# Patient Record
Sex: FEMALE | Race: WHITE | Employment: OTHER | URBAN - METROPOLITAN AREA
[De-identification: names, ages, dates, MRNs, and addresses within clinical notes are randomized per-mention and may not be internally consistent; named-entity substitution may affect disease eponyms.]

---

## 2017-01-04 DIAGNOSIS — E11.9 TYPE 2 DIABETES MELLITUS WITHOUT COMPLICATION, WITHOUT LONG-TERM CURRENT USE OF INSULIN (H): Primary | ICD-10-CM

## 2017-01-04 DIAGNOSIS — M10.9 GOUT, UNSPECIFIED: ICD-10-CM

## 2017-01-04 DIAGNOSIS — E11.8 TYPE 2 DIABETES MELLITUS WITH COMPLICATION, WITHOUT LONG-TERM CURRENT USE OF INSULIN (H): ICD-10-CM

## 2017-01-04 DIAGNOSIS — E78.2 MIXED HYPERLIPIDEMIA: ICD-10-CM

## 2017-01-04 DIAGNOSIS — I10 ESSENTIAL HYPERTENSION, BENIGN: ICD-10-CM

## 2017-01-04 RX ORDER — LISINOPRIL 10 MG/1
10 TABLET ORAL DAILY
Qty: 90 TABLET | Refills: 3 | Status: SHIPPED | OUTPATIENT
Start: 2017-01-04 | End: 2017-12-27

## 2017-01-04 RX ORDER — SIMVASTATIN 40 MG
40 TABLET ORAL AT BEDTIME
Qty: 90 TABLET | Refills: 3 | Status: SHIPPED | OUTPATIENT
Start: 2017-01-04 | End: 2017-12-27

## 2017-01-04 RX ORDER — ALLOPURINOL 300 MG/1
300 TABLET ORAL DAILY
Qty: 90 TABLET | Refills: 3 | Status: SHIPPED | OUTPATIENT
Start: 2017-01-04 | End: 2017-12-27

## 2017-01-04 RX ORDER — GLIMEPIRIDE 4 MG/1
4 TABLET ORAL 2 TIMES DAILY
Qty: 180 TABLET | Refills: 3 | Status: SHIPPED | OUTPATIENT
Start: 2017-01-04 | End: 2018-01-04

## 2017-01-04 RX ORDER — PROBENECID 500 MG/1
500 TABLET, FILM COATED ORAL 2 TIMES DAILY
Qty: 180 TABLET | Refills: 3 | Status: SHIPPED | OUTPATIENT
Start: 2017-01-04 | End: 2017-08-09

## 2017-03-28 ENCOUNTER — TELEPHONE (OUTPATIENT)
Dept: INTERNAL MEDICINE | Facility: CLINIC | Age: 77
End: 2017-03-28

## 2017-04-03 ENCOUNTER — MEDICAL CORRESPONDENCE (OUTPATIENT)
Dept: HEALTH INFORMATION MANAGEMENT | Facility: CLINIC | Age: 77
End: 2017-04-03

## 2017-04-15 DIAGNOSIS — R60.9 EDEMA, UNSPECIFIED TYPE: ICD-10-CM

## 2017-04-15 NOTE — TELEPHONE ENCOUNTER
Furosemide      Last Written Prescription Date: 12/27/2016  Last Fill Quantity: 60, # refills: 0  Last Office Visit with [facility] or [facility] prescribing provider: 12/9/2016       Potassium   Date Value Ref Range Status   09/22/2016 4.8 3.4 - 5.3 mmol/L Final     Creatinine   Date Value Ref Range Status   09/22/2016 0.67 0.52 - 1.04 mg/dL Final     BP Readings from Last 3 Encounters:   12/20/16 120/78   12/09/16 128/70   11/18/16 118/78

## 2017-04-17 RX ORDER — FUROSEMIDE 20 MG
20 TABLET ORAL DAILY PRN
Qty: 90 TABLET | Refills: 1 | Status: SHIPPED | OUTPATIENT
Start: 2017-04-17 | End: 2019-01-08

## 2017-04-19 ENCOUNTER — TELEPHONE (OUTPATIENT)
Dept: OBGYN | Facility: CLINIC | Age: 77
End: 2017-04-19

## 2017-04-19 DIAGNOSIS — B37.31 YEAST INFECTION OF THE VAGINA: ICD-10-CM

## 2017-04-19 RX ORDER — FLUCONAZOLE 150 MG/1
150 TABLET ORAL
Qty: 30 TABLET | Refills: 6 | Status: SHIPPED | OUTPATIENT
Start: 2017-04-19 | End: 2017-12-19

## 2017-04-19 NOTE — TELEPHONE ENCOUNTER
She did not need the diflucan at 12/20/2016. Pt. Expressed she needs the prescriptions now. She has not filled it not.    Complains of vaginal itching, lower back pain, wants to pre treat sx. Sx has been for ongoing for a few days. Denies Fever. Does not think she is having any foul odor or discharge.    Rx resent into pharmacy as written order.      Last OV note: 12/20/2016:  Assessment: 75 y/o with recurrent vaginal infection, declines exam today      Plan:   Labs pending  Prescribed diflucan and terazol to manage yeast infections and cipro if needed.      15 minutes spent with the patient with greater than 50% of the time spent in counseling the patient.      The information in this document, created by the medical scribe for me, accurately reflects the services I personally performed and the decisions made by me. I have reviewed and approved this document for accuracy prior to leaving the patient care area.  Lisa Church,   1:30 PM, 12/20/2016      Kelsae Bill, RN, BSN, PHN

## 2017-05-01 DIAGNOSIS — E11.9 TYPE 2 DIABETES MELLITUS WITHOUT COMPLICATION, WITHOUT LONG-TERM CURRENT USE OF INSULIN (H): Primary | ICD-10-CM

## 2017-05-01 NOTE — TELEPHONE ENCOUNTER
Pt calling for DME order for her [employer] irvin smartview meter.  Hers stopped working.    Last OV 6-16-16    Pt informed she is due for diab OV.    DME order done and faxed to pharm.

## 2017-06-04 ENCOUNTER — OFFICE VISIT (OUTPATIENT)
Dept: URGENT CARE | Facility: URGENT CARE | Age: 77
End: 2017-06-04
Payer: COMMERCIAL

## 2017-06-04 VITALS
SYSTOLIC BLOOD PRESSURE: 130 MMHG | DIASTOLIC BLOOD PRESSURE: 82 MMHG | OXYGEN SATURATION: 97 % | TEMPERATURE: 98.3 F | HEART RATE: 79 BPM

## 2017-06-04 DIAGNOSIS — R30.0 DYSURIA: Primary | ICD-10-CM

## 2017-06-04 LAB
ALBUMIN UR-MCNC: 30 MG/DL
APPEARANCE UR: CLEAR
BACTERIA #/AREA URNS HPF: ABNORMAL /HPF
BILIRUB UR QL STRIP: ABNORMAL
COLOR UR AUTO: YELLOW
GLUCOSE UR STRIP-MCNC: NEGATIVE MG/DL
HGB UR QL STRIP: NEGATIVE
KETONES UR STRIP-MCNC: ABNORMAL MG/DL
LEUKOCYTE ESTERASE UR QL STRIP: NEGATIVE
MICRO REPORT STATUS: NORMAL
NITRATE UR QL: NEGATIVE
PH UR STRIP: 5 PH (ref 5–7)
RBC #/AREA URNS AUTO: ABNORMAL /HPF (ref 0–2)
SP GR UR STRIP: 1.02 (ref 1–1.03)
SPECIMEN SOURCE: NORMAL
URN SPEC COLLECT METH UR: ABNORMAL
UROBILINOGEN UR STRIP-ACNC: 0.2 EU/DL (ref 0.2–1)
WBC #/AREA URNS AUTO: ABNORMAL /HPF (ref 0–2)
WET PREP SPEC: NORMAL

## 2017-06-04 PROCEDURE — 99214 OFFICE O/P EST MOD 30 MIN: CPT | Performed by: FAMILY MEDICINE

## 2017-06-04 PROCEDURE — 87086 URINE CULTURE/COLONY COUNT: CPT | Performed by: FAMILY MEDICINE

## 2017-06-04 PROCEDURE — 81001 URINALYSIS AUTO W/SCOPE: CPT | Performed by: FAMILY MEDICINE

## 2017-06-04 PROCEDURE — 87210 SMEAR WET MOUNT SALINE/INK: CPT | Performed by: FAMILY MEDICINE

## 2017-06-04 RX ORDER — CIPROFLOXACIN 250 MG/1
250 TABLET, FILM COATED ORAL 2 TIMES DAILY
Qty: 10 TABLET | Refills: 0 | Status: SHIPPED | OUTPATIENT
Start: 2017-06-04 | End: 2017-08-09

## 2017-06-04 NOTE — PROGRESS NOTES
SUBJECTIVE:  Lindsey Gary, a 76 year old female scheduled an appointment to discuss the following issues:  Dysuria ; these are similar symptoms that she's had in the past.    Concern whether this is a vaginal infection or urinary tract infection. She states her symptoms are significant for both or either.    Has been seen by urology has been down to the [facility] for [facility]rologicproblems.    Last seen here in the clinic in 12/16.    She has no fever no chills have some abdominal pain low back pain. No nausea or vomiting and no diarrhea.    Medical, social, surgical, and family histories reviewed.    ROS:  C: NEGATIVE for fever, chills  E: NEGATIVE for vision changes   R: NEGATIVE for significant cough or SOB  CV: NEGATIVE for chest pain, palpitations   GI: aas above  : NEGATIVE for frequency, dysuria, or hematuria   female: as above  M: NEGATIVE for significant arthralgias or myalgia  N: NEGATIVE for weakness, dizziness or paresthesias or headache    OBJECTIVE:  /82 (BP Location: Right arm, Patient Position: Chair, Cuff Size: Adult Regular)  Pulse 79  Temp 98.3  F (36.8  C) (Oral)  SpO2 97%  EXAM:  GENERAL APPEARANCE: healthy, alert and no distress  RESP: lungs clear to auscultation - no rales, rhonchi or wheezes  CV: regular rates and rhythm, normal S1 S2, no S3 or S4 and no murmur, click or rub -  ABDOMEN: soft and slightly tender  NEURO: Normal strength and tone, sensory exam grossly normal, mentation intact and speech normal    Results for orders placed or performed in visit on 06/04/17   UA reflex to Microscopic and Culture   Result Value Ref Range    Color Urine Yellow     Appearance Urine Clear     Glucose Urine Negative NEG mg/dL    Bilirubin Urine (A) NEG     Small  This is an unconfirmed screening test result. A positive result may be false.      Ketones Urine Trace (A) NEG mg/dL    Specific Gravity Urine 1.020 1.003 - 1.035    Blood Urine Negative NEG    pH Urine 5.0 5.0 - 7.0 pH    Protein  Albumin Urine 30 (A) NEG mg/dL    Urobilinogen Urine 0.2 0.2 - 1.0 EU/dL    Nitrite Urine Negative NEG    Leukocyte Esterase Urine Negative NEG    Source Midstream Urine    Urine Microscopic   Result Value Ref Range    WBC Urine O - 2 0 - 2 /HPF    RBC Urine O - 2 0 - 2 /HPF    Bacteria Urine Few (A) NEG /HPF   Wet prep   Result Value Ref Range    Specimen Description Vagina     Wet Prep       No Trichomonas seen  No clue cells seen  No yeast seen      Micro Report Status FINAL 06/04/2017          ASSESSMENT/PLAN:  (R30.0) Dysuria  (primary encounter diagnosis)  Comment:   Plan: Wet prep, UA reflex to Microscopic and Culture,        Urine Microscopic, Urine Culture Aerobic         Bacterial, ciprofloxacin (CIPRO) 250 MG tablet,        CANCELED: **UA reflex to Microscopic FUTURE 14d    Similar complaints that she's had no past. Negative workup today previous workup was negative also.    Cipro because of her symptoms. Follow-up with primary care    Suggested because of the way that she tries to keep her bladder from filling that she may be actually a little bit dehydrated. She has had some muscle cramping in the past. Will use electrolyte formulation Pedialyte Gatorade something without sugar in it since she is diabetic.    Should mention that her blood sugars have run over 200 recently suggesting that she may have an infection.    Cipro was sent to the pharmacy not to be used until culture is completed    25 minutes in exam and counseling. 50% of this time in counseling in regards to UTIs vaginitis previous history with her symptoms and potential follow-up. Discussed hydration  And electrolyte repletion

## 2017-06-04 NOTE — NURSING NOTE
"Chief Complaint   Patient presents with     Urgent Care     Vaginal Problem     Itchy, Burning sencation, LBP, frequency, lower abdominal pain x1 week       Initial /82 (BP Location: Right arm, Patient Position: Chair, Cuff Size: Adult Regular)  Pulse 79  Temp 98.3  F (36.8  C) (Oral)  SpO2 97% Estimated body mass index is 33.39 kg/(m^2) as calculated from the following:    Height as of 12/9/16: 5' 2.75\" (1.594 m).    Weight as of 12/20/16: 187 lb (84.8 kg).  Medication Reconciliation: complete     Anna Baeza CMA (AAMA)        "

## 2017-06-04 NOTE — MR AVS SNAPSHOT
"              After Visit Summary   6/4/2017    Lindsey Gary    MRN: [id number]           Patient Information     Date Of Birth          1940        Visit Information        Provider Department      6/4/2017 10:05 AM Júnior Castillo MD [facility]        Today's Diagnoses     Dysuria    -  1       Follow-ups after your visit        Your next 10 appointments already scheduled     Jun 06, 2017 11:00 AM CDT   [facility]    [address] [facility] [address] [phone]            Jun 07, 2017  9:40 AM CDT   [facility]IAM Sam C[facility]    [address]  [facility] [address] [phone]              Who to contact     If you have questions or need follow up information about today's clinic visit or your schedule please contact [facility] directly at [phone].  Normal or non-critical lab and imaging results will be communicated to you by MyChart, letter or phone within 4 business days after the clinic has received the results. If you do not hear from us within 7 days, please contact the clinic through MyChart or phone. If you have a critical or abnormal lab result, we will notify you by phone as soon as possible.  Submit refill requests through [employer] or call your pharmacy and they will forward the refill request to us. Please allow 3 business days for your refill to be completed.          Additional Information About Your Visit        [employer] Information     [employer] lets you send messages to your doctor, view your test results, renew your prescriptions, schedule appointments and more. To sign up, go to [email or web address] . Click on \"Log in\" on the left side of the screen, which will take you to the Welcome page. Then click on \"Sign up Now\" on the right side of the page.     You will be " asked to enter the access code listed below, as well as some personal information. Please follow the directions to create your username and password.     Your access code is: [email or web address]  Expires: 2017 11:33 AM     Your access code will  in 90 days. If you need help or a new code, please call your [facility] clinic or [phone].        Care EveryWhere ID     This is your Care EveryWhere ID. This could be used by other organizations to access your [facility] medical records  [id number]        Your Vitals Were     Pulse Temperature Pulse Oximetry             79 98.3  F (36.8  C) (Oral) 97%          Blood Pressure from Last 3 Encounters:   17 130/82   16 120/78   16 128/70    Weight from Last 3 Encounters:   16 187 lb (84.8 kg)   16 187 lb (84.8 kg)   16 190 lb (86.2 kg)              We Performed the Following     UA reflex to Microscopic and Culture     Urine Culture Aerobic Bacterial     Urine Microscopic     Wet prep          Today's Medication Changes          These changes are accurate as of: 17 11:33 AM.  If you have any questions, ask your nurse or doctor.               These medicines have changed or have updated prescriptions.        Dose/Directions    * ciprofloxacin 500 MG tablet   Commonly known as:  CIPRO   This may have changed:  Another medication with the same name was added. Make sure you understand how and when to take each.   Used for:  Personal history of urinary tract infection        Dose:  500 mg   Take 1 tablet (500 mg) by mouth 2 times daily   Quantity:  28 tablet   Refills:  11       * ciprofloxacin 250 MG tablet   Commonly known as:  CIPRO   This may have changed:  You were already taking a medication with the same name, and this prescription was added. Make sure you understand how and when to take each.   Used for:  Dysuria        Dose:  250 mg   Take 1 tablet (250 mg) by mouth 2 times daily   Quantity:  10 tablet   Refills:  0       *  Notice:  This list has 2 medication(s) that are the same as other medications prescribed for you. Read the directions carefully, and ask your doctor or other care provider to review them with you.         Where to get your medicines      These medications were sent to [id number] - [place], [place] - [address] [place] [facility] [address]  [address], [facility] [address]     Phone:  [phone]     ciprofloxacin 250 MG tablet                Primary Care Provider Office Phone # Fax #    Dinorah Boyer Marvin, LILLIAM [facility] [phone]       [facility] [address] [facility] [address]        Thank you!     Thank you for choosing [facility]  for your care. Our goal is always to provide you with excellent care. Hearing back from our patients is one way we can continue to improve our services. Please take a few minutes to complete the written survey that you may receive in the mail after your visit with us. Thank you!             Your Updated Medication List - Protect others around you: Learn how to safely use, store and throw away your medicines at www.disposemymeds.org.          This list is accurate as of: 6/4/17 11:33 AM.  Always use your most recent med list.                   Brand Name Dispense Instructions for use    allopurinol 300 MG tablet    ZYLOPRIM    90 tablet    Take 1 tablet (300 mg) by mouth daily       aspirin 81 MG tablet     100    1 tablet daily       blood glucose monitoring lancets     2 Box    Test 2-3 times daily as directed       * blood glucose monitoring meter device kit     1 kit    Use to test blood sugar 3 times daily or as directed.       * blood glucose monitoring meter device kit     1 kit    Use to test blood sugar 1 times daily or as directed.       blood glucose monitoring test strip    ACCU-CHEK SMARTVIEW    300 each    Check 3 times daily as directed       calcipotriene 0.005 % Oint      Apply 1 Application  topically as needed       cinnamon 500 MG Caps      2 tablets daily       * ciprofloxacin 500 MG tablet    CIPRO    28 tablet    Take 1 tablet (500 mg) by mouth 2 times daily       * ciprofloxacin 250 MG tablet    CIPRO    10 tablet    Take 1 tablet (250 mg) by mouth 2 times daily       conjugated estrogens cream    PREMARIN    30 g    Place vaginally twice a week May also use twice monthly       fluconazole 150 MG tablet    DIFLUCAN    30 tablet    Take 1 tablet (150 mg) by mouth every 3 days       furosemide 20 MG tablet    LASIX    90 tablet    Take 1 tablet (20 mg) by mouth daily as needed       gabapentin 300 MG capsule    NEURONTIN    180 capsule    Take 2 capsules (600 mg) by mouth At Bedtime       glimepiride 4 MG tablet    AMARYL    180 tablet    Take 1 tablet (4 mg) by mouth 2 times daily       indomethacin 50 MG capsule    INDOCIN    30 capsule    Take 1 capsule (50 mg) by mouth 3 times daily as needed for moderate pain       lisinopril 10 MG tablet    PRINIVIL/ZESTRIL    90 tablet    Take 1 tablet (10 mg) by mouth daily       MAGNESIUM OXIDE -MG SUPPLEMENT PO      Take 1 tablet by mouth daily       metFORMIN 500 MG tablet    GLUCOPHAGE    450 tablet    2 po with breakfast; one po with lunch and 2 po with evening meal       probenecid 500 MG tablet    BENEMID    180 tablet    Take 1 tablet (500 mg) by mouth 2 times daily       PROBIOTIC ACIDOPHILUS Tabs          simvastatin 40 MG tablet    ZOCOR    90 tablet    Take 1 tablet (40 mg) by mouth At Bedtime ONE TABLET DAILY IN THE EVENING       terconazole 0.4 % cream    TERAZOL 7    70 g    Place 1 applicator vaginally At Bedtime       TUMS 500 MG chewable tablet   Generic drug:  calcium carbonate     90    1 TABLET 3 TIMES PRN       TYLENOL 500 MG tablet   Generic drug:  acetaminophen      Take 1-2 tablets by mouth every morning.       * Notice:  This list has 4 medication(s) that are the same as other medications prescribed for you. Read the directions  carefully, and ask your doctor or other care provider to review them with you.

## 2017-06-05 LAB
BACTERIA SPEC CULT: NO GROWTH
MICRO REPORT STATUS: NORMAL
SPECIMEN SOURCE: NORMAL

## 2017-06-06 ENCOUNTER — NURSE TRIAGE (OUTPATIENT)
Dept: NURSING | Facility: CLINIC | Age: 77
End: 2017-06-06

## 2017-06-07 NOTE — TELEPHONE ENCOUNTER
"Reason for Call: \"I want to know my urine culture result-from 6/4/17.\"  Pt. says that she went to the [facility] clinic on 6/4/17. RN then checked EPIC and answered pt's questions. RN also instructed pt.to call that  clinic, for further information, about that culture. Pt. says that she will do this.    "

## 2017-06-09 ENCOUNTER — OFFICE VISIT (OUTPATIENT)
Dept: FAMILY MEDICINE | Facility: CLINIC | Age: 77
End: 2017-06-09
Payer: COMMERCIAL

## 2017-06-09 VITALS
WEIGHT: 187 LBS | SYSTOLIC BLOOD PRESSURE: 120 MMHG | HEART RATE: 89 BPM | OXYGEN SATURATION: 97 % | DIASTOLIC BLOOD PRESSURE: 60 MMHG | HEIGHT: 63 IN | TEMPERATURE: 97.7 F | BODY MASS INDEX: 33.13 KG/M2

## 2017-06-09 DIAGNOSIS — B37.31 YEAST INFECTION OF THE VAGINA: ICD-10-CM

## 2017-06-09 DIAGNOSIS — N89.8 VAGINAL DISCHARGE: Primary | ICD-10-CM

## 2017-06-09 LAB
MICRO REPORT STATUS: NORMAL
SPECIMEN SOURCE: NORMAL
WET PREP SPEC: NORMAL

## 2017-06-09 PROCEDURE — 99213 OFFICE O/P EST LOW 20 MIN: CPT | Performed by: NURSE PRACTITIONER

## 2017-06-09 PROCEDURE — 87210 SMEAR WET MOUNT SALINE/INK: CPT | Performed by: NURSE PRACTITIONER

## 2017-06-09 RX ORDER — TERCONAZOLE 0.4 %
1 CREAM WITH APPLICATOR VAGINAL AT BEDTIME
Qty: 70 G | Refills: 11 | Status: SHIPPED | OUTPATIENT
Start: 2017-06-09 | End: 2018-06-15

## 2017-06-09 NOTE — PROGRESS NOTES
"  SUBJECTIVE:                                                    Lindsey Gary is a 76 year old female who presents to clinic today for the following health issues:      Vaginal Symptoms     Onset: since memorial day     Description:  Vaginal Discharge: white   Itching (Pruritis): YES  Burning sensation:  YES  Odor: no     Accompanying Signs & Symptoms:  Pain with Urination: no   Abdominal Pain: no   Fever: no    History:   Sexually active: YES  New Partner: no   Possibility of Pregnancy:  No    Precipitating factors:   Recent Antibiotic Use: YES    Alleviating factors:  none   Therapies Tried and outcome: cipro,   Patient is here with complaints of vaginal dryness, vaginal itching and recurrent irritation. Recently diagnosed with urinary tract infection and completed a course of ciprofloxacin. Has been seen by 4 different urologists and nobody is able to \"fix her problem\". Here today for the first time and hoping that's she'll get a solution. Has been given Diflucan to take as needed and states that she takes it every 2 days. Has been given a prescription for Terazol 0.4% cream to be used vaginally to help with symptoms. Is currently out of that medication and is requesting a refill.      Problem list and histories reviewed & adjusted, as indicated.  Additional history: none    Patient Active Problem List   Diagnosis     Rosacea     Gout     CHONDROCALC NOS-OTHER SITE(aka PSEUDOGOUT)     Essential hypertension     Hyperlipidemia LDL goal <100     Advanced directives, counseling/discussion     Atrophic vaginitis     Chronic foot pain     Hypertension goal BP (blood pressure) < 130/80     Bereavement     Obesity     Type 2 diabetes mellitus without complication (H)     Status post total knee replacement, unspecified laterality     Psoriasis     Past Surgical History:   Procedure Laterality Date     C NONSPECIFIC PROCEDURE      Breast biopsies        C NONSPECIFIC PROCEDURE      Symptomatic left thigh lipomas        C " "NONSPECIFIC PROCEDURE  6/1/09    pubovaginal sling     COLONOSCOPY       COLONOSCOPY N/A 12/17/2014    Procedure: COMBINED COLONOSCOPY, SINGLE OR MULTIPLE BIOPSY/POLYPECTOMY BY BIOPSY;  Surgeon: Chuy Staton MD;  Location: [facility]     HYSTERECTOMY, PAP NO LONGER INDICATED       HYSTERECTOMY, JAIME  1991    fibroid     SURGICAL HISTORY OF -   10/28/2015    right partial knee        Social History   Substance Use Topics     Smoking status: Never Smoker     Smokeless tobacco: Never Used     Alcohol use No     Family History   Problem Relation Age of Onset     CANCER Mother      colon ca survivor     Cancer - colorectal Mother      CEREBROVASCULAR DISEASE Father      born 1916           Reviewed and updated as needed this visit by clinical staff  Tobacco  Allergies  Med Hx  Surg Hx  Fam Hx  Soc Hx      Reviewed and updated as needed this visit by Provider         ROS:  Constitutional, HEENT, cardiovascular, pulmonary, gi and gu systems are negative, except as otherwise noted.    OBJECTIVE:                                                    /60 (BP Location: Right arm, Patient Position: Chair, Cuff Size: Adult Large)  Pulse 89  Temp 97.7  F (36.5  C) (Oral)  Ht 5' 2.75\" (1.594 m)  Wt 187 lb (84.8 kg)  SpO2 97%  Breastfeeding? No  BMI 33.39 kg/m2  Body mass index is 33.39 kg/(m^2).  GENERAL: healthy, alert and no distress  RESP: lungs clear to auscultation - no rales, rhonchi or wheezes  CV: regular rate and rhythm, normal S1 S2, no S3 or S4, no murmur, click or rub, no peripheral edema and peripheral pulses strong  ABDOMEN: soft, nontender, no hepatosplenomegaly, no masses and bowel sounds normal  PSYCH: mentation appears normal, affect normal/bright    Results for orders placed or performed in visit on 06/09/17 (from the past 24 hour(s))   Wet prep   Result Value Ref Range    Specimen Description Vagina     Wet Prep       No Trichomonas seen  No clue cells seen  No yeast seen      Micro Report Status " FINAL 06/09/2017         ASSESSMENT/PLAN:                                                            1. Vaginal discharge   Wet prep is within normal limits. Explained that with recurrent use of Diflucan often difficult to determine exact cause of itching.  - Wet prep    2. Yeast infection of the vagina   For now, continue with Diflucan as directed by Dr. Vila's. I have refilled her Terazol as well. Recommended that she follow up with urology in the near future.  - terconazole (TERAZOL 7) 0.4 % cream; Place 1 applicator vaginally At Bedtime  Dispense: 70 g; Refill: 11        Calista Rachel NP  [facility]

## 2017-06-09 NOTE — NURSING NOTE
"Chief Complaint   Patient presents with     Vaginal Problem       Initial /60 (BP Location: Right arm, Patient Position: Chair, Cuff Size: Adult Large)  Pulse 89  Temp 97.7  F (36.5  C) (Oral)  Ht 5' 2.75\" (1.594 m)  Wt 187 lb (84.8 kg)  SpO2 97%  Breastfeeding? No  BMI 33.39 kg/m2 Estimated body mass index is 33.39 kg/(m^2) as calculated from the following:    Height as of this encounter: 5' 2.75\" (1.594 m).    Weight as of this encounter: 187 lb (84.8 kg).  Medication Reconciliation: complete   CARLOS ALBERTO Mena      "

## 2017-06-09 NOTE — MR AVS SNAPSHOT
"              After Visit Summary   2017    Lindsey Gary    MRN: [id number]           Patient Information     Date Of Birth          1940        Visit Information        Provider Department      2017 10:45 AM Calista Rachel NP [facility]        Today's Diagnoses     Vaginal discharge    -  1    Yeast infection of the vagina           Follow-ups after your visit        Who to contact     If you have questions or need follow up information about today's clinic visit or your schedule please contact [facility] directly at [phone].  Normal or non-critical lab and imaging results will be communicated to you by NeuroSigmahart, letter or phone within 4 business days after the clinic has received the results. If you do not hear from us within 7 days, please contact the clinic through NeuroSigmahart or phone. If you have a critical or abnormal lab result, we will notify you by phone as soon as possible.  Submit refill requests through SUPR or call your pharmacy and they will forward the refill request to us. Please allow 3 business days for your refill to be completed.          Additional Information About Your Visit        MyChart Information     SUPR lets you send messages to your doctor, view your test results, renew your prescriptions, schedule appointments and more. To sign up, go to [email or web address] . Click on \"Log in\" on the left side of the screen, which will take you to the Welcome page. Then click on \"Sign up Now\" on the right side of the page.     You will be asked to enter the access code listed below, as well as some personal information. Please follow the directions to create your username and password.     Your access code is: [email or web address]  Expires: 2017 11:33 AM     Your access code will  in 90 days. If you need help or a new code, please call your [facility] or [phone].        Care EveryWhere ID     This is your Care EveryWhere ID. This " "could be used by other organizations to access your [facility] medical records  [id number]        Your Vitals Were     Pulse Temperature Height Pulse Oximetry Breastfeeding? BMI (Body Mass Index)    89 97.7  F (36.5  C) (Oral) 5' 2.75\" (1.594 m) 97% No 33.39 kg/m2       Blood Pressure from Last 3 Encounters:   06/09/17 120/60   06/04/17 130/82   12/20/16 120/78    Weight from Last 3 Encounters:   06/09/17 187 lb (84.8 kg)   12/20/16 187 lb (84.8 kg)   12/09/16 187 lb (84.8 kg)              We Performed the Following     Wet prep          Where to get your medicines      These medications were sent to [facility] [id number] [place] [address] [address] [place] [address] [address] AT [facility] [address]  [address] [place] [address] [address], [facility] [address]     Phone:  [phone]     terconazole 0.4 % cream          Primary Care Provider Office Phone # Fax #    LILLIAM Sam [facility] [phone]       [facility] [address] [place] [address]        Thank you!     Thank you for choosing [facility]  for your care. Our goal is always to provide you with excellent care. Hearing back from our patients is one way we can continue to improve our services. Please take a few minutes to complete the written survey that you may receive in the mail after your visit with us. Thank you!             Your Updated Medication List - Protect others around you: Learn how to safely use, store and throw away your medicines at www.disposemymeds.org.          This list is accurate as of: 6/9/17 11:27 AM.  Always use your most recent med list.                   Brand Name Dispense Instructions for use    allopurinol 300 MG tablet    ZYLOPRIM    90 tablet    Take 1 tablet (300 mg) by mouth daily       aspirin 81 MG tablet     100    1 tablet daily       blood glucose monitoring lancets     2 Box    Test 2-3 times daily as directed       * blood glucose monitoring meter device kit     1 kit    Use to test " blood sugar 3 times daily or as directed.       * blood glucose monitoring meter device kit     1 kit    Use to test blood sugar 1 times daily or as directed.       blood glucose monitoring test strip    ACCU-CHEK SMARTVIEW    300 each    Check 3 times daily as directed       calcipotriene 0.005 % Oint      Apply 1 Application topically as needed       cinnamon 500 MG Caps      2 tablets daily       ciprofloxacin 250 MG tablet    CIPRO    10 tablet    Take 1 tablet (250 mg) by mouth 2 times daily       conjugated estrogens cream    PREMARIN    30 g    Place vaginally twice a week May also use twice monthly       fluconazole 150 MG tablet    DIFLUCAN    30 tablet    Take 1 tablet (150 mg) by mouth every 3 days       furosemide 20 MG tablet    LASIX    90 tablet    Take 1 tablet (20 mg) by mouth daily as needed       gabapentin 300 MG capsule    NEURONTIN    180 capsule    Take 2 capsules (600 mg) by mouth At Bedtime       glimepiride 4 MG tablet    AMARYL    180 tablet    Take 1 tablet (4 mg) by mouth 2 times daily       indomethacin 50 MG capsule    INDOCIN    30 capsule    Take 1 capsule (50 mg) by mouth 3 times daily as needed for moderate pain       lisinopril 10 MG tablet    PRINIVIL/ZESTRIL    90 tablet    Take 1 tablet (10 mg) by mouth daily       MAGNESIUM OXIDE -MG SUPPLEMENT PO      Take 1 tablet by mouth daily       metFORMIN 500 MG tablet    GLUCOPHAGE    450 tablet    2 po with breakfast; one po with lunch and 2 po with evening meal       probenecid 500 MG tablet    BENEMID    180 tablet    Take 1 tablet (500 mg) by mouth 2 times daily       PROBIOTIC ACIDOPHILUS Tabs          simvastatin 40 MG tablet    ZOCOR    90 tablet    Take 1 tablet (40 mg) by mouth At Bedtime ONE TABLET DAILY IN THE EVENING       terconazole 0.4 % cream    TERAZOL 7    70 g    Place 1 applicator vaginally At Bedtime       TUMS 500 MG chewable tablet   Generic drug:  calcium carbonate     90    1 TABLET 3 TIMES PRN       TYLENOL  500 MG tablet   Generic drug:  acetaminophen      Take 1-2 tablets by mouth every morning.       * Notice:  This list has 2 medication(s) that are the same as other medications prescribed for you. Read the directions carefully, and ask your doctor or other care provider to review them with you.

## 2017-07-12 ENCOUNTER — TELEPHONE (OUTPATIENT)
Dept: PHARMACY | Facility: OTHER | Age: 77
End: 2017-07-12

## 2017-07-12 NOTE — TELEPHONE ENCOUNTER
Clobetasol Propionate 0.05% oint will require a Prior Authorization    Pt's Insurance plan - [employer] Part D  Insurance Phone # [phone]  Member ID# [id number]    [facility]  [phone]

## 2017-07-27 DIAGNOSIS — E11.9 TYPE 2 DIABETES MELLITUS WITHOUT COMPLICATION (H): ICD-10-CM

## 2017-07-27 DIAGNOSIS — I10 ESSENTIAL HYPERTENSION WITH GOAL BLOOD PRESSURE LESS THAN 140/90: ICD-10-CM

## 2017-07-27 DIAGNOSIS — E78.5 HYPERLIPIDEMIA LDL GOAL <100: ICD-10-CM

## 2017-07-27 LAB
ALBUMIN SERPL-MCNC: 4.2 G/DL (ref 3.4–5)
ALP SERPL-CCNC: 74 U/L (ref 40–150)
ALT SERPL W P-5'-P-CCNC: 26 U/L (ref 0–50)
ANION GAP SERPL CALCULATED.3IONS-SCNC: 10 MMOL/L (ref 3–14)
AST SERPL W P-5'-P-CCNC: 15 U/L (ref 0–45)
BILIRUB SERPL-MCNC: 0.9 MG/DL (ref 0.2–1.3)
BUN SERPL-MCNC: 28 MG/DL (ref 7–30)
CALCIUM SERPL-MCNC: 9.5 MG/DL (ref 8.5–10.1)
CHLORIDE SERPL-SCNC: 103 MMOL/L (ref 94–109)
CO2 SERPL-SCNC: 25 MMOL/L (ref 20–32)
CREAT SERPL-MCNC: 0.84 MG/DL (ref 0.52–1.04)
GFR SERPL CREATININE-BSD FRML MDRD: 66 ML/MIN/1.7M2
GLUCOSE SERPL-MCNC: 201 MG/DL (ref 70–99)
HBA1C MFR BLD: 8.2 % (ref 4.3–6)
POTASSIUM SERPL-SCNC: 4.9 MMOL/L (ref 3.4–5.3)
PROT SERPL-MCNC: 7.6 G/DL (ref 6.8–8.8)
SODIUM SERPL-SCNC: 138 MMOL/L (ref 133–144)

## 2017-07-27 PROCEDURE — 80053 COMPREHEN METABOLIC PANEL: CPT | Performed by: NURSE PRACTITIONER

## 2017-07-27 PROCEDURE — 83036 HEMOGLOBIN GLYCOSYLATED A1C: CPT | Performed by: NURSE PRACTITIONER

## 2017-07-27 PROCEDURE — 36415 COLL VENOUS BLD VENIPUNCTURE: CPT | Performed by: NURSE PRACTITIONER

## 2017-08-09 ENCOUNTER — OFFICE VISIT (OUTPATIENT)
Dept: INTERNAL MEDICINE | Facility: CLINIC | Age: 77
End: 2017-08-09
Payer: COMMERCIAL

## 2017-08-09 ENCOUNTER — CARE COORDINATION (OUTPATIENT)
Dept: CARE COORDINATION | Facility: CLINIC | Age: 77
End: 2017-08-09

## 2017-08-09 VITALS
WEIGHT: 187.5 LBS | SYSTOLIC BLOOD PRESSURE: 122 MMHG | DIASTOLIC BLOOD PRESSURE: 60 MMHG | TEMPERATURE: 97.8 F | OXYGEN SATURATION: 99 % | HEIGHT: 63 IN | HEART RATE: 90 BPM | BODY MASS INDEX: 33.22 KG/M2

## 2017-08-09 DIAGNOSIS — E11.9 TYPE 2 DIABETES MELLITUS WITHOUT COMPLICATION, WITHOUT LONG-TERM CURRENT USE OF INSULIN (H): ICD-10-CM

## 2017-08-09 DIAGNOSIS — I10 ESSENTIAL HYPERTENSION: Primary | ICD-10-CM

## 2017-08-09 DIAGNOSIS — Z59.71 INSURANCE COVERAGE PROBLEMS: ICD-10-CM

## 2017-08-09 DIAGNOSIS — E78.5 HYPERLIPIDEMIA LDL GOAL <100: ICD-10-CM

## 2017-08-09 DIAGNOSIS — L40.9 PSORIASIS: ICD-10-CM

## 2017-08-09 DIAGNOSIS — R30.0 DYSURIA: ICD-10-CM

## 2017-08-09 PROCEDURE — 99215 OFFICE O/P EST HI 40 MIN: CPT | Performed by: NURSE PRACTITIONER

## 2017-08-09 RX ORDER — CIPROFLOXACIN 250 MG/1
250 TABLET, FILM COATED ORAL 2 TIMES DAILY
Qty: 10 TABLET | Refills: 0 | Status: SHIPPED | OUTPATIENT
Start: 2017-08-09 | End: 2017-11-03

## 2017-08-09 RX ORDER — GLIPIZIDE 2.5 MG/1
2.5 TABLET, EXTENDED RELEASE ORAL DAILY
Qty: 90 TABLET | Refills: 1 | Status: SHIPPED | OUTPATIENT
Start: 2017-08-09 | End: 2017-12-19 | Stop reason: ALTCHOICE

## 2017-08-09 NOTE — PROGRESS NOTES
Clinic Care Coordination Contact  [facility]/Voicemail    Referral Source: PCP  Clinical Data: Care Coordinator Outreach  Outreach attempted x 1.  Left message on voicemail with call back information and requested return call. SW will provide pt with information on insurance options.     Plan: Care Coordinator will try to reach patient again in 1-2 business days.    MALI Sullivan, [facility]  Social Work - Care Coordinator  [facility]  Phone: [phone]

## 2017-08-09 NOTE — PATIENT INSTRUCTIONS
Glipizide once daily   See how you do with this medication     Refill on Cipro sent in     Repeat labs fasting in 3-4 months

## 2017-08-09 NOTE — LETTER
[address] [place] [address]      August 10, 2017      Lindsey Gary  [id number]  [facility] [address]    Dear Lindsey,  I am the Clinic Care Coordinator that works with your primary care provider's clinic. I have been trying to reach you recently to introduce Clinic Care Coordination and to see if there was anything I could assist you with.  Below is a description of what Clinic Care Coordination is and how I can further assist you.     The Clinic Care Coordinator role is a Registered Nurse and/or  who understands the health care system. The goal of Clinic Care Coordination is to help you manage your health and improve access to the [facility] in the most efficient manner.  The Registered Nurse can assist you in meeting your health care goals by providing education, coordinating services, and strengthening the communication among your providers. The  can assist you with financial, behavioral, psychosocial, and chemical dependency and counseling/psychiatric resources.    Please feel free to keep this letter and contact information to contact me at [phone] with any further questions or concerns that may arise. We at [facility] are focused on providing you with the highest-quality healthcare experience possible and that all starts with you.       Sincerely,     Maricarmen Boyd    Enclosed: Insurance information

## 2017-08-09 NOTE — MR AVS SNAPSHOT
After Visit Summary   8/9/2017    Lindsey Gary    MRN: [id number]           Patient Information     Date Of Birth          1940        Visit Information        Provider Department      8/9/2017 9:40 AM Dinorah Wong APRN CNP [facility]        Today's Diagnoses     Essential hypertension    -  1    Psoriasis        Insurance coverage problems        Type 2 diabetes mellitus without complication, without long-term current use of insulin (H)        Dysuria          Care Instructions    Glipizide once daily   See how you do with this medication     Refill on Cipro sent in               Follow-ups after your visit        Additional Services     CARE COORDINATION REFERRAL       Services are provided by a Care Coordinator for people with complex needs such as: medical, social, or financial troubles.  The Care Coordinator works with the patient and their Primary Care Provider to determine health goals, obtain resources, achieve outcomes, and develop care plans that help coordinate the patient's care.     Reason for Referral: Other Financial Concerns    Provide additional details for Care Coordination to best meet the patient's current needs:   Information about choosing insurance plans and insurance brokers to find the best choice     Clinical Staff have discussed the Care Coordination Referral with the patient and/or caregiver: yes                  Future tests that were ordered for you today     Open Future Orders        Priority Expected Expires Ordered    Hemoglobin A1c Routine  3/28/2018 8/9/2017    Comprehensive metabolic panel Routine  3/28/2018 8/9/2017            Who to contact     If you have questions or need follow up information about today's clinic visit or your schedule please contact [facility] directly at [phone].  Normal or non-critical lab and imaging results will be communicated to you by MyChart, letter or phone within 4 business days  "after the clinic has received the results. If you do not hear from us within 7 days, please contact the clinic through [email or web address] or phone. If you have a critical or abnormal lab result, we will notify you by phone as soon as possible.  Submit refill requests through [email or web address] or call your pharmacy and they will forward the refill request to us. Please allow 3 business days for your refill to be completed.          Additional Information About Your Visit        [email or web address] Information     [email or web address] lets you send messages to your doctor, view your test results, renew your prescriptions, schedule appointments and more. To sign up, go to [email or web address] . Click on \"Log in\" on the left side of the screen, which will take you to the Welcome page. Then click on \"Sign up Now\" on the right side of the page.     You will be asked to enter the access code listed below, as well as some personal information. Please follow the directions to create your username and password.     Your access code is: [email or web address]  Expires: 2017 11:33 AM     Your access code will  in 90 days. If you need help or a new code, please call your [facility] clinic or [phone].        Care EveryWhere ID     This is your Care EveryWhere ID. This could be used by other organizations to access your [facility] medical records  [id number]        Your Vitals Were     Pulse Temperature Height Pulse Oximetry BMI (Body Mass Index)       90 97.8  F (36.6  C) (Oral) 5' 2.75\" (1.594 m) 99% 33.48 kg/m2        Blood Pressure from Last 3 Encounters:   17 122/60   17 120/60   17 130/82    Weight from Last 3 Encounters:   17 187 lb 8 oz (85 kg)   17 187 lb (84.8 kg)   16 187 lb (84.8 kg)              We Performed the Following     CARE COORDINATION REFERRAL          Today's Medication Changes          These changes are accurate as of: 17 10:29 AM.  If you have any questions, ask your nurse or doctor.               Start " taking these medicines.        Dose/Directions    glipiZIDE 2.5 MG 24 hr tablet   Commonly known as:  glipiZIDE XL   Used for:  Type 2 diabetes mellitus without complication, without long-term current use of insulin (H)   Started by:  Dinorah Wong APRN CNP        Dose:  2.5 mg   Take 1 tablet (2.5 mg) by mouth daily   Quantity:  90 tablet   Refills:  1            Where to get your medicines      These medications were sent to [facility] [id number] [place] [address] [address] AT [facility]  [address] [facility] [address], [facility] [address]     Phone:  [phone]     ciprofloxacin 250 MG tablet    glipiZIDE 2.5 MG 24 hr tablet                Primary Care Provider Office Phone # Fax #    LILLIAM Sam [phone]       [address] [facility] [address]        Equal Access to Services     ASHA OCAMPO AH: Hadii lorna ku hadasho Soomaali, waaxda luqadaha, qaybta kaalmada adeegyada, waxay idiin hayaagifty velarde khdipesh hardwick . So [facility] [phone].    ATENCIÓN: Si gilbertola espryan, tiene a toribio disposición servicios gratuitos de asistencia lingüística. Llame al [phone].    We comply with applicable federal civil rights laws and [place] laws. We do not discriminate on the basis of race, color, national origin, age, disability sex, sexual orientation or gender identity.            Thank you!     Thank you for choosing [facility]  for your care. Our goal is always to provide you with excellent care. Hearing back from our patients is one way we can continue to improve our services. Please take a few minutes to complete the written survey that you may receive in the mail after your visit with us. Thank you!             Your Updated Medication List - Protect others around you: Learn how to safely use, store and throw away your medicines at www.disposemymeds.org.          This list is accurate as of: 8/9/17 10:29 AM.  Always use your most recent med  list.                   Brand Name Dispense Instructions for use Diagnosis    allopurinol 300 MG tablet    ZYLOPRIM    90 tablet    Take 1 tablet (300 mg) by mouth daily    Gout, unspecified       aspirin 81 MG tablet     100    1 tablet daily        blood glucose monitoring lancets     2 Box    Test 2-3 times daily as directed    Type 2 diabetes, HbA1c goal < 7% (H)       * blood glucose monitoring meter device kit     1 kit    Use to test blood sugar 3 times daily or as directed.    Diabetes mellitus, type 2 (H)       * blood glucose monitoring meter device kit     1 kit    Use to test blood sugar 1 times daily or as directed.    Type 2 diabetes mellitus without complication, without long-term current use of insulin (H)       blood glucose monitoring test strip    ACCU-CHEK SMARTVIEW    300 each    Check 3 times daily as directed    Type 2 diabetes, HbA1c goal < 7% (H)       calcipotriene 0.005 % Oint      Apply 1 Application topically as needed        cinnamon 500 MG Caps      2 tablets daily        ciprofloxacin 250 MG tablet    CIPRO    10 tablet    Take 1 tablet (250 mg) by mouth 2 times daily    Dysuria       conjugated estrogens cream    PREMARIN    30 g    Place vaginally twice a week May also use twice monthly    Atrophic vaginitis       fluconazole 150 MG tablet    DIFLUCAN    30 tablet    Take 1 tablet (150 mg) by mouth every 3 days    Yeast infection of the vagina       furosemide 20 MG tablet    LASIX    90 tablet    Take 1 tablet (20 mg) by mouth daily as needed    Edema, unspecified type       gabapentin 300 MG capsule    NEURONTIN    180 capsule    Take 2 capsules (600 mg) by mouth At Bedtime    Neuropathy (H)       glimepiride 4 MG tablet    AMARYL    180 tablet    Take 1 tablet (4 mg) by mouth 2 times daily    Type 2 diabetes mellitus without complication, without long-term current use of insulin (H)       glipiZIDE 2.5 MG 24 hr tablet    glipiZIDE XL    90 tablet    Take 1 tablet (2.5 mg) by  mouth daily    Type 2 diabetes mellitus without complication, without long-term current use of insulin (H)       indomethacin 50 MG capsule    INDOCIN    30 capsule    Take 1 capsule (50 mg) by mouth 3 times daily as needed for moderate pain    Gout, unspecified cause, unspecified chronicity, unspecified site       lisinopril 10 MG tablet    PRINIVIL/ZESTRIL    90 tablet    Take 1 tablet (10 mg) by mouth daily    Essential hypertension, benign       MAGNESIUM OXIDE -MG SUPPLEMENT PO      Take 1 tablet by mouth daily        metFORMIN 500 MG tablet    GLUCOPHAGE    450 tablet    2 po with breakfast; one po with lunch and 2 po with evening meal    Type 2 diabetes mellitus with complication, without long-term current use of insulin (H)       simvastatin 40 MG tablet    ZOCOR    90 tablet    Take 1 tablet (40 mg) by mouth At Bedtime ONE TABLET DAILY IN THE EVENING    Mixed hyperlipidemia       terconazole 0.4 % cream    TERAZOL 7    70 g    Place 1 applicator vaginally At Bedtime    Yeast infection of the vagina       TUMS 500 MG chewable tablet   Generic drug:  calcium carbonate     90    1 TABLET 3 TIMES PRN        TYLENOL 500 MG tablet   Generic drug:  acetaminophen      Take 1-2 tablets by mouth every morning.    Mixed hyperlipidemia, Gout, unspecified, Type 2 diabetes, HbA1c goal < 7% (H), Hyperlipidemia LDL goal <100       * Notice:  This list has 2 medication(s) that are the same as other medications prescribed for you. Read the directions carefully, and ask your doctor or other care provider to review them with you.

## 2017-08-09 NOTE — PROGRESS NOTES
SUBJECTIVE:                                                    Lindsey Gary is a 77 year old female who presents to clinic today for the following health issues:      Diabetes Follow-up    Patient is checking blood sugars: once daily.  Results are as follows:       am - 190-200        Diabetic concerns: A1C is  High and what medication is she beening put on.     Symptoms of hypoglycemia (low blood sugar): none     Paresthesias (numbness or burning in feet) or sores: Yes, tingling on toes.   Date of last diabetic eye exam: 2016      Amount of exercise or physical activity: Walking 30 minutes daily.    Problems taking medications regularly: No    Medication side effects: none  Diet: low salt, carbohydrate counting and no sugar.    Her A1C in past has gone up with stress  She has 4 older adults who are having health issues and are probably going to die in near future   Her 15 year old cat is sick   Her  has skin cancer       Check both ears to make sure there's no wax.    Problem list and histories reviewed & adjusted, as indicated.  Additional history: as documented    Patient Active Problem List   Diagnosis     Rosacea     Gout     CHONDROCALC NOS-OTHER SITE(aka PSEUDOGOUT)     Essential hypertension     Hyperlipidemia LDL goal <100     Advanced directives, counseling/discussion     Atrophic vaginitis     Chronic foot pain     Hypertension goal BP (blood pressure) < 130/80     Bereavement     Obesity     Type 2 diabetes mellitus without complication (H)     Status post total knee replacement, unspecified laterality     Psoriasis     Past Surgical History:   Procedure Laterality Date     C NONSPECIFIC PROCEDURE      Breast biopsies        C NONSPECIFIC PROCEDURE      Symptomatic left thigh lipomas        C NONSPECIFIC PROCEDURE  6/1/09    pubovaginal sling     COLONOSCOPY       COLONOSCOPY N/A 12/17/2014    Procedure: COMBINED COLONOSCOPY, SINGLE OR MULTIPLE BIOPSY/POLYPECTOMY BY BIOPSY;  Surgeon: Brionna  "Chuy FRYE MD;  Location: [facility]     HYSTERECTOMY, PAP NO LONGER INDICATED       HYSTERECTOMY, JAIME  1991    fibroid     SURGICAL HISTORY OF -   10/28/2015    right partial knee        Social History   Substance Use Topics     Smoking status: Never Smoker     Smokeless tobacco: Never Used     Alcohol use No     Family History   Problem Relation Age of Onset     CANCER Mother      colon ca survivor     Cancer - colorectal Mother      CEREBROVASCULAR DISEASE Father      born 1916             Reviewed and updated as needed this visit by clinical staffTobacco  Allergies  Meds  Med Hx  Surg Hx  Fam Hx  Soc Hx      Reviewed and updated as needed this visit by Provider         ROS:  Constitutional, HEENT, cardiovascular, pulmonary, GI, , musculoskeletal, neuro, skin, endocrine and psych systems are negative, except as otherwise noted.      OBJECTIVE:   /60 (BP Location: Left arm, Patient Position: Sitting, Cuff Size: Adult Large)  Pulse 90  Temp 97.8  F (36.6  C) (Oral)  Ht 5' 2.75\" (1.594 m)  Wt 187 lb 8 oz (85 kg)  SpO2 99%  BMI 33.48 kg/m2  Body mass index is 33.48 kg/(m^2).  GENERAL: healthy, alert and no distress  HENT: ear canals and TM's normal, nose and mouth without ulcers or lesions  RESP: lungs clear to auscultation - no rales, rhonchi or wheezes  CV: regular rate and rhythm, normal S1 S2, no S3 or S4, no murmur, click or rub, no peripheral edema and peripheral pulses strong  ABDOMEN: soft, nontender, no hepatosplenomegaly, no masses and bowel sounds normal  MS: no gross musculoskeletal defects noted, no edema  NEURO: Normal strength and tone, mentation intact and speech normal  PSYCH: mentation appears normal, affect normal/bright    Diagnostic Test Results:  Reviewed labs   Future labs     ASSESSMENT/PLAN:             1. Essential hypertension  In good control   - Lipid panel reflex to direct LDL; Future  - TSH with free T4 reflex; Future  - CBC with platelets differential; Future    2. " Psoriasis  Treated with derm   Expensive medication for it - cream was $400 fr her to pay    3. Insurance coverage problems  Wants to research best company for her diabetic and psoriasis medication   - CARE COORDINATION REFERRAL    4. Type 2 diabetes mellitus without complication, without long-term current use of insulin (H)  Not in control- needs improvement   She had an allergy to sulfa in past but wants to try this medication even though could have some reaction regarding this   - glipiZIDE (GLIPIZIDE XL) 2.5 MG 24 hr tablet; Take 1 tablet (2.5 mg) by mouth daily  Dispense: 90 tablet; Refill: 1  - Hemoglobin A1c; Future  - Comprehensive metabolic panel; Future  - Lipid panel reflex to direct LDL; Future  - TSH with free T4 reflex; Future  - CBC with platelets differential; Future    5. Dysuria  Wants to have on hand as she is traveling   - ciprofloxacin (CIPRO) 250 MG tablet; Take 1 tablet (250 mg) by mouth 2 times daily  Dispense: 10 tablet; Refill: 0    6. Hyperlipidemia LDL goal <100  Fasting for labs in future   - Lipid panel reflex to direct LDL; Future    Patient Instructions   Glipizide once daily   See how you do with this medication     Refill on Cipro sent in     Repeat labs fasting in 3-4 months           LILLIAM Sam [facility]

## 2017-08-10 NOTE — PROGRESS NOTES
Clinic Care Coordination Contact  [facility]/Voicemail    Referral Source: PCP  Clinical Data: Care Coordinator Outreach  Outreach attempted x 2.  Left message on voicemail with call back information and requested return call. SW mailed pt insurance resources.    Plan: Care Coordinator mailed out care coordination introduction letter on 08/10/17. Care Coordinator will try to reach patient again in 3-5 business days.    MALI Sullivan, [facility]  Social Work - Care Coordinator  [facility] [place], [place], and [place]  Phone: [phone]

## 2017-08-14 ENCOUNTER — CARE COORDINATION (OUTPATIENT)
Dept: CARE COORDINATION | Facility: CLINIC | Age: 77
End: 2017-08-14

## 2017-08-14 NOTE — PROGRESS NOTES
Clinic Care Coordination Contact  Voicemail    Referral Source: PCP  Clinical Data: Care Coordinator Outreach  Message from pt stating that she did not know why SW was calling her. Pt stated that she would prefer to have SW call her back to discuss what SW wanted. SW left a message for pt stating that she was calling regarding insurance concerns. SW asked that SW call back to discuss. Spoke to pt who stated that she is over income for [employer] plans. Pt stated that she was just concerned about the cost of her medications. Pt declined SW follow-up.    Plan: Pt declined clinic care coordination services. Pt has SW contact information for future needs.    MALI Sullivan, [facility]  Social Work - Care Coordinator  [facility] [place], [place], and [place]  Phone: [phone]

## 2017-10-09 ENCOUNTER — ALLIED HEALTH/NURSE VISIT (OUTPATIENT)
Dept: NURSING | Facility: CLINIC | Age: 77
End: 2017-10-09
Payer: COMMERCIAL

## 2017-10-09 DIAGNOSIS — Z23 NEED FOR PROPHYLACTIC VACCINATION AND INOCULATION AGAINST INFLUENZA: Primary | ICD-10-CM

## 2017-10-09 PROCEDURE — 90662 IIV NO PRSV INCREASED AG IM: CPT

## 2017-10-09 PROCEDURE — 99207 ZZC NO CHARGE NURSE ONLY: CPT

## 2017-10-09 PROCEDURE — G0008 ADMIN INFLUENZA VIRUS VAC: HCPCS

## 2017-10-09 NOTE — PROGRESS NOTES
Injectable Influenza Immunization Documentation    1.  Is the person to be vaccinated sick today?   No    2. Does the person to be vaccinated have an allergy to a component   of the vaccine?   No    3. Has the person to be vaccinated ever had a serious reaction   to influenza vaccine in the past?   No    4. Has the person to be vaccinated ever had Guillain-Barré syndrome?   No    Form completed by Pema Tai MA

## 2017-10-09 NOTE — MR AVS SNAPSHOT
"              After Visit Summary   10/9/2017    Lindsey Gary    MRN: [id number]           Patient Information     Date Of Birth          1940        Visit Information        Provider Department      10/9/2017 11:30 AM EA FLU CLINIC NURSE [facility]        Today's Diagnoses     Need for prophylactic vaccination and inoculation against influenza    -  1       Follow-ups after your visit        Who to contact     If you have questions or need follow up information about today's clinic visit or your schedule please contact [facility] directly at [phone].  Normal or non-critical lab and imaging results will be communicated to you by xiao qu wu youhart, letter or phone within 4 business days after the clinic has received the results. If you do not hear from us within 7 days, please contact the clinic through [employer] or phone. If you have a critical or abnormal lab result, we will notify you by phone as soon as possible.  Submit refill requests through [employer] or call your pharmacy and they will forward the refill request to us. Please allow 3 business days for your refill to be completed.          Additional Information About Your Visit        MyChart Information     [employer] lets you send messages to your doctor, view your test results, renew your prescriptions, schedule appointments and more. To sign up, go to [email or web address] . Click on \"Log in\" on the left side of the screen, which will take you to the Welcome page. Then click on \"Sign up Now\" on the right side of the page.     You will be asked to enter the access code listed below, as well as some personal information. Please follow the directions to create your username and password.     Your access code is: [email or web address]  Expires: 2018 11:58 AM     Your access code will  in 90 days. If you need help or a new code, please call your [facility]3] or [phone].        Care EveryWhere ID     This is your Care EveryWhere ID. " This could be used by other organizations to access your [facility] medical records  [id number]         Blood Pressure from Last 3 Encounters:   08/09/17 122/60   06/09/17 120/60   06/04/17 130/82    Weight from Last 3 Encounters:   08/09/17 187 lb 8 oz (85 kg)   06/09/17 187 lb (84.8 kg)   12/20/16 187 lb (84.8 kg)              We Performed the Following     FLU VACCINE, INCREASED ANTIGEN, PRESV FREE, AGE 65+ [90485]     Vaccine Administration, Initial [75772]        Primary Care Provider Office Phone # Fax #    Dinorah Boyer Marvin, APRN [facility] [phone]       [address] [facility] [address]        Equal Access to Services     ASHA OCAMPO : Hadii lorna nassaro Soheather, waaxda luqadaha, qaybta kaalmada adeegyada, janet hardwick . So [facility] [phone].    ATENCIÓN: Si habla español, tiene a toribio disposición servicios gratuitos de asistencia lingüística. Llame al [phone].    We comply with applicable federal civil rights laws and [place] laws. We do not discriminate on the basis of race, color, national origin, age, disability, sex, sexual orientation, or gender identity.            Thank you!     Thank you for choosing [facility]  for your care. Our goal is always to provide you with excellent care. Hearing back from our patients is one way we can continue to improve our services. Please take a few minutes to complete the written survey that you may receive in the mail after your visit with us. Thank you!             Your Updated Medication List - Protect others around you: Learn how to safely use, store and throw away your medicines at www.disposemymeds.org.          This list is accurate as of: 10/9/17 11:58 AM.  Always use your most recent med list.                   Brand Name Dispense Instructions for use Diagnosis    allopurinol 300 MG tablet    ZYLOPRIM    90 tablet    Take 1 tablet (300 mg) by mouth daily    Gout, unspecified       aspirin 81  MG tablet     100    1 tablet daily        blood glucose monitoring lancets     2 Box    Test 2-3 times daily as directed    Type 2 diabetes, HbA1c goal < 7% (H)       * blood glucose monitoring meter device kit     1 kit    Use to test blood sugar 3 times daily or as directed.    Diabetes mellitus, type 2 (H)       * blood glucose monitoring meter device kit     1 kit    Use to test blood sugar 1 times daily or as directed.    Type 2 diabetes mellitus without complication, without long-term current use of insulin (H)       blood glucose monitoring test strip    ACCU-CHEK SMARTVIEW    300 each    Check 3 times daily as directed    Type 2 diabetes, HbA1c goal < 7% (H)       calcipotriene 0.005 % Oint      Apply 1 Application topically as needed        cinnamon 500 MG Caps      2 tablets daily        ciprofloxacin 250 MG tablet    CIPRO    10 tablet    Take 1 tablet (250 mg) by mouth 2 times daily    Dysuria       conjugated estrogens cream    PREMARIN    30 g    Place vaginally twice a week May also use twice monthly    Atrophic vaginitis       fluconazole 150 MG tablet    DIFLUCAN    30 tablet    Take 1 tablet (150 mg) by mouth every 3 days    Yeast infection of the vagina       furosemide 20 MG tablet    LASIX    90 tablet    Take 1 tablet (20 mg) by mouth daily as needed    Edema, unspecified type       gabapentin 300 MG capsule    NEURONTIN    180 capsule    Take 2 capsules (600 mg) by mouth At Bedtime    Neuropathy       glimepiride 4 MG tablet    AMARYL    180 tablet    Take 1 tablet (4 mg) by mouth 2 times daily    Type 2 diabetes mellitus without complication, without long-term current use of insulin (H)       glipiZIDE 2.5 MG 24 hr tablet    glipiZIDE XL    90 tablet    Take 1 tablet (2.5 mg) by mouth daily    Type 2 diabetes mellitus without complication, without long-term current use of insulin (H)       indomethacin 50 MG capsule    INDOCIN    30 capsule    Take 1 capsule (50 mg) by mouth 3 times daily as  needed for moderate pain    Gout, unspecified cause, unspecified chronicity, unspecified site       lisinopril 10 MG tablet    PRINIVIL/ZESTRIL    90 tablet    Take 1 tablet (10 mg) by mouth daily    Essential hypertension, benign       MAGNESIUM OXIDE -MG SUPPLEMENT PO      Take 1 tablet by mouth daily        metFORMIN 500 MG tablet    GLUCOPHAGE    450 tablet    2 po with breakfast; one po with lunch and 2 po with evening meal    Type 2 diabetes mellitus with complication, without long-term current use of insulin (H)       simvastatin 40 MG tablet    ZOCOR    90 tablet    Take 1 tablet (40 mg) by mouth At Bedtime ONE TABLET DAILY IN THE EVENING    Mixed hyperlipidemia       terconazole 0.4 % cream    TERAZOL 7    70 g    Place 1 applicator vaginally At Bedtime    Yeast infection of the vagina       TUMS 500 MG chewable tablet   Generic drug:  calcium carbonate     90    1 TABLET 3 TIMES PRN        TYLENOL 500 MG tablet   Generic drug:  acetaminophen      Take 1-2 tablets by mouth every morning.    Mixed hyperlipidemia, Gout, unspecified, Type 2 diabetes, HbA1c goal < 7% (H), Hyperlipidemia LDL goal <100       * Notice:  This list has 2 medication(s) that are the same as other medications prescribed for you. Read the directions carefully, and ask your doctor or other care provider to review them with you.

## 2017-10-10 ENCOUNTER — TELEPHONE (OUTPATIENT)
Dept: INTERNAL MEDICINE | Facility: CLINIC | Age: 77
End: 2017-10-10

## 2017-10-10 RX ORDER — AZITHROMYCIN 250 MG/1
500 TABLET, FILM COATED ORAL DAILY
Qty: 6 TABLET | Refills: 0 | Status: SHIPPED | OUTPATIENT
Start: 2017-10-10 | End: 2017-11-03

## 2017-10-10 NOTE — TELEPHONE ENCOUNTER
Lindsey Gary is a 77 year old female who calls with request for dental prophylaxis.  Had partial knee replacement Oct 2015, July 2016.    Last exam/Treatment:  8/9/17  Allergies:   Allergies   Allergen Reactions     Sulfa Drugs      Tongue swelling and rash     If she still needs pre dental antibiotic she would prefer something other than Amoxicillin as she gets terrible vaginal yeast infections from it.  EDMAR Jaeger R.N.

## 2017-10-18 RX ORDER — CLINDAMYCIN HCL 300 MG
600 CAPSULE ORAL
Qty: 2 CAPSULE | Refills: 3 | Status: SHIPPED | OUTPATIENT
Start: 2017-10-18 | End: 2017-11-03

## 2017-10-18 NOTE — TELEPHONE ENCOUNTER
Pre medication for life for joints   zithromax is reasonable alternative   I can send in clindamycin but cannot promise no yeast issues

## 2017-10-18 NOTE — TELEPHONE ENCOUNTER
Patient states she has now misplaced the bottle of Zithromax and needs another rx.  When she spoke with her dentist he was surprised that Zithromax was ordered.  Since she cannot take Amoxicillin due to yeast infections then dentist recommends Clindamycin and pt would like this prescribed if it won't cause a yeast infection.  Wants additional refills rather than qty for more than one dental visit.      Does she need to do the dental pre med for 2 years total or how long?  EDMAR Jaeger R.N.

## 2017-10-19 NOTE — TELEPHONE ENCOUNTER
Left detailed voicemail message for pt with instructions to call back if she decides she wants the Clindamycin.  EDMAR Jaeger R.N.

## 2017-11-03 ENCOUNTER — OFFICE VISIT (OUTPATIENT)
Dept: FAMILY MEDICINE | Facility: CLINIC | Age: 77
End: 2017-11-03
Payer: COMMERCIAL

## 2017-11-03 VITALS
SYSTOLIC BLOOD PRESSURE: 130 MMHG | TEMPERATURE: 97.6 F | HEART RATE: 83 BPM | WEIGHT: 182.6 LBS | BODY MASS INDEX: 32.36 KG/M2 | HEIGHT: 63 IN | OXYGEN SATURATION: 98 % | DIASTOLIC BLOOD PRESSURE: 80 MMHG

## 2017-11-03 DIAGNOSIS — N89.8 VAGINAL DISCHARGE: ICD-10-CM

## 2017-11-03 DIAGNOSIS — R30.0 DYSURIA: Primary | ICD-10-CM

## 2017-11-03 LAB
ALBUMIN UR-MCNC: 30 MG/DL
APPEARANCE UR: CLEAR
BILIRUB UR QL STRIP: ABNORMAL
COLOR UR AUTO: YELLOW
GLUCOSE UR STRIP-MCNC: NEGATIVE MG/DL
HGB UR QL STRIP: NEGATIVE
KETONES UR STRIP-MCNC: ABNORMAL MG/DL
LEUKOCYTE ESTERASE UR QL STRIP: NEGATIVE
NITRATE UR QL: NEGATIVE
PH UR STRIP: 5.5 PH (ref 5–7)
RBC #/AREA URNS AUTO: NORMAL /HPF
SOURCE: ABNORMAL
SP GR UR STRIP: 1.02 (ref 1–1.03)
SPECIMEN SOURCE: NORMAL
UROBILINOGEN UR STRIP-ACNC: 0.2 EU/DL (ref 0.2–1)
WBC #/AREA URNS AUTO: NORMAL /HPF
WET PREP SPEC: NORMAL

## 2017-11-03 PROCEDURE — 87086 URINE CULTURE/COLONY COUNT: CPT | Performed by: NURSE PRACTITIONER

## 2017-11-03 PROCEDURE — 81001 URINALYSIS AUTO W/SCOPE: CPT | Performed by: NURSE PRACTITIONER

## 2017-11-03 PROCEDURE — 99213 OFFICE O/P EST LOW 20 MIN: CPT | Performed by: NURSE PRACTITIONER

## 2017-11-03 PROCEDURE — 87210 SMEAR WET MOUNT SALINE/INK: CPT | Performed by: NURSE PRACTITIONER

## 2017-11-03 RX ORDER — BETAMETHASONE DIPROPIONATE 0.5 MG/ML
LOTION, AUGMENTED TOPICAL
COMMUNITY
Start: 2016-03-30 | End: 2022-08-10 | Stop reason: ALTCHOICE

## 2017-11-03 RX ORDER — CHLORHEXIDINE GLUCONATE ORAL RINSE 1.2 MG/ML
SOLUTION DENTAL
COMMUNITY
Start: 2016-02-22 | End: 2023-10-06

## 2017-11-03 ASSESSMENT — PATIENT HEALTH QUESTIONNAIRE - PHQ9: SUM OF ALL RESPONSES TO PHQ QUESTIONS 1-9: 3

## 2017-11-03 NOTE — PROGRESS NOTES
"  SUBJECTIVE:   Lindsey Gary is a 77 year old female who presents to clinic today for the following health issues:      URINARY TRACT SYMPTOMS  Onset: 1 week or so     Description:   Painful urination (Dysuria): YES- slight  Blood in urine (Hematuria): no   Delay in urine (Hesitency): no     Intensity: mild, moderate    Progression of Symptoms:  same    Accompanying Signs & Symptoms:  Fever/chills: no   Flank pain YES- back  Nausea and vomiting: YES  Any vaginal symptoms: none  Abdominal/Pelvic Pain: YES    History:   History of frequent UTI's: YES  History of kidney stones: no   Sexually Active: YES  Possibility of pregnancy: No    Precipitating factors:       Therapies Tried and outcome: none  Here with concerns about possible UTI and vaginal infection. Has had similar issues for the past \"53 years\". Has seen 4 different urologists and has been to [facility] and nobody can figure out the issue. States \" bladder does not close completely\". Advised by a previous provider to stop drinking water at 3 pm daily so patient has not been drinking much water. Denies fever or back pain. Life is dictated by where the bathrooms are located.         Problem list and histories reviewed & adjusted, as indicated.  Additional history: none    Patient Active Problem List   Diagnosis     Rosacea     Gout     CHONDROCALC NOS-OTHER SITE(aka PSEUDOGOUT)     Essential hypertension     Hyperlipidemia LDL goal <100     Advanced directives, counseling/discussion     Atrophic vaginitis     Chronic foot pain     Hypertension goal BP (blood pressure) < 130/80     Bereavement     Obesity     Type 2 diabetes mellitus without complication (H)     Status post total knee replacement, unspecified laterality     Psoriasis     Past Surgical History:   Procedure Laterality Date     C NONSPECIFIC PROCEDURE      Breast biopsies        C NONSPECIFIC PROCEDURE      Symptomatic left thigh lipomas        C NONSPECIFIC PROCEDURE  6/1/09    pubovaginal sling " "    COLONOSCOPY       COLONOSCOPY N/A 12/17/2014    Procedure: COMBINED COLONOSCOPY, SINGLE OR MULTIPLE BIOPSY/POLYPECTOMY BY BIOPSY;  Surgeon: Chuy Staton MD;  Location: [facility]     HYSTERECTOMY, PAP NO LONGER INDICATED       HYSTERECTOMY, JAIME  1991    fibroid     SURGICAL HISTORY OF -   10/28/2015    right partial knee        Social History   Substance Use Topics     Smoking status: Never Smoker     Smokeless tobacco: Never Used     Alcohol use No     Family History   Problem Relation Age of Onset     CANCER Mother      colon ca survivor     Cancer - colorectal Mother      CEREBROVASCULAR DISEASE Father      born 1916             Reviewed and updated as needed this visit by clinical staffTobacco  Allergies  Meds  Problems  Med Hx  Surg Hx  Fam Hx  Soc Hx        Reviewed and updated as needed this visit by Provider  Allergies  Meds  Problems         ROS:  Constitutional, HEENT, cardiovascular, pulmonary, gi and gu systems are negative, except as otherwise noted.      OBJECTIVE:   /80 (BP Location: Right arm, Patient Position: Chair, Cuff Size: Adult Large)  Pulse 83  Temp 97.6  F (36.4  C) (Oral)  Ht 5' 2.75\" (1.594 m)  Wt 182 lb 9.6 oz (82.8 kg)  SpO2 98%  Breastfeeding? No  BMI 32.6 kg/m2  Body mass index is 32.6 kg/(m^2).  GENERAL: healthy, alert and no distress  BACK: no CVA tenderness, no paralumbar tenderness  PSYCH: mentation appears normal, affect normal/bright    Results for orders placed or performed in visit on 11/03/17 (from the past 24 hour(s))   *UA reflex to Microscopic and Culture ([place] and [facility] (except [place] and [place])   Result Value Ref Range    Color Urine Yellow     Appearance Urine Clear     Glucose Urine Negative NEG^Negative mg/dL    Bilirubin Urine Small (A) NEG^Negative    Ketones Urine Trace (A) NEG^Negative mg/dL    Specific Gravity Urine 1.025 1.003 - 1.035    Blood Urine Negative NEG^Negative    pH Urine 5.5 5.0 - 7.0 pH    Protein " Albumin Urine 30 (A) NEG^Negative mg/dL    Urobilinogen Urine 0.2 0.2 - 1.0 EU/dL    Nitrite Urine Negative NEG^Negative    Leukocyte Esterase Urine Negative NEG^Negative    Source Midstream Urine    Urine Microscopic   Result Value Ref Range    WBC Urine O - 2 OTO2^O - 2 /HPF    RBC Urine O - 2 OTO2^O - 2 /HPF   Wet prep   Result Value Ref Range    Specimen Description Vagina     Wet Prep No clue cells seen     Wet Prep NO YEAST SEEN     Wet Prep NO TRICH SEEN        ASSESSMENT/PLAN:             1. Dysuria  Normal examination. No need for antibiotics.   - *UA reflex to Microscopic and Culture ([place] and [facility] (except [place] and [place])  - Urine Microscopic  - [facility] HAND, AND CHIROPRACTIC REFERRAL    2. Vaginal discharge  Normal findings.   - Wet prep    Will refer to PT for pelvic floor evaluation. Has seen Dr. Tellez' in the past who referred to pelvic floor specialists near [place] but patient has not scheduled. Reassured patient no infection at this time.     Calista Rachel, NP  [facility]

## 2017-11-03 NOTE — MR AVS SNAPSHOT
After Visit Summary   11/3/2017    Lindsey Gary    MRN: [id number]           Patient Information     Date Of Birth          1940        Visit Information        Provider Department      11/3/2017 1:30 PM Calista Rachel NP [facility]        Today's Diagnoses     Dysuria    -  1    Vaginal discharge           Follow-ups after your visit        Additional Services     [facility] PT, HAND, AND CHIROPRACTIC REFERRAL       **This order will print in the [facility] Scheduling Office**    Physical Therapy, Hand Therapy and Chiropractic Care are available through:    *[facility]  *[facility]  *[facility]    Call one number to schedule at any of the above locations: [phone].    Your provider has referred you to: Physical Therapy at [facility] or [facility]    Indication/Reason for Referral: Women's Health (Please Complete Special Programs SmartList)  Onset of Illness: years  Therapy Orders: Evaluate and Treat  Special Programs: None and Women's Health: Pelvic Floor Weakness: Incontinence:  and  None  Special Request: None    Rylee Mckee      Additional Comments for the Therapist or Chiropractor:ongoing urinary incontinence.     Please be aware that coverage of these services is subject to the terms and limitations of your health insurance plan.  Call member services at your health plan with any benefit or coverage questions.      Please bring the following to your appointment:    *Your personal calendar for scheduling future appointments  *Comfortable clothing                  Your next 10 appointments already scheduled     Nov 15, 2017 10:50 AM ALEX COLLINS SCREENING DIGITAL BILATERAL with [id number]   [facility] ([facility])    [address]  [facility] [address] [phone]           Do not use any powder, lotion or deodorant under your arms or on your breast. If you do, we will ask you to remove it  "before your exam.  Wear comfortable, two-piece clothing.  If you have any allergies, tell your care team.  Bring any previous mammograms from other facilities or have them mailed to the breast center. Three-dimensional (3D) mammograms are available at [facility] locations in [facility], [place], [place], [place], [place], [place], and [place]. [facility] locations include [place] and [facility] in [place]. Benefits of 3D mammograms include: - Improved rate of cancer detection - Decreases your chance of having to go back for more tests, which means fewer: - \"False-positive\" results (This means that there is an abnormal area but it isn't cancer.) - Invasive testing procedures, such as a biopsy or surgery - Can provide clearer images of the breast if you have dense breast tissue. 3D mammography is an optional exam that anyone can have with a 2D mammogram. It doesn't replace or take the place of a 2D mammogram. 2D mammograms remain an effective screening test for all women.  Not all insurance companies cover the cost of a 3D mammogram. Check with your insurance.              Who to contact     If you have questions or need follow up information about today's clinic visit or your schedule please contact [facility] directly at [phone].  Normal or non-critical lab and imaging results will be communicated to you by Milk Mantrahart, letter or phone within 4 business days after the clinic has received the results. If you do not hear from us within 7 days, please contact the clinic through SpectraLineart or phone. If you have a critical or abnormal lab result, we will notify you by phone as soon as possible.  Submit refill requests through [email or web address] or call your pharmacy and they will forward the refill request to us. Please allow 3 business days for your refill to be completed.          Additional Information About Your Visit        [email or web address] Information     [email or web address] lets you send messages " "to your doctor, view your test results, renew your prescriptions, schedule appointments and more. To sign up, go to [email or web address] . Click on \"Log in\" on the left side of the screen, which will take you to the Welcome page. Then click on \"Sign up Now\" on the right side of the page.     You will be asked to enter the access code listed below, as well as some personal information. Please follow the directions to create your username and password.     Your access code is: [email or web address]  Expires: 2018 11:58 AM     Your access code will  in 90 days. If you need help or a new code, please call your [facility] clinic or [phone].        Care EveryWhere ID     This is your [facility] EveryWhere ID. This could be used by other organizations to access your [facility] medical records  [id number]        Your Vitals Were     Pulse Temperature Height Pulse Oximetry Breastfeeding? BMI (Body Mass Index)    83 97.6  F (36.4  C) (Oral) 5' 2.75\" (1.594 m) 98% No 32.6 kg/m2       Blood Pressure from Last 3 Encounters:   17 130/80   17 122/60   17 120/60    Weight from Last 3 Encounters:   17 182 lb 9.6 oz (82.8 kg)   17 187 lb 8 oz (85 kg)   17 187 lb (84.8 kg)              We Performed the Following     *UA reflex to Microscopic and Culture ([place] and [facility] (except [place] and [place])     [facility] PT, HAND, AND CHIROPRACTIC REFERRAL     Urine Microscopic     Wet prep        Primary Care Provider Office Phone # Fax #    LILLIAM Sam [phone]       [address] [facility] [address]        Equal Access to Services     AHSA OCAMPO : Stacia Yi, wasivanda luqadaha, qaybta kaalmada javier, janet louis. [facility] [phone].    ATENCIÓN: Si habla español, tiene a toribio disposición servicios gratuitos de asistencia lingüística. Llame al [phone].    We comply with applicable federal civil " rights laws and [place] laws. We do not discriminate on the basis of race, color, national origin, age, disability, sex, sexual orientation, or gender identity.            Thank you!     Thank you for choosing [facility]  for your care. Our goal is always to provide you with excellent care. Hearing back from our patients is one way we can continue to improve our services. Please take a few minutes to complete the written survey that you may receive in the mail after your visit with us. Thank you!             Your Updated Medication List - Protect others around you: Learn how to safely use, store and throw away your medicines at www.disposemymeds.org.          This list is accurate as of: 11/3/17  2:20 PM.  Always use your most recent med list.                   Brand Name Dispense Instructions for use Diagnosis    allopurinol 300 MG tablet    ZYLOPRIM    90 tablet    Take 1 tablet (300 mg) by mouth daily    Gout, unspecified       aspirin 81 MG tablet     100    1 tablet daily        betamethasone (augmented) 0.05 % lotion    DIPROLENE     GINA 10 TO 20 DROPS TOPICALLY AA OF SCALP Q NIGHT UTD        blood glucose monitoring lancets     2 Box    Test 2-3 times daily as directed    Type 2 diabetes, HbA1c goal < 7% (H)       * blood glucose monitoring meter device kit     1 kit    Use to test blood sugar 3 times daily or as directed.    Diabetes mellitus, type 2 (H)       * blood glucose monitoring meter device kit     1 kit    Use to test blood sugar 1 times daily or as directed.    Type 2 diabetes mellitus without complication, without long-term current use of insulin (H)       blood glucose monitoring test strip    ACCU-CHEK SMARTVIEW    300 each    Check 3 times daily as directed    Type 2 diabetes, HbA1c goal < 7% (H)       calcipotriene 0.005 % Oint      Apply 1 Application topically as needed        chlorhexidine 0.12 % solution    PERIDEX     RINSE ONE HALF  OZ 2-3 X D AFTER BREAKFAST BEFORE  BEDTIME FOLLOWING BRUSHING AND FLOSSIN        cinnamon 500 MG Caps      2 tablets daily        conjugated estrogens cream    PREMARIN    30 g    Place vaginally twice a week May also use twice monthly    Atrophic vaginitis       fluconazole 150 MG tablet    DIFLUCAN    30 tablet    Take 1 tablet (150 mg) by mouth every 3 days    Yeast infection of the vagina       furosemide 20 MG tablet    LASIX    90 tablet    Take 1 tablet (20 mg) by mouth daily as needed    Edema, unspecified type       gabapentin 300 MG capsule    NEURONTIN    180 capsule    Take 2 capsules (600 mg) by mouth At Bedtime    Neuropathy       glimepiride 4 MG tablet    AMARYL    180 tablet    Take 1 tablet (4 mg) by mouth 2 times daily    Type 2 diabetes mellitus without complication, without long-term current use of insulin (H)       glipiZIDE 2.5 MG 24 hr tablet    glipiZIDE XL    90 tablet    Take 1 tablet (2.5 mg) by mouth daily    Type 2 diabetes mellitus without complication, without long-term current use of insulin (H)       lisinopril 10 MG tablet    PRINIVIL/ZESTRIL    90 tablet    Take 1 tablet (10 mg) by mouth daily    Essential hypertension, benign       MAGNESIUM OXIDE -MG SUPPLEMENT PO      Take 1 tablet by mouth daily        metFORMIN 500 MG tablet    GLUCOPHAGE    450 tablet    2 po with breakfast; one po with lunch and 2 po with evening meal    Type 2 diabetes mellitus with complication, without long-term current use of insulin (H)       simvastatin 40 MG tablet    ZOCOR    90 tablet    Take 1 tablet (40 mg) by mouth At Bedtime ONE TABLET DAILY IN THE EVENING    Mixed hyperlipidemia       terconazole 0.4 % cream    TERAZOL 7    70 g    Place 1 applicator vaginally At Bedtime    Yeast infection of the vagina       TUMS 500 MG chewable tablet   Generic drug:  calcium carbonate     90    1 TABLET 3 TIMES PRN        TYLENOL 500 MG tablet   Generic drug:  acetaminophen      Take 1-2 tablets by mouth every morning.    Mixed  hyperlipidemia, Gout, unspecified, Type 2 diabetes, HbA1c goal < 7% (H), Hyperlipidemia LDL goal <100       * Notice:  This list has 2 medication(s) that are the same as other medications prescribed for you. Read the directions carefully, and ask your doctor or other care provider to review them with you.

## 2017-11-03 NOTE — NURSING NOTE
"Chief Complaint   Patient presents with     UTI       Initial /80 (BP Location: Right arm, Patient Position: Chair, Cuff Size: Adult Large)  Pulse 83  Temp 97.6  F (36.4  C) (Oral)  Ht 5' 2.75\" (1.594 m)  Wt 182 lb 9.6 oz (82.8 kg)  SpO2 98%  Breastfeeding? No  BMI 32.6 kg/m2 Estimated body mass index is 32.6 kg/(m^2) as calculated from the following:    Height as of this encounter: 5' 2.75\" (1.594 m).    Weight as of this encounter: 182 lb 9.6 oz (82.8 kg).  Medication Reconciliation: complete   CARLOS ALBERTO Mena        Health Maintenance addressed:  Mammogram, PHQ9 and DM Visit    n/a      "

## 2017-11-04 LAB
BACTERIA SPEC CULT: NO GROWTH
SPECIMEN SOURCE: NORMAL

## 2017-11-07 ENCOUNTER — TRANSFERRED RECORDS (OUTPATIENT)
Dept: HEALTH INFORMATION MANAGEMENT | Facility: CLINIC | Age: 77
End: 2017-11-07

## 2017-11-15 ENCOUNTER — HOSPITAL ENCOUNTER (OUTPATIENT)
Dept: MAMMOGRAPHY | Facility: CLINIC | Age: 77
Discharge: HOME | End: 2017-11-15
Attending: NURSE PRACTITIONER | Admitting: NURSE PRACTITIONER
Payer: MEDICARE

## 2017-11-15 DIAGNOSIS — Z12.31 VISIT FOR SCREENING MAMMOGRAM: ICD-10-CM

## 2017-11-15 PROCEDURE — 77063 BREAST TOMOSYNTHESIS BI: CPT

## 2017-11-15 PROCEDURE — G0202 SCR MAMMO BI INCL CAD: HCPCS

## 2017-12-07 ENCOUNTER — TELEPHONE (OUTPATIENT)
Dept: INTERNAL MEDICINE | Facility: CLINIC | Age: 77
End: 2017-12-07

## 2017-12-07 NOTE — TELEPHONE ENCOUNTER
Panel Management Review      Patient has the following on her problem list:     Diabetes    ASA: Passed    Last A1C  Lab Results   Component Value Date    A1C 8.2 07/27/2017    A1C 7.0 09/22/2016    A1C 8.0 06/16/2016    A1C 7.4 12/15/2015    A1C 7.7 08/10/2015     A1C tested: MONITOR    Last LDL:    Lab Results   Component Value Date    CHOL 161 09/22/2016     Lab Results   Component Value Date    HDL 30 09/22/2016     Lab Results   Component Value Date    LDL 95 09/22/2016     Lab Results   Component Value Date    TRIG 182 09/22/2016     Lab Results   Component Value Date    CHOLHDLRATIO 4.3 12/09/2014     Lab Results   Component Value Date    NHDL 131 09/22/2016       Is the patient on a Statin? YES             Is the patient on Aspirin? YES    Medications     HMG CoA Reductase Inhibitors    simvastatin (ZOCOR) 40 MG tablet    Salicylates    ASPIRIN 81 MG OR TABS          Last three blood pressure readings:  BP Readings from Last 3 Encounters:   11/03/17 130/80   08/09/17 122/60   06/09/17 120/60       Date of last diabetes office visit: 8-9-17     Tobacco History:     History   Smoking Status     Never Smoker   Smokeless Tobacco     Never Used             Composite cancer screening  Chart review shows that this patient is due/due soon for the following None  Summary:    Patient is due/failing the following:   Diabetes    Action needed:   Patient needs office visit for see above.    Type of outreach:    Sent letter. Unable to reach pt by phone number. It says pt is not taking calls at thi time.      Questions for provider review:    None                                                                                                                                    .MARIAH LIAO LPN       Chart routed to none. .

## 2017-12-07 NOTE — LETTER
[facility]  [address], [address]  [place], [place]  [address]                                            TEL:[phone]  FAX:[phone]      Lindsey Gary  [address] [facility] [address]      December 7, 2017    Dear Lindsey,    At [facility], we care about your health and well-being. A review of your chart has indicated that you are due for a Diabetes Exam and labs. Please contact us at [phone] to schedule an appointment.     If you have already had one or all of the above screening tests at another facility, please call us to update your chart.              Sincerely,      SAIRA BetancourtC

## 2017-12-11 ENCOUNTER — TELEPHONE (OUTPATIENT)
Dept: PHARMACY | Facility: CLINIC | Age: 77
End: 2017-12-11

## 2017-12-15 ENCOUNTER — OFFICE VISIT (OUTPATIENT)
Dept: URGENT CARE | Facility: URGENT CARE | Age: 77
End: 2017-12-15
Payer: COMMERCIAL

## 2017-12-15 VITALS
SYSTOLIC BLOOD PRESSURE: 124 MMHG | WEIGHT: 182 LBS | BODY MASS INDEX: 32.5 KG/M2 | RESPIRATION RATE: 16 BRPM | TEMPERATURE: 97.4 F | OXYGEN SATURATION: 99 % | HEART RATE: 76 BPM | DIASTOLIC BLOOD PRESSURE: 76 MMHG

## 2017-12-15 DIAGNOSIS — B37.31 YEAST INFECTION OF THE VAGINA: Primary | ICD-10-CM

## 2017-12-15 DIAGNOSIS — R30.0 DYSURIA: ICD-10-CM

## 2017-12-15 DIAGNOSIS — N89.8 VAGINAL DISCHARGE: ICD-10-CM

## 2017-12-15 LAB
ALBUMIN UR-MCNC: NEGATIVE MG/DL
APPEARANCE UR: CLEAR
BILIRUB UR QL STRIP: NEGATIVE
COLOR UR AUTO: YELLOW
GLUCOSE UR STRIP-MCNC: NEGATIVE MG/DL
HGB UR QL STRIP: NEGATIVE
KETONES UR STRIP-MCNC: ABNORMAL MG/DL
LEUKOCYTE ESTERASE UR QL STRIP: NEGATIVE
NITRATE UR QL: NEGATIVE
PH UR STRIP: 5.5 PH (ref 5–7)
SOURCE: ABNORMAL
SP GR UR STRIP: 1.02 (ref 1–1.03)
SPECIMEN SOURCE: NORMAL
UROBILINOGEN UR STRIP-ACNC: 0.2 EU/DL (ref 0.2–1)
WET PREP SPEC: NORMAL

## 2017-12-15 PROCEDURE — 87210 SMEAR WET MOUNT SALINE/INK: CPT | Performed by: FAMILY MEDICINE

## 2017-12-15 PROCEDURE — 81003 URINALYSIS AUTO W/O SCOPE: CPT | Performed by: FAMILY MEDICINE

## 2017-12-15 PROCEDURE — 99214 OFFICE O/P EST MOD 30 MIN: CPT | Performed by: FAMILY MEDICINE

## 2017-12-15 RX ORDER — FLUCONAZOLE 150 MG/1
150 TABLET ORAL WEEKLY
Qty: 5 TABLET | Refills: 0 | Status: SHIPPED | OUTPATIENT
Start: 2017-12-15 | End: 2017-12-23

## 2017-12-15 NOTE — NURSING NOTE
"Lindsey Gary is a 77 year old female.      Chief Complaint   Patient presents with     Urgent Care     Vaginal Problem     pt is here for a possible vaginal infection or UTI - has pain with urination and some discharge       Initial /76 (BP Location: Right arm, Cuff Size: Adult Large)  Pulse 76  Temp 97.4  F (36.3  C) (Oral)  Resp 16  Wt 182 lb (82.6 kg)  SpO2 99%  BMI 32.5 kg/m2 Estimated body mass index is 32.5 kg/(m^2) as calculated from the following:    Height as of 11/3/17: 5' 2.75\" (1.594 m).    Weight as of this encounter: 182 lb (82.6 kg).  Medication Reconciliation: complete      Questioned patient about current smoking habits.  Pt. has never smoked.      Esther Fernández CMA      "

## 2017-12-15 NOTE — PATIENT INSTRUCTIONS
Vaginal Infection: Yeast (Candidiasis)  Yeast infection occurs when yeast in the vagina increase and attacks the vaginal tissues. Yeast is a type of fungus. These infections are often caused by a type of yeast called Candida albicans. Other species of yeast can also cause infections. Factors that may make infection more likely include recent antibiotic use, douching, or increased sex. Yeast infections are more common in women who have diabetes, or are obese or pregnant, or have a weak immune system.  Symptoms of yeast infection    Clumpy or thin, white discharge, which may look like cottage cheese    No odor or minimal odor    Severe vaginal itching or burning    Burning with urination    Swelling, redness of vulva    Pain during sex  Treating yeast infection  Yeast infection is treated with a vaginal antifungal cream. In some cases, antifungal pills are prescribed instead. During treatment:    Finish all of your medicine, even if your symptoms go away.    Apply the cream before going to bed. Lie flat after applying so that it doesn't drip out.    Do not douche or use tampons.    Don't rely on a diaphragm or condoms, since the cream may weaken them.    Avoid intercourse if advised by your healthcare provider.     Should I treat a yeast infection myself?  Discuss with your healthcare provider whether you should use over-the-counter medicines to treat a yeast infection. Self-treatment may depend on whether:    You've had a yeast infection in the past.    You're at risk for STDs.  Call your healthcare provider if symptoms do not go away or come back after treatment.   Date Last Reviewed: 3/1/2017    6449-8949 [employer]. [address] [place] [address], [place] [address]. All rights reserved. This information is not intended as a substitute for professional medical care. Always follow your healthcare professional's instructions.

## 2017-12-15 NOTE — MR AVS SNAPSHOT
After Visit Summary   12/15/2017    Lindsey Gary    MRN: [id number]           Patient Information     Date Of Birth          1940        Visit Information        Provider Department      12/15/2017 5:00 PM Radha Damon MD [facility]        Today's Diagnoses     Yeast infection of the vagina    -  1    Dysuria        Vaginal discharge          Care Instructions      Vaginal Infection: Yeast (Candidiasis)  Yeast infection occurs when yeast in the vagina increase and attacks the vaginal tissues. Yeast is a type of fungus. These infections are often caused by a type of yeast called Candida albicans. Other species of yeast can also cause infections. Factors that may make infection more likely include recent antibiotic use, douching, or increased sex. Yeast infections are more common in women who have diabetes, or are obese or pregnant, or have a weak immune system.  Symptoms of yeast infection    Clumpy or thin, white discharge, which may look like cottage cheese    No odor or minimal odor    Severe vaginal itching or burning    Burning with urination    Swelling, redness of vulva    Pain during sex  Treating yeast infection  Yeast infection is treated with a vaginal antifungal cream. In some cases, antifungal pills are prescribed instead. During treatment:    Finish all of your medicine, even if your symptoms go away.    Apply the cream before going to bed. Lie flat after applying so that it doesn't drip out.    Do not douche or use tampons.    Don't rely on a diaphragm or condoms, since the cream may weaken them.    Avoid intercourse if advised by your healthcare provider.     Should I treat a yeast infection myself?  Discuss with your healthcare provider whether you should use over-the-counter medicines to treat a yeast infection. Self-treatment may depend on whether:    You've had a yeast infection in the past.    You're at risk for STDs.  Call your healthcare provider  "if symptoms do not go away or come back after treatment.   Date Last Reviewed: 3/1/2017    2344-9626 [employer]. [address] [place] [address], [place] [address]. All rights reserved. This information is not intended as a substitute for professional medical care. Always follow your healthcare professional's instructions.                Follow-ups after your visit        Your next 10 appointments already scheduled     Dec 19, 2017  8:30 AM CST   Office Visit with Beti Jacobo [facility] ([facility])    [address]  [address]  [facility] [address] [phone]           Bring a current list of meds and any records pertaining to this visit. For Physicals, please bring immunization records and any forms needing to be filled out. Please arrive 10 minutes early to complete paperwork.              Who to contact     If you have questions or need follow up information about today's clinic visit or your schedule please contact [facility] directly at [phone].  Normal or non-critical lab and imaging results will be communicated to you by Owingohart, letter or phone within 4 business days after the clinic has received the results. If you do not hear from us within 7 days, please contact the clinic through Owingohart or phone. If you have a critical or abnormal lab result, we will notify you by phone as soon as possible.  Submit refill requests through MOG or call your pharmacy and they will forward the refill request to us. Please allow 3 business days for your refill to be completed.          Additional Information About Your Visit        OwingoharProofpoint Information     MOG lets you send messages to your doctor, view your test results, renew your prescriptions, schedule appointments and more. To sign up, go to [email or web address] . Click on \"Log in\" on the left side of the screen, which will take you to the Welcome page. Then click on " "\"Sign up Now\" on the right side of the page.     You will be asked to enter the access code listed below, as well as some personal information. Please follow the directions to create your username and password.     Your access code is: [email or web address]  Expires: 2018 10:58 AM     Your access code will  in 90 days. If you need help or a new code, please call your [place] clinic or [phone].        Care EveryWhere ID     This is your Care EveryWhere ID. This could be used by other organizations to access your [place] medical records  [id number]        Your Vitals Were     Pulse Temperature Respirations Pulse Oximetry BMI (Body Mass Index)       76 97.4  F (36.3  C) (Oral) 16 99% 32.5 kg/m2        Blood Pressure from Last 3 Encounters:   12/15/17 124/76   17 130/80   17 122/60    Weight from Last 3 Encounters:   12/15/17 182 lb (82.6 kg)   17 182 lb 9.6 oz (82.8 kg)   17 187 lb 8 oz (85 kg)              We Performed the Following     UA reflex to Microscopic and Culture     Wet prep          Today's Medication Changes          These changes are accurate as of: 12/15/17  5:20 PM.  If you have any questions, ask your nurse or doctor.               These medicines have changed or have updated prescriptions.        Dose/Directions    * fluconazole 150 MG tablet   Commonly known as:  DIFLUCAN   This may have changed:  Another medication with the same name was added. Make sure you understand how and when to take each.   Used for:  Yeast infection of the vagina   Changed by:  Lisa Church DO        Dose:  150 mg   Take 1 tablet (150 mg) by mouth every 3 days   Quantity:  30 tablet   Refills:  6       * fluconazole 150 MG tablet   Commonly known as:  DIFLUCAN   This may have changed:  You were already taking a medication with the same name, and this prescription was added. Make sure you understand how and when to take each.   Used for:  Yeast infection of the vagina   Changed by:  " Radha Damon MD        Dose:  150 mg   Take 1 tablet (150 mg) by mouth once a week for 2 doses One tablet per week   Quantity:  5 tablet   Refills:  0       * Notice:  This list has 2 medication(s) that are the same as other medications prescribed for you. Read the directions carefully, and ask your doctor or other care provider to review them with you.         Where to get your medicines      Some of these will need a paper prescription and others can be bought over the counter.  Ask your nurse if you have questions.     Bring a paper prescription for each of these medications     fluconazole 150 MG tablet                Primary Care Provider Office Phone # Fax #    Dinorah Boyer Marvin, LILLIAM [facility] [phone]       [address]  [facility] [address]        Equal Access to Services     ASHA OCAMPO : Stacia nassaro Soheather, waaxda luqadaha, qaybta kaalmada adeegyada, janet hardwick . So [facility] [phone].    ATENCIÓN: Si habla español, tiene a toribio disposición servicios gratuitos de asistencia lingüística. LlUniversity [facility] [phone].    We comply with applicable federal civil rights laws and [place] laws. We do not discriminate on the basis of race, color, national origin, age, disability, sex, sexual orientation, or gender identity.            Thank you!     Thank you for choosing [facility]  for your care. Our goal is always to provide you with excellent care. Hearing back from our patients is one way we can continue to improve our services. Please take a few minutes to complete the written survey that you may receive in the mail after your visit with us. Thank you!             Your Updated Medication List - Protect others around you: Learn how to safely use, store and throw away your medicines at www.disposemymeds.org.          This list is accurate as of: 12/15/17  5:20 PM.  Always use your most recent med list.                   Brand Name  Dispense Instructions for use Diagnosis    allopurinol 300 MG tablet    ZYLOPRIM    90 tablet    Take 1 tablet (300 mg) by mouth daily    Gout, unspecified       aspirin 81 MG tablet     100    1 tablet daily        betamethasone (augmented) 0.05 % lotion    DIPROLENE     GINA 10 TO 20 DROPS TOPICALLY AA OF SCALP Q NIGHT UTD        blood glucose monitoring lancets     2 Box    Test 2-3 times daily as directed    Type 2 diabetes, HbA1c goal < 7% (H)       * blood glucose monitoring meter device kit     1 kit    Use to test blood sugar 3 times daily or as directed.    Diabetes mellitus, type 2 (H)       * blood glucose monitoring meter device kit     1 kit    Use to test blood sugar 1 times daily or as directed.    Type 2 diabetes mellitus without complication, without long-term current use of insulin (H)       blood glucose monitoring test strip    ACCU-CHEK SMARTVIEW    300 each    Check 3 times daily as directed    Type 2 diabetes, HbA1c goal < 7% (H)       calcipotriene 0.005 % Oint      Apply 1 Application topically as needed        chlorhexidine 0.12 % solution    PERIDEX     RINSE ONE HALF  OZ 2-3 X D AFTER BREAKFAST BEFORE BEDTIME FOLLOWING BRUSHING AND FLOSSIN        cinnamon 500 MG Caps      2 tablets daily        conjugated estrogens cream    PREMARIN    30 g    Place vaginally twice a week May also use twice monthly    Atrophic vaginitis       * fluconazole 150 MG tablet    DIFLUCAN    30 tablet    Take 1 tablet (150 mg) by mouth every 3 days    Yeast infection of the vagina       * fluconazole 150 MG tablet    DIFLUCAN    5 tablet    Take 1 tablet (150 mg) by mouth once a week for 2 doses One tablet per week    Yeast infection of the vagina       furosemide 20 MG tablet    LASIX    90 tablet    Take 1 tablet (20 mg) by mouth daily as needed    Edema, unspecified type       gabapentin 300 MG capsule    NEURONTIN    180 capsule    Take 2 capsules (600 mg) by mouth At Bedtime    Neuropathy       glimepiride 4  MG tablet    AMARYL    180 tablet    Take 1 tablet (4 mg) by mouth 2 times daily    Type 2 diabetes mellitus without complication, without long-term current use of insulin (H)       glipiZIDE 2.5 MG 24 hr tablet    glipiZIDE XL    90 tablet    Take 1 tablet (2.5 mg) by mouth daily    Type 2 diabetes mellitus without complication, without long-term current use of insulin (H)       lisinopril 10 MG tablet    PRINIVIL/ZESTRIL    90 tablet    Take 1 tablet (10 mg) by mouth daily    Essential hypertension, benign       MAGNESIUM OXIDE -MG SUPPLEMENT PO      Take 1 tablet by mouth daily        metFORMIN 500 MG tablet    GLUCOPHAGE    450 tablet    2 po with breakfast; one po with lunch and 2 po with evening meal    Type 2 diabetes mellitus with complication, without long-term current use of insulin (H)       simvastatin 40 MG tablet    ZOCOR    90 tablet    Take 1 tablet (40 mg) by mouth At Bedtime ONE TABLET DAILY IN THE EVENING    Mixed hyperlipidemia       terconazole 0.4 % cream    TERAZOL 7    70 g    Place 1 applicator vaginally At Bedtime    Yeast infection of the vagina       TUMS 500 MG chewable tablet   Generic drug:  calcium carbonate     90    1 TABLET 3 TIMES PRN        TYLENOL 500 MG tablet   Generic drug:  acetaminophen      Take 1-2 tablets by mouth every morning.    Mixed hyperlipidemia, Gout, unspecified, Type 2 diabetes, HbA1c goal < 7% (H), Hyperlipidemia LDL goal <100       * Notice:  This list has 4 medication(s) that are the same as other medications prescribed for you. Read the directions carefully, and ask your doctor or other care provider to review them with you.

## 2017-12-15 NOTE — PROGRESS NOTES
SUBJECTIVE:  Chief Complaint   Patient presents with     Urgent Care     Vaginal Problem     pt is here for a possible vaginal infection or UTI - has pain with urination and some discharge     Lindsey Gary is a 77 year old female  presents with abnormal vaginal burning for 1 week. No LMP recorded. Patient has had a hysterectomy..  She tends to get yeast infections due to  Her diabetes.  She has been applying Premarin to the vaginal area for 3 days,  Also taking diflucan and applying terazol with much improvement of her symptoms-  She wants to check if she currently has vaginal infection  Vaginal symptoms: local irritation and burning.  Vulvar symptoms: vulvar itching and burning.  Other associated symptoms: dysuria.       Past Medical History:   Diagnosis Date     Essential hypertension, benign      Mixed hyperlipidemia      Pain in joint, ankle and foot     gout     Type II or unspecified type diabetes mellitus without mention of complication, not stated as uncontrolled     Diabetes mellitus       ALLERGIES:  Sulfa drugs      Current Outpatient Prescriptions on File Prior to Visit:  betamethasone, augmented, (DIPROLENE) 0.05 % lotion GINA 10 TO 20 DROPS TOPICALLY AA OF SCALP Q NIGHT UTD   chlorhexidine (PERIDEX) 0.12 % solution RINSE ONE HALF  OZ 2-3 X D AFTER BREAKFAST BEFORE BEDTIME FOLLOWING BRUSHING AND FLOSSIN   glipiZIDE (GLIPIZIDE XL) 2.5 MG 24 hr tablet Take 1 tablet (2.5 mg) by mouth daily   terconazole (TERAZOL 7) 0.4 % cream Place 1 applicator vaginally At Bedtime   blood glucose monitoring (ACCU-CHEK GORDO SMARTVIEW) meter device kit Use to test blood sugar 1 times daily or as directed.   fluconazole (DIFLUCAN) 150 MG tablet Take 1 tablet (150 mg) by mouth every 3 days   furosemide (LASIX) 20 MG tablet Take 1 tablet (20 mg) by mouth daily as needed   glimepiride (AMARYL) 4 MG tablet Take 1 tablet (4 mg) by mouth 2 times daily   lisinopril (PRINIVIL/ZESTRIL) 10 MG tablet Take 1 tablet (10 mg) by mouth  daily   allopurinol (ZYLOPRIM) 300 MG tablet Take 1 tablet (300 mg) by mouth daily   metFORMIN (GLUCOPHAGE) 500 MG tablet 2 po with breakfast; one po with lunch and 2 po with evening meal   simvastatin (ZOCOR) 40 MG tablet Take 1 tablet (40 mg) by mouth At Bedtime ONE TABLET DAILY IN THE EVENING   blood glucose monitoring (ACCU-CHEK GORDO SMARTVIEW) meter device kit Use to test blood sugar 3 times daily or as directed.   gabapentin (NEURONTIN) 300 MG capsule Take 2 capsules (600 mg) by mouth At Bedtime   blood glucose monitoring (ACCU-CHEK FASTCLIX) lancets Test 2-3 times daily as directed   blood glucose monitoring (ACCU-CHEK SMARTVIEW) test strip Check 3 times daily as directed   calcipotriene 0.005 % OINT Apply 1 Application topically as needed   conjugated estrogens (PREMARIN) vaginal cream Place vaginally twice a week May also use twice monthly   MAGNESIUM OXIDE -MG SUPPLEMENT PO Take 1 tablet by mouth daily   acetaminophen (TYLENOL) 500 MG tablet Take 1-2 tablets by mouth every morning.   CINNAMON 500 MG OR CAPS 2 tablets daily   ASPIRIN 81 MG OR TABS 1 tablet daily   TUMS 500 MG OR CHEW 1 TABLET 3 TIMES PRN     No current facility-administered medications on file prior to visit.     Social History   Substance Use Topics     Smoking status: Never Smoker     Smokeless tobacco: Never Used     Alcohol use No       Family History   Problem Relation Age of Onset     CANCER Mother      colon ca survivor     Cancer - colorectal Mother      CEREBROVASCULAR DISEASE Father      born 1916       ROS:  CONSTITUTIONAL:NEGATIVE for fever, chills, change in weight  INTEGUMENTARY/SKIN: NEGATIVE for worrisome rashes, moles or lesions  EYES: NEGATIVE for vision changes or irritation  ENT/MOUTH: NEGATIVE for ear, mouth and throat problems  RESP:NEGATIVE for significant cough or SOB    OBJECTIVE:  /76 (BP Location: Right arm, Cuff Size: Adult Large)  Pulse 76  Temp 97.4  F (36.3  C) (Oral)  Resp 16  Wt 182 lb (82.6 kg)   SpO2 99%  BMI 32.5 kg/m2       deferred  GENERAL APPEARANCE: healthy, alert and no distress  CV: regular rates and rhythm, normal S1 S2, no murmur noted  ABDOMEN:  soft, nontender, no HSM or masses and bowel sounds normal  BACK: No CVA tenderness  SKIN: no suspicious lesions or rashes       Results for orders placed or performed in visit on 12/15/17   UA reflex to Microscopic and Culture   Result Value Ref Range    Color Urine Yellow     Appearance Urine Clear     Glucose Urine Negative NEG^Negative mg/dL    Bilirubin Urine Negative NEG^Negative    Ketones Urine Trace (A) NEG^Negative mg/dL    Specific Gravity Urine 1.025 1.003 - 1.035    Blood Urine Negative NEG^Negative    pH Urine 5.5 5.0 - 7.0 pH    Protein Albumin Urine Negative NEG^Negative mg/dL    Urobilinogen Urine 0.2 0.2 - 1.0 EU/dL    Nitrite Urine Negative NEG^Negative    Leukocyte Esterase Urine Negative NEG^Negative    Source Midstream Urine    Wet prep   Result Value Ref Range    Specimen Description Vagina     Wet Prep No Trichomonas seen     Wet Prep No clue cells seen     Wet Prep No yeast seen             ASSESSMENT:   Dysuria     - UA reflex to Microscopic and Culture    Vaginal discharge     - Wet prep    Yeast infection of the vagina     She had improvement with medications for yeast, but has frequent recurrence- so will give Rx    - fluconazole (DIFLUCAN) 150 MG tablet; Take 1 tablet (150 mg) by mouth once a week for 2 doses One tablet per week     The patient will observe these symptoms,   Return or follow-up with primary care for worsening or unexpected persistence.     .

## 2017-12-19 ENCOUNTER — OFFICE VISIT (OUTPATIENT)
Dept: PHARMACY | Facility: CLINIC | Age: 77
End: 2017-12-19
Payer: COMMERCIAL

## 2017-12-19 VITALS
HEART RATE: 71 BPM | BODY MASS INDEX: 32.57 KG/M2 | WEIGHT: 182.4 LBS | SYSTOLIC BLOOD PRESSURE: 127 MMHG | DIASTOLIC BLOOD PRESSURE: 68 MMHG

## 2017-12-19 DIAGNOSIS — I10 ESSENTIAL HYPERTENSION WITH GOAL BLOOD PRESSURE LESS THAN 140/90: ICD-10-CM

## 2017-12-19 DIAGNOSIS — N95.2 ATROPHIC VAGINITIS: ICD-10-CM

## 2017-12-19 DIAGNOSIS — E78.5 HYPERLIPIDEMIA LDL GOAL <100: ICD-10-CM

## 2017-12-19 DIAGNOSIS — E11.9 TYPE 2 DIABETES MELLITUS WITHOUT COMPLICATION, WITHOUT LONG-TERM CURRENT USE OF INSULIN (H): Primary | ICD-10-CM

## 2017-12-19 DIAGNOSIS — Z96.659 STATUS POST TOTAL KNEE REPLACEMENT, UNSPECIFIED LATERALITY: ICD-10-CM

## 2017-12-19 DIAGNOSIS — Z13.820 SCREENING FOR OSTEOPOROSIS: ICD-10-CM

## 2017-12-19 DIAGNOSIS — M1A.0720 CHRONIC GOUT OF LEFT FOOT, UNSPECIFIED CAUSE: ICD-10-CM

## 2017-12-19 DIAGNOSIS — E63.9 NUTRITIONAL DEFICIENCY: ICD-10-CM

## 2017-12-19 DIAGNOSIS — E11.40 TYPE 2 DIABETES MELLITUS WITH DIABETIC NEUROPATHY, WITHOUT LONG-TERM CURRENT USE OF INSULIN (H): ICD-10-CM

## 2017-12-19 DIAGNOSIS — L40.9 PSORIASIS: ICD-10-CM

## 2017-12-19 DIAGNOSIS — R60.9 EDEMA, UNSPECIFIED TYPE: ICD-10-CM

## 2017-12-19 DIAGNOSIS — E11.9 TYPE 2 DIABETES MELLITUS WITHOUT COMPLICATION, WITHOUT LONG-TERM CURRENT USE OF INSULIN (H): ICD-10-CM

## 2017-12-19 PROCEDURE — 82043 UR ALBUMIN QUANTITATIVE: CPT | Performed by: NURSE PRACTITIONER

## 2017-12-19 PROCEDURE — 87086 URINE CULTURE/COLONY COUNT: CPT | Performed by: NURSE PRACTITIONER

## 2017-12-19 PROCEDURE — 99605 MTMS BY PHARM NP 15 MIN: CPT | Performed by: PHARMACIST

## 2017-12-19 PROCEDURE — 99607 MTMS BY PHARM ADDL 15 MIN: CPT | Performed by: PHARMACIST

## 2017-12-19 RX ORDER — CYANOCOBALAMIN (VITAMIN B-12) 500 MCG
400 LOZENGE ORAL DAILY
COMMUNITY
End: 2022-08-29

## 2017-12-19 RX ORDER — CHOLECALCIFEROL (VITAMIN D3) 50 MCG
1 TABLET ORAL DAILY
COMMUNITY
End: 2024-07-24

## 2017-12-19 NOTE — PROGRESS NOTES
SUBJECTIVE/OBJECTIVE:                                                    Lindsey Gary is a 76 year old female coming in for a follow-up visit for Medication Therapy Management.  She was referred to me from Dinorah Wong. First visit in 2017    Chief Complaint: Follow up from our visit on 9/26/16.     Tobacco: No tobacco use   Alcohol: not currently using    Medication Adherence: no issues reported by patient    UTI:  Had UA macro lab that was normal on 12/15 but wants to have a culture.  Continues to have a lot of burning.  Some lower tummy and back aches.  Historically she has needed cultures.  Significant history of bladder infections due to her 'bladder not closing'.  Infections at least once annually but she feels like she has them all the time.  Uses premarin vaginal cream twice weekly - needing refill.  Has fluconazole to use as needed; needing twice weekly.  Has been seen by Urologist - recommended to avoid fluids in the evening and certain foods.    Supplements:  Taking Probiotic daily to help with vaginal itching and discomfort.  Taking magnesium daily, vitamin D daily and vitamin E.  Calcium in diet with some cheese and milk.    Last Dexa from 2013, Tscore -0.5    Diabetes:  Pt currently taking metformin 2500mg daily, glimepiride 4mg BID, glipizide XL 2.5 mg daily and cinnamon.   She has history of UTI so would not be interested in SGLT2s.  SMBG: one time daily.   Ranges (patient reported): -200s - feels these have gotten higher since starting glipizide in Sept.  Symptoms of low blood sugar? dizzy. Frequency of hypoglycemia? rare  Recent symptoms of high blood sugar? Polyuria  Up to date on eye exam - just checked  Up to date on foot exam  Microalbumin is not < 30 mg/g. Pt is taking an ACEi/ARB.  Aspirin: Taking 81 mg daily - no side effects  Diet/Exercise:   Walking at the [facility] in the morning 3 days per week in the winter; 7 days per week during the summer.      Lab Results   Component Value Date     A1C 8.2 07/27/2017    A1C 7.0 09/22/2016    A1C 8.0 06/16/2016    A1C 7.4 12/15/2015    A1C 7.7 08/10/2015       Neuropathy: Pt currently taking gabapentin 300mg daily (taking differently than prescribed 600mg daily). Experiencing numbness and tingling, but trying to minimize amount of medications. Minimal edema possibly from the gabapentin.      Hyperlipidemia: Current therapy includes simvastatin 40 mg.  Pt reports no side effects.      Hypertension: Current meds includes lisinopril 10 mg daily.  Pt does not self-monitor BP.  Pt reports no current medication side effects.    Swelling: Has prescription for furosemide, only uses as needed for swelling in her hands and feet.  Maybe needing once monthly.    Gout: Current therapy includes allopurinol 300mg daily. Stopped Probenecid after last visit without symptoms.  Pt reports twice per year pains in left foot. Pt reports no side effects.   Uric Acid   Date Value Ref Range Status   08/10/2015 5.0 2.6 - 6.0 mg/dL Final     Arthritis/Knee Surgery: Taking APAP 1000 QAM QAM or Excedrin. This helps get her going. Some aches during the day but not as bad during the day. No additional therapy needed during the day.      Psoriasis: Diprolene on her scalp every 3rd night and calcipotriene ointment on her elbows and skin as needed.  Effective for her.   Followed by Derm.     Current labs include:  BP Readings from Last 3 Encounters:   12/15/17 124/76   11/03/17 130/80   08/09/17 122/60     A1C      7.0   9/22/2016.  CHOL      161   9/22/2016  TRIG      182   9/22/2016  HDL       30   9/22/2016  LDL       95   9/22/2016    Liver Function Studies -   Recent Labs   Lab Test  09/22/16   0929   PROTTOTAL  7.7   ALBUMIN  4.3   BILITOTAL  0.6   ALKPHOS  80   AST  15   ALT  26     MICROL       57   9/22/2016  MICROALBUMIN    39.38   9/22/2016    Last Basic Metabolic Panel:  NA      138   9/22/2016   POTASSIUM      4.8   9/22/2016  CHLORIDE      105   9/22/2016  BUN       19    9/22/2016  CR     0.67   9/22/2016  GFR Estimate   Date Value Ref Range Status   07/27/2017 66 >60 mL/min/1.7m2 Final     Comment:     Non  GFR Calc   09/22/2016 86 >60 mL/min/1.7m2 Final     Comment:     Non  GFR Calc   06/16/2016 52 (L) >60 mL/min/1.7m2 Final     Comment:     Non  GFR Calc     GFR Estimate If Black   Date Value Ref Range Status   07/27/2017 80 >60 mL/min/1.7m2 Final     Comment:      GFR Calc   09/22/2016 >90   GFR Calc   >60 mL/min/1.7m2 Final   06/16/2016 63 >60 mL/min/1.7m2 Final     Comment:      GFR Calc     TSH   Date Value Ref Range Status   09/22/2016 1.25 0.40 - 4.00 mU/L Final   ]  /68  Pulse 71  Wt 182 lb 6.4 oz (82.7 kg)  BMI 32.57 kg/m2    Most Recent Immunizations   Administered Date(s) Administered     Influenza (H1N1) 12/01/2009     Influenza (High Dose) 3 valent vaccine 10/09/2017     Influenza (IIV3) PF 09/17/2009     Pneumococcal (PCV 13) 09/26/2016     Pneumococcal 23 valent 05/16/2013     TD (ADULT, 7+) 10/08/2010     TDAP Vaccine (Adacel) 03/31/2014     ASSESSMENT:                                                    Current medications were reviewed with her today.      Medication Adherence: no issues identified    UTI:  Per Dinorah ibarra to order Culture today and refill Premarin    Supplements:  Due for DEXA.  Reviewed calcium intake goals    Diabetes:  Needs improvement.  Not meeting A1c goal or home reading goals.  Since she is also on glimepiride, and feels that her reading got worse on the glipizide will replace the glipizide and metformin with Janumet XL (Januvia and metformin combination).  This was a higher Tier for her but does replaces two other co-coays    Neuropathy: stable.      Hyperlipidemia: due for labs, will return next week fasting    Hypertension: stable    Swelling: stable    Gout: stable    Arthritis/Knee Surgery: stable    Psoriasis: stable  PLAN:                                                       1.  Refill Premarin  2.  Calcium intake goal 1200 mg including diet  3.  Due for DEXA and labs (cholesterol, A1c, microalbumin)  4.  Make sure you are taking 400 mg or less of vitamin E  5.  Stop glipizide and metformin.  Start Janumet  mg 2 tablets daily  6.  Urine culture today per Dinorah    I spent 60 minutes with this patient today.  All changes were made via collaborative practice agreement with Dinorah Wong. A copy of the visit note was provided to the patient's primary care provider.     Will follow up in 1 year.    The patient was given a summary of these recommendations as an after visit summary.    Beti Jacobo , Pharm D  [phone] (phone)  [phone] (pager)  Medication Therapy Management Pharmacist

## 2017-12-19 NOTE — PATIENT INSTRUCTIONS
Recommendations from today's MTM visit:                                                    MTM (medication therapy management) is a service provided by a clinical pharmacist designed to help you get the most of out of your medicines.   Today we reviewed what your medicines are for, how to know if they are working, that your medicines are safe and how to make your medicine regimen as easy as possible.     1.  Refilled Premarin today per Dinorah    2.  Calcium intake goal 1200 mg including diet (milk, cheese, yogurt, green leafy veggies)    3.  Due for DEXA and labs (cholesterol, A1c, microalbumin) - scheduled fasting lab appointment for Wed. 1/27 at 9am    4.  Make sure you are taking 400 mg or less of vitamin E    5.  Stop glipizide and metformin.  Start Janumet XL  mg 2 tablets daily    6.  Urine culture today per Dinorah Wong    Next MTM visit: 3 months    To schedule another MTM appointment, please call the clinic directly or you may call the MTM scheduling line at [phone] or toll-free at [phone].     My Clinical Pharmacist's contact information:                                                      It was a pleasure seeing you today!  Please feel free to contact me with any questions or concerns you have.      Beti Jacobo , Radha D  [phone] (phone)  [phone] (pager)  Medication Therapy Management Pharmacist     You may receive a survey about the MTM services you received.  I would appreciate your feedback to help me serve you better in the future. Please fill it out and return it when you can. Your comments will be anonymous.

## 2017-12-19 NOTE — MR AVS SNAPSHOT
After Visit Summary   12/19/2017    Lindsey Gary    MRN: [id number]           Patient Information     Date Of Birth          1940        Visit Information        Provider Department      12/19/2017 8:30 AM Beti Jacobo, [facility] MTM        Today's Diagnoses     Type 2 diabetes mellitus without complication, without long-term current use of insulin (H)    -  1    Screening for osteoporosis        Atrophic vaginitis          Care Instructions    Recommendations from today's MTM visit:                                                    MTM (medication therapy management) is a service provided by a clinical pharmacist designed to help you get the most of out of your medicines.   Today we reviewed what your medicines are for, how to know if they are working, that your medicines are safe and how to make your medicine regimen as easy as possible.     1.  Refilled Premarin today per Dinorah    2.  Calcium intake goal 1200 mg including diet (milk, cheese, yogurt, green leafy veggies)    3.  Due for DEXA and labs (cholesterol, A1c, microalbumin) - scheduled fasting lab appointment for Wed. 1/27 at 9am    4.  Make sure you are taking 400 mg or less of vitamin E    5.  Stop glipizide and metformin.  Start Janumet XL  mg 2 tablets daily    6.  Urine culture today per Dinorah Wong    Next MTM visit: 3 months    To schedule another MTM appointment, please call the clinic directly or you may call the MTM scheduling line at [phone] or toll-free at [phone].     My Clinical Pharmacist's contact information:                                                      It was a pleasure seeing you today!  Please feel free to contact me with any questions or concerns you have.      Beti Jacobo , Pharm D  [phone] (phone)  [phone] (pager)  Medication Therapy Management Pharmacist     You may receive a survey about the MTM services you received.  I would appreciate your feedback to help  me serve you better in the future. Please fill it out and return it when you can. Your comments will be anonymous.                      Follow-ups after your visit        Your next 10 appointments already scheduled     Dec 27, 2017  9:00 AM CST   LAB with [facility]   [facility]    [address]  [facility] [address]   [phone]           Please do not eat 10-12 hours before your appointment if you are coming in fasting for labs on lipids, cholesterol, or glucose (sugar). This does not apply to pregnant women. Water, hot tea and black coffee (with nothing added) are okay. Do not drink other fluids, diet soda or chew gum.              Future tests that were ordered for you today     Open Future Orders        Priority Expected Expires Ordered    Urine Culture Aerobic Bacterial Routine  12/18/2018 12/19/2017    Albumin Random Urine Quantitative with Creat Ratio Routine  12/19/2018 12/19/2017    DX Hip/Pelvis/Spine Routine  12/19/2018 12/19/2017            Who to contact     If you have questions or need follow up information about today's clinic visit or your schedule please contact [facility] directly at [phone].  Normal or non-critical lab and imaging results will be communicated to you by MyChart, letter or phone within 4 business days after the clinic has received the results. If you do not hear from us within 7 days, please contact the clinic through [employer] or phone. If you have a critical or abnormal lab result, we will notify you by phone as soon as possible.  Submit refill requests through [employer] or call your pharmacy and they will forward the refill request to us. Please allow 3 business days for your refill to be completed.          Additional Information About Your Visit        [employer] Information     [employer] lets you send messages to your doctor, view your test results, renew your prescriptions, schedule appointments and more. To  "sign up, go to [email or web address] . Click on \"Log in\" on the left side of the screen, which will take you to the Welcome page. Then click on \"Sign up Now\" on the right side of the page.     You will be asked to enter the access code listed below, as well as some personal information. Please follow the directions to create your username and password.     Your access code is: [email or web address]  Expires: 2018 10:58 AM     Your access code will  in 90 days. If you need help or a new code, please call your [facility] clinic or [phone].        Care EveryWhere ID     This is your Care EveryWhere ID. This could be used by other organizations to access your [facility] medical records  [id number]        Your Vitals Were     Pulse BMI (Body Mass Index)                71 32.57 kg/m2           Blood Pressure from Last 3 Encounters:   17 127/68   12/15/17 124/76   17 130/80    Weight from Last 3 Encounters:   17 182 lb 6.4 oz (82.7 kg)   12/15/17 182 lb (82.6 kg)   17 182 lb 9.6 oz (82.8 kg)                 Today's Medication Changes          These changes are accurate as of: 17  9:30 AM.  If you have any questions, ask your nurse or doctor.               Start taking these medicines.        Dose/Directions    conjugated estrogens cream   Commonly known as:  PREMARIN   Used for:  Atrophic vaginitis   Started by:  Beti Jacobo RPH        Insert 1 application vaginally twice per week, may also use twice monthly   Quantity:  30 g   Refills:  12       SitaGLIPtin-MetFORMIN HCl  MG Tb24   Commonly known as:  JANUMET XR   Used for:  Type 2 diabetes mellitus without complication, without long-term current use of insulin (H)   Started by:  Beti Jacobo RPH        Dose:  2 tablet   Take 2 tablets by mouth daily   Quantity:  180 tablet   Refills:  1         Stop taking these medicines if you haven't already. Please contact your care team if you have questions.     glipiZIDE 2.5 MG " 24 hr tablet   Commonly known as:  glipiZIDE XL   Stopped by:  Beti Jacobo, [place]           metFORMIN 500 MG tablet   Commonly known as:  GLUCOPHAGE   Stopped by:  Beti Jacobo RPH                Where to get your medicines      Some of these will need a paper prescription and others can be bought over the counter.  Ask your nurse if you have questions.     Bring a paper prescription for each of these medications     conjugated estrogens cream    SitaGLIPtin-MetFORMIN HCl  MG Tb24                Primary Care Provider Office Phone # Fax #    Dinorah LILLIAM [facility] [phone]       [address] [facility] [address]        Equal Access to Services     [facility]: Hadii lorna nassaro Kd, waaxda luheidyadaha, qaybta kaalmada javier, janet hardwick . So [facility] [phone].    ATENCIÓN: Si habla español, tiene a toribio disposición servicios gratuitos de asistencia lingüística. [facility] [phone].    We comply with applicable federal civil rights laws and [place] laws. We do not discriminate on the basis of race, color, national origin, age, disability, sex, sexual orientation, or gender identity.            Thank you!     Thank you for choosing [facility]  for your care. Our goal is always to provide you with excellent care. Hearing back from our patients is one way we can continue to improve our services. Please take a few minutes to complete the written survey that you may receive in the mail after your visit with us. Thank you!             Your Updated Medication List - Protect others around you: Learn how to safely use, store and throw away your medicines at www.disposemymeds.org.          This list is accurate as of: 12/19/17  9:30 AM.  Always use your most recent med list.                   Brand Name Dispense Instructions for use Diagnosis    allopurinol 300 MG tablet    ZYLOPRIM    90 tablet    Take 1 tablet (300 mg) by mouth  daily    Gout, unspecified       aspirin 81 MG tablet     100    1 tablet daily        betamethasone (augmented) 0.05 % lotion    DIPROLENE     GINA 10 TO 20 DROPS TOPICALLY AA OF SCALP Q NIGHT UTD        blood glucose monitoring lancets     2 Box    Test 2-3 times daily as directed    Type 2 diabetes, HbA1c goal < 7% (H)       blood glucose monitoring meter device kit     1 kit    Use to test blood sugar 1 times daily or as directed.    Type 2 diabetes mellitus without complication, without long-term current use of insulin (H)       blood glucose monitoring test strip    ACCU-CHEK SMARTVIEW    300 each    Check 3 times daily as directed    Type 2 diabetes, HbA1c goal < 7% (H)       calcipotriene 0.005 % Oint      Apply 1 Application topically as needed        chlorhexidine 0.12 % solution    PERIDEX     RINSE ONE HALF  OZ 2-3 X D AFTER BREAKFAST BEFORE BEDTIME FOLLOWING BRUSHING AND FLOSSIN        cinnamon 500 MG Caps      2 tablets daily        conjugated estrogens cream    PREMARIN    30 g    Insert 1 application vaginally twice per week, may also use twice monthly    Atrophic vaginitis       fluconazole 150 MG tablet    DIFLUCAN    5 tablet    Take 1 tablet (150 mg) by mouth once a week for 2 doses One tablet per week    Yeast infection of the vagina       furosemide 20 MG tablet    LASIX    90 tablet    Take 1 tablet (20 mg) by mouth daily as needed    Edema, unspecified type       gabapentin 300 MG capsule    NEURONTIN    180 capsule    Take 2 capsules (600 mg) by mouth At Bedtime    Neuropathy       glimepiride 4 MG tablet    AMARYL    180 tablet    Take 1 tablet (4 mg) by mouth 2 times daily    Type 2 diabetes mellitus without complication, without long-term current use of insulin (H)       lisinopril 10 MG tablet    PRINIVIL/ZESTRIL    90 tablet    Take 1 tablet (10 mg) by mouth daily    Essential hypertension, benign       MAGNESIUM OXIDE -MG SUPPLEMENT PO      Take 1 tablet by mouth daily        PROBIOTIC  ACIDOPHILUS Tabs      Take 1 tablet by mouth daily        simvastatin 40 MG tablet    ZOCOR    90 tablet    Take 1 tablet (40 mg) by mouth At Bedtime ONE TABLET DAILY IN THE EVENING    Mixed hyperlipidemia       SitaGLIPtin-MetFORMIN HCl  MG Tb24    JANUMET XR    180 tablet    Take 2 tablets by mouth daily    Type 2 diabetes mellitus without complication, without long-term current use of insulin (H)       terconazole 0.4 % cream    TERAZOL 7    70 g    Place 1 applicator vaginally At Bedtime    Yeast infection of the vagina       TUMS 500 MG chewable tablet   Generic drug:  calcium carbonate     90    1 TABLET 3 TIMES PRN        TYLENOL 500 MG tablet   Generic drug:  acetaminophen      Take 1-2 tablets by mouth every morning.    Mixed hyperlipidemia, Gout, unspecified, Type 2 diabetes, HbA1c goal < 7% (H), Hyperlipidemia LDL goal <100       vitamin D 2000 UNITS tablet      Take 1 tablet by mouth daily        vitamin E 400 UNITS Tabs      Take 400 Units by mouth daily

## 2017-12-20 LAB
BACTERIA SPEC CULT: NORMAL
CREAT UR-MCNC: 198 MG/DL
MICROALBUMIN UR-MCNC: 104 MG/L
MICROALBUMIN/CREAT UR: 52.53 MG/G CR (ref 0–25)
SPECIMEN SOURCE: NORMAL

## 2017-12-27 ENCOUNTER — TELEPHONE (OUTPATIENT)
Dept: INTERNAL MEDICINE | Facility: CLINIC | Age: 77
End: 2017-12-27

## 2017-12-27 DIAGNOSIS — E11.8 TYPE 2 DIABETES MELLITUS WITH COMPLICATION, WITHOUT LONG-TERM CURRENT USE OF INSULIN (H): ICD-10-CM

## 2017-12-27 DIAGNOSIS — N95.1 SYMPTOMATIC MENOPAUSAL OR FEMALE CLIMACTERIC STATES: Primary | ICD-10-CM

## 2017-12-27 DIAGNOSIS — M10.9 GOUT: ICD-10-CM

## 2017-12-27 DIAGNOSIS — E78.2 MIXED HYPERLIPIDEMIA: ICD-10-CM

## 2017-12-27 DIAGNOSIS — I10 ESSENTIAL HYPERTENSION, BENIGN: ICD-10-CM

## 2017-12-27 NOTE — TELEPHONE ENCOUNTER
Pt calls, states she went to [facility] with the Premarin rx written by PCP. Indicates her insurance will not cover it. Reports this has happened in the past as well and after PA it's been approved. Unable to find documentation of this in epic. Pt reports she has not used any alternative rxs in the past. Has used Premarin with good results and would like to stay with Premarin. Indicates she typically uses it about twice a week.    Medicare Rx - Part D  ID: [id number]  BIN: [id number]  PCN: [id number]  Group: [id number]  Ph: [phone]    Please advise if okay with proceeding with PA, thanks.

## 2017-12-27 NOTE — TELEPHONE ENCOUNTER
Ordered in past from [facility]   We could check and see if GYN has record of doing this or knows what to write on PA   PA ok to do, but should be through Dr Church

## 2017-12-29 ENCOUNTER — TELEPHONE (OUTPATIENT)
Dept: INTERNAL MEDICINE | Facility: CLINIC | Age: 77
End: 2017-12-29

## 2017-12-29 RX ORDER — LISINOPRIL 10 MG/1
TABLET ORAL
Qty: 90 TABLET | Refills: 0 | Status: SHIPPED | OUTPATIENT
Start: 2017-12-29 | End: 2018-03-06

## 2017-12-29 RX ORDER — SIMVASTATIN 40 MG
TABLET ORAL
Qty: 90 TABLET | Refills: 0 | Status: SHIPPED | OUTPATIENT
Start: 2017-12-29 | End: 2018-03-06

## 2017-12-29 RX ORDER — ALLOPURINOL 300 MG/1
TABLET ORAL
Qty: 90 TABLET | Refills: 0 | Status: SHIPPED | OUTPATIENT
Start: 2017-12-29 | End: 2018-03-06

## 2017-12-29 NOTE — TELEPHONE ENCOUNTER
BP Readings from Last 3 Encounters:   12/19/17 127/68   12/15/17 124/76   11/03/17 130/80       CBC RESULTS:   Recent Labs   Lab Test  09/22/16   0929   WBC  6.1   RBC  4.29   HGB  12.2   HCT  37.7   MCV  88   MCH  28.4   MCHC  32.4   RDW  13.5   PLT  199     Creatinine   Date Value Ref Range Status   07/27/2017 0.84 0.52 - 1.04 mg/dL Final     Potassium   Date Value Ref Range Status   07/27/2017 4.9 3.4 - 5.3 mmol/L Final     Lab Results   Component Value Date    A1C 8.2 07/27/2017    A1C 7.0 09/22/2016    A1C 8.0 06/16/2016    A1C 7.4 12/15/2015    A1C 7.7 08/10/2015     Medications are being filled for 1 time refill only due to:  Patient needs labs --in chart. And due for OV in Feb 2018.     Pt informed.

## 2017-12-29 NOTE — TELEPHONE ENCOUNTER
Pt calls. She received a letter in the mail that stated she has protein in her urine and is already taking Lisinopril for this. Pt thought Lisinopril was for hypertension. Informed pt that Lisinopril is also the recommended treatment to protect the kidneys when protein is present in the urine. Pt asks what caused this to increase, informed her it was likely caused by her diabetes. Pt asks if there is anything more she can be doing. Recommended keeping BP and Diabetes well managed and we will continue to monitor the protein levels, if Dinorah has any concerns in the future she may refer her to Nephrology for further evaluation. Pt verbalizes understanding.

## 2018-01-02 ENCOUNTER — TELEPHONE (OUTPATIENT)
Dept: INTERNAL MEDICINE | Facility: CLINIC | Age: 78
End: 2018-01-02

## 2018-01-02 DIAGNOSIS — E78.5 HYPERLIPIDEMIA LDL GOAL <100: ICD-10-CM

## 2018-01-02 DIAGNOSIS — E11.9 TYPE 2 DIABETES MELLITUS WITHOUT COMPLICATION, WITHOUT LONG-TERM CURRENT USE OF INSULIN (H): ICD-10-CM

## 2018-01-02 DIAGNOSIS — I10 ESSENTIAL HYPERTENSION: ICD-10-CM

## 2018-01-02 LAB
BASOPHILS # BLD AUTO: 0 10E9/L (ref 0–0.2)
BASOPHILS NFR BLD AUTO: 0.3 %
DIFFERENTIAL METHOD BLD: NORMAL
EOSINOPHIL # BLD AUTO: 0.4 10E9/L (ref 0–0.7)
EOSINOPHIL NFR BLD AUTO: 5.1 %
ERYTHROCYTE [DISTWIDTH] IN BLOOD BY AUTOMATED COUNT: 13.1 % (ref 10–15)
HBA1C MFR BLD: 9 % (ref 4.3–6)
HCT VFR BLD AUTO: 38 % (ref 35–47)
HGB BLD-MCNC: 12.1 G/DL (ref 11.7–15.7)
LYMPHOCYTES # BLD AUTO: 1.6 10E9/L (ref 0.8–5.3)
LYMPHOCYTES NFR BLD AUTO: 21.8 %
MCH RBC QN AUTO: 27.6 PG (ref 26.5–33)
MCHC RBC AUTO-ENTMCNC: 31.8 G/DL (ref 31.5–36.5)
MCV RBC AUTO: 87 FL (ref 78–100)
MONOCYTES # BLD AUTO: 0.5 10E9/L (ref 0–1.3)
MONOCYTES NFR BLD AUTO: 6.2 %
NEUTROPHILS # BLD AUTO: 4.9 10E9/L (ref 1.6–8.3)
NEUTROPHILS NFR BLD AUTO: 66.6 %
PLATELET # BLD AUTO: 197 10E9/L (ref 150–450)
RBC # BLD AUTO: 4.39 10E12/L (ref 3.8–5.2)
WBC # BLD AUTO: 7.4 10E9/L (ref 4–11)

## 2018-01-02 PROCEDURE — 36415 COLL VENOUS BLD VENIPUNCTURE: CPT | Performed by: NURSE PRACTITIONER

## 2018-01-02 PROCEDURE — 80061 LIPID PANEL: CPT | Performed by: NURSE PRACTITIONER

## 2018-01-02 PROCEDURE — 80050 GENERAL HEALTH PANEL: CPT | Performed by: NURSE PRACTITIONER

## 2018-01-02 PROCEDURE — 83036 HEMOGLOBIN GLYCOSYLATED A1C: CPT | Performed by: NURSE PRACTITIONER

## 2018-01-03 ENCOUNTER — OFFICE VISIT (OUTPATIENT)
Dept: INTERNAL MEDICINE | Facility: CLINIC | Age: 78
End: 2018-01-03
Payer: COMMERCIAL

## 2018-01-03 VITALS
DIASTOLIC BLOOD PRESSURE: 66 MMHG | TEMPERATURE: 97.9 F | RESPIRATION RATE: 12 BRPM | HEART RATE: 68 BPM | SYSTOLIC BLOOD PRESSURE: 120 MMHG | HEIGHT: 63 IN | BODY MASS INDEX: 32.25 KG/M2 | WEIGHT: 182 LBS | OXYGEN SATURATION: 97 %

## 2018-01-03 DIAGNOSIS — N95.2 ATROPHIC VAGINITIS: ICD-10-CM

## 2018-01-03 DIAGNOSIS — R82.90 ABNORMAL URINE ODOR: Primary | ICD-10-CM

## 2018-01-03 DIAGNOSIS — E11.9 TYPE 2 DIABETES MELLITUS WITHOUT COMPLICATION, WITHOUT LONG-TERM CURRENT USE OF INSULIN (H): ICD-10-CM

## 2018-01-03 DIAGNOSIS — N89.8 VAGINAL DISCHARGE: ICD-10-CM

## 2018-01-03 DIAGNOSIS — B37.31 YEAST INFECTION OF THE VAGINA: ICD-10-CM

## 2018-01-03 DIAGNOSIS — I10 ESSENTIAL HYPERTENSION: ICD-10-CM

## 2018-01-03 DIAGNOSIS — R05.9 COUGH: ICD-10-CM

## 2018-01-03 LAB
ALBUMIN SERPL-MCNC: 4 G/DL (ref 3.4–5)
ALP SERPL-CCNC: 66 U/L (ref 40–150)
ALT SERPL W P-5'-P-CCNC: 26 U/L (ref 0–50)
ANION GAP SERPL CALCULATED.3IONS-SCNC: 9 MMOL/L (ref 3–14)
AST SERPL W P-5'-P-CCNC: 24 U/L (ref 0–45)
BILIRUB SERPL-MCNC: 0.7 MG/DL (ref 0.2–1.3)
BUN SERPL-MCNC: 21 MG/DL (ref 7–30)
CALCIUM SERPL-MCNC: 9 MG/DL (ref 8.5–10.1)
CHLORIDE SERPL-SCNC: 102 MMOL/L (ref 94–109)
CHOLEST SERPL-MCNC: 180 MG/DL
CO2 SERPL-SCNC: 26 MMOL/L (ref 20–32)
CREAT SERPL-MCNC: 0.65 MG/DL (ref 0.52–1.04)
GFR SERPL CREATININE-BSD FRML MDRD: 88 ML/MIN/1.7M2
GLUCOSE SERPL-MCNC: 203 MG/DL (ref 70–99)
HDLC SERPL-MCNC: 30 MG/DL
LDLC SERPL CALC-MCNC: 113 MG/DL
NONHDLC SERPL-MCNC: 150 MG/DL
POTASSIUM SERPL-SCNC: 4.6 MMOL/L (ref 3.4–5.3)
PROT SERPL-MCNC: 6.9 G/DL (ref 6.8–8.8)
SODIUM SERPL-SCNC: 137 MMOL/L (ref 133–144)
TRIGL SERPL-MCNC: 185 MG/DL
TSH SERPL DL<=0.005 MIU/L-ACNC: 1.32 MU/L (ref 0.4–4)

## 2018-01-03 PROCEDURE — 87186 SC STD MICRODIL/AGAR DIL: CPT | Performed by: NURSE PRACTITIONER

## 2018-01-03 PROCEDURE — 99215 OFFICE O/P EST HI 40 MIN: CPT | Performed by: NURSE PRACTITIONER

## 2018-01-03 PROCEDURE — 87077 CULTURE AEROBIC IDENTIFY: CPT | Performed by: NURSE PRACTITIONER

## 2018-01-03 PROCEDURE — 87070 CULTURE OTHR SPECIMN AEROBIC: CPT | Performed by: NURSE PRACTITIONER

## 2018-01-03 RX ORDER — FLUCONAZOLE 150 MG/1
150 TABLET ORAL
Qty: 30 TABLET | Refills: 6 | Status: SHIPPED | OUTPATIENT
Start: 2018-01-03 | End: 2019-01-08

## 2018-01-03 RX ORDER — CODEINE PHOSPHATE AND GUAIFENESIN 10; 100 MG/5ML; MG/5ML
2 SOLUTION ORAL EVERY 4 HOURS PRN
Qty: 240 ML | Refills: 1 | Status: SHIPPED | OUTPATIENT
Start: 2018-01-03 | End: 2018-03-27

## 2018-01-03 NOTE — PATIENT INSTRUCTIONS
[facility] mail order   [id number]   [facility] [address] [phone] phone     I will let you know what Beti says   Would continue amaryl for now    Call insurance regarding the premarin or substitute  Ask your daughter about the compounding pharmacy        Balneol lotion if needed for discomfort     Vitamin D 2000 units daily

## 2018-01-03 NOTE — PROGRESS NOTES
SUBJECTIVE:   Lindsey Gary is a 77 year old female who presents to clinic today for the following health issues:    Recheck meds  Refill cough medication- robitussin with codeine   Check vaginal irritation,rash- needs prior auth for premarin cream - she will check with insurance regarding alternative or with compounding pharmacy regarding options   Needs culture - vaginal culture done in past and treatable   Discuss Amaryl- think she should be on this with Janumet    How much Vitamin D to take? 2000 units daily     Diaper rash - butt cream - ok for protection   Mom is dying- she is 98 years old  - she lost her sibling and her  lost his sibling in a week in Sept 2017  Lots of stress this year     Janumet XL recommended by Pharm D - has not started   Wanted to get a baseline as to where she was for diabetes  before starting medication               Problem list and histories reviewed & adjusted, as indicated.  Additional history: as documented    Patient Active Problem List   Diagnosis     Rosacea     Gout     CHONDROCALC NOS-OTHER SITE(aka PSEUDOGOUT)     Essential hypertension     Hyperlipidemia LDL goal <100     Advanced directives, counseling/discussion     Atrophic vaginitis     Chronic foot pain     Hypertension goal BP (blood pressure) < 130/80     Bereavement     Obesity     Type 2 diabetes mellitus without complication (H)     Status post total knee replacement, unspecified laterality     Psoriasis     Past Surgical History:   Procedure Laterality Date     C NONSPECIFIC PROCEDURE      Breast biopsies        C NONSPECIFIC PROCEDURE      Symptomatic left thigh lipomas        C NONSPECIFIC PROCEDURE  6/1/09    pubovaginal sling     COLONOSCOPY       COLONOSCOPY N/A 12/17/2014    Procedure: COMBINED COLONOSCOPY, SINGLE OR MULTIPLE BIOPSY/POLYPECTOMY BY BIOPSY;  Surgeon: Chuy Staton MD;  Location:  GI     HYSTERECTOMY, PAP NO LONGER INDICATED       HYSTERECTOMY, JAIME  1991    fibroid     SURGICAL  "HISTORY OF -   10/28/2015    right partial knee        Social History   Substance Use Topics     Smoking status: Never Smoker     Smokeless tobacco: Never Used     Alcohol use No     Family History   Problem Relation Age of Onset     CANCER Mother      colon ca survivor     Cancer - colorectal Mother      CEREBROVASCULAR DISEASE Father      born 1916             Reviewed and updated as needed this visit by clinical staffTobacco  Allergies  Meds  Soc Hx      Reviewed and updated as needed this visit by Provider         ROS:  Constitutional, HEENT, cardiovascular, pulmonary, GI, , musculoskeletal, neuro, skin, endocrine and psych systems are negative, except as otherwise noted.      OBJECTIVE:   /66 (BP Location: Left arm, Patient Position: Chair, Cuff Size: Adult Large)  Pulse 68  Temp 97.9  F (36.6  C) (Oral)  Resp 12  Ht 5' 2.75\" (1.594 m)  Wt 182 lb (82.6 kg)  SpO2 97%  BMI 32.5 kg/m2  Body mass index is 32.5 kg/(m^2).  GENERAL: alert and no distress- sad regarding mother dying   RESP: lungs clear to auscultation - no rales, rhonchi or wheezes  CV: regular rate and rhythm  : normal female genitalia - no excess redness or discharge ; culture taken    MS: no gross musculoskeletal defects noted, no edema  NEURO: Normal strength and tone, mentation intact and speech normal  PSYCH: mentation appears normal, affect normal/bright    Diagnostic Test Results:  Vaginal culture     ASSESSMENT/PLAN:             1. Abnormal urine odor    - *UA reflex to Microscopic and Culture ([place] [facility] (except [place] and [place])    2. Vaginal discharge    - Wound Culture Aerobic Bacterial    3. Atrophic vaginitis  Needs to figure out if premarin can be covered - was $400 for a tube and that is too expensive     4. Essential hypertension  Good control  No changes     5. Type 2 diabetes mellitus without complication, without long-term current use of insulin (H)  Needs improvement   To start Janumet "     6. Cough    - guaiFENesin-codeine (ROBITUSSIN AC) 100-10 MG/5ML SOLN solution; Take 10 mLs by mouth every 4 hours as needed  Dispense: 240 mL; Refill: 1    7. Yeast infection of the vagina  Recurrent   - fluconazole (DIFLUCAN) 150 MG tablet; Take 1 tablet (150 mg) by mouth every 3 days  Dispense: 30 tablet; Refill: 6    Patient Instructions   [facility] mail order   [address] [facility] [address] [phone] phone     I will let you know what Beti says   Would continue amaryl for now    Call insurance regarding the premarin or substitute  Ask your daughter about the compounding pharmacy        Balneol lotion if needed for discomfort     Vitamin D 2000 units daily       LILLIAM [facility]

## 2018-01-03 NOTE — NURSING NOTE
"Chief Complaint   Patient presents with     RECHECK       Initial /66 (BP Location: Left arm, Patient Position: Chair, Cuff Size: Adult Large)  Pulse 68  Temp 97.9  F (36.6  C) (Oral)  Resp 12  Ht 5' 2.75\" (1.594 m)  Wt 182 lb (82.6 kg)  SpO2 97%  BMI 32.5 kg/m2 Estimated body mass index is 32.5 kg/(m^2) as calculated from the following:    Height as of this encounter: 5' 2.75\" (1.594 m).    Weight as of this encounter: 182 lb (82.6 kg).  Medication Reconciliation: complete     NELSON Ardon      "

## 2018-01-03 NOTE — MR AVS SNAPSHOT
After Visit Summary   1/3/2018    Lindsey Gary    MRN: [id number]           Patient Information     Date Of Birth          1940        Visit Information        Provider Department      1/3/2018 8:20 AM Dinorah Wong APRN CNP [facility]        Today's Diagnoses     Abnormal urine odor    -  1    Vaginal discharge        Atrophic vaginitis        Essential hypertension        Type 2 diabetes mellitus without complication, without long-term current use of insulin (H)        Cough        Yeast infection of the vagina          Care Instructions    [facility] mail order   [address] [facility] [address] [phone] phone     I will let you know what Beti says   Would continue amaryl for now    Call insurance regarding the premarin or substitute  Ask your daughter about the compounding pharmacy        Balneol lotion if needed for discomfort     Vitamin D 2000 units daily           Follow-ups after your visit        Your next 10 appointments already scheduled     Jan 22, 2018 11:00 AM CST   DX HIP/PELVIS/SPINE with RIDX1   [facility] ([facility])    [address]  [address]  [facility] [address]              Please do not take any of the following 24 hours prior to the day of your exam: vitamins, calcium tablets, antacids.  If possible, please wear clothes without metal (snaps, zippers). A sweatsuit works well.              Who to contact     If you have questions or need follow up information about today's clinic visit or your schedule please contact [facility] directly at [phone].  Normal or non-critical lab and imaging results will be communicated to you by MyChart, letter or phone within 4 business days after the clinic has received the results. If you do not hear from us within 7 days, please contact the clinic through MyChart or phone. If you have a critical or abnormal lab result, we will notify  "you by phone as soon as possible.  Submit refill requests through [employer] or call your pharmacy and they will forward the refill request to us. Please allow 3 business days for your refill to be completed.          Additional Information About Your Visit        [employer] Information     [employer] lets you send messages to your doctor, view your test results, renew your prescriptions, schedule appointments and more. To sign up, go to [email or web address] . Click on \"Log in\" on the left side of the screen, which will take you to the Welcome page. Then click on \"Sign up Now\" on the right side of the page.     You will be asked to enter the access code listed below, as well as some personal information. Please follow the directions to create your username and password.     Your access code is: [email or web address]  Expires: 2018 10:58 AM     Your access code will  in 90 days. If you need help or a new code, please call your [place] clinic or [phone].        Care EveryWhere ID     This is your Care EveryWhere ID. This could be used by other organizations to access your [place] medical records  [id number]        Your Vitals Were     Pulse Temperature Respirations Height Pulse Oximetry BMI (Body Mass Index)    68 97.9  F (36.6  C) (Oral) 12 5' 2.75\" (1.594 m) 97% 32.5 kg/m2       Blood Pressure from Last 3 Encounters:   18 120/66   17 127/68   12/15/17 124/76    Weight from Last 3 Encounters:   18 182 lb (82.6 kg)   17 182 lb 6.4 oz (82.7 kg)   12/15/17 182 lb (82.6 kg)              We Performed the Following     *UA reflex to Microscopic and Culture ([place] and [facility] (except [place] and [place])     Wound Culture Aerobic Bacterial          Today's Medication Changes          These changes are accurate as of: 1/3/18  9:28 AM.  If you have any questions, ask your nurse or doctor.               Start taking these medicines.        Dose/Directions    fluconazole 150 MG tablet "   Commonly known as:  DIFLUCAN   Used for:  Yeast infection of the vagina   Started by:  Dinorah Wong APRN CNP        Dose:  150 mg   Take 1 tablet (150 mg) by mouth every 3 days   Quantity:  30 tablet   Refills:  6       guaiFENesin-codeine 100-10 MG/5ML Soln solution   Commonly known as:  ROBITUSSIN AC   Used for:  Cough   Started by:  Dinorah Wong APRN CNP        Dose:  2 tsp.   Take 10 mLs by mouth every 4 hours as needed   Quantity:  240 mL   Refills:  1         Stop taking these medicines if you haven't already. Please contact your care team if you have questions.     metFORMIN 500 MG tablet   Commonly known as:  GLUCOPHAGE   Stopped by:  Dinorah Wong APRN CNP                Where to get your medicines      These medications were sent to [facility] - [place], [place] - [address] [place] [id number] [facility], [facility] [address]     Phone:  [phone]     fluconazole 150 MG tablet         Some of these will need a paper prescription and others can be bought over the counter.  Ask your nurse if you have questions.     Bring a paper prescription for each of these medications     guaiFENesin-codeine 100-10 MG/5ML Soln solution                Primary Care Provider Office Phone # Fax #    LILLIAM Sam [phone]       [address]  [facility] [address]        Equal Access to Services     [facility] AH: Hadii aad ku hadasho Soomaali, waaxda luqadaha, qaybta kaalmada adeegyada, waxay deysi haypaola louis. So [facility] [phone].    ATENCIÓN: Si habla español, tiene a toribio disposición servicios gratuitos de asistencia lingüística. Llame al [phone].    We comply with applicable federal civil rights laws and [place] laws. We do not discriminate on the basis of race, color, national origin, age, disability, sex, sexual orientation, or gender identity.            Thank you!     Thank you  for choosing [facility]  for your care. Our goal is always to provide you with excellent care. Hearing back from our patients is one way we can continue to improve our services. Please take a few minutes to complete the written survey that you may receive in the mail after your visit with us. Thank you!             Your Updated Medication List - Protect others around you: Learn how to safely use, store and throw away your medicines at www.disposemymeds.org.          This list is accurate as of: 1/3/18  9:28 AM.  Always use your most recent med list.                   Brand Name Dispense Instructions for use Diagnosis    allopurinol 300 MG tablet    ZYLOPRIM    90 tablet    TAKE ONE TABLET BY MOUTH EVERY DAY    Gout       aspirin 81 MG tablet     100    1 tablet daily        betamethasone (augmented) 0.05 % lotion    DIPROLENE     GINA 10 TO 20 DROPS TOPICALLY AA OF SCALP Q NIGHT UTD        blood glucose monitoring lancets     2 Box    Test 2-3 times daily as directed    Type 2 diabetes, HbA1c goal < 7% (H)       blood glucose monitoring meter device kit     1 kit    Use to test blood sugar 1 times daily or as directed.    Type 2 diabetes mellitus without complication, without long-term current use of insulin (H)       blood glucose monitoring test strip    ACCU-CHEK SMARTVIEW    300 each    Check 3 times daily as directed    Type 2 diabetes, HbA1c goal < 7% (H)       calcipotriene 0.005 % Oint      Apply 1 Application topically as needed        chlorhexidine 0.12 % solution    PERIDEX     RINSE ONE HALF  OZ 2-3 X D AFTER BREAKFAST BEFORE BEDTIME FOLLOWING BRUSHING AND FLOSSIN        cinnamon 500 MG Caps      2 tablets daily        conjugated estrogens cream    PREMARIN    30 g    Insert 1 application vaginally twice per week, may also use twice monthly    Atrophic vaginitis       fluconazole 150 MG tablet    DIFLUCAN    30 tablet    Take 1 tablet (150 mg) by mouth every 3 days    Yeast infection of  the vagina       furosemide 20 MG tablet    LASIX    90 tablet    Take 1 tablet (20 mg) by mouth daily as needed    Edema, unspecified type       gabapentin 300 MG capsule    NEURONTIN    180 capsule    Take 2 capsules (600 mg) by mouth At Bedtime    Neuropathy       glimepiride 4 MG tablet    AMARYL    180 tablet    Take 1 tablet (4 mg) by mouth 2 times daily    Type 2 diabetes mellitus without complication, without long-term current use of insulin (H)       guaiFENesin-codeine 100-10 MG/5ML Soln solution    ROBITUSSIN AC    240 mL    Take 10 mLs by mouth every 4 hours as needed    Cough       lisinopril 10 MG tablet    PRINIVIL/ZESTRIL    90 tablet    TAKE ONE TABLET BY MOUTH EVERY DAY    Essential hypertension, benign       MAGNESIUM OXIDE -MG SUPPLEMENT PO      Take 1 tablet by mouth daily        PROBIOTIC ACIDOPHILUS Tabs      Take 1 tablet by mouth daily        simvastatin 40 MG tablet    ZOCOR    90 tablet    TAKE ONE TABLET BY MOUTH EVERY NIGHT AT BEDTIME    Mixed hyperlipidemia       SitaGLIPtin-MetFORMIN HCl  MG Tb24    JANUMET XR    180 tablet    Take 2 tablets by mouth daily    Type 2 diabetes mellitus without complication, without long-term current use of insulin (H)       terconazole 0.4 % cream    TERAZOL 7    70 g    Place 1 applicator vaginally At Bedtime    Yeast infection of the vagina       TUMS 500 MG chewable tablet   Generic drug:  calcium carbonate     90    1 TABLET 3 TIMES PRN        TYLENOL 500 MG tablet   Generic drug:  acetaminophen      Take 1-2 tablets by mouth every morning.    Mixed hyperlipidemia, Gout, unspecified, Type 2 diabetes, HbA1c goal < 7% (H), Hyperlipidemia LDL goal <100       vitamin D 2000 UNITS tablet      Take 1 tablet by mouth daily        vitamin E 400 UNITS Tabs      Take 400 Units by mouth daily

## 2018-01-04 DIAGNOSIS — E11.9 TYPE 2 DIABETES MELLITUS WITHOUT COMPLICATION, WITHOUT LONG-TERM CURRENT USE OF INSULIN (H): ICD-10-CM

## 2018-01-04 RX ORDER — GLIMEPIRIDE 4 MG/1
4 TABLET ORAL 2 TIMES DAILY
Qty: 180 TABLET | Refills: 0 | Status: SHIPPED | OUTPATIENT
Start: 2018-01-04 | End: 2018-03-06

## 2018-01-04 NOTE — TELEPHONE ENCOUNTER
Pt calls for her Glimepiride refill.     Last OV 1/3/18.     Lab Results   Component Value Date    A1C 9.0 01/02/2018    A1C 8.2 07/27/2017    A1C 7.0 09/22/2016    A1C 8.0 06/16/2016    A1C 7.4 12/15/2015     Creatinine   Date Value Ref Range Status   01/02/2018 0.65 0.52 - 1.04 mg/dL Final   ]

## 2018-01-04 NOTE — TELEPHONE ENCOUNTER
Left message on answering machine for patient to call back.  Has she contacted pharmacy??  Dr Christine last prescribed this medication in 2014  Spoke with pharmacist, estrace vaginal cream is covered. Co-pay $162.00  Can also try compounded vaginal estrogen from  compounding pharmacy.  When patient calls back, please ask her if she would like RX for estrace.  Kim Reed RN

## 2018-01-04 NOTE — TELEPHONE ENCOUNTER
----- Message from Jr Forrest MD sent at 1/4/2018  4:59 PM CST -----  Please see my previous note on this patient in response to the telephone message.  If the patient has not tried any other alternative medication that can make getting a prior authorization more difficult.  Please contact [facility] and see if there is a formulary equivalent that her insurance will pay for.  From that point we can make a decision regarding the best course of action  Thank you  Jr Forrest M.D.

## 2018-01-05 NOTE — TELEPHONE ENCOUNTER
Luis suggested sending a compounded medication script to their [facility] pharmacy (this is the only one that compounds) with a 0.5 or 0.6% strength to see what the cost comes up as.  She thought this may be a lot cheaper for pt.        Yoselyn AVERY R.N.  [facility]

## 2018-01-06 LAB
BACTERIA SPEC CULT: ABNORMAL
SPECIMEN SOURCE: ABNORMAL

## 2018-01-08 ENCOUNTER — OFFICE VISIT (OUTPATIENT)
Dept: INTERNAL MEDICINE | Facility: CLINIC | Age: 78
End: 2018-01-08
Payer: COMMERCIAL

## 2018-01-08 ENCOUNTER — TELEPHONE (OUTPATIENT)
Dept: INTERNAL MEDICINE | Facility: CLINIC | Age: 78
End: 2018-01-08

## 2018-01-08 VITALS
BODY MASS INDEX: 32.43 KG/M2 | TEMPERATURE: 98.9 F | SYSTOLIC BLOOD PRESSURE: 150 MMHG | HEART RATE: 84 BPM | HEIGHT: 63 IN | OXYGEN SATURATION: 98 % | DIASTOLIC BLOOD PRESSURE: 80 MMHG | WEIGHT: 183 LBS

## 2018-01-08 DIAGNOSIS — J01.00 ACUTE NON-RECURRENT MAXILLARY SINUSITIS: Primary | ICD-10-CM

## 2018-01-08 DIAGNOSIS — N76.0 VAGINAL INFECTION: Primary | ICD-10-CM

## 2018-01-08 PROCEDURE — 99213 OFFICE O/P EST LOW 20 MIN: CPT | Performed by: PHYSICIAN ASSISTANT

## 2018-01-08 RX ORDER — NEOMYCIN SULFATE, POLYMYXIN B SULFATE AND DEXAMETHASONE 3.5; 10000; 1 MG/ML; [USP'U]/ML; MG/ML
SUSPENSION/ DROPS OPHTHALMIC 4 TIMES DAILY
COMMUNITY
Start: 2018-01-08 | End: 2018-01-25

## 2018-01-08 RX ORDER — CIPROFLOXACIN 500 MG/1
500 TABLET, FILM COATED ORAL 2 TIMES DAILY
Qty: 14 TABLET | Refills: 0 | Status: SHIPPED | OUTPATIENT
Start: 2018-01-08 | End: 2018-01-25

## 2018-01-08 NOTE — TELEPHONE ENCOUNTER
Patient calling stating why was Cipro sent in to pharmacy. Advised :  Notes Recorded by Esther Townsend MD on 1/8/2018 at 11:06 AM  Please call her and let her know looks like she has UTI. RX sent in for cipro 500 mg bid for 7 days.    Patient asking if she needs to come back for a urine sample. Wound culture was done 1/3/18 not a UA. Patient reports got a letter stating has to come back in for UA.  Provider please review and advise. Thank you.

## 2018-01-08 NOTE — MR AVS SNAPSHOT
After Visit Summary   1/8/2018    Lindsey Gary    MRN: [id number]           Patient Information     Date Of Birth          1940        Visit Information        Provider Department      1/8/2018 3:00 PM Helena Pérez PA-C [facility]        Care Instructions      Sinusitis     The sinuses are air-filled spaces within the bones of the face. They connect to the inside of the nose. Sinusitis is an inflammation of the tissue lining the sinus cavity. Sinus inflammation can occur during a cold. It can also be due to allergies to pollens and other particles in the air. It can cause symptoms such as sinus congestion, headache, sore throat, facial swelling and fullness. It may also cause a low-grade fever. No infection is present, and no antibiotic treatment is needed.  Home care    Drink plenty of water, hot tea, and other liquids. This may help thin mucus. It also may promote sinus drainage.    Heat may help soothe painful areas of the face. Use a towel soaked in hot water. Or,  the shower and direct the hot spray onto your face. Using a vaporizer along with a menthol rub at night may also help.     An expectorant containing guaifenesin may help thin the mucus and promote drainage from the sinuses.    Over-the-counter decongestants may be used unless a similar medicine was prescribed. Nasal sprays work the fastest. Use one that contains phenylephrine or oxymetazoline. First blow the nose gently. Then use the spray. Do not use these medicines more often than directed on the label or symptoms may get worse. You may also use tablets containing pseudoephedrine. Avoid products that combine ingredients, because side effects may be increased. Read labels. You can also ask the pharmacist for help. (NOTE: Persons with high blood pressure should not use decongestants. They can raise blood pressure.)    Over-the-counter antihistamines may help if allergies contributed to your  sinusitis.      Use acetaminophen or ibuprofen to control pain, unless another pain medicine was prescribed. (If you have chronic liver or kidney disease or ever had a stomach ulcer, talk with your doctor before using these medicines. Aspirin should never be used in anyone under 18 years of age who is ill with a fever. It may cause severe liver damage.)    Use nasal rinses or irrigation as instructed by your health care provider.    Don't smoke. This can worsen symptoms.  Follow-up care  Follow up with your healthcare provider or our staff if you are not improving within the next week.  When to seek medical advice  Call your healthcare provider if any of these occur:    Unusual drowsiness or confusion    Swelling of the forehead or eyelids    Fever of 100.4 F (38 C) or higher, or as directed by your healthcare provider    Seizure    Breathing problems    Symptoms not resolving within 10 days  Date Last Reviewed: 4/13/2015 2000-2017 [employer]. [address] [place] [address]. All rights reserved. This information is not intended as a substitute for professional medical care. Always follow your healthcare professional's instructions.                Follow-ups after your visit        Your next 10 appointments already scheduled     Jan 22, 2018 11:00 AM CST   DX HIP[facility]PELVIS/SPINE [facility] RIDX[facility]   [facility]    [address] [place] [address]              Please do not take any of the following 24 hours prior to the day of your exam: vitamins, calcium tablets, antacids.  If possible, please wear clothes without metal (snaps, zippers). A sweatsuit works well.              Who to contact     If you have questions or need follow up information about today's clinic visit or your schedule please contact [facility] directly at [phone].  Normal or non-critical lab and imaging results will be  "communicated to you by [employer], letter or phone within 4 business days after the clinic has received the results. If you do not hear from us within 7 days, please contact the clinic through [employer] or phone. If you have a critical or abnormal lab result, we will notify you by phone as soon as possible.  Submit refill requests through [employer] or call your pharmacy and they will forward the refill request to us. Please allow 3 business days for your refill to be completed.          Additional Information About Your Visit        [employer] Information     [employer] lets you send messages to your doctor, view your test results, renew your prescriptions, schedule appointments and more. To sign up, go to [email or web address] . Click on \"Log in\" on the left side of the screen, which will take you to the Welcome page. Then click on \"Sign up Now\" on the right side of the page.     You will be asked to enter the access code listed below, as well as some personal information. Please follow the directions to create your username and password.     Your access code is: [email or web address]  Expires: 2018  3:49 PM     Your access code will  in 90 days. If you need help or a new code, please call your [place] clinic or [phone].        Care EveryWhere ID     This is your Care EveryWhere ID. This could be used by other organizations to access your [place] medical records  [id number]        Your Vitals Were     Pulse Temperature Height Pulse Oximetry BMI (Body Mass Index)       84 98.9  F (37.2  C) (Oral) 5' 2.75\" (1.594 m) 98% 32.68 kg/m2        Blood Pressure from Last 3 Encounters:   18 150/80   18 120/66   17 127/68    Weight from Last 3 Encounters:   18 183 lb (83 kg)   18 182 lb (82.6 kg)   17 182 lb 6.4 oz (82.7 kg)              Today, you had the following     No orders found for display       Primary Care Provider Office Phone # Fax #    LILLIAM Sam [phone] " [phone]       [address] [facility] [address]        Equal Access to Services     KERRYCARROLL TERRENCE : Hadii aad ku hadlokio Sonaelali, waaxda luqadaha, qaybta kaalmada ademaryurida, janet rodriguein hayaagifty hawthorneswetah mathew laashgifty heriberto. So [facility] [phone].    ATENCIÓN: Si habla español, tiene a toribio disposición servicios gratuitos de asistencia lingüística. Carieame al [phone].    We comply with applicable federal civil rights laws and [place] laws. We do not discriminate on the basis of race, color, national origin, age, disability, sex, sexual orientation, or gender identity.            Thank you!     Thank you for choosing [facility]  for your care. Our goal is always to provide you with excellent care. Hearing back from our patients is one way we can continue to improve our services. Please take a few minutes to complete the written survey that you may receive in the mail after your visit with us. Thank you!             Your Updated Medication List - Protect others around you: Learn how to safely use, store and throw away your medicines at www.disposemymeds.org.          This list is accurate as of: 1/8/18  3:50 PM.  Always use your most recent med list.                   Brand Name Dispense Instructions for use Diagnosis    allopurinol 300 MG tablet    ZYLOPRIM    90 tablet    TAKE ONE TABLET BY MOUTH EVERY DAY    Gout       aspirin 81 MG tablet     100    1 tablet daily        betamethasone (augmented) 0.05 % lotion    DIPROLENE     GINA 10 TO 20 DROPS TOPICALLY AA OF SCALP Q NIGHT UTD        blood glucose monitoring lancets     2 Box    Test 2-3 times daily as directed    Type 2 diabetes, HbA1c goal < 7% (H)       blood glucose monitoring meter device kit     1 kit    Use to test blood sugar 1 times daily or as directed.    Type 2 diabetes mellitus without complication, without long-term current use of insulin (H)       blood glucose monitoring test strip    ACCU-CHEK SMARTVIEW    300 each     Check 3 times daily as directed    Type 2 diabetes, HbA1c goal < 7% (H)       calcipotriene 0.005 % Oint      Apply 1 Application topically as needed        chlorhexidine 0.12 % solution    PERIDEX     RINSE ONE HALF  OZ 2-3 X D AFTER BREAKFAST BEFORE BEDTIME FOLLOWING BRUSHING AND FLOSSIN        cinnamon 500 MG Caps      2 tablets daily        ciprofloxacin 500 MG tablet    CIPRO    14 tablet    Take 1 tablet (500 mg) by mouth 2 times daily    Abnormal urine odor       conjugated estrogens cream    PREMARIN    30 g    Insert 1 application vaginally twice per week, may also use twice monthly    Atrophic vaginitis       fluconazole 150 MG tablet    DIFLUCAN    30 tablet    Take 1 tablet (150 mg) by mouth every 3 days    Yeast infection of the vagina       furosemide 20 MG tablet    LASIX    90 tablet    Take 1 tablet (20 mg) by mouth daily as needed    Edema, unspecified type       gabapentin 300 MG capsule    NEURONTIN    180 capsule    Take 2 capsules (600 mg) by mouth At Bedtime    Neuropathy       glimepiride 4 MG tablet    AMARYL    180 tablet    Take 1 tablet (4 mg) by mouth 2 times daily    Type 2 diabetes mellitus without complication, without long-term current use of insulin (H)       guaiFENesin-codeine 100-10 MG/5ML Soln solution    ROBITUSSIN AC    240 mL    Take 10 mLs by mouth every 4 hours as needed    Cough       lisinopril 10 MG tablet    PRINIVIL/ZESTRIL    90 tablet    TAKE ONE TABLET BY MOUTH EVERY DAY    Essential hypertension, benign       MAGNESIUM OXIDE -MG SUPPLEMENT PO      Take 1 tablet by mouth daily        neomycin-polymyxin-dexamethasone [id number] Susp ophthalmic susp    MAXITROL     Place into both eyes 4 times daily        PROBIOTIC ACIDOPHILUS Tabs      Take 1 tablet by mouth daily        simvastatin 40 MG tablet    ZOCOR    90 tablet    TAKE ONE TABLET BY MOUTH EVERY NIGHT AT BEDTIME    Mixed hyperlipidemia       SitaGLIPtin-MetFORMIN HCl  MG Tb24    JANUMET XR    180  tablet    Take 2 tablets by mouth daily    Type 2 diabetes mellitus without complication, without long-term current use of insulin (H)       terconazole 0.4 % cream    TERAZOL 7    70 g    Place 1 applicator vaginally At Bedtime    Yeast infection of the vagina       TUMS 500 MG chewable tablet   Generic drug:  calcium carbonate     90    1 TABLET 3 TIMES PRN        TYLENOL 500 MG tablet   Generic drug:  acetaminophen      Take 1-2 tablets by mouth every morning.    Mixed hyperlipidemia, Gout, unspecified, Type 2 diabetes, HbA1c goal < 7% (H), Hyperlipidemia LDL goal <100       vitamin D 2000 UNITS tablet      Take 1 tablet by mouth daily        vitamin E 400 UNITS Tabs      Take 400 Units by mouth daily

## 2018-01-08 NOTE — TELEPHONE ENCOUNTER
I sent a prescription for compounded estradiol vaginal cream to the listed [facility] pharmacy on [facility] in [facility] as requested

## 2018-01-08 NOTE — PROGRESS NOTES
"  SUBJECTIVE:   Lindsey Gary is a 77 year old female who presents to clinic today for the following health issues:      RESPIRATORY SYMPTOMS      Duration: 1 week    Description  nasal congestion, facial pain/pressure, cough, ear block bilateral, headache, fatigue/malaise, hoarse voice and conjunctival irritation    Severity: moderate    Accompanying signs and symptoms: None    History (predisposing factors):  none    Precipitating or alleviating factors: None    Therapies tried and outcome:  Cough drops, nyquil, vit c and nothing helped.    Pt notes her symptoms started with a sore throat then turned into sinus pain. She reports frontal and maxilary sinus pain x 1 week. She has acompanying HA. Pt denies fever, chest pain, and shortness of breath. Pt notes she just started ciprofloxacin for a vaginal infection.     Problem list and histories reviewed & adjusted, as indicated.  Additional history: as documented      Reviewed and updated as needed this visit by clinical staffTobacco  Allergies  Meds  Problems  Soc Hx      Reviewed and updated as needed this visit by Provider  Meds  Problems         OBJECTIVE:     /80 (BP Location: Left arm, Patient Position: Chair, Cuff Size: Adult Regular)  Pulse 84  Temp 98.9  F (37.2  C) (Oral)  Ht 5' 2.75\" (1.594 m)  Wt 183 lb (83 kg)  SpO2 98%  BMI 32.68 kg/m2  Body mass index is 32.68 kg/(m^2).  GENERAL: healthy, alert and no distress  EYES: Eyes grossly normal to inspection, PERRL and conjunctivae and sclerae normal  HENT: ear canals and TM's normal, nose and mouth without ulcers or lesions, sinus tenderness to frontal and maxillary sinuses bilaterally   NECK: no adenopathy, no asymmetry, masses, or scars and thyroid normal to palpation  RESP: lungs clear to auscultation - no rales, rhonchi or wheezes  CV: regular rates and rhythm, normal S1 S2, no S3 or S4 and no murmur, click or rub    Diagnostic Test Results:  none     ASSESSMENT/PLAN:     1. Acute " non-recurrent maxillary sinusitis  - onset of symptoms for 1 week with mild exam and no fever   - symptomatic care is the mainstay of treatment with sinus rinse and humidifier  - did review that ciprofloxacin would cover the sinuses for most infections as well as she is already on this   - follow up if symptoms worsen or fail to improve   - pt agrees to the above plan and all questions are answered      Helena Pérez PA-C  [facility]

## 2018-01-09 ENCOUNTER — VIRTUAL VISIT (OUTPATIENT)
Dept: OBGYN | Facility: CLINIC | Age: 78
End: 2018-01-09
Payer: COMMERCIAL

## 2018-01-09 DIAGNOSIS — N95.2 ATROPHIC VAGINITIS: Primary | ICD-10-CM

## 2018-01-09 NOTE — PATIENT INSTRUCTIONS
You can reach your [place] Care Team any time of the day by calling [phone]. This number will put you in touch with the 24 hour nurse line if the clinic is closed.    To contact your OB/GYN Station Coordinator/Surgery Scheduler please call [phone]. This is a direct number for your care team between 8 a.m. and 4 p.m. Monday through Friday.    [facility] is open for your convenience:  Monday through Friday 8 a.m. to 6 p.m.  Closed weekends and all major holidays.

## 2018-01-09 NOTE — TELEPHONE ENCOUNTER
Pt has started Cipro.  She says she has been very sick with this vaginal infection.  She is asking if she should be rechecked some time after she finishes the Cipro.

## 2018-01-09 NOTE — MR AVS SNAPSHOT
After Visit Summary   1/9/2018    Lindsey Gary    MRN: [id number]           Patient Information     Date Of Birth          1940        Visit Information        Provider Department      1/9/2018 2:17 PM Jr Forrest MD [facility]        Today's Diagnoses     Atrophic vaginitis    -  1      Care Instructions    You can reach your [place] Care Team any time of the day by calling [phone]. This number will put you in touch with the 24 hour nurse line if the clinic is closed.    To contact your OB/GYN Station Coordinator/Surgery Scheduler please call [phone]. This is a direct number for your care team between 8 a.m. and 4 p.m. Monday through Friday.    [facility] is open for your convenience:  Monday through Friday 8 a.m. to 6 p.m.  Closed weekends and all major holidays.              Follow-ups after your visit        Your next 10 appointments already scheduled     Jan 22, 2018 11:00 AM CST   DX HIP/PELVIS/SPINE with RIDX1   [facility]    [address]  [address]  [facility] [address]              Please do not take any of the following 24 hours prior to the day of your exam: vitamins, calcium tablets, antacids.  If possible, please wear clothes without metal (snaps, zippers). A sweatsuit works well.              Who to contact     If you have questions or need follow up information about today's clinic visit or your schedule please contact [facility] directly at [phone].  Normal or non-critical lab and imaging results will be communicated to you by MyChart, letter or phone within 4 business days after the clinic has received the results. If you do not hear from us within 7 days, please contact the clinic through MyChart or phone. If you have a critical or abnormal lab result, we will notify you by phone as soon as possible.  Submit refill requests through Madmagzhart or call your  "pharmacy and they will forward the refill request to us. Please allow 3 business days for your refill to be completed.          Additional Information About Your Visit        MyChart Information     Lockitron lets you send messages to your doctor, view your test results, renew your prescriptions, schedule appointments and more. To sign up, go to [email or web address] . Click on \"Log in\" on the left side of the screen, which will take you to the Welcome page. Then click on \"Sign up Now\" on the right side of the page.     You will be asked to enter the access code listed below, as well as some personal information. Please follow the directions to create your username and password.     Your access code is: [email or web address]  Expires: 2018  3:49 PM     Your access code will  in 90 days. If you need help or a new code, please call your [place] clinic or [phone].        Care EveryWhere ID     This is your Care EveryWhere ID. This could be used by other organizations to access your [place] medical records  [id number]         Blood Pressure from Last 3 Encounters:   18 150/80   18 120/66   17 127/68    Weight from Last 3 Encounters:   18 183 lb (83 kg)   18 182 lb (82.6 kg)   17 182 lb 6.4 oz (82.7 kg)              Today, you had the following     No orders found for display       Primary Care Provider Office Phone # Fax #    LILLIAM Sam [facility] [phone]       [address] [facility] [address]        Equal Access to Services     CARROLL OCAMPO : Hadii lorna Yi, sho elon, qajanet iraheta. So [facility] [phone].    ATENCIÓN: Si habla español, tiene a toribio disposición servicios gratuitos de asistencia lingüística. Bety al [phone].    We comply with applicable federal civil rights laws and [place] laws. We do not discriminate on the basis of race, color, national " origin, age, disability, sex, sexual orientation, or gender identity.            Thank you!     Thank you for choosing [facility]  for your care. Our goal is always to provide you with excellent care. Hearing back from our patients is one way we can continue to improve our services. Please take a few minutes to complete the written survey that you may receive in the mail after your visit with us. Thank you!             Your Updated Medication List - Protect others around you: Learn how to safely use, store and throw away your medicines at www.disposemymeds.org.          This list is accurate as of: 1/9/18  2:19 PM.  Always use your most recent med list.                   Brand Name Dispense Instructions for use Diagnosis    allopurinol 300 MG tablet    ZYLOPRIM    90 tablet    TAKE ONE TABLET BY MOUTH EVERY DAY    Gout       aspirin 81 MG tablet     100    1 tablet daily        betamethasone (augmented) 0.05 % lotion    DIPROLENE     GINA 10 TO 20 DROPS TOPICALLY AA OF SCALP Q NIGHT UTD        blood glucose monitoring lancets     2 Box    Test 2-3 times daily as directed    Type 2 diabetes, HbA1c goal < 7% (H)       blood glucose monitoring meter device kit     1 kit    Use to test blood sugar 1 times daily or as directed.    Type 2 diabetes mellitus without complication, without long-term current use of insulin (H)       blood glucose monitoring test strip    ACCU-CHEK SMARTVIEW    300 each    Check 3 times daily as directed    Type 2 diabetes, HbA1c goal < 7% (H)       calcipotriene 0.005 % Oint      Apply 1 Application topically as needed        chlorhexidine 0.12 % solution    PERIDEX     RINSE ONE HALF  OZ 2-3 X D AFTER BREAKFAST BEFORE BEDTIME FOLLOWING BRUSHING AND FLOSSIN        cinnamon 500 MG Caps      2 tablets daily        ciprofloxacin 500 MG tablet    CIPRO    14 tablet    Take 1 tablet (500 mg) by mouth 2 times daily    Abnormal urine odor       COMPOUNDED NON-CONTROLLED SUBSTANCE -  PHARMACY TO MIX COMPOUNDED MEDICATION    CMPD RX    45 g    Patient to use 1 g intravaginally at bedtime Monday and Thursday nights as directed for menopausal symptoms    Symptomatic menopausal or female climacteric states       conjugated estrogens cream    PREMARIN    30 g    Insert 1 application vaginally twice per week, may also use twice monthly    Atrophic vaginitis       fluconazole 150 MG tablet    DIFLUCAN    30 tablet    Take 1 tablet (150 mg) by mouth every 3 days    Yeast infection of the vagina       furosemide 20 MG tablet    LASIX    90 tablet    Take 1 tablet (20 mg) by mouth daily as needed    Edema, unspecified type       gabapentin 300 MG capsule    NEURONTIN    180 capsule    Take 2 capsules (600 mg) by mouth At Bedtime    Neuropathy       glimepiride 4 MG tablet    AMARYL    180 tablet    Take 1 tablet (4 mg) by mouth 2 times daily    Type 2 diabetes mellitus without complication, without long-term current use of insulin (H)       guaiFENesin-codeine 100-10 MG/5ML Soln solution    ROBITUSSIN AC    240 mL    Take 10 mLs by mouth every 4 hours as needed    Cough       lisinopril 10 MG tablet    PRINIVIL/ZESTRIL    90 tablet    TAKE ONE TABLET BY MOUTH EVERY DAY    Essential hypertension, benign       MAGNESIUM OXIDE -MG SUPPLEMENT PO      Take 1 tablet by mouth daily        neomycin-polymyxin-dexamethasone [id number] Susp ophthalmic susp    MAXITROL     Place into both eyes 4 times daily        PROBIOTIC ACIDOPHILUS Tabs      Take 1 tablet by mouth daily        simvastatin 40 MG tablet    ZOCOR    90 tablet    TAKE ONE TABLET BY MOUTH EVERY NIGHT AT BEDTIME    Mixed hyperlipidemia       SitaGLIPtin-MetFORMIN HCl  MG Tb24    JANUMET XR    180 tablet    Take 2 tablets by mouth daily    Type 2 diabetes mellitus without complication, without long-term current use of insulin (H)       terconazole 0.4 % cream    TERAZOL 7    70 g    Place 1 applicator vaginally At Bedtime    Yeast infection of  the vagina       TUMS 500 MG chewable tablet   Generic drug:  calcium carbonate     90    1 TABLET 3 TIMES PRN        TYLENOL 500 MG tablet   Generic drug:  acetaminophen      Take 1-2 tablets by mouth every morning.    Mixed hyperlipidemia, Gout, unspecified, Type 2 diabetes, HbA1c goal < 7% (H), Hyperlipidemia LDL goal <100       vitamin D 2000 UNITS tablet      Take 1 tablet by mouth daily        vitamin E 400 UNITS Tabs      Take 400 Units by mouth daily

## 2018-01-09 NOTE — TELEPHONE ENCOUNTER
cipro ordered for her vaginal culture and is ok to do   If we are treating that, it would also treat urine infection

## 2018-01-11 NOTE — TELEPHONE ENCOUNTER
Pt advised of Cipro treating both vaginal and urine but still wants another UC after being off the Cipro for 10 days!!. MARIAH LIAO LPN

## 2018-01-12 RX ORDER — CEFDINIR 300 MG/1
300 CAPSULE ORAL 2 TIMES DAILY
Qty: 20 CAPSULE | Refills: 0 | Status: SHIPPED | OUTPATIENT
Start: 2018-01-12 | End: 2018-01-25

## 2018-01-12 NOTE — TELEPHONE ENCOUNTER
I will send in omnicef  Has worked in past   Just to be clear she did not have UTI  Because of result of wound culture looking like urine result with culture and sensitivity it was thought to be a urine test by another provider

## 2018-01-12 NOTE — TELEPHONE ENCOUNTER
Pt calls because she isn't getting any better.  She would like to get a different abx sent to [facility].

## 2018-01-25 ENCOUNTER — OFFICE VISIT (OUTPATIENT)
Dept: INTERNAL MEDICINE | Facility: CLINIC | Age: 78
End: 2018-01-25
Payer: COMMERCIAL

## 2018-01-25 VITALS
WEIGHT: 183.6 LBS | BODY MASS INDEX: 32.53 KG/M2 | HEIGHT: 63 IN | OXYGEN SATURATION: 97 % | HEART RATE: 79 BPM | SYSTOLIC BLOOD PRESSURE: 126 MMHG | DIASTOLIC BLOOD PRESSURE: 64 MMHG | TEMPERATURE: 98.7 F

## 2018-01-25 DIAGNOSIS — R30.0 DYSURIA: ICD-10-CM

## 2018-01-25 DIAGNOSIS — J01.01 ACUTE RECURRENT MAXILLARY SINUSITIS: Primary | ICD-10-CM

## 2018-01-25 PROCEDURE — 99213 OFFICE O/P EST LOW 20 MIN: CPT | Performed by: NURSE PRACTITIONER

## 2018-01-25 RX ORDER — TOBRAMYCIN 3 MG/ML
1 SOLUTION/ DROPS OPHTHALMIC 4 TIMES DAILY
COMMUNITY
End: 2018-03-27

## 2018-01-25 RX ORDER — AZITHROMYCIN 250 MG/1
TABLET, FILM COATED ORAL
Qty: 6 TABLET | Refills: 1 | Status: SHIPPED | OUTPATIENT
Start: 2018-01-25 | End: 2018-03-08

## 2018-01-25 ASSESSMENT — ANXIETY QUESTIONNAIRES: GAD7 TOTAL SCORE: INCOMPLETE

## 2018-01-25 NOTE — NURSING NOTE
"Chief Complaint   Patient presents with     Sinus Problem     pt states 2nd time  being seen for sx of a cough, headache, and sinuses.        Initial /64 (BP Location: Left arm, Patient Position: Sitting, Cuff Size: Adult Large)  Pulse 79  Temp 98.7  F (37.1  C) (Oral)  Ht 5' 2.75\" (1.594 m)  Wt 183 lb 9.6 oz (83.3 kg)  SpO2 97%  BMI 32.78 kg/m2 Estimated body mass index is 32.78 kg/(m^2) as calculated from the following:    Height as of this encounter: 5' 2.75\" (1.594 m).    Weight as of this encounter: 183 lb 9.6 oz (83.3 kg).  Medication Reconciliation: complete    "

## 2018-01-25 NOTE — PATIENT INSTRUCTIONS
Will treat with Zithromax 250mg, 2 tabs by mouth day 1, then 1 tab by mouth days 2-5. She is to watch for GI upset, diarrhea or rash.  Take for 5 days and wait 5 days and then may repeat       Please, call or return to clinic if signs or symptoms worsen or fail to improve as anticipated

## 2018-01-25 NOTE — MR AVS SNAPSHOT
After Visit Summary   1/25/2018    Lindsey Gary    MRN: [id number]           Patient Information     Date Of Birth          1940        Visit Information        Provider Department      1/25/2018 10:40 AM Dinorah Wong APRN CNP [facility]        Today's Diagnoses     Acute recurrent maxillary sinusitis    -  1    Dysuria          Care Instructions    Will treat with Zithromax 250mg, 2 tabs by mouth day 1, then 1 tab by mouth days 2-5. She is to watch for GI upset, diarrhea or rash.  Take for 5 days and wait 5 days and then may repeat       Please, call or return to clinic if signs or symptoms worsen or fail to improve as anticipated            Follow-ups after your visit        Your next 10 appointments already scheduled     Feb 05, 2018 11:30 AM CST   DX HIP/PELVIS/SPINE with RIDX1   [facility]    [address]  [facility] [address]              Please do not take any of the following 24 hours prior to the day of your exam: vitamins, calcium tablets, antacids.  If possible, please wear clothes without metal (snaps, zippers). A sweatsuit works well.              Who to contact     If you have questions or need follow up information about today's clinic visit or your schedule please contact [facility] directly at [phone].  Normal or non-critical lab and imaging results will be communicated to you by MyChart, letter or phone within 4 business days after the clinic has received the results. If you do not hear from us within 7 days, please contact the clinic through MyChart or phone. If you have a critical or abnormal lab result, we will notify you by phone as soon as possible.  Submit refill requests through Squid[employer] or call your pharmacy and they will forward the refill request to us. Please allow 3 business days for your refill to be completed.          Additional Information  "About Your Visit        [employer] Information     [employer]ight lets you send messages to your doctor, view your test results, renew your prescriptions, schedule appointments and more. To sign up, go to [email or web address] . Click on \"Log in\" on the left side of the screen, which will take you to the Welcome page. Then click on \"Sign up Now\" on the right side of the page.     You will be asked to enter the access code listed below, as well as some personal information. Please follow the directions to create your username and password.     Your access code is: [email or web address]  Expires: 2018  3:49 PM     Your access code will  in 90 days. If you need help or a new code, please call your [place] clinic or [phone].        Care EveryWhere ID     This is your Care EveryWhere ID. This could be used by other organizations to access your [place] medical records  [id number]        Your Vitals Were     Pulse Temperature Height Pulse Oximetry BMI (Body Mass Index)       79 98.7  F (37.1  C) (Oral) 5' 2.75\" (1.594 m) 97% 32.78 kg/m2        Blood Pressure from Last 3 Encounters:   18 126/64   18 150/80   18 120/66    Weight from Last 3 Encounters:   18 183 lb 9.6 oz (83.3 kg)   18 183 lb (83 kg)   18 182 lb (82.6 kg)              We Performed the Following     Urine Culture Aerobic Bacterial          Today's Medication Changes          These changes are accurate as of 18 11:15 AM.  If you have any questions, ask your nurse or doctor.               Start taking these medicines.        Dose/Directions    azithromycin 250 MG tablet   Commonly known as:  ZITHROMAX   Used for:  Acute recurrent maxillary sinusitis   Started by:  Dinorah Wong APRN CNP        Two tablets first day, then one tablet daily for four days.   Quantity:  6 tablet   Refills:  1            Where to get your medicines      These medications were sent to [facility] [address] [place] [address] - [address] [facility] [address]  [address], [facility] [address]     Phone:  [phone]     azithromycin 250 MG tablet                Primary Care Provider Office Phone # Fax #    LILLIAM Sam TREVON [phone]       [address] [facility] [address]        Equal Access to Services     ASHA OCAMPO : Hadii aad ku hadasho Soomaali, waaxda luqadaha, qaybta kaalmada adeegyada, waxay idiin hayaan adeeg kharash la'aan ah. So [facility] [phone].    ATENCIÓN: Si habla espryan, tiene a toribio disposición servicios gratuitos de asistencia lingüística. [facility] [phone].    We comply with applicable federal civil rights laws and [place] laws. We do not discriminate on the basis of race, color, national origin, age, disability, sex, sexual orientation, or gender identity.            Thank you!     Thank you for choosing [facility]  for your care. Our goal is always to provide you with excellent care. Hearing back from our patients is one way we can continue to improve our services. Please take a few minutes to complete the written survey that you may receive in the mail after your visit with us. Thank you!             Your Updated Medication List - Protect others around you: Learn how to safely use, store and throw away your medicines at www.disposemymeds.org.          This list is accurate as of 1/25/18 11:15 AM.  Always use your most recent med list.                   Brand Name Dispense Instructions for use Diagnosis    allopurinol 300 MG tablet    ZYLOPRIM    90 tablet    TAKE ONE TABLET BY MOUTH EVERY DAY    Gout       aspirin 81 MG tablet     100    1 tablet daily        azithromycin 250 MG tablet    ZITHROMAX    6 tablet    Two tablets first day, then one tablet daily for four days.    Acute recurrent maxillary sinusitis       betamethasone (augmented) 0.05 % lotion    DIPROLENE     GINA 10 TO 20 DROPS TOPICALLY AA OF SCALP Q NIGHT UTD        blood  glucose monitoring lancets     2 Box    Test 2-3 times daily as directed    Type 2 diabetes, HbA1c goal < 7% (H)       blood glucose monitoring meter device kit     1 kit    Use to test blood sugar 1 times daily or as directed.    Type 2 diabetes mellitus without complication, without long-term current use of insulin (H)       blood glucose monitoring test strip    ACCU-CHEK SMARTVIEW    300 each    Check 3 times daily as directed    Type 2 diabetes, HbA1c goal < 7% (H)       calcipotriene 0.005 % Oint      Apply 1 Application topically as needed        chlorhexidine 0.12 % solution    PERIDEX     RINSE ONE HALF  OZ 2-3 X D AFTER BREAKFAST BEFORE BEDTIME FOLLOWING BRUSHING AND FLOSSIN        cinnamon 500 MG Caps      2 tablets daily        COMPOUNDED NON-CONTROLLED SUBSTANCE - PHARMACY TO MIX COMPOUNDED MEDICATION    CMPD RX    45 g    Patient to use 1 g intravaginally at bedtime Monday and Thursday nights as directed for menopausal symptoms    Symptomatic menopausal or female climacteric states       EXCEDRIN ASPIRIN FREE PO           fluconazole 150 MG tablet    DIFLUCAN    30 tablet    Take 1 tablet (150 mg) by mouth every 3 days    Yeast infection of the vagina       furosemide 20 MG tablet    LASIX    90 tablet    Take 1 tablet (20 mg) by mouth daily as needed    Edema, unspecified type       gabapentin 300 MG capsule    NEURONTIN    180 capsule    Take 2 capsules (600 mg) by mouth At Bedtime    Neuropathy       glimepiride 4 MG tablet    AMARYL    180 tablet    Take 1 tablet (4 mg) by mouth 2 times daily    Type 2 diabetes mellitus without complication, without long-term current use of insulin (H)       guaiFENesin-codeine 100-10 MG/5ML Soln solution    ROBITUSSIN AC    240 mL    Take 10 mLs by mouth every 4 hours as needed    Cough       lisinopril 10 MG tablet    PRINIVIL/ZESTRIL    90 tablet    TAKE ONE TABLET BY MOUTH EVERY DAY    Essential hypertension, benign       MAGNESIUM OXIDE -MG SUPPLEMENT PO       Take 1 tablet by mouth daily        NYQUIL PO           PROBIOTIC ACIDOPHILUS Tabs      Take 1 tablet by mouth daily        simvastatin 40 MG tablet    ZOCOR    90 tablet    TAKE ONE TABLET BY MOUTH EVERY NIGHT AT BEDTIME    Mixed hyperlipidemia       SitaGLIPtin-MetFORMIN HCl  MG Tb24    JANUMET XR    180 tablet    Take 2 tablets by mouth daily    Type 2 diabetes mellitus without complication, without long-term current use of insulin (H)       terconazole 0.4 % cream    TERAZOL 7    70 g    Place 1 applicator vaginally At Bedtime    Yeast infection of the vagina       tobramycin 0.3 % ophthalmic solution    TOBREX     1 drop 4 times daily        TUMS 500 MG chewable tablet   Generic drug:  calcium carbonate     90    1 TABLET 3 TIMES PRN        TYLENOL 500 MG tablet   Generic drug:  acetaminophen      Take 1-2 tablets by mouth every morning.    Mixed hyperlipidemia, Gout, unspecified, Type 2 diabetes, HbA1c goal < 7% (H), Hyperlipidemia LDL goal <100       vitamin D 2000 UNITS tablet      Take 1 tablet by mouth daily        vitamin E 400 UNITS Tabs      Take 400 Units by mouth daily

## 2018-01-25 NOTE — PROGRESS NOTES
.  SUBJECTIVE:   Lindsey Gary is a 77 year old female who presents to clinic today for the following health issues:      Chief Complaint   Patient presents with     Sinus Problem     pt states 2nd time  being seen for sx of a cough, headache, and sinuses.    Saw someone at another clinic   Was on cipro and now worse - was present and went away and came back     Has been on z pack or PCN K without reaction               Problem list and histories reviewed & adjusted, as indicated.  Additional history: as documented    Patient Active Problem List   Diagnosis     Rosacea     Gout     CHONDROCALC NOS-OTHER SITE(aka PSEUDOGOUT)     Essential hypertension     Hyperlipidemia LDL goal <100     Advanced directives, counseling/discussion     Atrophic vaginitis     Chronic foot pain     Hypertension goal BP (blood pressure) < 130/80     Bereavement     Obesity     Type 2 diabetes mellitus without complication (H)     Status post total knee replacement, unspecified laterality     Psoriasis     Past Surgical History:   Procedure Laterality Date     C NONSPECIFIC PROCEDURE      Breast biopsies        C NONSPECIFIC PROCEDURE      Symptomatic left thigh lipomas        C NONSPECIFIC PROCEDURE  6/1/09    pubovaginal sling     COLONOSCOPY       COLONOSCOPY N/A 12/17/2014    Procedure: COMBINED COLONOSCOPY, SINGLE OR MULTIPLE BIOPSY/POLYPECTOMY BY BIOPSY;  Surgeon: Chuy Staton MD;  Location: [facility]     HYSTERECTOMY, PAP NO LONGER INDICATED       HYSTERECTOMY, JAIME  1991    fibroid     SURGICAL HISTORY OF -   10/28/2015    right partial knee        Social History   Substance Use Topics     Smoking status: Never Smoker     Smokeless tobacco: Never Used     Alcohol use No     Family History   Problem Relation Age of Onset     CANCER Mother      colon ca survivor     Cancer - colorectal Mother      CEREBROVASCULAR DISEASE Father      born 1916           Reviewed and updated as needed this visit by clinical staff  Tobacco  Allergies  " Meds  Med Hx  Surg Hx  Fam Hx  Soc Hx      Reviewed and updated as needed this visit by Provider         ROS:  Constitutional, HEENT, cardiovascular, pulmonary, GI, , musculoskeletal, neuro, skin, endocrine and psych systems are negative, except as otherwise noted.    OBJECTIVE:     /64 (BP Location: Left arm, Patient Position: Sitting, Cuff Size: Adult Large)  Pulse 79  Temp 98.7  F (37.1  C) (Oral)  Ht 5' 2.75\" (1.594 m)  Wt 183 lb 9.6 oz (83.3 kg)  SpO2 97%  BMI 32.78 kg/m2  Body mass index is 32.78 kg/(m^2).  GENERAL:  alert and mild sinus distress  RESP: lungs clear to auscultation - no rales, rhonchi or wheezes  CV: regular rate and rhythm  PSYCH: mentation appears normal, affect normal/bright    Diagnostic Test Results:  Urine culture     ASSESSMENT/PLAN:             1. Acute recurrent maxillary sinusitis    - azithromycin (ZITHROMAX) 250 MG tablet; Two tablets first day, then one tablet daily for four days.  Dispense: 6 tablet; Refill: 1    2. Dysuria  Wants to make sure urine ok   - Urine Culture Aerobic Bacterial; Future    Patient Instructions   Will treat with Zithromax 250mg, 2 tabs by mouth day 1, then 1 tab by mouth days 2-5. She is to watch for GI upset, diarrhea or rash.  Take for 5 days and wait 5 days and then may repeat       Please, call or return to clinic if signs or symptoms worsen or fail to improve as anticipated        LILLIAM [facility]    "

## 2018-01-31 ENCOUNTER — OFFICE VISIT (OUTPATIENT)
Dept: OBGYN | Facility: CLINIC | Age: 78
End: 2018-01-31
Payer: COMMERCIAL

## 2018-01-31 VITALS
HEIGHT: 63 IN | WEIGHT: 180.4 LBS | BODY MASS INDEX: 31.96 KG/M2 | HEART RATE: 84 BPM | DIASTOLIC BLOOD PRESSURE: 64 MMHG | SYSTOLIC BLOOD PRESSURE: 126 MMHG

## 2018-01-31 DIAGNOSIS — R10.2 VAGINAL PAIN: Primary | ICD-10-CM

## 2018-01-31 DIAGNOSIS — N95.2 VAGINAL ATROPHY: ICD-10-CM

## 2018-01-31 DIAGNOSIS — R30.0 DYSURIA: ICD-10-CM

## 2018-01-31 PROCEDURE — 87070 CULTURE OTHR SPECIMN AEROBIC: CPT | Performed by: FAMILY MEDICINE

## 2018-01-31 PROCEDURE — 99213 OFFICE O/P EST LOW 20 MIN: CPT | Performed by: FAMILY MEDICINE

## 2018-01-31 PROCEDURE — 87077 CULTURE AEROBIC IDENTIFY: CPT | Performed by: FAMILY MEDICINE

## 2018-01-31 PROCEDURE — 87086 URINE CULTURE/COLONY COUNT: CPT | Performed by: NURSE PRACTITIONER

## 2018-01-31 PROCEDURE — 87186 SC STD MICRODIL/AGAR DIL: CPT | Performed by: FAMILY MEDICINE

## 2018-01-31 RX ORDER — ESTRADIOL 10 UG/1
10 INSERT VAGINAL
Qty: 8 TABLET | Refills: 11 | Status: SHIPPED | OUTPATIENT
Start: 2018-02-01 | End: 2019-01-08

## 2018-01-31 RX ORDER — ESTRADIOL 0.1 MG/G
2 CREAM VAGINAL
Qty: 42.5 G | Refills: 11 | Status: SHIPPED | OUTPATIENT
Start: 2018-02-01 | End: 2018-08-09 | Stop reason: ALTCHOICE

## 2018-01-31 NOTE — PROGRESS NOTES
SUBJECTIVE:  Lindsey Gary is an 77 year old  woman who presents for   gynecology consult for atrophic vaginitis.  No LMP recorded. Patient has had a hysterectomy.     Current contraception: none -   History of abnormal Pap smear: No  Family history of uterine or ovarian cancer: No  History of abnormal mammogram: No  Family history of breast cancer: No    Concerns today:     - Pt experienced vaginal burning this past summer, was unable to come in due to personal issues. She reports the burning became so bad she reported to ED, where nothing was found. She later visited another physician, who did a wound culture and found positive for bacteria. She has since been treated with antibiotics, feels much better & has improved but wants to have the culture done again today in clinic to ensure the symptoms have resolved. She has used Premarin & felt this successfully treated her symptoms, however her insurance no longer covers this & she cannot afford it on her own.      Past Medical History:   Diagnosis Date     Essential hypertension, benign      Mixed hyperlipidemia      Pain in joint, ankle and foot     gout     Type II or unspecified type diabetes mellitus without mention of complication, not stated as uncontrolled     Diabetes mellitus          Family History   Problem Relation Age of Onset     CANCER Mother      colon ca survivor     Cancer - colorectal Mother      CEREBROVASCULAR DISEASE Father      born        Past Surgical History:   Procedure Laterality Date     C NONSPECIFIC PROCEDURE      Breast biopsies        C NONSPECIFIC PROCEDURE      Symptomatic left thigh lipomas        C NONSPECIFIC PROCEDURE  09    pubovaginal sling     COLONOSCOPY       COLONOSCOPY N/A 2014    Procedure: COMBINED COLONOSCOPY, SINGLE OR MULTIPLE BIOPSY/POLYPECTOMY BY BIOPSY;  Surgeon: Chuy Staton MD;  Location: [facility]     HYSTERECTOMY, PAP NO LONGER INDICATED       HYSTERECTOMY, JAIME      fibroid      SURGICAL HISTORY OF -   10/28/2015    right partial knee        Current Outpatient Prescriptions   Medication     [START ON 2/1/2018] estradiol (VAGIFEM) 10 MCG TABS vaginal tablet     [START ON 2/1/2018] estradiol (ESTRACE VAGINAL) 0.1 MG/GM cream     Acetaminophen-Caffeine (EXCEDRIN ASPIRIN FREE PO)     Pseudoeph-Doxylamine-DM-APAP (NYQUIL PO)     tobramycin (TOBREX) 0.3 % ophthalmic solution     azithromycin (ZITHROMAX) 250 MG tablet     COMPOUNDED NON-CONTROLLED SUBSTANCE (CMPD RX) - PHARMACY TO MIX COMPOUNDED MEDICATION     glimepiride (AMARYL) 4 MG tablet     guaiFENesin-codeine (ROBITUSSIN AC) 100-10 MG/5ML SOLN solution     fluconazole (DIFLUCAN) 150 MG tablet     lisinopril (PRINIVIL/ZESTRIL) 10 MG tablet     allopurinol (ZYLOPRIM) 300 MG tablet     simvastatin (ZOCOR) 40 MG tablet     Lactobacillus (PROBIOTIC ACIDOPHILUS) TABS     Cholecalciferol (VITAMIN D) 2000 UNITS tablet     vitamin E 400 UNITS TABS     SitaGLIPtin-MetFORMIN HCl (JANUMET XR)  MG TB24     betamethasone, augmented, (DIPROLENE) 0.05 % lotion     chlorhexidine (PERIDEX) 0.12 % solution     terconazole (TERAZOL 7) 0.4 % cream     blood glucose monitoring (ACCU-CHEK GORDO SMARTVIEW) meter device kit     furosemide (LASIX) 20 MG tablet     gabapentin (NEURONTIN) 300 MG capsule     blood glucose monitoring (ACCU-CHEK FASTCLIX) lancets     blood glucose monitoring (ACCU-CHEK SMARTVIEW) test strip     calcipotriene 0.005 % OINT     MAGNESIUM OXIDE -MG SUPPLEMENT PO     acetaminophen (TYLENOL) 500 MG tablet     CINNAMON 500 MG OR CAPS     ASPIRIN 81 MG OR TABS     TUMS 500 MG OR CHEW     No current facility-administered medications for this visit.      Allergies   Allergen Reactions     Augmentin      Tongue swelling and rash     Can take PCN K without reaction      Sulfa Drugs      Tongue swelling and rash       Social History   Substance Use Topics     Smoking status: Never Smoker     Smokeless tobacco: Never Used     Alcohol use No  "      Review Of Systems  Ears/Nose/Throat: negative  Respiratory: No shortness of breath, dyspnea on exertion, cough, or hemoptysis  Cardiovascular: negative  Gastrointestinal: negative  Genitourinary: negative  Constitutional, HEENT, cardiovascular, pulmonary, GI, , musculoskeletal, neuro, skin, endocrine and psych systems are negative, except as otherwise noted.      This document serves as a record of the services and decisions personally performed and made by Lisa Church DO. It was created on her behalf by Mamie Cox, a trained medical scribe. The creation of this document is based the provider's statements to the medical scribe.  Scribe Mamie Cox 2:29 PM, 2018    OBJECTIVE:  /64  Pulse 84  Ht 1.594 m (5' 2.75\")  Wt 81.8 kg (180 lb 6.4 oz)  Breastfeeding? No  BMI 32.21 kg/m2  General appearance: healthy, alert and no distress  Skin: Skin color, texture, turgor normal. No rashes or lesions on visible skin  RESP: Clear to auscultation  CV: NEGATIVE  Breasts: Inspection negative. No nipple discharge or bleeding. No masses.  Pelvic: Pelvic examination without pap/ Gonorrhea and Chlamydia   including  External genitalia normal   and vagina normal rugatted not atrophic  Examination of urethra normal no masses, tenderness, scarring  bladder, no masses or tenderness  Cervix no lesions or discharge          LABS:  Culture - results pending        ASSESSMENT:  Lindsey Gary is an 77 year old  woman who presents for   gynecology consult for atrophic vaginitis.  Concerns today: vaginal burning    PLAN:  Dx: Atrophic vaginitis   1)  Atrophic vaginitis   - Culture taken - results pending; Pt already treated with antibiotics, will discuss  further treatment dependent on culture results   - Rx for vaginal estrogen given, Pt will fill whichever is covered by her insurance  2)  Return to clinic in 1 year for follow-up      Rx:  - Vagifem 10 mcg place 1 tab vaginally 2 times/week  - Estradiol " 0.1 mg/gm place 2 g vaginally 2 times/week        The information in this document, created by the medical scribe for me, accurately reflects the services I personally performed and the decisions made by me. I have reviewed and approved this document for accuracy prior to leaving the patient care area.  2:29 PM, 01/31/18    Dr. Lisa Church,     Obstetrics and Gynecology  [facility] and [place]

## 2018-01-31 NOTE — NURSING NOTE
"Chief Complaint   Patient presents with     Vaginal Problem     Patient requesting a wound culture.   Had a would culture done by PCP and did come back positive and wants to be checked that the infection is gone.    Initial /64  Pulse 84  Ht 5' 2.75\" (1.594 m)  Wt 180 lb 6.4 oz (81.8 kg)  Breastfeeding? No  BMI 32.21 kg/m2 Estimated body mass index is 32.21 kg/(m^2) as calculated from the following:    Height as of this encounter: 5' 2.75\" (1.594 m).    Weight as of this encounter: 180 lb 6.4 oz (81.8 kg).  Medication Reconciliation: complete     Melisa Jones CMA      "

## 2018-01-31 NOTE — MR AVS SNAPSHOT
After Visit Summary   1/31/2018    Lindsey Gary    MRN: [id number]           Patient Information     Date Of Birth          1940        Visit Information        Provider Department      1/31/2018 2:00 PM Lisa Church, DO [facility]        Today's Diagnoses     Vaginal pain    -  1    Vaginal atrophy          Care Instructions    Will call with results     Dr. Lisa Church, DO    Obstetrics and Gynecology  [facility] and [place]                 Follow-ups after your visit        Your next 10 appointments already scheduled     Feb 05, 2018 11:30 AM CST   DX HIP/PELVIS/SPINE with RIDX1   [facility] ([facility])    [address]  [address]  [facility] [address]              Please do not take any of the following 24 hours prior to the day of your exam: vitamins, calcium tablets, antacids.  If possible, please wear clothes without metal (snaps, zippers). A sweatsuit works well.              Who to contact     If you have questions or need follow up information about today's clinic visit or your schedule please contact [facility] directly at [phone].  Normal or non-critical lab and imaging results will be communicated to you by OurHousehart, letter or phone within 4 business days after the clinic has received the results. If you do not hear from us within 7 days, please contact the clinic through [employer] or phone. If you have a critical or abnormal lab result, we will notify you by phone as soon as possible.  Submit refill requests through Versa or call your pharmacy and they will forward the refill request to us. Please allow 3 business days for your refill to be completed.          Additional Information About Your Visit        Versa Information     Versa lets you send messages to your doctor, view your test results, renew your prescriptions, schedule appointments and more. To  "sign up, go to [email or web address] . Click on \"Log in\" on the left side of the screen, which will take you to the Welcome page. Then click on \"Sign up Now\" on the right side of the page.     You will be asked to enter the access code listed below, as well as some personal information. Please follow the directions to create your username and password.     Your access code is: [email or web address]  Expires: 2018  3:49 PM     Your access code will  in 90 days. If you need help or a new code, please call your [place] clinic or [phone].        Care EveryWhere ID     This is your Care EveryWhere ID. This could be used by other organizations to access your [place] medical records  [id number]        Your Vitals Were     Pulse Height Breastfeeding? BMI (Body Mass Index)          84 5' 2.75\" (1.594 m) No 32.21 kg/m2         Blood Pressure from Last 3 Encounters:   18 126/64   18 126/64   18 150/80    Weight from Last 3 Encounters:   18 180 lb 6.4 oz (81.8 kg)   18 183 lb 9.6 oz (83.3 kg)   18 183 lb (83 kg)              We Performed the Following     Wound Culture Aerobic Bacterial          Today's Medication Changes          These changes are accurate as of 18  2:41 PM.  If you have any questions, ask your nurse or doctor.               Start taking these medicines.        Dose/Directions    * estradiol 10 MCG Tabs vaginal tablet   Commonly known as:  VAGIFEM   Used for:  Vaginal pain, Vaginal atrophy   Started by:  Lisa Church DO        Dose:  10 mcg   Start taking on:  2018   Place 1 tablet (10 mcg) vaginally twice a week   Quantity:  8 tablet   Refills:  11       * estradiol 0.1 MG/GM cream   Commonly known as:  ESTRACE VAGINAL   Used for:  Vaginal pain, Vaginal atrophy   Started by:  Lisa Church DO        Dose:  2 g   Start taking on:  2018   Place 2 g vaginally twice a week   Quantity:  42.5 g   Refills:  11       * Notice:  This list " has 2 medication(s) that are the same as other medications prescribed for you. Read the directions carefully, and ask your doctor or other care provider to review them with you.         Where to get your medicines      Some of these will need a paper prescription and others can be bought over the counter.  Ask your nurse if you have questions.     Bring a paper prescription for each of these medications     estradiol 0.1 MG/GM cream    estradiol 10 MCG Tabs vaginal tablet                Primary Care Provider Office Phone # Fax #    Dinorah Boyer Marvin APRJHOANA [facility] [phone]       [address] [facility] [address]        Equal Access to Services     [facility] Center[facility] : Hadii lorna gonzalez Soheather, waaxda edward, qaybta kaalmatahir herrera, janet hardwick . So [facility] [phone].    ATENCIÓN: Si habla español, tiene a toribio disposición servicios gratuitos de asistencia lingüística. LlFort [facility] [phone].    We comply with applicable federal civil rights laws and [place] laws. We do not discriminate on the basis of race, color, national origin, age, disability, sex, sexual orientation, or gender identity.            Thank you!     Thank you for choosing [facility]  for your care. Our goal is always to provide you with excellent care. Hearing back from our patients is one way we can continue to improve our services. Please take a few minutes to complete the written survey that you may receive in the mail after your visit with us. Thank you!             Your Updated Medication List - Protect others around you: Learn how to safely use, store and throw away your medicines at www.disposemymeds.org.          This list is accurate as of 1/31/18  2:41 PM.  Always use your most recent med list.                   Brand Name Dispense Instructions for use Diagnosis    allopurinol 300 MG tablet    ZYLOPRIM    90 tablet    TAKE ONE TABLET BY MOUTH EVERY DAY    Gout        aspirin 81 MG tablet     100    1 tablet daily        azithromycin 250 MG tablet    ZITHROMAX    6 tablet    Two tablets first day, then one tablet daily for four days.    Acute recurrent maxillary sinusitis       betamethasone (augmented) 0.05 % lotion    DIPROLENE     GINA 10 TO 20 DROPS TOPICALLY AA OF SCALP Q NIGHT UTD        blood glucose monitoring lancets     2 Box    Test 2-3 times daily as directed    Type 2 diabetes, HbA1c goal < 7% (H)       blood glucose monitoring meter device kit     1 kit    Use to test blood sugar 1 times daily or as directed.    Type 2 diabetes mellitus without complication, without long-term current use of insulin (H)       blood glucose monitoring test strip    ACCU-CHEK SMARTVIEW    300 each    Check 3 times daily as directed    Type 2 diabetes, HbA1c goal < 7% (H)       calcipotriene 0.005 % Oint      Apply 1 Application topically as needed        chlorhexidine 0.12 % solution    PERIDEX     RINSE ONE HALF  OZ 2-3 X D AFTER BREAKFAST BEFORE BEDTIME FOLLOWING BRUSHING AND FLOSSIN        cinnamon 500 MG Caps      2 tablets daily        COMPOUNDED NON-CONTROLLED SUBSTANCE - PHARMACY TO MIX COMPOUNDED MEDICATION    CMPD RX    45 g    Patient to use 1 g intravaginally at bedtime Monday and Thursday nights as directed for menopausal symptoms    Symptomatic menopausal or female climacteric states       * estradiol 10 MCG Tabs vaginal tablet   Start taking on:  2/1/2018    VAGIFEM    8 tablet    Place 1 tablet (10 mcg) vaginally twice a week    Vaginal pain, Vaginal atrophy       * estradiol 0.1 MG/GM cream   Start taking on:  2/1/2018    ESTRACE VAGINAL    42.5 g    Place 2 g vaginally twice a week    Vaginal pain, Vaginal atrophy       EXCEDRIN ASPIRIN FREE PO           fluconazole 150 MG tablet    DIFLUCAN    30 tablet    Take 1 tablet (150 mg) by mouth every 3 days    Yeast infection of the vagina       furosemide 20 MG tablet    LASIX    90 tablet    Take 1 tablet (20 mg) by mouth  daily as needed    Edema, unspecified type       gabapentin 300 MG capsule    NEURONTIN    180 capsule    Take 2 capsules (600 mg) by mouth At Bedtime    Neuropathy       glimepiride 4 MG tablet    AMARYL    180 tablet    Take 1 tablet (4 mg) by mouth 2 times daily    Type 2 diabetes mellitus without complication, without long-term current use of insulin (H)       guaiFENesin-codeine 100-10 MG/5ML Soln solution    ROBITUSSIN AC    240 mL    Take 10 mLs by mouth every 4 hours as needed    Cough       lisinopril 10 MG tablet    PRINIVIL/ZESTRIL    90 tablet    TAKE ONE TABLET BY MOUTH EVERY DAY    Essential hypertension, benign       MAGNESIUM OXIDE -MG SUPPLEMENT PO      Take 1 tablet by mouth daily        NYQUIL PO           PROBIOTIC ACIDOPHILUS Tabs      Take 1 tablet by mouth daily        simvastatin 40 MG tablet    ZOCOR    90 tablet    TAKE ONE TABLET BY MOUTH EVERY NIGHT AT BEDTIME    Mixed hyperlipidemia       SitaGLIPtin-MetFORMIN HCl  MG Tb24    JANUMET XR    180 tablet    Take 2 tablets by mouth daily    Type 2 diabetes mellitus without complication, without long-term current use of insulin (H)       terconazole 0.4 % cream    TERAZOL 7    70 g    Place 1 applicator vaginally At Bedtime    Yeast infection of the vagina       tobramycin 0.3 % ophthalmic solution    TOBREX     1 drop 4 times daily        TUMS 500 MG chewable tablet   Generic drug:  calcium carbonate     90    1 TABLET 3 TIMES PRN        TYLENOL 500 MG tablet   Generic drug:  acetaminophen      Take 1-2 tablets by mouth every morning.    Mixed hyperlipidemia, Gout, unspecified, Type 2 diabetes, HbA1c goal < 7% (H), Hyperlipidemia LDL goal <100       vitamin D 2000 UNITS tablet      Take 1 tablet by mouth daily        vitamin E 400 UNITS Tabs      Take 400 Units by mouth daily        * Notice:  This list has 2 medication(s) that are the same as other medications prescribed for you. Read the directions carefully, and ask your doctor or  other care provider to review them with you.

## 2018-01-31 NOTE — PATIENT INSTRUCTIONS
Will call with results     Dr. Lisa Church, DO    Obstetrics and Gynecology  [facility] [place]

## 2018-02-02 LAB
BACTERIA SPEC CULT: ABNORMAL
BACTERIA SPEC CULT: NO GROWTH
SPECIMEN SOURCE: ABNORMAL
SPECIMEN SOURCE: NORMAL

## 2018-02-05 ENCOUNTER — RADIANT APPOINTMENT (OUTPATIENT)
Dept: BONE DENSITY | Facility: CLINIC | Age: 78
End: 2018-02-05
Attending: NURSE PRACTITIONER
Payer: COMMERCIAL

## 2018-02-05 ENCOUNTER — TELEPHONE (OUTPATIENT)
Dept: OBGYN | Facility: CLINIC | Age: 78
End: 2018-02-05

## 2018-02-05 DIAGNOSIS — E11.9 TYPE 2 DIABETES MELLITUS WITHOUT COMPLICATION, WITHOUT LONG-TERM CURRENT USE OF INSULIN (H): ICD-10-CM

## 2018-02-05 DIAGNOSIS — N89.8 VAGINAL IRRITATION: Primary | ICD-10-CM

## 2018-02-05 DIAGNOSIS — Z13.820 SCREENING FOR OSTEOPOROSIS: ICD-10-CM

## 2018-02-05 PROCEDURE — 77080 DXA BONE DENSITY AXIAL: CPT | Mod: GA | Performed by: INTERNAL MEDICINE

## 2018-02-05 NOTE — TELEPHONE ENCOUNTER
Pt wants her rx sent asap.  She is in a lot of pain and is tearful.  She was expecting penicillin k to be sent to chosen pharmacy.  This has worked for her in the past.    Yoselyn AVERY R.N.  [facility]

## 2018-02-05 NOTE — TELEPHONE ENCOUNTER
Dr. Forrest spoke to the pt and he gave her some resources and recommendations to help her infection tonight. He plans to ask Dr. Church to speak with her tomorrow to go over results and a plan for care.      Claire Sen RN

## 2018-02-06 RX ORDER — PENICILLIN V POTASSIUM 500 MG/1
500 TABLET, FILM COATED ORAL 3 TIMES DAILY
Qty: 21 TABLET | Refills: 0 | Status: SHIPPED | OUTPATIENT
Start: 2018-02-06 | End: 2018-02-13

## 2018-02-06 NOTE — TELEPHONE ENCOUNTER
Called patient she understands the risks of pcn, and understand the risks and benefits   Was able to fill vagifem   rx for pcn given   Also would like the boric acid which is called in     Dr. Lisa Church,     Obstetrics and Gynecology  [facility] and [place]

## 2018-02-06 NOTE — TELEPHONE ENCOUNTER
I had a 35 min discussion on the phone with the pt this afternoon regarding the results of her recent office visit with Dr. Rome, vaginal culture showing enterococcus and also my discussion with Dr. Rafy Patel an infectious disease specialist.  The pt states that she has used PCN in the recent past with an improvement in her sx's.  I discussed the listing of a Augmentin/PCN allergy listed in the chart.  She would like this removed.   The patient at this time complains of burning and irritation vaginally.  She is diabetic, incontinent of urine postmenopausal and post hysterectomy.   did   strongly advise  against treating the enterococcus on vaginal culture stating that it most probably was a bowel contaminant.  I reviewed this finding with the patient at length.  She discussed her previous visit with Dr. Patel.  This evening I discussed the use of 1% hydrocortisone cream and Desitin externally as well as the use of aloe wipes.  I also discussed the option of boric acid suppositories in an effort to acidify vaginal tissues to minimize risk of recurring infection and irritation.  I have advised that she talk with Dr. Rome with whom she has a long term relationship.  The patient states that she also has a prescription for butt paste.  Dr. Ramirez had prescribed Vagifem as well as Estrace cream in the past and the patient did not fill the prescription because of concerns of cost.  I discussed the website www.[employer] .[employer]abs as  perhaps an alternative that she may look to for less expensive prescription costs.  I told the patient that I would forward a copy of this note to Dr. Rome and have her contact the patient.

## 2018-02-09 DIAGNOSIS — E11.9 TYPE 2 DIABETES MELLITUS WITHOUT COMPLICATION, WITHOUT LONG-TERM CURRENT USE OF INSULIN (H): ICD-10-CM

## 2018-02-09 NOTE — TELEPHONE ENCOUNTER
"Pt calls for refill of Janumet. She wants it sent to [facility] for 90 day supply. Pt states she initially had 30 day supply filled at local pharmacy to see if this worked before getting 90 day from mail order.     Requested Prescriptions   Pending Prescriptions Disp Refills     SitaGLIPtin-MetFORMIN HCl (JANUMET XR)  MG TB24  Last Written Prescription Date:  12/19/17 (local print)  Last Fill Quantity: 180,  # refills: 1   Last Office Visit with [facility] provider:  1/25/18   Future Office Visit:     180 tablet 1     Sig: Take 2 tablets by mouth daily    Combination Oral Antihyperglycemic Agents Passed    2/9/2018  9:33 AM       Passed - Patient's BP is less than 140/90    BP Readings from Last 3 Encounters:   01/31/18 126/64   01/25/18 126/64   01/08/18 150/80                Passed - Patient has a documented LDL level within past 12 mos.    Recent Labs   Lab Test  01/02/18   1024   LDL  113*            Passed - Patient has a documented Microalbumin level within past 12 mos.    Recent Labs   Lab Test  12/19/17   0930   MICROL  104   UMALCR  52.53*            Passed - Patient has documented A1c within the specified period of time.    Recent Labs   Lab Test  01/02/18   1024   A1C  9.0*            Passed - Patient's CR is NOT>1.4 OR Patient's EGFR is NOT<45 within past 12 mos.    Recent Labs   Lab Test  01/02/18   1024   GFRESTIMATED  88   GFRESTBLACK  >90       Recent Labs   Lab Test  01/02/18   1024   CR  0.65            Passed - Patient does not have a diagnosis of CHF.       Passed - Patient is 18 years old or older.       Passed - Patient is not pregnant       Passed - Patient has not had a positive pregnancy test within the past 12 mos.       Passed - Patient has had appt within past 6 mos    Patient had office visit in the last 6 months or has a visit in the next 30 days with authorizing provider.  See \"Patient Info\" tab in inbasket, or \"Choose Columns\" in Meds & Orders section of the refill " encounter.         Per Dinorah's 1/3/18 visit notes:   5. Type 2 diabetes mellitus without complication, without long-term current use of insulin (H)  Needs improvement   To start Janumet     Prescription approved per [facility] Refill Protocol.

## 2018-02-13 ENCOUNTER — TELEPHONE (OUTPATIENT)
Dept: OBGYN | Facility: CLINIC | Age: 78
End: 2018-02-13

## 2018-02-13 DIAGNOSIS — N76.0 VAGINAL INFECTION: Primary | ICD-10-CM

## 2018-02-13 NOTE — TELEPHONE ENCOUNTER
Pt is on Pen V for her recent vaginal culture.  She is day 6 of her meds.  She still has burning and feels nauseous.  Wondering if you are able to send in something else (she requested ampicillin)?    She is also using the boric acid suppositories (and vagifem).  Is it okay that she uses the boric acid and the vagifem together at night (on the nights she needs to do the vagifem)?    Yoselyn AVERY R.N.  [facility]

## 2018-02-14 RX ORDER — AMPICILLIN TRIHYDRATE 500 MG
500 CAPSULE ORAL 4 TIMES DAILY
Qty: 28 CAPSULE | Refills: 0 | Status: SHIPPED | OUTPATIENT
Start: 2018-02-14 | End: 2018-02-23

## 2018-02-14 NOTE — TELEPHONE ENCOUNTER
Pt calling to inquire about the status of her message. She is very frustrated that this didn't get resolved yesterday. She says she is getting more uncomfortable. Please advise.      Claire Sen RN

## 2018-02-14 NOTE — TELEPHONE ENCOUNTER
D/w patient    She Is not allergic to ampicillin   And had some old over the weekend without reaction.   New rx ampicillin called in   Dr. Lisa Church,     Obstetrics and Gynecology  [facility] [place] [facility] [place]

## 2018-02-16 ENCOUNTER — TELEPHONE (OUTPATIENT)
Dept: OBGYN | Facility: CLINIC | Age: 78
End: 2018-02-16

## 2018-02-16 DIAGNOSIS — R30.0 DYSURIA: ICD-10-CM

## 2018-02-16 DIAGNOSIS — R30.0 DYSURIA: Primary | ICD-10-CM

## 2018-02-16 PROCEDURE — 87086 URINE CULTURE/COLONY COUNT: CPT | Performed by: OBSTETRICS & GYNECOLOGY

## 2018-02-16 NOTE — TELEPHONE ENCOUNTER
Pt calling stating she has been being treated for a vaginal infection, but she thinks she might have a UTI now as well. She is requesting a urine culture to be done. She says the regular UA never shows anything, but that usually her infections show up on the culture. Please advise. Routed to MD on call as Dr. Church is off today.      Claire Sen RN

## 2018-02-17 LAB
BACTERIA SPEC CULT: NO GROWTH
SPECIMEN SOURCE: NORMAL

## 2018-02-23 ENCOUNTER — TELEPHONE (OUTPATIENT)
Dept: OBGYN | Facility: CLINIC | Age: 78
End: 2018-02-23

## 2018-02-23 DIAGNOSIS — N76.0 VAGINAL INFECTION: ICD-10-CM

## 2018-02-23 RX ORDER — AMPICILLIN TRIHYDRATE 500 MG
500 CAPSULE ORAL 4 TIMES DAILY
Qty: 20 CAPSULE | Refills: 0 | Status: SHIPPED | OUTPATIENT
Start: 2018-02-23 | End: 2018-03-08

## 2018-02-23 NOTE — TELEPHONE ENCOUNTER
Pt calling stating her vaginal bacterial infection is not completely gone. She has tried ampicillin which she said helped, but it is not completely gone. She is wondering if she can get another refill of the ampicillin before she comes in to see a provider. Please advise. Routed to MD on call as Dr. Church is out of the office.        Claire Sen RN

## 2018-02-23 NOTE — TELEPHONE ENCOUNTER
Spoke with Dr. Forrest. He will give the patient 5 additional days of the ampicillin. Order placed and patient notified.      Claire Sen RN

## 2018-02-26 DIAGNOSIS — N89.8 VAGINAL IRRITATION: ICD-10-CM

## 2018-02-26 NOTE — TELEPHONE ENCOUNTER
Boric Acid 600mg Vaginal capsules    Last Written Prescription Date: 2/6/18  Last Fill Quantity: 21,   # refills: 0  Last Office Visit: 1/31/18  Future Office visit:    Next 5 appointments (look out 90 days)     Mar 22, 2018 10:15 AM CDT   SHORT with Lisa Church DO   [facility] ([facility])    [address]  [facility] [address]   [phone]                   Routing refill request to provider for review/approval because:  Compounded medication

## 2018-03-04 ENCOUNTER — OFFICE VISIT (OUTPATIENT)
Dept: URGENT CARE | Facility: URGENT CARE | Age: 78
End: 2018-03-04
Payer: COMMERCIAL

## 2018-03-04 VITALS
TEMPERATURE: 98 F | WEIGHT: 183 LBS | BODY MASS INDEX: 32.68 KG/M2 | SYSTOLIC BLOOD PRESSURE: 126 MMHG | OXYGEN SATURATION: 97 % | HEART RATE: 82 BPM | DIASTOLIC BLOOD PRESSURE: 80 MMHG

## 2018-03-04 DIAGNOSIS — N39.0 RECURRENT UTI: ICD-10-CM

## 2018-03-04 DIAGNOSIS — N76.0 VAGINAL INFECTION: Primary | ICD-10-CM

## 2018-03-04 DIAGNOSIS — N89.8 VAGINAL IRRITATION: ICD-10-CM

## 2018-03-04 DIAGNOSIS — R30.0 DYSURIA: ICD-10-CM

## 2018-03-04 DIAGNOSIS — E11.9 TYPE 2 DIABETES MELLITUS WITHOUT COMPLICATION, WITHOUT LONG-TERM CURRENT USE OF INSULIN (H): ICD-10-CM

## 2018-03-04 LAB
ALBUMIN UR-MCNC: NEGATIVE MG/DL
APPEARANCE UR: CLEAR
BILIRUB UR QL STRIP: NEGATIVE
COLOR UR AUTO: YELLOW
GLUCOSE UR STRIP-MCNC: NEGATIVE MG/DL
HGB UR QL STRIP: NEGATIVE
KETONES UR STRIP-MCNC: NEGATIVE MG/DL
LEUKOCYTE ESTERASE UR QL STRIP: NEGATIVE
NITRATE UR QL: NEGATIVE
PH UR STRIP: 5.5 PH (ref 5–7)
SOURCE: NORMAL
SP GR UR STRIP: 1.01 (ref 1–1.03)
UROBILINOGEN UR STRIP-ACNC: 0.2 EU/DL (ref 0.2–1)

## 2018-03-04 PROCEDURE — 87086 URINE CULTURE/COLONY COUNT: CPT | Performed by: PHYSICIAN ASSISTANT

## 2018-03-04 PROCEDURE — 81003 URINALYSIS AUTO W/O SCOPE: CPT | Performed by: PHYSICIAN ASSISTANT

## 2018-03-04 PROCEDURE — 87077 CULTURE AEROBIC IDENTIFY: CPT | Performed by: PHYSICIAN ASSISTANT

## 2018-03-04 PROCEDURE — 99214 OFFICE O/P EST MOD 30 MIN: CPT | Performed by: PHYSICIAN ASSISTANT

## 2018-03-04 PROCEDURE — 87070 CULTURE OTHR SPECIMN AEROBIC: CPT | Performed by: PHYSICIAN ASSISTANT

## 2018-03-04 PROCEDURE — 87186 SC STD MICRODIL/AGAR DIL: CPT | Performed by: PHYSICIAN ASSISTANT

## 2018-03-04 NOTE — NURSING NOTE
Pt declined to do wet prep, stated she had it multiple times and did not show any result, wants to have wound culture instead.    Anna Baeza CMA ([facility])

## 2018-03-04 NOTE — Clinical Note
Linus Church,   Your very kind patient, Lindsey Gary, presented to our [facility] yesterday for concerns of another vaginal infection vs UTI.   A vaginal wound culture a urine culture are pending. She tells me she has a previously scheduled follow-up appointment with you in the near future. My note is complete in Epic if you wish to review.   Sincerely,   David

## 2018-03-04 NOTE — PROGRESS NOTES
"Lindsey Gary is a 77 y.o. Woman, with a pmhx that includes DM, recurrent vaginal infections and recurrent UTI by her report.  She  presents to clinic today with concern for  possible vaginal infection and expresses her frustration with chronic vaginal infections dating back to November.  She c/o increased, sometimes malodorous, yellow vaginal discharge and severe vaginal itching. Patient is specifically requesting \"wound culture\" of her vagina. Patient states, \"They have to do a full wound culture to find my infections.\"  Patient is specifically requesting a urine culture.  Patient sates, \"My urine always looks normal but they find my infections on the urine culture.\"      Past GYN HX: Patient informs me she has been following closely with GYN MD (Dr. Church) since November (3 months) for recurrent vaginal infections. She has been treated with Vagifem, boric acid and ampicillin thus far without relief of sxs. She does have an apt with Dr. Church in a couple of weeks but sates, \"I couldn't wait that long to get this figured out.\"       ROS:     GYN : As per above. S/P Hyst for benign fibroid per patient report. She denies any pelvic pain. She denies any abdominal pain or fever.  Denies any vaginal bleeding. Denies any stool out of vagina or air out of vagina   GI: Denies any abdominal pain. Denies any F/C/N/V/D  URINARY REVIEW:  Positive for mild, waxing and waning, atypical dysuria for months. Denies any sudden severe dysuria, urinary urgency/frequency, gross hematuria, fever, chills, nausea, vomiting, back or flank pain.  SKIN: Denies any rash or lesions   RHEUM: Denies any red, warm or swollen joints   NEURO: Denies any mental status changes or lethargy   ENDOCRINE: Positive hx of DM. Admits to less then optimal home BS readings over past several weeks (readings in the 200 range which she states is higher than usual for her).     Past Medical History:   Diagnosis Date     Essential hypertension, benign      " Mixed hyperlipidemia      Pain in joint, ankle and foot     gout     Type II or unspecified type diabetes mellitus without mention of complication, not stated as uncontrolled     Diabetes mellitus     Past Surgical History:   Procedure Laterality Date     C NONSPECIFIC PROCEDURE      Breast biopsies        C NONSPECIFIC PROCEDURE      Symptomatic left thigh lipomas        C NONSPECIFIC PROCEDURE  6/1/09    pubovaginal sling     COLONOSCOPY       COLONOSCOPY N/A 12/17/2014    Procedure: COMBINED COLONOSCOPY, SINGLE OR MULTIPLE BIOPSY/POLYPECTOMY BY BIOPSY;  Surgeon: Chuy Staton MD;  Location: [facility]     HYSTERECTOMY, PAP NO LONGER INDICATED       HYSTERECTOMY, JAIME  1991    fibroid     SURGICAL HISTORY OF -   10/28/2015    right partial knee        Current Outpatient Prescriptions   Medication     boric acid 600 mg vaginal suppository - PHARMACY TO MIX COMPOUND     SitaGLIPtin-MetFORMIN HCl (JANUMET XR)  MG TB24     estradiol (VAGIFEM) 10 MCG TABS vaginal tablet     Acetaminophen-Caffeine (EXCEDRIN ASPIRIN FREE PO)     Pseudoeph-Doxylamine-DM-APAP (NYQUIL PO)     COMPOUNDED NON-CONTROLLED SUBSTANCE (CMPD RX) - PHARMACY TO MIX COMPOUNDED MEDICATION     glimepiride (AMARYL) 4 MG tablet     guaiFENesin-codeine (ROBITUSSIN AC) 100-10 MG/5ML SOLN solution     fluconazole (DIFLUCAN) 150 MG tablet     lisinopril (PRINIVIL/ZESTRIL) 10 MG tablet     allopurinol (ZYLOPRIM) 300 MG tablet     simvastatin (ZOCOR) 40 MG tablet     Lactobacillus (PROBIOTIC ACIDOPHILUS) TABS     Cholecalciferol (VITAMIN D) 2000 UNITS tablet     vitamin E 400 UNITS TABS     chlorhexidine (PERIDEX) 0.12 % solution     terconazole (TERAZOL 7) 0.4 % cream     gabapentin (NEURONTIN) 300 MG capsule     blood glucose monitoring (ACCU-CHEK SMARTVIEW) test strip     calcipotriene 0.005 % OINT     MAGNESIUM OXIDE -MG SUPPLEMENT PO     acetaminophen (TYLENOL) 500 MG tablet     CINNAMON 500 MG OR CAPS     ASPIRIN 81 MG OR TABS     TUMS 500 MG OR  CHEW     ampicillin (PRINCIPEN) 500 MG capsule     estradiol (ESTRACE VAGINAL) 0.1 MG/GM cream     tobramycin (TOBREX) 0.3 % ophthalmic solution     azithromycin (ZITHROMAX) 250 MG tablet     betamethasone, augmented, (DIPROLENE) 0.05 % lotion     blood glucose monitoring (ACCU-CHEK GORDO SMARTVIEW) meter device kit     furosemide (LASIX) 20 MG tablet     blood glucose monitoring (ACCU-CHEK FASTCLIX) lancets     No current facility-administered medications for this visit.      Allergies   Allergen Reactions     Augmentin      Tongue swelling and rash     Can take PCN K without reaction      Sulfa Drugs      Tongue swelling and rash       OBJECTIVE:  /80 (BP Location: Right arm, Patient Position: Chair, Cuff Size: Adult Regular)  Pulse 82  Temp 98  F (36.7  C) (Oral)  Wt 183 lb (83 kg)  SpO2 97%  BMI 32.68 kg/m2      GENERAL:  Very pleasant, comfortable and generally well appearing.  CARDIAC:NORMAL - regular rate and rhythm without murmur., normal s1/s2 and without extra heart sounds  RESP: Normal - CTA without rales, rhonchi, or wheezing.  ABDOMEN:  Soft, non-tender, non-distended.  Positive normal bowel sounds.  No HSM or masses.  No suprapubic tenderness.  No CVA tenderness.  GYN:  Normal external female genitalia other than typical age related atrophic changes. No lesions.  Small amount of white discharge collected for patient desired vaginal culture. Positive cystocele noted. Specifically no fecal matter in vagina.       LAB:    Results for orders placed or performed in visit on 03/04/18   UA reflex to Microscopic and Culture   Result Value Ref Range    Color Urine Yellow     Appearance Urine Clear     Glucose Urine Negative NEG^Negative mg/dL    Bilirubin Urine Negative NEG^Negative    Ketones Urine Negative NEG^Negative mg/dL    Specific Gravity Urine 1.010 1.003 - 1.035    Blood Urine Negative NEG^Negative    pH Urine 5.5 5.0 - 7.0 pH    Protein Albumin Urine Negative NEG^Negative mg/dL     Urobilinogen Urine 0.2 0.2 - 1.0 EU/dL    Nitrite Urine Negative NEG^Negative    Leukocyte Esterase Urine Negative NEG^Negative    Source Midstream Urine      Vaginal wound cx pending      EPIC LAB REVIEW:     On review of Epic I do see she has had vaginal culture reports positive for enterococcus faecalis (1/31/18) and a second positive vaginal culture report for  E. Coli and enterococcus faecalis (1/3/18) .         ASSESSMENT/PLAN:      (N76.0) Vaginal infection  (primary encounter diagnosis)  MDM: Lindsey Gary is a 77 y.o. Woman with a hx of chronic vs chronic intermittent vaginal infections and UTI's of several months duration. She reports waxing and waning sxs for months. Sxs have been bothering he more again lately so she presents now specifically requesting UCX and vaginal wound culture. She is without fever or any other systemic sxs. I have low suspicion today for UTI. She denies any classic dysuria but does report some waxing and waning atypical dysuria x months duration.UA today is totally normal. I advise no abx pending return of UCX. Patient advised recurrent use of abx also potentially adversely affects normal vaginal david and may place at increased risk for vaginitis. On review of Epic I do see she has had vaginal culture reports positive for enterococcus faecalis (1/31/18) and a second positive vaginal culture report for  E. Coli and enterococcus faecalis (1/3/18) . I did consider the potential of a fistula due to above gram negative vaginal culture reports. I do not see any feces in vagina. Patient denies any air or feces in vagina. GYN exam unremarkable for age other than a cystocele on exam today.     Patient advised she should follow-op with Dr. Church or PCP regarding today's pending test results and for guidance regarding follow-up of her chronic, intermittent problem. She is also advised to follow-up with PCP regarding her self-reported increased in home BS readings in setting of DM.      Chart is cc'd to Dr. Church     Plan: Wound Culture Aerobic Bacterial      (N76.0) Vaginal infection  (primary encounter diagnosis)  Plan: Wound Culture Aerobic Bacterial      (N39.0) Recurrent UTI  Plan: UA reflex to Microscopic and Culture, Urine         Culture Aerobic Bacterial      (E11.9) Type 2 diabetes mellitus without complication, without long-term current use of insulin (H)      (R30.0) Dysuria  Plan: UA reflex to Microscopic and Culture, Urine         Culture Aerobic Bacterial      (N89.8) Vaginal irritation  Plan: Wound Culture Aerobic Bacterial

## 2018-03-05 LAB
BACTERIA SPEC CULT: NO GROWTH
SPECIMEN SOURCE: NORMAL

## 2018-03-06 DIAGNOSIS — I10 ESSENTIAL HYPERTENSION, BENIGN: ICD-10-CM

## 2018-03-06 DIAGNOSIS — E11.9 TYPE 2 DIABETES MELLITUS WITHOUT COMPLICATION, WITHOUT LONG-TERM CURRENT USE OF INSULIN (H): ICD-10-CM

## 2018-03-06 DIAGNOSIS — G62.9 NEUROPATHY: ICD-10-CM

## 2018-03-06 DIAGNOSIS — E78.2 MIXED HYPERLIPIDEMIA: ICD-10-CM

## 2018-03-06 DIAGNOSIS — M10.9 GOUT: ICD-10-CM

## 2018-03-07 LAB
BACTERIA SPEC CULT: ABNORMAL
SPECIMEN SOURCE: ABNORMAL

## 2018-03-07 RX ORDER — CIPROFLOXACIN 500 MG/1
500 TABLET, FILM COATED ORAL 2 TIMES DAILY
Qty: 14 TABLET | Refills: 0 | Status: SHIPPED | OUTPATIENT
Start: 2018-03-07 | End: 2018-03-08

## 2018-03-07 NOTE — TELEPHONE ENCOUNTER
Notified patient of preliminary results and that we are waiting on final results. Also informed patient that she should follow up with OBGYN to figure out new treatment plan. Patient states has a follow up appt with Nurse practitioner tomorrow and follow up appt with OBGYN at the end of the month.   Zhane Saez CMA ([facility]) 3/7/2018 9:42 AM

## 2018-03-07 NOTE — TELEPHONE ENCOUNTER
Aviva  MA:     Please call patient     Her preliminary vaginal culture does show infection.  The preliminary report shows multi abx resistance.     She has been working closely with her GYN MD (Dr. Church) regarding chronic vaginal infection.     Please have patient contact Dr. Church to see which abx Dr. Church prefers.     If patient is unable to reach GYN MD, Aviva COLLINS please have one of our  providers review the final report (currently in preliminary status), and prescribe an antibiotic.     Due to her abx allergies, if the final report does not change from current preliminary report, it looks like she will need to be treated with Cipro.

## 2018-03-08 ENCOUNTER — OFFICE VISIT (OUTPATIENT)
Dept: INTERNAL MEDICINE | Facility: CLINIC | Age: 78
End: 2018-03-08
Payer: COMMERCIAL

## 2018-03-08 VITALS
TEMPERATURE: 98.2 F | SYSTOLIC BLOOD PRESSURE: 122 MMHG | HEART RATE: 88 BPM | WEIGHT: 184 LBS | HEIGHT: 63 IN | OXYGEN SATURATION: 98 % | DIASTOLIC BLOOD PRESSURE: 76 MMHG | BODY MASS INDEX: 32.6 KG/M2

## 2018-03-08 DIAGNOSIS — N95.2 ATROPHIC VAGINITIS: ICD-10-CM

## 2018-03-08 DIAGNOSIS — N76.0 VAGINAL INFECTION: ICD-10-CM

## 2018-03-08 DIAGNOSIS — N32.81 OVERACTIVE BLADDER: Primary | ICD-10-CM

## 2018-03-08 PROCEDURE — 99215 OFFICE O/P EST HI 40 MIN: CPT | Performed by: NURSE PRACTITIONER

## 2018-03-08 ASSESSMENT — ANXIETY QUESTIONNAIRES
5. BEING SO RESTLESS THAT IT IS HARD TO SIT STILL: NOT AT ALL
GAD7 TOTAL SCORE: 2
6. BECOMING EASILY ANNOYED OR IRRITABLE: NOT AT ALL
GAD7 TOTAL SCORE: 2
1. FEELING NERVOUS, ANXIOUS, OR ON EDGE: SEVERAL DAYS
3. WORRYING TOO MUCH ABOUT DIFFERENT THINGS: SEVERAL DAYS
7. FEELING AFRAID AS IF SOMETHING AWFUL MIGHT HAPPEN: NOT AT ALL
7. FEELING AFRAID AS IF SOMETHING AWFUL MIGHT HAPPEN: NOT AT ALL
4. TROUBLE RELAXING: NOT AT ALL
2. NOT BEING ABLE TO STOP OR CONTROL WORRYING: NOT AT ALL
GAD7 TOTAL SCORE: 2

## 2018-03-08 NOTE — PATIENT INSTRUCTIONS
Cipro twice daily for 7 days as previously ordered     Consider ALIGN probiotic    BALNEOL lotion to skin as needed for burning     Dr Ravi

## 2018-03-08 NOTE — NURSING NOTE
"Chief Complaint   Patient presents with     Vaginal Problem     She has ongoing vaginal infection since 11/2016.       Initial /76 (BP Location: Right arm, Patient Position: Sitting, Cuff Size: Adult Regular)  Pulse 88  Temp 98.2  F (36.8  C) (Oral)  Ht 5' 2.75\" (1.594 m)  Wt 184 lb (83.5 kg)  SpO2 98%  BMI 32.85 kg/m2 Estimated body mass index is 32.85 kg/(m^2) as calculated from the following:    Height as of this encounter: 5' 2.75\" (1.594 m).    Weight as of this encounter: 184 lb (83.5 kg).  Medication Reconciliation: complete   Rhonda Barbour MA      "

## 2018-03-08 NOTE — PROGRESS NOTES
"  SUBJECTIVE:   Lindsey Gary is a 77 year old female who presents to clinic today for the following health issues:      She has a ongoing vaginal infection problem since November of 2016.  [facility] - culture negative     In January  Cipro helped some and did not help all   [place] cultured and PCN would treat - Pen K no change and ampicillin - no change  Urgent care [place] - Sunday - wound culture - positive     Boric acid not helping   Doing vagifen twice weekly          ROS:  Constitutional, HEENT, cardiovascular, pulmonary, GI, , musculoskeletal, neuro, skin, endocrine and psych systems are negative, except as otherwise noted.    OBJECTIVE:     /76 (BP Location: Right arm, Patient Position: Sitting, Cuff Size: Adult Regular)  Pulse 88  Temp 98.2  F (36.8  C) (Oral)  Ht 5' 2.75\" (1.594 m)  Wt 184 lb (83.5 kg)  SpO2 98%  BMI 32.85 kg/m2  Body mass index is 32.85 kg/(m^2).  GENERAL: alert and no distress  PSYCH: mentation appears normal, affect normal/bright    Diagnostic Test Results:  Reviewed results     ASSESSMENT/PLAN:             1. Overactive bladder    - UROLOGY ADULT REFERRAL    2. Atrophic vaginitis    - UROLOGY ADULT REFERRAL    3. Vaginal infection    - UROLOGY ADULT REFERRAL    Patient Instructions   Cipro twice daily for 7 days as previously ordered     Consider ALIGN probiotic    BALNEOL lotion to skin as needed for burning     Dr Breonna Wong, APRN [facility]    Answers for HPI/ROS submitted by the patient on 3/8/2018   PERLA 7 TOTAL SCORE: 2  Appointment of 40 minutes greater than 50% counseling on vaginal culture    "

## 2018-03-09 RX ORDER — GLIMEPIRIDE 4 MG/1
4 TABLET ORAL 2 TIMES DAILY
Qty: 180 TABLET | Refills: 0 | Status: SHIPPED | OUTPATIENT
Start: 2018-03-09 | End: 2018-06-25

## 2018-03-09 RX ORDER — GABAPENTIN 300 MG/1
600 CAPSULE ORAL AT BEDTIME
Qty: 180 CAPSULE | Refills: 3 | Status: SHIPPED | OUTPATIENT
Start: 2018-03-09 | End: 2019-01-08

## 2018-03-09 RX ORDER — LISINOPRIL 10 MG/1
10 TABLET ORAL DAILY
Qty: 90 TABLET | Refills: 2 | Status: SHIPPED | OUTPATIENT
Start: 2018-03-09 | End: 2019-01-08

## 2018-03-09 RX ORDER — ALLOPURINOL 300 MG/1
1 TABLET ORAL DAILY
Qty: 90 TABLET | Refills: 0 | Status: SHIPPED | OUTPATIENT
Start: 2018-03-09 | End: 2018-06-25

## 2018-03-09 RX ORDER — SIMVASTATIN 40 MG
TABLET ORAL
Qty: 90 TABLET | Refills: 2 | Status: SHIPPED | OUTPATIENT
Start: 2018-03-09 | End: 2019-01-08

## 2018-03-09 ASSESSMENT — ANXIETY QUESTIONNAIRES: GAD7 TOTAL SCORE: 2

## 2018-03-09 NOTE — TELEPHONE ENCOUNTER
"Requested Prescriptions   Pending Prescriptions Disp Refills     allopurinol (ZYLOPRIM) 300 MG tablet 90 tablet 0     Sig: Take 1 tablet (300 mg) by mouth daily    Gout Agents Protocol Failed    3/7/2018  9:43 AM       Failed - Uric acid greater than or equal to 6 on file in past 12 months    Recent Labs   Lab Test  08/10/15   1301   URIC  5.0     If level is 6mg/dL or greater, ok to refill one time and refer to provider.          Passed - CBC on file in past 12 months    Recent Labs   Lab Test  01/02/18   1024   WBC  7.4   RBC  4.39   HGB  12.1   HCT  38.0   PLT  197            Passed - ALT on file in past 12 months    Recent Labs   Lab Test  01/02/18   1024   ALT  26            Passed - Recent (12 mo) or future (30 days) visit within the authorizing provider's specialty    Patient had office visit in the last year or has a visit in the next 30 days with authorizing provider.  See \"Patient Info\" tab in inbasket, or \"Choose Columns\" in Meds & Orders section of the refill encounter.            Passed - Patient is age 18 or older       Passed - No active pregnancy on record       Passed - Normal serum creatinine on file in the past 12 months    Recent Labs   Lab Test  01/02/18   1024   CR  0.65            Passed - No positive pregnancy test in past year        lisinopril (PRINIVIL/ZESTRIL) 10 MG tablet 90 tablet 0     Sig: Take 1 tablet (10 mg) by mouth daily    ACE Inhibitors (Including Combos) Protocol Passed    3/7/2018  9:43 AM       Passed - Blood pressure under 140/90 in past 12 months    BP Readings from Last 3 Encounters:   03/08/18 122/76   03/04/18 126/80   01/31/18 126/64                Passed - Recent (12 mo) or future (30 days) visit within the authorizing provider's specialty    Patient had office visit in the last year or has a visit in the next 30 days with authorizing provider.  See \"Patient Info\" tab in inbasket, or \"Choose Columns\" in Meds & Orders section of the refill encounter.            Passed " "- Patient is age 18 or older       Passed - No active pregnancy on record       Passed - Normal serum creatinine on file in past 12 months    Recent Labs   Lab Test  01/02/18   1024   CR  0.65            Passed - Normal serum potassium on file in past 12 months    Recent Labs   Lab Test  01/02/18   1024   POTASSIUM  4.6            Passed - No positive pregnancy test in past 12 months        simvastatin (ZOCOR) 40 MG tablet 90 tablet 0    Statins Protocol Passed    3/7/2018  9:43 AM       Passed - LDL on file in past 12 months    Recent Labs   Lab Test  01/02/18   1024   LDL  113*            Passed - No abnormal creatine kinase in past 12 months    No lab results found.         Passed - Recent (12 mo) or future (30 days) visit within the authorizing provider's specialty    Patient had office visit in the last year or has a visit in the next 30 days with authorizing provider.  See \"Patient Info\" tab in inbasket, or \"Choose Columns\" in Meds & Orders section of the refill encounter.            Passed - Patient is age 18 or older       Passed - No active pregnancy on record       Passed - No positive pregnancy test in past 12 months        gabapentin (NEURONTIN) 300 MG capsule 180 capsule 3     Sig: Take 2 capsules (600 mg) by mouth At Bedtime    There is no refill protocol information for this order        glimepiride (AMARYL) 4 MG tablet 180 tablet 0     Sig: Take 1 tablet (4 mg) by mouth 2 times daily    Sulfonylurea Agents Passed    3/7/2018  9:43 AM       Passed - Blood pressure less than 140/90 in past 6 months    BP Readings from Last 3 Encounters:   03/08/18 122/76   03/04/18 126/80   01/31/18 126/64                Passed - Patient has documented LDL within the past 12 mos.    Recent Labs   Lab Test  01/02/18   1024   LDL  113*            Passed - Patient has had a Microalbumin in the past 12 mos.    Recent Labs   Lab Test  12/19/17   0930   MICROL  104   UMALCR  52.53*            Passed - Patient has documented " "A1c within the specified period of time.    Recent Labs   Lab Test  01/02/18   1024   A1C  9.0*            Passed - Patient is age 18 or older       Passed - No active pregnancy on record       Passed - Patient has a recent creatinine (normal) within the past 12 mos.    Recent Labs   Lab Test  01/02/18   1024   CR  0.65            Passed - Patient has not had a positive pregnancy test within the past 12 mos.       Passed - Recent (6 mo) or future (30 days) visit within the authorizing provider's specialty    Patient had office visit in the last 6 months or has a visit in the next 30 days with authorizing provider.  See \"Patient Info\" tab in inbasket, or \"Choose Columns\" in Meds & Orders section of the refill encounter.              Allopurinol medication is being filled for 1 time refill only due to:  Patient needs labs --uric acid. Future labs ordered in chart.    Lisinopril and Simvastatin prescriptions approved per [facility] Refill Protocol.    Gabapentin      Last Written Prescription Date:  12-2-16  Last Fill Quantity: 180,   # refills: 3  Last Office Visit: 3-8-18  Future Office visit:    Next 5 appointments (look out 90 days)     Mar 22, 2018 10:15 AM CDT   SHORT with Lisa Church,    [facility]    [address]  [facility] [address] [phone]                   Routing Gabapentin and Amaryl refill request to provider for review/approval because:  Drug not on the [facility] refill protocol   Labs out of range:  Microalbumin, A1C    Please advise, thanks.              "

## 2018-03-14 ENCOUNTER — TELEPHONE (OUTPATIENT)
Dept: INTERNAL MEDICINE | Facility: CLINIC | Age: 78
End: 2018-03-14

## 2018-03-14 NOTE — TELEPHONE ENCOUNTER
[facility] potential for interaction when Diflucan and Zocor are used together.  They received a faxed message to hold Zocor while pt is on Diflucan but there were 6 refills on rx leading pharmacy to believe this is going to be a somewhat long term medication.  Do you really want pt to be off Zocor for an extended period of time?      Call [facility] at [phone]  Order# [id number]

## 2018-03-22 ENCOUNTER — OFFICE VISIT (OUTPATIENT)
Dept: OBGYN | Facility: CLINIC | Age: 78
End: 2018-03-22
Payer: COMMERCIAL

## 2018-03-22 VITALS
WEIGHT: 184 LBS | BODY MASS INDEX: 32.6 KG/M2 | SYSTOLIC BLOOD PRESSURE: 134 MMHG | DIASTOLIC BLOOD PRESSURE: 78 MMHG | HEIGHT: 63 IN

## 2018-03-22 DIAGNOSIS — R10.2 VAGINAL PAIN: Primary | ICD-10-CM

## 2018-03-22 DIAGNOSIS — N89.8 VAGINAL IRRITATION: ICD-10-CM

## 2018-03-22 PROCEDURE — 99213 OFFICE O/P EST LOW 20 MIN: CPT | Performed by: FAMILY MEDICINE

## 2018-03-22 PROCEDURE — 87070 CULTURE OTHR SPECIMN AEROBIC: CPT | Performed by: FAMILY MEDICINE

## 2018-03-22 NOTE — PATIENT INSTRUCTIONS
Return as needed       Dr. Lias Church, DO    Obstetrics and Gynecology  [facility] [place] [facility] [place]

## 2018-03-22 NOTE — NURSING NOTE
"Chief Complaint   Patient presents with     RECHECK     last office visit 1/31/18--given vagifem and estrodial--feeling better--pt desires wound culture--pt has been dealing with constipation       Initial /78  Ht 5' 2.75\" (1.594 m)  Wt 184 lb (83.5 kg)  BMI 32.85 kg/m2 Estimated body mass index is 32.85 kg/(m^2) as calculated from the following:    Height as of this encounter: 5' 2.75\" (1.594 m).    Weight as of this encounter: 184 lb (83.5 kg).  Medication Reconciliation: complete   Elise Mcclure CMA      "

## 2018-03-22 NOTE — PROGRESS NOTES
SUBJECTIVE:  Lindsey Gary is an 77 year old  woman who presents for   Gyn follow-up exam. She was seen on 18, for atrophic vaginitis.   Last time her treatment was vagifem and estradiol.     Today patient reports use of boric acid and vagifem. States her symptoms have improved. Desires wound culture.     Lindsey also states she has been constipated over the past couple months and inquires if it could be due to antibiotic use. Notes she is no longer constipated. Use of probiotics daily.     Past Medical History:   Diagnosis Date     Essential hypertension, benign      Mixed hyperlipidemia      Pain in joint, ankle and foot     gout     Type II or unspecified type diabetes mellitus without mention of complication, not stated as uncontrolled     Diabetes mellitus          Family History   Problem Relation Age of Onset     CANCER Mother      colon ca survivor     Cancer - colorectal Mother      CEREBROVASCULAR DISEASE Father        Past Surgical History:   Procedure Laterality Date     C NONSPECIFIC PROCEDURE      Breast biopsies        C NONSPECIFIC PROCEDURE      Symptomatic left thigh lipomas        C NONSPECIFIC PROCEDURE  09    pubovaginal sling     COLONOSCOPY       COLONOSCOPY N/A 2014    Procedure: COMBINED COLONOSCOPY, SINGLE OR MULTIPLE BIOPSY/POLYPECTOMY BY BIOPSY;  Surgeon: Chuy Staton MD;  Location: [facility]     HYSTERECTOMY, PAP NO LONGER INDICATED       HYSTERECTOMY, JAIME      fibroid     SURGICAL HISTORY OF -   10/28/2015    right partial knee        Current Outpatient Prescriptions   Medication     allopurinol (ZYLOPRIM) 300 MG tablet     lisinopril (PRINIVIL/ZESTRIL) 10 MG tablet     simvastatin (ZOCOR) 40 MG tablet     gabapentin (NEURONTIN) 300 MG capsule     glimepiride (AMARYL) 4 MG tablet     boric acid 600 mg vaginal suppository - PHARMACY TO MIX COMPOUND     SitaGLIPtin-MetFORMIN HCl (JANUMET XR)  MG TB24     estradiol (VAGIFEM) 10 MCG TABS vaginal tablet      estradiol (ESTRACE VAGINAL) 0.1 MG/GM cream     Acetaminophen-Caffeine (EXCEDRIN ASPIRIN FREE PO)     Pseudoeph-Doxylamine-DM-APAP (NYQUIL PO)     tobramycin (TOBREX) 0.3 % ophthalmic solution     COMPOUNDED NON-CONTROLLED SUBSTANCE (CMPD RX) - PHARMACY TO MIX COMPOUNDED MEDICATION     guaiFENesin-codeine (ROBITUSSIN AC) 100-10 MG/5ML SOLN solution     fluconazole (DIFLUCAN) 150 MG tablet     Lactobacillus (PROBIOTIC ACIDOPHILUS) TABS     Cholecalciferol (VITAMIN D) 2000 UNITS tablet     vitamin E 400 UNITS TABS     betamethasone, augmented, (DIPROLENE) 0.05 % lotion     chlorhexidine (PERIDEX) 0.12 % solution     terconazole (TERAZOL 7) 0.4 % cream     blood glucose monitoring (ACCU-CHEK GORDO SMARTVIEW) meter device kit     furosemide (LASIX) 20 MG tablet     blood glucose monitoring (ACCU-CHEK FASTCLIX) lancets     blood glucose monitoring (ACCU-CHEK SMARTVIEW) test strip     calcipotriene 0.005 % OINT     MAGNESIUM OXIDE -MG SUPPLEMENT PO     acetaminophen (TYLENOL) 500 MG tablet     CINNAMON 500 MG OR CAPS     ASPIRIN 81 MG OR TABS     TUMS 500 MG OR CHEW     No current facility-administered medications for this visit.      Allergies   Allergen Reactions     Augmentin      Tongue swelling and rash     Can take PCN K without reaction      Sulfa Drugs      Tongue swelling and rash       Social History   Substance Use Topics     Smoking status: Never Smoker     Smokeless tobacco: Never Used     Alcohol use No       Review Of Systems  Ears/Nose/Throat: negative  Respiratory: No shortness of breath, dyspnea on exertion, cough, or hemoptysis  Cardiovascular: negative  Gastrointestinal: negative  Genitourinary: see HPI   Constitutional, HEENT, cardiovascular, pulmonary, GI, , musculoskeletal, neuro, skin, endocrine and psych systems are negative, except as otherwise noted.    This document serves as a record of the services and decisions personally performed and made by Lisa Church DO. It was created on his/her  "behalf by Abner Nelson, a trained medical scribe. The creation of this document is based the provider's statements to the medical scribe.  Jada Nelson 10:22 AM, 2018    OBJECTIVE:  /78  Ht 1.594 m (5' 2.75\")  Wt 83.5 kg (184 lb)  BMI 32.85 kg/m2  General appearance: healthy, alert and no distress  Pelvic: External genitalia normal   and vagina normal rugatted not atrophic  Examination of urethra - normal no masses, tenderness, scarring  bladder, no masses or tenderness  Cervix no lesions or discharge    ASSESSMENT:  Lindsey Gary is an 77 year old  woman who presents for   Gyn follow-up exam. She was seen on 18, for atrophic vaginitis. Today the following concerns were   addressed:    PLAN:  Dx:   1)  Atrophic Vaginitis: Vaginal wound culture taken; results pending. Pt has been treated with antibiotics. Current use of vagifem, boric acid. Will discuss further treatment dependent on culture results.   2)  Return to clinic prn    The information in this document, created by the medical scribe for me, accurately reflects the services I personally performed and the decisions made by me. I have reviewed and approved this document for accuracy prior to leaving the patient care area.  Lisa Church DO  10:28 AM, 18    Dr. Lisa Church, DO    OB/GYN   [facility]  "

## 2018-03-22 NOTE — MR AVS SNAPSHOT
"              After Visit Summary   3/22/2018    Lindsey Gary    MRN: [id number]           Patient Information     Date Of Birth          1940        Visit Information        Provider Department      3/22/2018 10:15 AM Lisa Church, DO [facility]        Today's Diagnoses     Vaginal pain    -  1      Care Instructions    Return as needed       Dr. Lisa Church, DO    Obstetrics and Gynecology  [facility]                 Follow-ups after your visit        Who to contact     If you have questions or need follow up information about today's clinic visit or your schedule please contact [facility] directly at [phone].  Normal or non-critical lab and imaging results will be communicated to you by Tailoredhart, letter or phone within 4 business days after the clinic has received the results. If you do not hear from us within 7 days, please contact the clinic through Tailoredhart or phone. If you have a critical or abnormal lab result, we will notify you by phone as soon as possible.  Submit refill requests through Xiaomi or call your pharmacy and they will forward the refill request to us. Please allow 3 business days for your refill to be completed.          Additional Information About Your Visit        MyChart Information     [employer] lets you send messages to your doctor, view your test results, renew your prescriptions, schedule appointments and more. To sign up, go to [email or web address] . Click on \"Log in\" on the left side of the screen, which will take you to the Welcome page. Then click on \"Sign up Now\" on the right side of the page.     You will be asked to enter the access code listed below, as well as some personal information. Please follow the directions to create your username and password.     Your access code is: [email or web address]  Expires: 2018  4:49 PM     Your access code will  in 90 days. If you need help or a new " "code, please call your [facility] clinic or [phone].        Care EveryWhere ID     This is your Care EveryWhere ID. This could be used by other organizations to access your [facility] medical records  [id number]        Your Vitals Were     Height BMI (Body Mass Index)                5' 2.75\" (1.594 m) 32.85 kg/m2           Blood Pressure from Last 3 Encounters:   03/22/18 134/78   03/08/18 122/76   03/04/18 126/80    Weight from Last 3 Encounters:   03/22/18 184 lb (83.5 kg)   03/08/18 184 lb (83.5 kg)   03/04/18 183 lb (83 kg)              We Performed the Following     Wound Culture Aerobic Bacterial        Primary Care Provider Office Phone # Fax #    LILLIAM Sam [facility] [phone]       [address] [facility] [address]        Equal Access to Services     [facility] : Hadii aad ku hadasho Soomaali, waaxda luqadaha, qaybta kaalmada adeegyada, waxay rodriguein haynikn syd hardwick . So [facility] [phone].    ATENCIÓN: Si habla español, tiene a toribio disposición servicios gratuitos de asistencia lingüística. Carieame al [phone].    We comply with applicable federal civil rights laws and [place] laws. We do not discriminate on the basis of race, color, national origin, age, disability, sex, sexual orientation, or gender identity.            Thank you!     Thank you for choosing [facility]  for your care. Our goal is always to provide you with excellent care. Hearing back from our patients is one way we can continue to improve our services. Please take a few minutes to complete the written survey that you may receive in the mail after your visit with us. Thank you!             Your Updated Medication List - Protect others around you: Learn how to safely use, store and throw away your medicines at www.disposemymeds.org.          This list is accurate as of 3/22/18 10:27 AM.  Always use your most recent med list.                   Brand Name Dispense Instructions " for use Diagnosis    allopurinol 300 MG tablet    ZYLOPRIM    90 tablet    Take 1 tablet (300 mg) by mouth daily    Gout       aspirin 81 MG tablet     100    1 tablet daily        betamethasone (augmented) 0.05 % lotion    DIPROLENE     GINA 10 TO 20 DROPS TOPICALLY AA OF SCALP Q NIGHT UTD        blood glucose monitoring lancets     2 Box    Test 2-3 times daily as directed    Type 2 diabetes, HbA1c goal < 7% (H)       blood glucose monitoring meter device kit     1 kit    Use to test blood sugar 1 times daily or as directed.    Type 2 diabetes mellitus without complication, without long-term current use of insulin (H)       blood glucose monitoring test strip    ACCU-CHEK SMARTVIEW    300 each    Check 3 times daily as directed    Type 2 diabetes, HbA1c goal < 7% (H)       boric acid 600 mg vaginal suppository - PHARMACY TO MIX COMPOUND     21 suppository    Place 1 suppository (600 mg) vaginally At Bedtime    Vaginal irritation       calcipotriene 0.005 % Oint      Apply 1 Application topically as needed        chlorhexidine 0.12 % solution    PERIDEX     RINSE ONE HALF  OZ 2-3 X D AFTER BREAKFAST BEFORE BEDTIME FOLLOWING BRUSHING AND FLOSSIN        cinnamon 500 MG Caps      2 tablets daily        COMPOUNDED NON-CONTROLLED SUBSTANCE - PHARMACY TO MIX COMPOUNDED MEDICATION    CMPD RX    45 g    Patient to use 1 g intravaginally at bedtime Monday and Thursday nights as directed for menopausal symptoms    Symptomatic menopausal or female climacteric states       * estradiol 10 MCG Tabs vaginal tablet    VAGIFEM    8 tablet    Place 1 tablet (10 mcg) vaginally twice a week    Vaginal pain, Vaginal atrophy       * estradiol 0.1 MG/GM cream    ESTRACE VAGINAL    42.5 g    Place 2 g vaginally twice a week    Vaginal pain, Vaginal atrophy       EXCEDRIN ASPIRIN FREE PO           fluconazole 150 MG tablet    DIFLUCAN    30 tablet    Take 1 tablet (150 mg) by mouth every 3 days    Yeast infection of the vagina        furosemide 20 MG tablet    LASIX    90 tablet    Take 1 tablet (20 mg) by mouth daily as needed    Edema, unspecified type       gabapentin 300 MG capsule    NEURONTIN    180 capsule    Take 2 capsules (600 mg) by mouth At Bedtime    Neuropathy       glimepiride 4 MG tablet    AMARYL    180 tablet    Take 1 tablet (4 mg) by mouth 2 times daily    Type 2 diabetes mellitus without complication, without long-term current use of insulin (H)       guaiFENesin-codeine 100-10 MG/5ML Soln solution    ROBITUSSIN AC    240 mL    Take 10 mLs by mouth every 4 hours as needed    Cough       lisinopril 10 MG tablet    PRINIVIL/ZESTRIL    90 tablet    Take 1 tablet (10 mg) by mouth daily    Essential hypertension, benign       MAGNESIUM OXIDE -MG SUPPLEMENT PO      Take 1 tablet by mouth daily        NYQUIL PO           PROBIOTIC ACIDOPHILUS Tabs      Take 1 tablet by mouth daily        simvastatin 40 MG tablet    ZOCOR    90 tablet    TAKE ONE TABLET BY MOUTH EVERY NIGHT AT BEDTIME    Mixed hyperlipidemia       SitaGLIPtin-MetFORMIN HCl  MG Tb24    JANUMET XR    180 tablet    Take 2 tablets by mouth daily    Type 2 diabetes mellitus without complication, without long-term current use of insulin (H)       terconazole 0.4 % cream    TERAZOL 7    70 g    Place 1 applicator vaginally At Bedtime    Yeast infection of the vagina       tobramycin 0.3 % ophthalmic solution    TOBREX     1 drop 4 times daily        TUMS 500 MG chewable tablet   Generic drug:  calcium carbonate     90    1 TABLET 3 TIMES PRN        TYLENOL 500 MG tablet   Generic drug:  acetaminophen      Take 1-2 tablets by mouth every morning.    Mixed hyperlipidemia, Gout, unspecified, Type 2 diabetes, HbA1c goal < 7% (H), Hyperlipidemia LDL goal <100       vitamin D 2000 UNITS tablet      Take 1 tablet by mouth daily        vitamin E 400 UNITS Tabs      Take 400 Units by mouth daily        * Notice:  This list has 2 medication(s) that are the same as other  medications prescribed for you. Read the directions carefully, and ask your doctor or other care provider to review them with you.

## 2018-03-24 LAB
BACTERIA SPEC CULT: NORMAL
SPECIMEN SOURCE: NORMAL

## 2018-03-25 ENCOUNTER — APPOINTMENT (OUTPATIENT)
Dept: CT IMAGING | Facility: CLINIC | Age: 78
End: 2018-03-25
Attending: EMERGENCY MEDICINE
Payer: MEDICARE

## 2018-03-25 ENCOUNTER — HOSPITAL ENCOUNTER (EMERGENCY)
Facility: CLINIC | Age: 78
Discharge: HOME | End: 2018-03-25
Attending: EMERGENCY MEDICINE | Admitting: EMERGENCY MEDICINE
Payer: MEDICARE

## 2018-03-25 VITALS
WEIGHT: 172 LBS | HEART RATE: 92 BPM | HEIGHT: 64 IN | OXYGEN SATURATION: 98 % | RESPIRATION RATE: 16 BRPM | TEMPERATURE: 97.8 F | SYSTOLIC BLOOD PRESSURE: 149 MMHG | BODY MASS INDEX: 29.37 KG/M2 | DIASTOLIC BLOOD PRESSURE: 68 MMHG

## 2018-03-25 DIAGNOSIS — R10.12 LUQ ABDOMINAL PAIN: ICD-10-CM

## 2018-03-25 DIAGNOSIS — N39.0 UTI (URINARY TRACT INFECTION), UNCOMPLICATED: ICD-10-CM

## 2018-03-25 LAB
ALBUMIN SERPL-MCNC: 4.2 G/DL (ref 3.4–5)
ALBUMIN UR-MCNC: NEGATIVE MG/DL
ALP SERPL-CCNC: 69 U/L (ref 40–150)
ALT SERPL W P-5'-P-CCNC: 24 U/L (ref 0–50)
ANION GAP SERPL CALCULATED.3IONS-SCNC: 7 MMOL/L (ref 3–14)
APPEARANCE UR: CLEAR
AST SERPL W P-5'-P-CCNC: 17 U/L (ref 0–45)
BASOPHILS # BLD AUTO: 0 10E9/L (ref 0–0.2)
BASOPHILS NFR BLD AUTO: 0.5 %
BILIRUB SERPL-MCNC: 0.4 MG/DL (ref 0.2–1.3)
BILIRUB UR QL STRIP: NEGATIVE
BUN SERPL-MCNC: 23 MG/DL (ref 7–30)
CALCIUM SERPL-MCNC: 9.2 MG/DL (ref 8.5–10.1)
CHLORIDE SERPL-SCNC: 103 MMOL/L (ref 94–109)
CO2 SERPL-SCNC: 29 MMOL/L (ref 20–32)
COLOR UR AUTO: YELLOW
CREAT SERPL-MCNC: 0.88 MG/DL (ref 0.52–1.04)
DIFFERENTIAL METHOD BLD: NORMAL
EOSINOPHIL # BLD AUTO: 0.1 10E9/L (ref 0–0.7)
EOSINOPHIL NFR BLD AUTO: 1.1 %
ERYTHROCYTE [DISTWIDTH] IN BLOOD BY AUTOMATED COUNT: 13.4 % (ref 10–15)
GFR SERPL CREATININE-BSD FRML MDRD: 62 ML/MIN/1.7M2
GLUCOSE SERPL-MCNC: 192 MG/DL (ref 70–99)
GLUCOSE UR STRIP-MCNC: NEGATIVE MG/DL
HCT VFR BLD AUTO: 36.5 % (ref 35–47)
HGB BLD-MCNC: 12.2 G/DL (ref 11.7–15.7)
HGB UR QL STRIP: NEGATIVE
IMM GRANULOCYTES # BLD: 0 10E9/L (ref 0–0.4)
IMM GRANULOCYTES NFR BLD: 0.3 %
KETONES UR STRIP-MCNC: 5 MG/DL
LEUKOCYTE ESTERASE UR QL STRIP: ABNORMAL
LIPASE SERPL-CCNC: 173 U/L (ref 73–393)
LYMPHOCYTES # BLD AUTO: 2.6 10E9/L (ref 0.8–5.3)
LYMPHOCYTES NFR BLD AUTO: 35.3 %
MCH RBC QN AUTO: 27.6 PG (ref 26.5–33)
MCHC RBC AUTO-ENTMCNC: 33.4 G/DL (ref 31.5–36.5)
MCV RBC AUTO: 83 FL (ref 78–100)
MONOCYTES # BLD AUTO: 0.5 10E9/L (ref 0–1.3)
MONOCYTES NFR BLD AUTO: 7.2 %
MUCOUS THREADS #/AREA URNS LPF: PRESENT /LPF
NEUTROPHILS # BLD AUTO: 4.1 10E9/L (ref 1.6–8.3)
NEUTROPHILS NFR BLD AUTO: 55.6 %
NITRATE UR QL: NEGATIVE
NRBC # BLD AUTO: 0 10*3/UL
NRBC BLD AUTO-RTO: 0 /100
PH UR STRIP: 5 PH (ref 5–7)
PLATELET # BLD AUTO: 207 10E9/L (ref 150–450)
POTASSIUM SERPL-SCNC: 4.1 MMOL/L (ref 3.4–5.3)
PROT SERPL-MCNC: 7.6 G/DL (ref 6.8–8.8)
RBC # BLD AUTO: 4.42 10E12/L (ref 3.8–5.2)
RBC #/AREA URNS AUTO: 2 /HPF (ref 0–2)
SODIUM SERPL-SCNC: 139 MMOL/L (ref 133–144)
SOURCE: ABNORMAL
SP GR UR STRIP: 1.03 (ref 1–1.03)
SQUAMOUS #/AREA URNS AUTO: 1 /HPF (ref 0–1)
UROBILINOGEN UR STRIP-MCNC: 0 MG/DL (ref 0–2)
WBC # BLD AUTO: 7.5 10E9/L (ref 4–11)
WBC #/AREA URNS AUTO: 35 /HPF (ref 0–5)

## 2018-03-25 PROCEDURE — 25000125 ZZHC RX 250: Performed by: EMERGENCY MEDICINE

## 2018-03-25 PROCEDURE — 87088 URINE BACTERIA CULTURE: CPT | Performed by: EMERGENCY MEDICINE

## 2018-03-25 PROCEDURE — 80053 COMPREHEN METABOLIC PANEL: CPT | Performed by: EMERGENCY MEDICINE

## 2018-03-25 PROCEDURE — 81001 URINALYSIS AUTO W/SCOPE: CPT | Performed by: EMERGENCY MEDICINE

## 2018-03-25 PROCEDURE — 96375 TX/PRO/DX INJ NEW DRUG ADDON: CPT

## 2018-03-25 PROCEDURE — 83690 ASSAY OF LIPASE: CPT | Performed by: EMERGENCY MEDICINE

## 2018-03-25 PROCEDURE — 87086 URINE CULTURE/COLONY COUNT: CPT | Performed by: EMERGENCY MEDICINE

## 2018-03-25 PROCEDURE — 25000128 H RX IP 250 OP 636: Performed by: EMERGENCY MEDICINE

## 2018-03-25 PROCEDURE — 87186 SC STD MICRODIL/AGAR DIL: CPT | Performed by: EMERGENCY MEDICINE

## 2018-03-25 PROCEDURE — 99285 EMERGENCY DEPT VISIT HI MDM: CPT | Mod: 25

## 2018-03-25 PROCEDURE — 74176 CT ABD & PELVIS W/O CONTRAST: CPT

## 2018-03-25 PROCEDURE — 85025 COMPLETE CBC W/AUTO DIFF WBC: CPT | Performed by: EMERGENCY MEDICINE

## 2018-03-25 PROCEDURE — 96374 THER/PROPH/DIAG INJ IV PUSH: CPT

## 2018-03-25 PROCEDURE — 96361 HYDRATE IV INFUSION ADD-ON: CPT

## 2018-03-25 RX ORDER — CEFDINIR 300 MG/1
300 CAPSULE ORAL 2 TIMES DAILY
Qty: 10 CAPSULE | Refills: 0 | Status: SHIPPED | OUTPATIENT
Start: 2018-03-25 | End: 2018-03-30

## 2018-03-25 RX ORDER — ONDANSETRON 2 MG/ML
4 INJECTION INTRAMUSCULAR; INTRAVENOUS EVERY 30 MIN PRN
Status: DISCONTINUED | OUTPATIENT
Start: 2018-03-25 | End: 2018-03-25 | Stop reason: HOSPADM

## 2018-03-25 RX ORDER — MORPHINE SULFATE 4 MG/ML
4 INJECTION, SOLUTION INTRAMUSCULAR; INTRAVENOUS
Status: DISCONTINUED | OUTPATIENT
Start: 2018-03-25 | End: 2018-03-25 | Stop reason: HOSPADM

## 2018-03-25 RX ORDER — SODIUM CHLORIDE 9 MG/ML
1000 INJECTION, SOLUTION INTRAVENOUS CONTINUOUS
Status: DISCONTINUED | OUTPATIENT
Start: 2018-03-25 | End: 2018-03-25 | Stop reason: HOSPADM

## 2018-03-25 RX ADMIN — ONDANSETRON 4 MG: 2 INJECTION INTRAMUSCULAR; INTRAVENOUS at 19:14

## 2018-03-25 RX ADMIN — PANTOPRAZOLE SODIUM 40 MG: 40 INJECTION, POWDER, FOR SOLUTION INTRAVENOUS at 19:14

## 2018-03-25 RX ADMIN — SODIUM CHLORIDE 1000 ML: 9 INJECTION, SOLUTION INTRAVENOUS at 19:13

## 2018-03-25 ASSESSMENT — ENCOUNTER SYMPTOMS
ABDOMINAL PAIN: 1
DIARRHEA: 0
NAUSEA: 1

## 2018-03-25 NOTE — ED PROVIDER NOTES
"  History     Chief Complaint:  Abdominal pain    HPI   Lindsey Gary is a 77 year old diabetic female who presents with left-sided abdominal pain beginning a couple of weeks ago. Patient notes she recently underwent quite a bit of stress with her mother passing away this morning. She endorses some slight nausea and bloating. She had an episode of constipation a week ago and tried some laxatives (milk of magnesia and Senokot) which temporarily helped the pain. She notes it feels like a ball when she palpates it at home. Patient notes she was on ciprofloxacin two weeks ago for a vaginal infection. She has had a hysterectomy. Patient has not had a cholecystectomy or appendectomy. Patient reports she is eating regularly. Patient denies diarrhea    Allergies:  Augmentin  Sulfa      Medications:    Allopurinol  Lisinopril  Zocor  Gabapentin  Amaryl  Janumet  Estrace vaginal  Vagifem  Excedrin  Nyquil  Tobrex  Robitussin   Diflucan  Vitamin D  Vitamin E  Lactobacillus  Diprolene   Peridex  Terazol  Lasix  Tylenol  Aspirin 81 mg     Past Medical History:    HTN  HLD  DM type 2  Gout  Rosacea   Psoriasis     Past Surgical History:    Breast biopsies  Left thigh lipomas  Pubovaginal sling  Colonoscopy   Hysterectomy  Right partial knee    Family History:    Colon cancer  Cerebrovascular disease    Social History:  Marital Status:   Presents to the ED alone.   Tobacco Use: Never  Alcohol Use: No  PCP: Dinorah Wong     Review of Systems   Gastrointestinal: Positive for abdominal pain and nausea. Negative for diarrhea.   All other systems reviewed and are negative.      Physical Exam   First Vitals:   BP: 177/89  Pulse: 92  Heart Rate: 92  Temp: 97.8  F (36.6  C)  Resp: 16  Height: 162.6 cm (5' 4\")  Weight: 78 kg (172 lb)  SpO2: 98 %      Physical Exam   Constitutional: She appears well-developed and well-nourished.   HENT:   Right Ear: External ear normal.   Left Ear: External ear normal.   Mouth/Throat: " Oropharynx is clear and moist. No oropharyngeal exudate.   TM's clear bilaterally   Eyes: Conjunctivae are normal. Pupils are equal, round, and reactive to light. No scleral icterus.   Neck: Normal range of motion. Neck supple.   Cardiovascular: Normal rate, regular rhythm, normal heart sounds and intact distal pulses.  Exam reveals no gallop and no friction rub.    No murmur heard.  Pulmonary/Chest: Effort normal and breath sounds normal. No respiratory distress. She has no wheezes. She has no rales.   Abdominal: Soft. Bowel sounds are normal. She exhibits no distension and no mass. There is tenderness.   Mid-epigastric and left upper quadrant tenderness.    Musculoskeletal: Normal range of motion. She exhibits no edema.   Neurological: She is alert.   Skin: Skin is warm and dry. No rash noted.   Psychiatric: She has a normal mood and affect.       Emergency Department Course     Imaging:  Radiographic findings were communicated with the patient who voiced understanding of the findings.    CT Abdomen Pelvis without Contrast (stone protocol)   Preliminary Result   IMPRESSION:   1. Moderate amount of stool seen in the colon.   2. No other significant abnormalities are identified. No etiology for   patient's symptoms is seen. No evidence for diverticulitis,   appendicitis, or abscess.   3. Atherosclerosis.        Laboratory:  CBC:  WBC 7.5, HGB 12.2, , otherwise WNL  CMP: Glucose 192 (H), otherwise WNL (Creatinine 0.88)  Lipase: 173  UA: Clear yellow urine, moderate leukocyte esterase, WBC 35 (H), mucous present, otherwise WNL    Interventions:  1913: Normal Saline, 1 liter, IV bolus   1914: Zofran, 4 mg, IV injection   1914: Protonix, 40 mg, IV injection    Emergency Department Course:  Nursing notes and vitals reviewed.  1842: I performed an exam of the patient as documented above.  The above workup was undertaken.  2010: I rechecked the patient and discussed results.  Findings and plan explained to the  Patient. Patient discharged home, status improved, with instructions regarding supportive care, medications, and reasons to return as well as the importance of close follow-up was reviewed. Patient was prescribed Omnicef and Prilosec.     Impression & Plan      Medical Decision Making:  Patient presents with left upper quadrant and epigastric pain for quite awhile. She is tender in that area. On UA she does have what looks like a UTI. She was recently placed on Cipro for her vaginal infection so she preferred to take something different. I did also did a CT scan which did not show anything acute. It certainly could be gastritis or reflux or ulcers. She is given a prescription for omeprazole to see if that will help. She does have a doctor's appointment two days from now and she will follow up with them and push fluids and rest. If symptoms worsen, she should return.     Diagnosis:    ICD-10-CM    1. UTI (urinary tract infection), uncomplicated N39.0    2. LUQ abdominal pain R10.12        Disposition:  Discharged to home.     Discharge Medications:  Discharge Medication List as of 3/25/2018  8:25 PM      START taking these medications    Details   cefdinir (OMNICEF) 300 MG capsule Take 1 capsule (300 mg) by mouth 2 times daily for 5 days, Disp-10 capsule, R-0, Local Print      omeprazole (PRILOSEC) 20 MG CR capsule Take 1 capsule (20 mg) by mouth daily, Disp-30 capsule, R-1, Local Print               Ondina ALEXANDER, eulalio serving as a scribe on 3/25/2018 at 6:42 PM to personally document services performed by Dr. Price based on my observations and the provider's statements to me.   [facility] EMERGENCY DEPARTMENT       Cheatn Price MD  03/26/18 0021

## 2018-03-25 NOTE — ED AVS SNAPSHOT
[facility]    [address]    [facility] [address]    Phone:  [phone]    Fax:  [phone]                                       Lindsey Gary   MRN: [id number]    Department:  [facility]   Date of Visit:  3/25/2018           Patient Information     Date Of Birth          1940        Your diagnoses for this visit were:     UTI (urinary tract infection), uncomplicated     LUQ abdominal pain        You were seen by Chetan Price MD.      Follow-up Information     Follow up with Dinorah Wong APRN CNP. Go in 2 days.    Specialty:  Nurse Practitioner - Family    Contact information:    [address]  [facility] [address]  [phone]          Discharge Instructions         Abdominal Pain    Abdominal pain is pain in the stomach or belly area. Everyone has this pain from time to time. In many cases it goes away on its own. But abdominal pain can sometimes be due to a serious problem, such as appendicitis. So it s important to know when to seek help.  Causes of abdominal pain  There are many possible causes of abdominal pain. Common causes in adults include:    Constipation, diarrhea, or gas    Stomach acid flowing back up into the esophagus (acid reflux or heartburn)    Severe acid reflux, called GERD (gastroesophageal reflux disease)    A sore in the lining of the stomach or small intestine (peptic ulcer)    Inflammation of the gallbladder, liver, or pancreas    Gallstones or kidney stones    Appendicitis     Intestinal blockage     An internal organ pushing through a muscle or other tissue (hernia)    Urinary tract infections    In women, menstrual cramps, fibroids, or endometriosis    Inflammation or infection of the intestines  Diagnosing the cause of abdominal pain  Your healthcare provider will do a physical exam help find the cause of your pain. If needed, tests will be ordered. Belly pain has many possible  causes. So it can be hard to find the reason for your pain. Giving details about your pain can help. Tell your provider where and when you feel the pain, and what makes it better or worse. Also let your provider know if you have other symptoms such as:    Fever    Tiredness    Upset stomach (nausea)    Vomiting    Changes in bathroom habits  Treating abdominal pain  Some causes of pain need emergency medical treatment right away. These include appendicitis or a bowel blockage. Other problems can be treated with rest, fluids, or medicines. Your healthcare provider can give you specific instructions for treatment or self-care based on what is causing your pain.  If you have vomiting or diarrhea, sip water or other clear fluids. When you are ready to eat solid foods again, start with small amounts of easy-to-digest, low-fat foods. These include apple sauce, toast, or crackers.   When to seek medical care  Call 911 or go to the hospital right away if you:    Can t pass stool and are vomiting    Are vomiting blood or have bloody diarrhea or black, tarry diarrhea    Have chest, neck, or shoulder pain    Feel like you might pass out    Have pain in your shoulder blades with nausea    Have sudden, severe belly pain    Have new, severe pain unlike any you have felt before    Have a belly that is rigid, hard, and tender to touch  Call your healthcare provider if you have:    Pain for more than 5 days    Bloating for more than 2 days    Diarrhea for more than 5 days    A fever of 100.4 F (38.0 C) or higher, or as directed by your provider    Pain that gets worse    Weight loss for no reason    Continued lack of appetite    Blood in your stool  How to prevent abdominal pain  Here are some tips to help prevent abdominal pain:    Eat smaller amounts of food at one time.    Avoid greasy, fried, or other high-fat foods.    Avoid foods that give you gas.    Exercise regularly.    Drink plenty of fluids.  To help prevent GERD  "symptoms:    Quit smoking.    Reduce alcohol and certain foods that increase stomach acid.    Avoid aspirin and over-the-counter pain and fever medicines (NSAIDS or nonsteroidal anti-inflammatory drugs), if possible    Lose extra weight.    Finish eating at least 2 hours before you go to bed or lie down.    Raise the head of your bed.  Date Last Reviewed: 7/1/2016 2000-2017 [employer]. [address] [place] [address]. All rights reserved. This information is not intended as a substitute for professional medical care. Always follow your healthcare professional's instructions.        [place] Diet  Your healthcare provider may recommend a bland diet if you have an upset stomach. It consists of foods that are mild and easy to digest. It is better to eat small frequent meals rather than 3 large meals a day.    Beverages  OK: Fruit juices, non-caffeinated teas and coffee, non-carbonated fu  Avoid: Carbonated beverage, caffeinated tea and coffee, all alcoholic beverages  Bread  OK: Refined white, wheat or rye bread, reji or soda crackers, Melisa toast, plain rolls, bagels  Avoid: Whole-grain bread  Cereal  OK: Refined cereals: cooked or ready to eat  Avoid: Whole-grain cereals and granola, or those containing bran, seeds or nuts  Desserts  OK: Peanut butter and all others except those to \"avoid\"  Avoid: Chocolate, cocoa, coconut, popcorn, nuts, seeds, jam, marmalade  Fruits  OK: Canned, cooked, frozen or fresh fruits without seeds or tough skin  Avoid: Olives, skin and seeds of fruit  Meats  OK: All fresh or preserved meat, fish and fowl  Avoid: Any that are prepared with those spices to \"avoid\"  Cheese and eggs  OK: Eggs, cottage cheese, cream cheese, other cheeses  Avoid: All cheeses made with those spices to \"avoid\"  Potatoes and pasta  OK: Potato, rice, macaroni, noodles, spaghetti  Avoid: None  Soups  OK: All soups without heavy seasoning  Avoid: Soups made with those spices to " "\"avoid\"  Vegetables  OK: Canned, cooked, fresh or frozen mildly flavored vegetables without seeds, skins or coarse fiber  Avoid: Vegetables prepared with those spices to \"avoid\"; skin and seeds of vegetables and those with coarse fiber  Spices  OK: Salt, lemon and lime juice, vinegar, all extracts, félix, cinnamon, thyme, mace, allspice, paprika  Avoid: Chili powder, cloves, pepper, seed spices, garlic, gravy pickles, highly seasoned salad dressings  Date Last Reviewed: 11/20/2015 2000-2017 [employer]. [address] [place] [address]. All rights reserved. This information is not intended as a substitute for professional medical care. Always follow your healthcare professional's instructions.          Your next 10 appointments already scheduled     Mar 27, 2018 11:00 AM CDT   Office Visit with Desirae Han NP   [facility] ([facility])    [address] [place] [address] [phone]           Bring a current list of meds and any records pertaining to this visit. For Physicals, please bring immunization records and any forms needing to be filled out. Please arrive 10 minutes early to complete paperwork.              24 Hour Appointment Hotline       To make an appointment at any [facility] (formerly [facility]), call [phone] ([phone]). If you don't have a family doctor or clinic, we will help you find one. [facility] are conveniently located to serve the needs of you and your family.             Review of your medicines      START taking        Dose / Directions Last dose taken    cefdinir 300 MG capsule   Commonly known as:  OMNICEF   Dose:  300 mg   Quantity:  10 capsule        Take 1 capsule (300 mg) by mouth 2 times daily for 5 days   Refills:  0        omeprazole 20 MG CR capsule   Commonly known as:  priLOSEC   Dose:  20 mg   Quantity:  30 capsule        Take 1 capsule (20 mg) by mouth daily   Refills:  1          Our " records show that you are taking the medicines listed below. If these are incorrect, please call your family doctor or clinic.        Dose / Directions Last dose taken    allopurinol 300 MG tablet   Commonly known as:  ZYLOPRIM   Dose:  1 tablet   Quantity:  90 tablet        Take 1 tablet (300 mg) by mouth daily   Refills:  0        aspirin 81 MG tablet   Quantity:  100        1 tablet daily   Refills:  3        betamethasone (augmented) 0.05 % lotion   Commonly known as:  DIPROLENE        GINA 10 TO 20 DROPS TOPICALLY AA OF SCALP Q NIGHT UTD   Refills:  0        blood glucose monitoring lancets   Quantity:  2 Box        Test 2-3 times daily as directed   Refills:  3        blood glucose monitoring meter device kit   Quantity:  1 kit        Use to test blood sugar 1 times daily or as directed.   Refills:  0        blood glucose monitoring test strip   Commonly known as:  ACCU-CHEK SMARTVIEW   Quantity:  300 each        Check 3 times daily as directed   Refills:  0        boric acid 600 mg vaginal suppository - PHARMACY TO MIX COMPOUND   Dose:  600 mg   Quantity:  21 suppository        Place 1 suppository (600 mg) vaginally At Bedtime   Refills:  0        calcipotriene 0.005 % Oint   Dose:  1 Application        Apply 1 Application topically as needed   Refills:  1        chlorhexidine 0.12 % solution   Commonly known as:  PERIDEX        RINSE ONE HALF  OZ 2-3 X D AFTER BREAKFAST BEFORE BEDTIME FOLLOWING BRUSHING AND FLOSSIN   Refills:  0        cinnamon 500 MG Caps        2 tablets daily   Refills:  0        COMPOUNDED NON-CONTROLLED SUBSTANCE - PHARMACY TO MIX COMPOUNDED MEDICATION   Commonly known as:  CMPD RX   Quantity:  45 g        Patient to use 1 g intravaginally at bedtime Monday and Thursday nights as directed for menopausal symptoms   Refills:  1        * estradiol 10 MCG Tabs vaginal tablet   Commonly known as:  VAGIFEM   Dose:  10 mcg   Quantity:  8 tablet        Place 1 tablet (10 mcg) vaginally twice a  week   Refills:  11        * estradiol 0.1 MG/GM cream   Commonly known as:  ESTRACE VAGINAL   Dose:  2 g   Quantity:  42.5 g        Place 2 g vaginally twice a week   Refills:  11        EXCEDRIN ASPIRIN FREE PO        Refills:  0        fluconazole 150 MG tablet   Commonly known as:  DIFLUCAN   Dose:  150 mg   Quantity:  30 tablet        Take 1 tablet (150 mg) by mouth every 3 days   Refills:  6        furosemide 20 MG tablet   Commonly known as:  LASIX   Dose:  20 mg   Quantity:  90 tablet        Take 1 tablet (20 mg) by mouth daily as needed   Refills:  1        gabapentin 300 MG capsule   Commonly known as:  NEURONTIN   Dose:  600 mg   Quantity:  180 capsule        Take 2 capsules (600 mg) by mouth At Bedtime   Refills:  3        glimepiride 4 MG tablet   Commonly known as:  AMARYL   Dose:  4 mg   Quantity:  180 tablet        Take 1 tablet (4 mg) by mouth 2 times daily   Refills:  0        guaiFENesin-codeine 100-10 MG/5ML Soln solution   Commonly known as:  ROBITUSSIN AC   Dose:  2 tsp.   Quantity:  240 mL        Take 10 mLs by mouth every 4 hours as needed   Refills:  1        lisinopril 10 MG tablet   Commonly known as:  PRINIVIL/ZESTRIL   Dose:  10 mg   Quantity:  90 tablet        Take 1 tablet (10 mg) by mouth daily   Refills:  2        MAGNESIUM OXIDE -MG SUPPLEMENT PO   Dose:  1 tablet        Take 1 tablet by mouth daily   Refills:  0        NYQUIL PO        Refills:  0        PROBIOTIC ACIDOPHILUS Tabs   Dose:  1 tablet        Take 1 tablet by mouth daily   Refills:  0        simvastatin 40 MG tablet   Commonly known as:  ZOCOR   Quantity:  90 tablet        TAKE ONE TABLET BY MOUTH EVERY NIGHT AT BEDTIME   Refills:  2        SitaGLIPtin-MetFORMIN HCl  MG Tb24   Commonly known as:  JANUMET XR   Dose:  2 tablet   Quantity:  180 tablet        Take 2 tablets by mouth daily   Refills:  1        terconazole 0.4 % cream   Commonly known as:  TERAZOL 7   Dose:  1 applicator   Quantity:  70 g        Place  1 applicator vaginally At Bedtime   Refills:  11        tobramycin 0.3 % ophthalmic solution   Commonly known as:  TOBREX   Dose:  1 drop        1 drop 4 times daily   Refills:  0        TUMS 500 MG chewable tablet   Quantity:  90   Generic drug:  calcium carbonate        1 TABLET 3 TIMES PRN   Refills:  0        TYLENOL 500 MG tablet   Dose:  1-2 tablet   Generic drug:  acetaminophen        Take 1-2 tablets by mouth every morning.   Refills:  0        vitamin D 2000 UNITS tablet   Dose:  1 tablet        Take 1 tablet by mouth daily   Refills:  0        vitamin E 400 UNITS Tabs   Dose:  400 Units        Take 400 Units by mouth daily   Refills:  0        * Notice:  This list has 2 medication(s) that are the same as other medications prescribed for you. Read the directions carefully, and ask your doctor or other care provider to review them with you.            Prescriptions were sent or printed at these locations (2 Prescriptions)                   Other Prescriptions                Printed at Department/Unit printer (2 of 2)         cefdinir (OMNICEF) 300 MG capsule               omeprazole (PRILOSEC) 20 MG CR capsule                Procedures and tests performed during your visit     CBC with platelets differential    CT Abdomen Pelvis without Contrast (stone protocol)    Comprehensive metabolic panel    Lipase    Peripheral IV: Standard    UA with Microscopic      Orders Needing Specimen Collection     None      Pending Results     Date and Time Order Name Status Description    3/25/2018 1854 CT Abdomen Pelvis without Contrast (stone protocol) Preliminary             Pending Culture Results     No orders found from 3/23/2018 to 3/26/2018.            Pending Results Instructions     If you had any lab results that were not finalized at the time of your Discharge, you can call the ED Lab Result RN at [phone]. You will be contacted by this team for any positive Lab results or changes in treatment. The nurses are  available 7 days a week from 10A to 6:30P.  You can leave a message 24 hours per day and they will return your call.        Test Results From Your Hospital Stay        3/25/2018  7:27 PM      Component Results     Component Value Ref Range & Units Status    WBC 7.5 4.0 - 11.0 10e9/L Final    RBC Count 4.42 3.8 - 5.2 10e12/L Final    Hemoglobin 12.2 11.7 - 15.7 g/dL Final    Hematocrit 36.5 35.0 - 47.0 % Final    MCV 83 78 - 100 fl Final    MCH 27.6 26.5 - 33.0 pg Final    MCHC 33.4 31.5 - 36.5 g/dL Final    RDW 13.4 10.0 - 15.0 % Final    Platelet Count 207 150 - 450 10e9/L Final    Diff Method Automated Method  Final    % Neutrophils 55.6 % Final    % Lymphocytes 35.3 % Final    % Monocytes 7.2 % Final    % Eosinophils 1.1 % Final    % Basophils 0.5 % Final    % Immature Granulocytes 0.3 % Final    Nucleated RBCs 0 0 /100 Final    Absolute Neutrophil 4.1 1.6 - 8.3 10e9/L Final    Absolute Lymphocytes 2.6 0.8 - 5.3 10e9/L Final    Absolute Monocytes 0.5 0.0 - 1.3 10e9/L Final    Absolute Eosinophils 0.1 0.0 - 0.7 10e9/L Final    Absolute Basophils 0.0 0.0 - 0.2 10e9/L Final    Abs Immature Granulocytes 0.0 0 - 0.4 10e9/L Final    Absolute Nucleated RBC 0.0  Final         3/25/2018  7:46 PM      Component Results     Component Value Ref Range & Units Status    Sodium 139 133 - 144 mmol/L Final    Potassium 4.1 3.4 - 5.3 mmol/L Final    Chloride 103 94 - 109 mmol/L Final    Carbon Dioxide 29 20 - 32 mmol/L Final    Anion Gap 7 3 - 14 mmol/L Final    Glucose 192 (H) 70 - 99 mg/dL Final    Urea Nitrogen 23 7 - 30 mg/dL Final    Creatinine 0.88 0.52 - 1.04 mg/dL Final    GFR Estimate 62 >60 mL/min/1.7m2 Final    Non  GFR Calc    GFR Estimate If Black 75 >60 mL/min/1.7m2 Final    African American GFR Calc    Calcium 9.2 8.5 - 10.1 mg/dL Final    Bilirubin Total 0.4 0.2 - 1.3 mg/dL Final    Albumin 4.2 3.4 - 5.0 g/dL Final    Protein Total 7.6 6.8 - 8.8 g/dL Final    Alkaline Phosphatase 69 40 - 150 U/L  Final    ALT 24 0 - 50 U/L Final    AST 17 0 - 45 U/L Final         3/25/2018  7:46 PM      Component Results     Component Value Ref Range & Units Status    Lipase 173 73 - 393 U/L Final         3/25/2018  7:31 PM      Component Results     Component Value Ref Range & Units Status    Color Urine Yellow  Final    Appearance Urine Clear  Final    Glucose Urine Negative NEG^Negative mg/dL Final    Bilirubin Urine Negative NEG^Negative Final    Ketones Urine 5 (A) NEG^Negative mg/dL Final    Specific Gravity Urine 1.028 1.003 - 1.035 Final    Blood Urine Negative NEG^Negative Final    pH Urine 5.0 5.0 - 7.0 pH Final    Protein Albumin Urine Negative NEG^Negative mg/dL Final    Urobilinogen mg/dL 0.0 0.0 - 2.0 mg/dL Final    Nitrite Urine Negative NEG^Negative Final    Leukocyte Esterase Urine Moderate (A) NEG^Negative Final    Source Midstream Urine  Final    WBC Urine 35 (H) 0 - 5 /HPF Final    RBC Urine 2 0 - 2 /HPF Final    Squamous Epithelial /HPF Urine 1 0 - 1 /HPF Final    Mucous Urine Present (A) NEG^Negative /LPF Final         3/25/2018  8:09 PM      Narrative     CT ABDOMEN AND PELVIS WITHOUT CONTRAST  3/25/2018 7:46 PM    HISTORY:  Abdominal pain; 77-year-old diabetic female who presented  with left-sided abdominal pain beginning a couple weeks previously.  Patient's mother passed away this morning. She states she has some  nausea and bloating. Episode of constipation one week ago and tried  some laxatives. She states she notes it feels like a ball when she  palpates it at home. Prior history of hysterectomy. Prior  cholecystectomy or appendectomy. Denies diarrhea.    TECHNIQUE: Scans obtained from the diaphragm through the pelvis  without oral or IV contrast.   Radiation dose for this scan was reduced using automated exposure  control, adjustment of the mA and/or kV according to patient size, or  iterative reconstruction technique.    COMPARISON:  CT abdomen and pelvis dated 7/8/2011.    FINDINGS:   Stable  calcific along the dome of the liver could be in the  hemidiaphragm. Visualized portions of the lung bases are otherwise  clear. There are coronary calcifications and aortic calcifications.  Visualized mediastinal contents are otherwise unremarkable.  Degenerative changes are seen in the spine. No aggressive osseous  lesions are identified.    Gallbladder is mildly distended but otherwise unremarkable. The liver,  gallbladder, pancreas, spleen, bilateral adrenal glands, and bilateral  kidneys are otherwise normal in appearance for a noncontrast CT.  Evaluation of the solid organs of the abdomen and pelvis is limited  due to lack of IV contrast. No hydronephrosis, nephrolithiasis,  hydroureter, or ureteral calculus is identified. Urinary bladder is  grossly unremarkable.    There are calcifications just posterior to the central aspect of the  urinary bladder in the pelvis which are stable since the prior study.  The uterus appears to be surgically absent. Ovaries are not seen and  are also likely surgically absent.    Small amount of gas is seen in the vaginal fornices.    No adenopathy, free fluid, or free air is seen in the peritoneal  cavity. There is nonaneurysmal atherosclerosis.    The colon is of normal caliber without pericolonic inflammatory  changes to suggest acute diverticulitis. A moderate amount stool is  seen in the colon. Appendix is not definitely seen. No pericecal  inflammatory change to suggest acute appendicitis. Small bowel is of  normal caliber. Stomach is filled with fluid and other ingested  materials but is otherwise unremarkable.        Impression     IMPRESSION:  1. Moderate amount of stool seen in the colon.  2. No other significant abnormalities are identified. No etiology for  patient's symptoms is seen. No evidence for diverticulitis,  appendicitis, or abscess.  3. Atherosclerosis.                Clinical Quality Measure: Blood Pressure Screening     Your blood pressure was checked  "while you were in the emergency department today. The last reading we obtained was  BP: 177/89 . Please read the guidelines below about what these numbers mean and what you should do about them.  If your systolic blood pressure (the top number) is less than 120 and your diastolic blood pressure (the bottom number) is less than 80, then your blood pressure is normal. There is nothing more that you need to do about it.  If your systolic blood pressure (the top number) is 120-139 or your diastolic blood pressure (the bottom number) is 80-89, your blood pressure may be higher than it should be. You should have your blood pressure rechecked within a year by a primary care provider.  If your systolic blood pressure (the top number) is 140 or greater or your diastolic blood pressure (the bottom number) is 90 or greater, you may have high blood pressure. High blood pressure is treatable, but if left untreated over time it can put you at risk for heart attack, stroke, or kidney failure. You should have your blood pressure rechecked by a primary care provider within the next 4 weeks.  If your provider in the emergency department today gave you specific instructions to follow-up with your doctor or provider even sooner than that, you should follow that instruction and not wait for up to 4 weeks for your follow-up visit.        Thank you for choosing [facility]       Thank you for choosing [facility] for your care. Our goal is always to provide you with excellent care. Hearing back from our patients is one way we can continue to improve our services. Please take a few minutes to complete the written survey that you may receive in the mail after you visit with us. Thank you!        [facility] Information     [employer] lets you send messages to your doctor, view your test results, renew your prescriptions, schedule appointments and more. To sign up, go to [email or web address] . Click on \"Log in\" on the left side of the screen, which " "will take you to the Welcome page. Then click on \"Sign up Now\" on the right side of the page.     You will be asked to enter the access code listed below, as well as some personal information. Please follow the directions to create your username and password.     Your access code is: [email or web address]  Expires: 2018  4:49 PM     Your access code will  in 90 days. If you need help or a new code, please call your [facility] clinic or [phone].        Care EveryWhere ID     This is your Care EveryWhere ID. This could be used by other organizations to access your [facility] medical records  [id number]        Equal Access to Services     ASHA OCAMPO : Stacia Yi, sho leon, jamey herrera, janet louis. So [facility] [phone].    ATENCIÓN: Si habla español, tiene a toribio disposición servicios gratuitos de asistencia lingüística. Llame al [phone].    We comply with applicable federal civil rights laws and [place] laws. We do not discriminate on the basis of race, color, national origin, age, disability, sex, sexual orientation, or gender identity.            After Visit Summary       This is your record. Keep this with you and show to your community pharmacist(s) and doctor(s) at your next visit.                  "

## 2018-03-25 NOTE — ED AVS SNAPSHOT
[facility] Emergency Department    [address]    [facility] [address]    Phone:  [phone]    Fax:  [phone]                                       Lindsey Gary   MRN: [id number]    Department:  [facility] Emergency Department   Date of Visit:  3/25/2018           After Visit Summary Signature Page     I have received my discharge instructions, and my questions have been answered. I have discussed any challenges I see with this plan with the nurse or doctor.    ..........................................................................................................................................  Patient/Patient Representative Signature      ..........................................................................................................................................  Patient Representative Print Name and Relationship to Patient    ..................................................               ................................................  Date                                            Time    ..........................................................................................................................................  Reviewed by Signature/Title    ...................................................              ..............................................  Date                                                            Time

## 2018-03-25 NOTE — ED NOTES
Pt complains of R sided flank pain. Pt stated she has been under a lot of stress recently, citing that her mother passed yesterday. Pt A&Ox4. ABCDs intact.

## 2018-03-26 NOTE — ED NOTES
Pt refused morphine, citing previous opioid induced constipation post operatively. Pt educated on morphine side effects. Pt instructed to notify RN if pain becomes uncomfortable.

## 2018-03-26 NOTE — DISCHARGE INSTRUCTIONS
Abdominal Pain    Abdominal pain is pain in the stomach or belly area. Everyone has this pain from time to time. In many cases it goes away on its own. But abdominal pain can sometimes be due to a serious problem, such as appendicitis. So it s important to know when to seek help.  Causes of abdominal pain  There are many possible causes of abdominal pain. Common causes in adults include:    Constipation, diarrhea, or gas    Stomach acid flowing back up into the esophagus (acid reflux or heartburn)    Severe acid reflux, called GERD (gastroesophageal reflux disease)    A sore in the lining of the stomach or small intestine (peptic ulcer)    Inflammation of the gallbladder, liver, or pancreas    Gallstones or kidney stones    Appendicitis     Intestinal blockage     An internal organ pushing through a muscle or other tissue (hernia)    Urinary tract infections    In women, menstrual cramps, fibroids, or endometriosis    Inflammation or infection of the intestines  Diagnosing the cause of abdominal pain  Your healthcare provider will do a physical exam help find the cause of your pain. If needed, tests will be ordered. Belly pain has many possible causes. So it can be hard to find the reason for your pain. Giving details about your pain can help. Tell your provider where and when you feel the pain, and what makes it better or worse. Also let your provider know if you have other symptoms such as:    Fever    Tiredness    Upset stomach (nausea)    Vomiting    Changes in bathroom habits  Treating abdominal pain  Some causes of pain need emergency medical treatment right away. These include appendicitis or a bowel blockage. Other problems can be treated with rest, fluids, or medicines. Your healthcare provider can give you specific instructions for treatment or self-care based on what is causing your pain.  If you have vomiting or diarrhea, sip water or other clear fluids. When you are ready to eat solid foods again,  start with small amounts of easy-to-digest, low-fat foods. These include apple sauce, toast, or crackers.   When to seek medical care  Call 911 or go to the hospital right away if you:    Can t pass stool and are vomiting    Are vomiting blood or have bloody diarrhea or black, tarry diarrhea    Have chest, neck, or shoulder pain    Feel like you might pass out    Have pain in your shoulder blades with nausea    Have sudden, severe belly pain    Have new, severe pain unlike any you have felt before    Have a belly that is rigid, hard, and tender to touch  Call your healthcare provider if you have:    Pain for more than 5 days    Bloating for more than 2 days    Diarrhea for more than 5 days    A fever of 100.4 F (38.0 C) or higher, or as directed by your provider    Pain that gets worse    Weight loss for no reason    Continued lack of appetite    Blood in your stool  How to prevent abdominal pain  Here are some tips to help prevent abdominal pain:    Eat smaller amounts of food at one time.    Avoid greasy, fried, or other high-fat foods.    Avoid foods that give you gas.    Exercise regularly.    Drink plenty of fluids.  To help prevent GERD symptoms:    Quit smoking.    Reduce alcohol and certain foods that increase stomach acid.    Avoid aspirin and over-the-counter pain and fever medicines (NSAIDS or nonsteroidal anti-inflammatory drugs), if possible    Lose extra weight.    Finish eating at least 2 hours before you go to bed or lie down.    Raise the head of your bed.  Date Last Reviewed: 7/1/2016 2000-2017 [employer]. [address] [place] [address], [place] [address]. All rights reserved. This information is not intended as a substitute for professional medical care. Always follow your healthcare professional's instructions.        [place] Diet  Your healthcare provider may recommend a bland diet if you have an upset stomach. It consists of foods that are mild and easy to digest. It is better to eat  "small frequent meals rather than 3 large meals a day.    Beverages  OK: Fruit juices, non-caffeinated teas and coffee, non-carbonated fu  Avoid: Carbonated beverage, caffeinated tea and coffee, all alcoholic beverages  Bread  OK: Refined white, wheat or rye bread, reji or soda crackers, [place] toast, plain rolls, bagels  Avoid: Whole-grain bread  Cereal  OK: Refined cereals: cooked or ready to eat  Avoid: Whole-grain cereals and granola, or those containing bran, seeds or nuts  Desserts  OK: Peanut butter and all others except those to \"avoid\"  Avoid: Chocolate, cocoa, coconut, popcorn, nuts, seeds, jam, marmalade  Fruits  OK: Canned, cooked, frozen or fresh fruits without seeds or tough skin  Avoid: Olives, skin and seeds of fruit  Meats  OK: All fresh or preserved meat, fish and fowl  Avoid: Any that are prepared with those spices to \"avoid\"  Cheese and eggs  OK: Eggs, cottage cheese, cream cheese, other cheeses  Avoid: All cheeses made with those spices to \"avoid\"  Potatoes and pasta  OK: Potato, rice, macaroni, noodles, spaghetti  Avoid: None  Soups  OK: All soups without heavy seasoning  Avoid: Soups made with those spices to \"avoid\"  Vegetables  OK: Canned, cooked, fresh or frozen mildly flavored vegetables without seeds, skins or coarse fiber  Avoid: Vegetables prepared with those spices to \"avoid\"; skin and seeds of vegetables and those with coarse fiber  Spices  OK: Salt, lemon and lime juice, vinegar, all extracts, félix, cinnamon, thyme, mace, allspice, paprika  Avoid: Chili powder, cloves, pepper, seed spices, garlic, gravy pickles, highly seasoned salad dressings  Date Last Reviewed: 11/20/2015 2000-2017 [employer]. [address] [place] [address]. All rights reserved. This information is not intended as a substitute for professional medical care. Always follow your healthcare professional's instructions.        "

## 2018-03-27 ENCOUNTER — OFFICE VISIT (OUTPATIENT)
Dept: INTERNAL MEDICINE | Facility: CLINIC | Age: 78
End: 2018-03-27
Payer: COMMERCIAL

## 2018-03-27 VITALS
SYSTOLIC BLOOD PRESSURE: 146 MMHG | RESPIRATION RATE: 16 BRPM | HEART RATE: 88 BPM | TEMPERATURE: 97.8 F | DIASTOLIC BLOOD PRESSURE: 80 MMHG | HEIGHT: 64 IN | WEIGHT: 184 LBS | OXYGEN SATURATION: 99 % | BODY MASS INDEX: 31.41 KG/M2

## 2018-03-27 DIAGNOSIS — N39.0 URINARY TRACT INFECTION WITHOUT HEMATURIA, SITE UNSPECIFIED: Primary | ICD-10-CM

## 2018-03-27 PROCEDURE — 99214 OFFICE O/P EST MOD 30 MIN: CPT | Performed by: NURSE PRACTITIONER

## 2018-03-27 NOTE — MR AVS SNAPSHOT
"              After Visit Summary   3/27/2018    Lindsey Gary    MRN: [id number]           Patient Information     Date Of Birth          1940        Visit Information        Provider Department      3/27/2018 11:00 AM Desirae Han NP [facility]        Today's Diagnoses     Urinary tract infection without hematuria, site unspecified    -  1       Follow-ups after your visit        Who to contact     If you have questions or need follow up information about today's clinic visit or your schedule please contact [facility] directly at [phone].  Normal or non-critical lab and imaging results will be communicated to you by Codemediahart, letter or phone within 4 business days after the clinic has received the results. If you do not hear from us within 7 days, please contact the clinic through Codemediahart or phone. If you have a critical or abnormal lab result, we will notify you by phone as soon as possible.  Submit refill requests through Plibber or call your pharmacy and they will forward the refill request to us. Please allow 3 business days for your refill to be completed.          Additional Information About Your Visit        MyChart Information     Plibber lets you send messages to your doctor, view your test results, renew your prescriptions, schedule appointments and more. To sign up, go to [email or web address] . Click on \"Log in\" on the left side of the screen, which will take you to the Welcome page. Then click on \"Sign up Now\" on the right side of the page.     You will be asked to enter the access code listed below, as well as some personal information. Please follow the directions to create your username and password.     Your access code is: [email or web address]  Expires: 2018  4:49 PM     Your access code will  in 90 days. If you need help or a new code, please call your [facility] or [phone].        Care EveryWhere ID     This is your Care " "EveryWhere ID. This could be used by other organizations to access your [facility] medical records  [id number]        Your Vitals Were     Pulse Temperature Respirations Height Pulse Oximetry BMI (Body Mass Index)    88 97.8  F (36.6  C) (Oral) 16 5' 4\" (1.626 m) 99% 31.58 kg/m2       Blood Pressure from Last 3 Encounters:   03/27/18 146/80   03/25/18 149/68   03/22/18 134/78    Weight from Last 3 Encounters:   03/27/18 184 lb (83.5 kg)   03/25/18 172 lb (78 kg)   03/22/18 184 lb (83.5 kg)              Today, you had the following     No orders found for display       Primary Care Provider Office Phone # Fax #    LILLIAM [facility] [phone]       [address] [facility] [address]        Equal Access to Services     CARROLL OCAMPO : Hadii aad ku hadasho Soomaali, waaxda luqadaha, qaybta kaalmada adeegyada, waxay rodriguein haypaola hardwick . So [facility] [phone].    ATENCIÓN: Si habla español, tiene a toribio disposición servicios gratuitos de asistencia lingüística. Bety al [phone].    We comply with applicable federal civil rights laws and [place] laws. We do not discriminate on the basis of race, color, national origin, age, disability, sex, sexual orientation, or gender identity.            Thank you!     Thank you for choosing [facility]  for your care. Our goal is always to provide you with excellent care. Hearing back from our patients is one way we can continue to improve our services. Please take a few minutes to complete the written survey that you may receive in the mail after your visit with us. Thank you!             Your Updated Medication List - Protect others around you: Learn how to safely use, store and throw away your medicines at www.disposemymeds.org.          This list is accurate as of 3/27/18  1:08 PM.  Always use your most recent med list.                   Brand Name Dispense Instructions for use Diagnosis    allopurinol 300 MG " tablet    ZYLOPRIM    90 tablet    Take 1 tablet (300 mg) by mouth daily    Gout       aspirin 81 MG tablet     100    1 tablet daily        betamethasone (augmented) 0.05 % lotion    DIPROLENE     GINA 10 TO 20 DROPS TOPICALLY AA OF SCALP Q NIGHT UTD        blood glucose monitoring lancets     2 Box    Test 2-3 times daily as directed    Type 2 diabetes, HbA1c goal < 7% (H)       blood glucose monitoring meter device kit     1 kit    Use to test blood sugar 1 times daily or as directed.    Type 2 diabetes mellitus without complication, without long-term current use of insulin (H)       blood glucose monitoring test strip    ACCU-CHEK SMARTVIEW    300 each    Check 3 times daily as directed    Type 2 diabetes, HbA1c goal < 7% (H)       boric acid 600 mg vaginal suppository - PHARMACY TO MIX COMPOUND     21 suppository    Place 1 suppository (600 mg) vaginally At Bedtime    Vaginal irritation       calcipotriene 0.005 % Oint      Apply 1 Application topically as needed        cefdinir 300 MG capsule    OMNICEF    10 capsule    Take 1 capsule (300 mg) by mouth 2 times daily for 5 days        chlorhexidine 0.12 % solution    PERIDEX     RINSE ONE HALF  OZ 2-3 X D AFTER BREAKFAST BEFORE BEDTIME FOLLOWING BRUSHING AND FLOSSIN        cinnamon 500 MG Caps      2 tablets daily        COMPOUNDED NON-CONTROLLED SUBSTANCE - PHARMACY TO MIX COMPOUNDED MEDICATION    CMPD RX    45 g    Patient to use 1 g intravaginally at bedtime Monday and Thursday nights as directed for menopausal symptoms    Symptomatic menopausal or female climacteric states       * estradiol 10 MCG Tabs vaginal tablet    VAGIFEM    8 tablet    Place 1 tablet (10 mcg) vaginally twice a week    Vaginal pain, Vaginal atrophy       * estradiol 0.1 MG/GM cream    ESTRACE VAGINAL    42.5 g    Place 2 g vaginally twice a week    Vaginal pain, Vaginal atrophy       EXCEDRIN ASPIRIN FREE PO           fluconazole 150 MG tablet    DIFLUCAN    30 tablet    Take 1 tablet  (150 mg) by mouth every 3 days    Yeast infection of the vagina       furosemide 20 MG tablet    LASIX    90 tablet    Take 1 tablet (20 mg) by mouth daily as needed    Edema, unspecified type       gabapentin 300 MG capsule    NEURONTIN    180 capsule    Take 2 capsules (600 mg) by mouth At Bedtime    Neuropathy       glimepiride 4 MG tablet    AMARYL    180 tablet    Take 1 tablet (4 mg) by mouth 2 times daily    Type 2 diabetes mellitus without complication, without long-term current use of insulin (H)       lisinopril 10 MG tablet    PRINIVIL/ZESTRIL    90 tablet    Take 1 tablet (10 mg) by mouth daily    Essential hypertension, benign       MAGNESIUM OXIDE -MG SUPPLEMENT PO      Take 1 tablet by mouth daily        omeprazole 20 MG CR capsule    priLOSEC    30 capsule    Take 1 capsule (20 mg) by mouth daily        PROBIOTIC ACIDOPHILUS Tabs      Take 1 tablet by mouth daily        simvastatin 40 MG tablet    ZOCOR    90 tablet    TAKE ONE TABLET BY MOUTH EVERY NIGHT AT BEDTIME    Mixed hyperlipidemia       SitaGLIPtin-MetFORMIN HCl  MG Tb24    JANUMET XR    180 tablet    Take 2 tablets by mouth daily    Type 2 diabetes mellitus without complication, without long-term current use of insulin (H)       terconazole 0.4 % cream    TERAZOL 7    70 g    Place 1 applicator vaginally At Bedtime    Yeast infection of the vagina       TUMS 500 MG chewable tablet   Generic drug:  calcium carbonate     90    1 TABLET 3 TIMES PRN        TYLENOL 500 MG tablet   Generic drug:  acetaminophen      Take 1-2 tablets by mouth every morning.    Mixed hyperlipidemia, Gout, unspecified, Type 2 diabetes, HbA1c goal < 7% (H), Hyperlipidemia LDL goal <100       vitamin D 2000 UNITS tablet      Take 1 tablet by mouth daily        vitamin E 400 UNITS Tabs      Take 400 Units by mouth daily        * Notice:  This list has 2 medication(s) that are the same as other medications prescribed for you. Read the directions carefully, and ask  your doctor or other care provider to review them with you.

## 2018-03-27 NOTE — NURSING NOTE
"Chief Complaint   Patient presents with     Follow Up For     [facility] ER on 03/25/18 for UTI       Initial /80 (BP Location: Right arm, Patient Position: Sitting, Cuff Size: Adult Large)  Pulse 88  Temp 97.8  F (36.6  C) (Oral)  Resp 16  Ht 5' 4\" (1.626 m)  Wt 184 lb (83.5 kg)  SpO2 99%  BMI 31.58 kg/m2 Estimated body mass index is 31.58 kg/(m^2) as calculated from the following:    Height as of this encounter: 5' 4\" (1.626 m).    Weight as of this encounter: 184 lb (83.5 kg).  Medication Reconciliation: complete    "

## 2018-03-27 NOTE — PROGRESS NOTES
SUBJECTIVE:   Lindsey Gary is a 77 year old female who presents to clinic today for the following health issues:      ED/UC Followup:    Facility:  [facility] ER  Date of visit: 03/25/18  Reason for visit: flank pain-UTI, constipation  Current Status: feels better,  to touch.  Waiting for UC&S             Patient Active Problem List   Diagnosis     Rosacea     Gout     CHONDROCALC NOS-OTHER SITE(aka PSEUDOGOUT)     Essential hypertension     Hyperlipidemia LDL goal <100     Advanced directives, counseling/discussion     Atrophic vaginitis     Chronic foot pain     Hypertension goal BP (blood pressure) < 130/80     Bereavement     Obesity     Type 2 diabetes mellitus without complication (H)     Status post total knee replacement, unspecified laterality     Psoriasis     Past Surgical History:   Procedure Laterality Date     C NONSPECIFIC PROCEDURE      Breast biopsies        C NONSPECIFIC PROCEDURE      Symptomatic left thigh lipomas        C NONSPECIFIC PROCEDURE  6/1/09    pubovaginal sling     COLONOSCOPY       COLONOSCOPY N/A 12/17/2014    Procedure: COMBINED COLONOSCOPY, SINGLE OR MULTIPLE BIOPSY/POLYPECTOMY BY BIOPSY;  Surgeon: Chuy Staton MD;  Location: [facility]     HYSTERECTOMY, PAP NO LONGER INDICATED       HYSTERECTOMY, JAIME  1991    fibroid     SURGICAL HISTORY OF -   10/28/2015    right partial knee        Social History   Substance Use Topics     Smoking status: Never Smoker     Smokeless tobacco: Never Used     Alcohol use No     Family History   Problem Relation Age of Onset     CANCER Mother      colon ca survivor     Cancer - colorectal Mother      CEREBROVASCULAR DISEASE Father          Current Outpatient Prescriptions   Medication Sig Dispense Refill     cefdinir (OMNICEF) 300 MG capsule Take 1 capsule (300 mg) by mouth 2 times daily for 5 days 10 capsule 0     omeprazole (PRILOSEC) 20 MG CR capsule Take 1 capsule (20 mg) by mouth daily 30 capsule 1     allopurinol (ZYLOPRIM) 300 MG  tablet Take 1 tablet (300 mg) by mouth daily 90 tablet 0     lisinopril (PRINIVIL/ZESTRIL) 10 MG tablet Take 1 tablet (10 mg) by mouth daily 90 tablet 2     simvastatin (ZOCOR) 40 MG tablet TAKE ONE TABLET BY MOUTH EVERY NIGHT AT BEDTIME 90 tablet 2     gabapentin (NEURONTIN) 300 MG capsule Take 2 capsules (600 mg) by mouth At Bedtime 180 capsule 3     glimepiride (AMARYL) 4 MG tablet Take 1 tablet (4 mg) by mouth 2 times daily 180 tablet 0     boric acid 600 mg vaginal suppository - PHARMACY TO MIX COMPOUND Place 1 suppository (600 mg) vaginally At Bedtime 21 suppository 0     SitaGLIPtin-MetFORMIN HCl (JANUMET XR)  MG TB24 Take 2 tablets by mouth daily 180 tablet 1     estradiol (VAGIFEM) 10 MCG TABS vaginal tablet Place 1 tablet (10 mcg) vaginally twice a week 8 tablet 11     Acetaminophen-Caffeine (EXCEDRIN ASPIRIN FREE PO)        COMPOUNDED NON-CONTROLLED SUBSTANCE (CMPD RX) - PHARMACY TO MIX COMPOUNDED MEDICATION Patient to use 1 g intravaginally at bedtime Monday and Thursday nights as directed for menopausal symptoms 45 g 1     fluconazole (DIFLUCAN) 150 MG tablet Take 1 tablet (150 mg) by mouth every 3 days 30 tablet 6     Lactobacillus (PROBIOTIC ACIDOPHILUS) TABS Take 1 tablet by mouth daily       Cholecalciferol (VITAMIN D) 2000 UNITS tablet Take 1 tablet by mouth daily       betamethasone, augmented, (DIPROLENE) 0.05 % lotion GINA 10 TO 20 DROPS TOPICALLY AA OF SCALP Q NIGHT UTD       blood glucose monitoring (ACCU-CHEK GORDO SMARTVIEW) meter device kit Use to test blood sugar 1 times daily or as directed. 1 kit 0     blood glucose monitoring (ACCU-CHEK FASTCLIX) lancets Test 2-3 times daily as directed 2 Box 3     blood glucose monitoring (ACCU-CHEK SMARTVIEW) test strip Check 3 times daily as directed 300 each 0     calcipotriene 0.005 % OINT Apply 1 Application topically as needed  1     acetaminophen (TYLENOL) 500 MG tablet Take 1-2 tablets by mouth every morning.       ASPIRIN 81 MG OR TABS 1  "tablet daily 100 3     TUMS 500 MG OR CHEW 1 TABLET 3 TIMES PRN 90 0     estradiol (ESTRACE VAGINAL) 0.1 MG/GM cream Place 2 g vaginally twice a week (Patient not taking: Reported on 3/27/2018) 42.5 g 11     vitamin E 400 UNITS TABS Take 400 Units by mouth daily       chlorhexidine (PERIDEX) 0.12 % solution RINSE ONE HALF  OZ 2-3 X D AFTER BREAKFAST BEFORE BEDTIME FOLLOWING BRUSHING AND FLOSSIN       terconazole (TERAZOL 7) 0.4 % cream Place 1 applicator vaginally At Bedtime (Patient not taking: Reported on 3/27/2018) 70 g 11     furosemide (LASIX) 20 MG tablet Take 1 tablet (20 mg) by mouth daily as needed (Patient not taking: Reported on 3/27/2018) 90 tablet 1     MAGNESIUM OXIDE -MG SUPPLEMENT PO Take 1 tablet by mouth daily       CINNAMON 500 MG OR CAPS 2 tablets daily  0     BP Readings from Last 3 Encounters:   03/27/18 146/80   03/25/18 149/68   03/22/18 134/78    Wt Readings from Last 3 Encounters:   03/27/18 184 lb (83.5 kg)   03/25/18 172 lb (78 kg)   03/22/18 184 lb (83.5 kg)                    Reviewed and updated as needed this visit by clinical staff  Tobacco  Allergies  Meds  Med Hx  Surg Hx  Fam Hx  Soc Hx      Reviewed and updated as needed this visit by Provider         ROS:  CONSTITUTIONAL: NEGATIVE for fever, chills, change in weight  ENT/MOUTH: NEGATIVE for ear, mouth and throat problems  RESP: NEGATIVE for significant cough or SOB  CV: NEGATIVE for chest pain, palpitations or peripheral edema    OBJECTIVE:     /80 (BP Location: Right arm, Patient Position: Sitting, Cuff Size: Adult Large)  Pulse 88  Temp 97.8  F (36.6  C) (Oral)  Resp 16  Ht 5' 4\" (1.626 m)  Wt 184 lb (83.5 kg)  SpO2 99%  BMI 31.58 kg/m2  Body mass index is 31.58 kg/(m^2).  GENERAL: healthy, alert and no distress  PSYCH: mentation appears normal and anxious        ASSESSMENT/PLAN:               ICD-10-CM    1. Urinary tract infection without hematuria, site unspecified N39.0        Push fluids, finish " antibiotics, miralax  Will call with       Desirae Han NP  [facility]

## 2018-03-28 ENCOUNTER — TELEPHONE (OUTPATIENT)
Dept: INTERNAL MEDICINE | Facility: CLINIC | Age: 78
End: 2018-03-28

## 2018-03-28 DIAGNOSIS — N30.00 ACUTE CYSTITIS WITHOUT HEMATURIA: Primary | ICD-10-CM

## 2018-03-28 LAB
BACTERIA SPEC CULT: ABNORMAL
BACTERIA SPEC CULT: ABNORMAL
SPECIMEN SOURCE: ABNORMAL

## 2018-03-28 RX ORDER — NITROFURANTOIN 25; 75 MG/1; MG/1
100 CAPSULE ORAL 2 TIMES DAILY
Qty: 14 CAPSULE | Refills: 0 | Status: SHIPPED | OUTPATIENT
Start: 2018-03-28 | End: 2018-06-15

## 2018-03-28 NOTE — TELEPHONE ENCOUNTER
Pt took 2 doses of the omnicef today pt wondering if she should start the macrobid tonight or wait to start new medication tomorrow? Pt is not feeling better on omnicef FYI. Ok to leave a detailed message on pt's machine.

## 2018-03-28 NOTE — TELEPHONE ENCOUNTER
Please notify pt UC results show organism sensitive to macrobid and new eRx sent, stop the omnicef.  Desirae Han CNP

## 2018-04-05 ENCOUNTER — TELEPHONE (OUTPATIENT)
Dept: INTERNAL MEDICINE | Facility: CLINIC | Age: 78
End: 2018-04-05

## 2018-04-05 DIAGNOSIS — R30.0 DYSURIA: Primary | ICD-10-CM

## 2018-04-05 RX ORDER — PENICILLIN V POTASSIUM 500 MG/1
500 TABLET, FILM COATED ORAL 2 TIMES DAILY
Qty: 20 TABLET | Refills: 0 | Status: SHIPPED | OUTPATIENT
Start: 2018-04-05 | End: 2018-06-15

## 2018-04-05 NOTE — TELEPHONE ENCOUNTER
Ok  Prescription done pen vk   Bacteria was very small amounts in culture   May do culture after done with prescription

## 2018-04-17 DIAGNOSIS — R30.0 DYSURIA: ICD-10-CM

## 2018-04-17 PROCEDURE — 87086 URINE CULTURE/COLONY COUNT: CPT | Performed by: NURSE PRACTITIONER

## 2018-04-18 LAB
BACTERIA SPEC CULT: NORMAL
SPECIMEN SOURCE: NORMAL

## 2018-04-20 ENCOUNTER — TELEPHONE (OUTPATIENT)
Dept: INTERNAL MEDICINE | Facility: CLINIC | Age: 78
End: 2018-04-20

## 2018-04-20 DIAGNOSIS — E11.8 TYPE 2 DIABETES MELLITUS WITH COMPLICATION, WITHOUT LONG-TERM CURRENT USE OF INSULIN (H): ICD-10-CM

## 2018-04-20 DIAGNOSIS — E11.9 TYPE 2 DIABETES, HBA1C GOAL < 7% (H): ICD-10-CM

## 2018-04-20 NOTE — TELEPHONE ENCOUNTER
Last OV for diabetic appt-1/3/18      Lab Results   Component Value Date    A1C 9.0 01/02/2018    A1C 8.2 07/27/2017    A1C 7.0 09/22/2016    A1C 8.0 06/16/2016    A1C 7.4 12/15/2015

## 2018-04-20 NOTE — TELEPHONE ENCOUNTER
Reason for Call: Request for an order or referral:    Order or referral being requested: test strips and lancets    Date needed: as soon as possible    Has the patient been seen by the PCP for this problem? YES    Additional comments: new pharmacy, pt now uses [facility] mail.     Phone number Patient can be reached at:  Home number on file [phone] (home)    Best Time:  any    Can we leave a detailed message on this number?  YES    Call taken on 4/20/2018 at 10:13 AM by Eduarda Contreras

## 2018-05-01 RX ORDER — LANCETS
EACH MISCELLANEOUS
Qty: 300 EACH | Refills: 3 | Status: SHIPPED | OUTPATIENT
Start: 2018-05-01 | End: 2019-01-08

## 2018-05-01 NOTE — TELEPHONE ENCOUNTER
Received call from Adela at [facility] stating they received test strip rx but not rx for lancets.  Pt uses [facility], rx resent.   EDMAR Jaeger R.N.

## 2018-05-22 ENCOUNTER — TELEPHONE (OUTPATIENT)
Dept: PHARMACY | Facility: CLINIC | Age: 78
End: 2018-05-22

## 2018-05-22 NOTE — TELEPHONE ENCOUNTER
We have attempted to contact this patient two times to set up a MTM follow up appointment and were unsuccessful. Contact attempts were made via letter and phone. We will no longer continue to contact this patient to schedule a visit at this time. Please refer back to MT if you believe this patient would continue to benefit from our services.     Thank you!    Sharla Perry, PharmD  Pharmaceutical Care Resident   Pager: [phone]

## 2018-06-11 ENCOUNTER — TELEPHONE (OUTPATIENT)
Dept: INTERNAL MEDICINE | Facility: CLINIC | Age: 78
End: 2018-06-11

## 2018-06-11 DIAGNOSIS — R30.0 DYSURIA: Primary | ICD-10-CM

## 2018-06-11 NOTE — TELEPHONE ENCOUNTER
Pt called. Stated that she is sure she has another UTI. Is having flank pain on both sides, and burning with urination. Symptoms started last week. Pt stated she has a long history of UTI's and Dinorah normally just orders a UA/UC. Pt stated her UA has to be cultured. Pt is aware Dinorah not in today, but cant do UA til 6/13 anyways. Relayed I will call her tomorrow.

## 2018-06-12 NOTE — TELEPHONE ENCOUNTER
I am not going to be here to respond to urine result so she will have to be seen or go to urgent care

## 2018-06-12 NOTE — TELEPHONE ENCOUNTER
Patient advised and she is scheduling appt, prefers to wait until PCP is back in clinic to be seen.  EDMAR Jaeger R.N.

## 2018-06-15 ENCOUNTER — OFFICE VISIT (OUTPATIENT)
Dept: INTERNAL MEDICINE | Facility: CLINIC | Age: 78
End: 2018-06-15
Payer: COMMERCIAL

## 2018-06-15 VITALS
RESPIRATION RATE: 16 BRPM | OXYGEN SATURATION: 99 % | SYSTOLIC BLOOD PRESSURE: 110 MMHG | BODY MASS INDEX: 31.76 KG/M2 | DIASTOLIC BLOOD PRESSURE: 70 MMHG | HEART RATE: 91 BPM | WEIGHT: 185 LBS

## 2018-06-15 DIAGNOSIS — R30.0 DYSURIA: Primary | ICD-10-CM

## 2018-06-15 DIAGNOSIS — M62.830 BACK MUSCLE SPASM: ICD-10-CM

## 2018-06-15 LAB
ALBUMIN UR-MCNC: 30 MG/DL
APPEARANCE UR: CLEAR
BILIRUB UR QL STRIP: NEGATIVE
COLOR UR AUTO: YELLOW
GLUCOSE UR STRIP-MCNC: NEGATIVE MG/DL
HGB UR QL STRIP: NEGATIVE
KETONES UR STRIP-MCNC: NEGATIVE MG/DL
LEUKOCYTE ESTERASE UR QL STRIP: NEGATIVE
MUCOUS THREADS #/AREA URNS LPF: PRESENT /LPF
NITRATE UR QL: NEGATIVE
PH UR STRIP: 5.5 PH (ref 5–7)
RBC #/AREA URNS AUTO: ABNORMAL /HPF
SOURCE: ABNORMAL
SP GR UR STRIP: 1.02 (ref 1–1.03)
UROBILINOGEN UR STRIP-ACNC: 0.2 EU/DL (ref 0.2–1)
WBC #/AREA URNS AUTO: ABNORMAL /HPF

## 2018-06-15 PROCEDURE — 99214 OFFICE O/P EST MOD 30 MIN: CPT | Performed by: PHYSICIAN ASSISTANT

## 2018-06-15 PROCEDURE — 87086 URINE CULTURE/COLONY COUNT: CPT | Performed by: PHYSICIAN ASSISTANT

## 2018-06-15 PROCEDURE — 81001 URINALYSIS AUTO W/SCOPE: CPT | Performed by: PHYSICIAN ASSISTANT

## 2018-06-15 RX ORDER — CYCLOBENZAPRINE HCL 5 MG
2.5-5 TABLET ORAL AT BEDTIME
Qty: 14 TABLET | Refills: 1 | Status: SHIPPED | OUTPATIENT
Start: 2018-06-15 | End: 2018-08-09

## 2018-06-15 NOTE — MR AVS SNAPSHOT
After Visit Summary   6/15/2018    Lindsey Gary    MRN: [id number]           Patient Information     Date Of Birth          1940        Visit Information        Provider Department      6/15/2018 11:30 AM Anna Perez PA-C [facility]        Today's Diagnoses     Dysuria    -  1    Back muscle spasm          Care Instructions    We will contact you with results of your urine tests either today or Monday. I will have you continue to push fluids and will treat back muscle spasm with a muscle relaxant as needed at bedtime.     Back Spasm (No Trauma)    Spasm of the back muscles can occur after a sudden forceful twisting or bending force (such as in a car accident), after a simple awkward movement, or after lifting something heavy with poor body positioning. In any case, muscle spasm adds to the pain. Sleeping in an awkward position or on a poor quality mattress can also cause this. Some people respond to emotional stress by tensing the muscles of their back.  Pain that continues may need further evaluation or other types of treatment such as physical therapy.  You don't always need X-rays for the initial evaluation of back pain, unless you had a physical injury such as from a car accident or fall. If your pain continues and doesn't respond to medical treatment, X-rays and other tests may then be done.   Home care    As soon as possible, start sitting or walking again to avoid problems from prolonged bed rest (muscle weakness, worsening back stiffness and pain, blood clots in the legs).    When in bed, try to find a position of comfort. A firm mattress is best. Try lying flat on your back with pillows under your knees. You can also try lying on your side with your knees bent up toward your chest and a pillow between your knees.    Avoid prolonged sitting, long car rides, or travel. This puts more stress on the lower back than standing or walking.     During the first 24 to 72  hours after an injury or flare-up, apply an ice pack to the painful area for 20 minutes, then remove it for 20 minutes. Do this over a period of 60 to 90 minutes or several times a day. This will reduce swelling and pain. Always wrap ice packs in a thin towel.    You can start with ice, then switch to heat. Heat (hot shower, hot bath, or heating pad) reduces pain, and works well for muscle spasms. Apply heat to the painful area for 20 minutes, then remove it for 20 minutes. Do this over a period of 60 to 90 minutes or several times a day. Do not sleep on a heating pad as it can burn or damage skin.    Alternate ice and heat therapies.    Be aware of safe lifting methods and do not lift anything over 15 pounds until all the pain is gone.  Gentle stretching will help your back heal faster. Do this simple routine 2 to 3 times a day until your back is feeling better.    Lie on your back with your knees bent and both feet on the ground    Slowly raise your left knee to your chest as you flatten your lower back against the floor. Hold for 20 to 30 seconds.    Relax and repeat the exercise with your right knee.    Do 2 to 3 of these exercises for each leg.    Repeat, hugging both knees to your chest at the same time.    Do not bounce, but use a gentle pull.  Medicines  Talk to your doctor before using medicine, especially if you have other medical problems or are taking other medicines.  You may use acetaminophen or ibuprofen to control pain, unless your healthcare provider prescribed another pain medicine. If you have a chronic condition such as diabetes, liver or kidney disease, stomach ulcer, or gastrointestinal bleeding, or are taking blood thinners, talk with your healthcare provider before taking any medicines.  Be careful if you are given prescription pain medicine, narcotics, or medicine for muscle spasm. They can cause drowsiness, affect your coordination, reflexes, or judgment. Do not drive or operate heavy  machinery when taking these medicines. Take pain medicine only as prescribed by your healthcare provider.  Follow-up care  Follow up with your doctor, or as advised. Physical therapy or further tests may be needed.  If X-rays were taken, they may be reviewed by a radiologist. You will be notified of any new findings that may affect your care.  Call 911  Call 911 if any of these occur:    Trouble breathing    Confusion    Drowsiness or trouble awakening    Fainting or loss of consciousness    Rapid or very slow heart rate    Loss of bowel or bladder control  When to seek medical advice  Call your healthcare provider right away if any of these occur:    Pain becomes worse or spreads to your legs    Weakness or numbness in one or both legs    Numbness in the groin or genital area    Fever of 100.4 F (38 C) or higher, or as directed by your healthcare provider    Burning or pain when passing urine  Date Last Reviewed: 6/1/2016 2000-2017 The "[employer]. [address] [place] [address]. All rights reserved. This information is not intended as a substitute for professional medical care. Always follow your healthcare professional's instructions.                Follow-ups after your visit        Follow-up notes from your care team     Return if symptoms worsen or fail to improve.      Who to contact     If you have questions or need follow up information about today's clinic visit or your schedule please contact [facility] directly at [phone].  Normal or non-critical lab and imaging results will be communicated to you by MyChart, letter or phone within 4 business days after the clinic has received the results. If you do not hear from us within 7 days, please contact the clinic through MyChart or phone. If you have a critical or abnormal lab result, we will notify you by phone as soon as possible.  Submit refill requests through [employer] or call your pharmacy and they will forward  the refill request to us. Please allow 3 business days for your refill to be completed.          Additional Information About Your Visit        Care EveryWhere ID     This is your Care EveryWhere ID. This could be used by other organizations to access your [facility] medical records  [id number]        Your Vitals Were     Pulse Respirations Pulse Oximetry BMI (Body Mass Index)          91 16 99% 31.76 kg/m2         Blood Pressure from Last 3 Encounters:   06/15/18 110/70   03/27/18 146/80   03/25/18 149/68    Weight from Last 3 Encounters:   06/15/18 185 lb (83.9 kg)   03/27/18 184 lb (83.5 kg)   03/25/18 172 lb (78 kg)              We Performed the Following     UA with Microscopic reflex to Culture     Urine Culture Aerobic Bacterial          Today's Medication Changes          These changes are accurate as of 6/15/18 11:56 AM.  If you have any questions, ask your nurse or doctor.               Start taking these medicines.        Dose/Directions    cyclobenzaprine 5 MG tablet   Commonly known as:  FLEXERIL   Used for:  Back muscle spasm   Started by:  Anna Perez PA-C        Dose:  2.5-5 mg   Take 0.5-1 tablets (2.5-5 mg) by mouth At Bedtime   Quantity:  14 tablet   Refills:  1         Stop taking these medicines if you haven't already. Please contact your care team if you have questions.     nitroFURantoin (macrocrystal-monohydrate) 100 MG capsule   Commonly known as:  MACROBID   Stopped by:  Anna Perez PA-C           omeprazole 20 MG CR capsule   Commonly known as:  priLOSEC   Stopped by:  Anna Perez PA-C           penicillin V potassium 500 MG tablet   Commonly known as:  VEETID   Stopped by:  Anna Perez PA-C           terconazole 0.4 % cream   Commonly known as:  TERAZOL 7   Stopped by:  Anna Perez PA-C                Where to get your medicines      These medications were sent to [facility] [address] [address] AT [facility] [place] [address]  [address]  [address], [facility] [address]     Phone:  [phone]     cyclobenzaprine 5 MG tablet                Primary Care Provider Office Phone # Fax #    LILLIAM Sam TREVON [phone]       [address] [facility] [address]        Equal Access to Services     ASHA OCAMPO : Hadii aad ku hadasho Soomaali, waaxda luqadaha, qaybta kaalmada adeegyada, waxay idiin hayaan adeeg kharash la'aan . So [facility] [phone].    ATENCIÓN: Si habla español, tiene a toribio disposición servicios gratuitos de asistencia lingüística. Bety al [phone].    We comply with applicable federal civil rights laws and [place] laws. We do not discriminate on the basis of race, color, national origin, age, disability, sex, sexual orientation, or gender identity.            Thank you!     Thank you for choosing [facility]  for your care. Our goal is always to provide you with excellent care. Hearing back from our patients is one way we can continue to improve our services. Please take a few minutes to complete the written survey that you may receive in the mail after your visit with us. Thank you!             Your Updated Medication List - Protect others around you: Learn how to safely use, store and throw away your medicines at www.disposemymeds.org.          This list is accurate as of 6/15/18 11:56 AM.  Always use your most recent med list.                   Brand Name Dispense Instructions for use Diagnosis    allopurinol 300 MG tablet    ZYLOPRIM    90 tablet    Take 1 tablet (300 mg) by mouth daily    Gout       aspirin 81 MG tablet     100    1 tablet daily        betamethasone (augmented) 0.05 % lotion    DIPROLENE     GINA 10 TO 20 DROPS TOPICALLY AA OF SCALP Q NIGHT UTD        blood glucose lancets standard    no brand specified    300 each    Use to test blood sugar 3 times daily or as directed.    Type 2 diabetes mellitus with complication, without long-term current use  of insulin (H)       blood glucose monitoring lancets     300 each    Test 3 times daily as directed    Type 2 diabetes, HbA1c goal < 7% (H)       blood glucose monitoring meter device kit     1 kit    Use to test blood sugar 1 times daily or as directed.    Type 2 diabetes mellitus without complication, without long-term current use of insulin (H)       blood glucose monitoring test strip    ACCU-CHEK SMARTVIEW    300 each    Check 3 times daily as directed    Type 2 diabetes mellitus with complication, without long-term current use of insulin (H)       boric acid 600 mg vaginal suppository - PHARMACY TO MIX COMPOUND     21 suppository    Place 1 suppository (600 mg) vaginally At Bedtime    Vaginal irritation       calcipotriene 0.005 % Oint      Apply 1 Application topically as needed        chlorhexidine 0.12 % solution    PERIDEX     RINSE ONE HALF  OZ 2-3 X D AFTER BREAKFAST BEFORE BEDTIME FOLLOWING BRUSHING AND FLOSSIN        cinnamon 500 MG Caps      2 tablets daily        COMPOUNDED NON-CONTROLLED SUBSTANCE - PHARMACY TO MIX COMPOUNDED MEDICATION    CMPD RX    45 g    Patient to use 1 g intravaginally at bedtime Monday and Thursday nights as directed for menopausal symptoms    Symptomatic menopausal or female climacteric states       cyclobenzaprine 5 MG tablet    FLEXERIL    14 tablet    Take 0.5-1 tablets (2.5-5 mg) by mouth At Bedtime    Back muscle spasm       * estradiol 10 MCG Tabs vaginal tablet    VAGIFEM    8 tablet    Place 1 tablet (10 mcg) vaginally twice a week    Vaginal pain, Vaginal atrophy       * estradiol 0.1 MG/GM cream    ESTRACE VAGINAL    42.5 g    Place 2 g vaginally twice a week    Vaginal pain, Vaginal atrophy       EXCEDRIN ASPIRIN FREE PO           fluconazole 150 MG tablet    DIFLUCAN    30 tablet    Take 1 tablet (150 mg) by mouth every 3 days    Yeast infection of the vagina       furosemide 20 MG tablet    LASIX    90 tablet    Take 1 tablet (20 mg) by mouth daily as needed     Edema, unspecified type       gabapentin 300 MG capsule    NEURONTIN    180 capsule    Take 2 capsules (600 mg) by mouth At Bedtime    Neuropathy       glimepiride 4 MG tablet    AMARYL    180 tablet    Take 1 tablet (4 mg) by mouth 2 times daily    Type 2 diabetes mellitus without complication, without long-term current use of insulin (H)       lisinopril 10 MG tablet    PRINIVIL/ZESTRIL    90 tablet    Take 1 tablet (10 mg) by mouth daily    Essential hypertension, benign       MAGNESIUM OXIDE -MG SUPPLEMENT PO      Take 1 tablet by mouth daily        PROBIOTIC ACIDOPHILUS Tabs      Take 1 tablet by mouth daily        simvastatin 40 MG tablet    ZOCOR    90 tablet    TAKE ONE TABLET BY MOUTH EVERY NIGHT AT BEDTIME    Mixed hyperlipidemia       SitaGLIPtin-MetFORMIN HCl  MG Tb24    JANUMET XR    180 tablet    Take 2 tablets by mouth daily    Type 2 diabetes mellitus without complication, without long-term current use of insulin (H)       TUMS 500 MG chewable tablet   Generic drug:  calcium carbonate     90    1 TABLET 3 TIMES PRN        TYLENOL 500 MG tablet   Generic drug:  acetaminophen      Take 1-2 tablets by mouth every morning.    Mixed hyperlipidemia, Gout, unspecified, Type 2 diabetes, HbA1c goal < 7% (H), Hyperlipidemia LDL goal <100       vitamin D 2000 units tablet      Take 1 tablet by mouth daily        vitamin E 400 units Tabs      Take 400 Units by mouth daily        * Notice:  This list has 2 medication(s) that are the same as other medications prescribed for you. Read the directions carefully, and ask your doctor or other care provider to review them with you.

## 2018-06-15 NOTE — PATIENT INSTRUCTIONS
We will contact you with results of your urine tests either today or Monday. I will have you continue to push fluids and will treat back muscle spasm with a muscle relaxant as needed at bedtime.     Back Spasm (No Trauma)    Spasm of the back muscles can occur after a sudden forceful twisting or bending force (such as in a car accident), after a simple awkward movement, or after lifting something heavy with poor body positioning. In any case, muscle spasm adds to the pain. Sleeping in an awkward position or on a poor quality mattress can also cause this. Some people respond to emotional stress by tensing the muscles of their back.  Pain that continues may need further evaluation or other types of treatment such as physical therapy.  You don't always need X-rays for the initial evaluation of back pain, unless you had a physical injury such as from a car accident or fall. If your pain continues and doesn't respond to medical treatment, X-rays and other tests may then be done.   Home care    As soon as possible, start sitting or walking again to avoid problems from prolonged bed rest (muscle weakness, worsening back stiffness and pain, blood clots in the legs).    When in bed, try to find a position of comfort. A firm mattress is best. Try lying flat on your back with pillows under your knees. You can also try lying on your side with your knees bent up toward your chest and a pillow between your knees.    Avoid prolonged sitting, long car rides, or travel. This puts more stress on the lower back than standing or walking.     During the first 24 to 72 hours after an injury or flare-up, apply an ice pack to the painful area for 20 minutes, then remove it for 20 minutes. Do this over a period of 60 to 90 minutes or several times a day. This will reduce swelling and pain. Always wrap ice packs in a thin towel.    You can start with ice, then switch to heat. Heat (hot shower, hot bath, or heating pad) reduces pain, and  works well for muscle spasms. Apply heat to the painful area for 20 minutes, then remove it for 20 minutes. Do this over a period of 60 to 90 minutes or several times a day. Do not sleep on a heating pad as it can burn or damage skin.    Alternate ice and heat therapies.    Be aware of safe lifting methods and do not lift anything over 15 pounds until all the pain is gone.  Gentle stretching will help your back heal faster. Do this simple routine 2 to 3 times a day until your back is feeling better.    Lie on your back with your knees bent and both feet on the ground    Slowly raise your left knee to your chest as you flatten your lower back against the floor. Hold for 20 to 30 seconds.    Relax and repeat the exercise with your right knee.    Do 2 to 3 of these exercises for each leg.    Repeat, hugging both knees to your chest at the same time.    Do not bounce, but use a gentle pull.  Medicines  Talk to your doctor before using medicine, especially if you have other medical problems or are taking other medicines.  You may use acetaminophen or ibuprofen to control pain, unless your healthcare provider prescribed another pain medicine. If you have a chronic condition such as diabetes, liver or kidney disease, stomach ulcer, or gastrointestinal bleeding, or are taking blood thinners, talk with your healthcare provider before taking any medicines.  Be careful if you are given prescription pain medicine, narcotics, or medicine for muscle spasm. They can cause drowsiness, affect your coordination, reflexes, or judgment. Do not drive or operate heavy machinery when taking these medicines. Take pain medicine only as prescribed by your healthcare provider.  Follow-up care  Follow up with your doctor, or as advised. Physical therapy or further tests may be needed.  If X-rays were taken, they may be reviewed by a radiologist. You will be notified of any new findings that may affect your care.  Call 911  Call 911 if any of  these occur:    Trouble breathing    Confusion    Drowsiness or trouble awakening    Fainting or loss of consciousness    Rapid or very slow heart rate    Loss of bowel or bladder control  When to seek medical advice  Call your healthcare provider right away if any of these occur:    Pain becomes worse or spreads to your legs    Weakness or numbness in one or both legs    Numbness in the groin or genital area    Fever of 100.4 F (38 C) or higher, or as directed by your healthcare provider    Burning or pain when passing urine  Date Last Reviewed: 6/1/2016 2000-2017 [employer]. [address] [place] [id number]. All rights reserved. This information is not intended as a substitute for professional medical care. Always follow your healthcare professional's instructions.

## 2018-06-15 NOTE — PROGRESS NOTES
SUBJECTIVE:                                                    Lindsey Gary is a 77 year old female who presents to clinic today for the following health issues:          dysuria      Duration: x 2 weeks     Description (location/character/radiation): slight burning when urinating ,    Intensity:  mild    Accompanying signs and symptoms: Right side back pain     History (similar episodes/previous evaluation): Yes     Precipitating or alleviating factors: None    Therapies tried and outcome: drinking water      Patient has been having UTI symptoms for the last few weeks. She has not had fevers or noted blood in urine but has had burning with urination and some changes in color and odor. She would like a urine test and culture to evaluate.   In addition she has had some tension and pain in the mid left back, she thinks it is muscular because it is worse when turning in bed and with other certain movements. She has had resolution in pain with use of ibuprofen. She has not had skin rashes or changes. Did mow the lawn and had to pull the cord to start the mower prior to this symptom beginning. She denies any history of similar back symptoms in the past.   -------------------------------------    Problem list and histories reviewed & adjusted, as indicated.  Additional history: as documented    BP Readings from Last 3 Encounters:   06/15/18 110/70   03/27/18 146/80   03/25/18 149/68    Wt Readings from Last 3 Encounters:   06/15/18 185 lb (83.9 kg)   03/27/18 184 lb (83.5 kg)   03/25/18 172 lb (78 kg)         ROS:  Constitutional, HEENT, cardiovascular, pulmonary, gi and gu systems are negative, except as otherwise noted.    OBJECTIVE:     /70  Pulse 91  Resp 16  Wt 185 lb (83.9 kg)  SpO2 99%  BMI 31.76 kg/m2  Body mass index is 31.76 kg/(m^2).  GENERAL: healthy, alert and no distress  RESP: lungs clear to auscultation - no rales, rhonchi or wheezes  CV: regular rates and rhythm  ABDOMEN: bowel sounds  normal  MS: muscle tension and tenderness to the right mid back, no bony tenderness.   SKIN: no suspicious lesions or rashes    Diagnostic Test Results:  Results for orders placed or performed in visit on 06/15/18 (from the past 24 hour(s))   UA with Microscopic reflex to Culture   Result Value Ref Range    Color Urine Yellow     Appearance Urine Clear     Glucose Urine Negative NEG^Negative mg/dL    Bilirubin Urine Negative NEG^Negative    Ketones Urine Negative NEG^Negative mg/dL    Specific Gravity Urine 1.020 1.003 - 1.035    pH Urine 5.5 5.0 - 7.0 pH    Protein Albumin Urine 30 (A) NEG^Negative mg/dL    Urobilinogen Urine 0.2 0.2 - 1.0 EU/dL    Nitrite Urine Negative NEG^Negative    Blood Urine Negative NEG^Negative    Leukocyte Esterase Urine Negative NEG^Negative    Source Midstream Urine     WBC Urine 0 - 5 OTO5^0 - 5 /HPF    RBC Urine O - 2 OTO2^O - 2 /HPF    Mucous Urine Present (A) NEG^Negative /LPF       ASSESSMENT/PLAN:       ICD-10-CM    1. Dysuria R30.0 UA with Microscopic reflex to Culture     Urine Culture Aerobic Bacterial   2. Back muscle spasm M62.830 cyclobenzaprine (FLEXERIL) 5 MG tablet       I will get a urine culture to rule out UTI and follow up with result.   I will treat for back muscle spasm with heat packs and flexeril at bedtime. Follow up in clinic if new or worsening symptoms.     See Patient Instructions    Anna Perez PA-C  [facility]

## 2018-06-16 LAB
BACTERIA SPEC CULT: NORMAL
BACTERIA SPEC CULT: NORMAL
SPECIMEN SOURCE: NORMAL

## 2018-06-25 DIAGNOSIS — E11.9 TYPE 2 DIABETES MELLITUS WITHOUT COMPLICATION, WITHOUT LONG-TERM CURRENT USE OF INSULIN (H): ICD-10-CM

## 2018-06-25 DIAGNOSIS — M10.9 GOUT, UNSPECIFIED CAUSE, UNSPECIFIED CHRONICITY, UNSPECIFIED SITE: ICD-10-CM

## 2018-06-25 NOTE — TELEPHONE ENCOUNTER
"Requested Prescriptions   Pending Prescriptions Disp Refills     glimepiride (AMARYL) 4 MG tablet  Last Written Prescription Date:  3/9/18  Last Fill Quantity: 180,  # refills: 0   Last office visit: 6/15/2018 with prescribing provider:  Maddy   Future Office Visit:     180 tablet 0     Sig: Take 1 tablet (4 mg) by mouth 2 times daily    Sulfonylurea Agents Failed    6/25/2018  2:58 PM       Failed - Patient has documented A1c within the specified period of time.    If HgbA1C is 8 or greater, it needs to be on file within the past 3 months.  If less than 8, must be on file within the past 6 months.     Recent Labs   Lab Test  01/02/18   1024   A1C  9.0*            Passed - Blood pressure less than 140/90 in past 6 months    BP Readings from Last 3 Encounters:   06/15/18 110/70   03/27/18 146/80   03/25/18 149/68                Passed - Patient has documented LDL within the past 12 mos.    Recent Labs   Lab Test  01/02/18   1024   LDL  113*            Passed - Patient has had a Microalbumin in the past 12 mos.    Recent Labs   Lab Test  12/19/17   0930   MICROL  104   UMALCR  52.53*            Passed - Patient is age 18 or older       Passed - No active pregnancy on record       Passed - Patient has a recent creatinine (normal) within the past 12 mos.    Recent Labs   Lab Test  03/25/18   1905   CR  0.88            Passed - Patient has not had a positive pregnancy test within the past 12 mos.       Passed - Recent (6 mo) or future (30 days) visit within the authorizing provider's specialty    Patient had office visit in the last 6 months or has a visit in the next 30 days with authorizing provider or within the authorizing provider's specialty.  See \"Patient Info\" tab in inbasket, or \"Choose Columns\" in Meds & Orders section of the refill encounter.            allopurinol (ZYLOPRIM) 300 MG tablet  Last Written Prescription Date:  3/9/18  Last Fill Quantity: 90,  # refills: 0   Last office visit: 6/15/2018 with " "prescribing provider:  Maddy   Future Office Visit:     90 tablet 0     Sig: Take 1 tablet (300 mg) by mouth daily    Gout Agents Protocol Failed    6/25/2018  2:58 PM       Failed - Has Uric Acid on file in past 12 months and value is less than 6    Recent Labs   Lab Test  08/10/15   1301   URIC  5.0     If level is 6mg/dL or greater, ok to refill one time and refer to provider.          Passed - CBC on file in past 12 months    Recent Labs   Lab Test  03/25/18   1905   WBC  7.5   RBC  4.42   HGB  12.2   HCT  36.5   PLT  207       For [facility] ONLY: [id number] = WBC, [id number] = RBC         Passed - ALT on file in past 12 months    Recent Labs   Lab Test  03/25/18   1905   ALT  24            Passed - Recent (12 mo) or future (30 days) visit within the authorizing provider's specialty    Patient had office visit in the last 12 months or has a visit in the next 30 days with authorizing provider or within the authorizing provider's specialty.  See \"Patient Info\" tab in inbasket, or \"Choose Columns\" in Meds & Orders section of the refill encounter.           Passed - Patient is age 18 or older       Passed - No active pregnancy on record       Passed - Normal serum creatinine on file in the past 12 months    Recent Labs   Lab Test  03/25/18   1905   CR  0.88            Passed - No positive pregnancy test in past year          "

## 2018-06-27 RX ORDER — GLIMEPIRIDE 4 MG/1
4 TABLET ORAL 2 TIMES DAILY
Qty: 180 TABLET | Refills: 0 | Status: SHIPPED | OUTPATIENT
Start: 2018-06-27 | End: 2018-08-09 | Stop reason: ALTCHOICE

## 2018-06-27 RX ORDER — ALLOPURINOL 300 MG/1
1 TABLET ORAL DAILY
Qty: 90 TABLET | Refills: 0 | Status: SHIPPED | OUTPATIENT
Start: 2018-06-27 | End: 2018-10-01

## 2018-06-27 NOTE — TELEPHONE ENCOUNTER
She is due for blood work A1C and CMP and uric acid   Fill for 3 month and should have lab done prior to next refill

## 2018-07-05 ENCOUNTER — TELEPHONE (OUTPATIENT)
Dept: INTERNAL MEDICINE | Facility: CLINIC | Age: 78
End: 2018-07-05

## 2018-07-05 NOTE — TELEPHONE ENCOUNTER
Women and heart attacks act differently   If anything changes she cshould go to ER to be assessed more emergently

## 2018-08-01 DIAGNOSIS — E11.9 TYPE 2 DIABETES MELLITUS WITHOUT COMPLICATION, WITHOUT LONG-TERM CURRENT USE OF INSULIN (H): ICD-10-CM

## 2018-08-01 NOTE — TELEPHONE ENCOUNTER
"Requested Prescriptions   Pending Prescriptions Disp Refills     JANUMET XR  MG TB24 [Pharmacy Med Name: JANUMET XR TAB ] 180 tablet 1    Last Written Prescription Date:  02/09/2018  Last Fill Quantity: 180,  # refills: 1   Last office visit: 6/15/2018 with prescribing provider:     Future Office Visit:   Sig: TAKE 2 TABLETS DAILY    Combination Oral Antihyperglycemic Agents Failed    8/1/2018 12:10 AM       Failed - Patient has documented A1c within the specified period of time.    If HgbA1C is 8 or greater, it needs to be on file within the past 3 months.  If less than 8, must be on file within the past 6 months.     Recent Labs   Lab Test  01/02/18   1024   A1C  9.0*            Passed - Blood pressure under 140/90 in past 12 months    BP Readings from Last 3 Encounters:   06/15/18 110/70   03/27/18 146/80   03/25/18 149/68                Passed - Patient has a documented LDL level within past 12 mos.    Recent Labs   Lab Test  01/02/18   1024   LDL  113*            Passed - Patient has a documented Microalbumin level within past 12 mos.    Recent Labs   Lab Test  12/19/17   0930   MICROL  104   UMALCR  52.53*            Passed - Patient's CR is NOT>1.4 OR Patient's EGFR is NOT<45 within past 12 mos.    Recent Labs   Lab Test  03/25/18   1905   GFRESTIMATED  62   GFRESTBLACK  75       Recent Labs   Lab Test  03/25/18   1905   CR  0.88            Passed - Patient does not have a diagnosis of CHF.       Passed - Patient is 18 years old or older.       Passed - Patient is not pregnant       Passed - Patient has not had a positive pregnancy test within the past 12 mos.       Passed - Recent (6 mo) or future (30 days) visit within the authorizing provider's specialty    Patient had office visit in the last 6 months or has a visit in the next 30 days with authorizing provider or within the authorizing provider's specialty.  See \"Patient Info\" tab in inbasket, or \"Choose Columns\" in Meds & Orders section of " the refill encounter.

## 2018-08-02 RX ORDER — SITAGLIPTIN AND METFORMIN HYDROCHLORIDE 1000; 50 MG/1; MG/1
TABLET, FILM COATED, EXTENDED RELEASE ORAL
Qty: 180 TABLET | Refills: 1 | Status: SHIPPED | OUTPATIENT
Start: 2018-08-02 | End: 2018-08-09 | Stop reason: ALTCHOICE

## 2018-08-02 NOTE — TELEPHONE ENCOUNTER
Patient calls stating she requested prescription for Janumet from her mail order pharmacy last week and was instructed to contact us regarding status. Patient reports she is almost out and it takes her mail order about a week to get it sent to her - would hate to have send it to nearby pharmacy due to the cost of it. Informed patient was just received refill request yesterday and I would send request to provider as high priority for review. Patient requests a call back once completed.    Routing refill request to provider for review/approval because:  Labs out of range:  A1C, LDL and Microalb

## 2018-08-09 ENCOUNTER — OFFICE VISIT (OUTPATIENT)
Dept: PHARMACY | Facility: CLINIC | Age: 78
End: 2018-08-09
Payer: COMMERCIAL

## 2018-08-09 VITALS — DIASTOLIC BLOOD PRESSURE: 75 MMHG | HEART RATE: 72 BPM | SYSTOLIC BLOOD PRESSURE: 138 MMHG

## 2018-08-09 DIAGNOSIS — M10.9 GOUT, UNSPECIFIED CAUSE, UNSPECIFIED CHRONICITY, UNSPECIFIED SITE: ICD-10-CM

## 2018-08-09 DIAGNOSIS — E78.5 HYPERLIPIDEMIA LDL GOAL <100: ICD-10-CM

## 2018-08-09 DIAGNOSIS — E63.9 NUTRITIONAL DEFICIENCY: ICD-10-CM

## 2018-08-09 DIAGNOSIS — I10 ESSENTIAL HYPERTENSION WITH GOAL BLOOD PRESSURE LESS THAN 140/90: ICD-10-CM

## 2018-08-09 DIAGNOSIS — Z96.659 STATUS POST TOTAL KNEE REPLACEMENT, UNSPECIFIED LATERALITY: ICD-10-CM

## 2018-08-09 DIAGNOSIS — E11.9 TYPE 2 DIABETES MELLITUS WITHOUT COMPLICATION, WITHOUT LONG-TERM CURRENT USE OF INSULIN (H): Primary | ICD-10-CM

## 2018-08-09 DIAGNOSIS — L40.9 PSORIASIS: ICD-10-CM

## 2018-08-09 DIAGNOSIS — E11.9 TYPE 2 DIABETES MELLITUS WITHOUT COMPLICATION, WITHOUT LONG-TERM CURRENT USE OF INSULIN (H): ICD-10-CM

## 2018-08-09 DIAGNOSIS — G47.00 INSOMNIA, UNSPECIFIED TYPE: ICD-10-CM

## 2018-08-09 DIAGNOSIS — R60.9 EDEMA, UNSPECIFIED TYPE: ICD-10-CM

## 2018-08-09 DIAGNOSIS — N95.2 ATROPHIC VAGINITIS: ICD-10-CM

## 2018-08-09 LAB — HBA1C MFR BLD: 8.3 % (ref 0–5.6)

## 2018-08-09 PROCEDURE — 99605 MTMS BY PHARM NP 15 MIN: CPT | Performed by: PHARMACIST

## 2018-08-09 PROCEDURE — 99607 MTMS BY PHARM ADDL 15 MIN: CPT | Performed by: PHARMACIST

## 2018-08-09 PROCEDURE — 36415 COLL VENOUS BLD VENIPUNCTURE: CPT | Performed by: NURSE PRACTITIONER

## 2018-08-09 PROCEDURE — 83036 HEMOGLOBIN GLYCOSYLATED A1C: CPT | Performed by: NURSE PRACTITIONER

## 2018-08-09 PROCEDURE — 80053 COMPREHEN METABOLIC PANEL: CPT | Performed by: NURSE PRACTITIONER

## 2018-08-09 PROCEDURE — 84550 ASSAY OF BLOOD/URIC ACID: CPT | Performed by: NURSE PRACTITIONER

## 2018-08-09 RX ORDER — GLIPIZIDE AND METFORMIN HCL 5; 500 MG/1; MG/1
2 TABLET, FILM COATED ORAL
Qty: 360 TABLET | Refills: 1 | Status: SHIPPED | OUTPATIENT
Start: 2018-08-09 | End: 2018-10-29 | Stop reason: ALTCHOICE

## 2018-08-09 RX ORDER — PIOGLITAZONEHYDROCHLORIDE 15 MG/1
15 TABLET ORAL DAILY
Qty: 90 TABLET | Refills: 1 | Status: SHIPPED | OUTPATIENT
Start: 2018-08-09 | End: 2018-09-13

## 2018-08-09 RX ORDER — MULTIVIT-MIN/IRON/FOLIC ACID/K 18-600-40
500 CAPSULE ORAL DAILY
COMMUNITY
End: 2023-11-27

## 2018-08-09 NOTE — PATIENT INSTRUCTIONS
Recommendations from today's MTM visit:                                                    MTM (medication therapy management) is a service provided by a clinical pharmacist designed to help you get the most of out of your medicines.   Today we reviewed what your medicines are for, how to know if they are working, that your medicines are safe and how to make your medicine regimen as easy as possible.     1.  Stop glimepiride and Janumet     2.  Start Pioglitazone 15 mg daily and glipizide-metformin 2 tablets twice daily    3.  Labs today    4.  Recommend Tylenol 500-1000 mg up to three times daily for joint pain.  Would not recommend taking Advil daily due to risk of kidney and stomach damage.    5.   For sleep try the strategies listed below.  You can also try Melatonin 3 mg about 1 hour before bed.    Sleep Hygiene Advice    1) Routines   -Good to bed and get up the same time every day   -Avoid daytime naps   -Do something relaxing before bed (i.e. Listen to music, warm bath)  2)Sleep Environment   -Make your bedroom conducive to sleep (i.e. Cool, dark, quiet and comfortable)   -Reserve your bedroom for sleep and sex (No TV, video games, laptop)  3)Diet and Exercise   -Avoid stimulants such as caffeine, sugar, alcohol or smoking before bed   -Exercise every day, but not within 4-6 hours of bedtime   -you can try tea before bed as well  4)If you are unable to fall asleep within 15-20 minutes, get up and leave the bed, do something quietly.    5) Check the application called CALM to help with relaxation and meditation      Next MTM visit: 6 weeks    To schedule another MTM appointment, please call the clinic directly or you may call the MTM scheduling line at [phone] or toll-free at [phone].     My Clinical Pharmacist's contact information:                                                      It was a pleasure seeing you today!  Please feel free to contact me with any questions or concerns you have.       Beti Jacobo , Pharm D  [phone] (phone)  [phone] (pager)  Medication Therapy Management Pharmacist     You may receive a survey about the MTM services you received.  I would appreciate your feedback to help me serve you better in the future. Please fill it out and return it when you can. Your comments will be anonymous.

## 2018-08-09 NOTE — MR AVS SNAPSHOT
After Visit Summary   8/9/2018    Lindsey Gary    MRN: [id number]           Patient Information     Date Of Birth          1940        Visit Information        Provider Department      8/9/2018 11:00 AM Beti Jacobo, [facility] MTM        Today's Diagnoses     Type 2 diabetes mellitus without complication, without long-term current use of insulin (H)    -  1      Care Instructions    Recommendations from today's MTM visit:                                                    MTM (medication therapy management) is a service provided by a clinical pharmacist designed to help you get the most of out of your medicines.   Today we reviewed what your medicines are for, how to know if they are working, that your medicines are safe and how to make your medicine regimen as easy as possible.     1.  Stop glimepiride and Janumet     2.  Start Pioglitazone 15 mg daily and glipizide-metformin 2 tablets twice daily    3.  Labs today    4.  Recommend Tylenol 500-1000 mg up to three times daily for joint pain.  Would not recommend taking Advil daily due to risk of kidney and stomach damage.    5.   For sleep try the strategies listed below.  You can also try Melatonin 3 mg about 1 hour before bed.    Sleep Hygiene Advice    1) Routines   -Good to bed and get up the same time every day   -Avoid daytime naps   -Do something relaxing before bed (i.e. Listen to music, warm bath)  2)Sleep Environment   -Make your bedroom conducive to sleep (i.e. Cool, dark, quiet and comfortable)   -Reserve your bedroom for sleep and sex (No TV, video games, laptop)  3)Diet and Exercise   -Avoid stimulants such as caffeine, sugar, alcohol or smoking before bed   -Exercise every day, but not within 4-6 hours of bedtime   -you can try tea before bed as well  4)If you are unable to fall asleep within 15-20 minutes, get up and leave the bed, do something quietly.    5) Check the application called CALM to help with relaxation  and meditation      Next MT visit: 3 months    To schedule another MT appointment, please call the clinic directly or you may call the MT scheduling line at [phone] or toll-free at [phone].     My Clinical Pharmacist's contact information:                                                      It was a pleasure seeing you today!  Please feel free to contact me with any questions or concerns you have.      Beti Jacobo , Pharm D  [phone] (phone)  [phone] (pager)  Medication Therapy Management Pharmacist     You may receive a survey about the [facility] services you received.  I would appreciate your feedback to help me serve you better in the future. Please fill it out and return it when you can. Your comments will be anonymous.                      Follow-ups after your visit        Your next 10 appointments already scheduled     Aug 09, 2018 11:45 AM CDT   LAB with [facility]   [facility]    [address]  [facility] [id number] [phone]           Please do not eat 10-12 hours before your appointment if you are coming in fasting for labs on lipids, cholesterol, or glucose (sugar). This does not apply to pregnant women. Water, hot tea and black coffee (with nothing added) are okay. Do not drink other fluids, diet soda or chew gum.              Who to contact     If you have questions or need follow up information about today's clinic visit or your schedule please contact [facility] directly at [phone].  Normal or non-critical lab and imaging results will be communicated to you by MyChart, letter or phone within 4 business days after the clinic has received the results. If you do not hear from us within 7 days, please contact the clinic through MyChart or phone. If you have a critical or abnormal lab result, we will notify you by phone as soon as possible.  Submit refill requests through [employer] or call your pharmacy and they  will forward the refill request to us. Please allow 3 business days for your refill to be completed.          Additional Information About Your Visit        Care EveryWhere ID     This is your Care EveryWhere ID. This could be used by other organizations to access your [facility] medical records  [id number]        Your Vitals Were     Pulse                   72            Blood Pressure from Last 3 Encounters:   08/09/18 138/75   06/15/18 110/70   03/27/18 146/80    Weight from Last 3 Encounters:   06/15/18 185 lb (83.9 kg)   03/27/18 184 lb (83.5 kg)   03/25/18 172 lb (78 kg)              Today, you had the following     No orders found for display         Today's Medication Changes          These changes are accurate as of 8/9/18 11:33 AM.  If you have any questions, ask your nurse or doctor.               Start taking these medicines.        Dose/Directions    glipiZIDE-metFORMIN 5-500 MG per tablet   Commonly known as:  METAGLIP   Used for:  Type 2 diabetes mellitus without complication, without long-term current use of insulin (H)   Started by:  Beti Jacobo RPH        Dose:  2 tablet   Take 2 tablets by mouth 2 times daily (before meals)   Quantity:  360 tablet   Refills:  1       pioglitazone 15 MG tablet   Commonly known as:  ACTOS   Used for:  Type 2 diabetes mellitus without complication, without long-term current use of insulin (H)   Started by:  Beti Jacobo RPH        Dose:  15 mg   Take 1 tablet (15 mg) by mouth daily   Quantity:  90 tablet   Refills:  1         These medicines have changed or have updated prescriptions.        Dose/Directions    estradiol 10 MCG Tabs vaginal tablet   Commonly known as:  VAGIFEM   This may have changed:  Another medication with the same name was removed. Continue taking this medication, and follow the directions you see here.   Used for:  Vaginal pain, Vaginal atrophy   Changed by:  Beti Jacobo RPH        Dose:  10 mcg   Place 1 tablet (10 mcg)  vaginally twice a week   Quantity:  8 tablet   Refills:  11         Stop taking these medicines if you haven't already. Please contact your care team if you have questions.     glimepiride 4 MG tablet   Commonly known as:  AMARYL   Stopped by:  Beti Jacobo RPH           JANUMET XR  MG Tb24   Generic drug:  SitaGLIPtin-MetFORMIN HCl   Stopped by:  Beti Jacobo RPH           MAGNESIUM OXIDE -MG SUPPLEMENT PO   Stopped by:  Beti Jacobo RPH                Where to get your medicines      These medications were sent to [facility] - [place], [place] - [address] AT [facility]  [address] [facility] [address]     Phone:  [phone]     glipiZIDE-metFORMIN 5-500 MG per tablet    pioglitazone 15 MG tablet                Primary Care Provider Office Phone # Fax #    Dinorah Rosalind Wong, APRN [facility] [phone]       [address]  [facility] [address]        Equal Access to Services     [facility] : Hadii aad ku hadasho Soomaali, waaxda luqadaha, qaybta kaalmada adeegyada, waxay idiin haypaola hardwick . So [facility] [phone].    ATENCIÓN: Si habla español, tiene a toribio disposición servicios gratuitos de asistencia lingüística. [facility] [phone].    We comply with applicable federal civil rights laws and [place] laws. We do not discriminate on the basis of race, color, national origin, age, disability, sex, sexual orientation, or gender identity.            Thank you!     Thank you for choosing [facility]  for your care. Our goal is always to provide you with excellent care. Hearing back from our patients is one way we can continue to improve our services. Please take a few minutes to complete the written survey that you may receive in the mail after your visit with us. Thank you!             Your Updated Medication List - Protect others around you: Learn how to safely use, store and throw away your  medicines at www.disposemymeds.org.          This list is accurate as of 8/9/18 11:33 AM.  Always use your most recent med list.                   Brand Name Dispense Instructions for use Diagnosis    allopurinol 300 MG tablet    ZYLOPRIM    90 tablet    Take 1 tablet (300 mg) by mouth daily    Gout, unspecified cause, unspecified chronicity, unspecified site       aspirin 81 MG tablet     100    1 tablet daily        betamethasone (augmented) 0.05 % lotion    DIPROLENE     GINA 10 TO 20 DROPS TOPICALLY AA OF SCALP Q NIGHT UTD        blood glucose monitoring lancets     300 each    Test 3 times daily as directed    Type 2 diabetes, HbA1c goal < 7% (H)       blood glucose monitoring meter device kit     1 kit    Use to test blood sugar 1 times daily or as directed.    Type 2 diabetes mellitus without complication, without long-term current use of insulin (H)       blood glucose monitoring test strip    ACCU-CHEK SMARTVIEW    300 each    Check 3 times daily as directed    Type 2 diabetes mellitus with complication, without long-term current use of insulin (H)       boric acid 600 mg vaginal suppository - PHARMACY TO MIX COMPOUND     21 suppository    Place 1 suppository (600 mg) vaginally At Bedtime    Vaginal irritation       calcipotriene 0.005 % Oint      Apply 1 Application topically as needed        calcium-magnesium 500-250 MG Tabs per tablet    CALMAG     Take 1 tablet by mouth daily        chlorhexidine 0.12 % solution    PERIDEX     RINSE ONE HALF  OZ 2-3 X D AFTER BREAKFAST BEFORE BEDTIME FOLLOWING BRUSHING AND FLOSSIN        cinnamon 500 MG Caps      2 tablets daily        estradiol 10 MCG Tabs vaginal tablet    VAGIFEM    8 tablet    Place 1 tablet (10 mcg) vaginally twice a week    Vaginal pain, Vaginal atrophy       fluconazole 150 MG tablet    DIFLUCAN    30 tablet    Take 1 tablet (150 mg) by mouth every 3 days    Yeast infection of the vagina       furosemide 20 MG tablet    LASIX    90 tablet    Take  1 tablet (20 mg) by mouth daily as needed    Edema, unspecified type       gabapentin 300 MG capsule    NEURONTIN    180 capsule    Take 2 capsules (600 mg) by mouth At Bedtime    Neuropathy       glipiZIDE-metFORMIN 5-500 MG per tablet    METAGLIP    360 tablet    Take 2 tablets by mouth 2 times daily (before meals)    Type 2 diabetes mellitus without complication, without long-term current use of insulin (H)       lisinopril 10 MG tablet    PRINIVIL/ZESTRIL    90 tablet    Take 1 tablet (10 mg) by mouth daily    Essential hypertension, benign       pioglitazone 15 MG tablet    ACTOS    90 tablet    Take 1 tablet (15 mg) by mouth daily    Type 2 diabetes mellitus without complication, without long-term current use of insulin (H)       POTASSIUM CHLORIDE PO      Take 1 tablet by mouth daily        PROBIOTIC ACIDOPHILUS Tabs      Take 1 tablet by mouth daily        simvastatin 40 MG tablet    ZOCOR    90 tablet    TAKE ONE TABLET BY MOUTH EVERY NIGHT AT BEDTIME    Mixed hyperlipidemia       TUMS 500 MG chewable tablet   Generic drug:  calcium carbonate     90    1 TABLET 3 TIMES PRN        TYLENOL 500 MG tablet   Generic drug:  acetaminophen      Take 1-2 tablets by mouth every morning.    Mixed hyperlipidemia, Gout, unspecified, Type 2 diabetes, HbA1c goal < 7% (H), Hyperlipidemia LDL goal <100       Vitamin C 500 MG Caps      Take 500 mg by mouth daily        vitamin D 2000 units tablet      Take 1 tablet by mouth daily        vitamin E 400 units Tabs      Take 400 Units by mouth daily

## 2018-08-09 NOTE — PROGRESS NOTES
SUBJECTIVE/OBJECTIVE:                                                    Lindsey Gary is a 76 year old female coming in for a follow-up visit for Medication Therapy Management.  She was referred to me from Dinorah Wong. First visit in 2018    Chief Complaint: Follow up from our visit on 17 - high blood sugars and cost of Janumet    Tobacco: No tobacco use   Alcohol: not currently using    Medication Adherence: no issues reported by patient    UTI:  Using boric acid suppository every 3-4 nights and estradiol tablets twice weekly.  Last UTI was in March; was asymptomatic.    She feels like her current regimen has been effective.      Supplements:  Taking Probiotic daily to help with vaginal itching and discomfort.  Taking magnesium daily, vitamin D daily, vitamin C, potassium (just started, unsure of dose), Cinnamon and vitamin E.  Calcium in diet with some cheese and milk.    Last Dexa from 2018, Tscore -0.8    Diabetes:  Pt currently taking Janumet 2 tablets daily (coverage gap now so unable to afford), and glimepiride 4 mg BID.   She has history of UTI so would not be interested in SGLT2s.    SMBG: one time daily.   Ranges (patient reported): -210s; 202 this morning   Her blood sugar did improve to 150s after starting Janumet, then when her mother  in March her readings increased to 200s and have not come back down since then  Symptoms of low blood sugar? dizzy. Frequency of hypoglycemia? rare  Recent symptoms of high blood sugar? tired  Up to date on eye exam - just checked  Up to date on foot exam  Microalbumin is not < 30 mg/g. Pt is taking an ACEi/ARB.  Aspirin: Taking 81 mg daily - no side effects  Diet/Exercise:   [place] the [facility] in the morning 3 days per week in the winter; 7 days per week during the summer.      Lab Results   Component Value Date    A1C 9.0 2018    A1C 8.2 2017    A1C 7.0 2016    A1C 8.0 2016    A1C 7.4 12/15/2015       Insomnia: Current  medications include: none. Pt reports trouble staying asleep and trouble falling asleep.   Has not tried anything before.  Going to the bathroom a lot as well.      Neuropathy: Pt currently taking gabapentin 600mg daily. Has tried stopping this but pain got a lot worse. Experiencing numbness and tingling. Minimal edema possibly from the gabapentin.      Hyperlipidemia: Current therapy includes simvastatin 40 mg.  Pt reports no side effects.      Hypertension: Current meds includes lisinopril 10 mg daily.  Pt does not self-monitor BP.  Pt reports no current medication side effects.    Swelling: Has prescription for furosemide, only uses as needed for swelling in her hands and feet.  Maybe needing once monthly.    Gout: Current therapy includes allopurinol 300mg daily. Thinks she may have had a flare but it's control. Pt reports no side effects.   Uric Acid   Date Value Ref Range Status   08/10/2015 5.0 2.6 - 6.0 mg/dL Final     Arthritis/Knee Surgery: Taking Advil daily; recently switched from APAP when she hurt her back.  Has ongoing knee pain which was       Psoriasis: Diprolene on her scalp every 3rd night and calcipotriene ointment on her elbows and skin as needed.  Effective for her.   Followed by Derm.     Current labs include:  BP Readings from Last 3 Encounters:   06/15/18 110/70   03/27/18 146/80   03/25/18 149/68           GFR Estimate   Date Value Ref Range Status   03/25/2018 62 >60 mL/min/1.7m2 Final     Comment:     Non  GFR Calc   01/02/2018 88 >60 mL/min/1.7m2 Final     Comment:     Non  GFR Calc   07/27/2017 66 >60 mL/min/1.7m2 Final     Comment:     Non  GFR Calc     GFR Estimate If Black   Date Value Ref Range Status   03/25/2018 75 >60 mL/min/1.7m2 Final     Comment:      GFR Calc   01/02/2018 >90 >60 mL/min/1.7m2 Final     Comment:      GFR Calc   07/27/2017 80 >60 mL/min/1.7m2 Final     Comment:       GFR Calc     TSH   Date Value Ref Range Status   01/02/2018 1.32 0.40 - 4.00 mU/L Final   ]  There were no vitals taken for this visit.    Most Recent Immunizations   Administered Date(s) Administered     Influenza (H1N1) 12/01/2009     Influenza (High Dose) 3 valent vaccine 10/09/2017     Influenza (IIV3) PF 09/17/2009     Pneumo Conj 13-V (2010&after) 09/26/2016     Pneumococcal 23 valent 05/16/2013     TD (ADULT, 7+) 10/08/2010     TDAP Vaccine (Adacel) 03/31/2014     ASSESSMENT:                                                    Current medications were reviewed with her today.      Medication Adherence: no issues identified    UTI:  Stable    Supplements:  Stable    Diabetes:  Needs improvement.  Will stop Janumet due to cost.  Will also stop the glimepiride and change to glipizide-metformin.  Start piolgitazone once daily.  Discussed insulin today; she has been very hesitant to start in the past but today seemed more willing to consider.    Insomnia: Needs Improvement. Pt may benefit from Melatonin if needed, relaxation techniques and further education on sleep hygiene.    Neuropathy: stable    Hyperlipidemia: stable    Hypertension: stable    Swelling: stable    Gout: stable.  Will recheck uric acid today    Arthritis/Knee Surgery: recommend Advil as needed for daily.  APAP daily for arthritis.    Psoriasis: stable    PLAN:                                                      1.  Stop glimepiride and Janumet.    2.  Start Pioglitazone 15 mg daily and glipizide-metformin 2 tablets twice daily  3.  Recommend Tylenol 500-1000 mg up to three times daily for joint pain.  Would not recommend taking Advil daily due to risk of kidney and stomach.  4.  Sleep hygiene and Melatonin if needed  5.  Labs today    I spent 45 minutes with this patient today.  All changes were made via collaborative practice agreement with Dinorah Wong. A copy of the visit note was provided to the patient's primary care provider.      Will follow up in 6 weeks    The patient was given a summary of these recommendations as an after visit summary.    Radha Munson D  [phone] (phone)  [phone] (pager)  Medication Therapy Management Pharmacist

## 2018-08-10 LAB
ALBUMIN SERPL-MCNC: 4.5 G/DL (ref 3.4–5)
ALP SERPL-CCNC: 77 U/L (ref 40–150)
ALT SERPL W P-5'-P-CCNC: 25 U/L (ref 0–50)
ANION GAP SERPL CALCULATED.3IONS-SCNC: 7 MMOL/L (ref 3–14)
AST SERPL W P-5'-P-CCNC: 20 U/L (ref 0–45)
BILIRUB SERPL-MCNC: 0.8 MG/DL (ref 0.2–1.3)
BUN SERPL-MCNC: 28 MG/DL (ref 7–30)
CALCIUM SERPL-MCNC: 10.1 MG/DL (ref 8.5–10.1)
CHLORIDE SERPL-SCNC: 100 MMOL/L (ref 94–109)
CO2 SERPL-SCNC: 30 MMOL/L (ref 20–32)
CREAT SERPL-MCNC: 1 MG/DL (ref 0.52–1.04)
GFR SERPL CREATININE-BSD FRML MDRD: 54 ML/MIN/1.7M2
GLUCOSE SERPL-MCNC: 177 MG/DL (ref 70–99)
POTASSIUM SERPL-SCNC: 4.8 MMOL/L (ref 3.4–5.3)
PROT SERPL-MCNC: 8 G/DL (ref 6.8–8.8)
SODIUM SERPL-SCNC: 137 MMOL/L (ref 133–144)
URATE SERPL-MCNC: 4 MG/DL (ref 2.6–6)

## 2018-08-17 ENCOUNTER — TRANSFERRED RECORDS (OUTPATIENT)
Dept: HEALTH INFORMATION MANAGEMENT | Facility: CLINIC | Age: 78
End: 2018-08-17

## 2018-08-22 ENCOUNTER — TELEPHONE (OUTPATIENT)
Dept: INTERNAL MEDICINE | Facility: CLINIC | Age: 78
End: 2018-08-22

## 2018-08-22 DIAGNOSIS — E11.9 TYPE 2 DIABETES MELLITUS WITHOUT COMPLICATION, WITHOUT LONG-TERM CURRENT USE OF INSULIN (H): Primary | ICD-10-CM

## 2018-08-22 NOTE — TELEPHONE ENCOUNTER
Patient calling.  States she needs a new glucometer sent to pharmacy--uses the Accu-chek Dayanara.    Rx sent to pharmacy.

## 2018-09-13 ENCOUNTER — OFFICE VISIT (OUTPATIENT)
Dept: PHARMACY | Facility: CLINIC | Age: 78
End: 2018-09-13
Payer: COMMERCIAL

## 2018-09-13 ENCOUNTER — ALLIED HEALTH/NURSE VISIT (OUTPATIENT)
Dept: NURSING | Facility: CLINIC | Age: 78
End: 2018-09-13
Payer: COMMERCIAL

## 2018-09-13 ENCOUNTER — TELEPHONE (OUTPATIENT)
Dept: OBGYN | Facility: CLINIC | Age: 78
End: 2018-09-13

## 2018-09-13 DIAGNOSIS — E78.5 HYPERLIPIDEMIA LDL GOAL <100: ICD-10-CM

## 2018-09-13 DIAGNOSIS — I10 ESSENTIAL HYPERTENSION WITH GOAL BLOOD PRESSURE LESS THAN 140/90: ICD-10-CM

## 2018-09-13 DIAGNOSIS — R60.9 EDEMA, UNSPECIFIED TYPE: ICD-10-CM

## 2018-09-13 DIAGNOSIS — N89.8 VAGINAL IRRITATION: ICD-10-CM

## 2018-09-13 DIAGNOSIS — Z23 NEED FOR PROPHYLACTIC VACCINATION AND INOCULATION AGAINST INFLUENZA: Primary | ICD-10-CM

## 2018-09-13 DIAGNOSIS — M10.9 GOUT, UNSPECIFIED CAUSE, UNSPECIFIED CHRONICITY, UNSPECIFIED SITE: ICD-10-CM

## 2018-09-13 DIAGNOSIS — E11.9 TYPE 2 DIABETES MELLITUS WITHOUT COMPLICATION, WITHOUT LONG-TERM CURRENT USE OF INSULIN (H): Primary | ICD-10-CM

## 2018-09-13 PROCEDURE — G0008 ADMIN INFLUENZA VIRUS VAC: HCPCS

## 2018-09-13 PROCEDURE — 99606 MTMS BY PHARM EST 15 MIN: CPT | Performed by: PHARMACIST

## 2018-09-13 PROCEDURE — 90662 IIV NO PRSV INCREASED AG IM: CPT

## 2018-09-13 PROCEDURE — 99607 MTMS BY PHARM ADDL 15 MIN: CPT | Performed by: PHARMACIST

## 2018-09-13 NOTE — TELEPHONE ENCOUNTER
Ok to refill signed  Dr. Lisa Church, DO    Obstetrics and Gynecology  [facility] [place]

## 2018-09-13 NOTE — MR AVS SNAPSHOT
After Visit Summary   9/13/2018    Lindsey Gary    MRN: [id number]           Patient Information     Date Of Birth          1940        Visit Information        Provider Department      9/13/2018 11:00 AM Beti Jacobo [facility] [place] [facility]        Today's Diagnoses     Type 2 diabetes mellitus without complication, without long-term current use of insulin (H)    -  1      Care Instructions    Recommendations from today's MTM visit:                                                    MTM (medication therapy management) is a service provided by a clinical pharmacist designed to help you get the most of out of your medicines.   Today we reviewed what your medicines are for, how to know if they are working, that your medicines are safe and how to make your medicine regimen as easy as possible.     1. Stop pioglitazone    2.  Start Novolin N 5 units twice daily, before meals    Check-out the following website, it has a video walking you through injection of 'cloudy insulin'    FPA Diabetes:  Http://www.[email or web address]/diabetes    3.  Check blood sugar at least twice daily        Next MTM visit: Monday at 9:30 call    To schedule another MTM appointment, please call the clinic directly or you may call the MTM scheduling line at [phone] or toll-free at [phone].     My Clinical Pharmacist's contact information:                                                      It was a pleasure seeing you today!  Please feel free to contact me with any questions or concerns you have.      Beti Jacobo , Pharm D  [phone] (phone)  [phone] (pager)  Medication Therapy Management Pharmacist               Follow-ups after your visit        Your next 10 appointments already scheduled     Sep 17, 2018  9:30 AM CDT   TELEMEDICINE with Beti Jacobo [facility]   [facility]ossie Monte[facility] ([facility])    [address]  [address]  [facility] [address] [phone]            "Note: this is not an onsite visit; there is no need to come to the facility.              Who to contact     If you have questions or need follow up information about today's clinic visit or your schedule please contact [facility] directly at [phone].  Normal or non-critical lab and imaging results will be communicated to you by MyChart, letter or phone within 4 business days after the clinic has received the results. If you do not hear from us within 7 days, please contact the clinic through MyChart or phone. If you have a critical or abnormal lab result, we will notify you by phone as soon as possible.  Submit refill requests through [employer] or call your pharmacy and they will forward the refill request to us. Please allow 3 business days for your refill to be completed.          Additional Information About Your Visit        MyChart Information     Progression lets you send messages to your doctor, view your test results, renew your prescriptions, schedule appointments and more. To sign up, go to [email or web address] . Click on \"Log in\" on the left side of the screen, which will take you to the Welcome page. Then click on \"Sign up Now\" on the right side of the page.     You will be asked to enter the access code listed below, as well as some personal information. Please follow the directions to create your username and password.     Your access code is: [email or web address]  Expires: 2018 11:21 AM     Your access code will  in 90 days. If you need help or a new code, please call your [facility] clinic or [phone].        Care EveryWhere ID     This is your Care EveryWhere ID. This could be used by other organizations to access your [facility] medical records  [id number]         Blood Pressure from Last 3 Encounters:   18 138/75   06/15/18 110/70   18 146/80    Weight from Last 3 Encounters:   06/15/18 185 lb (83.9 kg)   18 184 lb (83.5 kg)   18 172 lb (78 kg)            "   Today, you had the following     No orders found for display         Today's Medication Changes          These changes are accurate as of 9/13/18 11:21 AM.  If you have any questions, ask your nurse or doctor.               Start taking these medicines.        Dose/Directions    insulin  UNIT/ML injection   Commonly known as:  NovoLIN N VIAL   Used for:  Type 2 diabetes mellitus without complication, without long-term current use of insulin (H)        5 units before breakfast  5 units before dinner   Quantity:  10 mL   Refills:  1         Stop taking these medicines if you haven't already. Please contact your care team if you have questions.     pioglitazone 15 MG tablet   Commonly known as:  ACTOS                Where to get your medicines      These medications were sent to [facility] [place] [address]  [facility] [address] [facility] [address]     Phone:  [phone]     insulin  UNIT/ML injection                Primary Care Provider Office Phone # Fax #    Dinorah LILLIAM [facility] [phone]       [address]  [facility] [address]        Equal Access to Services     [facility]: Hadii aad ku hadasho Soomaali, waaxda luqadaha, qaybta kaalmada adeegyada, waxtylor hardwick . So [facility] [phone].    ATENCIÓN: Si habla español, tiene a toribio disposición servicios gratuitos de asistencia lingüística. Llame al [phone].    We comply with applicable federal civil rights laws and [place] laws. We do not discriminate on the basis of race, color, national origin, age, disability, sex, sexual orientation, or gender identity.            Thank you!     Thank you for choosing [facility]  for your care. Our goal is always to provide you with excellent care. Hearing back from our patients is one way we can continue to improve our services. Please take a few minutes to complete the written survey that you may  receive in the mail after your visit with us. Thank you!             Your Updated Medication List - Protect others around you: Learn how to safely use, store and throw away your medicines at www.disposemymeds.org.          This list is accurate as of 9/13/18 11:21 AM.  Always use your most recent med list.                   Brand Name Dispense Instructions for use Diagnosis    allopurinol 300 MG tablet    ZYLOPRIM    90 tablet    Take 1 tablet (300 mg) by mouth daily    Gout, unspecified cause, unspecified chronicity, unspecified site       aspirin 81 MG tablet     100    1 tablet daily        betamethasone (augmented) 0.05 % lotion    DIPROLENE     GINA 10 TO 20 DROPS TOPICALLY AA OF SCALP Q NIGHT UTD        blood glucose monitoring lancets     300 each    Test 3 times daily as directed    Type 2 diabetes, HbA1c goal < 7% (H)       blood glucose monitoring meter device kit     1 kit    Use to test blood sugar 2 times daily or as directed.    Type 2 diabetes mellitus without complication, without long-term current use of insulin (H)       blood glucose monitoring test strip    ACCU-CHEK SMARTVIEW    300 each    Check 3 times daily as directed    Type 2 diabetes mellitus with complication, without long-term current use of insulin (H)       boric acid 600 mg vaginal suppository - PHARMACY TO MIX COMPOUND     21 suppository    Place 1 suppository (600 mg) vaginally At Bedtime    Vaginal irritation       calcipotriene 0.005 % Oint      Apply 1 Application topically as needed        calcium-magnesium 500-250 MG Tabs per tablet    CALMAG     Take 1 tablet by mouth daily        chlorhexidine 0.12 % solution    PERIDEX     RINSE ONE HALF  OZ 2-3 X D AFTER BREAKFAST BEFORE BEDTIME FOLLOWING BRUSHING AND FLOSSIN        cinnamon 500 MG Caps      2 tablets daily        estradiol 10 MCG Tabs vaginal tablet    VAGIFEM    8 tablet    Place 1 tablet (10 mcg) vaginally twice a week    Vaginal pain, Vaginal atrophy       fluconazole  150 MG tablet    DIFLUCAN    30 tablet    Take 1 tablet (150 mg) by mouth every 3 days    Yeast infection of the vagina       furosemide 20 MG tablet    LASIX    90 tablet    Take 1 tablet (20 mg) by mouth daily as needed    Edema, unspecified type       gabapentin 300 MG capsule    NEURONTIN    180 capsule    Take 2 capsules (600 mg) by mouth At Bedtime    Neuropathy       glipiZIDE-metFORMIN 5-500 MG per tablet    METAGLIP    360 tablet    Take 2 tablets by mouth 2 times daily (before meals)    Type 2 diabetes mellitus without complication, without long-term current use of insulin (H)       insulin  UNIT/ML injection    NovoLIN N VIAL    10 mL    5 units before breakfast  5 units before dinner    Type 2 diabetes mellitus without complication, without long-term current use of insulin (H)       lisinopril 10 MG tablet    PRINIVIL/ZESTRIL    90 tablet    Take 1 tablet (10 mg) by mouth daily    Essential hypertension, benign       POTASSIUM CHLORIDE PO      Take 1 tablet by mouth daily        PROBIOTIC ACIDOPHILUS Tabs      Take 1 tablet by mouth daily        simvastatin 40 MG tablet    ZOCOR    90 tablet    TAKE ONE TABLET BY MOUTH EVERY NIGHT AT BEDTIME    Mixed hyperlipidemia       TUMS 500 MG chewable tablet   Generic drug:  calcium carbonate     90    1 TABLET 3 TIMES PRN        TYLENOL 500 MG tablet   Generic drug:  acetaminophen      Take 1-2 tablets by mouth every morning.    Mixed hyperlipidemia, Gout, unspecified, Type 2 diabetes, HbA1c goal < 7% (H), Hyperlipidemia LDL goal <100       Vitamin C 500 MG Caps      Take 500 mg by mouth daily        vitamin D 2000 units tablet      Take 1 tablet by mouth daily        vitamin E 400 units Tabs      Take 400 Units by mouth daily

## 2018-09-13 NOTE — PATIENT INSTRUCTIONS
Recommendations from today's MTM visit:                                                    MTM (medication therapy management) is a service provided by a clinical pharmacist designed to help you get the most of out of your medicines.   Today we reviewed what your medicines are for, how to know if they are working, that your medicines are safe and how to make your medicine regimen as easy as possible.     1. Stop pioglitazone    2.  Start Novolin N 5 units twice daily, before meals    Check-out the following website, it has a video walking you through injection of 'cloudy insulin'    FPA Diabetes:  Http://www.[employer].org/diabetes    3.  Check blood sugar at least twice daily        Next MTM visit: Monday at 9:30 call    To schedule another MTM appointment, please call the clinic directly or you may call the MTM scheduling line at [phone] or toll-free at [phone].     My Clinical Pharmacist's contact information:                                                      It was a pleasure seeing you today!  Please feel free to contact me with any questions or concerns you have.      Beti Jacobo , Pharm D  [phone] (phone)  [phone] (pager)  Medication Therapy Management Pharmacist

## 2018-09-13 NOTE — PROGRESS NOTES

## 2018-09-13 NOTE — TELEPHONE ENCOUNTER
Reason for Call:  Medication or medication refill:    Do you use a [facility]?  Name of the pharmacy and phone number for the current request:  Mail order pharmacy/compound pharmacy     Name of the medication requested: boric acid 600mg vaginal     Other request: pt has 2 days left, please fill ASAP    Can we leave a detailed message on this number? YES    Phone number patient can be reached at: Home number on file [phone] (home)    Best Time:     Call taken on 9/13/2018 at 11:56 AM by Ida Sy

## 2018-09-13 NOTE — PROGRESS NOTES
SUBJECTIVE/OBJECTIVE:                                                    Lindsey Gary is a 76 year old female coming in for a follow-up visit for Medication Therapy Management.  She was referred to me from Dinorah Wong.     Chief Complaint: Follow up from our visit on 8/9/18 - elevated blood sugars    Tobacco: No tobacco use   Alcohol: not currently using    Medication Adherence: no issues reported by patient      Diabetes:  Pt currently taking glipizide-metformin 2 tablets BID and pioglitazone 15 mg daily (started in July) - has not responded to pioglitazone at all.  She does feel bloated.    She has history of UTI so would not be interested in SGLT2s.     SMBG: one time daily.   Ranges (patient reported):   -270  Lowest during the day 139    Symptoms of low blood sugar? dizzy. Frequency of hypoglycemia? rare  Recent symptoms of high blood sugar? tired  Up to date on eye exam - just checked  Up to date on foot exam  Microalbumin is not < 30 mg/g. Pt is taking an ACEi/ARB.  Aspirin: Taking 81 mg daily - no side effects  Diet/Exercise:   [place] [place] [facility] in the morning 3 days per week in the winter; 7 days per week during the summer.      Lab Results   Component Value Date    A1C 8.3 08/09/2018    A1C 9.0 01/02/2018    A1C 8.2 07/27/2017    A1C 7.0 09/22/2016    A1C 8.0 06/16/2016         Hyperlipidemia: Current therapy includes simvastatin 40 mg.  Pt reports no side effects.      Hypertension: Current meds includes lisinopril 10 mg daily.  Pt does not self-monitor BP.  Pt reports no current medication side effects.    Swelling: Has prescription for furosemide, only uses as needed for swelling in her hands and feet.  Maybe needing once monthly.    Gout: Current therapy includes allopurinol 300mg daily. Thinks she may have had a flare but it's control. Pt reports no side effects.   Uric Acid   Date Value Ref Range Status   08/09/2018 4.0 2.6 - 6.0 mg/dL Final       Current labs include:  BP Readings from  Last 3 Encounters:   08/09/18 138/75   06/15/18 110/70   03/27/18 146/80           GFR Estimate   Date Value Ref Range Status   08/09/2018 54 (L) >60 mL/min/1.7m2 Final     Comment:     Non  GFR Calc   03/25/2018 62 >60 mL/min/1.7m2 Final     Comment:     Non  GFR Calc   01/02/2018 88 >60 mL/min/1.7m2 Final     Comment:     Non  GFR Calc     GFR Estimate If Black   Date Value Ref Range Status   08/09/2018 65 >60 mL/min/1.7m2 Final     Comment:      GFR Calc   03/25/2018 75 >60 mL/min/1.7m2 Final     Comment:      GFR Calc   01/02/2018 >90 >60 mL/min/1.7m2 Final     Comment:      GFR Calc     TSH   Date Value Ref Range Status   01/02/2018 1.32 0.40 - 4.00 mU/L Final   ]  There were no vitals taken for this visit.    Most Recent Immunizations   Administered Date(s) Administered     Influenza (H1N1) 12/01/2009     Influenza (High Dose) 3 valent vaccine 10/09/2017     Influenza (IIV3) PF 09/17/2009     Pneumo Conj 13-V (2010&after) 09/26/2016     Pneumococcal 23 valent 05/16/2013     TD (ADULT, 7+) 10/08/2010     TDAP Vaccine (Adacel) 03/31/2014     ASSESSMENT:                                                    Current medications were reviewed with her today.      Medication Adherence: no issues identified    Diabetes:  Needs improvement.  A1c improved but above goal.  Home readings elevated; even with new meter.  Will discontinue pioglitazone since that has done nothing for her blood sugars and does have some potential risks and start Novolin N 5 units BID.  Picked this type of insulin due to cost (pt in coverage gap now).  Instructed patient on drawing up dose and injection. Reviewed symptoms of hypoglyemcia and how to treat.  Provided her with syringes today.    Hyperlipidemia: stable    Hypertension: stable    Swelling: stable    Gout: satble      PLAN:                                                      1. Stop  pioglitazone    2.  Start Novolin N 5 units twice daily, before meals    Check-out the following website, it has a video walking you through injection of 'cloudy insulin'    FPA Diabetes:  Http://www.[employer].org/diabetes    3.  Check blood sugar at least twice daily    I spent 60 minutes with this patient today.  All changes were made via collaborative practice agreement with Dinorah Wong. A copy of the visit note was provided to the patient's primary care provider.     Will follow up next week    The patient was given a summary of these recommendations as an after visit summary.    Beti Jacobo , Pharm D  [phone] (phone)  [phone] (pager)  Medication Therapy Management Pharmacist

## 2018-09-13 NOTE — MR AVS SNAPSHOT
"              After Visit Summary   9/13/2018    Lindsey Gary    MRN: [id number]           Patient Information     Date Of Birth          1940        Visit Information        Provider Department      9/13/2018 11:15 AM RI IM NURSE [facility]        Today's Diagnoses     Need for prophylactic vaccination and inoculation against influenza    -  1       Follow-ups after your visit        Your next 10 appointments already scheduled     Sep 17, 2018  9:30 AM CDT   TELEMEDICINE with Beti Jacobo RPH   [facility] ([facility])    [address]  [address]  [facility] [id number] [phone]           Note: this is not an onsite visit; there is no need to come to the facility.              Who to contact     If you have questions or need follow up information about today's clinic visit or your schedule please contact [facility] directly at [phone].  Normal or non-critical lab and imaging results will be communicated to you by MyChart, letter or phone within 4 business days after the clinic has received the results. If you do not hear from us within 7 days, please contact the clinic through [email or web address] or phone. If you have a critical or abnormal lab result, we will notify you by phone as soon as possible.  Submit refill requests through [employer] or call your pharmacy and they will forward the refill request to us. Please allow 3 business days for your refill to be completed.          Additional Information About Your Visit        MyChart Information     [employer] lets you send messages to your doctor, view your test results, renew your prescriptions, schedule appointments and more. To sign up, go to [email or web address] . Click on \"Log in\" on the left side of the screen, which will take you to the Welcome page. Then click on \"Sign up Now\" on the right side of the page.     You will be asked to enter the access code listed below, as well " as some personal information. Please follow the directions to create your username and password.     Your access code is: [email or web address]  Expires: 2018 11:21 AM     Your access code will  in 90 days. If you need help or a new code, please call your [facility] clinic or [phone].        Care EveryWhere ID     This is your Care EveryWhere ID. This could be used by other organizations to access your [facility] medical records  [id number]         Blood Pressure from Last 3 Encounters:   18 138/75   06/15/18 110/70   18 146/80    Weight from Last 3 Encounters:   06/15/18 185 lb (83.9 kg)   18 184 lb (83.5 kg)   18 172 lb (78 kg)              We Performed the Following     ADMIN INFLUENZA (For MEDICARE Patients ONLY) []     FLU VACCINE, INCREASED ANTIGEN, PRESV FREE, AGE 65+ [84706]          Today's Medication Changes          These changes are accurate as of 18 11:36 AM.  If you have any questions, ask your nurse or doctor.               Start taking these medicines.        Dose/Directions    insulin  UNIT/ML injection   Commonly known as:  NovoLIN N VIAL   Used for:  Type 2 diabetes mellitus without complication, without long-term current use of insulin (H)   Started by:  Beti Jacobo RPH        5 units before breakfast  5 units before dinner   Quantity:  10 mL   Refills:  1         Stop taking these medicines if you haven't already. Please contact your care team if you have questions.     pioglitazone 15 MG tablet   Commonly known as:  ACTOS   Stopped by:  Beti Jacobo RPH                Where to get your medicines      These medications were sent to [facility] [place], [place] - [address]  [address] [facility] [address]     Phone:  [phone]     insulin  UNIT/ML injection                Primary Care Provider Office Phone # Fax #    DinorahLILLIAM Barr [phone]       [address]  [facility] [address]        Equal Access to Services     [facility] TERRENCE : Hadii lorna avitia lisa Soheather, waaxda luqadaha, qaybta kamarinatahir herrera, janet blackmondelontedonavon louis. So [facility] [phone].    ATENCIÓN: Si habla español, tiene a toribio disposición servicios gratuitos de asistencia lingüística. Caireame al [phone].    We comply with applicable federal civil rights laws and [place] laws. We do not discriminate on the basis of race, color, national origin, age, disability, sex, sexual orientation, or gender identity.            Thank you!     Thank you for choosing [facility]  for your care. Our goal is always to provide you with excellent care. Hearing back from our patients is one way we can continue to improve our services. Please take a few minutes to complete the written survey that you may receive in the mail after your visit with us. Thank you!             Your Updated Medication List - Protect others around you: Learn how to safely use, store and throw away your medicines at www.disposemymeds.org.          This list is accurate as of 9/13/18 11:36 AM.  Always use your most recent med list.                   Brand Name Dispense Instructions for use Diagnosis    allopurinol 300 MG tablet    ZYLOPRIM    90 tablet    Take 1 tablet (300 mg) by mouth daily    Gout, unspecified cause, unspecified chronicity, unspecified site       aspirin 81 MG tablet     100    1 tablet daily        betamethasone (augmented) 0.05 % lotion    DIPROLENE     GINA 10 TO 20 DROPS TOPICALLY AA OF SCALP Q NIGHT UTD        blood glucose monitoring lancets     300 each    Test 3 times daily as directed    Type 2 diabetes, HbA1c goal < 7% (H)       blood glucose monitoring meter device kit     1 kit    Use to test blood sugar 2 times daily or as directed.    Type 2 diabetes mellitus without complication, without long-term current use of insulin (H)       blood glucose monitoring test strip     ACCU-CHEK SMARTVIEW    300 each    Check 3 times daily as directed    Type 2 diabetes mellitus with complication, without long-term current use of insulin (H)       boric acid 600 mg vaginal suppository - PHARMACY TO MIX COMPOUND     21 suppository    Place 1 suppository (600 mg) vaginally At Bedtime    Vaginal irritation       calcipotriene 0.005 % Oint      Apply 1 Application topically as needed        calcium-magnesium 500-250 MG Tabs per tablet    CALMAG     Take 1 tablet by mouth daily        chlorhexidine 0.12 % solution    PERIDEX     RINSE ONE HALF  OZ 2-3 X D AFTER BREAKFAST BEFORE BEDTIME FOLLOWING BRUSHING AND FLOSSIN        cinnamon 500 MG Caps      2 tablets daily        estradiol 10 MCG Tabs vaginal tablet    VAGIFEM    8 tablet    Place 1 tablet (10 mcg) vaginally twice a week    Vaginal pain, Vaginal atrophy       fluconazole 150 MG tablet    DIFLUCAN    30 tablet    Take 1 tablet (150 mg) by mouth every 3 days    Yeast infection of the vagina       furosemide 20 MG tablet    LASIX    90 tablet    Take 1 tablet (20 mg) by mouth daily as needed    Edema, unspecified type       gabapentin 300 MG capsule    NEURONTIN    180 capsule    Take 2 capsules (600 mg) by mouth At Bedtime    Neuropathy       glipiZIDE-metFORMIN 5-500 MG per tablet    METAGLIP    360 tablet    Take 2 tablets by mouth 2 times daily (before meals)    Type 2 diabetes mellitus without complication, without long-term current use of insulin (H)       insulin  UNIT/ML injection    NovoLIN N VIAL    10 mL    5 units before breakfast  5 units before dinner    Type 2 diabetes mellitus without complication, without long-term current use of insulin (H)       lisinopril 10 MG tablet    PRINIVIL/ZESTRIL    90 tablet    Take 1 tablet (10 mg) by mouth daily    Essential hypertension, benign       POTASSIUM CHLORIDE PO      Take 1 tablet by mouth daily        PROBIOTIC ACIDOPHILUS Tabs      Take 1 tablet by mouth daily        simvastatin  40 MG tablet    ZOCOR    90 tablet    TAKE ONE TABLET BY MOUTH EVERY NIGHT AT BEDTIME    Mixed hyperlipidemia       TUMS 500 MG chewable tablet   Generic drug:  calcium carbonate     90    1 TABLET 3 TIMES PRN        TYLENOL 500 MG tablet   Generic drug:  acetaminophen      Take 1-2 tablets by mouth every morning.    Mixed hyperlipidemia, Gout, unspecified, Type 2 diabetes, HbA1c goal < 7% (H), Hyperlipidemia LDL goal <100       Vitamin C 500 MG Caps      Take 500 mg by mouth daily        vitamin D 2000 units tablet      Take 1 tablet by mouth daily        vitamin E 400 units Tabs      Take 400 Units by mouth daily

## 2018-09-17 ENCOUNTER — ALLIED HEALTH/NURSE VISIT (OUTPATIENT)
Dept: PHARMACY | Facility: CLINIC | Age: 78
End: 2018-09-17
Payer: COMMERCIAL

## 2018-09-17 DIAGNOSIS — E11.9 TYPE 2 DIABETES MELLITUS WITHOUT COMPLICATION, WITHOUT LONG-TERM CURRENT USE OF INSULIN (H): ICD-10-CM

## 2018-09-17 PROCEDURE — 99606 MTMS BY PHARM EST 15 MIN: CPT | Performed by: PHARMACIST

## 2018-09-17 NOTE — MR AVS SNAPSHOT
"              After Visit Summary   9/17/2018    Lindsey Gary    MRN: [id number]           Patient Information     Date Of Birth          1940        Visit Information        Provider Department      9/17/2018 9:30 AM Beti Jacobo [facility]        Today's Diagnoses     Type 2 diabetes mellitus without complication, without long-term current use of insulin (H)           Follow-ups after your visit        Your next 10 appointments already scheduled     Sep 24, 2018  9:30 AM CDT   TELEMEDICINE with Beti Jacobo RPH   [facility] ([facility])    [address]  [address]  [facility] [address] [phone]           Note: this is not an onsite visit; there is no need to come to the facility.              Who to contact     If you have questions or need follow up information about today's clinic visit or your schedule please contact [facility] directly at [phone].  Normal or non-critical lab and imaging results will be communicated to you by [employer], letter or phone within 4 business days after the clinic has received the results. If you do not hear from us within 7 days, please contact the clinic through [employer] or phone. If you have a critical or abnormal lab result, we will notify you by phone as soon as possible.  Submit refill requests through [employer] or call your pharmacy and they will forward the refill request to us. Please allow 3 business days for your refill to be completed.          Additional Information About Your Visit        [employer] Information     [employer] lets you send messages to your doctor, view your test results, renew your prescriptions, schedule appointments and more. To sign up, go to [email or web address] . Click on \"Log in\" on the left side of the screen, which will take you to the Welcome page. Then click on \"Sign up Now\" on the right side of the page.     You will be asked to enter the access code listed " below, as well as some personal information. Please follow the directions to create your username and password.     Your access code is: [email or web address]  Expires: 2018 11:21 AM     Your access code will  in 90 days. If you need help or a new code, please call your [facility] clinic or [phone].        Care EveryWhere ID     This is your Care EveryWhere ID. This could be used by other organizations to access your [facility] medical records  [id number]         Blood Pressure from Last 3 Encounters:   18 138/75   06/15/18 110/70   18 146/80    Weight from Last 3 Encounters:   06/15/18 185 lb (83.9 kg)   18 184 lb (83.5 kg)   18 172 lb (78 kg)              Today, you had the following     No orders found for display         Today's Medication Changes          These changes are accurate as of 18  9:46 AM.  If you have any questions, ask your nurse or doctor.               These medicines have changed or have updated prescriptions.        Dose/Directions    insulin  UNIT/ML injection   Commonly known as:  NovoLIN N VIAL   This may have changed:  additional instructions   Used for:  Type 2 diabetes mellitus without complication, without long-term current use of insulin (H)        10 units before breakfast  10 units before dinner   Quantity:  10 mL   Refills:  1            Where to get your medicines      Some of these will need a paper prescription and others can be bought over the counter.  Ask your nurse if you have questions.     You don't need a prescription for these medications     insulin  UNIT/ML injection                Primary Care Provider Office Phone # Fax #    LILLIAM [facility] [phone]       [address] [facility] [address]        Equal Access to Services     [facility]: Stacia Yi, wafabiano leon, qaybta kaalredd herrera, janet louis. So [facility]  [phone].    ATENCIÓN: Si anita jackson, tiene a toribio disposición servicios gratuitos de asistencia lingüística. Bety dwyer [phone].    We comply with applicable federal civil rights laws and [place] laws. We do not discriminate on the basis of race, color, national origin, age, disability, sex, sexual orientation, or gender identity.            Thank you!     Thank you for choosing [facility]  for your care. Our goal is always to provide you with excellent care. Hearing back from our patients is one way we can continue to improve our services. Please take a few minutes to complete the written survey that you may receive in the mail after your visit with us. Thank you!             Your Updated Medication List - Protect others around you: Learn how to safely use, store and throw away your medicines at www.disposemymeds.org.          This list is accurate as of 9/17/18  9:46 AM.  Always use your most recent med list.                   Brand Name Dispense Instructions for use Diagnosis    allopurinol 300 MG tablet    ZYLOPRIM    90 tablet    Take 1 tablet (300 mg) by mouth daily    Gout, unspecified cause, unspecified chronicity, unspecified site       aspirin 81 MG tablet     100    1 tablet daily        betamethasone (augmented) 0.05 % lotion    DIPROLENE     GINA 10 TO 20 DROPS TOPICALLY AA OF SCALP Q NIGHT UTD        blood glucose monitoring lancets     300 each    Test 3 times daily as directed    Type 2 diabetes, HbA1c goal < 7% (H)       blood glucose monitoring meter device kit     1 kit    Use to test blood sugar 2 times daily or as directed.    Type 2 diabetes mellitus without complication, without long-term current use of insulin (H)       blood glucose monitoring test strip    ACCU-CHEK SMARTVIEW    300 each    Check 3 times daily as directed    Type 2 diabetes mellitus with complication, without long-term current use of insulin (H)       boric acid 600 mg vaginal suppository - PHARMACY TO  MIX COMPOUND     21 suppository    Place 1 suppository (600 mg) vaginally At Bedtime    Vaginal irritation       calcipotriene 0.005 % Oint      Apply 1 Application topically as needed        calcium-magnesium 500-250 MG Tabs per tablet    CALMAG     Take 1 tablet by mouth daily        chlorhexidine 0.12 % solution    PERIDEX     RINSE ONE HALF  OZ 2-3 X D AFTER BREAKFAST BEFORE BEDTIME FOLLOWING BRUSHING AND FLOSSIN        cinnamon 500 MG Caps      2 tablets daily        estradiol 10 MCG Tabs vaginal tablet    VAGIFEM    8 tablet    Place 1 tablet (10 mcg) vaginally twice a week    Vaginal pain, Vaginal atrophy       fluconazole 150 MG tablet    DIFLUCAN    30 tablet    Take 1 tablet (150 mg) by mouth every 3 days    Yeast infection of the vagina       furosemide 20 MG tablet    LASIX    90 tablet    Take 1 tablet (20 mg) by mouth daily as needed    Edema, unspecified type       gabapentin 300 MG capsule    NEURONTIN    180 capsule    Take 2 capsules (600 mg) by mouth At Bedtime    Neuropathy       glipiZIDE-metFORMIN 5-500 MG per tablet    METAGLIP    360 tablet    Take 2 tablets by mouth 2 times daily (before meals)    Type 2 diabetes mellitus without complication, without long-term current use of insulin (H)       insulin  UNIT/ML injection    NovoLIN N VIAL    10 mL    10 units before breakfast  10 units before dinner    Type 2 diabetes mellitus without complication, without long-term current use of insulin (H)       lisinopril 10 MG tablet    PRINIVIL/ZESTRIL    90 tablet    Take 1 tablet (10 mg) by mouth daily    Essential hypertension, benign       POTASSIUM CHLORIDE PO      Take 1 tablet by mouth daily        PROBIOTIC ACIDOPHILUS Tabs      Take 1 tablet by mouth daily        simvastatin 40 MG tablet    ZOCOR    90 tablet    TAKE ONE TABLET BY MOUTH EVERY NIGHT AT BEDTIME    Mixed hyperlipidemia       TUMS 500 MG chewable tablet   Generic drug:  calcium carbonate     90    1 TABLET 3 TIMES PRN         TYLENOL 500 MG tablet   Generic drug:  acetaminophen      Take 1-2 tablets by mouth every morning.    Mixed hyperlipidemia, Gout, unspecified, Type 2 diabetes, HbA1c goal < 7% (H), Hyperlipidemia LDL goal <100       Vitamin C 500 MG Caps      Take 500 mg by mouth daily        vitamin D 2000 units tablet      Take 1 tablet by mouth daily        vitamin E 400 units Tabs      Take 400 Units by mouth daily

## 2018-09-17 NOTE — PROGRESS NOTES
SUBJECTIVE/OBJECTIVE:                                                    Lindsey Gary is a 76 year old female called in for a follow-up visit for Medication Therapy Management.  She was referred to me from Dinorah Wong.     Chief Complaint: Follow up from our visit on 8//18 - elevated blood sugars    Tobacco: No tobacco use   Alcohol: not currently using    Medication Adherence: no issues reported by patient      Diabetes:  Pt currently taking glipizide-metformin 2 tablets BID and Novolin N 5 units BID (started on Thursday.  She does feel bloated.  Feeling comfortable with the dosing technique  She has history of UTI so would not be interested in SGLT2s.     SMBG: one time daily.   Ranges (patient reported): no real change since starting insulin  , 232, 245, 230  5pm 234, 253, 274    Symptoms of low blood sugar? dizzy. Frequency of hypoglycemia? rare  Recent symptoms of high blood sugar? tired  Up to date on eye exam - just checked  Up to date on foot exam  Microalbumin is not < 30 mg/g. Pt is taking an ACEi/ARB.  Aspirin: Taking 81 mg daily - no side effects  Diet/Exercise:   [place] [facility] in the [place] 3 days per week in the winter; 7 days per week during the summer.      Lab Results   Component Value Date    A1C 8.3 08/09/2018    A1C 9.0 01/02/2018    A1C 8.2 07/27/2017    A1C 7.0 09/22/2016    A1C 8.0 06/16/2016         Current labs include:  BP Readings from Last 3 Encounters:   08/09/18 138/75   06/15/18 110/70   03/27/18 146/80           GFR Estimate   Date Value Ref Range Status   08/09/2018 54 (L) >60 mL/min/1.7m2 Final     Comment:     Non  GFR Calc   03/25/2018 62 >60 mL/min/1.7m2 Final     Comment:     Non  GFR Calc   01/02/2018 88 >60 mL/min/1.7m2 Final     Comment:     Non  GFR Calc     GFR Estimate If Black   Date Value Ref Range Status   08/09/2018 65 >60 mL/min/1.7m2 Final     Comment:      GFR Calc   03/25/2018 75 >60  mL/min/1.7m2 Final     Comment:      GFR Calc   01/02/2018 >90 >60 mL/min/1.7m2 Final     Comment:      GFR Calc     TSH   Date Value Ref Range Status   01/02/2018 1.32 0.40 - 4.00 mU/L Final   ]  There were no vitals taken for this visit.    Most Recent Immunizations   Administered Date(s) Administered     Influenza (H1N1) 12/01/2009     Influenza (High Dose) 3 valent vaccine 09/13/2018     Influenza (IIV3) PF 09/17/2009     Pneumo Conj 13-V (2010&after) 09/26/2016     Pneumococcal 23 valent 05/16/2013     TD (ADULT, 7+) 10/08/2010     TDAP Vaccine (Adacel) 03/31/2014     ASSESSMENT:                                                    Current medications were reviewed with her today.      Medication Adherence: no issues identified    Diabetes:  Needs improvement.  A1c improved but above goal.  Home readings elevated; even with start of insulin - no change at all since starting last week.  Will increase to 10 units twice daily today, pt to increase to 12 units on Thursday if readings still above 130        PLAN:                                                      1. Increase Novolin N 10 units twice daily, before meals  2.  If blood sugars <130, Thursday increase dose to 12 units    I spent 15 minutes with this patient today.  All changes were made via collaborative practice agreement with Dinorah Wong. A copy of the visit note was provided to the patient's primary care provider.     Will follow up Monday    The patient was given a summary of these recommendations as an after visit summary.    Beti Jacobo , Pharm D  [phone] (phone)  [phone] (pager)  Medication Therapy Management Pharmacist

## 2018-09-24 ENCOUNTER — ALLIED HEALTH/NURSE VISIT (OUTPATIENT)
Dept: PHARMACY | Facility: CLINIC | Age: 78
End: 2018-09-24
Payer: COMMERCIAL

## 2018-09-24 DIAGNOSIS — E78.5 HYPERLIPIDEMIA LDL GOAL <100: Primary | ICD-10-CM

## 2018-09-24 DIAGNOSIS — E11.9 TYPE 2 DIABETES MELLITUS WITHOUT COMPLICATION, WITHOUT LONG-TERM CURRENT USE OF INSULIN (H): ICD-10-CM

## 2018-09-24 PROCEDURE — 99606 MTMS BY PHARM EST 15 MIN: CPT | Performed by: PHARMACIST

## 2018-09-24 NOTE — PROGRESS NOTES
SUBJECTIVE/OBJECTIVE:                                                    Lindsey Gary is a 76 year old female called in for a follow-up visit for Medication Therapy Management.  She was referred to me from Dinorah Wong.     Chief Complaint: Follow up from our visit on 8//18 - elevated blood sugars    Tobacco: No tobacco use   Alcohol: not currently using    Medication Adherence: no issues reported by patient      Diabetes:  Pt currently taking glipizide-metformin 2 tablets BID and Novolin N 12 units BID She has history of UTI so would not be interested in SGLT2s.     SMBG: one time daily.   Ranges (patient reported): no real change since starting insulin  , 184, 190, 209 (had banana bread last night)  5pm 203, 261, 229    Symptoms of low blood sugar? dizzy. Frequency of hypoglycemia? rare  Recent symptoms of high blood sugar? tired  Up to date on eye exam - just checked  Up to date on foot exam  Microalbumin is not < 30 mg/g. Pt is taking an ACEi/ARB.  Aspirin: Taking 81 mg daily - no side effects  Diet/Exercise:   [place] the [employer] in the [place] 3 days per week in the winter; 7 days per week during the summer.      Lab Results   Component Value Date    A1C 8.3 08/09/2018    A1C 9.0 01/02/2018    A1C 8.2 07/27/2017    A1C 7.0 09/22/2016    A1C 8.0 06/16/2016     Hyperlipidemia: Current therapy includes simvastatin 40 mg once daily.  Pt reports no significant myalgias or other side effects.          Current labs include:  BP Readings from Last 3 Encounters:   08/09/18 138/75   06/15/18 110/70   03/27/18 146/80           GFR Estimate   Date Value Ref Range Status   08/09/2018 54 (L) >60 mL/min/1.7m2 Final     Comment:     Non  GFR Calc   03/25/2018 62 >60 mL/min/1.7m2 Final     Comment:     Non  GFR Calc   01/02/2018 88 >60 mL/min/1.7m2 Final     Comment:     Non  GFR Calc     GFR Estimate If Black   Date Value Ref Range Status   08/09/2018 65 >60 mL/min/1.7m2  Final     Comment:      GFR Calc   03/25/2018 75 >60 mL/min/1.7m2 Final     Comment:      GFR Calc   01/02/2018 >90 >60 mL/min/1.7m2 Final     Comment:      GFR Calc     TSH   Date Value Ref Range Status   01/02/2018 1.32 0.40 - 4.00 mU/L Final   ]  There were no vitals taken for this visit. phone visit    Most Recent Immunizations   Administered Date(s) Administered     Influenza (H1N1) 12/01/2009     Influenza (High Dose) 3 valent vaccine 09/13/2018     Influenza (IIV3) PF 09/17/2009     Pneumo Conj 13-V (2010&after) 09/26/2016     Pneumococcal 23 valent 05/16/2013     TD (ADULT, 7+) 10/08/2010     TDAP Vaccine (Adacel) 03/31/2014     ASSESSMENT:                                                    Current medications were reviewed with her today.      Medication Adherence: no issues identified    Diabetes:  Needs improvement.  A1c improved but above goal.  Home readings improved but still above goal elevated.  Will increase to 15 units twice daily today, pt to increase to 17 units on Thursday if readings still above 130    Hyperlipidemia: Stable.      PLAN:                                                      1. Increase Novolin N 15 units twice daily, before meals  2.  If blood sugars <130, Thursday increase dose to 17 units    I spent 15 minutes with this patient today.  All changes were made via collaborative practice agreement with Dinorah Wong. A copy of the visit note was provided to the patient's primary care provider.     Will follow up Monday    The patient was given a summary of these recommendations as an after visit summary.    Beti Jacobo , Pharm D  [phone] (phone)  [phone] (pager)  Medication Therapy Management Pharmacist

## 2018-09-24 NOTE — MR AVS SNAPSHOT
"              After Visit Summary   9/24/2018    Lindsey Gary    MRN: [id number]           Patient Information     Date Of Birth          1940        Visit Information        Provider Department      9/24/2018 9:30 AM [facility]        Today's Diagnoses     Hyperlipidemia LDL goal <100    -  1    Type 2 diabetes mellitus without complication, without long-term current use of insulin (H)           Follow-ups after your visit        Follow-up notes from your care team     Return in about 1 week (around 10/1/2018) for Medication Therapy Management.      Your next 10 appointments already scheduled     Oct 01, 2018  9:30 AM CDT   TELEMEDICINE with [facility] ([facility])    [address]  [address]  [facility] [address] [phone]           Note: this is not an onsite visit; there is no need to come to the facility.              Who to contact     If you have questions or need follow up information about today's clinic visit or your schedule please contact [facility] directly at [phone].  Normal or non-critical lab and imaging results will be communicated to you by MyChart, letter or phone within 4 business days after the clinic has received the results. If you do not hear from us within 7 days, please contact the clinic through [employer] or phone. If you have a critical or abnormal lab result, we will notify you by phone as soon as possible.  Submit refill requests through [employer] or call your pharmacy and they will forward the refill request to us. Please allow 3 business days for your refill to be completed.          Additional Information About Your Visit        MyChart Information     [employer] lets you send messages to your doctor, view your test results, renew your prescriptions, schedule appointments and more. To sign up, go to [email or web address] . Click on \"Log in\" on the left side of the " "screen, which will take you to the Welcome page. Then click on \"Sign up Now\" on the right side of the page.     You will be asked to enter the access code listed below, as well as some personal information. Please follow the directions to create your username and password.     Your access code is: [email or web address]  Expires: 2018 11:21 AM     Your access code will  in 90 days. If you need help or a new code, please call your [facility] or [phone].        Care EveryWhere ID     This is your Care EveryWhere ID. This could be used by other organizations to access your [facility] medical records  [id number]         Blood Pressure from Last 3 Encounters:   18 138/75   06/15/18 110/70   18 146/80    Weight from Last 3 Encounters:   06/15/18 185 lb (83.9 kg)   18 184 lb (83.5 kg)   18 172 lb (78 kg)              Today, you had the following     No orders found for display         Today's Medication Changes          These changes are accurate as of 18  9:42 AM.  If you have any questions, ask your nurse or doctor.               These medicines have changed or have updated prescriptions.        Dose/Directions    insulin  UNIT/ML injection   Commonly known as:  NovoLIN N VIAL   This may have changed:  additional instructions   Used for:  Type 2 diabetes mellitus without complication, without long-term current use of insulin (H)        15 units before breakfast  15 units before dinner   Quantity:  10 mL   Refills:  1            Where to get your medicines      Some of these will need a paper prescription and others can be bought over the counter.  Ask your nurse if you have questions.     You don't need a prescription for these medications     insulin  UNIT/ML injection                Primary Care Provider Office Phone # Fax #    LILL[facility] [phone]       [address]  [facility] [address]        Equal Access to Services     " ASHA OCAMPO : Hadii aad ku lisa Soheather, waaxda luqadaha, qaybta kaalmada adeeghipolito, janet deysi kierstengifty velarde charlesdonavon hardwick . So [facility] [phone].    ATENCIÓN: Si habla español, tiene a toribio disposición servicios gratuitos de asistencia lingüística. Llame al [phone].    We comply with applicable federal civil rights laws and [place] laws. We do not discriminate on the basis of race, color, national origin, age, disability, sex, sexual orientation, or gender identity.            Thank you!     Thank you for choosing [facility]  for your care. Our goal is always to provide you with excellent care. Hearing back from our patients is one way we can continue to improve our services. Please take a few minutes to complete the written survey that you may receive in the mail after your visit with us. Thank you!             Your Updated Medication List - Protect others around you: Learn how to safely use, store and throw away your medicines at www.disposemymeds.org.          This list is accurate as of 9/24/18  9:42 AM.  Always use your most recent med list.                   Brand Name Dispense Instructions for use Diagnosis    allopurinol 300 MG tablet    ZYLOPRIM    90 tablet    Take 1 tablet (300 mg) by mouth daily    Gout, unspecified cause, unspecified chronicity, unspecified site       aspirin 81 MG tablet     100    1 tablet daily        betamethasone (augmented) 0.05 % lotion    DIPROLENE     GINA 10 TO 20 DROPS TOPICALLY AA OF SCALP Q NIGHT UTD        blood glucose monitoring lancets     300 each    Test 3 times daily as directed    Type 2 diabetes, HbA1c goal < 7% (H)       blood glucose monitoring meter device kit     1 kit    Use to test blood sugar 2 times daily or as directed.    Type 2 diabetes mellitus without complication, without long-term current use of insulin (H)       blood glucose monitoring test strip    ACCU-CHEK SMARTVIEW    300 each    Check 3 times daily as directed    Type 2  diabetes mellitus with complication, without long-term current use of insulin (H)       boric acid 600 mg vaginal suppository - PHARMACY TO MIX COMPOUND     21 suppository    Place 1 suppository (600 mg) vaginally At Bedtime    Vaginal irritation       calcipotriene 0.005 % Oint      Apply 1 Application topically as needed        calcium-magnesium 500-250 MG Tabs per tablet    CALMAG     Take 1 tablet by mouth daily        chlorhexidine 0.12 % solution    PERIDEX     RINSE ONE HALF  OZ 2-3 X D AFTER BREAKFAST BEFORE BEDTIME FOLLOWING BRUSHING AND FLOSSIN        cinnamon 500 MG Caps      2 tablets daily        estradiol 10 MCG Tabs vaginal tablet    VAGIFEM    8 tablet    Place 1 tablet (10 mcg) vaginally twice a week    Vaginal pain, Vaginal atrophy       fluconazole 150 MG tablet    DIFLUCAN    30 tablet    Take 1 tablet (150 mg) by mouth every 3 days    Yeast infection of the vagina       furosemide 20 MG tablet    LASIX    90 tablet    Take 1 tablet (20 mg) by mouth daily as needed    Edema, unspecified type       gabapentin 300 MG capsule    NEURONTIN    180 capsule    Take 2 capsules (600 mg) by mouth At Bedtime    Neuropathy       glipiZIDE-metFORMIN 5-500 MG per tablet    METAGLIP    360 tablet    Take 2 tablets by mouth 2 times daily (before meals)    Type 2 diabetes mellitus without complication, without long-term current use of insulin (H)       insulin  UNIT/ML injection    NovoLIN N VIAL    10 mL    15 units before breakfast  15 units before dinner    Type 2 diabetes mellitus without complication, without long-term current use of insulin (H)       lisinopril 10 MG tablet    PRINIVIL/ZESTRIL    90 tablet    Take 1 tablet (10 mg) by mouth daily    Essential hypertension, benign       POTASSIUM CHLORIDE PO      Take 1 tablet by mouth daily        PROBIOTIC ACIDOPHILUS Tabs      Take 1 tablet by mouth daily        simvastatin 40 MG tablet    ZOCOR    90 tablet    TAKE ONE TABLET BY MOUTH EVERY NIGHT  AT BEDTIME    Mixed hyperlipidemia       TUMS 500 MG chewable tablet   Generic drug:  calcium carbonate     90    1 TABLET 3 TIMES PRN        TYLENOL 500 MG tablet   Generic drug:  acetaminophen      Take 1-2 tablets by mouth every morning.    Mixed hyperlipidemia, Gout, unspecified, Type 2 diabetes, HbA1c goal < 7% (H), Hyperlipidemia LDL goal <100       Vitamin C 500 MG Caps      Take 500 mg by mouth daily        vitamin D 2000 units tablet      Take 1 tablet by mouth daily        vitamin E 400 units Tabs      Take 400 Units by mouth daily

## 2018-09-28 ENCOUNTER — TELEPHONE (OUTPATIENT)
Dept: INTERNAL MEDICINE | Facility: CLINIC | Age: 78
End: 2018-09-28

## 2018-09-28 DIAGNOSIS — Z12.31 VISIT FOR SCREENING MAMMOGRAM: Primary | ICD-10-CM

## 2018-09-28 NOTE — TELEPHONE ENCOUNTER
Reason for Call: Request for an order or referral:    Order or referral being requested: Mammogram    Date needed: before my next appointment    Has the patient been seen by the PCP for this problem? YES    Additional comments: Pt wants regular screening for mammo ordered before her set appt on 11/19/18    Phone number Patient can be reached at:  Home number on file [phone] (home)    Best Time:  any    Can we leave a detailed message on this number?  Not Applicable    Call taken on 9/28/2018 at 3:18 PM by Liya Shafer

## 2018-10-01 ENCOUNTER — ALLIED HEALTH/NURSE VISIT (OUTPATIENT)
Dept: PHARMACY | Facility: CLINIC | Age: 78
End: 2018-10-01
Payer: COMMERCIAL

## 2018-10-01 DIAGNOSIS — M10.9 GOUT, UNSPECIFIED CAUSE, UNSPECIFIED CHRONICITY, UNSPECIFIED SITE: ICD-10-CM

## 2018-10-01 DIAGNOSIS — E11.9 TYPE 2 DIABETES MELLITUS WITHOUT COMPLICATION, WITHOUT LONG-TERM CURRENT USE OF INSULIN (H): ICD-10-CM

## 2018-10-01 PROCEDURE — 99606 MTMS BY PHARM EST 15 MIN: CPT | Performed by: PHARMACIST

## 2018-10-01 RX ORDER — BLOOD SUGAR DIAGNOSTIC
STRIP MISCELLANEOUS
Qty: 100 EACH | Refills: 11 | Status: SHIPPED | OUTPATIENT
Start: 2018-10-01 | End: 2019-01-18

## 2018-10-01 RX ORDER — ALLOPURINOL 300 MG/1
1 TABLET ORAL DAILY
Qty: 90 TABLET | Refills: 0 | Status: SHIPPED | OUTPATIENT
Start: 2018-10-01 | End: 2018-10-08

## 2018-10-01 NOTE — PROGRESS NOTES
SUBJECTIVE/OBJECTIVE:                                                    Lindsey Gary is a 76 year old female called in for a follow-up visit for Medication Therapy Management.  She was referred to me from Dinorah Wong.     Chief Complaint: Follow up from our visit on 9/2418 - elevated blood sugars    Tobacco: No tobacco use   Alcohol: not currently using    Medication Adherence: no issues reported by patient      Diabetes:  Pt currently taking glipizide-metformin 2 tablets BID and Novolin N 17 units BID (increased from 15 units yesterday).   She has history of UTI so would not be interested in SGLT2s.     SMBG: one time daily.   Ranges (patient reported): no real change since starting insulin  ,198  5pm 159, 186, 204  Bedtime 170    Symptoms of low blood sugar? dizzy. Frequency of hypoglycemia? rare  Recent symptoms of high blood sugar? tired  Up to date on eye exam - just checked  Up to date on foot exam  Microalbumin is not < 30 mg/g. Pt is taking an ACEi/ARB.  Aspirin: Taking 81 mg daily - no side effects  Diet/Exercise:   [place]alking [place] the [employer] in the morning 3 days per week in the winter; 7 days per week during the summer.      Lab Results   Component Value Date    A1C 8.3 08/09/2018    A1C 9.0 01/02/2018    A1C 8.2 07/27/2017    A1C 7.0 09/22/2016    A1C 8.0 06/16/2016     Gout: Currently taking allopurinol 300 mg every other day (prescribed as every day) - has been spacing it out due to cost. Pt reports no current pain concerns. Pt is experiencing the following medication side effects: none.   Uric Acid   Date Value Ref Range Status   08/09/2018 4.0 2.6 - 6.0 mg/dL Final   ]            Current labs include:  BP Readings from Last 3 Encounters:   08/09/18 138/75   06/15/18 110/70   03/27/18 146/80           GFR Estimate   Date Value Ref Range Status   08/09/2018 54 (L) >60 mL/min/1.7m2 Final     Comment:     Non  GFR Calc   03/25/2018 62 >60 mL/min/1.7m2 Final     Comment:     Non   American GFR Calc   01/02/2018 88 >60 mL/min/1.7m2 Final     Comment:     Non  GFR Calc     GFR Estimate If Black   Date Value Ref Range Status   08/09/2018 65 >60 mL/min/1.7m2 Final     Comment:      GFR Calc   03/25/2018 75 >60 mL/min/1.7m2 Final     Comment:      GFR Calc   01/02/2018 >90 >60 mL/min/1.7m2 Final     Comment:      GFR Calc     TSH   Date Value Ref Range Status   01/02/2018 1.32 0.40 - 4.00 mU/L Final   ]  There were no vitals taken for this visit. phone visit    Most Recent Immunizations   Administered Date(s) Administered     Influenza (H1N1) 12/01/2009     Influenza (High Dose) 3 valent vaccine 09/13/2018     Influenza (IIV3) PF 09/17/2009     Pneumo Conj 13-V (2010&after) 09/26/2016     Pneumococcal 23 valent 05/16/2013     TD (ADULT, 7+) 10/08/2010     TDAP Vaccine (Adacel) 03/31/2014     ASSESSMENT:                                                    Current medications were reviewed with her today.      Medication Adherence: no issues identified    Diabetes:  Needs improvement.  A1c improved but above goal.  Home readings improved but still above goal elevated.  Will continue to increase insulin by 2 units every 3 days until readings consistently less than 150 mg/dl    Gout: Needs improvement.  Pt is at goal of uric acid <6mg/dl.  Pt would benefit from the following medication changes: decreasing allopurinol 150 mg (1/2 tablet) every day; this is essentially the dose she has been taking but would prefer for her to take daily vs every other day.  Discussed that if she has flare will need to increase dose again.    PLAN:                                                      1. Allopurinol 150 mg daily  2.  Continue to increase insulin by 2 units every 3 days    I spent 15 minutes with this patient today.  All changes were made via collaborative practice agreement with Dinorah Wong. A copy of the visit note was provided to the  patient's primary care provider.     Will follow up next week, Monday    Radha Munson D  [phone] (phone)  [phone] (pager)  Medication Therapy Management Pharmacist

## 2018-10-01 NOTE — MR AVS SNAPSHOT
After Visit Summary   10/1/2018    Lindsey Gary    MRN: [id number]           Patient Information     Date Of Birth          1940        Visit Information        Provider Department      10/1/2018 9:30 AM Beti Jacobo, [facility]        Today's Diagnoses     Type 2 diabetes mellitus without complication, without long-term current use of insulin (H)        Gout, unspecified cause, unspecified chronicity, unspecified site           Follow-ups after your visit        Follow-up notes from your care team     Return in about 1 week (around 10/8/2018) for Medication Therapy Management.      Your next 10 appointments already scheduled     Oct 08, 2018  2:00 PM CDT   TELEMEDICINE with Beti Jacobo [facility] ([facility])    [address]  [address]  [facility] [id number] [phone]           Note: this is not an onsite visit; there is no need to come to the facility.            Nov 19, 2018 10:20 AM CST   MA SCREENING DIGITAL BILATERAL with [id number]   [facility] ([facility])    [address], [address]  [facility] [id number] [phone]           How do I prepare for my exam? (Food and drink instructions) No Food and Drink Restrictions.  How do I prepare for my exam? (Other instructions) Do not use any powder, lotion or deodorant under your arms or on your breast. If you do, we will ask you to remove it before your exam.  What should I wear: Wear comfortable, two-piece clothing.  How long does the exam take: Most scans will take 15 minutes.  What should I bring: Bring any previous mammograms from other facilities or have them mailed to the breast center.  Do I need a :  No  is needed.  What do I need to tell my doctor: If you have any allergies, tell your care team.  What should I do after the exam: No restrictions, You may resume normal activities.  What is this test: This test is an  "x-ray of the breast to look for breast disease. The breast is pressed between two plates to flatten and spread the tissue. An X-ray is taken of the breast from different angles.  Who should I call with questions: If you have any questions, please call the Imaging Department where you will have your exam. Directions, parking instructions, and other information is available on our website, [email or web address].  Other information about my exam Three-dimensional (3D) mammograms are available at [place] locations in [facility], [facility], [place], [facility] and [place].  Health locations include [place] and the [facility] [place] in [place].  Benefits of 3D mammograms include * Improved rate of cancer detection * Decreases your chance of having to go back for more tests, which means fewer: * \"False-positive\" results (This means that there is an abnormal area but it isn't cancer.) * Invasive testing procedures, such as a biopsy or surgery * Can provide clearer images of the breast if you have dense breast tissue.  *3D mammography is an optional exam that anyone can have with a 2D mammogram. It doesn't replace or take the place of a 2D mammogram. 2D mammograms remain an effective screening test for all women.  Not all insurance companies cover the cost of a 3D mammogram. Check with your insurance. Three-dimensional (3D) mammograms are available at [place] locations in [facility], [facility], [facility], and [place]. Health locations include [place] and [facility] [place] in [place]. Benefits of 3D mammograms include: - Improved rate of cancer detection - Decreases your chance of having to go back for more tests, which means fewer: - \"False-positive\" results (This means that there is an abnormal area but it isn't cancer.) - Invasive testing procedures, such as a biopsy or surgery - Can provide clearer images of the " "breast if you have dense breast tissue. 3D mammography is an optional exam that anyone can have with a 2D mammogram. It doesn't replace or take the place of a 2D mammogram. 2D mammograms remain an effective screening test for all women.  Not all insurance companies cover the cost of a 3D mammogram. Check with your insurance.              Who to contact     If you have questions or need follow up information about today's clinic visit or your schedule please contact [facility] directly at [phone].  Normal or non-critical lab and imaging results will be communicated to you by [employer], letter or phone within 4 business days after the clinic has received the results. If you do not hear from us within 7 days, please contact the clinic through [employer] or phone. If you have a critical or abnormal lab result, we will notify you by phone as soon as possible.  Submit refill requests through [employer] or call your pharmacy and they will forward the refill request to us. Please allow 3 business days for your refill to be completed.          Additional Information About Your Visit        [employer] Information     [employer] lets you send messages to your doctor, view your test results, renew your prescriptions, schedule appointments and more. To sign up, go to [email or web address] . Click on \"Log in\" on the left side of the screen, which will take you to the Welcome page. Then click on \"Sign up Now\" on the right side of the page.     You will be asked to enter the access code listed below, as well as some personal information. Please follow the directions to create your username and password.     Your access code is: [email or web address]  Expires: 2018 11:21 AM     Your access code will  in 90 days. If you need help or a new code, please call your [place] clinic or [phone].        Care EveryWhere ID     This is your Care EveryWhere ID. This could be used by other organizations to access your [facility] medical " "records  [id number]         Blood Pressure from Last 3 Encounters:   08/09/18 138/75   06/15/18 110/70   03/27/18 146/80    Weight from Last 3 Encounters:   06/15/18 185 lb (83.9 kg)   03/27/18 184 lb (83.5 kg)   03/25/18 172 lb (78 kg)              Today, you had the following     No orders found for display         Today's Medication Changes          These changes are accurate as of 10/1/18  9:51 AM.  If you have any questions, ask your nurse or doctor.               Start taking these medicines.        Dose/Directions    insulin syringe-needle U-100 31G X 5/16\" 0.3 ML   Commonly known as:  BD insulin syringe ultrafine   Used for:  Type 2 diabetes mellitus without complication, without long-term current use of insulin (H)        Use 2 syringes daily or as directed.   Quantity:  100 each   Refills:  11         These medicines have changed or have updated prescriptions.        Dose/Directions    insulin  UNIT/ML injection   Commonly known as:  NovoLIN N VIAL   This may have changed:  additional instructions   Used for:  Type 2 diabetes mellitus without complication, without long-term current use of insulin (H)        17 units before breakfast  17 units before dinner   Quantity:  10 mL   Refills:  3            Where to get your medicines      These medications were sent to [facility] - [place], [place] - [address] AT [address] to [address] [address]     Phone:  [phone]     allopurinol 300 MG tablet         These medications were sent to [facility] - [facility] [id number] [place] [address]  [id number] [place] [address]     Phone:  [phone]     insulin  UNIT/ML injection    insulin syringe-needle U-100 31G X 5/16\" 0.3 ML                Primary Care Provider Office Phone # Fax #    LILLIAM Sam [phone]       [address] [facility] [address]        Equal Access " to Services     ASHA OCAMPO : Stacia Yi, wasivanda luqadaha, qaybta kaalmatahir herrera, janet louis. So [facility] [phone].    ATENCIÓN: Si habla español, tiene a toribio disposición servicios gratuitos de asistencia lingüística. Llame al [phone].    We comply with applicable federal civil rights laws and [place] laws. We do not discriminate on the basis of race, color, national origin, age, disability, sex, sexual orientation, or gender identity.            Thank you!     Thank you for choosing [facility]  for your care. Our goal is always to provide you with excellent care. Hearing back from our patients is one way we can continue to improve our services. Please take a few minutes to complete the written survey that you may receive in the mail after your visit with us. Thank you!             Your Updated Medication List - Protect others around you: Learn how to safely use, store and throw away your medicines at www.disposemymeds.org.          This list is accurate as of 10/1/18  9:51 AM.  Always use your most recent med list.                   Brand Name Dispense Instructions for use Diagnosis    allopurinol 300 MG tablet    ZYLOPRIM    90 tablet    Take 1 tablet (300 mg) by mouth daily    Gout, unspecified cause, unspecified chronicity, unspecified site       aspirin 81 MG tablet     100    1 tablet daily        betamethasone (augmented) 0.05 % lotion    DIPROLENE     GINA 10 TO 20 DROPS TOPICALLY AA OF SCALP Q NIGHT UTD        blood glucose monitoring lancets     300 each    Test 3 times daily as directed    Type 2 diabetes, HbA1c goal < 7% (H)       blood glucose monitoring meter device kit     1 kit    Use to test blood sugar 2 times daily or as directed.    Type 2 diabetes mellitus without complication, without long-term current use of insulin (H)       blood glucose monitoring test strip    ACCU-CHEK SMARTVIEW    300 each    Check 3 times daily as  "directed    Type 2 diabetes mellitus with complication, without long-term current use of insulin (H)       boric acid 600 mg vaginal suppository - PHARMACY TO MIX COMPOUND     21 suppository    Place 1 suppository (600 mg) vaginally At Bedtime    Vaginal irritation       calcipotriene 0.005 % Oint      Apply 1 Application topically as needed        calcium-magnesium 500-250 MG Tabs per tablet    CALMAG     Take 1 tablet by mouth daily        chlorhexidine 0.12 % solution    PERIDEX     RINSE ONE HALF  OZ 2-3 X D AFTER BREAKFAST BEFORE BEDTIME FOLLOWING BRUSHING AND FLOSSIN        cinnamon 500 MG Caps      2 tablets daily        estradiol 10 MCG Tabs vaginal tablet    VAGIFEM    8 tablet    Place 1 tablet (10 mcg) vaginally twice a week    Vaginal pain, Vaginal atrophy       fluconazole 150 MG tablet    DIFLUCAN    30 tablet    Take 1 tablet (150 mg) by mouth every 3 days    Yeast infection of the vagina       furosemide 20 MG tablet    LASIX    90 tablet    Take 1 tablet (20 mg) by mouth daily as needed    Edema, unspecified type       gabapentin 300 MG capsule    NEURONTIN    180 capsule    Take 2 capsules (600 mg) by mouth At Bedtime    Neuropathy       glipiZIDE-metFORMIN 5-500 MG per tablet    METAGLIP    360 tablet    Take 2 tablets by mouth 2 times daily (before meals)    Type 2 diabetes mellitus without complication, without long-term current use of insulin (H)       insulin  UNIT/ML injection    NovoLIN N VIAL    10 mL    17 units before breakfast  17 units before dinner    Type 2 diabetes mellitus without complication, without long-term current use of insulin (H)       insulin syringe-needle U-100 31G X 5/16\" 0.3 ML    BD insulin syringe ultrafine    100 each    Use 2 syringes daily or as directed.    Type 2 diabetes mellitus without complication, without long-term current use of insulin (H)       lisinopril 10 MG tablet    PRINIVIL/ZESTRIL    90 tablet    Take 1 tablet (10 mg) by mouth daily    " Essential hypertension, benign       POTASSIUM CHLORIDE PO      Take 1 tablet by mouth daily        PROBIOTIC ACIDOPHILUS Tabs      Take 1 tablet by mouth daily        simvastatin 40 MG tablet    ZOCOR    90 tablet    TAKE ONE TABLET BY MOUTH EVERY NIGHT AT BEDTIME    Mixed hyperlipidemia       TUMS 500 MG chewable tablet   Generic drug:  calcium carbonate     90    1 TABLET 3 TIMES PRN        TYLENOL 500 MG tablet   Generic drug:  acetaminophen      Take 1-2 tablets by mouth every morning.    Mixed hyperlipidemia, Gout, unspecified, Type 2 diabetes, HbA1c goal < 7% (H), Hyperlipidemia LDL goal <100       Vitamin C 500 MG Caps      Take 500 mg by mouth daily        vitamin D 2000 units tablet      Take 1 tablet by mouth daily        vitamin E 400 units Tabs      Take 400 Units by mouth daily

## 2018-10-08 ENCOUNTER — ALLIED HEALTH/NURSE VISIT (OUTPATIENT)
Dept: PHARMACY | Facility: CLINIC | Age: 78
End: 2018-10-08
Payer: COMMERCIAL

## 2018-10-08 DIAGNOSIS — M10.9 GOUT, UNSPECIFIED CAUSE, UNSPECIFIED CHRONICITY, UNSPECIFIED SITE: ICD-10-CM

## 2018-10-08 DIAGNOSIS — E11.9 TYPE 2 DIABETES MELLITUS WITHOUT COMPLICATION, WITHOUT LONG-TERM CURRENT USE OF INSULIN (H): ICD-10-CM

## 2018-10-08 PROCEDURE — 99606 MTMS BY PHARM EST 15 MIN: CPT | Performed by: PHARMACIST

## 2018-10-08 RX ORDER — ALLOPURINOL 300 MG/1
0.5 TABLET ORAL DAILY
Qty: 90 TABLET | Refills: 0
Start: 2018-10-08 | End: 2019-01-08

## 2018-10-08 NOTE — PROGRESS NOTES
SUBJECTIVE/OBJECTIVE:                                                    Lindsey Gary is a 76 year old female called in for a follow-up visit for Medication Therapy Management.  She was referred to me from Dinorah Wong.     Chief Complaint: Follow up from our visit on 9/2418 - elevated blood sugars    Tobacco: No tobacco use   Alcohol: not currently using    Medication Adherence: no issues reported by patient      Diabetes:  Pt currently taking glipizide-metformin 2 tablets BID and Novolin N 21 units BID (increased in the last couple of days).   She has history of UTI so would not be interested in SGLT2s.     SMBG: one time daily.   Ranges (patient reported):    , 228, 150, 166, 179, 204, 204   130pm 166  Before dinner 197, 202, 195*, 195, 229, 195, 166 (active outside this afternoon)    Symptoms of low blood sugar? dizzy. Frequency of hypoglycemia? rare  Recent symptoms of high blood sugar? tired  Up to date on eye exam - just checked  Up to date on foot exam  Microalbumin is not < 30 mg/g. Pt is taking an ACEi/ARB.  Aspirin: Taking 81 mg daily - no side effects  Diet/Exercise:   Walking at the [facility] in the morning 3 days per week in the winter; 7 days per week during the summer.      Lab Results   Component Value Date    A1C 8.3 08/09/2018    A1C 9.0 01/02/2018    A1C 8.2 07/27/2017    A1C 7.0 09/22/2016    A1C 8.0 06/16/2016     Gout: Currently taking allopurinol 150 mg every day; changed from 300 mg every other day at last visit. Pt reports no current pain concerns. Pt is experiencing the following medication side effects: none.   Uric Acid   Date Value Ref Range Status   08/09/2018 4.0 2.6 - 6.0 mg/dL Final   ]    Current labs include:  BP Readings from Last 3 Encounters:   08/09/18 138/75   06/15/18 110/70   03/27/18 146/80           GFR Estimate   Date Value Ref Range Status   08/09/2018 54 (L) >60 mL/min/1.7m2 Final     Comment:     Non  GFR Calc   03/25/2018 62 >60 mL/min/1.7m2 Final      Comment:     Non  GFR Calc   01/02/2018 88 >60 mL/min/1.7m2 Final     Comment:     Non  GFR Calc     GFR Estimate If Black   Date Value Ref Range Status   08/09/2018 65 >60 mL/min/1.7m2 Final     Comment:      GFR Calc   03/25/2018 75 >60 mL/min/1.7m2 Final     Comment:      GFR Calc   01/02/2018 >90 >60 mL/min/1.7m2 Final     Comment:      GFR Calc     TSH   Date Value Ref Range Status   01/02/2018 1.32 0.40 - 4.00 mU/L Final   ]  There were no vitals taken for this visit. phone visit    Most Recent Immunizations   Administered Date(s) Administered     Influenza (H1N1) 12/01/2009     Influenza (High Dose) 3 valent vaccine 09/13/2018     Influenza (IIV3) PF 09/17/2009     Pneumo Conj 13-V (2010&after) 09/26/2016     Pneumococcal 23 valent 05/16/2013     TD (ADULT, 7+) 10/08/2010     TDAP Vaccine (Adacel) 03/31/2014     ASSESSMENT:                                                    Current medications were reviewed with her today.      Medication Adherence: no issues identified    Diabetes:  Needs improvement.  A1c improved but above goal.  Home readings improved but still above goal elevated.  Will continue to increase insulin by 2 units every 3 days until readings consistently less than 150 mg/dl.  Asked her to take note of dietary changes, activity changes that may contribute to spikes in readings.      Gout: stable  Pt is at goal of uric acid <6mg/dl.  PLAN:                                                      1. Allopurinol 150 mg daily  2.  Continue to increase insulin by 2 units every 3 days    I spent 15 minutes with this patient today.  All changes were made via collaborative practice agreement with Dinorah Wong. A copy of the visit note was provided to the patient's primary care provider.     Will follow up 1 week    Beti Jacobo , Pharm D  [phone] (phone)  [phone] (pager)  Medication Therapy Management  Pharmacist

## 2018-10-08 NOTE — MR AVS SNAPSHOT
After Visit Summary   10/8/2018    Lindsey Gary    MRN: [id number]           Patient Information     Date Of Birth          1940        Visit Information        Provider Department      10/8/2018 2:00 PM Beti Jacobo, [facility]        Today's Diagnoses     Type 2 diabetes mellitus without complication, without long-term current use of insulin (H)        Gout, unspecified cause, unspecified chronicity, unspecified site           Follow-ups after your visit        Follow-up notes from your care team     Return in about 1 week (around 10/15/2018) for Medication Therapy Management.      Your next 10 appointments already scheduled     Oct 15, 2018  9:00 AM CDT   TELEMEDICINE with Beti Jacobo [facility] ([facility])    [address]  [address]  [facility] [id number] [phone]           Note: this is not an onsite visit; there is no need to come to the facility.            Nov 19, 2018 10:20 AM CST   MA SCREENING DIGITAL BILATERAL with [id number]   [facility] ([facility])    [address] [facility], [address]  [facility] [id number] [phone]           How do I prepare for my exam? (Food and drink instructions) No Food and Drink Restrictions.  How do I prepare for my exam? (Other instructions) Do not use any powder, lotion or deodorant under your arms or on your breast. If you do, we will ask you to remove it before your exam.  What should I wear: Wear comfortable, two-piece clothing.  How long does the exam take: Most scans will take 15 minutes.  What should I bring: Bring any previous mammograms from other facilities or have them mailed to the breast center.  Do I need a :  No  is needed.  What do I need to tell my doctor: If you have any allergies, tell your care team.  What should I do after the exam: No restrictions, You may resume normal activities.  What is this test: This test is  "an x-ray of the breast to look for breast disease. The breast is pressed between two plates to flatten and spread the tissue. An X-ray is taken of the breast from different angles.  Who should I call with questions: If you have any questions, please call the Imaging Department where you will have your exam. Directions, parking instructions, and other information is available on our website, [email or web address].  Other information about my exam Three-dimensional (3D) mammograms are available at [facility] locations in [facility], [facility], [place], [facility] and [place].  Health locations include [facility] and the [facility] in [place].  Benefits of 3D mammograms include * Improved rate of cancer detection * Decreases your chance of having to go back for more tests, which means fewer: * \"False-positive\" results (This means that there is an abnormal area but it isn't cancer.) * Invasive testing procedures, such as a biopsy or surgery * Can provide clearer images of the breast if you have dense breast tissue.  *3D mammography is an optional exam that anyone can have with a 2D mammogram. It doesn't replace or take the place of a 2D mammogram. 2D mammograms remain an effective screening test for all women.  Not all insurance companies cover the cost of a 3D mammogram. Check with your insurance. Three-dimensional (3D) mammograms are available at [facility] locations in [facility], [facility], [facility], and [place]. Health locations include [place] and [facility] in [place]. Benefits of 3D mammograms include: - Improved rate of cancer detection - Decreases your chance of having to go back for more tests, which means fewer: - \"False-positive\" results (This means that there is an abnormal area but it isn't cancer.) - Invasive testing procedures, such as a biopsy or surgery - Can provide clearer images of " "the breast if you have dense breast tissue. 3D mammography is an optional exam that anyone can have with a 2D mammogram. It doesn't replace or take the place of a 2D mammogram. 2D mammograms remain an effective screening test for all women.  Not all insurance companies cover the cost of a 3D mammogram. Check with your insurance.              Who to contact     If you have questions or need follow up information about today's clinic visit or your schedule please contact [facility] directly at [phone].  Normal or non-critical lab and imaging results will be communicated to you by [employer], letter or phone within 4 business days after the clinic has received the results. If you do not hear from us within 7 days, please contact the clinic through [employer] or phone. If you have a critical or abnormal lab result, we will notify you by phone as soon as possible.  Submit refill requests through [facility] or call your pharmacy and they will forward the refill request to us. Please allow 3 business days for your refill to be completed.          Additional Information About Your Visit        [facility] Information     [facility] lets you send messages to your doctor, view your test results, renew your prescriptions, schedule appointments and more. To sign up, go to [email or web address] . Click on \"Log in\" on the left side of the screen, which will take you to the Welcome page. Then click on \"Sign up Now\" on the right side of the page.     You will be asked to enter the access code listed below, as well as some personal information. Please follow the directions to create your username and password.     Your access code is: [email or web address]  Expires: 2018 11:21 AM     Your access code will  in 90 days. If you need help or a new code, please call your [facility] clinic or [phone].        Care EveryWhere ID     This is your Care EveryWhere ID. This could be used by other organizations to access your [facility] " medical records  [id number]         Blood Pressure from Last 3 Encounters:   08/09/18 138/75   06/15/18 110/70   03/27/18 146/80    Weight from Last 3 Encounters:   06/15/18 185 lb (83.9 kg)   03/27/18 184 lb (83.5 kg)   03/25/18 172 lb (78 kg)              Today, you had the following     No orders found for display         Today's Medication Changes          These changes are accurate as of 10/8/18  2:20 PM.  If you have any questions, ask your nurse or doctor.               These medicines have changed or have updated prescriptions.        Dose/Directions    allopurinol 300 MG tablet   Commonly known as:  ZYLOPRIM   This may have changed:  how much to take   Used for:  Gout, unspecified cause, unspecified chronicity, unspecified site        Dose:  0.5 tablet   Take 0.5 tablets (150 mg) by mouth daily   Quantity:  90 tablet   Refills:  0       insulin  UNIT/ML injection   Commonly known as:  NovoLIN N VIAL   This may have changed:  additional instructions   Used for:  Type 2 diabetes mellitus without complication, without long-term current use of insulin (H)        Inject 21 units before breakfast and 21 units before dinner   Quantity:  10 mL   Refills:  3            Where to get your medicines      These medications were sent to [facility] [address] [place], [place] [id number]     Phone:  [phone]     insulin  UNIT/ML injection         Some of these will need a paper prescription and others can be bought over the counter.  Ask your nurse if you have questions.     You don't need a prescription for these medications     allopurinol 300 MG tablet                Primary Care Provider Office Phone # Fax #    LILLIAM Sam [phone]       [address] [place] [address]        Equal Access to Services     ASHA OCAMPO AH: sho Sotelo qaybta kaalmada adeegyada, waxay  deysi hawthorneswetha nietoaan ah. [facility] [phone].    ATENCIÓN: Si gilbertola renetta, tiene a toribio disposición servicios gratuitos de asistencia lingüística. Bety al [phone].    We comply with applicable federal civil rights laws and [place] laws. We do not discriminate on the basis of race, color, national origin, age, disability, sex, sexual orientation, or gender identity.            Thank you!     Thank you for choosing [facility]  for your care. Our goal is always to provide you with excellent care. Hearing back from our patients is one way we can continue to improve our services. Please take a few minutes to complete the written survey that you may receive in the mail after your visit with us. Thank you!             Your Updated Medication List - Protect others around you: Learn how to safely use, store and throw away your medicines at www.disposemymeds.org.          This list is accurate as of 10/8/18  2:20 PM.  Always use your most recent med list.                   Brand Name Dispense Instructions for use Diagnosis    allopurinol 300 MG tablet    ZYLOPRIM    90 tablet    Take 0.5 tablets (150 mg) by mouth daily    Gout, unspecified cause, unspecified chronicity, unspecified site       aspirin 81 MG tablet     100    1 tablet daily        betamethasone (augmented) 0.05 % lotion    DIPROLENE     GINA 10 TO 20 DROPS TOPICALLY AA OF SCALP Q NIGHT UTD        blood glucose monitoring lancets     300 each    Test 3 times daily as directed    Type 2 diabetes, HbA1c goal < 7% (H)       blood glucose monitoring meter device kit     1 kit    Use to test blood sugar 2 times daily or as directed.    Type 2 diabetes mellitus without complication, without long-term current use of insulin (H)       blood glucose monitoring test strip    ACCU-CHEK SMARTVIEW    300 each    Check 3 times daily as directed    Type 2 diabetes mellitus with complication, without long-term current use of insulin (H)       boric  "acid 600 mg vaginal suppository - PHARMACY TO MIX COMPOUND     21 suppository    Place 1 suppository (600 mg) vaginally At Bedtime    Vaginal irritation       calcipotriene 0.005 % Oint      Apply 1 Application topically as needed        calcium-magnesium 500-250 MG Tabs per tablet    CALMAG     Take 1 tablet by mouth daily        chlorhexidine 0.12 % solution    PERIDEX     RINSE ONE HALF  OZ 2-3 X D AFTER BREAKFAST BEFORE BEDTIME FOLLOWING BRUSHING AND FLOSSIN        cinnamon 500 MG Caps      2 tablets daily        estradiol 10 MCG Tabs vaginal tablet    VAGIFEM    8 tablet    Place 1 tablet (10 mcg) vaginally twice a week    Vaginal pain, Vaginal atrophy       fluconazole 150 MG tablet    DIFLUCAN    30 tablet    Take 1 tablet (150 mg) by mouth every 3 days    Yeast infection of the vagina       furosemide 20 MG tablet    LASIX    90 tablet    Take 1 tablet (20 mg) by mouth daily as needed    Edema, unspecified type       gabapentin 300 MG capsule    NEURONTIN    180 capsule    Take 2 capsules (600 mg) by mouth At Bedtime    Neuropathy       glipiZIDE-metFORMIN 5-500 MG per tablet    METAGLIP    360 tablet    Take 2 tablets by mouth 2 times daily (before meals)    Type 2 diabetes mellitus without complication, without long-term current use of insulin (H)       insulin  UNIT/ML injection    NovoLIN N VIAL    10 mL    Inject 21 units before breakfast and 21 units before dinner    Type 2 diabetes mellitus without complication, without long-term current use of insulin (H)       insulin syringe-needle U-100 31G X 5/16\" 0.3 ML    BD insulin syringe ultrafine    100 each    Use 2 syringes daily or as directed.    Type 2 diabetes mellitus without complication, without long-term current use of insulin (H)       lisinopril 10 MG tablet    PRINIVIL/ZESTRIL    90 tablet    Take 1 tablet (10 mg) by mouth daily    Essential hypertension, benign       POTASSIUM CHLORIDE PO      Take 1 tablet by mouth daily        " PROBIOTIC ACIDOPHILUS Tabs      Take 1 tablet by mouth daily        simvastatin 40 MG tablet    ZOCOR    90 tablet    TAKE ONE TABLET BY MOUTH EVERY NIGHT AT BEDTIME    Mixed hyperlipidemia       TUMS 500 MG chewable tablet   Generic drug:  calcium carbonate     90    1 TABLET 3 TIMES PRN        TYLENOL 500 MG tablet   Generic drug:  acetaminophen      Take 1-2 tablets by mouth every morning.    Mixed hyperlipidemia, Gout, unspecified, Type 2 diabetes, HbA1c goal < 7% (H), Hyperlipidemia LDL goal <100       Vitamin C 500 MG Caps      Take 500 mg by mouth daily        vitamin D 2000 units tablet      Take 1 tablet by mouth daily        vitamin E 400 units Tabs      Take 400 Units by mouth daily

## 2018-10-15 ENCOUNTER — ALLIED HEALTH/NURSE VISIT (OUTPATIENT)
Dept: PHARMACY | Facility: CLINIC | Age: 78
End: 2018-10-15
Payer: COMMERCIAL

## 2018-10-15 DIAGNOSIS — E11.9 TYPE 2 DIABETES MELLITUS WITHOUT COMPLICATION, WITHOUT LONG-TERM CURRENT USE OF INSULIN (H): Primary | ICD-10-CM

## 2018-10-15 DIAGNOSIS — M10.9 GOUT, UNSPECIFIED CAUSE, UNSPECIFIED CHRONICITY, UNSPECIFIED SITE: ICD-10-CM

## 2018-10-15 PROCEDURE — 99606 MTMS BY PHARM EST 15 MIN: CPT | Performed by: PHARMACIST

## 2018-10-15 NOTE — PROGRESS NOTES
SUBJECTIVE/OBJECTIVE:                                                    Lindsey Gary is a 76 year old female called in for a follow-up visit for Medication Therapy Management.  She was referred to me from Dinorah Wong.     Chief Complaint: Follow up from our visit on 10/8/18 - elevated blood sugars,she is happy with the improvement over the week    Tobacco: No tobacco use   Alcohol: not currently using    Medication Adherence: no issues reported by patient      Diabetes:  Pt currently taking glipizide-metformin 2 tablets BID and Novolin N 21 units BID (has not increased since we last spoke).   She has history of UTI so would not be interested in SGLT2s.   Looking at insurance options for next year; the combination glipizide-metformin is more expensive than metformin alone and she questions if she can change.    SMBG: one time daily.   Ranges (patient reported):   , 173, 163, 177, 175,176   Before bed (about 4 hours after eating) 143-204    Symptoms of low blood sugar? dizzy. Frequency of hypoglycemia? rare  Recent symptoms of high blood sugar? tired  Up to date on eye exam - just checked  Up to date on foot exam  Microalbumin is not < 30 mg/g. Pt is taking an ACEi/ARB.  Aspirin: Taking 81 mg daily - no side effects  Diet/Exercise:   [place]alking [place] the [facility] in the morning 3 days per week in the winter; 7 days per week during the summer.      Lab Results   Component Value Date    A1C 8.3 08/09/2018    A1C 9.0 01/02/2018    A1C 8.2 07/27/2017    A1C 7.0 09/22/2016    A1C 8.0 06/16/2016     Gout: Currently taking allopurinol 150 mg every day; changed from 300 mg every other day. Pt reports no current pain concerns but some burning last night. Pt is experiencing the following medication side effects: none.   Uric Acid   Date Value Ref Range Status   08/09/2018 4.0 2.6 - 6.0 mg/dL Final   ]    Current labs include:  BP Readings from Last 3 Encounters:   08/09/18 138/75   06/15/18 110/70   03/27/18 146/80           GFR  Estimate   Date Value Ref Range Status   08/09/2018 54 (L) >60 mL/min/1.7m2 Final     Comment:     Non  GFR Calc   03/25/2018 62 >60 mL/min/1.7m2 Final     Comment:     Non  GFR Calc   01/02/2018 88 >60 mL/min/1.7m2 Final     Comment:     Non  GFR Calc     GFR Estimate If Black   Date Value Ref Range Status   08/09/2018 65 >60 mL/min/1.7m2 Final     Comment:      GFR Calc   03/25/2018 75 >60 mL/min/1.7m2 Final     Comment:      GFR Calc   01/02/2018 >90 >60 mL/min/1.7m2 Final     Comment:      GFR Calc     TSH   Date Value Ref Range Status   01/02/2018 1.32 0.40 - 4.00 mU/L Final   ]  There were no vitals taken for this visit. phone visit    Most Recent Immunizations   Administered Date(s) Administered     Influenza (H1N1) 12/01/2009     Influenza (High Dose) 3 valent vaccine 09/13/2018     Influenza (IIV3) PF 09/17/2009     Pneumo Conj 13-V (2010&after) 09/26/2016     Pneumococcal 23 valent 05/16/2013     TD (ADULT, 7+) 10/08/2010     TDAP Vaccine (Adacel) 03/31/2014     ASSESSMENT:                                                    Current medications were reviewed with her today.      Medication Adherence: no issues identified    Diabetes:  Needs improvement.  A1c improved but above goal.  Home readings improved but still above goal elevated.  Will increase insulin to 23 units BID.  May consider changing from glipizide-metformin to metformin since that would be a cost savings to her once her current supply of the combination is gone.    Gout: stable  Pt is at goal of uric acid <6mg/dl.  PLAN:                                                      1.  Novolin 23 units twice daily    I spent 15 minutes with this patient today.  All changes were made via collaborative practice agreement with Dinorah Wong. A copy of the visit note was provided to the patient's primary care provider.     Will follow up 1  week      Beti Jacobo , Pharm D  [phone] (phone)  [phone] (pager)  Medication Therapy Management Pharmacist

## 2018-10-15 NOTE — MR AVS SNAPSHOT
After Visit Summary   10/15/2018    Lindsey Gary    MRN: [id number]           Patient Information     Date Of Birth          1940        Visit Information        Provider Department      10/15/2018 9:00 AM Beti Jacobo, [facility]        Today's Diagnoses     Type 2 diabetes mellitus without complication, without long-term current use of insulin (H)    -  1    Gout, unspecified cause, unspecified chronicity, unspecified site           Follow-ups after your visit        Follow-up notes from your care team     Return in about 1 week (around 10/22/2018) for Medication Therapy Management.      Your next 10 appointments already scheduled     Oct 22, 2018 10:00 AM CDT   TELEMEDICINE with Beti Jacobo [facility] ([facility])    [address]  [address]  [facility] [id number] [phone]           Note: this is not an onsite visit; there is no need to come to the facility.            Nov 19, 2018 10:20 AM CST   MA SCREENING DIGITAL BILATERAL with RHBCMA1   [facility] [place] ([facility])    [address] [facility], [address]  [facility] [id number] [phone]           How do I prepare for my exam? (Food and drink instructions) No Food and Drink Restrictions.  How do I prepare for my exam? (Other instructions) Do not use any powder, lotion or deodorant under your arms or on your breast. If you do, we will ask you to remove it before your exam.  What should I wear: Wear comfortable, two-piece clothing.  How long does the exam take: Most scans will take 15 minutes.  What should I bring: Bring any previous mammograms from other facilities or have them mailed to the breast center.  Do I need a :  No  is needed.  What do I need to tell my doctor: If you have any allergies, tell your care team.  What should I do after the exam: No restrictions, You may resume normal activities.  What is this test:  "This test is an x-ray of the breast to look for breast disease. The breast is pressed between two plates to flatten and spread the tissue. An X-ray is taken of the breast from different angles.  Who should I call with questions: If you have any questions, please call the Imaging Department where you will have your exam. Directions, parking instructions, and other information is available on our website, [email or web address].  Other information about my exam Three-dimensional (3D) mammograms are available at [place] locations in [facility], [facility], [place], [facility] and [place].  Health locations include [place] and the [facility] in [place].  Benefits of 3D mammograms include * Improved rate of cancer detection * Decreases your chance of having to go back for more tests, which means fewer: * \"False-positive\" results (This means that there is an abnormal area but it isn't cancer.) * Invasive testing procedures, such as a biopsy or surgery * Can provide clearer images of the breast if you have dense breast tissue.  *3D mammography is an optional exam that anyone can have with a 2D mammogram. It doesn't replace or take the place of a 2D mammogram. 2D mammograms remain an effective screening test for all women.  Not all insurance companies cover the cost of a 3D mammogram. Check with your insurance. Three-dimensional (3D) mammograms are available at [facility] [facility] locations in [facility], [facility], [facility], and [place]. Health locations include [place] and [facility] in [place]. Benefits of 3D mammograms include: - Improved rate of cancer detection - Decreases your chance of having to go back for more tests, which means fewer: - \"False-positive\" results (This means that there is an abnormal area but it isn't cancer.) - Invasive testing procedures, such as a biopsy or surgery - Can provide " "clearer images of the breast if you have dense breast tissue. 3D mammography is an optional exam that anyone can have with a 2D mammogram. It doesn't replace or take the place of a 2D mammogram. 2D mammograms remain an effective screening test for all women.  Not all insurance companies cover the cost of a 3D mammogram. Check with your insurance.              Who to contact     If you have questions or need follow up information about today's clinic visit or your schedule please contact [facility] directly at [phone].  Normal or non-critical lab and imaging results will be communicated to you by Stereotaxishart, letter or phone within 4 business days after the clinic has received the results. If you do not hear from us within 7 days, please contact the clinic through Joobilit or phone. If you have a critical or abnormal lab result, we will notify you by phone as soon as possible.  Submit refill requests through Padcom or call your pharmacy and they will forward the refill request to us. Please allow 3 business days for your refill to be completed.          Additional Information About Your Visit        Padcom Information     Padcom lets you send messages to your doctor, view your test results, renew your prescriptions, schedule appointments and more. To sign up, go to [email or web address] . Click on \"Log in\" on the left side of the screen, which will take you to the Welcome page. Then click on \"Sign up Now\" on the right side of the page.     You will be asked to enter the access code listed below, as well as some personal information. Please follow the directions to create your username and password.     Your access code is: [email or web address]  Expires: 2018 11:21 AM     Your access code will  in 90 days. If you need help or a new code, please call your [facility] or [phone].        Care EveryWhere ID     This is your Care EveryWhere ID. This could be used by other organizations to access " your [facility] medical records  [id number]         Blood Pressure from Last 3 Encounters:   08/09/18 138/75   06/15/18 110/70   03/27/18 146/80    Weight from Last 3 Encounters:   06/15/18 185 lb (83.9 kg)   03/27/18 184 lb (83.5 kg)   03/25/18 172 lb (78 kg)              Today, you had the following     No orders found for display       Primary Care Provider Office Phone # Fax #    Dinorah Guonicolette Wong, LILLIAM [phone]       [address] [facility] [address]        Equal Access to Services     : Hadii aad ku hadasho Soheather, waaxda luqadaha, qaybta kaalmada adeswethayada, janet hardwick . So [facility] [phone].    ATENCIÓN: Si habla español, tiene a toribio disposición servicios gratuitos de asistencia lingüística. Llame al [phone].    We comply with applicable federal civil rights laws and [place] laws. We do not discriminate on the basis of race, color, national origin, age, disability, sex, sexual orientation, or gender identity.            Thank you!     Thank you for choosing [facility]  for your care. Our goal is always to provide you with excellent care. Hearing back from our patients is one way we can continue to improve our services. Please take a few minutes to complete the written survey that you may receive in the mail after your visit with us. Thank you!             Your Updated Medication List - Protect others around you: Learn how to safely use, store and throw away your medicines at www.disposemymeds.org.          This list is accurate as of 10/15/18  9:16 AM.  Always use your most recent med list.                   Brand Name Dispense Instructions for use Diagnosis    allopurinol 300 MG tablet    ZYLOPRIM    90 tablet    Take 0.5 tablets (150 mg) by mouth daily    Gout, unspecified cause, unspecified chronicity, unspecified site       aspirin 81 MG tablet     100    1 tablet daily        betamethasone (augmented) 0.05 % lotion     DIPROLENE     GINA 10 TO 20 DROPS TOPICALLY AA OF SCALP Q NIGHT UTD        blood glucose monitoring lancets     300 each    Test 3 times daily as directed    Type 2 diabetes, HbA1c goal < 7% (H)       blood glucose monitoring meter device kit     1 kit    Use to test blood sugar 2 times daily or as directed.    Type 2 diabetes mellitus without complication, without long-term current use of insulin (H)       blood glucose monitoring test strip    ACCU-CHEK SMARTVIEW    300 each    Check 3 times daily as directed    Type 2 diabetes mellitus with complication, without long-term current use of insulin (H)       boric acid 600 mg vaginal suppository - PHARMACY TO MIX COMPOUND     21 suppository    Place 1 suppository (600 mg) vaginally At Bedtime    Vaginal irritation       calcipotriene 0.005 % Oint      Apply 1 Application topically as needed        calcium-magnesium 500-250 MG Tabs per tablet    CALMAG     Take 1 tablet by mouth daily        chlorhexidine 0.12 % solution    PERIDEX     RINSE ONE HALF  OZ 2-3 X D AFTER BREAKFAST BEFORE BEDTIME FOLLOWING BRUSHING AND FLOSSIN        cinnamon 500 MG Caps      2 tablets daily        estradiol 10 MCG Tabs vaginal tablet    VAGIFEM    8 tablet    Place 1 tablet (10 mcg) vaginally twice a week    Vaginal pain, Vaginal atrophy       fluconazole 150 MG tablet    DIFLUCAN    30 tablet    Take 1 tablet (150 mg) by mouth every 3 days    Yeast infection of the vagina       furosemide 20 MG tablet    LASIX    90 tablet    Take 1 tablet (20 mg) by mouth daily as needed    Edema, unspecified type       gabapentin 300 MG capsule    NEURONTIN    180 capsule    Take 2 capsules (600 mg) by mouth At Bedtime    Neuropathy       glipiZIDE-metFORMIN 5-500 MG per tablet    METAGLIP    360 tablet    Take 2 tablets by mouth 2 times daily (before meals)    Type 2 diabetes mellitus without complication, without long-term current use of insulin (H)       insulin  UNIT/ML injection     "NovoLIN N VIAL    10 mL    Inject 21 units before breakfast and 21 units before dinner    Type 2 diabetes mellitus without complication, without long-term current use of insulin (H)       insulin syringe-needle U-100 31G X 5/16\" 0.3 ML    BD insulin syringe ultrafine    100 each    Use 2 syringes daily or as directed.    Type 2 diabetes mellitus without complication, without long-term current use of insulin (H)       lisinopril 10 MG tablet    PRINIVIL/ZESTRIL    90 tablet    Take 1 tablet (10 mg) by mouth daily    Essential hypertension, benign       POTASSIUM CHLORIDE PO      Take 1 tablet by mouth daily        PROBIOTIC ACIDOPHILUS Tabs      Take 1 tablet by mouth daily        simvastatin 40 MG tablet    ZOCOR    90 tablet    TAKE ONE TABLET BY MOUTH EVERY NIGHT AT BEDTIME    Mixed hyperlipidemia       TUMS 500 MG chewable tablet   Generic drug:  calcium carbonate     90    1 TABLET 3 TIMES PRN        TYLENOL 500 MG tablet   Generic drug:  acetaminophen      Take 1-2 tablets by mouth every morning.    Mixed hyperlipidemia, Gout, unspecified, Type 2 diabetes, HbA1c goal < 7% (H), Hyperlipidemia LDL goal <100       Vitamin C 500 MG Caps      Take 500 mg by mouth daily        vitamin D 2000 units tablet      Take 1 tablet by mouth daily        vitamin E 400 units Tabs      Take 400 Units by mouth daily          "

## 2018-10-22 ENCOUNTER — ALLIED HEALTH/NURSE VISIT (OUTPATIENT)
Dept: PHARMACY | Facility: CLINIC | Age: 78
End: 2018-10-22
Payer: COMMERCIAL

## 2018-10-22 DIAGNOSIS — M1A.0720 CHRONIC GOUT OF LEFT FOOT, UNSPECIFIED CAUSE: Primary | ICD-10-CM

## 2018-10-22 DIAGNOSIS — E11.9 TYPE 2 DIABETES MELLITUS WITHOUT COMPLICATION, WITHOUT LONG-TERM CURRENT USE OF INSULIN (H): ICD-10-CM

## 2018-10-22 PROCEDURE — 99606 MTMS BY PHARM EST 15 MIN: CPT | Performed by: PHARMACIST

## 2018-10-22 NOTE — MR AVS SNAPSHOT
After Visit Summary   10/22/2018    Lindsey Gary    MRN: [id number]           Patient Information     Date Of Birth          1940        Visit Information        Provider Department      10/22/2018 10:00 AM Beti Jacboo, [facility]        Today's Diagnoses     Chronic gout of left foot, unspecified cause    -  1    Type 2 diabetes mellitus without complication, without long-term current use of insulin (H)           Follow-ups after your visit        Follow-up notes from your care team     Return in about 1 week (around 10/29/2018) for Medication Therapy Management.      Your next 10 appointments already scheduled     Oct 29, 2018  9:30 AM CDT   TELEMEDICINE with Beti Jacobo [facility] ([facility])    [address]  [address]  [facility] [id number] [phone]           Note: this is not an onsite visit; there is no need to come to the facility.            Nov 19, 2018 10:20 AM CST   MA SCREENING DIGITAL BILATERAL with RHBCMA1   [facility] [place] ([facility])    [address], [address]  [facility] [id number] [phone]           How do I prepare for my exam? (Food and drink instructions) No Food and Drink Restrictions.  How do I prepare for my exam? (Other instructions) Do not use any powder, lotion or deodorant under your arms or on your breast. If you do, we will ask you to remove it before your exam.  What should I wear: Wear comfortable, two-piece clothing.  How long does the exam take: Most scans will take 15 minutes.  What should I bring: Bring any previous mammograms from other facilities or have them mailed to the breast center.  Do I need a :  No  is needed.  What do I need to tell my doctor: If you have any allergies, tell your care team.  What should I do after the exam: No restrictions, You may resume normal activities.  What is this test: This test is an x-ray  "of the breast to look for breast disease. The breast is pressed between two plates to flatten and spread the tissue. An X-ray is taken of the breast from different angles.  Who should I call with questions: If you have any questions, please call the Imaging Department where you will have your exam. Directions, parking instructions, and other information is available on our website, [email or web address].  Other information about my exam Three-dimensional (3D) mammograms are available at [facility] locations in [facility], [facility], [place], [facility] and [place].  Health locations include [place] and the [facility] in [place].  Benefits of 3D mammograms include * Improved rate of cancer detection * Decreases your chance of having to go back for more tests, which means fewer: * \"False-positive\" results (This means that there is an abnormal area but it isn't cancer.) * Invasive testing procedures, such as a biopsy or surgery * Can provide clearer images of the breast if you have dense breast tissue.  *3D mammography is an optional exam that anyone can have with a 2D mammogram. It doesn't replace or take the place of a 2D mammogram. 2D mammograms remain an effective screening test for all women.  Not all insurance companies cover the cost of a 3D mammogram. Check with your insurance. Three-dimensional (3D) mammograms are available at [facility] locations in [facility], [facility], [facility], and [place]. Health locations include [place] and [facility] [place] in [place]. Benefits of 3D mammograms include: - Improved rate of cancer detection - Decreases your chance of having to go back for more tests, which means fewer: - \"False-positive\" results (This means that there is an abnormal area but it isn't cancer.) - Invasive testing procedures, such as a biopsy or surgery - Can provide clearer images of the " "breast if you have dense breast tissue. 3D mammography is an optional exam that anyone can have with a 2D mammogram. It doesn't replace or take the place of a 2D mammogram. 2D mammograms remain an effective screening test for all women.  Not all insurance companies cover the cost of a 3D mammogram. Check with your insurance.              Who to contact     If you have questions or need follow up information about today's clinic visit or your schedule please contact [facility] directly at [phone].  Normal or non-critical lab and imaging results will be communicated to you by Seven Energyhart, letter or phone within 4 business days after the clinic has received the results. If you do not hear from us within 7 days, please contact the clinic through Oversight Systemst or phone. If you have a critical or abnormal lab result, we will notify you by phone as soon as possible.  Submit refill requests through ncyclo or call your pharmacy and they will forward the refill request to us. Please allow 3 business days for your refill to be completed.          Additional Information About Your Visit        ncyclo Information     ncyclo lets you send messages to your doctor, view your test results, renew your prescriptions, schedule appointments and more. To sign up, go to [email or web address] . Click on \"Log in\" on the left side of the screen, which will take you to the Welcome page. Then click on \"Sign up Now\" on the right side of the page.     You will be asked to enter the access code listed below, as well as some personal information. Please follow the directions to create your username and password.     Your access code is: [email or web address]  Expires: 2018 11:21 AM     Your access code will  in 90 days. If you need help or a new code, please call your [facility] clinic or [phone].        Care EveryWhere ID     This is your Care EveryWhere ID. This could be used by other organizations to access your [facility] medical " records  [id number]         Blood Pressure from Last 3 Encounters:   08/09/18 138/75   06/15/18 110/70   03/27/18 146/80    Weight from Last 3 Encounters:   06/15/18 185 lb (83.9 kg)   03/27/18 184 lb (83.5 kg)   03/25/18 172 lb (78 kg)              Today, you had the following     No orders found for display         Today's Medication Changes          These changes are accurate as of 10/22/18 10:22 AM.  If you have any questions, ask your nurse or doctor.               These medicines have changed or have updated prescriptions.        Dose/Directions    insulin  UNIT/ML injection   Commonly known as:  NovoLIN N VIAL   This may have changed:  additional instructions   Used for:  Type 2 diabetes mellitus without complication, without long-term current use of insulin (H)        Inject 22 units before breakfast and 22 units before dinner   Quantity:  10 mL   Refills:  3            Where to get your medicines      Some of these will need a paper prescription and others can be bought over the counter.  Ask your nurse if you have questions.     You don't need a prescription for these medications     insulin  UNIT/ML injection                Primary Care Provider Office Phone # Fax #    Dinorah Wong, LILLIAM [facility] [phone]       [address] [place] [id number]        Equal Access to Services     ASHA OCAMPO : Hadii aad ku hadasho Soomaali, waaxda luqadaha, qaybta kaalmada adeegyada, janet louis. So [facility] [phone].    ATENCIÓN: Si habla español, tiene a toribio disposición servicios gratuitos de asistencia lingüística. Llame al [phone].    We comply with applicable federal civil rights laws and [place] laws. We do not discriminate on the basis of race, color, national origin, age, disability, sex, sexual orientation, or gender identity.            Thank you!     Thank you for choosing [facility]  for your care. Our goal is always to  provide you with excellent care. Hearing back from our patients is one way we can continue to improve our services. Please take a few minutes to complete the written survey that you may receive in the mail after your visit with us. Thank you!             Your Updated Medication List - Protect others around you: Learn how to safely use, store and throw away your medicines at www.disposemymeds.org.          This list is accurate as of 10/22/18 10:22 AM.  Always use your most recent med list.                   Brand Name Dispense Instructions for use Diagnosis    allopurinol 300 MG tablet    ZYLOPRIM    90 tablet    Take 0.5 tablets (150 mg) by mouth daily    Gout, unspecified cause, unspecified chronicity, unspecified site       aspirin 81 MG tablet     100    1 tablet daily        betamethasone (augmented) 0.05 % lotion    DIPROLENE     GINA 10 TO 20 DROPS TOPICALLY AA OF SCALP Q NIGHT UTD        blood glucose monitoring lancets     300 each    Test 3 times daily as directed    Type 2 diabetes, HbA1c goal < 7% (H)       blood glucose monitoring meter device kit     1 kit    Use to test blood sugar 2 times daily or as directed.    Type 2 diabetes mellitus without complication, without long-term current use of insulin (H)       blood glucose monitoring test strip    ACCU-CHEK SMARTVIEW    300 each    Check 3 times daily as directed    Type 2 diabetes mellitus with complication, without long-term current use of insulin (H)       boric acid 600 mg vaginal suppository - PHARMACY TO MIX COMPOUND     21 suppository    Place 1 suppository (600 mg) vaginally At Bedtime    Vaginal irritation       calcipotriene 0.005 % Oint      Apply 1 Application topically as needed        calcium-magnesium 500-250 MG Tabs per tablet    CALMAG     Take 1 tablet by mouth daily        chlorhexidine 0.12 % solution    PERIDEX     RINSE ONE HALF  OZ 2-3 X D AFTER BREAKFAST BEFORE BEDTIME FOLLOWING BRUSHING AND FLOSSIN        cinnamon 500 MG Caps  "     2 tablets daily        estradiol 10 MCG Tabs vaginal tablet    VAGIFEM    8 tablet    Place 1 tablet (10 mcg) vaginally twice a week    Vaginal pain, Vaginal atrophy       fluconazole 150 MG tablet    DIFLUCAN    30 tablet    Take 1 tablet (150 mg) by mouth every 3 days    Yeast infection of the vagina       furosemide 20 MG tablet    LASIX    90 tablet    Take 1 tablet (20 mg) by mouth daily as needed    Edema, unspecified type       gabapentin 300 MG capsule    NEURONTIN    180 capsule    Take 2 capsules (600 mg) by mouth At Bedtime    Neuropathy       glipiZIDE-metFORMIN 5-500 MG per tablet    METAGLIP    360 tablet    Take 2 tablets by mouth 2 times daily (before meals)    Type 2 diabetes mellitus without complication, without long-term current use of insulin (H)       insulin  UNIT/ML injection    NovoLIN N VIAL    10 mL    Inject 22 units before breakfast and 22 units before dinner    Type 2 diabetes mellitus without complication, without long-term current use of insulin (H)       insulin syringe-needle U-100 31G X 5/16\" 0.3 ML    BD insulin syringe ultrafine    100 each    Use 2 syringes daily or as directed.    Type 2 diabetes mellitus without complication, without long-term current use of insulin (H)       lisinopril 10 MG tablet    PRINIVIL/ZESTRIL    90 tablet    Take 1 tablet (10 mg) by mouth daily    Essential hypertension, benign       POTASSIUM CHLORIDE PO      Take 1 tablet by mouth daily        PROBIOTIC ACIDOPHILUS Tabs      Take 1 tablet by mouth daily        simvastatin 40 MG tablet    ZOCOR    90 tablet    TAKE ONE TABLET BY MOUTH EVERY NIGHT AT BEDTIME    Mixed hyperlipidemia       TUMS 500 MG chewable tablet   Generic drug:  calcium carbonate     90    1 TABLET 3 TIMES PRN        TYLENOL 500 MG tablet   Generic drug:  acetaminophen      Take 1-2 tablets by mouth every morning.    Mixed hyperlipidemia, Gout, unspecified, Type 2 diabetes, HbA1c goal < 7% (H), Hyperlipidemia LDL goal " <100       Vitamin C 500 MG Caps      Take 500 mg by mouth daily        vitamin D 2000 units tablet      Take 1 tablet by mouth daily        vitamin E 400 units Tabs      Take 400 Units by mouth daily

## 2018-10-22 NOTE — PROGRESS NOTES
SUBJECTIVE/OBJECTIVE:                                                    Lindsey Gary is a 76 year old female called in for a follow-up visit for Medication Therapy Management.  She was referred to me from Dinorah Wong.     Chief Complaint: Follow up from our visit on 10/8/18 - elevated blood sugars,she is happy with the improvement over the week    Tobacco: No tobacco use   Alcohol: not currently using    Medication Adherence: no issues reported by patient      Diabetes:  Pt currently taking glipizide-metformin 2 tablets BID and Novolin N 22 units BID.   She has history of UTI so would not be interested in SGLT2s.   Looking at insurance options for next year; the combination glipizide-metformin is more expensive than metformin alone and she questions if she can change.    SMBG: one time daily.   Ranges (patient reported):   -176, 215 (saturday)  Evening meal 81, 74, 232 (before insulin)  Before bed (about 4 hours after eating and after insulin dose): 150, 144, 140    Panic when she was in the 80s - unsure what she ate  Symptoms of low blood sugar? dizzy. Frequency of hypoglycemia? rare  Recent symptoms of high blood sugar? tired  Up to date on eye exam - just checked  Up to date on foot exam  Microalbumin is not < 30 mg/g. Pt is taking an ACEi/ARB.  Aspirin: Taking 81 mg daily - no side effects  Diet/Exercise:   [place] in [place] 3 days per week in the winter; 7 days per week during the summer.      Lab Results   Component Value Date    A1C 8.3 08/09/2018    A1C 9.0 01/02/2018    A1C 8.2 07/27/2017    A1C 7.0 09/22/2016    A1C 8.0 06/16/2016     Gout: Currently taking allopurinol 150 mg every day; changed from 300 mg every other day. Pt reports no current pain concerns but some burning last night. Pt is experiencing the following medication side effects: none.   Uric Acid   Date Value Ref Range Status   08/09/2018 4.0 2.6 - 6.0 mg/dL Final   ]    Current labs include:  BP Readings from Last  3 Encounters:   08/09/18 138/75   06/15/18 110/70   03/27/18 146/80           GFR Estimate   Date Value Ref Range Status   08/09/2018 54 (L) >60 mL/min/1.7m2 Final     Comment:     Non  GFR Calc   03/25/2018 62 >60 mL/min/1.7m2 Final     Comment:     Non  GFR Calc   01/02/2018 88 >60 mL/min/1.7m2 Final     Comment:     Non  GFR Calc     GFR Estimate If Black   Date Value Ref Range Status   08/09/2018 65 >60 mL/min/1.7m2 Final     Comment:      GFR Calc   03/25/2018 75 >60 mL/min/1.7m2 Final     Comment:      GFR Calc   01/02/2018 >90 >60 mL/min/1.7m2 Final     Comment:      GFR Calc     TSH   Date Value Ref Range Status   01/02/2018 1.32 0.40 - 4.00 mU/L Final   ]  There were no vitals taken for this visit. phone visit    Most Recent Immunizations   Administered Date(s) Administered     Influenza (H1N1) 12/01/2009     Influenza (High Dose) 3 valent vaccine 09/13/2018     Influenza (IIV3) PF 09/17/2009     Pneumo Conj 13-V (2010&after) 09/26/2016     Pneumococcal 23 valent 05/16/2013     TD (ADULT, 7+) 10/08/2010     TDAP Vaccine (Adacel) 03/31/2014     ASSESSMENT:                                                    Current medications were reviewed with her today.      Medication Adherence: no issues identified    Diabetes:  Improved.  A1c improved but above goal.  Home readings improved and majority at goal <150.  Reviewed how to treat low readings so she is not over correcting.  Will continue with Novolin 22 units BID.  She will decide next week if she wants to change to metformin alone.    Gout: stable  Pt is at goal of uric acid <6mg/dl.  PLAN:                                                      1.  Novolin 22 units twice daily  2.  If blood sugars <80, eat/drink 15 grams of carbohydrate such as 1/2 cup of juice and recheck in 15 minutes  3.  If blood sugars <130 before bed, eat healthy snack    I spent 15 minutes  with this patient today.  All changes were made via collaborative practice agreement with Dinorah Wong. A copy of the visit note was provided to the patient's primary care provider.     Will follow up 1 week      Beti Jacobo , Pharm D  [phone] (phone)  [phone] (pager)  Medication Therapy Management Pharmacist

## 2018-10-24 ENCOUNTER — TELEPHONE (OUTPATIENT)
Dept: INTERNAL MEDICINE | Facility: CLINIC | Age: 78
End: 2018-10-24

## 2018-10-24 NOTE — TELEPHONE ENCOUNTER
Pt calls stating Dr Millan filled out Handicap form about 6 years ago. It is due to  at the end of November.     She is asking if Dinorah Marvin will fill out a new form for her.     She has arthritis in her lower legs, and feet.     OK to mail, or she can  at clinic.

## 2018-10-29 ENCOUNTER — ALLIED HEALTH/NURSE VISIT (OUTPATIENT)
Dept: PHARMACY | Facility: CLINIC | Age: 78
End: 2018-10-29
Payer: COMMERCIAL

## 2018-10-29 DIAGNOSIS — E11.9 TYPE 2 DIABETES MELLITUS WITHOUT COMPLICATION, WITHOUT LONG-TERM CURRENT USE OF INSULIN (H): Primary | ICD-10-CM

## 2018-10-29 PROCEDURE — 99606 MTMS BY PHARM EST 15 MIN: CPT | Performed by: PHARMACIST

## 2018-10-29 RX ORDER — GLIPIZIDE 10 MG/1
10 TABLET ORAL
Qty: 180 TABLET | Refills: 1 | Status: SHIPPED | OUTPATIENT
Start: 2018-10-29 | End: 2019-01-08

## 2018-10-29 NOTE — MR AVS SNAPSHOT
After Visit Summary   10/29/2018    Lindsey Gary    MRN: [id number]           Patient Information     Date Of Birth          1940        Visit Information        Provider Department      10/29/2018 9:30 AM Beti Jacobo, [facility]        Today's Diagnoses     Type 2 diabetes mellitus without complication, without long-term current use of insulin (H)    -  1       Follow-ups after your visit        Follow-up notes from your care team     Return in about 2 weeks (around 11/12/2018) for Medication Therapy Management.      Your next 10 appointments already scheduled     Nov 12, 2018  9:00 AM CST   [facility]    [address]  [address]  [facility] [id number] [phone]           Note: this is not an onsite visit; there is no need to come to the facility.            Nov 19, 2018 10:20 AM CST   MA SCREENING DIGITAL BILATERAL with [id number]   [facility] ([facility])    [address] [facility], [address]  [facility] [id number] [phone]           How do I prepare for my exam? (Food and drink instructions) No Food and Drink Restrictions.  How do I prepare for my exam? (Other instructions) Do not use any powder, lotion or deodorant under your arms or on your breast. If you do, we will ask you to remove it before your exam.  What should I wear: Wear comfortable, two-piece clothing.  How long does the exam take: Most scans will take 15 minutes.  What should I bring: Bring any previous mammograms from other facilities or have them mailed to the breast center.  Do I need a :  No  is needed.  What do I need to tell my doctor: If you have any allergies, tell your care team.  What should I do after the exam: No restrictions, You may resume normal activities.  What is this test: This test is an x-ray of the breast to look for breast disease. The breast  "is pressed between two plates to flatten and spread the tissue. An X-ray is taken of the breast from different angles.  Who should I call with questions: If you have any questions, please call the Imaging Department where you will have your exam. Directions, parking instructions, and other information is available on our website, [email or web address].  Other information about my exam Three-dimensional (3D) mammograms are available at [place] locations in [facility], [facility], [place], [facility] and [place].  Health locations include [place] and the [facility] in [place].  Benefits of 3D mammograms include * Improved rate of cancer detection * Decreases your chance of having to go back for more tests, which means fewer: * \"False-positive\" results (This means that there is an abnormal area but it isn't cancer.) * Invasive testing procedures, such as a biopsy or surgery * Can provide clearer images of the breast if you have dense breast tissue.  *3D mammography is an optional exam that anyone can have with a 2D mammogram. It doesn't replace or take the place of a 2D mammogram. 2D mammograms remain an effective screening test for all women.  Not all insurance companies cover the cost of a 3D mammogram. Check with your insurance. Three-dimensional (3D) mammograms are available at [place] locations in [facility], [facility], [facility], and [place]. Health locations include [place] and [facility] in [place]. Benefits of 3D mammograms include: - Improved rate of cancer detection - Decreases your chance of having to go back for more tests, which means fewer: - \"False-positive\" results (This means that there is an abnormal area but it isn't cancer.) - Invasive testing procedures, such as a biopsy or surgery - Can provide clearer images of the breast if you have dense breast tissue. 3D mammography is " "an optional exam that anyone can have with a 2D mammogram. It doesn't replace or take the place of a 2D mammogram. 2D mammograms remain an effective screening test for all women.  Not all insurance companies cover the cost of a 3D mammogram. Check with your insurance.              Future tests that were ordered for you today     Open Future Orders        Priority Expected Expires Ordered    Hemoglobin A1c Routine  10/29/2019 10/29/2018            Who to contact     If you have questions or need follow up information about today's clinic visit or your schedule please contact [facility] directly at [phone].  Normal or non-critical lab and imaging results will be communicated to you by [employer], letter or phone within 4 business days after the clinic has received the results. If you do not hear from us within 7 days, please contact the clinic through My Dentist[employer] or phone. If you have a critical or abnormal lab result, we will notify you by phone as soon as possible.  Submit refill requests through [employer] or call your pharmacy and they will forward the refill request to us. Please allow 3 business days for your refill to be completed.          Additional Information About Your Visit        [employer] Information     [employer] lets you send messages to your doctor, view your test results, renew your prescriptions, schedule appointments and more. To sign up, go to [email or web address] . Click on \"Log in\" on the left side of the screen, which will take you to the Welcome page. Then click on \"Sign up Now\" on the right side of the page.     You will be asked to enter the access code listed below, as well as some personal information. Please follow the directions to create your username and password.     Your access code is: [email or web address]  Expires: 2018 11:21 AM     Your access code will  in 90 days. If you need help or a new code, please call your [facility] or [phone].        Care EveryWhere ID  "    This is your Care EveryWhere ID. This could be used by other organizations to access your [place] medical records  [id number]         Blood Pressure from Last 3 Encounters:   08/09/18 138/75   06/15/18 110/70   03/27/18 146/80    Weight from Last 3 Encounters:   06/15/18 185 lb (83.9 kg)   03/27/18 184 lb (83.5 kg)   03/25/18 172 lb (78 kg)                 Today's Medication Changes          These changes are accurate as of 10/29/18  9:44 AM.  If you have any questions, ask your nurse or doctor.               Start taking these medicines.        Dose/Directions    glipiZIDE 10 MG tablet   Commonly known as:  GLUCOTROL   Used for:  Type 2 diabetes mellitus without complication, without long-term current use of insulin (H)        Dose:  10 mg   Take 1 tablet (10 mg) by mouth 2 times daily (before meals)   Quantity:  180 tablet   Refills:  1       metFORMIN 1000 MG tablet   Commonly known as:  GLUCOPHAGE   Used for:  Type 2 diabetes mellitus without complication, without long-term current use of insulin (H)        Dose:  1000 mg   Take 1 tablet (1,000 mg) by mouth 2 times daily (with meals)   Quantity:  180 tablet   Refills:  1         Stop taking these medicines if you haven't already. Please contact your care team if you have questions.     glipiZIDE-metFORMIN 5-500 MG per tablet   Commonly known as:  METAGLIP                Where to get your medicines      These medications were sent to [facility] - [place], [place] - [address] AT Portal to Registered [facility] Sites  [address], [facility] [address]     Phone:  [phone]     glipiZIDE 10 MG tablet    metFORMIN 1000 MG tablet                Primary Care Provider Office Phone # Fax #    LILLIAM Sma [phone]       [address]  [facility] [address]        Equal Access to Services     ASHA OCAMPO AH: Stacia Yi, sho leon, jamey kajanet callahan  nam marineswetha lorridipesh hardwick ah. So [facility] [phone].    ATENCIÓN: Si habla renetta, tiene a toribio disposición servicios gratuitos de asistencia lingüística. Bety al [phone].    We comply with applicable federal civil rights laws and [place] laws. We do not discriminate on the basis of race, color, national origin, age, disability, sex, sexual orientation, or gender identity.            Thank you!     Thank you for choosing [facility]  for your care. Our goal is always to provide you with excellent care. Hearing back from our patients is one way we can continue to improve our services. Please take a few minutes to complete the written survey that you may receive in the mail after your visit with us. Thank you!             Your Updated Medication List - Protect others around you: Learn how to safely use, store and throw away your medicines at www.disposemymeds.org.          This list is accurate as of 10/29/18  9:44 AM.  Always use your most recent med list.                   Brand Name Dispense Instructions for use Diagnosis    allopurinol 300 MG tablet    ZYLOPRIM    90 tablet    Take 0.5 tablets (150 mg) by mouth daily    Gout, unspecified cause, unspecified chronicity, unspecified site       aspirin 81 MG tablet     100    1 tablet daily        betamethasone (augmented) 0.05 % lotion    DIPROLENE     GINA 10 TO 20 DROPS TOPICALLY AA OF SCALP Q NIGHT UTD        blood glucose monitoring lancets     300 each    Test 3 times daily as directed    Type 2 diabetes, HbA1c goal < 7% (H)       blood glucose monitoring meter device kit     1 kit    Use to test blood sugar 2 times daily or as directed.    Type 2 diabetes mellitus without complication, without long-term current use of insulin (H)       blood glucose monitoring test strip    ACCU-CHEK SMARTVIEW    300 each    Check 3 times daily as directed    Type 2 diabetes mellitus with complication, without long-term current use of insulin (H)       boric acid  "600 mg vaginal suppository - PHARMACY TO MIX COMPOUND     21 suppository    Place 1 suppository (600 mg) vaginally At Bedtime    Vaginal irritation       calcipotriene 0.005 % Oint      Apply 1 Application topically as needed        calcium-magnesium 500-250 MG Tabs per tablet    CALMAG     Take 1 tablet by mouth daily        chlorhexidine 0.12 % solution    PERIDEX     RINSE ONE HALF  OZ 2-3 X D AFTER BREAKFAST BEFORE BEDTIME FOLLOWING BRUSHING AND FLOSSIN        cinnamon 500 MG Caps      2 tablets daily        estradiol 10 MCG Tabs vaginal tablet    VAGIFEM    8 tablet    Place 1 tablet (10 mcg) vaginally twice a week    Vaginal pain, Vaginal atrophy       fluconazole 150 MG tablet    DIFLUCAN    30 tablet    Take 1 tablet (150 mg) by mouth every 3 days    Yeast infection of the vagina       furosemide 20 MG tablet    LASIX    90 tablet    Take 1 tablet (20 mg) by mouth daily as needed    Edema, unspecified type       gabapentin 300 MG capsule    NEURONTIN    180 capsule    Take 2 capsules (600 mg) by mouth At Bedtime    Neuropathy       glipiZIDE 10 MG tablet    GLUCOTROL    180 tablet    Take 1 tablet (10 mg) by mouth 2 times daily (before meals)    Type 2 diabetes mellitus without complication, without long-term current use of insulin (H)       insulin  UNIT/ML injection    NovoLIN N VIAL    10 mL    Inject 22 units before breakfast and 22 units before dinner    Type 2 diabetes mellitus without complication, without long-term current use of insulin (H)       insulin syringe-needle U-100 31G X 5/16\" 0.3 ML    BD insulin syringe ultrafine    100 each    Use 2 syringes daily or as directed.    Type 2 diabetes mellitus without complication, without long-term current use of insulin (H)       lisinopril 10 MG tablet    PRINIVIL/ZESTRIL    90 tablet    Take 1 tablet (10 mg) by mouth daily    Essential hypertension, benign       metFORMIN 1000 MG tablet    GLUCOPHAGE    180 tablet    Take 1 tablet (1,000 mg) by " mouth 2 times daily (with meals)    Type 2 diabetes mellitus without complication, without long-term current use of insulin (H)       POTASSIUM CHLORIDE PO      Take 1 tablet by mouth daily        PROBIOTIC ACIDOPHILUS Tabs      Take 1 tablet by mouth daily        simvastatin 40 MG tablet    ZOCOR    90 tablet    TAKE ONE TABLET BY MOUTH EVERY NIGHT AT BEDTIME    Mixed hyperlipidemia       TUMS 500 MG chewable tablet   Generic drug:  calcium carbonate     90    1 TABLET 3 TIMES PRN        TYLENOL 500 MG tablet   Generic drug:  acetaminophen      Take 1-2 tablets by mouth every morning.    Mixed hyperlipidemia, Gout, unspecified, Type 2 diabetes, HbA1c goal < 7% (H), Hyperlipidemia LDL goal <100       Vitamin C 500 MG Caps      Take 500 mg by mouth daily        vitamin D 2000 units tablet      Take 1 tablet by mouth daily        vitamin E 400 units Tabs      Take 400 Units by mouth daily

## 2018-10-29 NOTE — PROGRESS NOTES
SUBJECTIVE/OBJECTIVE:                                                    Lindsey Gary is a 76 year old female called in for a follow-up visit for Medication Therapy Management.  She was referred to me from Dinorah Wong.     Chief Complaint: Follow up from our visit on 10/8/18 - elevated blood sugars,she is happy with the improvement over the week    Tobacco: No tobacco use   Alcohol: not currently using    Medication Adherence: no issues reported by patient      Diabetes:  Pt currently taking glipizide-metformin 2 tablets BID and Novolin N 22 units BID.   She has history of UTI so would not be interested in SGLT2s.   Looking at insurance options for next year; the combination glipizide-metformin is more expensive than metformin alone and she questions if she can change.    SMBG: one time daily.   Ranges (patient reported):   , 177, 140, 152, 155, 155, 124, 144  Before evening meal 161, 211, 217, 275, 236, 99  930 pm  138, 156, 219, 99, 156, 172    Symptoms of low blood sugar? dizzy. Frequency of hypoglycemia? rare  Recent symptoms of high blood sugar? tired  Up to date on eye exam - just checked  Up to date on foot exam  Microalbumin is not < 30 mg/g. Pt is taking an ACEi/ARB.  Aspirin: Taking 81 mg daily - no side effects  Diet/Exercise:   [place] in the [place] 3 days per week in the winter; 7 days per week during the summer.    Had Shingles vaccine Friday - arm red and puffy now.  Warm as well.  Has been taking Advil to help.  Feels it has gotten better since starting.      Lab Results   Component Value Date    A1C 8.3 08/09/2018    A1C 9.0 01/02/2018    A1C 8.2 07/27/2017    A1C 7.0 09/22/2016    A1C 8.0 06/16/2016         Current labs include:  BP Readings from Last 3 Encounters:   08/09/18 138/75   06/15/18 110/70   03/27/18 146/80           GFR Estimate   Date Value Ref Range Status   08/09/2018 54 (L) >60 mL/min/1.7m2 Final     Comment:     Non  GFR Calc   03/25/2018 62 >60  mL/min/1.7m2 Final     Comment:     Non  GFR Calc   01/02/2018 88 >60 mL/min/1.7m2 Final     Comment:     Non  GFR Calc     GFR Estimate If Black   Date Value Ref Range Status   08/09/2018 65 >60 mL/min/1.7m2 Final     Comment:      GFR Calc   03/25/2018 75 >60 mL/min/1.7m2 Final     Comment:      GFR Calc   01/02/2018 >90 >60 mL/min/1.7m2 Final     Comment:      GFR Calc     TSH   Date Value Ref Range Status   01/02/2018 1.32 0.40 - 4.00 mU/L Final   ]  There were no vitals taken for this visit. phone visit    Most Recent Immunizations   Administered Date(s) Administered     Influenza (H1N1) 12/01/2009     Influenza (High Dose) 3 valent vaccine 09/13/2018     Influenza (IIV3) PF 09/17/2009     Pneumo Conj 13-V (2010&after) 09/26/2016     Pneumococcal 23 valent 05/16/2013     TD (ADULT, 7+) 10/08/2010     TDAP Vaccine (Adacel) 03/31/2014     Zoster vaccine recombinant adjuvanted (SHINGRIX) 10/26/2018     ASSESSMENT:                                                    Current medications were reviewed with her today.      Medication Adherence: no issues identified    Diabetes:  Improved.  A1c improved but above goal.  Home readings improved and majority at goal <150.   Will continue with Novolin 22 units BID.  She will log afternoon snacks to see if there is room for improvement since her pre-dinner readings are higher.  Due for A1c and follow-up with PCP in Nov.  Changed to the individual glipizide and metformin since this would be less expensive for her.    PLAN:                                                      1.  Novolin 22 units twice daily  2.  Metformin 1000 mg BID and glipizide 10 mg BID  3.  Log afternoon snacks   4.  Due for A1c and PCP follow-up in Nov.    I spent 15 minutes with this patient today.  All changes were made via collaborative practice agreement with Dinorah Wong. A copy of the visit note was provided to  the patient's primary care provider.     Will follow up 2 weeks      Radha Munson  [phone] (phone)  [phone] (pager)  Medication Therapy Management Pharmacist

## 2018-11-12 ENCOUNTER — TRANSFERRED RECORDS (OUTPATIENT)
Dept: HEALTH INFORMATION MANAGEMENT | Facility: CLINIC | Age: 78
End: 2018-11-12

## 2018-11-12 ENCOUNTER — ALLIED HEALTH/NURSE VISIT (OUTPATIENT)
Dept: PHARMACY | Facility: CLINIC | Age: 78
End: 2018-11-12
Payer: COMMERCIAL

## 2018-11-12 DIAGNOSIS — M10.9 GOUT, UNSPECIFIED CAUSE, UNSPECIFIED CHRONICITY, UNSPECIFIED SITE: Primary | ICD-10-CM

## 2018-11-12 DIAGNOSIS — E11.9 TYPE 2 DIABETES MELLITUS WITHOUT COMPLICATION, WITHOUT LONG-TERM CURRENT USE OF INSULIN (H): ICD-10-CM

## 2018-11-12 PROCEDURE — 99606 MTMS BY PHARM EST 15 MIN: CPT | Performed by: PHARMACIST

## 2018-11-12 NOTE — PROGRESS NOTES
SUBJECTIVE/OBJECTIVE:                                                    Lindsey Gary is a 76 year old female called in for a follow-up visit for Medication Therapy Management.  She was referred to me from Dinorah Wong.     Chief Complaint: Follow up from our visit on 10/29/18 - elevated blood sugars,she is happy with the improvement over the week    Tobacco: No tobacco use   Alcohol: not currently using    Medication Adherence: no issues reported by patient      Diabetes:  Pt currently taking metformin 1000 mg BID and glipizide 10 mg BID and Novolin N 23 units BID.   She has history of UTI so would not be interested in SGLT2s.   Looking at insurance options for next year; the combination glipizide-metformin is more expensive than metformin alone and she questions if she can change.    SMBG: one time daily.   Ranges (patient reported):   , 149, 143, 123, 171, 174  Before evening meal 220, 187, 165, 139, 148, 200, 180  930 pm  141, 245 (after Cheerios)    Symptoms of low blood sugar? Dizzy spell last night - maybe due to her eyes. Blood sugar was 150 whcn she checked.  Feels a little pressure, tightening in her head.  Frequency of hypoglycemia? rare  Recent symptoms of high blood sugar? tired  Has eye exam today   Up to date on foot exam  Microalbumin is not < 30 mg/g. Pt is taking an ACEi/ARB.  Aspirin: Taking 81 mg daily - no side effects  Diet/Exercise:   [place] the [place] in the [place] 3 days per week in the winter; 7 days per week during the summer.    Had Shingles vaccine Friday - arm red and puffy now.  Warm as well.  Has been taking Advil to help.  Feels it has gotten better since starting.      Lab Results   Component Value Date    A1C 8.3 08/09/2018    A1C 9.0 01/02/2018    A1C 8.2 07/27/2017    A1C 7.0 09/22/2016    A1C 8.0 06/16/2016     Gout: Currently taking allopurinol 150 mg. Pt reports recent flare one time recently at night; unsure if this is gout or neuropathy - but nothing she can't  tolerate. Pt is experiencing the following medication side effects: none.   Uric Acid   Date Value Ref Range Status   08/09/2018 4.0 2.6 - 6.0 mg/dL Final   ]      Current labs include:  BP Readings from Last 3 Encounters:   08/09/18 138/75   06/15/18 110/70   03/27/18 146/80           GFR Estimate   Date Value Ref Range Status   08/09/2018 54 (L) >60 mL/min/1.7m2 Final     Comment:     Non  GFR Calc   03/25/2018 62 >60 mL/min/1.7m2 Final     Comment:     Non  GFR Calc   01/02/2018 88 >60 mL/min/1.7m2 Final     Comment:     Non  GFR Calc     GFR Estimate If Black   Date Value Ref Range Status   08/09/2018 65 >60 mL/min/1.7m2 Final     Comment:      GFR Calc   03/25/2018 75 >60 mL/min/1.7m2 Final     Comment:      GFR Calc   01/02/2018 >90 >60 mL/min/1.7m2 Final     Comment:      GFR Calc     TSH   Date Value Ref Range Status   01/02/2018 1.32 0.40 - 4.00 mU/L Final   ]  There were no vitals taken for this visit. phone visit    Most Recent Immunizations   Administered Date(s) Administered     Influenza (H1N1) 12/01/2009     Influenza (High Dose) 3 valent vaccine 09/13/2018     Influenza (IIV3) PF 09/17/2009     Pneumo Conj 13-V (2010&after) 09/26/2016     Pneumococcal 23 valent 05/16/2013     TD (ADULT, 7+) 10/08/2010     TDAP Vaccine (Adacel) 03/31/2014     Zoster vaccine recombinant adjuvanted (SHINGRIX) 10/26/2018     ASSESSMENT:                                                    Current medications were reviewed with her today.      Medication Adherence: no issues identified    Diabetes:  Improved.  A1c improved but above goal.  Home readings improved and majority at goal <150.   Will increase Novolin to 25 units QAM and 23 units QPM.    Due for A1c and follow-up with PCP.    Gout: Stable.  Pt is at goal of uric acid <6mg/dl.    PLAN:                                                      1.  Novolin 25 units every  morning and 23 units every evening  2.  Due for A1c and follow-up with PCP    I spent 15 minutes with this patient today.  All changes were made via collaborative practice agreement with Dinorah Wong. A copy of the visit note was provided to the patient's primary care provider.     Will follow up 2 months      Beti Jacobo , Radha D  [phone] (phone)  [phone] (pager)  Medication Therapy Management Pharmacist

## 2018-11-12 NOTE — MR AVS SNAPSHOT
After Visit Summary   11/12/2018    Lindsey Gary    MRN: [id number]           Patient Information     Date Of Birth          1940        Visit Information        Provider Department      11/12/2018 9:00 AM Beti Jacobo, [facility]        Today's Diagnoses     Gout, unspecified cause, unspecified chronicity, unspecified site    -  1    Type 2 diabetes mellitus without complication, without long-term current use of insulin (H)           Follow-ups after your visit        Follow-up notes from your care team     Return in about 2 months (around 1/12/2019) for Medication Therapy Management.      Your next 10 appointments already scheduled     Nov 19, 2018 10:20 AM CST   MA SCREENING DIGITAL BILATERAL with [id number]   [facility] ([facility])    [address] [facility], [address]  [facility] [address] [phone]           How do I prepare for my exam? (Food and drink instructions) No Food and Drink Restrictions.  How do I prepare for my exam? (Other instructions) Do not use any powder, lotion or deodorant under your arms or on your breast. If you do, we will ask you to remove it before your exam.  What should I wear: Wear comfortable, two-piece clothing.  How long does the exam take: Most scans will take 15 minutes.  What should I bring: Bring any previous mammograms from other facilities or have them mailed to the breast center.  Do I need a :  No  is needed.  What do I need to tell my doctor: If you have any allergies, tell your care team.  What should I do after the exam: No restrictions, You may resume normal activities.  What is this test: This test is an x-ray of the breast to look for breast disease. The breast is pressed between two plates to flatten and spread the tissue. An X-ray is taken of the breast from different angles.  Who should I call with questions: If you have any questions, please call the Imaging Department where you  "will have your exam. Directions, parking instructions, and other information is available on our website, [email or web address].  Other information about my exam Three-dimensional (3D) mammograms are available at [facility] locations in [facility], [facility], [place], [facility] and [place].  Health locations include [place] and [facility] in [place].  Benefits of 3D mammograms include * Improved rate of cancer detection * Decreases your chance of having to go back for more tests, which means fewer: * \"False-positive\" results (This means that there is an abnormal area but it isn't cancer.) * Invasive testing procedures, such as a biopsy or surgery * Can provide clearer images of the breast if you have dense breast tissue.  *3D mammography is an optional exam that anyone can have with a 2D mammogram. It doesn't replace or take the place of a 2D mammogram. 2D mammograms remain an effective screening test for all women.  Not all insurance companies cover the cost of a 3D mammogram. Check with your insurance. Three-dimensional (3D) mammograms are available at [facility] locations in [facility], [facility], [facility], and [place]. Health locations include [place] and [facility] in [place]. Benefits of 3D mammograms include: - Improved rate of cancer detection - Decreases your chance of having to go back for more tests, which means fewer: - \"False-positive\" results (This means that there is an abnormal area but it isn't cancer.) - Invasive testing procedures, such as a biopsy or surgery - Can provide clearer images of the breast if you have dense breast tissue. 3D mammography is an optional exam that anyone can have with a 2D mammogram. It doesn't replace or take the place of a 2D mammogram. 2D mammograms remain an effective screening test for all women.  Not all insurance companies cover the cost of " "a 3D mammogram. Check with your insurance.              Who to contact     If you have questions or need follow up information about today's clinic visit or your schedule please contact [facility] directly at [phone].  Normal or non-critical lab and imaging results will be communicated to you by MyChart, letter or phone within 4 business days after the clinic has received the results. If you do not hear from us within 7 days, please contact the clinic through MyChart or phone. If you have a critical or abnormal lab result, we will notify you by phone as soon as possible.  Submit refill requests through [employer] or call your pharmacy and they will forward the refill request to us. Please allow 3 business days for your refill to be completed.          Additional Information About Your Visit        [facility] Information     [facility] lets you send messages to your doctor, view your test results, renew your prescriptions, schedule appointments and more. To sign up, go to [email or web address] . Click on \"Log in\" on the left side of the screen, which will take you to the Welcome page. Then click on \"Sign up Now\" on the right side of the page.     You will be asked to enter the access code listed below, as well as some personal information. Please follow the directions to create your username and password.     Your access code is: [email or web address]  Expires: 2018 10:21 AM     Your access code will  in 90 days. If you need help or a new code, please call your [facility] clinic or [phone].        Care EveryWhere ID     This is your Care EveryWhere ID. This could be used by other organizations to access your [facility] medical records  [id number]         Blood Pressure from Last 3 Encounters:   18 138/75   06/15/18 110/70   18 146/80    Weight from Last 3 Encounters:   06/15/18 185 lb (83.9 kg)   18 184 lb (83.5 kg)   18 172 lb (78 kg)              Today, you had the following     " No orders found for display         Today's Medication Changes          These changes are accurate as of 11/12/18  9:14 AM.  If you have any questions, ask your nurse or doctor.               These medicines have changed or have updated prescriptions.        Dose/Directions    insulin  UNIT/ML injection   Commonly known as:  NovoLIN N VIAL   This may have changed:  additional instructions   Used for:  Type 2 diabetes mellitus without complication, without long-term current use of insulin (H)        Inject 25 units before breakfast and 23 units before dinner   Quantity:  10 mL   Refills:  3            Where to get your medicines      These medications were sent to [facility] [address] [place] [address] [facility]  [address] [facility] [address]     Phone:  [phone]     insulin  UNIT/ML injection                Primary Care Provider Office Phone # Fax #    Dinorah Guonicolette Wong, APRN [facility] [phone]       [address] [facility] [address]        Equal Access to Services     ASHA OCAMPO : Hadii aad ku hadasho Soomaali, waaxda luqadaha, qaybta kaalmada adeegyada, waxay rodriguein haynikn syd kharadonavon hardwick . So [facility] [phone].    ATENCIÓN: Si habla español, tiene a toribio disposición servicios gratuitos de asistencia lingüística. Carieame al [phone].    We comply with applicable federal civil rights laws and [place] laws. We do not discriminate on the basis of race, color, national origin, age, disability, sex, sexual orientation, or gender identity.            Thank you!     Thank you for choosing [facility]  for your care. Our goal is always to provide you with excellent care. Hearing back from our patients is one way we can continue to improve our services. Please take a few minutes to complete the written survey that you may receive in the mail after your visit with us. Thank you!             Your Updated Medication List - Protect others around  you: Learn how to safely use, store and throw away your medicines at www.disposemymeds.org.          This list is accurate as of 11/12/18  9:14 AM.  Always use your most recent med list.                   Brand Name Dispense Instructions for use Diagnosis    allopurinol 300 MG tablet    ZYLOPRIM    90 tablet    Take 0.5 tablets (150 mg) by mouth daily    Gout, unspecified cause, unspecified chronicity, unspecified site       aspirin 81 MG tablet     100    1 tablet daily        betamethasone (augmented) 0.05 % lotion    DIPROLENE     GINA 10 TO 20 DROPS TOPICALLY AA OF SCALP Q NIGHT UTD        blood glucose monitoring lancets     300 each    Test 3 times daily as directed    Type 2 diabetes, HbA1c goal < 7% (H)       blood glucose monitoring meter device kit     1 kit    Use to test blood sugar 2 times daily or as directed.    Type 2 diabetes mellitus without complication, without long-term current use of insulin (H)       blood glucose monitoring test strip    ACCU-CHEK SMARTVIEW    300 each    Check 3 times daily as directed    Type 2 diabetes mellitus with complication, without long-term current use of insulin (H)       boric acid 600 mg vaginal suppository - PHARMACY TO MIX COMPOUND     21 suppository    Place 1 suppository (600 mg) vaginally At Bedtime    Vaginal irritation       calcipotriene 0.005 % Oint      Apply 1 Application topically as needed        calcium-magnesium 500-250 MG Tabs per tablet    CALMAG     Take 1 tablet by mouth daily        chlorhexidine 0.12 % solution    PERIDEX     RINSE ONE HALF  OZ 2-3 X D AFTER BREAKFAST BEFORE BEDTIME FOLLOWING BRUSHING AND FLOSSIN        cinnamon 500 MG Caps      2 tablets daily        estradiol 10 MCG Tabs vaginal tablet    VAGIFEM    8 tablet    Place 1 tablet (10 mcg) vaginally twice a week    Vaginal pain, Vaginal atrophy       fluconazole 150 MG tablet    DIFLUCAN    30 tablet    Take 1 tablet (150 mg) by mouth every 3 days    Yeast infection of the vagina  "      furosemide 20 MG tablet    LASIX    90 tablet    Take 1 tablet (20 mg) by mouth daily as needed    Edema, unspecified type       gabapentin 300 MG capsule    NEURONTIN    180 capsule    Take 2 capsules (600 mg) by mouth At Bedtime    Neuropathy       glipiZIDE 10 MG tablet    GLUCOTROL    180 tablet    Take 1 tablet (10 mg) by mouth 2 times daily (before meals)    Type 2 diabetes mellitus without complication, without long-term current use of insulin (H)       insulin  UNIT/ML injection    NovoLIN N VIAL    10 mL    Inject 25 units before breakfast and 23 units before dinner    Type 2 diabetes mellitus without complication, without long-term current use of insulin (H)       insulin syringe-needle U-100 31G X 5/16\" 0.3 ML    BD insulin syringe ultrafine    100 each    Use 2 syringes daily or as directed.    Type 2 diabetes mellitus without complication, without long-term current use of insulin (H)       lisinopril 10 MG tablet    PRINIVIL/ZESTRIL    90 tablet    Take 1 tablet (10 mg) by mouth daily    Essential hypertension, benign       metFORMIN 1000 MG tablet    GLUCOPHAGE    180 tablet    Take 1 tablet (1,000 mg) by mouth 2 times daily (with meals)    Type 2 diabetes mellitus without complication, without long-term current use of insulin (H)       POTASSIUM CHLORIDE PO      Take 1 tablet by mouth daily        PROBIOTIC ACIDOPHILUS Tabs      Take 1 tablet by mouth daily        simvastatin 40 MG tablet    ZOCOR    90 tablet    TAKE ONE TABLET BY MOUTH EVERY NIGHT AT BEDTIME    Mixed hyperlipidemia       TUMS 500 MG chewable tablet   Generic drug:  calcium carbonate     90    1 TABLET 3 TIMES PRN        TYLENOL 500 MG tablet   Generic drug:  acetaminophen      Take 1-2 tablets by mouth every morning.    Mixed hyperlipidemia, Gout, unspecified, Type 2 diabetes, HbA1c goal < 7% (H), Hyperlipidemia LDL goal <100       Vitamin C 500 MG Caps      Take 500 mg by mouth daily        vitamin D 2000 units tablet "      Take 1 tablet by mouth daily        vitamin E 400 units Tabs      Take 400 Units by mouth daily

## 2018-11-19 ENCOUNTER — HOSPITAL ENCOUNTER (OUTPATIENT)
Dept: MAMMOGRAPHY | Facility: CLINIC | Age: 78
Discharge: HOME | End: 2018-11-19
Attending: NURSE PRACTITIONER | Admitting: NURSE PRACTITIONER
Payer: MEDICARE

## 2018-11-19 DIAGNOSIS — Z12.31 VISIT FOR SCREENING MAMMOGRAM: ICD-10-CM

## 2018-11-19 PROCEDURE — 77063 BREAST TOMOSYNTHESIS BI: CPT

## 2018-11-26 ENCOUNTER — TELEPHONE (OUTPATIENT)
Dept: INTERNAL MEDICINE | Facility: CLINIC | Age: 78
End: 2018-11-26

## 2018-11-26 NOTE — TELEPHONE ENCOUNTER
Pt needs her clindamycin for dental appt. This Thursday. Please send to [facility] on [address]

## 2018-11-27 RX ORDER — CLINDAMYCIN HCL 300 MG
600 CAPSULE ORAL
Qty: 2 CAPSULE | Refills: 3 | Status: SHIPPED | OUTPATIENT
Start: 2018-11-27 | End: 2019-01-08

## 2018-12-06 ENCOUNTER — TRANSFERRED RECORDS (OUTPATIENT)
Dept: HEALTH INFORMATION MANAGEMENT | Facility: CLINIC | Age: 78
End: 2018-12-06

## 2018-12-22 ENCOUNTER — OFFICE VISIT (OUTPATIENT)
Dept: URGENT CARE | Facility: URGENT CARE | Age: 78
End: 2018-12-22
Payer: COMMERCIAL

## 2018-12-22 DIAGNOSIS — R30.0 DYSURIA: Primary | ICD-10-CM

## 2018-12-22 PROCEDURE — 81001 URINALYSIS AUTO W/SCOPE: CPT | Performed by: PHYSICIAN ASSISTANT

## 2018-12-23 ENCOUNTER — OFFICE VISIT (OUTPATIENT)
Dept: URGENT CARE | Facility: URGENT CARE | Age: 78
End: 2018-12-23
Payer: COMMERCIAL

## 2018-12-23 VITALS
OXYGEN SATURATION: 100 % | SYSTOLIC BLOOD PRESSURE: 132 MMHG | HEART RATE: 83 BPM | DIASTOLIC BLOOD PRESSURE: 80 MMHG | TEMPERATURE: 97 F

## 2018-12-23 DIAGNOSIS — R31.0 GROSS HEMATURIA: Primary | ICD-10-CM

## 2018-12-23 DIAGNOSIS — E11.9 TYPE 2 DIABETES MELLITUS WITHOUT COMPLICATION, WITHOUT LONG-TERM CURRENT USE OF INSULIN (H): ICD-10-CM

## 2018-12-23 LAB
ALBUMIN UR-MCNC: NEGATIVE MG/DL
ALBUMIN UR-MCNC: NORMAL MG/DL
APPEARANCE UR: CLEAR
APPEARANCE UR: NORMAL
BILIRUB UR QL STRIP: NEGATIVE
BILIRUB UR QL STRIP: NORMAL
COLOR UR AUTO: NORMAL
COLOR UR AUTO: YELLOW
GLUCOSE UR STRIP-MCNC: NEGATIVE MG/DL
GLUCOSE UR STRIP-MCNC: NORMAL MG/DL
HGB UR QL STRIP: ABNORMAL
HGB UR QL STRIP: NORMAL
KETONES UR STRIP-MCNC: NEGATIVE MG/DL
KETONES UR STRIP-MCNC: NORMAL MG/DL
LEUKOCYTE ESTERASE UR QL STRIP: NEGATIVE
LEUKOCYTE ESTERASE UR QL STRIP: NORMAL
NITRATE UR QL: NEGATIVE
NITRATE UR QL: NORMAL
NON-SQ EPI CELLS #/AREA URNS LPF: NORMAL /LPF
PH UR STRIP: 5.5 PH (ref 5–7)
PH UR STRIP: NORMAL PH (ref 5–7)
RBC #/AREA URNS AUTO: NORMAL /HPF
RBC #/AREA URNS AUTO: NORMAL /HPF (ref 0–2)
SOURCE: ABNORMAL
SOURCE: NORMAL
SP GR UR STRIP: 1.01 (ref 1–1.03)
SP GR UR STRIP: NORMAL (ref 1–1.03)
UROBILINOGEN UR STRIP-ACNC: 0.2 EU/DL (ref 0.2–1)
UROBILINOGEN UR STRIP-ACNC: NORMAL EU/DL (ref 0.2–1)
WBC #/AREA URNS AUTO: NORMAL /HPF
WBC #/AREA URNS AUTO: NORMAL /HPF

## 2018-12-23 PROCEDURE — 81001 URINALYSIS AUTO W/SCOPE: CPT | Performed by: PHYSICIAN ASSISTANT

## 2018-12-23 PROCEDURE — 99214 OFFICE O/P EST MOD 30 MIN: CPT | Performed by: PHYSICIAN ASSISTANT

## 2018-12-23 PROCEDURE — 87086 URINE CULTURE/COLONY COUNT: CPT | Performed by: PHYSICIAN ASSISTANT

## 2018-12-23 NOTE — PATIENT INSTRUCTIONS
Patient Education     Blood in the Urine    Blood in the urine (hematuria) has many possible causes. If it occurs after an injury (such as a car accident or fall), it is most often a sign of bruising to the kidney or bladder. Common causes of blood in the urine include urinary tract infections, kidney stones, inflammation, tumors, or certain other diseases of the kidney or bladder. Menstruation can cause blood to appear in the urine sample, although it is not coming from the urinary tract.  If only a trace amount of blood is present, it will show up on the urine test, even though the urine may be yellow and not pink or red. This may occur with any of the above conditions, as well as heavy exercise or high fever. In this case, your doctor may want to repeat the urine test on another day. This will show if the blood is still present. If it is, then other tests can be done to find out the cause.  Home care  Follow these home care guidelines:    If your urine does not appear bloody (pink, brown or red) then you do not need to restrict your activity in any way.    If you can see blood in your urine, rest and avoid heavy exertion until your next exam. Do not use aspirin, blood thinners, or anti-platelet or anti-inflammatory medicines. These include ibuprofen and naproxen. These thin the blood and may increase bleeding.  Follow-up care  Follow up with your healthcare provider, or as advised. If you were injured and had blood in your urine, you should have a repeat urine test in 1 to 2 days. Contact your doctor for this test.  A radiologist will review any X-rays that were taken. You will be told of any new findings that may affect your care.  When to seek medical advice  Call your healthcare provider right away if any of these occur:    Bright red blood or blood clots in the urine (if you did not have this before)    Weakness, dizziness or fainting    New groin, abdominal, or back pain    Fever of 100.4 F (38 C) or  higher, or as directed by your healthcare provider    Repeated vomiting    Bleeding from the nose or gums or easy bruising  Date Last Reviewed: 9/1/2016 2000-2018 [employer]. [address] [place] [address], [place] [address]. All rights reserved. This information is not intended as a substitute for professional medical care. Always follow your healthcare professional's instructions.

## 2018-12-23 NOTE — PROGRESS NOTES
"    Lindsey Gary is a 78 y.o. Woman, with a pmhx that includes DM and frequent UTI's who presents to  today requesting urinalysis for possible bladder infection.  Patient is specifically requesting a urine culture.  Patient sates, \"My urine always looks normal but they find my infections on the urine culture.\"       HPI: Patient states she had a single episode of dark blood colored urine Wednesday (4 days ago) in the middle of the night. She has not had any other episodes of dark colored urine. Denies any ponce bloody urination.     Denies any dysuria, urinary urgency/frequency  fever, chills, nausea, vomiting, back or flank pain.      Denies any past hx of kidney stones. Denies any past hx of gross hematuria.  Denies any personal past hx of kidney or bladder cancer. Denies any past hx of smoking.       ROS:      UROLOGY: As per above.GYN : S/P Hyst for benign fibroid per patient report. She denies any pelvic pain. She denies any abdominal pain or fever.  Denies any vaginal bleeding. Denies any stool out of vagina or air out of vagina   GI: Denies any abdominal pain. Denies any F/C/N/V/D  SKIN: Denies any rash or lesions   RHEUM: Denies any red, warm or swollen joints   NEURO: Denies any mental status changes or lethargy   ENDOCRINE: Positive hx of DM. Denies any acute fluctuations in home BS readings over the past 1-2 weeks    Past Medical History:   Diagnosis Date     Essential hypertension, benign      Mixed hyperlipidemia      Pain in joint, ankle and foot     gout     Type II or unspecified type diabetes mellitus without mention of complication, not stated as uncontrolled     Diabetes mellitus     Current Outpatient Medications   Medication     allopurinol (ZYLOPRIM) 300 MG tablet     Ascorbic Acid (VITAMIN C) 500 MG CAPS     ASPIRIN 81 MG OR TABS     betamethasone, augmented, (DIPROLENE) 0.05 % lotion     boric acid 600 mg vaginal suppository - PHARMACY TO MIX COMPOUND     calcipotriene 0.005 % OINT     " "calcium-magnesium (CALMAG) 500-250 MG TABS per tablet     chlorhexidine (PERIDEX) 0.12 % solution     Cholecalciferol (VITAMIN D) 2000 UNITS tablet     CINNAMON 500 MG OR CAPS     estradiol (VAGIFEM) 10 MCG TABS vaginal tablet     fluconazole (DIFLUCAN) 150 MG tablet     gabapentin (NEURONTIN) 300 MG capsule     glipiZIDE (GLUCOTROL) 10 MG tablet     insulin NPH (NOVOLIN N VIAL) 100 UNIT/ML injection     insulin syringe-needle U-100 (BD INSULIN SYRINGE ULTRAFINE) 31G X 5/16\" 0.3 ML     Lactobacillus (PROBIOTIC ACIDOPHILUS) TABS     lisinopril (PRINIVIL/ZESTRIL) 10 MG tablet     metFORMIN (GLUCOPHAGE) 1000 MG tablet     POTASSIUM CHLORIDE PO     simvastatin (ZOCOR) 40 MG tablet     vitamin E 400 UNITS TABS     acetaminophen (TYLENOL) 500 MG tablet     blood glucose monitoring (ACCU-CHEK FASTCLIX) lancets     blood glucose monitoring (ACCU-CHEK GORDO SMARTVIEW) meter device kit     blood glucose monitoring (ACCU-CHEK SMARTVIEW) test strip     clindamycin (CLEOCIN) 300 MG capsule     furosemide (LASIX) 20 MG tablet     TUMS 500 MG OR CHEW     No current facility-administered medications for this visit.      Allergies   Allergen Reactions     Augmentin      Tongue swelling and rash     Can take PCN K without reaction      Sulfa Drugs      Tongue swelling and rash               Component      Latest Ref Rng & Units 12/23/2018   Color Urine       Yellow   Appearance Urine       Clear   Glucose Urine      NEG:Negative mg/dL Negative   Bilirubin Urine      NEG:Negative Negative   Ketones Urine      NEG:Negative mg/dL Negative   Specific Gravity Urine      1.003 - 1.035 1.010   Blood Urine      NEG:Negative Trace (A)   pH Urine      5.0 - 7.0 pH 5.5   Protein Albumin Urine      NEG:Negative mg/dL Negative   Urobilinogen Urine      0.2 - 1.0 EU/dL 0.2   Nitrite Urine      NEG:Negative Negative   Leukocyte Esterase Urine      NEG:Negative Negative   Source       Midstream Urine   WBC Urine      OTO5:0 - 5 /HPF 0 - 5   RBC " Urine      OTO2:O - 2 /HPF O - 2   Squamous Epithelial /LPF Urine      FEW:Few /LPF Few       OBJECTIVE:  /80   Pulse 83   Temp 97  F (36.1  C) (Tympanic)   SpO2 100%     GENERAL:  Very pleasant, comfortable and generally well appearing.  SKIN: No rashes.  Normal color.  Sclera clear.  CARDIAC:NORMAL - regular rate and rhythm without murmur., normal s1/s2 and without extra heart sounds  RESP: Normal - CTA without rales, rhonchi, or wheezing.   ABDOMEN:  Soft, non-tender, non-distended.  Positive normal bowel sounds.  No HSM or masses.  No suprapubic tenderness.  No CVA tenderness.  NEURO: Alert and oriented.  Normal speech and mentation.  CN II/XII grossly intact.  Gait within normal limits.    PSYCH:  NAD    ASSESSMENT/PLAN:    (R31.0) Gross hematuria  (primary encounter diagnosis)    MDM: Single episode of gross hematuria 4 days ago in a woman with past hx of frequent bladder infections. She has not had any ongoing hematuria. She denies any acute UTI sxs now. Denies any fever or systemic sxs. Differential diagnoses for gross hematuria is very broad and includes but is not limited to upper and lower urinary tract infections (although patient has no urinary sxs now or systemic sxs now), kidney stones, polycystic kidneys, bladder cancer and kidney cancer.     PLAN:     1. I have ordered a UCX to fully rule out infection as patient states she has had positive UCX with unremarkable UA in past     2. I have discussed all above potential etiologies for gross hematuria with patient today and have advised she follow-up to see PCP next week for re-evaluation and likely another recheck of urine.     3. I advised patient she should talk to PCP to see if PCP would like her to see urologist for evaluation of gross hematuria work-up to rule out above etiologies     4. Follow-up - immediately if any onset of fever, urgency/frequency, abdominal pain, N/V/D, back pain or return of gross hematuria     5.  In addition to the  "above, hematuria \"red flag\" signs and sxs are reviewed with pt both verbally and by way of printed educational material for home review.  Pt verbalizes understanding of and agrees to the above plan.         Plan: UA reflex to Microscopic and Culture, Urine         Microscopic, Urine Culture Aerobic Bacterial            (E11.9) Type 2 diabetes mellitus without complication, without long-term current use of insulin (H)      Plan: Continue all DM meds as prescribed by PCP. Continue to monitory home BS regularly.       "

## 2018-12-25 LAB
BACTERIA SPEC CULT: NORMAL
SPECIMEN SOURCE: NORMAL

## 2018-12-27 ENCOUNTER — ALLIED HEALTH/NURSE VISIT (OUTPATIENT)
Dept: PHARMACY | Facility: CLINIC | Age: 78
End: 2018-12-27
Payer: COMMERCIAL

## 2018-12-27 ENCOUNTER — TELEPHONE (OUTPATIENT)
Dept: URGENT CARE | Facility: URGENT CARE | Age: 78
End: 2018-12-27

## 2018-12-27 DIAGNOSIS — E11.9 TYPE 2 DIABETES MELLITUS WITHOUT COMPLICATION, WITHOUT LONG-TERM CURRENT USE OF INSULIN (H): ICD-10-CM

## 2018-12-27 DIAGNOSIS — M10.9 GOUT, UNSPECIFIED CAUSE, UNSPECIFIED CHRONICITY, UNSPECIFIED SITE: Primary | ICD-10-CM

## 2018-12-27 DIAGNOSIS — E11.9 TYPE 2 DIABETES MELLITUS WITHOUT COMPLICATION, WITHOUT LONG-TERM CURRENT USE OF INSULIN (H): Primary | ICD-10-CM

## 2018-12-27 PROCEDURE — 99606 MTMS BY PHARM EST 15 MIN: CPT | Performed by: PHARMACIST

## 2018-12-27 NOTE — TELEPHONE ENCOUNTER
Reason for call:  Results   Name of test or procedure: urine culture  Date of test or procedure: 12/2  Location of test or procedure:  [facility]    Additional comments: Patient would like someone to call back to discuss labs from 12/23. Specifically urine culture.     Phone number to reach patient:  Home number on file [phone] (home)    Best Time:  any    Can we leave a detailed message on this number?  NO

## 2018-12-27 NOTE — PROGRESS NOTES
SUBJECTIVE/OBJECTIVE:                                                    Lindsey Gary is a 76 year old female called in for a follow-up visit for Medication Therapy Management.  She was referred to me from Dinorah Wong.     Chief Complaint: Follow up from our visit on 11/12/18 - blood sugars    Tobacco: No tobacco use   Alcohol: not currently using    Medication Adherence: no issues reported by patient      Diabetes:  Pt currently taking metformin 1000 mg BID and glipizide 10 mg BID and Novolin N 23 units BID.   She has history of UTI so would not be interested in SGLT2s.   Looking at insurance options for next year; the combination glipizide-metformin is more expensive than metformin alone and she questions if she can change.    SMBG: one time daily.   Ranges (patient reported):   , 149, 143, 123, 171, 174  Before evening meal 220, 187, 165, 139, 148, 200, 180  930 pm  141, 245 (after Cheerios)    Symptoms of low blood sugar? Dizzy spell last night - maybe due to her eyes. Blood sugar was 150 whcn she checked.  Feels a little pressure, tightening in her head.  Frequency of hypoglycemia? rare  Recent symptoms of high blood sugar? tired  Has eye exam today   Up to date on foot exam  Microalbumin is not < 30 mg/g. Pt is taking an ACEi/ARB.  Aspirin: Taking 81 mg daily - no side effects  Diet/Exercise:   [place] the [facility] in the [place] 3 days per week in the winter; 7 days per week during the summer.    Had Shingles vaccine Friday - arm red and puffy now.  Warm as well.  Has been taking Advil to help.  Feels it has gotten better since starting.      Lab Results   Component Value Date    A1C 8.3 08/09/2018    A1C 9.0 01/02/2018    A1C 8.2 07/27/2017    A1C 7.0 09/22/2016    A1C 8.0 06/16/2016     Gout: Currently taking allopurinol 150 mg. Pt reports recent flare one time recently at night; unsure if this is gout or neuropathy - but nothing she can't tolerate. Pt is experiencing the following medication side  effects: none.   Uric Acid   Date Value Ref Range Status   08/09/2018 4.0 2.6 - 6.0 mg/dL Final   ]      Current labs include:  BP Readings from Last 3 Encounters:   12/23/18 132/80   08/09/18 138/75   06/15/18 110/70           GFR Estimate   Date Value Ref Range Status   08/09/2018 54 (L) >60 mL/min/1.7m2 Final     Comment:     Non  GFR Calc   03/25/2018 62 >60 mL/min/1.7m2 Final     Comment:     Non  GFR Calc   01/02/2018 88 >60 mL/min/1.7m2 Final     Comment:     Non  GFR Calc     GFR Estimate If Black   Date Value Ref Range Status   08/09/2018 65 >60 mL/min/1.7m2 Final     Comment:      GFR Calc   03/25/2018 75 >60 mL/min/1.7m2 Final     Comment:      GFR Calc   01/02/2018 >90 >60 mL/min/1.7m2 Final     Comment:      GFR Calc     TSH   Date Value Ref Range Status   01/02/2018 1.32 0.40 - 4.00 mU/L Final   ]  There were no vitals taken for this visit. phone visit    Most Recent Immunizations   Administered Date(s) Administered     Influenza (H1N1) 12/01/2009     Influenza (High Dose) 3 valent vaccine 09/13/2018     Influenza (IIV3) PF 09/17/2009     Pneumo Conj 13-V (2010&after) 09/26/2016     Pneumococcal 23 valent 05/16/2013     TD (ADULT, 7+) 10/08/2010     TDAP Vaccine (Adacel) 03/31/2014     Zoster vaccine recombinant adjuvanted (SHINGRIX) 12/26/2018     ASSESSMENT:                                                    Current medications were reviewed with her today.      Medication Adherence: no issues identified    Diabetes:  Improved.  A1c improved but above goal.  Home readings improved and majority at goal <150.   Will increase Novolin to 25 units QAM and 23 units QPM.    Due for A1c and follow-up with PCP.    Gout: Stable.  Pt is at goal of uric acid <6mg/dl.    PLAN:                                                      1.  Recheck labs Monday  2.  Insulin 20 units twice daily    I spent 15 minutes with this  patient today.  All changes were made via collaborative practice agreement with Dinorah Wong. A copy of the visit note was provided to the patient's primary care provider.     Will follow up 2 months      Beti Jacobo , Pharm D  [phone] (phone)  [phone] (pager)  Medication Therapy Management Pharmacist

## 2018-12-27 NOTE — PROGRESS NOTES
SUBJECTIVE/OBJECTIVE:                                                    Lindsey Gayr is a 76 year old female called in for a follow-up visit for Medication Therapy Management.  She was referred to me from Dinorah Wong.     Chief Complaint: Follow up from our visit on 11/12/18 - blood sugars    Tobacco: No tobacco use   Alcohol: not currently using    Medication Adherence: no issues reported by patient      Diabetes:  Pt currently taking metformin 1000 mg BID and glipizide 10 mg BID and Novolin N 23 units BID.   She has history of UTI so would not be interested in SGLT2s.     SMBG: one time daily.   Ranges (patient reported):    (this was unusually high for her), 102, 143, 132, 82, 102, 94, 137  Before evening meal 134, 79  930 pm  120,111, 93, 103, 85, 96    Symptoms of low blood sugar? None but very concerned with readings <100 before bed Frequency of hypoglycemia? rare  Recent symptoms of high blood sugar? tired  Has eye exam today   Up to date on foot exam  Microalbumin is not < 30 mg/g. Pt is taking an ACEi/ARB.  Aspirin: Taking 81 mg daily - no side effects  Diet/Exercise:   [place] in [place] morning 3 days per week in the winter; 7 days per week during the summer.      Lab Results   Component Value Date    A1C 8.3 08/09/2018    A1C 9.0 01/02/2018    A1C 8.2 07/27/2017    A1C 7.0 09/22/2016    A1C 8.0 06/16/2016     Gout: Currently taking allopurinol 150 mg. Pt reports recent flare one time recently at night; unsure if this is gout or neuropathy - but nothing she can't tolerate. Pt is experiencing the following medication side effects: none.   Uric Acid   Date Value Ref Range Status   08/09/2018 4.0 2.6 - 6.0 mg/dL Final   ]      Current labs include:  BP Readings from Last 3 Encounters:   12/23/18 132/80   08/09/18 138/75   06/15/18 110/70           GFR Estimate   Date Value Ref Range Status   08/09/2018 54 (L) >60 mL/min/1.7m2 Final     Comment:     Non  GFR Calc   03/25/2018  62 >60 mL/min/1.7m2 Final     Comment:     Non  GFR Calc   01/02/2018 88 >60 mL/min/1.7m2 Final     Comment:     Non  GFR Calc     GFR Estimate If Black   Date Value Ref Range Status   08/09/2018 65 >60 mL/min/1.7m2 Final     Comment:      GFR Calc   03/25/2018 75 >60 mL/min/1.7m2 Final     Comment:      GFR Calc   01/02/2018 >90 >60 mL/min/1.7m2 Final     Comment:      GFR Calc     TSH   Date Value Ref Range Status   01/02/2018 1.32 0.40 - 4.00 mU/L Final   ]  There were no vitals taken for this visit. phone visit    Most Recent Immunizations   Administered Date(s) Administered     Influenza (H1N1) 12/01/2009     Influenza (High Dose) 3 valent vaccine 09/13/2018     Influenza (IIV3) PF 09/17/2009     Pneumo Conj 13-V (2010&after) 09/26/2016     Pneumococcal 23 valent 05/16/2013     TD (ADULT, 7+) 10/08/2010     TDAP Vaccine (Adacel) 03/31/2014     Zoster vaccine recombinant adjuvanted (SHINGRIX) 12/26/2018     ASSESSMENT:                                                    Current medications were reviewed with her today.      Medication Adherence: no issues identified    Diabetes:  Improved.  A1c improved but above goal.  Home readings improved and majority at goal <150.   Per patient request will reduce insulin to 20 units twice daily to prevent hypoglycemia.  Scheduled for labs on Monday.     Gout: Stable.  Pt is at goal of uric acid <6mg/dl.    PLAN:                                                      1.  Novolin N 20 units BID  2.  Labs on Monday    I spent 15 minutes with this patient today.  All changes were made via collaborative practice agreement with Dinorah Wong. A copy of the visit note was provided to the patient's primary care provider.     Will follow up 2 months      Beti Jacobo , Pharm D  [phone] (phone)  [phone] (pager)  Medication Therapy Management Pharmacist

## 2018-12-27 NOTE — TELEPHONE ENCOUNTER
Routing to Provider     Lab results have not been documented by Provider has of yet.     //Gypsy Sims MA// December 27, 2018 4:37 PM

## 2018-12-28 NOTE — TELEPHONE ENCOUNTER
"SUBJECTIVE: The 2018, Urine Culture grew out \"10,000 to 50,000 colonies/mL\" of \"mixed urogenital david.\"      PLAN:  Please notify patient of this result.  Patient does not have a urinary tract infection.    Patient will have to follow up with her primary care provider for further evaluation of the blood in the urine.      Please notify patient of this result.  Her home phone number is the [phone]    Ajay Fish MD        "

## 2018-12-30 NOTE — TELEPHONE ENCOUNTER
Phone call to patient -     Advised of negative urine culture - patient is very upset that she was unable to get to someone in the clinic as she had tried to get through and was unable   Apologized to patient     Glenda Horne Registered Nurse   [facility] and [facility]

## 2018-12-31 DIAGNOSIS — E11.9 DIABETES MELLITUS WITHOUT COMPLICATION (H): ICD-10-CM

## 2018-12-31 DIAGNOSIS — E11.9 TYPE 2 DIABETES MELLITUS WITHOUT COMPLICATION, WITHOUT LONG-TERM CURRENT USE OF INSULIN (H): ICD-10-CM

## 2018-12-31 DIAGNOSIS — M10.9 GOUT: ICD-10-CM

## 2018-12-31 LAB
ANION GAP SERPL CALCULATED.3IONS-SCNC: 7 MMOL/L (ref 3–14)
BUN SERPL-MCNC: 19 MG/DL (ref 7–30)
CALCIUM SERPL-MCNC: 9.1 MG/DL (ref 8.5–10.1)
CHLORIDE SERPL-SCNC: 102 MMOL/L (ref 94–109)
CHOLEST SERPL-MCNC: 159 MG/DL
CO2 SERPL-SCNC: 25 MMOL/L (ref 20–32)
CREAT SERPL-MCNC: 0.76 MG/DL (ref 0.52–1.04)
CREAT UR-MCNC: 169 MG/DL
GFR SERPL CREATININE-BSD FRML MDRD: 76 ML/MIN/{1.73_M2}
GLUCOSE SERPL-MCNC: 116 MG/DL (ref 70–99)
HBA1C MFR BLD: 7 % (ref 0–5.6)
HDLC SERPL-MCNC: 31 MG/DL
LDLC SERPL CALC-MCNC: 103 MG/DL
MICROALBUMIN UR-MCNC: 150 MG/L
MICROALBUMIN/CREAT UR: 88.76 MG/G CR (ref 0–25)
NONHDLC SERPL-MCNC: 128 MG/DL
POTASSIUM SERPL-SCNC: 4.6 MMOL/L (ref 3.4–5.3)
SODIUM SERPL-SCNC: 134 MMOL/L (ref 133–144)
TRIGL SERPL-MCNC: 127 MG/DL
URATE SERPL-MCNC: 6.1 MG/DL (ref 2.6–6)

## 2018-12-31 PROCEDURE — 80048 BASIC METABOLIC PNL TOTAL CA: CPT | Performed by: NURSE PRACTITIONER

## 2018-12-31 PROCEDURE — 84550 ASSAY OF BLOOD/URIC ACID: CPT | Performed by: NURSE PRACTITIONER

## 2018-12-31 PROCEDURE — 82043 UR ALBUMIN QUANTITATIVE: CPT | Performed by: NURSE PRACTITIONER

## 2018-12-31 PROCEDURE — 36415 COLL VENOUS BLD VENIPUNCTURE: CPT | Performed by: NURSE PRACTITIONER

## 2018-12-31 PROCEDURE — 80061 LIPID PANEL: CPT | Performed by: NURSE PRACTITIONER

## 2018-12-31 PROCEDURE — 83036 HEMOGLOBIN GLYCOSYLATED A1C: CPT | Performed by: NURSE PRACTITIONER

## 2019-01-08 ENCOUNTER — OFFICE VISIT (OUTPATIENT)
Dept: INTERNAL MEDICINE | Facility: CLINIC | Age: 79
End: 2019-01-08
Payer: COMMERCIAL

## 2019-01-08 VITALS
SYSTOLIC BLOOD PRESSURE: 130 MMHG | HEIGHT: 64 IN | HEART RATE: 80 BPM | BODY MASS INDEX: 31.99 KG/M2 | DIASTOLIC BLOOD PRESSURE: 80 MMHG | RESPIRATION RATE: 16 BRPM | WEIGHT: 187.4 LBS

## 2019-01-08 DIAGNOSIS — B37.31 YEAST INFECTION OF THE VAGINA: ICD-10-CM

## 2019-01-08 DIAGNOSIS — R10.2 VAGINAL PAIN: ICD-10-CM

## 2019-01-08 DIAGNOSIS — E11.9 TYPE 2 DIABETES MELLITUS WITHOUT COMPLICATION, WITHOUT LONG-TERM CURRENT USE OF INSULIN (H): ICD-10-CM

## 2019-01-08 DIAGNOSIS — N95.2 VAGINAL ATROPHY: ICD-10-CM

## 2019-01-08 DIAGNOSIS — R60.9 EDEMA, UNSPECIFIED TYPE: ICD-10-CM

## 2019-01-08 DIAGNOSIS — E11.8 TYPE 2 DIABETES MELLITUS WITH COMPLICATION, WITHOUT LONG-TERM CURRENT USE OF INSULIN (H): ICD-10-CM

## 2019-01-08 DIAGNOSIS — I10 ESSENTIAL HYPERTENSION, BENIGN: ICD-10-CM

## 2019-01-08 DIAGNOSIS — G62.9 NEUROPATHY: ICD-10-CM

## 2019-01-08 DIAGNOSIS — N76.0 VAGINAL INFECTION: Primary | ICD-10-CM

## 2019-01-08 DIAGNOSIS — M10.9 GOUT, UNSPECIFIED CAUSE, UNSPECIFIED CHRONICITY, UNSPECIFIED SITE: ICD-10-CM

## 2019-01-08 DIAGNOSIS — E78.2 MIXED HYPERLIPIDEMIA: ICD-10-CM

## 2019-01-08 PROCEDURE — 99213 OFFICE O/P EST LOW 20 MIN: CPT | Performed by: NURSE PRACTITIONER

## 2019-01-08 PROCEDURE — 87186 SC STD MICRODIL/AGAR DIL: CPT | Performed by: NURSE PRACTITIONER

## 2019-01-08 PROCEDURE — 87070 CULTURE OTHR SPECIMN AEROBIC: CPT | Performed by: NURSE PRACTITIONER

## 2019-01-08 PROCEDURE — 87077 CULTURE AEROBIC IDENTIFY: CPT | Performed by: NURSE PRACTITIONER

## 2019-01-08 RX ORDER — ALLOPURINOL 300 MG/1
0.5 TABLET ORAL DAILY
Qty: 90 TABLET | Refills: 1 | Status: SHIPPED | OUTPATIENT
Start: 2019-01-08 | End: 2020-03-24

## 2019-01-08 RX ORDER — GABAPENTIN 300 MG/1
600 CAPSULE ORAL AT BEDTIME
Qty: 180 CAPSULE | Refills: 3 | Status: SHIPPED | OUTPATIENT
Start: 2019-01-08 | End: 2019-01-24

## 2019-01-08 RX ORDER — GLIPIZIDE 10 MG/1
10 TABLET ORAL
Qty: 180 TABLET | Refills: 1 | Status: SHIPPED | OUTPATIENT
Start: 2019-01-08 | End: 2019-07-09

## 2019-01-08 RX ORDER — SIMVASTATIN 40 MG
TABLET ORAL
Qty: 90 TABLET | Refills: 2 | Status: SHIPPED | OUTPATIENT
Start: 2019-01-08 | End: 2019-08-12

## 2019-01-08 RX ORDER — FLUCONAZOLE 150 MG/1
150 TABLET ORAL
Qty: 30 TABLET | Refills: 6 | Status: SHIPPED | OUTPATIENT
Start: 2019-01-08 | End: 2020-11-24

## 2019-01-08 RX ORDER — LISINOPRIL 10 MG/1
10 TABLET ORAL DAILY
Qty: 90 TABLET | Refills: 2 | Status: SHIPPED | OUTPATIENT
Start: 2019-01-08 | End: 2019-10-08

## 2019-01-08 RX ORDER — CLINDAMYCIN HCL 300 MG
600 CAPSULE ORAL
Qty: 2 CAPSULE | Refills: 3 | Status: SHIPPED | OUTPATIENT
Start: 2019-01-08 | End: 2019-03-08

## 2019-01-08 RX ORDER — CIPROFLOXACIN 500 MG/1
500 TABLET, FILM COATED ORAL 2 TIMES DAILY
Qty: 6 TABLET | Refills: 0 | Status: SHIPPED | OUTPATIENT
Start: 2019-01-08 | End: 2019-03-08

## 2019-01-08 RX ORDER — LANCETS
EACH MISCELLANEOUS
Qty: 300 EACH | Refills: 3 | Status: SHIPPED | OUTPATIENT
Start: 2019-01-08 | End: 2019-01-10

## 2019-01-08 RX ORDER — FUROSEMIDE 20 MG
20 TABLET ORAL DAILY PRN
Qty: 90 TABLET | Refills: 1 | Status: SHIPPED | OUTPATIENT
Start: 2019-01-08 | End: 2019-06-06

## 2019-01-08 RX ORDER — ESTRADIOL 10 UG/1
10 INSERT VAGINAL
Qty: 8 TABLET | Refills: 11 | Status: SHIPPED | OUTPATIENT
Start: 2019-01-10 | End: 2019-11-21

## 2019-01-08 ASSESSMENT — ANXIETY QUESTIONNAIRES
3. WORRYING TOO MUCH ABOUT DIFFERENT THINGS: SEVERAL DAYS
6. BECOMING EASILY ANNOYED OR IRRITABLE: SEVERAL DAYS
2. NOT BEING ABLE TO STOP OR CONTROL WORRYING: SEVERAL DAYS
7. FEELING AFRAID AS IF SOMETHING AWFUL MIGHT HAPPEN: NOT AT ALL
IF YOU CHECKED OFF ANY PROBLEMS ON THIS QUESTIONNAIRE, HOW DIFFICULT HAVE THESE PROBLEMS MADE IT FOR YOU TO DO YOUR WORK, TAKE CARE OF THINGS AT HOME, OR GET ALONG WITH OTHER PEOPLE: NOT DIFFICULT AT ALL
5. BEING SO RESTLESS THAT IT IS HARD TO SIT STILL: NOT AT ALL
1. FEELING NERVOUS, ANXIOUS, OR ON EDGE: SEVERAL DAYS
GAD7 TOTAL SCORE: 4

## 2019-01-08 ASSESSMENT — PATIENT HEALTH QUESTIONNAIRE - PHQ9
5. POOR APPETITE OR OVEREATING: NOT AT ALL
SUM OF ALL RESPONSES TO PHQ QUESTIONS 1-9: 3

## 2019-01-08 ASSESSMENT — MIFFLIN-ST. JEOR: SCORE: 1325.04

## 2019-01-08 NOTE — PROGRESS NOTES
SUBJECTIVE:   Lindsey Gary is a 78 year old female who presents to clinic today for the following health issues:    Needs mult refills to new pharmacy. Vaginal irritation x 1 month.  She tried vagifem and felt it may have helped   Last infection was 3/2018 and was resistant to many antibiotic and treated with cipro   Having discomfort since Thanksgiving off and on   Minimal discharge   Today is better than previously   Wants prescription to use for 3 days in case while waiting for culture     Diabetes Follow-up    Patient is checking blood sugars: three times daily.   Blood sugar testing frequency justification: Adjustment of medication(s)  Results are as follows:     Diabetic concerns: None     Symptoms of hypoglycemia (low blood sugar): weak, lethargy     Paresthesias (numbness or burning in feet) or sores: No     Date of last diabetic eye exam: 11-    She has started insulin, and doing well on same, and A1C much improved   She is proud of her improvement     BP Readings from Last 2 Encounters:   12/23/18 132/80   08/09/18 138/75     Hemoglobin A1C (%)   Date Value   12/31/2018 7.0 (H)   08/09/2018 8.3 (H)     LDL Cholesterol Calculated (mg/dL)   Date Value   12/31/2018 103 (H)   01/02/2018 113 (H)       Diabetes Management Resources    Amount of exercise or physical activity: None    Problems taking medications regularly: No    Medication side effects: none    Diet: low salt, low fat/cholesterol, diabetic and carbohydrate counting          Problem list and histories reviewed & adjusted, as indicated.  Additional history: as documented    Patient Active Problem List   Diagnosis     Rosacea     Gout     CHONDROCALC NOS-OTHER SITE(aka PSEUDOGOUT)     Essential hypertension     Hyperlipidemia LDL goal <100     Advanced directives, counseling/discussion     Atrophic vaginitis     Chronic foot pain     Hypertension goal BP (blood pressure) < 130/80     Bereavement     Obesity     Type 2 diabetes  "mellitus without complication (H)     Status post total knee replacement, unspecified laterality     Psoriasis     Past Surgical History:   Procedure Laterality Date     C NONSPECIFIC PROCEDURE      Breast biopsies        C NONSPECIFIC PROCEDURE      Symptomatic left thigh lipomas        C NONSPECIFIC PROCEDURE  6/1/09    pubovaginal sling     COLONOSCOPY       COLONOSCOPY N/A 12/17/2014    Procedure: COMBINED COLONOSCOPY, SINGLE OR MULTIPLE BIOPSY/POLYPECTOMY BY BIOPSY;  Surgeon: Chuy Staton MD;  Location: [facility]     HYSTERECTOMY, PAP NO LONGER INDICATED       HYSTERECTOMY, JAIME  1991    fibroid     SURGICAL HISTORY OF -   10/28/2015    right partial knee        Social History     Tobacco Use     Smoking status: Never Smoker     Smokeless tobacco: Never Used   Substance Use Topics     Alcohol use: No     Alcohol/week: 0.0 oz     Family History   Problem Relation Age of Onset     Cancer Mother         colon ca survivor     Cancer - colorectal Mother      Cerebrovascular Disease Father            Reviewed and updated as needed this visit by clinical staff  Tobacco  Allergies  Meds  Med Hx  Surg Hx  Fam Hx  Soc Hx      Reviewed and updated as needed this visit by Provider         ROS:  Constitutional, HEENT, cardiovascular, pulmonary, gi and gu systems are negative, except as otherwise noted.    OBJECTIVE:     Resp 16   Ht 1.626 m (5' 4\")   Wt 85 kg (187 lb 6.4 oz)   BMI 32.17 kg/m    Body mass index is 32.17 kg/m .  GENERAL: alert and no distress  RESP: lungs clear   CV: regular rate and rhythm   (female): normal female external genitalia, normal urethral meatus, vaginal culture taken   PSYCH: mentation appears normal, affect normal/bright    Diagnostic Test Results:  vaginal culture     ASSESSMENT/PLAN:             1. Essential hypertension, benign  In good range   Tolerating medication   - lisinopril (PRINIVIL/ZESTRIL) 10 MG tablet; Take 1 tablet (10 mg) by mouth daily  Dispense: 90 tablet; Refill: " 2    2. Gout, unspecified cause, unspecified chronicity, unspecified site  Stable   - allopurinol (ZYLOPRIM) 300 MG tablet; Take 0.5 tablets (150 mg) by mouth daily  Dispense: 90 tablet; Refill: 1    3. Antibiotic prophylaxis for dental procedure indicated due to prior joint replacement    - clindamycin (CLEOCIN) 300 MG capsule; Take 2 capsules (600 mg) by mouth once as needed (dental care) For dental visit  Dispense: 2 capsule; Refill: 3    4. Vaginal pain  Helping   - estradiol (VAGIFEM) 10 MCG TABS vaginal tablet; Place 1 tablet (10 mcg) vaginally twice a week  Dispense: 8 tablet; Refill: 11    5. Vaginal atrophy  helping  - estradiol (VAGIFEM) 10 MCG TABS vaginal tablet; Place 1 tablet (10 mcg) vaginally twice a week  Dispense: 8 tablet; Refill: 11    6. Yeast infection of the vagina    - fluconazole (DIFLUCAN) 150 MG tablet; Take 1 tablet (150 mg) by mouth every 3 days  Dispense: 30 tablet; Refill: 6    7. Edema, unspecified type    - furosemide (LASIX) 20 MG tablet; Take 1 tablet (20 mg) by mouth daily as needed (edema)  Dispense: 90 tablet; Refill: 1    8. Neuropathy    - gabapentin (NEURONTIN) 300 MG capsule; Take 2 capsules (600 mg) by mouth At Bedtime  Dispense: 180 capsule; Refill: 3    9. Type 2 diabetes mellitus without complication, without long-term current use of insulin (H)  Improved   - glipiZIDE (GLUCOTROL) 10 MG tablet; Take 1 tablet (10 mg) by mouth 2 times daily (before meals)  Dispense: 180 tablet; Refill: 1  - insulin NPH (NOVOLIN N VIAL) 100 UNIT/ML vial; Inject 20 units before breakfast and 20 units before dinner  Dispense: 10 mL; Refill: 3  - metFORMIN (GLUCOPHAGE) 1000 MG tablet; Take 1 tablet (1,000 mg) by mouth 2 times daily (with meals)  Dispense: 180 tablet; Refill: 1  - blood glucose monitoring (ACCU-CHEK FASTCLIX) lancets; Test 3 times daily as directed  Dispense: 300 each; Refill: 3    10. Mixed hyperlipidemia    - simvastatin (ZOCOR) 40 MG tablet; TAKE ONE TABLET BY MOUTH EVERY  NIGHT AT BEDTIME  Dispense: 90 tablet; Refill: 2    11. Type 2 diabetes mellitus with complication, without long-term current use of insulin (H)    - blood glucose monitoring (ACCU-CHEK FASTCLIX) lancets; Test 3 times daily as directed  Dispense: 300 each; Refill: 3  - blood glucose (ACCU-CHEK SMARTVIEW) test strip; Check 3 times daily as directed  Dispense: 300 each; Refill: 3    12. Vaginal infection    - Wound Culture Aerobic Bacterial; Future  - ciprofloxacin (CIPRO) 500 MG tablet; Take 1 tablet (500 mg) by mouth 2 times daily for 6 doses  Dispense: 6 tablet; Refill: 0    Patient Instructions   cipro twice daily for 3 days     Take 1/2 glipizide while on cipro        LILLIAM [facility]

## 2019-01-09 ENCOUNTER — TELEPHONE (OUTPATIENT)
Dept: INTERNAL MEDICINE | Facility: CLINIC | Age: 79
End: 2019-01-09

## 2019-01-09 DIAGNOSIS — E11.8 TYPE 2 DIABETES MELLITUS WITH COMPLICATION, WITHOUT LONG-TERM CURRENT USE OF INSULIN (H): ICD-10-CM

## 2019-01-09 DIAGNOSIS — E11.9 TYPE 2 DIABETES MELLITUS WITHOUT COMPLICATION, WITHOUT LONG-TERM CURRENT USE OF INSULIN (H): ICD-10-CM

## 2019-01-09 ASSESSMENT — ANXIETY QUESTIONNAIRES: GAD7 TOTAL SCORE: 4

## 2019-01-09 NOTE — TELEPHONE ENCOUNTER
Pt just called and found out that her diabetic supplies should be sent to "[facility].  She is [facility] to have them returned.  Can you please resend her diabetic supplies to the "[facility]. [phone]. She apologizes for any extra work that has to be done. If you have any questions please call Sonje at [phone]

## 2019-01-10 RX ORDER — LANCETS
EACH MISCELLANEOUS
Qty: 300 EACH | Refills: 3 | Status: SHIPPED | OUTPATIENT
Start: 2019-01-10 | End: 2020-02-04

## 2019-01-11 ENCOUNTER — TELEPHONE (OUTPATIENT)
Dept: INTERNAL MEDICINE | Facility: CLINIC | Age: 79
End: 2019-01-11

## 2019-01-11 DIAGNOSIS — E11.9 TYPE 2 DIABETES MELLITUS WITHOUT COMPLICATION, WITHOUT LONG-TERM CURRENT USE OF INSULIN (H): ICD-10-CM

## 2019-01-11 DIAGNOSIS — B37.31 YEAST INFECTION OF THE VAGINA: Primary | ICD-10-CM

## 2019-01-11 LAB
BACTERIA SPEC CULT: ABNORMAL
Lab: ABNORMAL
SPECIMEN SOURCE: ABNORMAL

## 2019-01-11 RX ORDER — CIPROFLOXACIN 500 MG/1
500 TABLET, FILM COATED ORAL 2 TIMES DAILY
Qty: 14 TABLET | Refills: 0 | Status: SHIPPED | OUTPATIENT
Start: 2019-01-11 | End: 2019-03-08

## 2019-01-11 NOTE — TELEPHONE ENCOUNTER
Please call patient and let her know bacteria is sensitive to cipro  I will send in additional 7 days of cipro to go with the 3 days already given to fully treat the bacteria present.   Pt to take 1/2 Glipizide while on Cipro per Dinorah Wong.     Unable to access this in result notes. Pt notified, Rx sent to [facility].   NELSON Ardon

## 2019-01-18 RX ORDER — BLOOD SUGAR DIAGNOSTIC
STRIP MISCELLANEOUS
Qty: 300 EACH | Refills: 3 | Status: SHIPPED | OUTPATIENT
Start: 2019-01-18 | End: 2020-02-04

## 2019-01-18 RX ORDER — GLUCOSAMINE HCL/CHONDROITIN SU 500-400 MG
CAPSULE ORAL
Qty: 300 EACH | Refills: 3 | Status: SHIPPED | OUTPATIENT
Start: 2019-01-18 | End: 2019-01-23

## 2019-01-18 NOTE — TELEPHONE ENCOUNTER
Patient calling.  States she needs a 3 month supply of alcohol pads and needles to be faxed to new pharmacy--[facility].    No need to call patient back if prescriptions sent.

## 2019-01-19 ENCOUNTER — NURSE TRIAGE (OUTPATIENT)
Dept: NURSING | Facility: CLINIC | Age: 79
End: 2019-01-19

## 2019-01-19 ENCOUNTER — OFFICE VISIT (OUTPATIENT)
Dept: URGENT CARE | Facility: URGENT CARE | Age: 79
End: 2019-01-19
Payer: COMMERCIAL

## 2019-01-19 VITALS
SYSTOLIC BLOOD PRESSURE: 132 MMHG | TEMPERATURE: 97.7 F | HEART RATE: 77 BPM | DIASTOLIC BLOOD PRESSURE: 76 MMHG | OXYGEN SATURATION: 98 %

## 2019-01-19 DIAGNOSIS — Z79.4 TYPE 2 DIABETES MELLITUS WITHOUT COMPLICATION, WITH LONG-TERM CURRENT USE OF INSULIN (H): ICD-10-CM

## 2019-01-19 DIAGNOSIS — E11.9 TYPE 2 DIABETES MELLITUS WITHOUT COMPLICATION, WITH LONG-TERM CURRENT USE OF INSULIN (H): ICD-10-CM

## 2019-01-19 DIAGNOSIS — N76.0 VAGINAL INFECTION: Primary | ICD-10-CM

## 2019-01-19 PROCEDURE — 99214 OFFICE O/P EST MOD 30 MIN: CPT | Performed by: PHYSICIAN ASSISTANT

## 2019-01-19 RX ORDER — PENICILLIN V POTASSIUM 500 MG/1
500 TABLET, FILM COATED ORAL 3 TIMES DAILY
Qty: 21 TABLET | Refills: 0 | Status: SHIPPED | OUTPATIENT
Start: 2019-01-19 | End: 2019-03-08

## 2019-01-19 NOTE — TELEPHONE ENCOUNTER
[facility] triage call :   Presenting problem :  From  [phone] Pt called.  Vaginal Pain from vaginal bacteriosis  on 1/8/19  and did a  wound culture done and given Cipro 500mg BID since 1/12/19  but Rx is not better after a week and would use [facility] phone [phone] , sees    Shane Betancourt FP .  Currently : constant vaginal pain is 7/10 on pain scale since for last month , with constant   lower abdominal pain  and lower back pain  are  7/10  on painscale  for last month  and Hx of     IDDB -  Blood sugar is 142 now .  Seen 12/23/18 for suspected UTI , burning  with urination and prone to UTI s = R/o , 1&0 is ok - urine color is dark yellow , no change in pain with urination  and more tired but ADL s .    Only  change in symptoms since seen on 1/12/19-  slightly more painful vaginally than visit on 1/12/19  .   Guideline used : infection on Antibiotic follow up call - Adult .  Disposition and recommendations :  See provider in 4 hours but prefers to consult with on-call. Smart web paged at 819am Dr Esther Townsend to call [facility] Pat [phone] to discuss . Called Pt to advise  Dr Townsend will not be able to address this over the phone because it is too complicated  but Pt can decide if she will go into be seen this weekend or not .   Pt thinks she will go into be seen .   Caller verbalizes understanding and denies further questions and will call back if further symptoms to triage or questions  . Emely Esquivel RN  - [facility] Nurse Advisor       Reason for Disposition    [1] Taking antibiotics > 24 hours AND [2] symptoms WORSE    Additional Information    Negative: Severe difficulty breathing (e.g., struggling for each breath, speaks in single words)    Negative: Sounds like a life-threatening emergency to the triager    Negative: [1] Recent hospitalization for pneumonia AND [2] taking an antibiotic    Negative: [1] Animal bite infection AND [2] taking an antibiotic    Negative: [1] Ear  infection (Otitis Media)  AND [2] taking an antibiotic    Negative: [1] Ear  infection (Swimmer's Ear)) AND [2] taking an antibiotic    Negative: [1] Sinus infection AND [2] taking an antibiotic    Negative: [1] Strep throat AND [2] taking an antibiotic    Negative: [1] Urinary tract  infection (e.g., cystitis, pyelonephritis, urethritis, epididymitis) AND [2] male AND [3] taking an antibiotic    Negative: [1] Urinary tract  infection (e.g., cystitis, pyelonephritis, urethritis) AND [2] female AND [3] taking an antibiotic    Negative: [1] Wound infection AND [2] taking an antibiotic    Negative: MODERATE difficulty breathing (e.g., speaks in phrases, SOB even at rest, pulse 100-120)    Negative: Fever > 103 F (39.4 C)    Negative: Patient sounds very sick or weak to the triager    Negative: [1] Recent hospitalization AND [2] symptoms WORSE    Protocols used: [facility]

## 2019-01-20 NOTE — PROGRESS NOTES
SUBJECTIVE:  Lindsey Gary is a 78 year old female who comes in for continued vaginal infection.  States that get wound cultures of her vagina per GYN.  Infection again on 1-08-19 and started on Cipro  Not resolved fully.  Has pressure in lower pelvic region, discharge and some odor.  Culture results as below.  States that in the past has needed PCN also .  Would like new med.  No fevers, UTI  Sx or abdominal pan  Is diabetic also   Susceptibility     Escherichia coli (1)     Antibiotic Interpretation Sensitivity Method Status    AMIKACIN Sensitive <=2 ug/mL SHAY Final    AMPICILLIN Resistant >=32 ug/mL SHAY Final    AMPICILLIN/SULBACTAM Resistant >=32 ug/mL SHAY Final    CEFEPIME Sensitive <=1 ug/mL SHAY Final    CEFTAZIDIME Sensitive <=1 ug/mL SHAY Final    CEFTRIAXONE Sensitive <=1 ug/mL SHAY Final    CIPROFLOXACIN Sensitive <=0.25 ug/mL SHAY Final    GENTAMICIN Sensitive <=1 ug/mL SHAY Final    LEVOFLOXACIN Sensitive <=0.12 ug/mL SHAY Final    Piperacillin/Tazo Sensitive <=4 ug/mL SHAY Final    TOBRAMYCIN Sensitive <=1 ug/mL SHAY Final    Trimethoprim/Sulfa Sensitive <=1/19 ug/mL SHAY Final    MEROPENEM Sensitive <=0.25 ug/mL SHAY Final    Enterococcus faecalis (3)     Antibiotic Interpretation Sensitivity Method Status    AMPICILLIN Sensitive <=2 ug/mL SHAY Final    PENICILLIN Sensitive 4 ug/mL SHAY Final    VANCOMYCIN Sensitive 1 ug/mL SHAY Final    Gentamicin Screen Sensitive   SHAY Final     No high level gentamicin resistance found - If no high level gentamicin   resistance is found, combination therapy with an aminoglycoside may be   indicated for serious enterococcal infections such as bacteremia and   endocarditis.   Escherichia coli (2)     Antibiotic Interpretation Sensitivity Method Status    AMIKACIN Sensitive <=2 ug/mL SHAY Final    AMPICILLIN Resistant >=32 ug/mL SHAY Final    AMPICILLIN/SULBACTAM Intermediate 16 ug/mL SHAY Final    CEFEPIME Sensitive <=1 ug/mL SHAY Final    CEFTAZIDIME Sensitive <=1 ug/mL SHAY Final   "  CEFTRIAXONE Sensitive <=1 ug/mL SHAY Final    CIPROFLOXACIN Sensitive <=0.25 ug/mL SHAY Final    GENTAMICIN Sensitive <=1 ug/mL SHAY Final    LEVOFLOXACIN Sensitive <=0.12 ug/mL SHAY Final    Piperacillin/Tazo Sensitive <=4 ug/mL SHAY Final    TOBRAMYCIN Sensitive <=1 ug/mL SHAY Final    Trimethoprim/Sulfa Sensitive <=1/19 ug/mL SHAY Final    MEROPENEM Sensitive <=0.25 ug/mL SHAY Final     Condensed View     Past Medical History:   Diagnosis Date     Essential hypertension, benign      Mixed hyperlipidemia      Pain in joint, ankle and foot     gout     Type II or unspecified type diabetes mellitus without mention of complication, not stated as uncontrolled     Diabetes mellitus     Current Outpatient Medications   Medication     acetaminophen (TYLENOL) 500 MG tablet     alcohol swab prep pads     allopurinol (ZYLOPRIM) 300 MG tablet     Ascorbic Acid (VITAMIN C) 500 MG CAPS     ASPIRIN 81 MG OR TABS     betamethasone, augmented, (DIPROLENE) 0.05 % lotion     blood glucose (ACCU-CHEK SMARTVIEW) test strip     blood glucose monitoring (ACCU-CHEK FASTCLIX) lancets     blood glucose monitoring (ACCU-CHEK GORDO SMARTVIEW) meter device kit     boric acid 600 mg vaginal suppository - PHARMACY TO MIX COMPOUND     calcipotriene 0.005 % OINT     calcium-magnesium (CALMAG) 500-250 MG TABS per tablet     chlorhexidine (PERIDEX) 0.12 % solution     Cholecalciferol (VITAMIN D) 2000 UNITS tablet     CINNAMON 500 MG OR CAPS     ciprofloxacin (CIPRO) 500 MG tablet     clindamycin (CLEOCIN) 300 MG capsule     estradiol (VAGIFEM) 10 MCG TABS vaginal tablet     fluconazole (DIFLUCAN) 150 MG tablet     furosemide (LASIX) 20 MG tablet     gabapentin (NEURONTIN) 300 MG capsule     glipiZIDE (GLUCOTROL) 10 MG tablet     insulin NPH (NOVOLIN N VIAL) 100 UNIT/ML vial     insulin syringe-needle U-100 (BD INSULIN SYRINGE ULTRAFINE) 31G X 5/16\" 0.3 ML miscellaneous     Lactobacillus (PROBIOTIC ACIDOPHILUS) TABS     lisinopril (PRINIVIL/ZESTRIL) 10 MG " tablet     metFORMIN (GLUCOPHAGE) 1000 MG tablet     penicillin V (VEETID) 500 MG tablet     POTASSIUM CHLORIDE PO     simvastatin (ZOCOR) 40 MG tablet     TUMS 500 MG OR CHEW     vitamin E 400 UNITS TABS     No current facility-administered medications for this visit.      Social History     Socioeconomic History     Marital status:      Spouse name: Not on file     Number of children: Not on file     Years of education: Not on file     Highest education level: Not on file   Social Needs     Financial resource strain: Not on file     Food insecurity - worry: Not on file     Food insecurity - inability: Not on file     Transportation needs - medical: Not on file     Transportation needs - non-medical: Not on file   Occupational History     Not on file   Tobacco Use     Smoking status: Never Smoker     Smokeless tobacco: Never Used   Substance and Sexual Activity     Alcohol use: No     Alcohol/week: 0.0 oz     Drug use: No     Sexual activity: Yes     Partners: Male   Other Topics Concern     Parent/sibling w/ CABG, MI or angioplasty before 65F 55M? Not Asked   Social History Narrative     Not on file     ROS:  Negative other than stated above    Exam:  /76 (BP Location: Right arm, Patient Position: Chair)   Pulse 77   Temp 97.7  F (36.5  C) (Oral)   SpO2 98%     GENERAL APPEARANCE: healthy, alert and no distress  RESP: lungs clear to auscultation - no rales, rhonchi or wheezes  CV: regular rates and rhythm, normal S1 S2, no S3 or S4 and no murmur, click or rub -  ABDOMEN:  soft, nontender, no HSM or masses and bowel sounds normal  GU_female: declines  SKIN: no suspicious lesions or rashes    assessment/plan:  (N76.0) Vaginal infection  (primary encounter diagnosis)  Comment:   Plan: penicillin V (VEETID) 500 MG tablet          Will add PCN per culture and hx.  Discussed wet prep also be run due to sx and she refuses.  Discussed that different med would be needed and still declines . Med as directed  and to Follow-up with GYN.  Decline UA also    (E11.9,  Z79.4) Type 2 diabetes mellitus without complication, with long-term current use of insulin (H)  Comment:   Plan:continue to monitor BS and adjust med as needed.  Follow-up with PCP if spikes or low BS levels

## 2019-01-23 DIAGNOSIS — E11.9 TYPE 2 DIABETES MELLITUS WITHOUT COMPLICATION, WITHOUT LONG-TERM CURRENT USE OF INSULIN (H): ICD-10-CM

## 2019-01-23 RX ORDER — BLOOD-GLUCOSE CONTROL, NORMAL
EACH MISCELLANEOUS
Qty: 1 BOTTLE | Refills: 3 | Status: SHIPPED | OUTPATIENT
Start: 2019-01-23 | End: 2019-02-01

## 2019-01-23 RX ORDER — GLUCOSAMINE HCL/CHONDROITIN SU 500-400 MG
CAPSULE ORAL
Qty: 300 EACH | Refills: 3 | Status: SHIPPED | OUTPATIENT
Start: 2019-01-23 | End: 2020-04-29

## 2019-01-23 NOTE — TELEPHONE ENCOUNTER
rx for control solution, meter and alcohol preps sent to [facility].  EDMAR Jaeger R.N.

## 2019-01-24 ENCOUNTER — OFFICE VISIT (OUTPATIENT)
Dept: INTERNAL MEDICINE | Facility: CLINIC | Age: 79
End: 2019-01-24
Payer: COMMERCIAL

## 2019-01-24 VITALS
HEART RATE: 77 BPM | HEIGHT: 64 IN | TEMPERATURE: 97.7 F | DIASTOLIC BLOOD PRESSURE: 76 MMHG | BODY MASS INDEX: 31.92 KG/M2 | OXYGEN SATURATION: 98 % | RESPIRATION RATE: 16 BRPM | SYSTOLIC BLOOD PRESSURE: 154 MMHG | WEIGHT: 187 LBS

## 2019-01-24 DIAGNOSIS — N64.4 BREAST PAIN: ICD-10-CM

## 2019-01-24 DIAGNOSIS — M94.0 COSTOCHONDRITIS: Primary | ICD-10-CM

## 2019-01-24 DIAGNOSIS — G62.9 NEUROPATHY: ICD-10-CM

## 2019-01-24 PROCEDURE — 99213 OFFICE O/P EST LOW 20 MIN: CPT | Performed by: NURSE PRACTITIONER

## 2019-01-24 RX ORDER — GABAPENTIN 300 MG/1
600 CAPSULE ORAL AT BEDTIME
Qty: 180 CAPSULE | Refills: 3 | Status: SHIPPED | OUTPATIENT
Start: 2019-01-24 | End: 2020-02-04

## 2019-01-24 ASSESSMENT — MIFFLIN-ST. JEOR: SCORE: 1323.23

## 2019-01-24 NOTE — NURSING NOTE
"Chief Complaint   Patient presents with     Pain     pt c/o sharp pain in her left breast onset unsure. pt states better than it was. mammogram done in November and was normal.      initial /76   Pulse 77   Temp 97.7  F (36.5  C) (Oral)   Resp 16   Ht 1.626 m (5' 4\")   Wt 84.8 kg (187 lb)   SpO2 98%   BMI 32.10 kg/m   Estimated body mass index is 32.1 kg/m  as calculated from the following:    Height as of this encounter: 1.626 m (5' 4\").    Weight as of this encounter: 84.8 kg (187 lb)..  bp completed using cuff size large  MARIAH LIAO LPN  "

## 2019-01-24 NOTE — PROGRESS NOTES
.  SUBJECTIVE:   Lindsey Gary is a 78 year old female who presents to clinic today for the following health issues:  Chief Complaint   Patient presents with     Pain     pt c/o sharp pain in her left breast onset unsure. pt states better than it was. mammogram done in November and was normal.    She was not feeling all better on cipro and went to urgent care and they added PCN to cover the 3 rd bacteria in vagina, and she feels she is improving           Problem list and histories reviewed & adjusted, as indicated.  Additional history: as documented    Patient Active Problem List   Diagnosis     Rosacea     Gout     CHONDROCALC NOS-OTHER SITE(aka PSEUDOGOUT)     Essential hypertension     Hyperlipidemia LDL goal <100     Advanced directives, counseling/discussion     Atrophic vaginitis     Chronic foot pain     Hypertension goal BP (blood pressure) < 130/80     Bereavement     Obesity     Type 2 diabetes mellitus without complication (H)     Status post total knee replacement, unspecified laterality     Psoriasis     Past Surgical History:   Procedure Laterality Date     C NONSPECIFIC PROCEDURE      Breast biopsies        C NONSPECIFIC PROCEDURE      Symptomatic left thigh lipomas        C NONSPECIFIC PROCEDURE  6/1/09    pubovaginal sling     COLONOSCOPY       COLONOSCOPY N/A 12/17/2014    Procedure: COMBINED COLONOSCOPY, SINGLE OR MULTIPLE BIOPSY/POLYPECTOMY BY BIOPSY;  Surgeon: Chuy Staton MD;  Location: [facility]     HYSTERECTOMY, PAP NO LONGER INDICATED       HYSTERECTOMY, JAIME  1991    fibroid     SURGICAL HISTORY OF -   10/28/2015    right partial knee        Social History     Tobacco Use     Smoking status: Never Smoker     Smokeless tobacco: Never Used   Substance Use Topics     Alcohol use: No     Alcohol/week: 0.0 oz     Family History   Problem Relation Age of Onset     Cancer Mother         colon ca survivor     Cancer - colorectal Mother      Cerebrovascular Disease Father            Reviewed and  "updated as needed this visit by clinical staff  Tobacco  Allergies  Meds  Med Hx  Surg Hx  Fam Hx  Soc Hx      Reviewed and updated as needed this visit by Provider         ROS:  Constitutional, HEENT, cardiovascular, pulmonary, gi and gu systems are negative, except as otherwise noted.    OBJECTIVE:     /76   Pulse 77   Temp 97.7  F (36.5  C) (Oral)   Resp 16   Ht 1.626 m (5' 4\")   Wt 84.8 kg (187 lb)   SpO2 98%   BMI 32.10 kg/m    Body mass index is 32.1 kg/m .  GENERAL: alert and no distress  RESP: lungs clear   BREAST: normal without masses on right ; left with fibrocystic changes and tenderness upper outer breast - no discrete lesions- no adenopathy  Pain in costochondral area on left breast side   CV: regular rate and rhythm  PSYCH: mentation appears normal, affect normal/bright    Diagnostic Test Results:  none     ASSESSMENT/PLAN:             1. Costochondritis  Discussed care     2. Breast pain  Fibrocystic changes and mostly related to costochondritis    3. Neuropathy  needs refill to [facility]  - gabapentin (NEURONTIN) 300 MG capsule; Take 2 capsules (600 mg) by mouth At Bedtime  Dispense: 180 capsule; Refill: 3    Patient Instructions   Warm moist heat to chest   Ibuprofen or aleve if tolerated with food for comfort           LILLIAM Sam [facility]    "

## 2019-01-29 ENCOUNTER — TELEPHONE (OUTPATIENT)
Dept: INTERNAL MEDICINE | Facility: CLINIC | Age: 79
End: 2019-01-29

## 2019-01-29 DIAGNOSIS — Z79.4 TYPE 2 DIABETES MELLITUS WITHOUT COMPLICATION, WITH LONG-TERM CURRENT USE OF INSULIN (H): Primary | ICD-10-CM

## 2019-01-29 DIAGNOSIS — E11.9 TYPE 2 DIABETES MELLITUS WITHOUT COMPLICATION, WITH LONG-TERM CURRENT USE OF INSULIN (H): Primary | ICD-10-CM

## 2019-01-29 NOTE — TELEPHONE ENCOUNTER
Reason for Call:  Medication or medication refill:    Do you use a [facility]?  Name of the pharmacy and phone number for the current request:  [facility]     Name of the medication requested: Pt's meter/test strips are being discontinued and pt needs a new rx for the Accucheck Guide supplies-Meter & test strips.  3 month supply.  Pt says [facility] told her to have it faxed to [phone].  I questioned the # but pt said that is what they told her.  Pt did just get her DM supplies renewed on 01/23/19 and is going to use them up first but pt states that [facility] wanted a new rx on file for the Accucheck Guide.  Pt also stated [facility] sent us a fax regarding this and was hoping if the fax# was incorrect that you could get the correct fax #.    Other request: none    Can we leave a detailed message on this number? YES    Phone number patient can be reached at: Home number on file [phone] (home)    Best Time: any    Call taken on 1/29/2019 at 1:23 PM by Leah Pedro

## 2019-01-30 RX ORDER — BLOOD-GLUCOSE METER
1 EACH MISCELLANEOUS 3 TIMES DAILY
Qty: 1 KIT | Refills: 0 | Status: SHIPPED | OUTPATIENT
Start: 2019-01-30 | End: 2019-04-08

## 2019-02-01 ENCOUNTER — OFFICE VISIT (OUTPATIENT)
Dept: INTERNAL MEDICINE | Facility: CLINIC | Age: 79
End: 2019-02-01
Payer: COMMERCIAL

## 2019-02-01 VITALS
SYSTOLIC BLOOD PRESSURE: 130 MMHG | DIASTOLIC BLOOD PRESSURE: 80 MMHG | RESPIRATION RATE: 12 BRPM | BODY MASS INDEX: 31.92 KG/M2 | TEMPERATURE: 97.9 F | HEART RATE: 86 BPM | WEIGHT: 187 LBS | HEIGHT: 64 IN | OXYGEN SATURATION: 99 %

## 2019-02-01 DIAGNOSIS — R30.0 DYSURIA: ICD-10-CM

## 2019-02-01 DIAGNOSIS — N95.2 ATROPHIC VAGINITIS: Primary | ICD-10-CM

## 2019-02-01 DIAGNOSIS — N89.8 VAGINAL DISCHARGE: ICD-10-CM

## 2019-02-01 DIAGNOSIS — I10 ESSENTIAL HYPERTENSION: ICD-10-CM

## 2019-02-01 DIAGNOSIS — N89.8 VAGINAL ITCHING: ICD-10-CM

## 2019-02-01 DIAGNOSIS — E11.9 TYPE 2 DIABETES MELLITUS WITHOUT COMPLICATION, WITH LONG-TERM CURRENT USE OF INSULIN (H): ICD-10-CM

## 2019-02-01 DIAGNOSIS — Z79.4 TYPE 2 DIABETES MELLITUS WITHOUT COMPLICATION, WITH LONG-TERM CURRENT USE OF INSULIN (H): ICD-10-CM

## 2019-02-01 LAB
BACTERIA SPEC CULT: NORMAL
GRAM STN SPEC: ABNORMAL
Lab: ABNORMAL
SPECIMEN SOURCE: ABNORMAL
SPECIMEN SOURCE: NORMAL

## 2019-02-01 PROCEDURE — 87086 URINE CULTURE/COLONY COUNT: CPT | Performed by: NURSE PRACTITIONER

## 2019-02-01 PROCEDURE — 87205 SMEAR GRAM STAIN: CPT | Performed by: NURSE PRACTITIONER

## 2019-02-01 PROCEDURE — 87088 URINE BACTERIA CULTURE: CPT | Performed by: NURSE PRACTITIONER

## 2019-02-01 PROCEDURE — 99213 OFFICE O/P EST LOW 20 MIN: CPT | Performed by: NURSE PRACTITIONER

## 2019-02-01 PROCEDURE — 87186 SC STD MICRODIL/AGAR DIL: CPT | Performed by: NURSE PRACTITIONER

## 2019-02-01 ASSESSMENT — MIFFLIN-ST. JEOR: SCORE: 1323.23

## 2019-02-01 NOTE — PROGRESS NOTES
SUBJECTIVE:   Lindsey Gary is a 78 year old female who presents to clinic today for the following health issues:    Pt c/o vaginal discomfort, low back pain. Sx are better than in December but still having some sx and wants to be checked. Also, pt requests a UC. Urine dark in color and some odor.   Blood sugar has been higher than normal. 160 this morning.   NELSON Ardon             Problem list and histories reviewed & adjusted, as indicated.  Additional history: as documented    Patient Active Problem List   Diagnosis     Rosacea     Gout     CHONDROCALC NOS-OTHER SITE(aka PSEUDOGOUT)     Essential hypertension     Hyperlipidemia LDL goal <100     Advanced directives, counseling/discussion     Atrophic vaginitis     Chronic foot pain     Hypertension goal BP (blood pressure) < 130/80     Bereavement     Obesity     Type 2 diabetes mellitus without complication (H)     Status post total knee replacement, unspecified laterality     Psoriasis     Past Surgical History:   Procedure Laterality Date     C NONSPECIFIC PROCEDURE      Breast biopsies        C NONSPECIFIC PROCEDURE      Symptomatic left thigh lipomas        C NONSPECIFIC PROCEDURE  6/1/09    pubovaginal sling     COLONOSCOPY       COLONOSCOPY N/A 12/17/2014    Procedure: COMBINED COLONOSCOPY, SINGLE OR MULTIPLE BIOPSY/POLYPECTOMY BY BIOPSY;  Surgeon: Chuy Staton MD;  Location: [facility]     HYSTERECTOMY, PAP NO LONGER INDICATED       HYSTERECTOMY, JAIME  1991    fibroid     SURGICAL HISTORY OF -   10/28/2015    right partial knee        Social History     Tobacco Use     Smoking status: Never Smoker     Smokeless tobacco: Never Used   Substance Use Topics     Alcohol use: No     Alcohol/week: 0.0 oz     Family History   Problem Relation Age of Onset     Cancer Mother         colon ca survivor     Cancer - colorectal Mother      Cerebrovascular Disease Father            Reviewed and updated as needed this visit by clinical staff  Tobacco  Allergies   "Meds  Soc Hx      Reviewed and updated as needed this visit by Provider         ROS:  Constitutional, HEENT, cardiovascular, pulmonary, gi and gu systems are negative, except as otherwise noted.    OBJECTIVE:     /80   Pulse 86   Temp 97.9  F (36.6  C) (Oral)   Resp 12   Ht 1.626 m (5' 4\")   Wt 84.8 kg (187 lb)   SpO2 99%   BMI 32.10 kg/m    Body mass index is 32.1 kg/m .  GENERAL: alert and no distress  RESP: lungs clear   CV: regular rate and rhythm   (female): normal female external genitalia, normal urethral meatus, vaginal mucosa minimal discharge   PSYCH: mentation appears normal, affect normal/bright    Diagnostic Test Results:  Vaginal culture     ASSESSMENT/PLAN:       1. Atrophic vaginitis  Needs repeat as symptoms not resolved after cipro and pcn   - Wound Culture Aerobic Bacterial    2. Essential hypertension  In good range     3. Type 2 diabetes mellitus without complication, with long-term current use of insulin (H)  stable   - order for DME; Equipment being ordered: accuchek guide lancets  Dispense: 300 Device; Refill: 3    4. Vaginal itching  Needs recheck    - Wound Culture Aerobic Bacterial    5. Vaginal discharge    - Wound Culture Aerobic Bacterial    6. Dysuria    - Urine Culture Aerobic Bacterial    Patient Instructions   We will let you know results when available       LILLIAM Sam [facility]    "

## 2019-02-04 LAB
BACTERIA SPEC CULT: ABNORMAL
SPECIMEN SOURCE: ABNORMAL

## 2019-02-05 ENCOUNTER — TELEPHONE (OUTPATIENT)
Dept: INTERNAL MEDICINE | Facility: CLINIC | Age: 79
End: 2019-02-05

## 2019-02-05 DIAGNOSIS — N39.0 URINARY TRACT INFECTION WITHOUT HEMATURIA, SITE UNSPECIFIED: Primary | ICD-10-CM

## 2019-02-05 RX ORDER — NITROFURANTOIN 25; 75 MG/1; MG/1
100 CAPSULE ORAL 2 TIMES DAILY
Qty: 14 CAPSULE | Refills: 0 | Status: SHIPPED | OUTPATIENT
Start: 2019-02-05 | End: 2019-03-08

## 2019-02-05 NOTE — TELEPHONE ENCOUNTER
Patient being treated for UTI, she has had difficulty clearing this in the past and asks if Dinorah could order UA to recheck after taking antibiotic or if she would need to be seen for appointment.

## 2019-02-07 ENCOUNTER — TELEPHONE (OUTPATIENT)
Dept: INTERNAL MEDICINE | Facility: CLINIC | Age: 79
End: 2019-02-07

## 2019-02-07 DIAGNOSIS — E11.9 TYPE 2 DIABETES MELLITUS WITHOUT COMPLICATION, WITHOUT LONG-TERM CURRENT USE OF INSULIN (H): ICD-10-CM

## 2019-02-07 DIAGNOSIS — E11.8 TYPE 2 DIABETES MELLITUS WITH COMPLICATION, WITHOUT LONG-TERM CURRENT USE OF INSULIN (H): ICD-10-CM

## 2019-02-07 RX ORDER — LANCETS
EACH MISCELLANEOUS
Qty: 300 EACH | Refills: 3 | Status: CANCELLED | OUTPATIENT
Start: 2019-02-07

## 2019-02-07 NOTE — TELEPHONE ENCOUNTER
Reason for Call:  Medication or medication refill:    Do you use a [facility]? No        Name of the pharmacy and phone number for the current request[facility]     Name of the medication requested: lancets for accuchek guide     Other request: ususally gets 3 month supply-checks 3 times a day-looking for about 100    Can we leave a detailed message on this number? YES    Phone number patient can be reached at: Home number on file [phone] (home)    Best Time: asap    Call taken on 2/7/2019 at 11:47 AM by Seema Adams

## 2019-02-07 NOTE — TELEPHONE ENCOUNTER
Checked with pharmacist Beti Jacobo to see if the fast clix lancet that were prescribed on 1/10/19 would work with patient's new Guide meter.  She starts that these will work.  Notified patient that she will not need a new rx for lancets.  She did confirm that she has refills on her current lancets, but was not sure if these would work with her new meter.  Ondina Alston RN

## 2019-02-19 ENCOUNTER — TELEPHONE (OUTPATIENT)
Dept: INTERNAL MEDICINE | Facility: CLINIC | Age: 79
End: 2019-02-19

## 2019-02-19 DIAGNOSIS — N64.4 BREAST PAIN, LEFT: Primary | ICD-10-CM

## 2019-02-19 NOTE — TELEPHONE ENCOUNTER
"Patient states that when she was seen in clinic 1/24/19 she described left breast pain and pain on sternum \"from cysts\".  Sternal pain has improved but pain along outer aspect of left breast is unchanged.  Describes it as sharp, fairly constant in varying degrees of severity.  Pain averages 5 out of 10, seems worse at night.  Painful at rest, without pressure or anything making contact with breast.  Denies redness, heat, fever, visible or palpable lump.  Has been using a heating pad and alternating Advil and Excedrin without much relief.  Now also has intermittent nausea.  Coming in to see primary care provider next week but would like to know what she can do about pain now as far as diagnosing or treating.  EDMAR Jaeger R.N.    "

## 2019-02-19 NOTE — TELEPHONE ENCOUNTER
Reason for Call:  Other call back    Detailed comments: pt calling with breast cyst pain    Phone Number Patient can be reached at: Home number on file [phone] (home)    Best Time: anytime    Can we leave a detailed message on this number? YES    Call taken on 2/19/2019 at 12:47 PM by Alicia Webster

## 2019-02-19 NOTE — TELEPHONE ENCOUNTER
diagnostic mammogram and ultrasound if in breast   Does she want to do this? I will put an order in if she want this to be done   When I saw her before it seemed to be more costochondral pain

## 2019-02-20 NOTE — TELEPHONE ENCOUNTER
Reason for Call:  Other order    Detailed comments: Patient [facility] to [facility] with [facility], however, the [facility] stated that they have not received orders.  Please place orders.      Phone Number Patient can be reached at: Cell number on file:    Telephone Information:   home [phone]       Best Time: anytime    Can we leave a detailed message on this number? YES    Call taken on 2/20/2019 at 2:34 PM by Martha Francisco

## 2019-02-21 DIAGNOSIS — N39.0 URINARY TRACT INFECTION WITHOUT HEMATURIA, SITE UNSPECIFIED: ICD-10-CM

## 2019-02-21 PROCEDURE — 87086 URINE CULTURE/COLONY COUNT: CPT | Performed by: NURSE PRACTITIONER

## 2019-02-22 LAB
BACTERIA SPEC CULT: NO GROWTH
SPECIMEN SOURCE: NORMAL

## 2019-02-25 ENCOUNTER — HOSPITAL ENCOUNTER (OUTPATIENT)
Dept: ULTRASOUND IMAGING | Facility: CLINIC | Age: 79
End: 2019-02-25
Attending: NURSE PRACTITIONER
Payer: COMMERCIAL

## 2019-02-25 ENCOUNTER — HOSPITAL ENCOUNTER (OUTPATIENT)
Dept: MAMMOGRAPHY | Facility: CLINIC | Age: 79
Discharge: HOME | End: 2019-02-25
Attending: NURSE PRACTITIONER | Admitting: NURSE PRACTITIONER
Payer: COMMERCIAL

## 2019-02-25 DIAGNOSIS — N64.4 BREAST PAIN, LEFT: ICD-10-CM

## 2019-02-25 PROCEDURE — 77065 DX MAMMO INCL CAD UNI: CPT | Mod: LT

## 2019-02-25 PROCEDURE — 76642 ULTRASOUND BREAST LIMITED: CPT | Mod: LT

## 2019-03-08 ENCOUNTER — OFFICE VISIT (OUTPATIENT)
Dept: INTERNAL MEDICINE | Facility: CLINIC | Age: 79
End: 2019-03-08
Payer: COMMERCIAL

## 2019-03-08 VITALS
HEART RATE: 90 BPM | SYSTOLIC BLOOD PRESSURE: 136 MMHG | HEIGHT: 64 IN | RESPIRATION RATE: 18 BRPM | TEMPERATURE: 98.3 F | DIASTOLIC BLOOD PRESSURE: 78 MMHG | OXYGEN SATURATION: 97 % | BODY MASS INDEX: 32.1 KG/M2

## 2019-03-08 DIAGNOSIS — Z12.11 SPECIAL SCREENING FOR MALIGNANT NEOPLASMS, COLON: ICD-10-CM

## 2019-03-08 DIAGNOSIS — Z79.4 TYPE 2 DIABETES MELLITUS WITHOUT COMPLICATION, WITH LONG-TERM CURRENT USE OF INSULIN (H): Primary | ICD-10-CM

## 2019-03-08 DIAGNOSIS — E11.9 TYPE 2 DIABETES MELLITUS WITHOUT COMPLICATION, WITH LONG-TERM CURRENT USE OF INSULIN (H): Primary | ICD-10-CM

## 2019-03-08 DIAGNOSIS — Z80.0 FAMILY HISTORY OF COLON CANCER: ICD-10-CM

## 2019-03-08 DIAGNOSIS — I10 ESSENTIAL HYPERTENSION: ICD-10-CM

## 2019-03-08 LAB
ALBUMIN SERPL-MCNC: 4.4 G/DL (ref 3.4–5)
ALP SERPL-CCNC: 62 U/L (ref 40–150)
ALT SERPL W P-5'-P-CCNC: 39 U/L (ref 0–50)
ANION GAP SERPL CALCULATED.3IONS-SCNC: 6 MMOL/L (ref 3–14)
AST SERPL W P-5'-P-CCNC: 28 U/L (ref 0–45)
BILIRUB SERPL-MCNC: 0.7 MG/DL (ref 0.2–1.3)
BUN SERPL-MCNC: 20 MG/DL (ref 7–30)
CALCIUM SERPL-MCNC: 9.5 MG/DL (ref 8.5–10.1)
CHLORIDE SERPL-SCNC: 108 MMOL/L (ref 94–109)
CO2 SERPL-SCNC: 27 MMOL/L (ref 20–32)
CREAT SERPL-MCNC: 0.76 MG/DL (ref 0.52–1.04)
GFR SERPL CREATININE-BSD FRML MDRD: 76 ML/MIN/{1.73_M2}
GLUCOSE SERPL-MCNC: 103 MG/DL (ref 70–99)
HBA1C MFR BLD: 7.1 % (ref 0–5.6)
POTASSIUM SERPL-SCNC: 4.4 MMOL/L (ref 3.4–5.3)
PROT SERPL-MCNC: 7.4 G/DL (ref 6.8–8.8)
SODIUM SERPL-SCNC: 141 MMOL/L (ref 133–144)

## 2019-03-08 PROCEDURE — 99213 OFFICE O/P EST LOW 20 MIN: CPT | Performed by: NURSE PRACTITIONER

## 2019-03-08 PROCEDURE — 36415 COLL VENOUS BLD VENIPUNCTURE: CPT | Performed by: NURSE PRACTITIONER

## 2019-03-08 PROCEDURE — 83036 HEMOGLOBIN GLYCOSYLATED A1C: CPT | Performed by: NURSE PRACTITIONER

## 2019-03-08 PROCEDURE — 80053 COMPREHEN METABOLIC PANEL: CPT | Performed by: NURSE PRACTITIONER

## 2019-03-08 NOTE — PROGRESS NOTES
SUBJECTIVE:   Lindsey Gary is a 78 year old female who presents to clinic today for the following health issues:  Patient here for lab and mammogram follow up. Patient would also like to discuss a colonoscopy last one was 12/17/2014.    Diabetes Follow-up    Patient is checking blood sugars: three times daily.   Blood sugar testing frequency justification: Uncontrolled diabetes  Results are as follows:         am - 120-150         Dinnertime-120         bedtime - 125    Diabetic concerns: None     Symptoms of hypoglycemia (low blood sugar): patient noticed drop around 85-95 at bedtime     Paresthesias (numbness or burning in feet) or sores: Yes      Date of last diabetic eye exam: 12/10/2018    Diabetes Management Resources    Hyperlipidemia Follow-Up      Rate your low fat/cholesterol diet?: fair    Taking statin?  Yes, no muscle aches from statin    Other lipid medications/supplements?:  none    Hypertension Follow-up      Outpatient blood pressures are not being checked.    Low Salt Diet: low salt    BP Readings from Last 2 Encounters:   03/08/19 136/78   02/01/19 130/80     Hemoglobin A1C (%)   Date Value   03/08/2019 7.1 (H)   12/31/2018 7.0 (H)     LDL Cholesterol Calculated (mg/dL)   Date Value   12/31/2018 103 (H)   01/02/2018 113 (H)       Amount of exercise or physical activity: None    Problems taking medications regularly: No    Medication side effects: none    Diet: diabetic      Mammogram negative- cysts were not present   Discussed     Discussed costochondritis           Problem list and histories reviewed & adjusted, as indicated.  Additional history: as documented    Patient Active Problem List   Diagnosis     Rosacea     Gout     CHONDROCALC NOS-OTHER SITE(aka PSEUDOGOUT)     Essential hypertension     Hyperlipidemia LDL goal <100     Advanced directives, counseling/discussion     Atrophic vaginitis     Chronic foot pain     Hypertension goal BP (blood pressure) < 130/80     Bereavement      "Obesity     Type 2 diabetes mellitus without complication (H)     Status post total knee replacement, unspecified laterality     Psoriasis     Past Surgical History:   Procedure Laterality Date     C NONSPECIFIC PROCEDURE      Breast biopsies        C NONSPECIFIC PROCEDURE      Symptomatic left thigh lipomas        C NONSPECIFIC PROCEDURE  6/1/09    pubovaginal sling     COLONOSCOPY       COLONOSCOPY N/A 12/17/2014    Procedure: COMBINED COLONOSCOPY, SINGLE OR MULTIPLE BIOPSY/POLYPECTOMY BY BIOPSY;  Surgeon: Chuy Staton MD;  Location: [facility]     HYSTERECTOMY, PAP NO LONGER INDICATED       HYSTERECTOMY, JAIME  1991    fibroid     SURGICAL HISTORY OF -   10/28/2015    right partial knee        Social History     Tobacco Use     Smoking status: Never Smoker     Smokeless tobacco: Never Used   Substance Use Topics     Alcohol use: No     Alcohol/week: 0.0 oz     Family History   Problem Relation Age of Onset     Cancer Mother         colon ca survivor     Cancer - colorectal Mother      Cerebrovascular Disease Father            Reviewed and updated as needed this visit by clinical staff  Tobacco  Allergies  Meds  Med Hx  Surg Hx  Fam Hx  Soc Hx      Reviewed and updated as needed this visit by Provider  Allergies         ROS:  Constitutional, HEENT, cardiovascular, pulmonary, gi and gu systems are negative, except as otherwise noted.    OBJECTIVE:     /78   Pulse 90   Temp 98.3  F (36.8  C) (Oral)   Resp 18   Ht 1.626 m (5' 4\")   SpO2 97%   BMI 32.10 kg/m    Body mass index is 32.1 kg/m .  GENERAL: alert and no distress  RESP: lungs clear to auscultation - no rales, rhonchi or wheezes  CV: regular rate and rhythm  PSYCH: mentation appears normal, affect normal/bright    Diagnostic Test Results:  Reviewed results   Lab     ASSESSMENT/PLAN:             1. Type 2 diabetes mellitus without complication, with long-term current use of insulin (H)  Much better controlled  - Comprehensive metabolic " panel  - Hemoglobin A1c    2. Essential hypertension  In good range   - Comprehensive metabolic panel    3. Special screening for malignant neoplasms, colon  Wants to do one more colon screen   - GASTROENTEROLOGY ADULT REF PROCEDURE ONLY [facility] [phone]; No Provider Preference    4. Family history of colon cancer    - GASTROENTEROLOGY ADULT REF PROCEDURE ONLY [facility]  [phone]; No Provider Preference    Patient Instructions   Lab in [address]    Colonoscopy   They will call you to set up       LILLIAM Sam [facility]

## 2019-03-08 NOTE — NURSING NOTE
"/77   Pulse 90   Temp 98.3  F (36.8  C) (Oral)   Resp 18   Ht 1.626 m (5' 4\")   SpO2 97%   BMI 32.10 kg/m    Patient here for lab and mammogram follow up. Would also like to discuss a colonoscopy last one was done 12/17/2014.  "

## 2019-03-14 ENCOUNTER — HOSPITAL ENCOUNTER (OUTPATIENT)
Facility: CLINIC | Age: 79
End: 2019-03-14
Attending: INTERNAL MEDICINE | Admitting: INTERNAL MEDICINE
Payer: COMMERCIAL

## 2019-04-01 ENCOUNTER — TELEPHONE (OUTPATIENT)
Dept: INTERNAL MEDICINE | Facility: CLINIC | Age: 79
End: 2019-04-01

## 2019-04-02 NOTE — TELEPHONE ENCOUNTER
Spoke with pt. Pt has cancelled the procedure for now. Pt has many questions and will schedule appt to discuss.   NELSON Ardon

## 2019-04-08 ENCOUNTER — TELEPHONE (OUTPATIENT)
Dept: PHARMACY | Facility: CLINIC | Age: 79
End: 2019-04-08

## 2019-04-08 DIAGNOSIS — Z79.4 TYPE 2 DIABETES MELLITUS WITHOUT COMPLICATION, WITH LONG-TERM CURRENT USE OF INSULIN (H): ICD-10-CM

## 2019-04-08 DIAGNOSIS — E11.9 TYPE 2 DIABETES MELLITUS WITHOUT COMPLICATION, WITH LONG-TERM CURRENT USE OF INSULIN (H): ICD-10-CM

## 2019-04-08 NOTE — TELEPHONE ENCOUNTER
Called patient to follow-up.  With new insurance she would not have coverage for MTM visits.  Left message for patient to call back.

## 2019-04-09 DIAGNOSIS — E11.9 TYPE 2 DIABETES MELLITUS WITHOUT COMPLICATION, WITH LONG-TERM CURRENT USE OF INSULIN (H): ICD-10-CM

## 2019-04-09 DIAGNOSIS — Z79.4 TYPE 2 DIABETES MELLITUS WITHOUT COMPLICATION, WITH LONG-TERM CURRENT USE OF INSULIN (H): ICD-10-CM

## 2019-04-09 NOTE — TELEPHONE ENCOUNTER
"Requested Prescriptions   Pending Prescriptions Disp Refills     Blood Glucose Monitoring Suppl (ACCU-CHEK GUIDE) w/Device KIT [Pharmacy  Last Written Prescription Date:  1/30/2019  Last Fill Quantity: 1 kit,  # refills: 0   Last office visit: 3/8/2019 with prescribing provider:     Future Office Visit:   Med Name: ACCU-CHEK GUIDE w/Device  Kit] 1 kit 0     Sig: TEST BLOOD SUGAR THREE TIMES DAILY       Diabetic Supplies Protocol Passed - 4/8/2019  5:19 PM        Passed - Medication is active on med list        Passed - Patient is 18 years of age or older        Passed - Recent (6 mo) or future (30 days) visit within the authorizing provider's specialty     Patient had office visit in the last 6 months or has a visit in the next 30 days with authorizing provider.  See \"Patient Info\" tab in inbasket, or \"Choose Columns\" in Meds & Orders section of the refill encounter.            "

## 2019-04-09 NOTE — TELEPHONE ENCOUNTER
"Requested Prescriptions   Pending Prescriptions Disp Refills     ACCU-CHEK GUIDE test strip [Pharmacy Med Name: ACCU-CHEK GUIDE   Strip]  Last Written Prescription Date:  1/30/19  Last Fill Quantity: 300,  # refills: 0   Last office visit: 3/8/2019 with prescribing provider:  Marvin   Future Office Visit:     300 strip 0     Sig: TEST BLOOD SUGAR THREE TIMES DAILY  OR AS DIRECTED       Diabetic Supplies Protocol Passed - 4/9/2019  9:34 AM        Passed - Medication is active on med list        Passed - Patient is 18 years of age or older        Passed - Recent (6 mo) or future (30 days) visit within the authorizing provider's specialty     Patient had office visit in the last 6 months or has a visit in the next 30 days with authorizing provider.  See \"Patient Info\" tab in inbasket, or \"Choose Columns\" in Meds & Orders section of the refill encounter.              "

## 2019-04-10 RX ORDER — BLOOD-GLUCOSE METER
EACH MISCELLANEOUS
Qty: 1 KIT | Refills: 0 | Status: SHIPPED | OUTPATIENT
Start: 2019-04-10 | End: 2021-05-21 | Stop reason: ALTCHOICE

## 2019-04-10 NOTE — TELEPHONE ENCOUNTER
Prescription approved per [facility] Refill Protocol.  Ondina Alston RN

## 2019-04-11 RX ORDER — BLOOD SUGAR DIAGNOSTIC
STRIP MISCELLANEOUS
Qty: 300 STRIP | Refills: 1 | Status: SHIPPED | OUTPATIENT
Start: 2019-04-11 | End: 2019-11-19

## 2019-04-11 NOTE — TELEPHONE ENCOUNTER
Prescription approved per [facility] Refill Protocol. EDMAR Jaeger R.N.

## 2019-04-12 DIAGNOSIS — Z79.4 TYPE 2 DIABETES MELLITUS WITHOUT COMPLICATION, WITH LONG-TERM CURRENT USE OF INSULIN (H): ICD-10-CM

## 2019-04-12 DIAGNOSIS — E11.9 TYPE 2 DIABETES MELLITUS WITHOUT COMPLICATION, WITH LONG-TERM CURRENT USE OF INSULIN (H): ICD-10-CM

## 2019-04-12 RX ORDER — BLOOD GLUCOSE CONTROL HIGH,LOW
1 EACH MISCELLANEOUS PRN
Qty: 1 EACH | Refills: 6 | Status: SHIPPED | OUTPATIENT
Start: 2019-04-12 | End: 2020-06-01

## 2019-04-12 NOTE — TELEPHONE ENCOUNTER
Received fax from pharmacy asking for a prescription for Accu-chek guide control solution (level 1 and 2).    Last office visit 3-8-19    Prescription approved per [facility] Refill Protocol.

## 2019-04-16 ENCOUNTER — TELEPHONE (OUTPATIENT)
Dept: PHARMACY | Facility: CLINIC | Age: 79
End: 2019-04-16

## 2019-04-16 NOTE — TELEPHONE ENCOUNTER
Lindsey called back.  Overall doing well and no concerns at this time.  Blood sugar readings do fluctuate but averaging 150s.    No changes at this time.    Plans to recheck labs in 6 months.    Beti Jacobo , Pharm D  [phone] (phone)  [phone] (pager)  Medication Therapy Management Pharmacist

## 2019-05-16 ENCOUNTER — TRANSFERRED RECORDS (OUTPATIENT)
Dept: HEALTH INFORMATION MANAGEMENT | Facility: CLINIC | Age: 79
End: 2019-05-16

## 2019-06-06 ENCOUNTER — OFFICE VISIT (OUTPATIENT)
Dept: INTERNAL MEDICINE | Facility: CLINIC | Age: 79
End: 2019-06-06
Payer: COMMERCIAL

## 2019-06-06 ENCOUNTER — TRANSFERRED RECORDS (OUTPATIENT)
Dept: HEALTH INFORMATION MANAGEMENT | Facility: CLINIC | Age: 79
End: 2019-06-06

## 2019-06-06 VITALS
SYSTOLIC BLOOD PRESSURE: 178 MMHG | DIASTOLIC BLOOD PRESSURE: 62 MMHG | RESPIRATION RATE: 18 BRPM | HEIGHT: 64 IN | BODY MASS INDEX: 32.44 KG/M2 | WEIGHT: 190 LBS | HEART RATE: 71 BPM | OXYGEN SATURATION: 98 % | TEMPERATURE: 98.2 F

## 2019-06-06 DIAGNOSIS — R60.9 EDEMA, UNSPECIFIED TYPE: ICD-10-CM

## 2019-06-06 DIAGNOSIS — M79.662 PAIN OF LEFT LOWER LEG: Primary | ICD-10-CM

## 2019-06-06 PROCEDURE — 99213 OFFICE O/P EST LOW 20 MIN: CPT | Performed by: NURSE PRACTITIONER

## 2019-06-06 RX ORDER — FUROSEMIDE 20 MG
20 TABLET ORAL DAILY PRN
Qty: 90 TABLET | Refills: 1 | Status: SHIPPED | OUTPATIENT
Start: 2019-06-06 | End: 2020-08-04

## 2019-06-06 ASSESSMENT — PAIN SCALES - GENERAL: PAINLEVEL: MODERATE PAIN (4)

## 2019-06-06 ASSESSMENT — MIFFLIN-ST. JEOR: SCORE: 1336.83

## 2019-06-06 NOTE — PROGRESS NOTES
"Subjective     Sonmichelle Gary is a 78 year old female who presents to clinic today for the following health issues:    HPI   Bilateral leg pain onset 1 month after moving stuff from basement.     A month ago had to clear stuff out of basement for radon repair and was carrying things up stairs and knees really hurt   Saw ortho and told she had bruised knee caps - watch and if not better cortisone injection  Bakers cyst behind knee     Left leg swelling - hard to wear shoes   Behind knee and on lateral side of lower left leg painful   Right leg swelling basically resolved   Taking piroxicam daily and icing- per ortho                 Reviewed and updated as needed this visit by Provider  Tobacco  Allergies  Meds  Problems  Med Hx  Surg Hx  Fam Hx         Review of Systems   ROS COMP: Constitutional, HEENT, cardiovascular, pulmonary, gi and gu systems are negative, except as otherwise noted.      Objective    /62 (BP Location: Left arm, Patient Position: Chair, Cuff Size: Adult Large)   Pulse 71   Temp 98.2  F (36.8  C) (Oral)   Resp 18   Ht 1.626 m (5' 4\")   Wt 86.2 kg (190 lb)   SpO2 98%   BMI 32.61 kg/m    Body mass index is 32.61 kg/m .  Physical Exam   GENERAL: healthy, alert and no distress  RESP: lungs clear to auscultation - no rales, rhonchi or wheezes  CV: regular rate and rhythm, normal S1 S2, no S3 or S4, no murmur, click or rub, no peripheral edema and peripheral pulses strong  MS: lower right leg swelling ion calf area compared to right   Han leg- no redness or excess heat noted, negative Homans sign   PSYCH: mentation appears normal, affect normal/bright    Diagnostic Test Results:  Labs reviewed in Epic  Ultrasound - her insurance is cheaper if done at [facility] - order faxed and given to patient and she will call to set up         Assessment & Plan     1. Edema, unspecified type  Using ongoing- not new   - furosemide (LASIX) 20 MG tablet; Take 1 tablet (20 mg) by mouth daily " "as needed (edema)  Dispense: 90 tablet; Refill: 1  - US Lower Extremity Venous Duplex Left; Future    2. Pain of left lower leg  Rule out DVT or ruptured bakers cyst   Will have her see ortho if all normal   - US Lower Extremity Venous Duplex Left; Future     BMI:   Estimated body mass index is 32.61 kg/m  as calculated from the following:    Height as of this encounter: 1.626 m (5' 4\").    Weight as of this encounter: 86.2 kg (190 lb).   Weight management plan: Discussed healthy diet and exercise guidelines        Patient Instructions   [facility], [address]  [address] [address]    Scheduling Line: [phone]  Scheduling Fax: [phone]  Clinic Phone: [phone]  Clinic Fax: [phone]    Ultrasound left leg        Return in about 1 week (around 6/13/2019), or after ultrasound if positive .    LILLIAM Sam [facility]        "

## 2019-06-06 NOTE — PATIENT INSTRUCTIONS
[facility]   Counts include 234 beds at the [facility], [address]  [address] [place] [address]    Scheduling Line: [phone]  Scheduling Fax: [phone]  Clinic Phone: [phone]  Clinic Fax: [phone]    Ultrasound left leg

## 2019-06-06 NOTE — NURSING NOTE
"Chief Complaint   Patient presents with     Leg Pain     bilateral onset 1 months ago after lifting objects from basement, history of Bakers cyst pt wanting to rule out, pt still having pain.      initial /62 (BP Location: Left arm, Patient Position: Chair, Cuff Size: Adult Large)   Pulse 71   Temp 98.2  F (36.8  C) (Oral)   Resp 18   Ht 1.626 m (5' 4\")   Wt 86.2 kg (190 lb)   SpO2 98%   BMI 32.61 kg/m   Estimated body mass index is 32.61 kg/m  as calculated from the following:    Height as of this encounter: 1.626 m (5' 4\").    Weight as of this encounter: 86.2 kg (190 lb)..  bp completed using cuff size large    "

## 2019-06-07 ENCOUNTER — TELEPHONE (OUTPATIENT)
Dept: INTERNAL MEDICINE | Facility: CLINIC | Age: 79
End: 2019-06-07

## 2019-06-07 NOTE — TELEPHONE ENCOUNTER
She has no DVT in her left leg  She does have a Baker cyst   Would  have her follow up with ortho- sometimes they will inject the cyst to help it reabsorb

## 2019-06-07 NOTE — TELEPHONE ENCOUNTER
Pt calling requesting ultrasound results that were done at [facility] yesterday 6/6/19. She would like a call back before the weekend if possible. Pt can be reached at [phone]. OK to leave a detailed message. Please advise. Thanks.

## 2019-06-18 ENCOUNTER — TRANSFERRED RECORDS (OUTPATIENT)
Dept: HEALTH INFORMATION MANAGEMENT | Facility: CLINIC | Age: 79
End: 2019-06-18

## 2019-07-09 DIAGNOSIS — E11.9 TYPE 2 DIABETES MELLITUS WITHOUT COMPLICATION, WITHOUT LONG-TERM CURRENT USE OF INSULIN (H): ICD-10-CM

## 2019-07-09 NOTE — TELEPHONE ENCOUNTER
Requested Prescriptions   Pending Prescriptions Disp Refills     metFORMIN (GLUCOPHAGE) 1000 MG tablet [Pharmacy Med Name: METFORMIN   1000MG TAB]    Last Written Prescription Date:  1/8/19  Last Fill Quantity: 180,  # refills: 1   Last office visit: 6/6/2019 with prescribing provider:  Marvin   Future Office Visit:   Next 5 appointments (look out 90 days)    Jul 11, 2019  5:00 PM CDT  SHORT with LILLIAM Sam CNP  [facility] [address]  [facility] [address]  [phone]          180 tablet 1     Sig: TAKE 1 TABLET BY MOUTH TWICE DAILY WITH MEALS       Biguanide Agents Failed - 7/9/2019  2:21 PM        Failed - Blood pressure less than 140/90 in past 6 months     BP Readings from Last 3 Encounters:   06/06/19 178/62   03/08/19 136/78   02/01/19 130/80                 Passed - Patient has documented LDL within the past 12 mos.     Recent Labs   Lab Test 12/31/18  0850   *             Passed - Patient has had a Microalbumin in the past 15 mos.     Recent Labs   Lab Test 12/31/18  0850   MICROL 150   UMALCR 88.76*             Passed - Patient is age 10 or older        Passed - Patient has documented A1c within the specified period of time.     If HgbA1C is 8 or greater, it needs to be on file within the past 3 months.  If less than 8, must be on file within the past 6 months.     Recent Labs   Lab Test 03/08/19  1237   A1C 7.1*             Passed - Patient's CR is NOT>1.4 OR Patient's EGFR is NOT<45 within past 12 mos.     Recent Labs   Lab Test 03/08/19  1237   GFRESTIMATED 76   GFRESTBLACK 88       Recent Labs   Lab Test 03/08/19  1237   CR 0.76             Passed - Patient does NOT have a diagnosis of CHF.        Passed - Medication is active on med list        Passed - Patient is not pregnant        Passed - Patient has not had a positive pregnancy test within the past 12 mos.         Passed - Recent (6 mo) or future (30 days) visit  "within the authorizing provider's specialty     Patient had office visit in the last 6 months or has a visit in the next 30 days with authorizing provider or within the authorizing provider's specialty.  See \"Patient Info\" tab in inbasket, or \"Choose Columns\" in Meds & Orders section of the refill encounter.            glipiZIDE (GLUCOTROL) 10 MG tablet [Pharmacy Med Name: GLIPIZIDE 10MG        TAB]    Last Written Prescription Date:  1/8/19  Last Fill Quantity: 180,  # refills: 1   Last office visit: 6/6/2019 with prescribing provider:  Marvin   Future Office Visit:   Next 5 appointments (look out 90 days)    Jul 11, 2019  5:00 PM CDT  SHORT with LILLIAM Sam CNP  [facility] [address]  [facility] [address]  [phone]          180 tablet 1     Sig: TAKE 1 TABLET BY MOUTH TWICE DAILY BEFORE MEAL(S)       Sulfonylurea Agents Failed - 7/9/2019  2:21 PM        Failed - Blood pressure less than 140/90 in past 6 months     BP Readings from Last 3 Encounters:   06/06/19 178/62   03/08/19 136/78   02/01/19 130/80                 Passed - Patient has documented LDL within the past 12 mos.     Recent Labs   Lab Test 12/31/18  0850   *             Passed - Patient has had a Microalbumin in the past 15 mos.     Recent Labs   Lab Test 12/31/18  0850   MICROL 150   UMALCR 88.76*             Passed - Patient has documented A1c within the specified period of time.     If HgbA1C is 8 or greater, it needs to be on file within the past 3 months.  If less than 8, must be on file within the past 6 months.     Recent Labs   Lab Test 03/08/19  1237   A1C 7.1*             Passed - Medication is active on med list        Passed - Patient is age 18 or older        Passed - No active pregnancy on record        Passed - Patient has a recent creatinine (normal) within the past 12 mos.     Recent Labs   Lab Test 03/08/19  1237   CR 0.76             Passed - " "Patient has not had a positive pregnancy test within the past 12 mos.        Passed - Recent (6 mo) or future (30 days) visit within the authorizing provider's specialty     Patient had office visit in the last 6 months or has a visit in the next 30 days with authorizing provider or within the authorizing provider's specialty.  See \"Patient Info\" tab in inbasket, or \"Choose Columns\" in Meds & Orders section of the refill encounter.              "

## 2019-07-10 RX ORDER — GLIPIZIDE 10 MG/1
TABLET ORAL
Qty: 180 TABLET | Refills: 1 | Status: SHIPPED | OUTPATIENT
Start: 2019-07-10 | End: 2020-02-05

## 2019-07-10 NOTE — TELEPHONE ENCOUNTER
Glipizide & Metformin  Routing refill request to provider for review/approval because:  Blood pressures out of range    Next 5 appointments (look out 90 days)    Jul 11, 2019  5:00 PM CDT  SHORT with LILLIAM Sam CNP  [facility] ([facility]) [address]  [facility] [address]  [phone]

## 2019-07-11 ENCOUNTER — ANCILLARY PROCEDURE (OUTPATIENT)
Dept: GENERAL RADIOLOGY | Facility: CLINIC | Age: 79
End: 2019-07-11
Attending: NURSE PRACTITIONER
Payer: COMMERCIAL

## 2019-07-11 ENCOUNTER — OFFICE VISIT (OUTPATIENT)
Dept: INTERNAL MEDICINE | Facility: CLINIC | Age: 79
End: 2019-07-11
Payer: COMMERCIAL

## 2019-07-11 VITALS
HEART RATE: 93 BPM | OXYGEN SATURATION: 97 % | HEIGHT: 64 IN | RESPIRATION RATE: 18 BRPM | BODY MASS INDEX: 32.44 KG/M2 | DIASTOLIC BLOOD PRESSURE: 83 MMHG | WEIGHT: 190 LBS | SYSTOLIC BLOOD PRESSURE: 132 MMHG | TEMPERATURE: 98.5 F

## 2019-07-11 DIAGNOSIS — J32.0 MAXILLARY SINUSITIS, UNSPECIFIED CHRONICITY: Primary | ICD-10-CM

## 2019-07-11 DIAGNOSIS — J06.9 UPPER RESPIRATORY TRACT INFECTION, UNSPECIFIED TYPE: ICD-10-CM

## 2019-07-11 DIAGNOSIS — M79.645 PAIN OF LEFT THUMB: ICD-10-CM

## 2019-07-11 DIAGNOSIS — H61.21 IMPACTED CERUMEN OF RIGHT EAR: ICD-10-CM

## 2019-07-11 DIAGNOSIS — Z96.659 STATUS POST TOTAL KNEE REPLACEMENT, UNSPECIFIED LATERALITY: ICD-10-CM

## 2019-07-11 DIAGNOSIS — M25.50 MULTIPLE JOINT PAIN: ICD-10-CM

## 2019-07-11 DIAGNOSIS — R05.9 COUGH: ICD-10-CM

## 2019-07-11 PROCEDURE — 99214 OFFICE O/P EST MOD 30 MIN: CPT | Mod: 25 | Performed by: NURSE PRACTITIONER

## 2019-07-11 PROCEDURE — 73140 X-RAY EXAM OF FINGER(S): CPT | Mod: LT | Performed by: RADIOLOGY

## 2019-07-11 PROCEDURE — 69209 REMOVE IMPACTED EAR WAX UNI: CPT | Mod: RT | Performed by: NURSE PRACTITIONER

## 2019-07-11 RX ORDER — AZITHROMYCIN 250 MG/1
TABLET, FILM COATED ORAL
Qty: 6 TABLET | Refills: 0 | Status: SHIPPED | OUTPATIENT
Start: 2019-07-11 | End: 2019-07-18

## 2019-07-11 RX ORDER — CODEINE PHOSPHATE AND GUAIFENESIN 10; 100 MG/5ML; MG/5ML
2 SOLUTION ORAL EVERY 4 HOURS PRN
Qty: 240 ML | Refills: 1 | Status: SHIPPED | OUTPATIENT
Start: 2019-07-11 | End: 2019-07-18

## 2019-07-11 ASSESSMENT — MIFFLIN-ST. JEOR: SCORE: 1336.83

## 2019-07-11 NOTE — PATIENT INSTRUCTIONS
Left thumb x ray [address]    Follow up with [facility] as planned     Robitussin with codeine as needed for cough      Zithromax  Pack - take as directed     Turmeric is what I've discussed. [employer] has one that is made with fish oil (Omega Curcumin) or Pure Encapsulations make a great product (Curcumin C3 Complex). Patients can find it on Amazon.     Dr. Batista at [facility] ([facility]) and at [facility]  Rheumatology    [place] clinic  [phone] for appointment     Address  [address] [place] [address]  Contact  Phone:  [phone]  Fax:  [phone]

## 2019-07-11 NOTE — NURSING NOTE
"Chief Complaint   Patient presents with     Thumb Discomfort     pt c/o a clicking with her left thumb onset x 1 1/2 weeks. worse in the am     Cough     pt c/o a cough, sinus headache onset x 1 week.     initial /83   Pulse 93   Temp 98.5  F (36.9  C) (Oral)   Resp 18   Ht 1.626 m (5' 4\")   Wt 86.2 kg (190 lb)   SpO2 97%   BMI 32.61 kg/m   Estimated body mass index is 32.61 kg/m  as calculated from the following:    Height as of this encounter: 1.626 m (5' 4\").    Weight as of this encounter: 86.2 kg (190 lb)..  bp completed using cuff size large  MARIAH LIAO LPN  "

## 2019-07-11 NOTE — PROGRESS NOTES
".Subjective     Sonmichelle Gary is a 78 year old female who presents to clinic today for the following health issues:  Chief Complaint   Patient presents with     Thumb Discomfort     pt c/o a clicking with her left thumb onset x 1 1/2 weeks. worse in the am  Clicks at first joint   Appointment with hand doctor at [facility]     Discussed tylenol up to 3000 mg a day       Cough     pt c/o a cough, sinus headache onset x 1 week.  Needs prescription for cough syrup with codeine - no sugar added   Sore throat and headache and cough   Ear pain bilaterally   Sinus pain   Wheezing a couple weeks ago   Cough persists       HPI     Thumb Discomfort     pt c/o a clicking with her left thumb onset x 1 1/2 weeks. worse in the am  Clicks at first joint   Appointment with hand doctor at [facility]     Discussed tylenol up to 3000 mg a day       Cough     pt c/o a cough, sinus headache onset x 1 week.  Needs prescription for cough syrup with codeine - no sugar added   Sore throat and headache and cough   Ear pain bilaterally   Sinus pain   Wheezing a couple weeks ago   Cough persists                     Reviewed and updated as needed this visit by Provider  Tobacco  Allergies  Meds  Problems  Med Hx  Surg Hx  Fam Hx         Review of Systems   ROS COMP: Constitutional, HEENT, cardiovascular, pulmonary, gi and gu systems are negative, except as otherwise noted.      Objective    /83   Pulse 93   Temp 98.5  F (36.9  C) (Oral)   Resp 18   Ht 1.626 m (5' 4\")   Wt 86.2 kg (190 lb)   SpO2 97%   BMI 32.61 kg/m    Body mass index is 32.61 kg/m .  Physical Exam   GENERAL: alert and mild distress with sinus issues and cough   HENT: ear canals and TM's normal left - wet appearing cerumen on right - ear wash done , nose and mouth without ulcers or lesions; sinus pain in maxillary area    RESP: lungs clear to auscultation - no rales, rhonchi or wheezes  CV: regular rate and rhythm  MS: left thumb pain at joint line- not clicking today " "  PSYCH: mentation appears normal, affect normal/bright    Diagnostic Test Results:  Labs reviewed in Epic        Assessment & Plan     1. Cough    - guaiFENesin-codeine (ROBITUSSIN AC) 100-10 MG/5ML solution; Take 10 mLs by mouth every 4 hours as needed for cough  Dispense: 240 mL; Refill: 1    2. Upper respiratory tract infection, unspecified type    - guaiFENesin-codeine (ROBITUSSIN AC) 100-10 MG/5ML solution; Take 10 mLs by mouth every 4 hours as needed for cough  Dispense: 240 mL; Refill: 1    3. Pain of left thumb    - XR Finger Left G/E 2 Views; Future  - RHEUMATOLOGY REFERRAL    4. Status post total knee replacement, unspecified laterality    - RHEUMATOLOGY REFERRAL    5. Multiple joint pain    - RHEUMATOLOGY REFERRAL    6. Impacted cerumen of right ear    - REMOVE IMPACTED CERUMEN    7. Maxillary sinusitis, unspecified chronicity    - azithromycin (ZITHROMAX) 250 MG tablet; Two tablets first day, then one tablet daily for four days.  Dispense: 6 tablet; Refill: 0     BMI:   Estimated body mass index is 32.61 kg/m  as calculated from the following:    Height as of this encounter: 1.626 m (5' 4\").    Weight as of this encounter: 86.2 kg (190 lb).           Patient Instructions   Left thumb x ray [address]    Follow up with [facility] as planned     Robitussin with codeine as needed for cough      Zithromax  Pack - take as directed     Turmeric is what I've discussed. [employer] has one that is made with fish oil (Omega Curcumin) or Pure Encapsulations make a great product (Curcumin C3 Complex). Patients can find it on Amazon.     Dr. Batista at [facility] ([facility]) and at [facility]  Rheumatology    [place] clinic  [phone] for appointment     Address  [address] [place] [address]  Contact  Phone:  [phone]  Fax:  [phone]                Return in about 6 months (around 1/11/2020).    LILLIAM Sam [facility]        "

## 2019-07-15 ENCOUNTER — TRANSFERRED RECORDS (OUTPATIENT)
Dept: HEALTH INFORMATION MANAGEMENT | Facility: CLINIC | Age: 79
End: 2019-07-15

## 2019-07-18 ENCOUNTER — OFFICE VISIT (OUTPATIENT)
Dept: INTERNAL MEDICINE | Facility: CLINIC | Age: 79
End: 2019-07-18
Payer: COMMERCIAL

## 2019-07-18 VITALS
HEIGHT: 64 IN | HEART RATE: 77 BPM | TEMPERATURE: 98.2 F | SYSTOLIC BLOOD PRESSURE: 152 MMHG | RESPIRATION RATE: 16 BRPM | BODY MASS INDEX: 32.61 KG/M2 | OXYGEN SATURATION: 97 % | DIASTOLIC BLOOD PRESSURE: 66 MMHG

## 2019-07-18 DIAGNOSIS — R05.9 COUGH: Primary | ICD-10-CM

## 2019-07-18 DIAGNOSIS — M65.312 TRIGGER FINGER OF LEFT THUMB: ICD-10-CM

## 2019-07-18 DIAGNOSIS — J06.9 UPPER RESPIRATORY TRACT INFECTION, UNSPECIFIED TYPE: ICD-10-CM

## 2019-07-18 PROCEDURE — 99213 OFFICE O/P EST LOW 20 MIN: CPT | Performed by: NURSE PRACTITIONER

## 2019-07-18 RX ORDER — CEFDINIR 300 MG/1
300 CAPSULE ORAL 2 TIMES DAILY
Qty: 20 CAPSULE | Refills: 0 | Status: SHIPPED | OUTPATIENT
Start: 2019-07-18 | End: 2019-11-11

## 2019-07-18 RX ORDER — CODEINE PHOSPHATE AND GUAIFENESIN 10; 100 MG/5ML; MG/5ML
2 SOLUTION ORAL EVERY 4 HOURS PRN
Qty: 240 ML | Refills: 1 | Status: SHIPPED | OUTPATIENT
Start: 2019-07-18 | End: 2019-11-11

## 2019-07-18 NOTE — NURSING NOTE
"Chief Complaint   Patient presents with     RECHECK     LOV 7-11-19 wants refill on cough med and states she thinks she should be feeling better     initial /66   Pulse 77   Temp 98.2  F (36.8  C) (Oral)   Resp 16   Ht 1.626 m (5' 4\")   SpO2 97%   BMI 32.61 kg/m   Estimated body mass index is 32.61 kg/m  as calculated from the following:    Height as of this encounter: 1.626 m (5' 4\").    Weight as of 7/11/19: 86.2 kg (190 lb)..  bp completed using cuff size large  MARIAH LIAO LPN  "

## 2019-07-18 NOTE — PROGRESS NOTES
.Subjective     Sonmichelle Gary is a 78 year old female who presents to clinic today for the following health issues:  Chief Complaint   Patient presents with     RECHECK     LOV 7-11-19 wants refill on cough med and states she thinks she should be feeling better     HPI   She was seen a few days ago for UPPER RESPIRATORY INFECTION and treated with z pack   Still feel she is coughing excessively and not improving with sinus pain and secretions from nose. Not coughing up secretions   No fever  Ears feel full   Sinus pain      Saw ortho and her thumb is trigger finger     Patient Active Problem List   Diagnosis     Rosacea     Gout     CHONDROCALC NOS-OTHER SITE(aka PSEUDOGOUT)     Essential hypertension     Hyperlipidemia LDL goal <100     Advanced directives, counseling/discussion     Atrophic vaginitis     Chronic foot pain     Hypertension goal BP (blood pressure) < 130/80     Bereavement     Obesity     Type 2 diabetes mellitus without complication (H)     Status post total knee replacement, unspecified laterality     Psoriasis     Past Surgical History:   Procedure Laterality Date     C NONSPECIFIC PROCEDURE      Breast biopsies        C NONSPECIFIC PROCEDURE      Symptomatic left thigh lipomas        C NONSPECIFIC PROCEDURE  6/1/09    pubovaginal sling     COLONOSCOPY       COLONOSCOPY N/A 12/17/2014    Procedure: COMBINED COLONOSCOPY, SINGLE OR MULTIPLE BIOPSY/POLYPECTOMY BY BIOPSY;  Surgeon: Chuy Staton MD;  Location: [facility]     HYSTERECTOMY, PAP NO LONGER INDICATED       HYSTERECTOMY, JAIME  1991    fibroid     SURGICAL HISTORY OF -   10/28/2015    right partial knee        Social History     Tobacco Use     Smoking status: Never Smoker     Smokeless tobacco: Never Used   Substance Use Topics     Alcohol use: No     Alcohol/week: 0.0 oz     Family History   Problem Relation Age of Onset     Cancer Mother         colon ca survivor     Cancer - colorectal Mother      Cerebrovascular Disease Father       "        Reviewed and updated as needed this visit by Provider  Tobacco  Allergies  Meds  Problems  Med Hx  Surg Hx  Fam Hx         Review of Systems   ROS COMP: Constitutional, HEENT, cardiovascular, pulmonary, gi and gu systems are negative, except as otherwise noted.      Objective    /66   Pulse 77   Temp 98.2  F (36.8  C) (Oral)   Resp 16   Ht 1.626 m (5' 4\")   SpO2 97%   BMI 32.61 kg/m    Body mass index is 32.61 kg/m .  Physical Exam   GENERAL: alert and no distress- looks tired and sicker than previous visit   HENT: ear canals and TM's normal, nose mild exudate and mouth without ulcers or lesions  RESP: lungs clear to auscultation - no rales, rhonchi or wheezes- even with forced exhalation   CV: regular rate and rhythm  PSYCH: mentation appears normal, affect normal/bright    Diagnostic Test Results:  Labs reviewed in Epic        Assessment & Plan     1. Cough    - cefdinir (OMNICEF) 300 MG capsule; Take 1 capsule (300 mg) by mouth 2 times daily  Dispense: 20 capsule; Refill: 0  - guaiFENesin-codeine (ROBITUSSIN AC) 100-10 MG/5ML solution; Take 10 mLs by mouth every 4 hours as needed for cough  Dispense: 240 mL; Refill: 1    2. Upper respiratory tract infection, unspecified type    - cefdinir (OMNICEF) 300 MG capsule; Take 1 capsule (300 mg) by mouth 2 times daily  Dispense: 20 capsule; Refill: 0  - guaiFENesin-codeine (ROBITUSSIN AC) 100-10 MG/5ML solution; Take 10 mLs by mouth every 4 hours as needed for cough  Dispense: 240 mL; Refill: 1     BMI:   Estimated body mass index is 32.61 kg/m  as calculated from the following:    Height as of this encounter: 1.626 m (5' 4\").    Weight as of 7/11/19: 86.2 kg (190 lb).           Patient Instructions   Omnicef twice daily for 10 days     Cough syrup if needed for cough            Return in about 6 months (around 1/18/2020).    LILLIAM Sam [facility]        "

## 2019-07-23 ENCOUNTER — APPOINTMENT (OUTPATIENT)
Dept: GENERAL RADIOLOGY | Facility: CLINIC | Age: 79
End: 2019-07-23
Attending: PHYSICIAN ASSISTANT
Payer: COMMERCIAL

## 2019-07-23 ENCOUNTER — HOSPITAL ENCOUNTER (EMERGENCY)
Facility: CLINIC | Age: 79
Discharge: HOME | End: 2019-07-23
Attending: PHYSICIAN ASSISTANT | Admitting: PHYSICIAN ASSISTANT
Payer: COMMERCIAL

## 2019-07-23 VITALS
RESPIRATION RATE: 20 BRPM | SYSTOLIC BLOOD PRESSURE: 160 MMHG | HEART RATE: 68 BPM | DIASTOLIC BLOOD PRESSURE: 95 MMHG | TEMPERATURE: 97.6 F | BODY MASS INDEX: 32.44 KG/M2 | WEIGHT: 189 LBS | OXYGEN SATURATION: 97 %

## 2019-07-23 DIAGNOSIS — J20.9 ACUTE BRONCHITIS: ICD-10-CM

## 2019-07-23 LAB
ANION GAP SERPL CALCULATED.3IONS-SCNC: 5 MMOL/L (ref 3–14)
BASOPHILS # BLD AUTO: 0.1 10E9/L (ref 0–0.2)
BASOPHILS NFR BLD AUTO: 0.8 %
BUN SERPL-MCNC: 28 MG/DL (ref 7–30)
CALCIUM SERPL-MCNC: 9.1 MG/DL (ref 8.5–10.1)
CHLORIDE SERPL-SCNC: 108 MMOL/L (ref 94–109)
CO2 SERPL-SCNC: 26 MMOL/L (ref 20–32)
CREAT SERPL-MCNC: 0.85 MG/DL (ref 0.52–1.04)
DIFFERENTIAL METHOD BLD: NORMAL
EOSINOPHIL # BLD AUTO: 0.2 10E9/L (ref 0–0.7)
EOSINOPHIL NFR BLD AUTO: 2.6 %
ERYTHROCYTE [DISTWIDTH] IN BLOOD BY AUTOMATED COUNT: 13.6 % (ref 10–15)
GFR SERPL CREATININE-BSD FRML MDRD: 66 ML/MIN/{1.73_M2}
GLUCOSE SERPL-MCNC: 149 MG/DL (ref 70–99)
HCT VFR BLD AUTO: 39.4 % (ref 35–47)
HGB BLD-MCNC: 12.5 G/DL (ref 11.7–15.7)
IMM GRANULOCYTES # BLD: 0 10E9/L (ref 0–0.4)
IMM GRANULOCYTES NFR BLD: 0.4 %
LYMPHOCYTES # BLD AUTO: 2.2 10E9/L (ref 0.8–5.3)
LYMPHOCYTES NFR BLD AUTO: 30.3 %
MCH RBC QN AUTO: 27.5 PG (ref 26.5–33)
MCHC RBC AUTO-ENTMCNC: 31.7 G/DL (ref 31.5–36.5)
MCV RBC AUTO: 87 FL (ref 78–100)
MONOCYTES # BLD AUTO: 0.4 10E9/L (ref 0–1.3)
MONOCYTES NFR BLD AUTO: 6 %
NEUTROPHILS # BLD AUTO: 4.4 10E9/L (ref 1.6–8.3)
NEUTROPHILS NFR BLD AUTO: 59.9 %
NRBC # BLD AUTO: 0 10*3/UL
NRBC BLD AUTO-RTO: 0 /100
PLATELET # BLD AUTO: 226 10E9/L (ref 150–450)
POTASSIUM SERPL-SCNC: 5.6 MMOL/L (ref 3.4–5.3)
RBC # BLD AUTO: 4.54 10E12/L (ref 3.8–5.2)
SODIUM SERPL-SCNC: 139 MMOL/L (ref 133–144)
WBC # BLD AUTO: 7.4 10E9/L (ref 4–11)

## 2019-07-23 PROCEDURE — 80048 BASIC METABOLIC PNL TOTAL CA: CPT | Performed by: PHYSICIAN ASSISTANT

## 2019-07-23 PROCEDURE — 71046 X-RAY EXAM CHEST 2 VIEWS: CPT

## 2019-07-23 PROCEDURE — 99284 EMERGENCY DEPT VISIT MOD MDM: CPT | Mod: 25

## 2019-07-23 PROCEDURE — 85025 COMPLETE CBC W/AUTO DIFF WBC: CPT | Performed by: PHYSICIAN ASSISTANT

## 2019-07-23 RX ORDER — BENZONATATE 100 MG/1
100 CAPSULE ORAL 3 TIMES DAILY PRN
Qty: 20 CAPSULE | Refills: 0 | Status: SHIPPED | OUTPATIENT
Start: 2019-07-23 | End: 2019-08-19

## 2019-07-23 RX ORDER — GUAIFENESIN 600 MG/1
600 TABLET, EXTENDED RELEASE ORAL 2 TIMES DAILY
Qty: 15 TABLET | Refills: 0 | Status: SHIPPED | OUTPATIENT
Start: 2019-07-23 | End: 2019-11-11

## 2019-07-23 ASSESSMENT — ENCOUNTER SYMPTOMS
SHORTNESS OF BREATH: 0
SORE THROAT: 0
COUGH: 1
FEVER: 0
VOMITING: 0

## 2019-07-23 NOTE — ED TRIAGE NOTES
"Pt has cough for past 3 weeks and has been on 2 different meds after 2 visits.  She had cough spell this AM and felt \"stuck\" in throat which has now cleared.  "

## 2019-07-23 NOTE — ED AVS SNAPSHOT
[facility] Emergency Department  [address]  [facility] [address]  Phone:  [phone]  Fax:  [phone]                                    Lindsey Gayr   MRN: [id number]    Department:  [facility] Emergency Department   Date of Visit:  7/23/2019           After Visit Summary Signature Page    I have received my discharge instructions, and my questions have been answered. I have discussed any challenges I see with this plan with the nurse or doctor.    ..........................................................................................................................................  Patient/Patient Representative Signature      ..........................................................................................................................................  Patient Representative Print Name and Relationship to Patient    ..................................................               ................................................  Date                                   Time    ..........................................................................................................................................  Reviewed by Signature/Title    ...................................................              ..............................................  Date                                               Time          22EPIC Rev 08/18

## 2019-07-23 NOTE — DISCHARGE INSTRUCTIONS
*Continue antibiotic as prescribed.  Tessalon perles as prescribed for cough. Continue your current medications  *Follow-up with your doctor for a recheck in 2-3 days.    *Return if you develop difficulty breathing or swallowing, are unable to keep fluids down, faint or feel like you will faint or become worse in any way.     Discharge Instructions  Bronchitis, Pneumonia, Bronchospasm    You were seen today for a chest infection or inflammation. If your provider decided this was due to a bacterial infection, you may need an antibiotic. Sometimes these are caused by a virus, and then an antibiotic will not help.     Generally, every Emergency Department visit should have a follow-up clinic visit with either a primary or a specialty clinic/provider. Please follow-up as instructed by your emergency provider today.    Return to the Emergency Department if:  Your breathing gets much worse.  You are very weak, or feel much more ill.  You develop new symptoms, such as chest pain.  You cough up blood.  You are vomiting (throwing up) enough that you cannot keep fluids or your medicine down.    What can I do to help myself?  Fill any prescriptions the provider gave you and take them right away--especially antibiotics. Be sure to finish the whole antibiotic prescription.  You may be given a prescription for an inhaler, which can help loosen tight air passages.  Use this as needed, but not more often than directed. Inhalers work much better when used with a spacer.   You may be given a prescription for a steroid to reduce inflammation. Used long-term, these can have side effects, but for short-term use they are safe. You may notice restlessness or increased appetite.      You may use non-prescription cough or cold medicines. Cough medicines may help, but don t make the cough go away completely.   Avoid smoke, because this can make your symptoms worse. If you smoke, this may be a good time to quit! Consider using nicotine  lozenges, gum, or patches to reduce cravings.   If you have a fever, Tylenol  (acetaminophen), Motrin  (ibuprofen), or Advil  (ibuprofen) may help bring fever down and may help you feel more comfortable. Be sure to read and follow the package directions, and ask your provider if you have questions.  Be sure to get your flu shot each year.  For certain ages, the pneumonia shot can help prevent pneumonia.  If you were given a prescription for medicine here today, be sure to read all of the information (including the package insert) that comes with your prescription.  This will include important information about the medicine, its side effects, and any warnings that you need to know about.  The pharmacist who fills the prescription can provide more information and answer questions you may have about the medicine.  If you have questions or concerns that the pharmacist cannot address, please call or return to the Emergency Department.     Remember that you can always come back to the Emergency Department if you are not able to see your regular provider in the amount of time listed above, if you get any new symptoms, or if there is anything that worries you.

## 2019-07-23 NOTE — ED PROVIDER NOTES
History     Chief Complaint:  Cough    HPI:   The history is provided by the patient.      Lindsey Gary is a 79 year old female with history of hypertension, hyperlipidemia, and type 2 diabetes who presents with cough. The patient states she developed a productive cough about 2 weeks ago. She was seen by her primary on 7/11 and started on a Zithromax and robitussin for URI and maxillary sinusitis, and then she was seen again after her symptoms were not improving on 7/18 and started on cefdinir. The patient states she has been taking these medications as instructed but her cough has persisted. She reports drainage down the back of her throat. She denies fevers. She denies a history of COPD or asthma. The patient states that about 1 hour ago she had a coughing fit during which she felt unable to breath, prompting her evaluation. Here in the ED, the patient denies chest and shortness of breath. Denies fevers.     Allergies:  Augmentin   Sulfa drugs      Medications:    Allopurinol   Zyloprim   Vitamin C   Aspirin 81 mg   Calcium-magnesium   Cefdinir- day 5 of 10  Vitamin D   Estradiol   Fluconazole   Lasix   Gabapentin    Glipizide   Robitussin   Novolin   Lactobacillus   Lisinopril    Vitamin E   Metformin   Potassium chloride   Simvastatin     Past Medical History:    Hypertension   Hyperlipidemia   Type 2 diabetes   Gout   Psoriasis    Past Surgical History:    Breast biopsies   Symptomatic left thigh lipomas   Pubovaginal sling   Hysterectomy   Right partial knee surgery     Family History:    Colon cancer: mother  Cerebrovascular disease: father    Social History:  PCP: Dinorah Wong   Marital Status:    Smoking status: Never  Alcohol use: Negative    Drug use: Negative      Review of Systems   Constitutional: Negative for fever.   HENT: Positive for postnasal drip. Negative for ear pain and sore throat.    Respiratory: Positive for cough. Negative for shortness of breath.    Cardiovascular:  Negative for chest pain.   Gastrointestinal: Negative for vomiting.   All other systems reviewed and are negative.    Physical Exam     Patient Vitals for the past 24 hrs:   BP Temp Temp src Pulse Heart Rate Resp SpO2 Weight   07/23/19 1111 (!) 160/95 -- -- 68 -- -- 97 % --   07/23/19 0948 173/84 97.6  F (36.4  C) Temporal -- 78 20 98 % 85.7 kg (189 lb)        Physical Exam  General: Alert, interactive. GCS 15  Head:  Scalp is atraumatic.  Eyes:  EOM intact. The pupils are equal, round, and reactive to light. No scleral icterus.   ENT:                                      Ears:  The external ears are normal. TM's non-erythematous. External canals normal.    Nose:  The external nose is normal.  Throat:  The oropharynx is normal. Mucus membranes are moist.                 Neck:  Normal range of motion. There is no rigidity.   CV:  Regular rate and rhythm. No murmur. 2+ radial pulses  Resp:  Breath sounds are clear bilaterally. Non-labored, no retractions or accessory muscle use.  GI:  Abdomen is soft, no distension, no tenderness.   MS:  Normal range of motion.   Skin:  Warm and dry.   Neuro:  Strength and sensation grossly intact.   Psych:  Awake. Alert.  Appropriate interactions.       Emergency Department Course     Imaging:  Radiographic findings were communicated with the patient who voiced understanding of the findings.    Chest XR, PA & LAT  Impression: No acute cardiopulmonary disease. No focal consolidation  to suggest pneumonia. Multilevel degenerative changes in the spine.  As read by Radiology.     Laboratory:  CBC: WNL (WBC 7.4, HGB 12.5, )   BMP: Potassium 5.6 (specimen slightly hemolyzed, potassium may be falsely elevated), glucose 149, o/w WNL (Creatinine 0.85)    Interventions:  Medications - No data to display     Emergency Department Course:  Past medical records, nursing notes, and vitals reviewed.  1023: I performed an exam of the patient and obtained history, as documented above.   Blood  drawn for laboratory testing. Results are as above.    The patient was sent for X-ray imaging while in the emergency department, findings above.     1142: I rechecked the patient. Explained findings to the patient.    I rechecked the patient. Findings and plan explained to the Patient. Patient discharged home with instructions regarding supportive care, medications, and reasons to return. The importance of close follow-up was reviewed.       Impression & Plan      Medical Decision Making:  Lindsey Gary is a 79 year old female presents emergency department with ongoing cough.  She is currently on Ceftin prescribed by her primary care provider.  She presents today after an episode of coughing in which she had trouble catching her breath.  She states her mucous feels very thick causing her distress.  Vitals are normal on arrival besides hypertension.  Chest x-ray reveals no infiltrate to suggest lobar pneumonia.  There is no leukocytosis.  BMP reveals hyperkalemia, this is hemolyzed and there is no reason for this.  Patient is well-appearing and there is no indication for hospitalization.  I suspect bronchitis, possibly atypical pneumonia.  Recommended continuing antibiotic as prescribed by her primary care provider.  I prescribed guaifenesin as her main concern was the chest congestion.  Also prescribed Tessalon Perles for cough suppression.  Plan follow-up closely with her primary care provider in 2-3 days for recheck.  Return precautions discussed including fevers, trouble breathing, or any other new/concerning symptoms.  Patient agrees with this plan and all questions and concerns addressed prior to discharge home.    Diagnosis:    ICD-10-CM    1. Acute bronchitis J20.9        Disposition:  discharged to home    Discharge Medications:     Medication List      Started    benzonatate 100 MG capsule  Commonly known as:  TESSALON  100 mg, Oral, 3 TIMES DAILY PRN     guaiFENesin 600 MG 12 hr tablet  Commonly known as:   MUCINEX  600 mg, Oral, 2 TIMES DAILY            I, Megan Beh, am serving as a scribe at 10:23 AM on 7/23/2019 to document services personally performed by Michelle Reeves PA-C based on my observations and the provider's statements to me.      7/23/2019   [facility] EMERGENCY DEPARTMENT       Michelle Reeves PA-C  07/23/19 9520

## 2019-07-27 ENCOUNTER — OFFICE VISIT (OUTPATIENT)
Dept: URGENT CARE | Facility: URGENT CARE | Age: 79
End: 2019-07-27
Payer: COMMERCIAL

## 2019-07-27 VITALS
SYSTOLIC BLOOD PRESSURE: 149 MMHG | TEMPERATURE: 96.8 F | OXYGEN SATURATION: 96 % | DIASTOLIC BLOOD PRESSURE: 61 MMHG | HEART RATE: 75 BPM

## 2019-07-27 DIAGNOSIS — Z79.4 TYPE 2 DIABETES MELLITUS WITHOUT COMPLICATION, WITH LONG-TERM CURRENT USE OF INSULIN (H): ICD-10-CM

## 2019-07-27 DIAGNOSIS — E11.9 TYPE 2 DIABETES MELLITUS WITHOUT COMPLICATION, WITH LONG-TERM CURRENT USE OF INSULIN (H): ICD-10-CM

## 2019-07-27 DIAGNOSIS — J01.90 ACUTE SINUSITIS WITH SYMPTOMS > 10 DAYS: Primary | ICD-10-CM

## 2019-07-27 DIAGNOSIS — I10 HYPERTENSION GOAL BP (BLOOD PRESSURE) < 130/80: ICD-10-CM

## 2019-07-27 PROCEDURE — 99214 OFFICE O/P EST MOD 30 MIN: CPT | Performed by: PHYSICIAN ASSISTANT

## 2019-07-27 RX ORDER — DOXYCYCLINE 100 MG/1
100 CAPSULE ORAL 2 TIMES DAILY
Qty: 20 CAPSULE | Refills: 0 | Status: SHIPPED | OUTPATIENT
Start: 2019-07-27 | End: 2019-11-11

## 2019-07-27 RX ORDER — PREDNISONE 20 MG/1
20 TABLET ORAL DAILY
Qty: 6 TABLET | Refills: 0 | Status: SHIPPED | OUTPATIENT
Start: 2019-07-27 | End: 2019-11-11

## 2019-07-27 NOTE — PROGRESS NOTES
SUBJECTIVE:   Lindsey Gary is a 79 year old female presenting with a chief complaint of stuffy nose, sore throat, facial pain/pressure and headache.  Hx of sinus issues.  Recent bronchitis   Onset of symptoms was over a  week(s) ago.  Course of illness is worsening.    Severity moderate  Current and Associated symptoms: no ear pain, GI sx or rashes.  Cough mild but no SOB or chest pain   Treatment measures tried include Fluids, Rest and OTC med.  Predisposing factors include hx of sinus issues.    Past Medical History:   Diagnosis Date     Essential hypertension, benign      Mixed hyperlipidemia      Pain in joint, ankle and foot     gout     Type II or unspecified type diabetes mellitus without mention of complication, not stated as uncontrolled     Diabetes mellitus     Current Outpatient Medications   Medication Sig Dispense Refill     ACCU-CHEK GUIDE test strip TEST BLOOD SUGAR THREE TIMES DAILY  OR AS DIRECTED 300 strip 1     acetaminophen (TYLENOL) 500 MG tablet Take 1-2 tablets by mouth every morning.       alcohol swab prep pads Use to swab area of injection/beau 6 times per day or as directed. 300 each 3     allopurinol (ZYLOPRIM) 300 MG tablet Take 0.5 tablets (150 mg) by mouth daily 90 tablet 1     Ascorbic Acid (VITAMIN C) 500 MG CAPS Take 500 mg by mouth daily       ASPIRIN 81 MG OR TABS 1 tablet daily 100 3     benzonatate (TESSALON) 100 MG capsule Take 1 capsule (100 mg) by mouth 3 times daily as needed for cough 20 capsule 0     betamethasone, augmented, (DIPROLENE) 0.05 % lotion GINA 10 TO 20 DROPS TOPICALLY AA OF SCALP Q NIGHT UTD       Blood Glucose Calibration (ACCU-CHEK GUIDE CONTROL) LIQD 1 Application by In Vitro route as needed 1 each 6     blood glucose monitoring (ACCU-CHEK FASTCLIX) lancets Test 3 times daily as directed 300 each 3     Blood Glucose Monitoring Suppl (ACCU-CHEK GUIDE) w/Device KIT TEST BLOOD SUGAR THREE TIMES DAILY 1 kit 0     boric acid 600 mg vaginal suppository -  "PHARMACY TO MIX COMPOUND Place 1 suppository (600 mg) vaginally At Bedtime 21 suppository 11     calcipotriene 0.005 % OINT Apply 1 Application topically as needed  1     calcium-magnesium (CALMAG) 500-250 MG TABS per tablet Take 1 tablet by mouth daily       cefdinir (OMNICEF) 300 MG capsule Take 1 capsule (300 mg) by mouth 2 times daily 20 capsule 0     chlorhexidine (PERIDEX) 0.12 % solution RINSE ONE HALF  OZ 2-3 X D AFTER BREAKFAST BEFORE BEDTIME FOLLOWING BRUSHING AND FLOSSIN       Cholecalciferol (VITAMIN D) 2000 UNITS tablet Take 1 tablet by mouth daily       CINNAMON 500 MG OR CAPS 2 tablets daily  0     estradiol (VAGIFEM) 10 MCG TABS vaginal tablet Place 1 tablet (10 mcg) vaginally twice a week (Patient not taking: Reported on 7/11/2019) 8 tablet 11     fluconazole (DIFLUCAN) 150 MG tablet Take 1 tablet (150 mg) by mouth every 3 days 30 tablet 6     furosemide (LASIX) 20 MG tablet Take 1 tablet (20 mg) by mouth daily as needed (edema) 90 tablet 1     gabapentin (NEURONTIN) 300 MG capsule Take 2 capsules (600 mg) by mouth At Bedtime 180 capsule 3     glipiZIDE (GLUCOTROL) 10 MG tablet TAKE 1 TABLET BY MOUTH TWICE DAILY BEFORE MEAL(S) 180 tablet 1     guaiFENesin (MUCINEX) 600 MG 12 hr tablet Take 1 tablet (600 mg) by mouth 2 times daily 15 tablet 0     guaiFENesin-codeine (ROBITUSSIN AC) 100-10 MG/5ML solution Take 10 mLs by mouth every 4 hours as needed for cough 240 mL 1     insulin NPH (NOVOLIN N VIAL) 100 UNIT/ML vial Inject 20 units before breakfast and 20 units before dinner 10 mL 3     insulin syringe-needle U-100 (BD INSULIN SYRINGE ULTRAFINE) 31G X 5/16\" 0.3 ML miscellaneous Use 2 syringes daily or as directed. 300 each 3     Lactobacillus (PROBIOTIC ACIDOPHILUS) TABS Take 1 tablet by mouth daily       lisinopril (PRINIVIL/ZESTRIL) 10 MG tablet Take 1 tablet (10 mg) by mouth daily 90 tablet 2     metFORMIN (GLUCOPHAGE) 1000 MG tablet TAKE 1 TABLET BY MOUTH TWICE DAILY WITH MEALS 180 tablet 1     " order for DME Equipment being ordered: accuchek guide lancets 300 Device 3     POTASSIUM CHLORIDE PO Take 1 tablet by mouth daily       simvastatin (ZOCOR) 40 MG tablet TAKE ONE TABLET BY MOUTH EVERY NIGHT AT BEDTIME 90 tablet 2     TUMS 500 MG OR CHEW 1 TABLET 3 TIMES PRN 90 0     vitamin E 400 UNITS TABS Take 400 Units by mouth daily       Social History     Tobacco Use     Smoking status: Never Smoker     Smokeless tobacco: Never Used   Substance Use Topics     Alcohol use: No     Alcohol/week: 0.0 oz       ROS:  Review of systems negative except as stated above.    OBJECTIVE:  /61   Pulse 75   Temp 96.8  F (36  C)   SpO2 96%   GENERAL APPEARANCE: healthy, alert and no distress  EYES: EOMI,  PERRL, conjunctiva clear  HENT: TM's normal bilaterally, nasal turbinates erythematous, swollen, rhinorrhea yellow, oral mucous membranes moist, no erythema noted and maxillary sinus tenderness   NECK: supple, nontender, no lymphadenopathy  RESP: lungs clear to auscultation - no rales, rhonchi or wheezes  CV: regular rates and rhythm, normal S1 S2, no murmur noted  NEURO: Normal strength and tone, sensory exam grossly normal,  normal speech and mentation  SKIN: no suspicious lesions or rashes    assessment/plan:  (J01.90) Acute sinusitis with symptoms > 10 days  (primary encounter diagnosis)  Comment:   Plan: predniSONE (DELTASONE) 20 MG tablet,         doxycycline hyclate (VIBRAMYCIN) 100 MG capsule        Med as directed and OTC med for sx relief, hot packs to face, steam and increased fluids.  Follow-up with PCP as needed if sx worsen     (E11.9,  Z79.4) Type 2 diabetes mellitus without complication, with long-term current use of insulin (H)  Comment:   Plan:  Continue to monitor    (I10) Hypertension goal BP (blood pressure) < 130/80  Comment:   Plan: little elevated and watch OTC meds as may increase BP and increase fluids  \

## 2019-08-07 ENCOUNTER — TELEPHONE (OUTPATIENT)
Dept: INTERNAL MEDICINE | Facility: CLINIC | Age: 79
End: 2019-08-07

## 2019-08-07 DIAGNOSIS — J32.9 SINUSITIS, UNSPECIFIED CHRONICITY, UNSPECIFIED LOCATION: Primary | ICD-10-CM

## 2019-08-07 RX ORDER — AZITHROMYCIN 250 MG/1
TABLET, FILM COATED ORAL
Qty: 6 TABLET | Refills: 0 | Status: SHIPPED | OUTPATIENT
Start: 2019-08-07 | End: 2019-11-11

## 2019-08-07 NOTE — TELEPHONE ENCOUNTER
Complains of blowing blood from nose, sinus drainage causing cough, facial pain & HA for 3 days. Using OTCs with no help.  Seen here  6-6-2019, 6-, 6-18-20198, ED 7- & UC 7-. Finished  Prednisone last week. Has one day left of Doxy.  Wants to know if you can call in Z-pack?

## 2019-08-12 DIAGNOSIS — E78.2 MIXED HYPERLIPIDEMIA: ICD-10-CM

## 2019-08-12 NOTE — TELEPHONE ENCOUNTER
"Requested Prescriptions   Pending Prescriptions Disp Refills     simvastatin (ZOCOR) 40 MG tablet [Pharmacy Med Name: SIMVASTATIN 40MG    TAB]  Last Written Prescription Date:  1/8/2019  Last Fill Quantity: 90,  # refills: 2   Last office visit: 7/18/2019 with prescribing provider:     Future Office Visit:   90 tablet 2     Sig: TAKE 1 TABLET BY MOUTH EVERY NIGHT AT BEDTIME       [facility] Protocol Passed - 8/12/2019 12:20 PM        Passed - LDL on file in past 12 months     Recent Labs   Lab Test 12/31/18  0850   *             Passed - No abnormal creatine kinase in past 12 months     No lab results found.             Passed - Recent (12 mo) or future (30 days) visit within the authorizing provider's specialty     Patient had office visit in the last 12 months or has a visit in the next 30 days with authorizing provider or within the authorizing provider's specialty.  See \"Patient Info\" tab in inbasket, or \"Choose Columns\" in Meds & Orders section of the refill encounter.              Passed - Medication is active on med list        Passed - Patient is age 18 or older        Passed - No active pregnancy on record        Passed - No positive pregnancy test in past 12 months        "

## 2019-08-13 RX ORDER — SIMVASTATIN 40 MG
TABLET ORAL
Qty: 90 TABLET | Refills: 0 | Status: SHIPPED | OUTPATIENT
Start: 2019-08-13 | End: 2020-01-29

## 2019-08-13 NOTE — TELEPHONE ENCOUNTER
Prescription approved per [facility] Refill Protocol.  Ewelina Mojica RN

## 2019-08-19 ENCOUNTER — OFFICE VISIT (OUTPATIENT)
Dept: INTERNAL MEDICINE | Facility: CLINIC | Age: 79
End: 2019-08-19
Payer: COMMERCIAL

## 2019-08-19 VITALS
DIASTOLIC BLOOD PRESSURE: 78 MMHG | TEMPERATURE: 98.2 F | RESPIRATION RATE: 16 BRPM | SYSTOLIC BLOOD PRESSURE: 146 MMHG | OXYGEN SATURATION: 97 % | HEART RATE: 72 BPM

## 2019-08-19 DIAGNOSIS — R09.81 CONGESTION OF PARANASAL SINUS: ICD-10-CM

## 2019-08-19 DIAGNOSIS — J01.01 ACUTE RECURRENT MAXILLARY SINUSITIS: Primary | ICD-10-CM

## 2019-08-19 PROCEDURE — 99214 OFFICE O/P EST MOD 30 MIN: CPT | Performed by: PHYSICIAN ASSISTANT

## 2019-08-19 NOTE — PROGRESS NOTES
Subjective     Lindsey Gary is a 79 year old female who presents to clinic today for the following health issues:    HPI   Chief Complaint   Patient presents with     URI     Patient requesting Sinus X-ray and Throat Culture. Patient has sx for 2 months.        RESPIRATORY /sinus SYMPTOMS      Duration: 2-3 months     Description  nasal congestion, rhinorrhea, sore throat and post nasal drainage   Coughing.  Seen several times for illness   Treatment with omnicef, doxycycline and zpak ( just finished one week ago)   One course of prednisone x 5 days as well     Severity: moderate    Accompanying signs and symptoms: no recent fevers chills or sweats    Fatigue noted     History (predisposing factors):  As above     Precipitating or alleviating factors: None    Therapies tried and outcome:  rest and fluids antibiotics       BP Readings from Last 3 Encounters:   08/19/19 (!) 146/78   07/27/19 149/61   07/23/19 (!) 160/95    Wt Readings from Last 3 Encounters:   07/23/19 85.7 kg (189 lb)   07/11/19 86.2 kg (190 lb)   06/06/19 86.2 kg (190 lb)                    -------------------------------------  Reviewed and updated as needed this visit by Provider         Review of Systems   ROS COMP: Constitutional, HEENT, cardiovascular, pulmonary, gi and gu systems are negative, except as otherwise noted.      Objective    BP (!) 146/78   Pulse 72   Temp 98.2  F (36.8  C) (Temporal)   Resp 16   SpO2 97%   There is no height or weight on file to calculate BMI.  Physical Exam   GENERAL: healthy, alert and no distress  HENT: normal cephalic/atraumatic, ear canals and TM's normal, nose and mouth without ulcers or lesions, nasal mucosa edematous , rhinorrhea clear and yellow, oropharynx clear, oral mucous membranes moist and sinuses: maxillary tenderness on bilateral  NECK: no adenopathy, no asymmetry, masses, or scars and thyroid normal to palpation  RESP: lungs clear to auscultation - no rales, rhonchi or wheezes  CV: regular  "rates and rhythm and normal S1 S2, no S3 or S4  MS: no gross musculoskeletal defects noted, no edema  SKIN: no suspicious lesions or rashes    Diagnostic Test Results:  pending        Assessment & Plan     1. Acute recurrent maxillary sinusitis    - CT Sinus w/o Contrast; Future  - OTOLARYNGOLOGY REFERRAL    2. Congestion of paranasal sinus    - CT Sinus w/o Contrast; Future  - OTOLARYNGOLOGY REFERRAL     BMI:   Estimated body mass index is 32.44 kg/m  as calculated from the following:    Height as of 7/18/19: 1.626 m (5' 4\").    Weight as of 7/23/19: 85.7 kg (189 lb).   Weight management plan: Patient was referred to their PCP to discuss a diet and exercise plan.    25 minutes spent with patient > 50 % of time on counseling and plan of care.      Patient Instructions   Nasal spray    Trial Flonase  Or Nasacort nasal spray over the counter.           Return in about 2 weeks (around 9/2/2019) for ear nose and throat.    Colette Lizarraga PA-C  [facility]      "

## 2019-08-22 ENCOUNTER — TRANSFERRED RECORDS (OUTPATIENT)
Dept: HEALTH INFORMATION MANAGEMENT | Facility: CLINIC | Age: 79
End: 2019-08-22

## 2019-08-27 ENCOUNTER — TELEPHONE (OUTPATIENT)
Dept: INTERNAL MEDICINE | Facility: CLINIC | Age: 79
End: 2019-08-27

## 2019-08-27 DIAGNOSIS — J01.01 ACUTE RECURRENT MAXILLARY SINUSITIS: Primary | ICD-10-CM

## 2019-08-27 NOTE — TELEPHONE ENCOUNTER
Informed patient that still waiting for results to be faxed. Will recheck tomorrow. Ok to leave a detailed message on VM if no answer.    Kelsea ARGUETA RN, BSN, PHN

## 2019-08-27 NOTE — TELEPHONE ENCOUNTER
Patient calling seeking CT results.  No report seen in Epic. Patient stated she had this done at [facility] In [place]: [phone]  Triage will call to get report.    Spoke to SI, they will resend.    Kelsea ARGUETA RN, BSN, PHN

## 2019-08-28 RX ORDER — DOXYCYCLINE 100 MG/1
100 CAPSULE ORAL 2 TIMES DAILY
Qty: 28 CAPSULE | Refills: 0 | Status: SHIPPED | OUTPATIENT
Start: 2019-08-28 | End: 2019-11-26

## 2019-08-28 NOTE — TELEPHONE ENCOUNTER
Patient informed. Expressed understanding. No further questions at this time.    Kelsea ARGUETA RN, BSN, PHN

## 2019-08-28 NOTE — TELEPHONE ENCOUNTER
Reviewed report   Patient still with sinus infection noted on the scan   The sinus by the right eye.   Other sinuses are normal     Recommend that she be on antibiotics for 2 weeks  Doxycycline - as she has allergy to Augmentin-   Make sure to avoid any diary or multivitamins one hour before or 2 hours after antibiotics ( or she will not absorb the medication )    and also see ENT- referral placed at last visit.

## 2019-08-28 NOTE — TELEPHONE ENCOUNTER
[facility] will resend again this morning. Report placed in ordering provider in basket to review. Please advise if ok to share information with patient.     Kelsea ARGUETA RN, BSN, PHN

## 2019-10-08 DIAGNOSIS — I10 ESSENTIAL HYPERTENSION, BENIGN: ICD-10-CM

## 2019-10-09 NOTE — TELEPHONE ENCOUNTER
"Requested Prescriptions   Pending Prescriptions Disp Refills     lisinopril (PRINIVIL/ZESTRIL) 10 MG tablet [Pharmacy Med Name: LISINOPRIL 10MG  TAB] 90 tablet 2     Sig: TAKE 1 TABLET BY MOUTH ONCE DAILY   Last Written Prescription Date:  01/08/2019  Last Fill Quantity: 90,  # refills: 02   Last office visit: 07/18/2019 with prescribing provider:     Future Office Visit:   Next 5 appointments (look out 90 days)    Oct 10, 2019 11:00 AM CDT  Non provider visit with CR MA/LPN  [facility] [id number] [facility] [id number] [phone]           ACE Inhibitors (Including Combos) Protocol Failed - 10/8/2019 12:59 PM        Failed - Blood pressure under 140/90 in past 12 months     BP Readings from Last 3 Encounters:   08/19/19 (!) 146/78   07/27/19 149/61   07/23/19 (!) 160/95                 Failed - Normal serum potassium on file in past 12 months     Recent Labs   Lab Test 07/23/19  1009   POTASSIUM 5.6*             Passed - Recent (12 mo) or future (30 days) visit within the authorizing provider's specialty     Patient has had an office visit with the authorizing provider or a provider within the authorizing providers department within the previous 12 mos or has a future within next 30 days. See \"Patient Info\" tab in inbasket, or \"Choose Columns\" in Meds & Orders section of the refill encounter.              Passed - Medication is active on med list        Passed - Patient is age 18 or older        Passed - No active pregnancy on record        Passed - Normal serum creatinine on file in past 12 months     Recent Labs   Lab Test 07/23/19  1009   CR 0.85             Passed - No positive pregnancy test within past 12 months        "

## 2019-10-10 ENCOUNTER — ALLIED HEALTH/NURSE VISIT (OUTPATIENT)
Dept: NURSING | Facility: CLINIC | Age: 79
End: 2019-10-10
Payer: COMMERCIAL

## 2019-10-10 DIAGNOSIS — Z23 NEED FOR PROPHYLACTIC VACCINATION AND INOCULATION AGAINST INFLUENZA: Primary | ICD-10-CM

## 2019-10-10 PROCEDURE — 99207 ZZC NO CHARGE NURSE ONLY: CPT

## 2019-10-10 PROCEDURE — G0008 ADMIN INFLUENZA VIRUS VAC: HCPCS

## 2019-10-10 PROCEDURE — 90662 IIV NO PRSV INCREASED AG IM: CPT

## 2019-10-10 RX ORDER — LISINOPRIL 10 MG/1
TABLET ORAL
Qty: 90 TABLET | Refills: 1 | Status: SHIPPED | OUTPATIENT
Start: 2019-10-10 | End: 2020-04-16

## 2019-10-10 NOTE — TELEPHONE ENCOUNTER
Routing refill request to provider for review/approval because:  Blood pressures out of range, labs out of range: K

## 2019-11-08 NOTE — PROGRESS NOTES
SUBJECTIVE:                                                   Lindsey Gary is a 79 year old female who presents to clinic today for the following health issue(s):  Patient presents with:  Vaginal Problem: vaginal itching, stomach pain, odor, little discharge. Would also like urine checked as she sometimes has the same symptoms-she would like that cultured as well.      HPI: patient c/o vaginal itching, some discharge, some stomach pain also. She states these are all normal symptoms for her when she either has a vaginal infection or a UTI.  She has had a JAIME in .  Her blood sugars have been pretty stable, they are in the process of moving from big house to a patio home, so a little stressed. She states her PCP has to always run a wound culture usually to figure out her vaginal infections.        No LMP recorded. Patient has had a hysterectomy..     Patient is sexually active, .  Using hysterectomy for contraception.    reports that she has never smoked. She has never used smokeless tobacco.    STD testing offered?  Declined    Health maintenance updated:  yes    Today's PHQ-2 Score:   PHQ-2 (  [employer]) 2019   Q1: Little interest or pleasure in doing things 0   Q2: Feeling down, depressed or hopeless 0   PHQ-2 Score 0     Today's PHQ-9 Score:   PHQ-9 SCORE 2019   PHQ-9 Total Score -   PHQ-9 Total Score 3     Today's PERLA-7 Score:   PERLA-7 SCORE 2019   Total Score -   Total Score 4       Problem list and histories reviewed & adjusted, as indicated.  Additional history: as documented.    Patient Active Problem List   Diagnosis     Rosacea     Gout     CHONDROCALC NOS-OTHER SITE(aka PSEUDOGOUT)     Essential hypertension     Hyperlipidemia LDL goal <100     Advanced directives, counseling/discussion     Atrophic vaginitis     Chronic foot pain     Hypertension goal BP (blood pressure) < 130/80     Bereavement     Obesity     Type 2 diabetes mellitus without complication (H)     Status post  total knee replacement, unspecified laterality     Psoriasis     Past Surgical History:   Procedure Laterality Date     C NONSPECIFIC PROCEDURE      Breast biopsies        C NONSPECIFIC PROCEDURE      Symptomatic left thigh lipomas        C NONSPECIFIC PROCEDURE  6/1/09    pubovaginal sling     COLONOSCOPY       COLONOSCOPY N/A 12/17/2014    Procedure: COMBINED COLONOSCOPY, SINGLE OR MULTIPLE BIOPSY/POLYPECTOMY BY BIOPSY;  Surgeon: Chuy Staton MD;  Location: [facility]     HYSTERECTOMY, PAP NO LONGER INDICATED       HYSTERECTOMY, JAIME  1991    fibroid     SURGICAL HISTORY OF -   10/28/2015    right partial knee       Social History     Tobacco Use     Smoking status: Never Smoker     Smokeless tobacco: Never Used   Substance Use Topics     Alcohol use: No     Alcohol/week: 0.0 standard drinks      Problem (# of Occurrences) Relation (Name,Age of Onset)    Cancer (1) Mother: colon ca survivor    Cancer - colorectal (1) Mother    Cerebrovascular Disease (1) Father            Current Outpatient Medications   Medication Sig     ACCU-CHEK GUIDE test strip TEST BLOOD SUGAR THREE TIMES DAILY  OR AS DIRECTED     acetaminophen (TYLENOL) 500 MG tablet Take 1-2 tablets by mouth every morning.     alcohol swab prep pads Use to swab area of injection/beau 6 times per day or as directed.     allopurinol (ZYLOPRIM) 300 MG tablet Take 0.5 tablets (150 mg) by mouth daily     Ascorbic Acid (VITAMIN C) 500 MG CAPS Take 500 mg by mouth daily     ASPIRIN 81 MG OR TABS 1 tablet daily     betamethasone, augmented, (DIPROLENE) 0.05 % lotion GINA 10 TO 20 DROPS TOPICALLY AA OF SCALP Q NIGHT UTD     Blood Glucose Calibration (ACCU-CHEK GUIDE CONTROL) LIQD 1 Application by In Vitro route as needed     blood glucose monitoring (ACCU-CHEK FASTCLIX) lancets Test 3 times daily as directed     Blood Glucose Monitoring Suppl (ACCU-CHEK GUIDE) w/Device KIT TEST BLOOD SUGAR THREE TIMES DAILY     boric acid 600 mg vaginal suppository - PHARMACY TO  "MIX COMPOUND Place 1 suppository (600 mg) vaginally At Bedtime     calcipotriene 0.005 % OINT Apply 1 Application topically as needed     calcium-magnesium (CALMAG) 500-250 MG TABS per tablet Take 1 tablet by mouth daily     chlorhexidine (PERIDEX) 0.12 % solution RINSE ONE HALF  OZ 2-3 X D AFTER BREAKFAST BEFORE BEDTIME FOLLOWING BRUSHING AND FLOSSIN     Cholecalciferol (VITAMIN D) 2000 UNITS tablet Take 1 tablet by mouth daily     CINNAMON 500 MG OR CAPS 2 tablets daily     furosemide (LASIX) 20 MG tablet Take 1 tablet (20 mg) by mouth daily as needed (edema)     gabapentin (NEURONTIN) 300 MG capsule Take 2 capsules (600 mg) by mouth At Bedtime     glipiZIDE (GLUCOTROL) 10 MG tablet TAKE 1 TABLET BY MOUTH TWICE DAILY BEFORE MEAL(S)     insulin NPH (NOVOLIN N VIAL) 100 UNIT/ML vial Inject 20 units before breakfast and 20 units before dinner     insulin syringe-needle U-100 (BD INSULIN SYRINGE ULTRAFINE) 31G X 5/16\" 0.3 ML miscellaneous Use 2 syringes daily or as directed.     Lactobacillus (PROBIOTIC ACIDOPHILUS) TABS Take 1 tablet by mouth daily     lisinopril (PRINIVIL/ZESTRIL) 10 MG tablet TAKE 1 TABLET BY MOUTH ONCE DAILY     metFORMIN (GLUCOPHAGE) 1000 MG tablet TAKE 1 TABLET BY MOUTH TWICE DAILY WITH MEALS     order for DME Equipment being ordered: accuchek guide lancets     POTASSIUM CHLORIDE PO Take 1 tablet by mouth daily     simvastatin (ZOCOR) 40 MG tablet TAKE 1 TABLET BY MOUTH EVERY NIGHT AT BEDTIME     TUMS 500 MG OR CHEW 1 TABLET 3 TIMES PRN     vitamin E 400 UNITS TABS Take 400 Units by mouth daily     estradiol (VAGIFEM) 10 MCG TABS vaginal tablet Place 1 tablet (10 mcg) vaginally twice a week (Patient not taking: Reported on 11/11/2019)     fluconazole (DIFLUCAN) 150 MG tablet Take 1 tablet (150 mg) by mouth every 3 days (Patient not taking: Reported on 11/11/2019)     No current facility-administered medications for this visit.      Allergies   Allergen Reactions     Augmentin      Tongue " "swelling and rash     Can take PCN K without reaction      Sulfa Drugs      Tongue swelling and rash       ROS:  12 point review of systems negative other than symptoms noted below.  Genitourinary: Vaginal Discharge and Vaginal Itching    OBJECTIVE:     BP (!) 168/82   Pulse 72   Ht 1.626 m (5' 4\")   BMI 32.44 kg/m    Body mass index is 32.44 kg/m .    Exam:  Constitutional:  Appearance: Well nourished, well developed alert, in no acute distress   External genitalia appear normal.  Vagina atrophic. Small amount of white discharge noted.  Vaginal culture and wound culture obtained and sent.  Cervix surgically absent.  Patient going to try to leave urine sample, if cannot will do at [place] another day.      In-Clinic Test Results:  No results found for this or any previous visit (from the past 24 hour(s)).    ASSESSMENT/PLAN:                                                        ICD-10-CM    1. Itching of vagina N89.8 Group B strep PCR     Gram stain     Yeast culture     UA with Microscopic     Wound Culture Aerobic Bacterial   2. Dysuria R30.0 Urine Culture Aerobic Bacterial       There are no Patient Instructions on file for this visit.    Will notify patient with lab results when available.  Treatment if needed at that time.  Return prn.    LILLIAM Gomez [facility]  "

## 2019-11-11 ENCOUNTER — OFFICE VISIT (OUTPATIENT)
Dept: OBGYN | Facility: CLINIC | Age: 79
End: 2019-11-11
Payer: COMMERCIAL

## 2019-11-11 VITALS
HEIGHT: 64 IN | HEART RATE: 72 BPM | DIASTOLIC BLOOD PRESSURE: 82 MMHG | SYSTOLIC BLOOD PRESSURE: 168 MMHG | BODY MASS INDEX: 32.44 KG/M2

## 2019-11-11 DIAGNOSIS — R30.0 DYSURIA: ICD-10-CM

## 2019-11-11 DIAGNOSIS — N89.8 ITCHING OF VAGINA: Primary | ICD-10-CM

## 2019-11-11 LAB
ALBUMIN UR-MCNC: NEGATIVE MG/DL
APPEARANCE UR: CLEAR
BILIRUB UR QL STRIP: NEGATIVE
COLOR UR AUTO: YELLOW
GLUCOSE UR STRIP-MCNC: NEGATIVE MG/DL
GRAM STN SPEC: ABNORMAL
GRAM STN SPEC: ABNORMAL
HGB UR QL STRIP: NEGATIVE
KETONES UR STRIP-MCNC: NEGATIVE MG/DL
LEUKOCYTE ESTERASE UR QL STRIP: NEGATIVE
Lab: ABNORMAL
NITRATE UR QL: NEGATIVE
PH UR STRIP: 5.5 PH (ref 5–7)
RBC #/AREA URNS AUTO: NORMAL /HPF
SOURCE: NORMAL
SP GR UR STRIP: 1.01 (ref 1–1.03)
SPECIMEN SOURCE: ABNORMAL
UROBILINOGEN UR STRIP-ACNC: 0.2 EU/DL (ref 0.2–1)
WBC #/AREA URNS AUTO: NORMAL /HPF

## 2019-11-11 PROCEDURE — 87102 FUNGUS ISOLATION CULTURE: CPT | Performed by: NURSE PRACTITIONER

## 2019-11-11 PROCEDURE — 87077 CULTURE AEROBIC IDENTIFY: CPT | Performed by: NURSE PRACTITIONER

## 2019-11-11 PROCEDURE — 87070 CULTURE OTHR SPECIMN AEROBIC: CPT | Performed by: NURSE PRACTITIONER

## 2019-11-11 PROCEDURE — 87086 URINE CULTURE/COLONY COUNT: CPT | Performed by: NURSE PRACTITIONER

## 2019-11-11 PROCEDURE — 99213 OFFICE O/P EST LOW 20 MIN: CPT | Performed by: NURSE PRACTITIONER

## 2019-11-11 PROCEDURE — 81001 URINALYSIS AUTO W/SCOPE: CPT | Performed by: NURSE PRACTITIONER

## 2019-11-11 PROCEDURE — 87205 SMEAR GRAM STAIN: CPT | Performed by: NURSE PRACTITIONER

## 2019-11-11 PROCEDURE — 87653 STREP B DNA AMP PROBE: CPT | Performed by: NURSE PRACTITIONER

## 2019-11-11 PROCEDURE — 87186 SC STD MICRODIL/AGAR DIL: CPT | Performed by: NURSE PRACTITIONER

## 2019-11-12 LAB
BACTERIA SPEC CULT: NO GROWTH
GP B STREP DNA SPEC QL NAA+PROBE: NEGATIVE
Lab: NORMAL
SPECIMEN SOURCE: NORMAL
SPECIMEN SOURCE: NORMAL

## 2019-11-14 DIAGNOSIS — N76.0 VAGINAL INFECTION: Primary | ICD-10-CM

## 2019-11-14 LAB
BACTERIA SPEC CULT: ABNORMAL
BACTERIA SPEC CULT: ABNORMAL
Lab: ABNORMAL
SPECIMEN SOURCE: ABNORMAL

## 2019-11-14 RX ORDER — PENICILLIN V POTASSIUM 250 MG/1
250 TABLET, FILM COATED ORAL 4 TIMES DAILY
Qty: 30 TABLET | Refills: 0 | Status: SHIPPED | OUTPATIENT
Start: 2019-11-14 | End: 2019-11-26

## 2019-11-14 NOTE — RESULT ENCOUNTER NOTE
Called pt with results. Patient states she has taken pencillin k before, she is allergic to the other medication in augmentin. Naima Wallace RNC

## 2019-11-15 LAB
Lab: NORMAL
SPECIMEN SOURCE: NORMAL
YEAST SPEC QL CULT: NORMAL

## 2019-11-19 DIAGNOSIS — Z79.4 TYPE 2 DIABETES MELLITUS WITHOUT COMPLICATION, WITH LONG-TERM CURRENT USE OF INSULIN (H): ICD-10-CM

## 2019-11-19 DIAGNOSIS — E11.9 TYPE 2 DIABETES MELLITUS WITHOUT COMPLICATION, WITH LONG-TERM CURRENT USE OF INSULIN (H): ICD-10-CM

## 2019-11-20 RX ORDER — BLOOD SUGAR DIAGNOSTIC
STRIP MISCELLANEOUS
Qty: 300 STRIP | Refills: 0 | Status: SHIPPED | OUTPATIENT
Start: 2019-11-20 | End: 2020-02-04

## 2019-11-21 ENCOUNTER — TELEPHONE (OUTPATIENT)
Dept: OBGYN | Facility: CLINIC | Age: 79
End: 2019-11-21

## 2019-11-21 DIAGNOSIS — R10.2 VAGINAL PAIN: ICD-10-CM

## 2019-11-21 DIAGNOSIS — N95.2 VAGINAL ATROPHY: ICD-10-CM

## 2019-11-21 RX ORDER — ESTRADIOL 10 UG/1
10 INSERT VAGINAL
Qty: 24 TABLET | Refills: 0 | Status: SHIPPED | OUTPATIENT
Start: 2019-11-22 | End: 2021-04-20

## 2019-11-21 NOTE — TELEPHONE ENCOUNTER
Per her OV note, pt reported that she is not using any of her vagifem (vag E2 tabs). She needs to restart these. Finish abx. Can use aquaphor or A&D. Bath soaks with warm water only. The abx will work for infection, it's likely irritated from atrophy and lack of estrogen.     It's hard to say without doing an exam. Ok to start vagifem again daily for a few weeks then 2-3 times per week. Ok to take with oral abx.     If she needs a refill of her vagifem we can send that or she can call her GYN in [place]

## 2019-11-21 NOTE — TELEPHONE ENCOUNTER
"11/11/19 OV with EYAD Wallace NP for Vaginal symptoms and dx'd with infection and started on PCN 11/14/19  Calling today stating it \"is not helping\". Vagina is on fire and needs something today.    Allergies to Augmentin, Sulfa    Routing to La Dorsey NP in Naima's absence.    Call pt \"ASAP\"    Susceptibility - 11/11/19    Enterococcus faecalis     Antibiotic Interpretation Sensitivity Method Status   AMPICILLIN Sensitive <=2 ug/mL SHAY Final   Gentamicin Screen Sensitive   SHAY Final    No high level gentamicin resistance found - If no high level gentamicin   resistance is found, combination therapy with an aminoglycoside may be   indicated for serious enterococcal infections such as bacteremia and   endocarditis.   PENICILLIN Sensitive 4 ug/mL SHAY Final   VANCOMYCIN Sensitive 1 ug/mL SHAY Final     La Miller RN on 11/21/2019 at 8:22 AM    "

## 2019-11-21 NOTE — TELEPHONE ENCOUNTER
Called pt with response below. Encouraged recommendations. Refill sent for Vagifem tablets - one fill and needs to return to PCP/GYN Dr Church, no further refills from La. Recommended the bath soaks/plain warm water.  Return to office if sx's do not resolve/worsen. UC is always an option too.  Pt verbalized understanding, in agreement with plan, and voiced no further questions.  La Miller RN on 11/21/2019 at 9:29 AM

## 2019-11-21 NOTE — TELEPHONE ENCOUNTER
Prescription approved per [facility].    Last office visit with primary care provider was 7/18/19 with instructions to return in 6 months.

## 2019-11-26 ENCOUNTER — OFFICE VISIT (OUTPATIENT)
Dept: INTERNAL MEDICINE | Facility: CLINIC | Age: 79
End: 2019-11-26
Payer: COMMERCIAL

## 2019-11-26 ENCOUNTER — TELEPHONE (OUTPATIENT)
Dept: INTERNAL MEDICINE | Facility: CLINIC | Age: 79
End: 2019-11-26

## 2019-11-26 VITALS
DIASTOLIC BLOOD PRESSURE: 90 MMHG | OXYGEN SATURATION: 100 % | RESPIRATION RATE: 16 BRPM | TEMPERATURE: 97.6 F | HEART RATE: 75 BPM | BODY MASS INDEX: 32.3 KG/M2 | HEIGHT: 64 IN | SYSTOLIC BLOOD PRESSURE: 142 MMHG | WEIGHT: 189.2 LBS

## 2019-11-26 DIAGNOSIS — N76.0 VAGINAL INFECTION: Primary | ICD-10-CM

## 2019-11-26 DIAGNOSIS — E11.9 TYPE 2 DIABETES MELLITUS WITHOUT COMPLICATION, WITHOUT LONG-TERM CURRENT USE OF INSULIN (H): ICD-10-CM

## 2019-11-26 DIAGNOSIS — M79.662 PAIN OF LEFT LOWER LEG: ICD-10-CM

## 2019-11-26 DIAGNOSIS — R05.9 COUGH: ICD-10-CM

## 2019-11-26 LAB — HBA1C MFR BLD: 6.8 % (ref 0–5.6)

## 2019-11-26 PROCEDURE — 80061 LIPID PANEL: CPT | Performed by: NURSE PRACTITIONER

## 2019-11-26 PROCEDURE — 99207 C PAF COMPLETED  NO CHARGE: CPT | Mod: 25 | Performed by: NURSE PRACTITIONER

## 2019-11-26 PROCEDURE — 83036 HEMOGLOBIN GLYCOSYLATED A1C: CPT | Performed by: NURSE PRACTITIONER

## 2019-11-26 PROCEDURE — 99215 OFFICE O/P EST HI 40 MIN: CPT | Mod: 25 | Performed by: NURSE PRACTITIONER

## 2019-11-26 PROCEDURE — 36415 COLL VENOUS BLD VENIPUNCTURE: CPT | Performed by: NURSE PRACTITIONER

## 2019-11-26 PROCEDURE — 82043 UR ALBUMIN QUANTITATIVE: CPT | Performed by: NURSE PRACTITIONER

## 2019-11-26 PROCEDURE — 80053 COMPREHEN METABOLIC PANEL: CPT | Performed by: NURSE PRACTITIONER

## 2019-11-26 RX ORDER — CODEINE PHOSPHATE AND GUAIFENESIN 10; 100 MG/5ML; MG/5ML
2 SOLUTION ORAL EVERY 4 HOURS PRN
Qty: 240 ML | Refills: 1 | Status: SHIPPED | OUTPATIENT
Start: 2019-11-26 | End: 2020-11-24

## 2019-11-26 RX ORDER — AMOXICILLIN 500 MG/1
500 CAPSULE ORAL 2 TIMES DAILY
Qty: 20 CAPSULE | Refills: 0 | Status: SHIPPED | OUTPATIENT
Start: 2019-11-26 | End: 2020-03-27

## 2019-11-26 RX ORDER — VANCOMYCIN HYDROCHLORIDE 50 MG/ML
250 KIT ORAL 2 TIMES DAILY
Qty: 120 ML | Refills: 0 | Status: SHIPPED | OUTPATIENT
Start: 2019-11-26 | End: 2019-11-26

## 2019-11-26 ASSESSMENT — MIFFLIN-ST. JEOR: SCORE: 1328.21

## 2019-11-26 NOTE — TELEPHONE ENCOUNTER
Will send amoxicillin to her [facility] pharmacy in place of this - she did PCN previously

## 2019-11-26 NOTE — TELEPHONE ENCOUNTER
Left detailed voicemail message with provider response and included Gyn phone number.  EDMAR Jaeger R.N.

## 2019-11-26 NOTE — TELEPHONE ENCOUNTER
"Advised patient that Amoxicillin rx has been sent to [facility] in place of Vancomycin.      Patient states she forgot to ask provider this morning if Metrogel or Flagyl would be appropriate for her to use.  Recalls using these in past.  States her daughter uses \"Metrogel and a pill for yeast as a maintenance program\".  States she uses a Boric acid suppository but wants something else to use \"for maintenance\".  Reports that a 1 month supply of Metrogel is over $2000, Flagyl would be $6 for a 1 week supply.  Wants to know what you think.  Lo Jaeger R.N.        "

## 2019-11-26 NOTE — PATIENT INSTRUCTIONS
Vancomycin liquid 5 ml twice daily for 10 days     Ultrasound left leg   You need to call [facility] to set up appointment     Call for colonoscopy order when you are ready

## 2019-11-26 NOTE — NURSING NOTE
" [facility] on 11/11/19 vaginal infection. Patient would also like A1C checked and Rx for cough.  BP (!) 142/90 (BP Location: Left arm, Patient Position: Sitting, Cuff Size: Adult Regular)   Pulse 75   Temp 97.6  F (36.4  C) (Oral)   Resp 16   Ht 1.626 m (5' 4\")   Wt 85.8 kg (189 lb 3.2 oz)   SpO2 100%   BMI 32.48 kg/m      "

## 2019-11-26 NOTE — PROGRESS NOTES
Subjective     Lindsey Gary is a 79 year old female who presents to clinic today for the following health issues:      HPI     Vaginal problem   Treated with PCN   Vancomycin sensitive - will try oral - per pharmacy the oral vanco only works on gi tract   Will do amoxicillin as alternate - did PCN    Needs A1C recheck     Wants to have prescription for cough medication      Her grand daughter- [place] of state -  had pneumonia that was very serious     Colonoscopy    Call when she is ready to do this   She is selling her house and wants to wait until settled   Last colon check 2014 and adenomatous polyp           Patient Active Problem List   Diagnosis     Rosacea     Gout     CHONDROCALC NOS-OTHER SITE(aka PSEUDOGOUT)     Essential hypertension     Hyperlipidemia LDL goal <100     Advanced directives, counseling/discussion     Atrophic vaginitis     Chronic foot pain     Hypertension goal BP (blood pressure) < 130/80     Bereavement     Obesity     Type 2 diabetes mellitus without complication (H)     Status post total knee replacement, unspecified laterality     Psoriasis     Past Surgical History:   Procedure Laterality Date     C NONSPECIFIC PROCEDURE      Breast biopsies        C NONSPECIFIC PROCEDURE      Symptomatic left thigh lipomas        C NONSPECIFIC PROCEDURE  6/1/09    pubovaginal sling     COLONOSCOPY       COLONOSCOPY N/A 12/17/2014    Procedure: COMBINED COLONOSCOPY, SINGLE OR MULTIPLE BIOPSY/POLYPECTOMY BY BIOPSY;  Surgeon: Chuy Staton MD;  Location: [facility]     HYSTERECTOMY, PAP NO LONGER INDICATED       HYSTERECTOMY, JAIME  1991    fibroid     SURGICAL HISTORY OF -   10/28/2015    right partial knee        Social History     Tobacco Use     Smoking status: Never Smoker     Smokeless tobacco: Never Used   Substance Use Topics     Alcohol use: No     Alcohol/week: 0.0 standard drinks     Family History   Problem Relation Age of Onset     Cancer Mother         colon ca survivor     Cancer -  "colorectal Mother      Cerebrovascular Disease Father              Reviewed and updated as needed this visit by Provider  Tobacco  Allergies  Meds  Problems  Med Hx  Surg Hx  Fam Hx         Review of Systems   ROS COMP: Constitutional, HEENT, cardiovascular, pulmonary, GI, , musculoskeletal, neuro, skin, endocrine and psych systems are negative, except as otherwise noted.      Objective    BP (!) 142/90 (BP Location: Left arm, Patient Position: Sitting, Cuff Size: Adult Regular)   Pulse 75   Temp 97.6  F (36.4  C) (Oral)   Resp 16   Ht 1.626 m (5' 4\")   Wt 85.8 kg (189 lb 3.2 oz)   SpO2 100%   BMI 32.48 kg/m    Body mass index is 32.48 kg/m .  Physical Exam   GENERAL: alert and no distress  RESP: lungs clear to auscultation - no rales, rhonchi or wheezes  CV: regular rate and rhythm  ABDOMEN: soft, nontender, and bowel sounds normal  MS: lower left leg below medial knee painful to touch   PSYCH: mentation appears normal, affect normal/bright    Diagnostic Test Results:    Reviewed culture         Assessment & Plan     1. Vaginal infection  Penicillin did not help as much as expected  She cannot afford vagifem until January   vanco only gi sensitive unless IV per pharmacy   Will try amoxicillin    2. Pain of left lower leg  Has spot lower left medial side knee painful to touch   - US Lower Extremity Venous Duplex Left; Future    3. Cough  Prn use   - guaiFENesin-codeine (ROBITUSSIN AC) 100-10 MG/5ML solution; Take 10 mLs by mouth every 4 hours as needed for cough  Dispense: 240 mL; Refill: 1    4. Type 2 diabetes mellitus without complication, without long-term current use of insulin (H)  Numbers have been reported as good   - Hemoglobin A1c  - Lipid panel reflex to direct LDL Fasting  - Comprehensive metabolic panel  - Albumin Random Urine Quantitative with Creat Ratio     BMI:   Estimated body mass index is 32.48 kg/m  as calculated from the following:    Height as of this encounter: 1.626 m (5' " "4\").    Weight as of this encounter: 85.8 kg (189 lb 3.2 oz).           Patient Instructions   Vancomycin liquid 5 ml twice daily for 10 days     Ultrasound left leg   [facility] to [facility]     Call for colonoscopy order when you are ready       Return in about 6 months (around 5/26/2020).    Medication changed to amoxicillin       LILLIAM [facility]        "

## 2019-11-26 NOTE — TELEPHONE ENCOUNTER
Amoxicillin is a penicillin and would have similar coverage - would you consider a vaginal metronidazole or clindamycin cream? Would this be more effective if treating a vaginal infection?    Alexa Lyon PharmD    [facility]  Phone:  [phone]  Fax:  [phone]

## 2019-11-27 LAB
ALBUMIN SERPL-MCNC: 4.3 G/DL (ref 3.4–5)
ALP SERPL-CCNC: 75 U/L (ref 40–150)
ALT SERPL W P-5'-P-CCNC: 31 U/L (ref 0–50)
ANION GAP SERPL CALCULATED.3IONS-SCNC: 10 MMOL/L (ref 3–14)
AST SERPL W P-5'-P-CCNC: 22 U/L (ref 0–45)
BILIRUB SERPL-MCNC: 0.5 MG/DL (ref 0.2–1.3)
BUN SERPL-MCNC: 31 MG/DL (ref 7–30)
CALCIUM SERPL-MCNC: 9.2 MG/DL (ref 8.5–10.1)
CHLORIDE SERPL-SCNC: 107 MMOL/L (ref 94–109)
CHOLEST SERPL-MCNC: 150 MG/DL
CO2 SERPL-SCNC: 21 MMOL/L (ref 20–32)
CREAT SERPL-MCNC: 0.92 MG/DL (ref 0.52–1.04)
CREAT UR-MCNC: 79 MG/DL
GFR SERPL CREATININE-BSD FRML MDRD: 60 ML/MIN/{1.73_M2}
GLUCOSE SERPL-MCNC: 147 MG/DL (ref 70–99)
HDLC SERPL-MCNC: 30 MG/DL
LDLC SERPL CALC-MCNC: 85 MG/DL
MICROALBUMIN UR-MCNC: 58 MG/L
MICROALBUMIN/CREAT UR: 73.07 MG/G CR (ref 0–25)
NONHDLC SERPL-MCNC: 120 MG/DL
POTASSIUM SERPL-SCNC: 5.1 MMOL/L (ref 3.4–5.3)
PROT SERPL-MCNC: 7.1 G/DL (ref 6.8–8.8)
SODIUM SERPL-SCNC: 138 MMOL/L (ref 133–144)
TRIGL SERPL-MCNC: 176 MG/DL

## 2019-12-02 ENCOUNTER — TRANSFERRED RECORDS (OUTPATIENT)
Dept: HEALTH INFORMATION MANAGEMENT | Facility: CLINIC | Age: 79
End: 2019-12-02

## 2019-12-04 RX ORDER — METRONIDAZOLE 500 MG/1
500 TABLET ORAL 3 TIMES DAILY
Qty: 21 TABLET | Refills: 0 | Status: SHIPPED | OUTPATIENT
Start: 2019-12-04 | End: 2019-12-11

## 2019-12-04 NOTE — TELEPHONE ENCOUNTER
Spoke with patient to give her labs results. Patient does not believe the Amoxicillin is working and treatment is almost complete. Patient called GYN and she is not able to get an appointment until the end of January. Patient is frustrated and wondering if you would consider an RX for Flagyl to try. Patient states it's worked in the past.    Pharmacy verified. Please advised.    Michelle Mckeon CMA.

## 2019-12-10 ENCOUNTER — OFFICE VISIT (OUTPATIENT)
Dept: URGENT CARE | Facility: URGENT CARE | Age: 79
End: 2019-12-10
Payer: COMMERCIAL

## 2019-12-10 VITALS
OXYGEN SATURATION: 98 % | DIASTOLIC BLOOD PRESSURE: 80 MMHG | TEMPERATURE: 96.8 F | SYSTOLIC BLOOD PRESSURE: 136 MMHG | HEART RATE: 79 BPM

## 2019-12-10 DIAGNOSIS — Z87.42 HISTORY OF VAGINAL INFECTION: ICD-10-CM

## 2019-12-10 DIAGNOSIS — R30.0 DYSURIA: Primary | ICD-10-CM

## 2019-12-10 LAB
ALBUMIN UR-MCNC: ABNORMAL MG/DL
APPEARANCE UR: CLEAR
BACTERIA #/AREA URNS HPF: ABNORMAL /HPF
BILIRUB UR QL STRIP: NEGATIVE
COLOR UR AUTO: YELLOW
GLUCOSE UR STRIP-MCNC: NEGATIVE MG/DL
HGB UR QL STRIP: NEGATIVE
KETONES UR STRIP-MCNC: NEGATIVE MG/DL
LEUKOCYTE ESTERASE UR QL STRIP: ABNORMAL
NITRATE UR QL: NEGATIVE
NON-SQ EPI CELLS #/AREA URNS LPF: ABNORMAL /LPF
PH UR STRIP: 5.5 PH (ref 5–7)
RBC #/AREA URNS AUTO: ABNORMAL /HPF
SOURCE: ABNORMAL
SP GR UR STRIP: 1.02 (ref 1–1.03)
UROBILINOGEN UR STRIP-ACNC: 0.2 EU/DL (ref 0.2–1)
WBC #/AREA URNS AUTO: ABNORMAL /HPF

## 2019-12-10 PROCEDURE — 87186 SC STD MICRODIL/AGAR DIL: CPT | Performed by: FAMILY MEDICINE

## 2019-12-10 PROCEDURE — 87070 CULTURE OTHR SPECIMN AEROBIC: CPT | Performed by: FAMILY MEDICINE

## 2019-12-10 PROCEDURE — 87086 URINE CULTURE/COLONY COUNT: CPT | Performed by: FAMILY MEDICINE

## 2019-12-10 PROCEDURE — 99214 OFFICE O/P EST MOD 30 MIN: CPT | Performed by: FAMILY MEDICINE

## 2019-12-10 PROCEDURE — 87077 CULTURE AEROBIC IDENTIFY: CPT | Performed by: FAMILY MEDICINE

## 2019-12-10 PROCEDURE — 81001 URINALYSIS AUTO W/SCOPE: CPT | Performed by: FAMILY MEDICINE

## 2019-12-11 DIAGNOSIS — E11.9 TYPE 2 DIABETES MELLITUS WITHOUT COMPLICATION, WITHOUT LONG-TERM CURRENT USE OF INSULIN (H): ICD-10-CM

## 2019-12-11 LAB
BACTERIA SPEC CULT: NORMAL
SPECIMEN SOURCE: NORMAL

## 2019-12-11 NOTE — PROGRESS NOTES
SUBJECTIVE:  Lindsey Gary is a 79 year old female who presents with dysuria for weeks.    Has been on PCN, amox, and metronidazole for an Enterococcus infection found on vaginal culture.  No fevers, but has been feeling soreness in lower abdomen and kind of achy.  No unusual vaginal discharge, and no bleeding.    Denies diarrhea.    No LMP recorded. Patient has had a hysterectomy.      Past Medical History:   Diagnosis Date     Arthritis      Essential hypertension, benign      Mixed hyperlipidemia      Pain in joint, ankle and foot     gout     Type II or unspecified type diabetes mellitus without mention of complication, not stated as uncontrolled     Diabetes mellitus     Current Outpatient Medications   Medication Sig Dispense Refill     ACCU-CHEK GUIDE test strip TEST BLOOD SUGAR THREE TIMES DAILY  OR AS DIRECTED 300 strip 0     acetaminophen (TYLENOL) 500 MG tablet Take 1-2 tablets by mouth every morning.       alcohol swab prep pads Use to swab area of injection/beau 6 times per day or as directed. 300 each 3     allopurinol (ZYLOPRIM) 300 MG tablet Take 0.5 tablets (150 mg) by mouth daily 90 tablet 1     amoxicillin (AMOXIL) 500 MG capsule Take 1 capsule (500 mg) by mouth 2 times daily 20 capsule 0     Ascorbic Acid (VITAMIN C) 500 MG CAPS Take 500 mg by mouth daily       ASPIRIN 81 MG OR TABS 1 tablet daily 100 3     betamethasone, augmented, (DIPROLENE) 0.05 % lotion GINA 10 TO 20 DROPS TOPICALLY AA OF SCALP Q NIGHT UTD       Blood Glucose Calibration (ACCU-CHEK GUIDE CONTROL) LIQD 1 Application by In Vitro route as needed 1 each 6     blood glucose monitoring (ACCU-CHEK FASTCLIX) lancets Test 3 times daily as directed 300 each 3     Blood Glucose Monitoring Suppl (ACCU-CHEK GUIDE) w/Device KIT TEST BLOOD SUGAR THREE TIMES DAILY 1 kit 0     boric acid 600 mg vaginal suppository - PHARMACY TO MIX COMPOUND Place 1 suppository (600 mg) vaginally At Bedtime 21 suppository 11     calcipotriene 0.005 % OINT  "Apply 1 Application topically as needed  1     calcium-magnesium (CALMAG) 500-250 MG TABS per tablet Take 1 tablet by mouth daily       chlorhexidine (PERIDEX) 0.12 % solution RINSE ONE HALF  OZ 2-3 X D AFTER BREAKFAST BEFORE BEDTIME FOLLOWING BRUSHING AND FLOSSIN       Cholecalciferol (VITAMIN D) 2000 UNITS tablet Take 1 tablet by mouth daily       CINNAMON 500 MG OR CAPS 2 tablets daily  0     estradiol (VAGIFEM) 10 MCG TABS vaginal tablet Place 1 tablet (10 mcg) vaginally three times a week May use daily for up to 2 weeks, then 2-3 times weekly. 24 tablet 0     fluconazole (DIFLUCAN) 150 MG tablet Take 1 tablet (150 mg) by mouth every 3 days 30 tablet 6     furosemide (LASIX) 20 MG tablet Take 1 tablet (20 mg) by mouth daily as needed (edema) 90 tablet 1     gabapentin (NEURONTIN) 300 MG capsule Take 2 capsules (600 mg) by mouth At Bedtime 180 capsule 3     glipiZIDE (GLUCOTROL) 10 MG tablet TAKE 1 TABLET BY MOUTH TWICE DAILY BEFORE MEAL(S) 180 tablet 1     guaiFENesin-codeine (ROBITUSSIN AC) 100-10 MG/5ML solution Take 10 mLs by mouth every 4 hours as needed for cough 240 mL 1     insulin NPH (NOVOLIN N VIAL) 100 UNIT/ML vial Inject 20 units before breakfast and 20 units before dinner 10 mL 3     insulin syringe-needle U-100 (BD INSULIN SYRINGE ULTRAFINE) 31G X 5/16\" 0.3 ML miscellaneous Use 2 syringes daily or as directed. 300 each 3     Lactobacillus (PROBIOTIC ACIDOPHILUS) TABS Take 1 tablet by mouth daily       lisinopril (PRINIVIL/ZESTRIL) 10 MG tablet TAKE 1 TABLET BY MOUTH ONCE DAILY 90 tablet 1     metFORMIN (GLUCOPHAGE) 1000 MG tablet TAKE 1 TABLET BY MOUTH TWICE DAILY WITH MEALS 180 tablet 1     metroNIDAZOLE (FLAGYL) 500 MG tablet Take 1 tablet (500 mg) by mouth 3 times daily for 7 days 21 tablet 0     order for DME Equipment being ordered: accuchek guide lancets 300 Device 3     POTASSIUM CHLORIDE PO Take 1 tablet by mouth daily       simvastatin (ZOCOR) 40 MG tablet TAKE 1 TABLET BY MOUTH EVERY " NIGHT AT BEDTIME 90 tablet 0     TUMS 500 MG OR CHEW 1 TABLET 3 TIMES PRN 90 0     vitamin E 400 UNITS TABS Take 400 Units by mouth daily       Social History     Socioeconomic History     Marital status:      Spouse name: Not on file     Number of children: Not on file     Years of education: Not on file     Highest education level: Not on file   Occupational History     Not on file   Social Needs     Financial resource strain: Not on file     Food insecurity:     Worry: Not on file     Inability: Not on file     Transportation needs:     Medical: Not on file     Non-medical: Not on file   Tobacco Use     Smoking status: Never Smoker     Smokeless tobacco: Never Used   Substance and Sexual Activity     Alcohol use: No     Alcohol/week: 0.0 standard drinks     Drug use: No     Sexual activity: Yes     Partners: Male   Lifestyle     Physical activity:     Days per week: Not on file     Minutes per session: Not on file     Stress: Not on file   Relationships     Social connections:     Talks on phone: Not on file     Gets together: Not on file     Attends Judaism service: Not on file     Active member of club or organization: Not on file     Attends meetings of clubs or organizations: Not on file     Relationship status: Not on file     Intimate partner violence:     Fear of current or ex partner: Not on file     Emotionally abused: Not on file     Physically abused: Not on file     Forced sexual activity: Not on file   Other Topics Concern     Parent/sibling w/ CABG, MI or angioplasty before 65F 55M? Not Asked   Social History Narrative     Not on file       ROS:   As above.  Pt has not noticed any sores or rashes in her genital area.    OBJECTIVE:  /80   Pulse 79   Temp 96.8  F (36  C) (Tympanic)   SpO2 98%   GEN: well-appearing, in NAD  :  Normal external genitalia, no vaginal lesions, no unusual vaginal discharge.    Results for orders placed or performed in visit on 12/10/19   UA with  Microscopic reflex to Culture     Status: Abnormal   Result Value Ref Range    Color Urine Yellow     Appearance Urine Clear     Glucose Urine Negative NEG^Negative mg/dL    Bilirubin Urine Negative NEG^Negative    Ketones Urine Negative NEG^Negative mg/dL    Specific Gravity Urine 1.025 1.003 - 1.035    pH Urine 5.5 5.0 - 7.0 pH    Protein Albumin Urine Trace (A) NEG^Negative mg/dL    Urobilinogen Urine 0.2 0.2 - 1.0 EU/dL    Nitrite Urine Negative NEG^Negative    Blood Urine Negative NEG^Negative    Leukocyte Esterase Urine Trace (A) NEG^Negative    Source Midstream Urine     WBC Urine 0 - 5 OTO5^0 - 5 /HPF    RBC Urine O - 2 OTO2^O - 2 /HPF    Squamous Epithelial /LPF Urine Few FEW^Few /LPF    Bacteria Urine Few (A) NEG^Negative /HPF       ASSESSMENT:  Dysuria without clear evidence of bladder infection  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  Awaiting results before starting another round of antibiotics.  Urine culture pending.  Pt has appointment for follow up in primary care on Friday.  She plans to call  for culture results on Friday morning.

## 2019-12-12 ENCOUNTER — TRANSFERRED RECORDS (OUTPATIENT)
Dept: HEALTH INFORMATION MANAGEMENT | Facility: CLINIC | Age: 79
End: 2019-12-12

## 2019-12-12 LAB — RETINOPATHY: NEGATIVE

## 2019-12-12 NOTE — TELEPHONE ENCOUNTER
"Received fax from pharmacy stating patient usually gets 20ml at a time.  Requesting a change from 10ml to 20ml at a time.    Per last office visit 11-26-19:  \"Return in about 6 months (around 5/26/2020).\"    Prescription approved per [facility] Refill Protocol.    "

## 2019-12-12 NOTE — TELEPHONE ENCOUNTER
"Requested Prescriptions   Pending Prescriptions Disp Refills     insulin NPH (NOVOLIN N RELION) 100 UNIT/ML vial [Pharmacy Med Name: RELION NOVOLIN N    INJ]  1     Sig: INJECT 20 UNITS SUBCUTANEOUSLY BEFORE BREAKFAST AND  20  UNITS  BEFORE  DINNER   Last Written Prescription Date:  01/08/2019  Last Fill Quantity: 10 mL,  # refills: 03   Last office visit: 11/26/2019 with prescribing provider:     Future Office Visit:   Next 5 appointments (look out 90 days)    Dec 13, 2019  4:10 PM CST  (Arrive by 4:00 PM)  Ac[facility]  [facility] [address] [place] [id number] [phone]           Intermediate Acting Insulin Protocol Passed - 12/11/2019  7:36 PM        Passed - Blood pressure less than 140/90 in past 6 months     BP Readings from Last 3 Encounters:   12/10/19 136/80   11/26/19 (!) 142/90   11/11/19 (!) 168/82                 Passed - LDL on file in past 12 months     Recent Labs   Lab Test 11/26/19  0915   LDL 85             Passed - Microalbumin on file in past 12 months     Recent Labs   Lab Test 11/26/19  0916   MICROL 58   UMALCR 73.07*             Passed - Serum creatinine on file in past 12 months     Recent Labs   Lab Test 11/26/19  0915   CR 0.92             Passed - HgbA1C in past 3 or 6 months     If HgbA1C is 8 or greater, it needs to be on file within the past 3 months.  If less than 8, must be on file within the past 6 months.     Recent Labs   Lab Test 11/26/19  0915   A1C 6.8*             Passed - Medication is active on med list        Passed - Patient is age 18 or older        Passed - Recent (6 mo) or future (30 days) visit within the authorizing provider's specialty     Patient had office visit in the last 6 months or has a visit in the next 30 days with authorizing provider or within the authorizing provider's specialty.  See \"Patient Info\" tab in inbasket, or \"Choose Columns\" in Meds " & Orders section of the refill encounter.

## 2019-12-13 ENCOUNTER — TELEPHONE (OUTPATIENT)
Dept: INTERNAL MEDICINE | Facility: CLINIC | Age: 79
End: 2019-12-13

## 2019-12-13 NOTE — TELEPHONE ENCOUNTER
Please review test results that were taken at the  on 12/10/19.  Patient waiting for call back.  thanks

## 2019-12-13 NOTE — TELEPHONE ENCOUNTER
Patient calls requesting lab results be called to her as soon as possible. OK to leave a detailed message.

## 2019-12-14 ENCOUNTER — TELEPHONE (OUTPATIENT)
Dept: NURSING | Facility: CLINIC | Age: 79
End: 2019-12-14

## 2019-12-14 NOTE — TELEPHONE ENCOUNTER
"Patient was seen in  on 12/10 and she is still waiting for wound culture results (preliminary results in chart). Patient is concerned that something is \"lost\" because it is taking so long. I advised that I would check with the lab at [facility]. Called and spoke to them, I was told that ID at [facility] is doing the culture. I called them and was told that that the delay is due to completing sensitivities for the \"light growth\" of Klebsiella. These results most likely will not be completed until sometime on Monday. Patient asks if she can possibly begin treatment for the other 2 \"heavy growth\" organisms. Advised that I will contact the provider working in  today and give her this message/request. Spoke to provider and she will investigate today as time permits. Patient is aware and appreciates any help she can get as she is not feeling well and has not felt well for more than a month.  Sydni Delgado RN  [facility]    "

## 2019-12-15 ENCOUNTER — TELEPHONE (OUTPATIENT)
Dept: NURSING | Facility: CLINIC | Age: 79
End: 2019-12-15

## 2019-12-15 LAB
BACTERIA SPEC CULT: ABNORMAL
Lab: ABNORMAL
SPECIMEN SOURCE: ABNORMAL

## 2019-12-15 NOTE — TELEPHONE ENCOUNTER
Left message for patient to call back-   Please see message in result notes    Dinorah Jones CMA 12/15/2019 3:17 PM

## 2019-12-16 ENCOUNTER — TELEPHONE (OUTPATIENT)
Dept: URGENT CARE | Facility: URGENT CARE | Age: 79
End: 2019-12-16

## 2019-12-16 DIAGNOSIS — N76.0 VAGINAL INFECTION: Primary | ICD-10-CM

## 2019-12-16 RX ORDER — CIPROFLOXACIN 500 MG/1
500 TABLET, FILM COATED ORAL 2 TIMES DAILY
Qty: 14 TABLET | Refills: 0 | Status: SHIPPED | OUTPATIENT
Start: 2019-12-16 | End: 2019-12-26

## 2019-12-16 RX ORDER — PENICILLIN V POTASSIUM 500 MG/1
500 TABLET, FILM COATED ORAL 2 TIMES DAILY
Qty: 14 TABLET | Refills: 0 | Status: SHIPPED | OUTPATIENT
Start: 2019-12-16 | End: 2019-12-26

## 2019-12-16 NOTE — TELEPHONE ENCOUNTER
Patient calls clinic requesting lab results and does not want to wait around all day for an RX.  Please call patient and sent RX as soon as possible. OK to leave a detailed message.  Patient is very concerned about medication cost and states she  Checked on on medication that was $2,000 and she will tot pay that much.

## 2019-12-16 NOTE — TELEPHONE ENCOUNTER
"Per another telephone encounter:  \"Patient calls clinic requesting lab results and does not want to wait around all day for an RX.  Please call patient and sent RX as soon as possible. OK to leave a detailed message.  Patient is very concerned about medication cost and states she  Checked on on medication that was $2,000 and she will tot pay that much\"  "

## 2019-12-16 NOTE — TELEPHONE ENCOUNTER
"Patient calling about her Lab results.    Patient received a call from [facility] to call back about her labs,  but no information is documented on why we called her or what the message was, Triage nurse unable to help her - results are blocked for patient view, Provider level of authorization needed to review-however; appears she has both a wound and a urinary tract infection.        RN offered to  isra on-call physician to read the results and get the medications ordered tonight, she states \"no, I will not be going out tonight to get any medications\", she prefers  To have team at her primary care clinic review it Monday - RN will send URGENT message to PCP and clinic for next steps antibiotics, below is pharmacy of choice:    [facility] [address] [place], [place] - [id number] [place] [address]  [phone]    *Best Time to call patient is after 8am and could leave a message on her machine as to what is wrong and what medications have been prescribed    Billie Annette, RN - [facility]  12/15/2019   9:07PM    "

## 2019-12-20 ENCOUNTER — TELEPHONE (OUTPATIENT)
Dept: INTERNAL MEDICINE | Facility: CLINIC | Age: 79
End: 2019-12-20

## 2019-12-20 NOTE — TELEPHONE ENCOUNTER
Patient calling and not happy she has not heard about test looking for bakers cyst or a blood clot. Please advise. Ok to call and alejandra [phone]

## 2019-12-20 NOTE — TELEPHONE ENCOUNTER
There is a bakers cyst seen that is 1.9 x 1.3 x 0.6 cm seen in left leg   Seeing ortho would be next step, as previously discussed, as treatment usually is nothing, but if painful, steroid injections sometimes are helpful

## 2019-12-20 NOTE — TELEPHONE ENCOUNTER
Called patient, her  answered the phone. She had ultrasound done at [facility] and has not received the results. Ultrasound results are scanned into chart, routed to [facility] to review.

## 2019-12-23 NOTE — TELEPHONE ENCOUNTER
Contacted patient, informed her of ultrasound results and to see Ortho as previously recommended. Patient verbalizes understanding.

## 2019-12-26 ENCOUNTER — TELEPHONE (OUTPATIENT)
Dept: INTERNAL MEDICINE | Facility: CLINIC | Age: 79
End: 2019-12-26

## 2019-12-26 DIAGNOSIS — N76.0 VAGINAL INFECTION: ICD-10-CM

## 2019-12-26 RX ORDER — CIPROFLOXACIN 500 MG/1
500 TABLET, FILM COATED ORAL 2 TIMES DAILY
Qty: 14 TABLET | Refills: 0 | Status: SHIPPED | OUTPATIENT
Start: 2019-12-26 | End: 2020-03-23

## 2019-12-26 RX ORDER — PENICILLIN V POTASSIUM 500 MG/1
500 TABLET, FILM COATED ORAL 2 TIMES DAILY
Qty: 14 TABLET | Refills: 0 | Status: SHIPPED | OUTPATIENT
Start: 2019-12-26 | End: 2020-07-06

## 2019-12-26 NOTE — TELEPHONE ENCOUNTER
I will send in prescription   Will only do this once and then she should be seen   Probably should make appointment with Dr Church to discuss next steps

## 2019-12-26 NOTE — TELEPHONE ENCOUNTER
Patient seen in urgent care 12/10/19 and culture was done. Patient was also seen 11/26 by primary care provider and was on a round of antibiotics after that and 11/11 by OB/GYN and was on antibiotics after that appointment. Attempted to reach patient for more information regarding her current symptoms and whether or not there was any improvement while on antibiotics. Left message for patient to call back with spouse. Please have patient speak with triage nurse.    Dinorah - are you willing to treat patient based on information below or is more information needed?

## 2019-12-26 NOTE — TELEPHONE ENCOUNTER
Patient was seen in [facility] urgent care for a vaginal infection and was given ciprofloxacin and penicillin. She has taken all of the medication but is getting the same symptoms back and would like Dinorah to refill the 2 medications. Please send to [facility] in [place]. Call patient if you have any questions at [phone] (ok to leave detailed message)

## 2019-12-27 DIAGNOSIS — E11.9 TYPE 2 DIABETES MELLITUS WITHOUT COMPLICATION, WITHOUT LONG-TERM CURRENT USE OF INSULIN (H): ICD-10-CM

## 2019-12-27 NOTE — TELEPHONE ENCOUNTER
Requested Prescriptions   Pending Prescriptions Disp Refills     metFORMIN (GLUCOPHAGE) 1000 MG tablet [Pharmacy Med Name: metFORMIN  Last Written Prescription Date:  7/10/2019  Last Fill Quantity: 180,  # refills: 1   Last office visit: 11/26/2019 with prescribing provider:     Future Office Visit:   Next 5 appointments (look out 90 days)    Jan 30, 2020 10:45 AM CST  Office Visit with Meseret Gayr MD  [facility] ([facility]) [address] [place]  [address]  [facility] [id number] [phone]        HCl 1000 MG Oral Tablet]  0     Sig: TAKE 1 TABLET BY MOUTH TWICE DAILY WITH MEALS       Biguanide Agents Passed - 12/27/2019  8:52 AM        Passed - Blood pressure less than 140/90 in past 6 months     BP Readings from Last 3 Encounters:   12/10/19 136/80   11/26/19 (!) 142/90   11/11/19 (!) 168/82                 Passed - Patient has documented LDL within the past 12 mos.     Recent Labs   Lab Test 11/26/19  0915   LDL 85             Passed - Patient has had a Microalbumin in the past 15 mos.     Recent Labs   Lab Test 11/26/19  0916   MICROL 58   UMALCR 73.07*             Passed - Patient is age 10 or older        Passed - Patient has documented A1c within the specified period of time.     If HgbA1C is 8 or greater, it needs to be on file within the past 3 months.  If less than 8, must be on file within the past 6 months.     Recent Labs   Lab Test 11/26/19  0915   A1C 6.8*             Passed - Patient's CR is NOT>1.4 OR Patient's EGFR is NOT<45 within past 12 mos.     Recent Labs   Lab Test 11/26/19  0915   GFRESTIMATED 60*   GFRESTBLACK 69       Recent Labs   Lab Test 11/26/19  0915   CR 0.92             Passed - Patient does NOT have a diagnosis of CHF.        Passed - Medication is active on med list        Passed - Patient is not pregnant        Passed - Patient has not had a positive pregnancy test within the past 12 mos.         Passed - Recent (6 mo) or future (30  "days) visit within the authorizing provider's specialty     Patient had office visit in the last 6 months or has a visit in the next 30 days with authorizing provider or within the authorizing provider's specialty.  See \"Patient Info\" tab in inbasket, or \"Choose Columns\" in Meds & Orders section of the refill encounter.            "

## 2019-12-30 ENCOUNTER — TELEPHONE (OUTPATIENT)
Dept: INTERNAL MEDICINE | Facility: CLINIC | Age: 79
End: 2019-12-30

## 2019-12-30 NOTE — TELEPHONE ENCOUNTER
Patient was treated by Urgent Care with Cipro and Penicillin V, these were refilled by Dinorah on 12/26/19. Bacteria in cultures were sensitive to these antibiotics. Informed patient that the antibiotics she was given were listed on sensitivities. Patient states that symptoms are not improving and asks if there are any other antibiotic that would work for her. Informed patient that there are other antibiotic listed, but several of them are only available through IV.     Patient was told to see OB-GYN for follow-up, but unable to see Ob-Gyn for 5-6 weeks. Patient plans to go to Urgent Care tomorrow.

## 2019-12-30 NOTE — TELEPHONE ENCOUNTER
Please call , wants to know what cultures were sensitive to what medication . [facility] had vaginal wound culture . And is not better. Had 3 different microbes     VANESSA Srivastava LPN

## 2019-12-31 ENCOUNTER — OFFICE VISIT (OUTPATIENT)
Dept: URGENT CARE | Facility: URGENT CARE | Age: 79
End: 2019-12-31
Payer: COMMERCIAL

## 2019-12-31 VITALS
RESPIRATION RATE: 20 BRPM | DIASTOLIC BLOOD PRESSURE: 78 MMHG | TEMPERATURE: 97.8 F | SYSTOLIC BLOOD PRESSURE: 130 MMHG | HEART RATE: 82 BPM | OXYGEN SATURATION: 98 %

## 2019-12-31 DIAGNOSIS — N89.8 VAGINAL DISCHARGE: ICD-10-CM

## 2019-12-31 DIAGNOSIS — R30.0 DYSURIA: Primary | ICD-10-CM

## 2019-12-31 LAB
ALBUMIN UR-MCNC: 30 MG/DL
APPEARANCE UR: CLEAR
BACTERIA #/AREA URNS HPF: ABNORMAL /HPF
BILIRUB UR QL STRIP: NEGATIVE
COLOR UR AUTO: YELLOW
GLUCOSE UR STRIP-MCNC: NEGATIVE MG/DL
HGB UR QL STRIP: ABNORMAL
KETONES UR STRIP-MCNC: ABNORMAL MG/DL
LEUKOCYTE ESTERASE UR QL STRIP: NEGATIVE
NITRATE UR QL: NEGATIVE
NON-SQ EPI CELLS #/AREA URNS LPF: ABNORMAL /LPF
PH UR STRIP: 5 PH (ref 5–7)
RBC #/AREA URNS AUTO: ABNORMAL /HPF
SOURCE: ABNORMAL
SP GR UR STRIP: >1.03 (ref 1–1.03)
SPECIMEN SOURCE: ABNORMAL
UROBILINOGEN UR STRIP-ACNC: 0.2 EU/DL (ref 0.2–1)
WBC #/AREA URNS AUTO: ABNORMAL /HPF
WET PREP SPEC: ABNORMAL

## 2019-12-31 PROCEDURE — 87210 SMEAR WET MOUNT SALINE/INK: CPT | Performed by: PHYSICIAN ASSISTANT

## 2019-12-31 PROCEDURE — 87205 SMEAR GRAM STAIN: CPT | Performed by: PHYSICIAN ASSISTANT

## 2019-12-31 PROCEDURE — 99214 OFFICE O/P EST MOD 30 MIN: CPT | Performed by: PHYSICIAN ASSISTANT

## 2019-12-31 PROCEDURE — 81001 URINALYSIS AUTO W/SCOPE: CPT | Performed by: PHYSICIAN ASSISTANT

## 2019-12-31 PROCEDURE — 87070 CULTURE OTHR SPECIMN AEROBIC: CPT | Performed by: PHYSICIAN ASSISTANT

## 2019-12-31 RX ORDER — METRONIDAZOLE 500 MG/1
500 TABLET ORAL 2 TIMES DAILY
Qty: 14 TABLET | Refills: 0 | Status: SHIPPED | OUTPATIENT
Start: 2019-12-31 | End: 2020-03-27

## 2019-12-31 NOTE — PROGRESS NOTES
She has BV likely cause of her symptoms    CHIEF COMPLAINT:   Chief Complaint   Patient presents with     Urgent Care     UTI     poss UTI        HPI: Lindsey Gary is a 79 year old female who presents to clinic today for evaluation of vaginal infection. Patient has a history of vaginal discharge and foul odor. She has a history of vaginal infections and was recently treated with Cipro and PCN VK. She feels like her symptoms have improved but not resolved. Wet prep was not performed at last visit.     Past Medical History:   Diagnosis Date     Arthritis      Essential hypertension, benign      Mixed hyperlipidemia      Pain in joint, ankle and foot     gout     Type II or unspecified type diabetes mellitus without mention of complication, not stated as uncontrolled     Diabetes mellitus     Past Surgical History:   Procedure Laterality Date     C NONSPECIFIC PROCEDURE      Breast biopsies        C NONSPECIFIC PROCEDURE      Symptomatic left thigh lipomas        C NONSPECIFIC PROCEDURE  6/1/09    pubovaginal sling     COLONOSCOPY       COLONOSCOPY N/A 12/17/2014    Procedure: COMBINED COLONOSCOPY, SINGLE OR MULTIPLE BIOPSY/POLYPECTOMY BY BIOPSY;  Surgeon: Chuy Staton MD;  Location: [facility]     HYSTERECTOMY, PAP NO LONGER INDICATED       HYSTERECTOMY, JAIME  1991    fibroid     SURGICAL HISTORY OF -   10/28/2015    right partial knee      Social History     Tobacco Use     Smoking status: Never Smoker     Smokeless tobacco: Never Used   Substance Use Topics     Alcohol use: No     Alcohol/week: 0.0 standard drinks     Current Outpatient Medications   Medication     ACCU-CHEK GUIDE test strip     acetaminophen (TYLENOL) 500 MG tablet     alcohol swab prep pads     allopurinol (ZYLOPRIM) 300 MG tablet     Ascorbic Acid (VITAMIN C) 500 MG CAPS     ASPIRIN 81 MG OR TABS     betamethasone, augmented, (DIPROLENE) 0.05 % lotion     Blood Glucose Calibration (ACCU-CHEK GUIDE CONTROL) LIQD     blood glucose monitoring  "(ACCU-CHEK FASTCLIX) lancets     Blood Glucose Monitoring Suppl (ACCU-CHEK GUIDE) w/Device KIT     boric acid 600 mg vaginal suppository - PHARMACY TO MIX COMPOUND     calcipotriene 0.005 % OINT     calcium-magnesium (CALMAG) 500-250 MG TABS per tablet     chlorhexidine (PERIDEX) 0.12 % solution     Cholecalciferol (VITAMIN D) 2000 UNITS tablet     CINNAMON 500 MG OR CAPS     ciprofloxacin (CIPRO) 500 MG tablet     estradiol (VAGIFEM) 10 MCG TABS vaginal tablet     fluconazole (DIFLUCAN) 150 MG tablet     furosemide (LASIX) 20 MG tablet     gabapentin (NEURONTIN) 300 MG capsule     glipiZIDE (GLUCOTROL) 10 MG tablet     guaiFENesin-codeine (ROBITUSSIN AC) 100-10 MG/5ML solution     insulin NPH (NOVOLIN N RELION) 100 UNIT/ML vial     insulin syringe-needle U-100 (BD INSULIN SYRINGE ULTRAFINE) 31G X 5/16\" 0.3 ML miscellaneous     Lactobacillus (PROBIOTIC ACIDOPHILUS) TABS     lisinopril (PRINIVIL/ZESTRIL) 10 MG tablet     metFORMIN (GLUCOPHAGE) 1000 MG tablet     metroNIDAZOLE (FLAGYL) 500 MG tablet     order for DME     penicillin V (VEETID) 500 MG tablet     POTASSIUM CHLORIDE PO     simvastatin (ZOCOR) 40 MG tablet     TUMS 500 MG OR CHEW     vitamin E 400 UNITS TABS     amoxicillin (AMOXIL) 500 MG capsule     No current facility-administered medications for this visit.      Allergies   Allergen Reactions     Augmentin      Tongue swelling and rash     Can take PCN K without reaction      Sulfa Drugs      Tongue swelling and rash       10 point ROS of systems including Constitutional, Eyes, Respiratory, Cardiovascular, Gastroenterology, Genitourinary, Integumentary, Muscularskeletal, Psychiatric were all negative except for pertinent positives noted in my HPI.        Exam:  /78   Pulse 82   Temp 97.8  F (36.6  C) (Tympanic)   Resp 20   SpO2 98%   Constitutional: healthy, alert and no distress  Head: Normocephalic, atraumatic.  Eyes: conjunctiva clear, no drainage  ENT:  MMM  Cardiovascular: " RRR  Respiratory: CTA bilaterally, no rhonchi or rales  G/U: Scant discharge noted   Skin: no rashes  Neurologic: Speech clear, gait normal. Moves all extremities.    Results for orders placed or performed in visit on 12/31/19   UA reflex to Microscopic and Culture     Status: Abnormal   Result Value Ref Range    Color Urine Yellow     Appearance Urine Clear     Glucose Urine Negative NEG^Negative mg/dL    Bilirubin Urine Negative NEG^Negative    Ketones Urine Trace (A) NEG^Negative mg/dL    Specific Gravity Urine >1.030 1.003 - 1.035    Blood Urine Trace (A) NEG^Negative    pH Urine 5.0 5.0 - 7.0 pH    Protein Albumin Urine 30 (A) NEG^Negative mg/dL    Urobilinogen Urine 0.2 0.2 - 1.0 EU/dL    Nitrite Urine Negative NEG^Negative    Leukocyte Esterase Urine Negative NEG^Negative    Source Midstream Urine    Urine Microscopic     Status: Abnormal   Result Value Ref Range    WBC Urine 0 - 5 OTO5^0 - 5 /HPF    RBC Urine O - 2 OTO2^O - 2 /HPF    Squamous Epithelial /LPF Urine Few FEW^Few /LPF    Bacteria Urine Few (A) NEG^Negative /HPF   Wet prep     Status: Abnormal   Result Value Ref Range    Specimen Description Vagina     Wet Prep Many  Clue cells seen   (A)     Wet Prep No Trichomonas seen     Wet Prep No yeast seen     Wet Prep No WBC's seen          ASSESSMENT/PLAN:  1. Dysuria  No evidence of UTI  - UA reflex to Microscopic and Culture  - Wet prep  - Urine Microscopic    2. Vaginal discharge  Wet prep shows BV, will treat today  She has upcoming appt with OBGYN and if symptoms persist she should follow-up with them at that time.   Gram stain is negative, infection is cleared   - Wound Culture Aerobic Bacterial  - metroNIDAZOLE (FLAGYL) 500 MG tablet; Take 1 tablet (500 mg) by mouth 2 times daily for 7 days  Dispense: 14 tablet; Refill: 0    Diagnosis and treatment plan was reviewed with patient and/or family.   We went over any labs or imaging. Discussed worsening symptoms or little to no relief despite  treatment plan to follow-up with OB  Patient verbalizes understanding. All questions were addressed and answered.   Mary Corona PA-C

## 2019-12-31 NOTE — PATIENT INSTRUCTIONS

## 2020-01-01 LAB
GRAM STN SPEC: NORMAL
GRAM STN SPEC: NORMAL
Lab: NORMAL
SPECIMEN SOURCE: NORMAL

## 2020-01-07 ENCOUNTER — TELEPHONE (OUTPATIENT)
Dept: OBGYN | Facility: CLINIC | Age: 80
End: 2020-01-07

## 2020-01-07 NOTE — TELEPHONE ENCOUNTER
Pt unable to see you for a couple months.    She recently had vaginal infection with three different organisms.  She took meds and recent gram stain was neg.  Pt is concerned about the infection and wanted to talk with you about why she is getting these and if you have any advice on what she should do to prevent further infection.    Still doing boric acid.  Wonders if this is a culprit?    Basically, she wanted you to know about this and seek your advice.    Yoselyn AVERY R.N.  [facility]

## 2020-01-10 NOTE — TELEPHONE ENCOUNTER
Please let the patient know we can discuss this through office visit, ok to add week I am back.   Boric acid should not cause the infection, but if it is not helping it is ok to stop.    Thanks   Dr. Lisa Church,     Obstetrics and Gynecology  [facility] and [facility]

## 2020-01-12 ENCOUNTER — OFFICE VISIT (OUTPATIENT)
Dept: URGENT CARE | Facility: URGENT CARE | Age: 80
End: 2020-01-12
Payer: COMMERCIAL

## 2020-01-12 VITALS
SYSTOLIC BLOOD PRESSURE: 158 MMHG | OXYGEN SATURATION: 98 % | TEMPERATURE: 98 F | HEART RATE: 79 BPM | DIASTOLIC BLOOD PRESSURE: 76 MMHG

## 2020-01-12 DIAGNOSIS — N76.0 BACTERIAL VAGINOSIS: Primary | ICD-10-CM

## 2020-01-12 DIAGNOSIS — B96.89 BACTERIAL VAGINOSIS: Primary | ICD-10-CM

## 2020-01-12 PROCEDURE — 99213 OFFICE O/P EST LOW 20 MIN: CPT | Performed by: PHYSICIAN ASSISTANT

## 2020-01-12 RX ORDER — METRONIDAZOLE 500 MG/1
500 TABLET ORAL 2 TIMES DAILY
Qty: 14 TABLET | Refills: 0 | Status: SHIPPED | OUTPATIENT
Start: 2020-01-12 | End: 2020-03-27

## 2020-01-12 RX ORDER — METRONIDAZOLE 7.5 MG/G
1 GEL VAGINAL DAILY
Qty: 35 G | Refills: 0 | Status: SHIPPED | OUTPATIENT
Start: 2020-01-12 | End: 2020-07-06

## 2020-01-12 ASSESSMENT — ENCOUNTER SYMPTOMS
FREQUENCY: 0
DYSURIA: 0
FEVER: 0

## 2020-01-12 NOTE — PROGRESS NOTES
SUBJECTIVE:   Lindsey Gary is a 79 year old female presenting with a chief complaint of   Chief Complaint   Patient presents with     Urgent Care     Refill Request     Patient requesting refill for flagyl-Dx with BV       She is an established patient of [facility].    She is presenting to urgent care today requesting a refill on her metronidazole.  Patient diagnosed with bacterial vaginosis on 12/31/2019 and was prescribed Flagyl.  She notes symptoms have significantly improved but she is not back to baseline yet.  She has a history of vaginal infections.  She has an appointment with OB/GYN in the next couple weeks on 1/27/20.  She reports some residual vaginal discharge but declines a wet prep today.  She is using boric acid vaginally 3 times a week.  Denies any fevers or chills.  No pelvic pain.  No nausea vomiting or diarrhea.  No dysuria.      Review of Systems   Constitutional: Negative for fever.   Genitourinary: Positive for vaginal discharge. Negative for dysuria and frequency.       Past Medical History:   Diagnosis Date     Arthritis      Essential hypertension, benign      Mixed hyperlipidemia      Pain in joint, ankle and foot     gout     Type II or unspecified type diabetes mellitus without mention of complication, not stated as uncontrolled     Diabetes mellitus     Family History   Problem Relation Age of Onset     Cancer Mother         colon ca survivor     Cancer - colorectal Mother      Cerebrovascular Disease Father      Current Outpatient Medications   Medication Sig Dispense Refill     acetaminophen (TYLENOL) 500 MG tablet Take 1-2 tablets by mouth every morning.       allopurinol (ZYLOPRIM) 300 MG tablet Take 0.5 tablets (150 mg) by mouth daily 90 tablet 1     Ascorbic Acid (VITAMIN C) 500 MG CAPS Take 500 mg by mouth daily       ASPIRIN 81 MG OR TABS 1 tablet daily 100 3     betamethasone, augmented, (DIPROLENE) 0.05 % lotion GINA 10 TO 20 DROPS TOPICALLY AA OF SCALP Q NIGHT UTD       boric  "acid 600 mg vaginal suppository - PHARMACY TO MIX COMPOUND Place 1 suppository (600 mg) vaginally At Bedtime 21 suppository 11     calcipotriene 0.005 % OINT Apply 1 Application topically as needed  1     calcium-magnesium (CALMAG) 500-250 MG TABS per tablet Take 1 tablet by mouth daily       chlorhexidine (PERIDEX) 0.12 % solution RINSE ONE HALF  OZ 2-3 X D AFTER BREAKFAST BEFORE BEDTIME FOLLOWING BRUSHING AND FLOSSIN       Cholecalciferol (VITAMIN D) 2000 UNITS tablet Take 1 tablet by mouth daily       CINNAMON 500 MG OR CAPS 2 tablets daily  0     fluconazole (DIFLUCAN) 150 MG tablet Take 1 tablet (150 mg) by mouth every 3 days 30 tablet 6     gabapentin (NEURONTIN) 300 MG capsule Take 2 capsules (600 mg) by mouth At Bedtime 180 capsule 3     glipiZIDE (GLUCOTROL) 10 MG tablet TAKE 1 TABLET BY MOUTH TWICE DAILY BEFORE MEAL(S) 180 tablet 1     insulin NPH (NOVOLIN N RELION) 100 UNIT/ML vial INJECT 20 UNITS SUBCUTANEOUSLY BEFORE BREAKFAST AND  20  UNITS  BEFORE  DINNER 20 mL 1     insulin syringe-needle U-100 (BD INSULIN SYRINGE ULTRAFINE) 31G X 5/16\" 0.3 ML miscellaneous Use 2 syringes daily or as directed. 300 each 3     Lactobacillus (PROBIOTIC ACIDOPHILUS) TABS Take 1 tablet by mouth daily       lisinopril (PRINIVIL/ZESTRIL) 10 MG tablet TAKE 1 TABLET BY MOUTH ONCE DAILY 90 tablet 1     metFORMIN (GLUCOPHAGE) 1000 MG tablet TAKE 1 TABLET BY MOUTH TWICE DAILY WITH MEALS 180 tablet 1     metroNIDAZOLE (FLAGYL) 500 MG tablet Take 1 tablet (500 mg) by mouth 2 times daily for 7 days 14 tablet 0     metroNIDAZOLE (METROGEL) 0.75 % vaginal gel Place 1 applicator (5 g) vaginally daily for 7 days 35 g 0     POTASSIUM CHLORIDE PO Take 1 tablet by mouth daily       simvastatin (ZOCOR) 40 MG tablet TAKE 1 TABLET BY MOUTH EVERY NIGHT AT BEDTIME 90 tablet 0     TUMS 500 MG OR CHEW 1 TABLET 3 TIMES PRN 90 0     vitamin E 400 UNITS TABS Take 400 Units by mouth daily       ACCU-CHEK GUIDE test strip TEST BLOOD SUGAR THREE TIMES " DAILY  OR AS DIRECTED 300 strip 0     alcohol swab prep pads Use to swab area of injection/beau 6 times per day or as directed. 300 each 3     amoxicillin (AMOXIL) 500 MG capsule Take 1 capsule (500 mg) by mouth 2 times daily (Patient not taking: Reported on 12/31/2019) 20 capsule 0     Blood Glucose Calibration (ACCU-CHEK GUIDE CONTROL) LIQD 1 Application by In Vitro route as needed 1 each 6     blood glucose monitoring (ACCU-CHEK FASTCLIX) lancets Test 3 times daily as directed 300 each 3     Blood Glucose Monitoring Suppl (ACCU-CHEK GUIDE) w/Device KIT TEST BLOOD SUGAR THREE TIMES DAILY 1 kit 0     ciprofloxacin (CIPRO) 500 MG tablet Take 1 tablet (500 mg) by mouth 2 times daily (Patient not taking: Reported on 1/12/2020) 14 tablet 0     estradiol (VAGIFEM) 10 MCG TABS vaginal tablet Place 1 tablet (10 mcg) vaginally three times a week May use daily for up to 2 weeks, then 2-3 times weekly. (Patient not taking: Reported on 1/12/2020) 24 tablet 0     furosemide (LASIX) 20 MG tablet Take 1 tablet (20 mg) by mouth daily as needed (edema) (Patient not taking: Reported on 1/12/2020) 90 tablet 1     guaiFENesin-codeine (ROBITUSSIN AC) 100-10 MG/5ML solution Take 10 mLs by mouth every 4 hours as needed for cough (Patient not taking: Reported on 1/12/2020) 240 mL 1     order for DME Equipment being ordered: accuchek guide lancets 300 Device 3     penicillin V (VEETID) 500 MG tablet Take 1 tablet (500 mg) by mouth 2 times daily (Patient not taking: Reported on 1/12/2020) 14 tablet 0     Social History     Tobacco Use     Smoking status: Never Smoker     Smokeless tobacco: Never Used   Substance Use Topics     Alcohol use: No     Alcohol/week: 0.0 standard drinks       OBJECTIVE  BP (!) 158/76 (BP Location: Right arm, Patient Position: Sitting, Cuff Size: Adult Large)   Pulse 79   Temp 98  F (36.7  C) (Oral)   SpO2 98%   Breastfeeding No     Physical Exam  Constitutional:       General: She is not in acute distress.      Appearance: She is well-developed.   HENT:      Head: Normocephalic and atraumatic.      Right Ear: External ear normal.      Left Ear: External ear normal.   Eyes:      Conjunctiva/sclera: Conjunctivae normal.   Neck:      Musculoskeletal: Normal range of motion.   Cardiovascular:      Rate and Rhythm: Regular rhythm.      Heart sounds: Normal heart sounds.   Pulmonary:      Effort: Pulmonary effort is normal. No respiratory distress.      Breath sounds: Normal breath sounds.   Abdominal:      General: Bowel sounds are normal. There is no distension.      Palpations: Abdomen is soft.      Tenderness: There is no abdominal tenderness.   Skin:     General: Skin is warm and dry.   Neurological:      Mental Status: She is alert.         Labs:  No results found for this or any previous visit (from the past 24 hour(s)).      ASSESSMENT:      ICD-10-CM    1. Bacterial vaginosis N76.0 metroNIDAZOLE (FLAGYL) 500 MG tablet    B96.89 metroNIDAZOLE (METROGEL) 0.75 % vaginal gel          PLAN:    Bacterial vaginosis: Patient declined a wet prep today. Metronidazole prescription refilled today.  Patient was not sure if MetroGel will be covered by her insurance.  I have printed a prescription for that as well.  She will use the MetroGel if it is covered by her insurance instead of the oral tablets.  Follow-up with OB/GYN as scheduled.  Sooner if any worsening symptoms.  Patient agrees with the plan.    Followup:    If not improving or if condition worsens, follow up with your Primary Care Provider

## 2020-01-27 ENCOUNTER — OFFICE VISIT (OUTPATIENT)
Dept: OBGYN | Facility: CLINIC | Age: 80
End: 2020-01-27
Payer: COMMERCIAL

## 2020-01-27 VITALS — DIASTOLIC BLOOD PRESSURE: 70 MMHG | SYSTOLIC BLOOD PRESSURE: 132 MMHG | BODY MASS INDEX: 32.48 KG/M2 | HEIGHT: 64 IN

## 2020-01-27 DIAGNOSIS — N95.2 VAGINAL ATROPHY: Primary | ICD-10-CM

## 2020-01-27 DIAGNOSIS — N76.0 VAGINAL INFECTION: ICD-10-CM

## 2020-01-27 DIAGNOSIS — N89.8 ITCHING OF VAGINA: ICD-10-CM

## 2020-01-27 LAB
GRAM STN SPEC: ABNORMAL
Lab: ABNORMAL
SPECIMEN SOURCE: ABNORMAL
SPECIMEN SOURCE: NORMAL
WET PREP SPEC: NORMAL

## 2020-01-27 PROCEDURE — 87070 CULTURE OTHR SPECIMN AEROBIC: CPT | Performed by: FAMILY MEDICINE

## 2020-01-27 PROCEDURE — 87210 SMEAR WET MOUNT SALINE/INK: CPT | Performed by: FAMILY MEDICINE

## 2020-01-27 PROCEDURE — 99213 OFFICE O/P EST LOW 20 MIN: CPT | Performed by: FAMILY MEDICINE

## 2020-01-27 PROCEDURE — 87205 SMEAR GRAM STAIN: CPT | Performed by: FAMILY MEDICINE

## 2020-01-27 PROCEDURE — 87186 SC STD MICRODIL/AGAR DIL: CPT | Performed by: FAMILY MEDICINE

## 2020-01-27 PROCEDURE — 87077 CULTURE AEROBIC IDENTIFY: CPT | Performed by: FAMILY MEDICINE

## 2020-01-27 RX ORDER — FLUCONAZOLE 150 MG/1
150 TABLET ORAL SEE ADMIN INSTRUCTIONS
Qty: 30 TABLET | Refills: 11 | Status: SHIPPED | OUTPATIENT
Start: 2020-01-27 | End: 2020-03-27

## 2020-01-27 RX ORDER — FLUCONAZOLE 150 MG/1
150 TABLET ORAL DAILY
Qty: 3 TABLET | Refills: 11 | Status: SHIPPED | OUTPATIENT
Start: 2020-01-27 | End: 2020-03-27

## 2020-01-27 RX ORDER — METRONIDAZOLE 500 MG/1
500 TABLET ORAL 2 TIMES DAILY
Qty: 28 TABLET | Refills: 1 | Status: SHIPPED | OUTPATIENT
Start: 2020-01-27 | End: 2020-03-23

## 2020-01-27 RX ORDER — ESTRADIOL 0.1 MG/G
2 CREAM VAGINAL
Qty: 70 G | Refills: 11 | Status: SHIPPED | OUTPATIENT
Start: 2020-01-27 | End: 2020-03-27

## 2020-01-27 NOTE — PATIENT INSTRUCTIONS
Return in 3  Months       Daily yogurt and probiotic     Diflucan, flagyl, boric acid estrogen as stated       Dr. Lisa Church, DO    Obstetrics and Gynecology  [facility] [place] [facility] [place]

## 2020-01-27 NOTE — NURSING NOTE
"Chief Complaint   Patient presents with     Vaginal Problem     dealing with bacteria in vagina--would like wet prep and wound culture--last medication she was on Flagyl for BV--needs Fluconazole refilled       Initial /70   Ht 1.626 m (5' 4\")   BMI 32.48 kg/m   Estimated body mass index is 32.48 kg/m  as calculated from the following:    Height as of this encounter: 1.626 m (5' 4\").    Weight as of 19: 85.8 kg (189 lb 3.2 oz).  BP completed using cuff size: large    Questioned patient about current smoking habits.  Pt. has never smoked.          The following HM Due: NONE    "

## 2020-01-27 NOTE — PROGRESS NOTES
"SUBJECTIVE:  Lindsey Gary is an 79 year old  woman who presents for   Gyn follow-up exam. She was seen on 3/22/18, for vaginal pain/irritation.     This past October, the vaginal irritation returned. In November, she had a course of amoxicillin and more recently a course of oral flagyl. Patient continues to use the boric acid; however it gives her a \"funny feeling.\" She stopped the vaginal estrogen due to cost. Of note, she has a history of type II diabetes and psoriasis.     Past Medical History:   Diagnosis Date     Arthritis      Essential hypertension, benign      Mixed hyperlipidemia      Pain in joint, ankle and foot     gout     Type II or unspecified type diabetes mellitus without mention of complication, not stated as uncontrolled     Diabetes mellitus          Family History   Problem Relation Age of Onset     Cancer Mother         colon ca survivor     Cancer - colorectal Mother      Cerebrovascular Disease Father        Past Surgical History:   Procedure Laterality Date     C NONSPECIFIC PROCEDURE      Breast biopsies        C NONSPECIFIC PROCEDURE      Symptomatic left thigh lipomas        C NONSPECIFIC PROCEDURE  09    pubovaginal sling     COLONOSCOPY       COLONOSCOPY N/A 2014    Procedure: COMBINED COLONOSCOPY, SINGLE OR MULTIPLE BIOPSY/POLYPECTOMY BY BIOPSY;  Surgeon: Chuy Staton MD;  Location: [facility]     HYSTERECTOMY, PAP NO LONGER INDICATED       HYSTERECTOMY, JAIEM      fibroid     SURGICAL HISTORY OF -   10/28/2015    right partial knee        Current Outpatient Medications   Medication     ACCU-CHEK GUIDE test strip     acetaminophen (TYLENOL) 500 MG tablet     alcohol swab prep pads     allopurinol (ZYLOPRIM) 300 MG tablet     amoxicillin (AMOXIL) 500 MG capsule     Ascorbic Acid (VITAMIN C) 500 MG CAPS     ASPIRIN 81 MG OR TABS     betamethasone, augmented, (DIPROLENE) 0.05 % lotion     Blood Glucose Calibration (ACCU-CHEK GUIDE CONTROL) LIQD     blood glucose " "monitoring (ACCU-CHEK FASTCLIX) lancets     Blood Glucose Monitoring Suppl (ACCU-CHEK GUIDE) w/Device KIT     boric acid 600 mg vaginal suppository - PHARMACY TO MIX COMPOUND     calcipotriene 0.005 % OINT     calcium-magnesium (CALMAG) 500-250 MG TABS per tablet     chlorhexidine (PERIDEX) 0.12 % solution     Cholecalciferol (VITAMIN D) 2000 UNITS tablet     CINNAMON 500 MG OR CAPS     ciprofloxacin (CIPRO) 500 MG tablet     estradiol (VAGIFEM) 10 MCG TABS vaginal tablet     fluconazole (DIFLUCAN) 150 MG tablet     furosemide (LASIX) 20 MG tablet     gabapentin (NEURONTIN) 300 MG capsule     glipiZIDE (GLUCOTROL) 10 MG tablet     guaiFENesin-codeine (ROBITUSSIN AC) 100-10 MG/5ML solution     insulin NPH (NOVOLIN N RELION) 100 UNIT/ML vial     insulin syringe-needle U-100 (BD INSULIN SYRINGE ULTRAFINE) 31G X 5/16\" 0.3 ML miscellaneous     Lactobacillus (PROBIOTIC ACIDOPHILUS) TABS     lisinopril (PRINIVIL/ZESTRIL) 10 MG tablet     metFORMIN (GLUCOPHAGE) 1000 MG tablet     order for DME     penicillin V (VEETID) 500 MG tablet     POTASSIUM CHLORIDE PO     simvastatin (ZOCOR) 40 MG tablet     TUMS 500 MG OR CHEW     vitamin E 400 UNITS TABS     No current facility-administered medications for this visit.      Allergies   Allergen Reactions     Augmentin      Tongue swelling and rash     Can take PCN K without reaction      Sulfa Drugs      Tongue swelling and rash       Social History     Tobacco Use     Smoking status: Never Smoker     Smokeless tobacco: Never Used   Substance Use Topics     Alcohol use: No     Alcohol/week: 0.0 standard drinks       Review Of Systems  Ears/Nose/Throat: negative  Respiratory: No shortness of breath, dyspnea on exertion, cough, or hemoptysis  Cardiovascular: negative  Gastrointestinal: negative  Genitourinary: See HPI   Constitutional, HEENT, cardiovascular, pulmonary, GI, , musculoskeletal, neuro, skin, endocrine and psych systems are negative, except as otherwise noted.    This " "document serves as a record of the services and decisions personally performed and made by Lisa Church DO. It was created on her behalf by Mary Dover, a trained medical scribe. The creation of this document is based on the provider's statements to the medical scribe.  Mary Dover 8:51 AM 2020    OBJECTIVE:  /70   Ht 1.626 m (5' 4\")   BMI 32.48 kg/m    General appearance: healthy, alert and no distress  Lungs: negative, Percussion normal. Good diaphragmatic excursion. Lungs clear  Heart: negative, PMI normal. No lifts, heaves, or thrills. RRR. No murmurs, clicks gallops or rub  Pelvic: External genitalia and vagina normal. Normal vaginal with normal vaginal cuff, uterus/cervix surgically absent.     ASSESSMENT:  Lindsey Gary is an 79 year old  woman who presents for   Gyn follow-up exam. She was seen on 3/22/18, for vaginal pain/irritation. Today the following concerns were addressed:    PLAN:  Dx: Vaginal Atrophy/Irritation  1)  Wet Prep and Wound Culture Today   2)  Continue boric acid.   3)  Start estradiol cream and recommended a daily probiotic   4)  Rx fluconazole and Rx metronidazole to take as needed    5)  Return: in three months for recheck     Rx:   - estradiol (ESTRACE) 0.1 MG/GM vaginal cream; Place 2 g vaginally twice a week    - fluconazole (DIFLUCAN) 150 MG tablet; Take 1 tablet (150 mg) by mouth daily    - metroNIDAZOLE (FLAGYL) 500 MG tablet; Take 1 tablet (500 mg) by mouth 2 times daily     The information in this document, created by the medical scribe for me, accurately reflects the services I personally performed and the decisions made by me. I have reviewed and approved this document for accuracy prior to leaving the patient care area.  2020 9:11 AM    Dr. Lisa Church DO    OB/GYN   [facility]    "

## 2020-01-28 DIAGNOSIS — E78.2 MIXED HYPERLIPIDEMIA: ICD-10-CM

## 2020-01-28 NOTE — TELEPHONE ENCOUNTER
"Requested Prescriptions   Pending Prescriptions Disp Refills     simvastatin (ZOCOR) 40 MG tablet [Pharmacy Med Name: Simvastatin 40 MG Oral  Last Written Prescription Date:  8/13/2019  Last Fill Quantity: 90,  # refills: 0   Last office visit: 11/26/2019 with prescribing provider:     Future Office Visit:   Tablet]  0     Sig: TAKE 1 TABLET BY MOUTH EVERY DAY AT BEDTIME       [facility] Protocol Passed - 1/28/2020  7:47 AM        Passed - LDL on file in past 12 months     Recent Labs   Lab Test 11/26/19  0915   LDL 85             Passed - No abnormal creatine kinase in past 12 months     No lab results found.             Passed - Recent (12 mo) or future (30 days) visit within the authorizing provider's specialty     Patient has had an office visit with the authorizing provider or a provider within the authorizing providers department within the previous 12 mos or has a future within next 30 days. See \"Patient Info\" tab in inbasket, or \"Choose Columns\" in Meds & Orders section of the refill encounter.              Passed - Medication is active on med list        Passed - Patient is age 18 or older        Passed - No active pregnancy on record        Passed - No positive pregnancy test in past 12 months        "

## 2020-01-29 LAB
BACTERIA SPEC CULT: ABNORMAL
BACTERIA SPEC CULT: ABNORMAL
Lab: ABNORMAL
SPECIMEN SOURCE: ABNORMAL

## 2020-01-29 RX ORDER — SIMVASTATIN 40 MG
TABLET ORAL
Qty: 90 TABLET | Refills: 2 | Status: SHIPPED | OUTPATIENT
Start: 2020-01-29 | End: 2020-10-19

## 2020-01-29 NOTE — TELEPHONE ENCOUNTER
Prescription approved per [facility] Refill Protocol.  Ondina Alston RN

## 2020-01-30 RX ORDER — PENICILLIN V POTASSIUM 250 MG/1
250 TABLET, FILM COATED ORAL 4 TIMES DAILY
Qty: 40 TABLET | Refills: 0 | Status: SHIPPED | OUTPATIENT
Start: 2020-01-30 | End: 2020-03-18

## 2020-02-03 DIAGNOSIS — Z79.4 TYPE 2 DIABETES MELLITUS WITHOUT COMPLICATION, WITH LONG-TERM CURRENT USE OF INSULIN (H): ICD-10-CM

## 2020-02-03 DIAGNOSIS — G62.9 NEUROPATHY: ICD-10-CM

## 2020-02-03 DIAGNOSIS — E11.9 TYPE 2 DIABETES MELLITUS WITHOUT COMPLICATION, WITH LONG-TERM CURRENT USE OF INSULIN (H): ICD-10-CM

## 2020-02-03 DIAGNOSIS — E11.8 TYPE 2 DIABETES MELLITUS WITH COMPLICATION, WITHOUT LONG-TERM CURRENT USE OF INSULIN (H): ICD-10-CM

## 2020-02-03 DIAGNOSIS — E11.9 TYPE 2 DIABETES MELLITUS WITHOUT COMPLICATION, WITHOUT LONG-TERM CURRENT USE OF INSULIN (H): ICD-10-CM

## 2020-02-03 NOTE — TELEPHONE ENCOUNTER
"Requested Prescriptions   Pending Prescriptions Disp Refills     gabapentin (NEURONTIN) 300 MG capsule [Pharmacy Med Name: GABAPENTIN  Last Written Prescription Date:  1/24/2019  Last Fill Quantity: 180,  # refills: 3   Last office visit: 11/26/2019 with prescribing provider:     Future Office Visit:   Next 5 appointments (look out 90 days)    Apr 28, 2020  9:30 AM CDT  SHORT with Lisa Church DO  [facility] ([facility]) [address]  [address] [place] [address]  [phone]        300 MG Capsule] 180 capsule 3     Sig: TAKE 2 CAPSULES AT BEDTIME       There is no refill protocol information for this order        blood glucose monitoring (ACCU-CHEK FASTCLIX) lancets [Pharmacy Med Name: ACCU-CHEK FASTCLIX LANCETS   ]  Last Written Prescription Date:  1/10/2019  Last Fill Quantity: 300,  # refills: 3   Last office visit: 11/26/2019 with prescribing provider:     Future Office Visit:   Next 5 appointments (look out 90 days)    Apr 28, 2020  9:30 AM CDT  SHORT with Lisa Church DO  [facility] ([facility]) [address]  [address] [address]  [phone]              Sig: TEST BLOOD SUGAR 3 TIMES DAILY AS DIRECTED       Diabetic Supplies Protocol Passed - 2/3/2020  1:46 PM        Passed - Medication is active on med list        Passed - Patient is 18 years of age or older        Passed - Recent (6 mo) or future (30 days) visit within the authorizing provider's specialty     Patient had office visit in the last 6 months or has a visit in the next 30 days with authorizing provider.  See \"Patient Info\" tab in inbasket, or \"Choose Columns\" in Meds & Orders section of the refill encounter.            DROPLET INSULIN SYRINGE 31G X 5/16\" 0.3 ML miscellaneous [Pharmacy Med  Last Written Prescription Date:  1/18/2019  Last Fill Quantity: 300,  # refills: 3   Last office visit: 11/26/2019 with prescribing " "provider:     Future Office Visit:   Next 5 appointments (look out 90 days)    Apr 28, 2020  9:30 AM CDT  SHORT with Lisa Church DO  [facility] ([facility]) [address]  [address] [address]  [phone]        Name: DROPLET INSULIN SYRINGE U-100/0.3/31G X 5/16\" 31G X 5/16\" 0.3 ML ]       Sig: USE  2 SYRINGES EVERY DAY  OR AS DIRECTED       Diabetic Supplies Protocol Passed - 2/3/2020  1:46 PM        Passed - Medication is active on med list        Passed - Patient is 18 years of age or older        Passed - Recent (6 mo) or future (30 days) visit within the authorizing provider's specialty     Patient had office visit in the last 6 months or has a visit in the next 30 days with authorizing provider.  See \"Patient Info\" tab in inbasket, or \"Choose Columns\" in Meds & Orders section of the refill encounter.            ACCU-CHEK GUIDE test strip [Pharmacy Med Name: ACCU-CHEK GUIDE   Strip]  Last Written Prescription Date:  11/20/2019  Last Fill Quantity: 300,  # refills: 0   Last office visit: 11/26/2019 with prescribing provider:     Future Office Visit:   Next 5 appointments (look out 90 days)    Apr 28, 2020  9:30 AM CDT  SHORT with Lisa Church DO  [facility] ([facility]) [address]  [address] [address]  [phone]        300 strip 0     Sig: TEST BLOOD SUGAR THREE TIMES DAILY  OR AS DIRECTED       Diabetic Supplies Protocol Passed - 2/3/2020  1:46 PM        Passed - Medication is active on med list        Passed - Patient is 18 years of age or older        Passed - Recent (6 mo) or future (30 days) visit within the authorizing provider's specialty     Patient had office visit in the last 6 months or has a visit in the next 30 days with authorizing provider.  See \"Patient Info\" tab in inbasket, or \"Choose Columns\" in Meds & Orders section of the refill encounter.            "

## 2020-02-04 RX ORDER — LANCETS
EACH MISCELLANEOUS
Qty: 300 EACH | Refills: 1 | Status: SHIPPED | OUTPATIENT
Start: 2020-02-04 | End: 2020-06-01

## 2020-02-04 RX ORDER — GABAPENTIN 300 MG/1
CAPSULE ORAL
Qty: 180 CAPSULE | Refills: 3 | Status: SHIPPED | OUTPATIENT
Start: 2020-02-04 | End: 2021-01-20

## 2020-02-04 RX ORDER — SYRING-NEEDL,DISP,INSUL,0.3 ML 31 GX5/16"
SYRINGE, EMPTY DISPOSABLE MISCELLANEOUS
Qty: 200 EACH | Refills: 1 | Status: SHIPPED | OUTPATIENT
Start: 2020-02-04 | End: 2020-05-26

## 2020-02-04 RX ORDER — BLOOD SUGAR DIAGNOSTIC
STRIP MISCELLANEOUS
Qty: 300 STRIP | Refills: 1 | Status: SHIPPED | OUTPATIENT
Start: 2020-02-04 | End: 2020-05-29

## 2020-02-04 NOTE — TELEPHONE ENCOUNTER
Diabetic testing supplies/insulin - Prescription approved per [facility] Refill Protocol.    Gabapentin - Routing refill request to provider for review/approval because:  Drug not on the [facility] refill protocol

## 2020-02-05 DIAGNOSIS — E11.9 TYPE 2 DIABETES MELLITUS WITHOUT COMPLICATION, WITHOUT LONG-TERM CURRENT USE OF INSULIN (H): ICD-10-CM

## 2020-02-05 RX ORDER — GLIPIZIDE 10 MG/1
TABLET ORAL
Qty: 180 TABLET | Refills: 0 | Status: SHIPPED | OUTPATIENT
Start: 2020-02-05 | End: 2020-02-11

## 2020-02-05 NOTE — TELEPHONE ENCOUNTER
Prescription approved per [facility] Refill Protocol.    Last refilled on 7/10/19  Last office visit was 11/26/19 with instructions to return in about 6 months.

## 2020-02-05 NOTE — TELEPHONE ENCOUNTER
Requested Prescriptions   Pending Prescriptions Disp Refills     glipiZIDE (GLUCOTROL) 10 MG tablet [Pharmacy Med Name: glipiZIDE 10 MG Oral Tablet]  0     Sig: TAKE 1 TABLET BY MOUTH TWICE DAILY BEFORE MEAL(S)   Last Written Prescription Date:  07/10/2019  Last Fill Quantity: 180,  # refills: 01   Last office visit: 11/26/2019 with prescribing provider:     Future Office Visit:   Next 5 appointments (look out 90 days)    Apr 28, 2020  9:30 AM CDT  SHORT with Lisa Church,   [facility] [address]  [address]  [facility] [address]  [phone]           Sulfonylurea Agents Passed - 2/5/2020  9:00 AM        Passed - Blood pressure less than 140/90 in past 6 months     BP Readings from Last 3 Encounters:   01/27/20 132/70   01/12/20 (!) 158/76   12/31/19 130/78                 Passed - Patient has documented LDL within the past 12 mos.     Recent Labs   Lab Test 11/26/19  0915   LDL 85             Passed - Patient has had a Microalbumin in the past 15 mos.     Recent Labs   Lab Test 11/26/19  0916   MICROL 58   UMALCR 73.07*             Passed - Patient has documented A1c within the specified period of time.     If HgbA1C is 8 or greater, it needs to be on file within the past 3 months.  If less than 8, must be on file within the past 6 months.     Recent Labs   Lab Test 11/26/19  0915   A1C 6.8*             Passed - Medication is active on med list        Passed - Patient is age 18 or older        Passed - No active pregnancy on record        Passed - Patient has a recent creatinine (normal) within the past 12 mos.     Recent Labs   Lab Test 11/26/19  0915   CR 0.92             Passed - Patient has not had a positive pregnancy test within the past 12 mos.        Passed - Recent (6 mo) or future (30 days) visit within the authorizing provider's specialty     Patient had office visit in the last 6 months or has a visit in the next 30 days with  "authorizing provider or within the authorizing provider's specialty.  See \"Patient Info\" tab in inbasket, or \"Choose Columns\" in Meds & Orders section of the refill encounter.            "

## 2020-02-08 DIAGNOSIS — E11.9 TYPE 2 DIABETES MELLITUS WITHOUT COMPLICATION, WITHOUT LONG-TERM CURRENT USE OF INSULIN (H): ICD-10-CM

## 2020-02-10 NOTE — TELEPHONE ENCOUNTER
Requested Prescriptions   Pending Prescriptions Disp Refills     glipiZIDE (GLUCOTROL) 10 MG tablet [Pharmacy Med Name: glipiZIDE 10 MG Oral Tablet]  0     Sig: TAKE 1 TABLET BY MOUTH TWICE DAILY BEFORE MEAL(S)   Last Written Prescription Date:  02/05/2020  Last Fill Quantity: 180,  # refills: 0   Last office visit: 11/26/2019 with prescribing provider:     Future Office Visit:   Next 5 appointments (look out 90 days)    Apr 28, 2020  9:30 AM CDT  SHORT with Lisa Church,   [facility] [address]  [address]  [facility] [address]  [phone]           Sulfonylurea Agents Passed - 2/8/2020  8:57 AM        Passed - Blood pressure less than 140/90 in past 6 months     BP Readings from Last 3 Encounters:   01/27/20 132/70   01/12/20 (!) 158/76   12/31/19 130/78                 Passed - Patient has documented LDL within the past 12 mos.     Recent Labs   Lab Test 11/26/19  0915   LDL 85             Passed - Patient has had a Microalbumin in the past 15 mos.     Recent Labs   Lab Test 11/26/19  0916   MICROL 58   UMALCR 73.07*             Passed - Patient has documented A1c within the specified period of time.     If HgbA1C is 8 or greater, it needs to be on file within the past 3 months.  If less than 8, must be on file within the past 6 months.     Recent Labs   Lab Test 11/26/19  0915   A1C 6.8*             Passed - Medication is active on med list        Passed - Patient is age 18 or older        Passed - No active pregnancy on record        Passed - Patient has a recent creatinine (normal) within the past 12 mos.     Recent Labs   Lab Test 11/26/19  0915   CR 0.92             Passed - Patient has not had a positive pregnancy test within the past 12 mos.        Passed - Recent (6 mo) or future (30 days) visit within the authorizing provider's specialty     Patient had office visit in the last 6 months or has a visit in the next 30 days with  "authorizing provider or within the authorizing provider's specialty.  See \"Patient Info\" tab in inbasket, or \"Choose Columns\" in Meds & Orders section of the refill encounter.            "

## 2020-02-11 RX ORDER — GLIPIZIDE 10 MG/1
TABLET ORAL
Qty: 180 TABLET | Refills: 0 | Status: SHIPPED | OUTPATIENT
Start: 2020-02-11 | End: 2020-04-16

## 2020-03-10 DIAGNOSIS — E11.9 TYPE 2 DIABETES MELLITUS WITHOUT COMPLICATION, WITHOUT LONG-TERM CURRENT USE OF INSULIN (H): ICD-10-CM

## 2020-03-10 NOTE — TELEPHONE ENCOUNTER
"Requested Prescriptions   Pending Prescriptions Disp Refills     insulin NPH (NOVOLIN N RELION) 100 UNIT/ML vial [Pharmacy Med Name: NovoLIN N ReliOn 100 UNIT/ML Subcutaneous Suspension] 20 mL 0     Sig: INJECT 20 UNITS SUBCUTANEOUSLY BEFORE BREAKFAST AND 20 UNITS BEFORE DINNER       Intermediate Acting Insulin Protocol Passed - 3/10/2020 11:41 AM        Passed - Blood pressure less than 140/90 in past 6 months     BP Readings from Last 3 Encounters:   01/27/20 132/70   01/12/20 (!) 158/76   12/31/19 130/78                 Passed - LDL on file in past 12 months     Recent Labs   Lab Test 11/26/19  0915   LDL 85             Passed - Microalbumin on file in past 12 months     Recent Labs   Lab Test 11/26/19  0916   MICROL 58   UMALCR 73.07*             Passed - Serum creatinine on file in past 12 months     Recent Labs   Lab Test 11/26/19  0915   CR 0.92             Passed - HgbA1C in past 3 or 6 months     If HgbA1C is 8 or greater, it needs to be on file within the past 3 months.  If less than 8, must be on file within the past 6 months.     Recent Labs   Lab Test 11/26/19  0915   A1C 6.8*             Passed - Medication is active on med list        Passed - Patient is age 18 or older        Passed - Recent (6 mo) or future (30 days) visit within the authorizing provider's specialty     Patient had office visit in the last 6 months or has a visit in the next 30 days with authorizing provider or within the authorizing provider's specialty.  See \"Patient Info\" tab in inbasket, or \"Choose Columns\" in Meds & Orders section of the refill encounter.               Last Written Prescription Date:  12/12/19  Last Fill Quantity: 20mL,  # refills: 1   Last office visit: 11/26/2019 with prescribing provider:     Future Office Visit:   Next 5 appointments (look out 90 days)    Apr 28, 2020  9:30 AM CDT  SHORT with Lisa Church,   [facility] ([facility]) [address]  [address]  [facility] [address]  [phone]

## 2020-03-12 RX ORDER — HUMAN INSULIN 100 [IU]/ML
INJECTION, SUSPENSION SUBCUTANEOUS
Qty: 20 ML | Refills: 0 | Status: SHIPPED | OUTPATIENT
Start: 2020-03-12 | End: 2022-07-05

## 2020-03-12 NOTE — TELEPHONE ENCOUNTER
Prescription approved per [facility] Refill Protocol.[    Last refilled on 12/12/19  Last office visit was 11/26/19 with instructions to return in about 5 months.

## 2020-03-18 ENCOUNTER — TELEPHONE (OUTPATIENT)
Dept: INTERNAL MEDICINE | Facility: CLINIC | Age: 80
End: 2020-03-18

## 2020-03-18 DIAGNOSIS — N76.0 VAGINAL INFECTION: ICD-10-CM

## 2020-03-18 RX ORDER — PENICILLIN V POTASSIUM 250 MG/1
250 TABLET, FILM COATED ORAL 4 TIMES DAILY
Qty: 40 TABLET | Refills: 0 | Status: SHIPPED | OUTPATIENT
Start: 2020-03-18 | End: 2020-07-06

## 2020-03-18 NOTE — TELEPHONE ENCOUNTER
Will route to primary care provider for revie. Patient does not have access to mychart, please advise if this should be a telephone visit.    Per 11/26/19 office visit note, patient was treated for a vaginal infection at that time

## 2020-03-18 NOTE — TELEPHONE ENCOUNTER
Pt calling requesting prescription for bacterial vaginosis. She is experiencing burning and has had this problem in the past. Pt can be reached at [phone]. OK to leave a detailed message. Please advise. Thanks.

## 2020-03-23 ENCOUNTER — TELEPHONE (OUTPATIENT)
Dept: INTERNAL MEDICINE | Facility: CLINIC | Age: 80
End: 2020-03-23

## 2020-03-23 DIAGNOSIS — M10.9 GOUT, UNSPECIFIED CAUSE, UNSPECIFIED CHRONICITY, UNSPECIFIED SITE: ICD-10-CM

## 2020-03-23 DIAGNOSIS — N76.0 VAGINAL INFECTION: ICD-10-CM

## 2020-03-23 RX ORDER — METRONIDAZOLE 500 MG/1
500 TABLET ORAL 2 TIMES DAILY
Qty: 28 TABLET | Refills: 1 | Status: SHIPPED | OUTPATIENT
Start: 2020-03-23 | End: 2020-03-27

## 2020-03-23 RX ORDER — CIPROFLOXACIN 500 MG/1
500 TABLET, FILM COATED ORAL 2 TIMES DAILY
Qty: 14 TABLET | Refills: 0 | Status: SHIPPED | OUTPATIENT
Start: 2020-03-23 | End: 2020-03-27

## 2020-03-23 NOTE — TELEPHONE ENCOUNTER
"Called patient for further triage.    Patient reports she is taking the penicillin that was prescribed on 3/18/20 as directed, but is not getting relief.     Patient reports she continues to have vaginal burning, patient reports she feels like she is \"on fire\". Patient reports she also has mild lower abdominal pain, patient describes it as a dull ache. Patient states this is a consistent symptom she gets with vaginal infections. Patient reports she also feels foggy. Denies fever.     Patient was last treated for a vaginal infection on 11/26/20.     Patient requesting amoxicillin or Cipro. Please advise.   "

## 2020-03-23 NOTE — TELEPHONE ENCOUNTER
Called patient and advised of prescription. Patient verbalized understanding, and agrees to call back if symptoms continue, or with further concerns.

## 2020-03-23 NOTE — TELEPHONE ENCOUNTER
Patient called back and is getting no relief from the penicillin prescribed. She is experiencing the same symptoms. Please send something else for her. Cipro and Amoxicillin have worked in the past for her. Call [phone]

## 2020-03-24 ENCOUNTER — TELEPHONE (OUTPATIENT)
Dept: INTERNAL MEDICINE | Facility: CLINIC | Age: 80
End: 2020-03-24

## 2020-03-24 RX ORDER — ALLOPURINOL 300 MG/1
TABLET ORAL
Qty: 45 TABLET | Refills: 0 | Status: SHIPPED | OUTPATIENT
Start: 2020-03-24 | End: 2020-06-18

## 2020-03-27 ENCOUNTER — VIRTUAL VISIT (OUTPATIENT)
Dept: INTERNAL MEDICINE | Facility: CLINIC | Age: 80
End: 2020-03-27
Payer: COMMERCIAL

## 2020-03-27 ENCOUNTER — TELEPHONE (OUTPATIENT)
Dept: INTERNAL MEDICINE | Facility: CLINIC | Age: 80
End: 2020-03-27

## 2020-03-27 DIAGNOSIS — N89.8 VAGINAL DISCHARGE: ICD-10-CM

## 2020-03-27 DIAGNOSIS — N94.89 VAGINAL BURNING: Primary | ICD-10-CM

## 2020-03-27 DIAGNOSIS — E11.9 TYPE 2 DIABETES MELLITUS WITHOUT COMPLICATION, WITHOUT LONG-TERM CURRENT USE OF INSULIN (H): ICD-10-CM

## 2020-03-27 PROCEDURE — 99214 OFFICE O/P EST MOD 30 MIN: CPT | Mod: TEL | Performed by: INTERNAL MEDICINE

## 2020-03-27 RX ORDER — FLUCONAZOLE 150 MG/1
150 TABLET ORAL
Qty: 3 TABLET | Refills: 0 | Status: SHIPPED | OUTPATIENT
Start: 2020-03-27 | End: 2022-01-11

## 2020-03-27 RX ORDER — CIPROFLOXACIN 500 MG/1
500 TABLET, FILM COATED ORAL 2 TIMES DAILY
Qty: 6 TABLET | Refills: 0 | Status: SHIPPED | OUTPATIENT
Start: 2020-03-27 | End: 2020-07-06

## 2020-03-27 NOTE — TELEPHONE ENCOUNTER
Patient calling and not better with her vaginal infection. She would like a different medication. Ok to call and lm [phone]

## 2020-03-27 NOTE — PROGRESS NOTES
Subjective     Lindsey Gary is a 79 year old female who presents to clinic today for the following health issues:    HPI   Patient has symptoms of vaginal irritation and burning sensation for weeks.  Patient had yeast infection in the past and was treated for bacterial vaginosis and bacterial infection with penicillin, ciprofloxacin.  Patient was also given Flagyl for bacterial vaginosis.  Patient was given 7 days of ciprofloxacin but her symptoms are still persisting and patient was wondering if that has to be extended.  Patient is also known diabetic and her blood sugars were high few days ago it slowly trending down.  Denies fever, chills.  Patient wants to feel better.    Current Outpatient Medications   Medication Sig Dispense Refill     acetaminophen (TYLENOL) 500 MG tablet Take 1-2 tablets by mouth every morning.       allopurinol (ZYLOPRIM) 300 MG tablet Take 1/2 (one-half) tablet by mouth once daily 45 tablet 0     Ascorbic Acid (VITAMIN C) 500 MG CAPS Take 500 mg by mouth daily       ASPIRIN 81 MG OR TABS 1 tablet daily 100 3     betamethasone, augmented, (DIPROLENE) 0.05 % lotion GINA 10 TO 20 DROPS TOPICALLY AA OF SCALP Q NIGHT UTD       boric acid 600 mg vaginal suppository - PHARMACY TO MIX COMPOUND Place 1 suppository (600 mg) vaginally At Bedtime 21 suppository 11     calcipotriene 0.005 % OINT Apply 1 Application topically as needed  1     calcium-magnesium (CALMAG) 500-250 MG TABS per tablet Take 1 tablet by mouth daily       chlorhexidine (PERIDEX) 0.12 % solution RINSE ONE HALF  OZ 2-3 X D AFTER BREAKFAST BEFORE BEDTIME FOLLOWING BRUSHING AND FLOSSIN       Cholecalciferol (VITAMIN D) 2000 UNITS tablet Take 1 tablet by mouth daily       CINNAMON 500 MG OR CAPS 2 tablets daily  0     ciprofloxacin (CIPRO) 500 MG tablet Take 1 tablet (500 mg) by mouth 2 times daily for 3 days Please continue after finishing 7 days of ciprofloxacin 6 tablet 0     fluconazole (DIFLUCAN) 150 MG tablet Take 1 tablet  "(150 mg) by mouth every 72 hours 3 tablet 0     fluconazole (DIFLUCAN) 150 MG tablet Take 1 tablet (150 mg) by mouth every 3 days 30 tablet 6     furosemide (LASIX) 20 MG tablet Take 1 tablet (20 mg) by mouth daily as needed (edema) 90 tablet 1     gabapentin (NEURONTIN) 300 MG capsule TAKE 2 CAPSULES AT BEDTIME 180 capsule 3     glipiZIDE (GLUCOTROL) 10 MG tablet TAKE 1 TABLET BY MOUTH TWICE DAILY BEFORE MEAL(S) 180 tablet 0     guaiFENesin-codeine (ROBITUSSIN AC) 100-10 MG/5ML solution Take 10 mLs by mouth every 4 hours as needed for cough 240 mL 1     insulin NPH (NOVOLIN N RELION) 100 UNIT/ML vial INJECT 20 UNITS SUBCUTANEOUSLY BEFORE BREAKFAST AND 20 UNITS BEFORE DINNER 20 mL 0     Lactobacillus (PROBIOTIC ACIDOPHILUS) TABS Take 1 tablet by mouth daily       lisinopril (PRINIVIL/ZESTRIL) 10 MG tablet TAKE 1 TABLET BY MOUTH ONCE DAILY 90 tablet 1     metFORMIN (GLUCOPHAGE) 1000 MG tablet TAKE 1 TABLET BY MOUTH TWICE DAILY WITH MEALS 180 tablet 1     POTASSIUM CHLORIDE PO Take 1 tablet by mouth daily       simvastatin (ZOCOR) 40 MG tablet TAKE 1 TABLET BY MOUTH EVERY DAY AT BEDTIME 90 tablet 2     TUMS 500 MG OR CHEW 1 TABLET 3 TIMES PRN 90 0     vitamin E 400 UNITS TABS Take 400 Units by mouth daily       ACCU-CHEK GUIDE test strip TEST BLOOD SUGAR THREE TIMES DAILY  OR AS DIRECTED 300 strip 1     alcohol swab prep pads Use to swab area of injection/beau 6 times per day or as directed. 300 each 3     Blood Glucose Calibration (ACCU-CHEK GUIDE CONTROL) LIQD 1 Application by In Vitro route as needed 1 each 6     blood glucose monitoring (ACCU-CHEK FASTCLIX) lancets TEST BLOOD SUGAR 3 TIMES DAILY AS DIRECTED 300 each 1     Blood Glucose Monitoring Suppl (ACCU-CHEK GUIDE) w/Device KIT TEST BLOOD SUGAR THREE TIMES DAILY 1 kit 0     DROPLET INSULIN SYRINGE 31G X 5/16\" 0.3 ML miscellaneous USE  2 SYRINGES EVERY DAY  OR AS DIRECTED (Patient not taking: Reported on 3/27/2020) 200 each 1     estradiol (VAGIFEM) 10 MCG " TABS vaginal tablet Place 1 tablet (10 mcg) vaginally three times a week May use daily for up to 2 weeks, then 2-3 times weekly. (Patient not taking: Reported on 3/27/2020) 24 tablet 0     penicillin V (VEETID) 250 MG tablet Take 1 tablet (250 mg) by mouth 4 times daily 40 tablet 0     penicillin V (VEETID) 500 MG tablet Take 1 tablet (500 mg) by mouth 2 times daily 14 tablet 0       Reviewed and updated as needed this visit by Provider  Med Hx         Review of Systems   Genitourinary: Positive for vaginal discharge and vaginal pain. Negative for vaginal bleeding.      Assessment & Plan     Lindsey was seen today for vaginal problem.    Diagnoses and all orders for this visit:    Vaginal burning  -     ciprofloxacin (CIPRO) 500 MG tablet; Take 1 tablet (500 mg) by mouth 2 times daily for 3 days Please continue after finishing 7 days of ciprofloxacin  -     fluconazole (DIFLUCAN) 150 MG tablet; Take 1 tablet (150 mg) by mouth every 72 hours  -     PHYSICIAN TELEPHONE EVALUATION 11-20 MIN    Vaginal discharge  -     ciprofloxacin (CIPRO) 500 MG tablet; Take 1 tablet (500 mg) by mouth 2 times daily for 3 days Please continue after finishing 7 days of ciprofloxacin  -     fluconazole (DIFLUCAN) 150 MG tablet; Take 1 tablet (150 mg) by mouth every 72 hours  -     PHYSICIAN TELEPHONE EVALUATION 11-20 MIN    Type 2 diabetes mellitus without complication, without long-term current use of insulin (H)  -     fluconazole (DIFLUCAN) 150 MG tablet; Take 1 tablet (150 mg) by mouth every 72 hours  -     PHYSICIAN TELEPHONE EVALUATION 11-20 MIN    With her recent use of antibiotic I would cover yeast infection.  Will extend ciprofloxacin for 3 more days per patient's request.  We will also give Diflucan to use every 72 hours for 3 doses.  Advised the patient to monitor her symptoms.  If it persist she may need office visit to find out what is going on.     Telephone visit time 11 minutes.    Zhanna Cortez MD  [facility]  [place]

## 2020-03-30 ENCOUNTER — TELEPHONE (OUTPATIENT)
Dept: OBGYN | Facility: CLINIC | Age: 80
End: 2020-03-30

## 2020-03-30 DIAGNOSIS — N94.89 VAGINAL BURNING: Primary | ICD-10-CM

## 2020-03-30 RX ORDER — METRONIDAZOLE 7.5 MG/G
1 GEL VAGINAL AT BEDTIME
Qty: 25 G | Refills: 0 | Status: SHIPPED | OUTPATIENT
Start: 2020-03-30 | End: 2020-07-06

## 2020-03-30 NOTE — TELEPHONE ENCOUNTER
Hi!  Please let her know we can try metrogel, which can be soothing, and will treat bacterial vaginosis.  It is called in.   Dr. Lisa Church, DO    Obstetrics and Gynecology  [facility]

## 2020-03-30 NOTE — TELEPHONE ENCOUNTER
Pt has been trying various treatments for vaginal sx.  Clear thin discharge, intense vaginal burning.    Originally prescribed penicillin vk for this.  Pt is using boric acid, every 3 nights.  Was given cipro, has one pill left.  Was told to take it for another 3 days which she has to .    Also given fluconazole x3 tabs that she has not picked up yet.  Although she has some fluconazole at home and has used 3-5 tabs while at home.    Pt feels like this is a bacterial infection, not yeast.  Has appt with you on April 24th.    Last wound culture done was 11/2019.    Anything you advise?    Yoselyn AVERY R.N.

## 2020-03-30 NOTE — TELEPHONE ENCOUNTER
Spoke with pt.    She will start metrogel.  She will call urs if no improvement at all.    Yoselyn AVERY R.N.

## 2020-03-30 NOTE — TELEPHONE ENCOUNTER
Just to be sure, should she stop all other treatments right now and just use the metrogel?    Pt now mentions that she is also taking oral metronidazole.  She has been taking it for 6 days along with the cipro.  Plus the boric acid every 3rd night.      She also did 3 days of fluconazole (every 72 hours).    Epic takes a long time to load all of the meds, sometimes we have to scroll multiple times to get them all.    Yoselyn AVERY R.N.

## 2020-03-30 NOTE — TELEPHONE ENCOUNTER
"She can add the metrogel. She has long standing vaginal issues   And ok to stay on it. I\"m hoping the metrogel can be therapuetic itself, if this doesn't work I we can do telephone encounter. she is risky age to be out and so would prefer to start with phone visit.   Dr. Lisa Church, DO    Obstetrics and Gynecology  [facility] [place] [facility] [place]         "

## 2020-04-02 RX ORDER — PENICILLIN V POTASSIUM 500 MG/1
500 TABLET, FILM COATED ORAL 3 TIMES DAILY
Qty: 21 TABLET | Refills: 0 | Status: SHIPPED | OUTPATIENT
Start: 2020-04-02 | End: 2020-07-06

## 2020-04-02 RX ORDER — FLUCONAZOLE 150 MG/1
150 TABLET ORAL DAILY
Qty: 3 TABLET | Refills: 0 | Status: SHIPPED | OUTPATIENT
Start: 2020-04-02 | End: 2020-07-06

## 2020-04-02 RX ORDER — TERCONAZOLE 8 MG/G
1 CREAM VAGINAL AT BEDTIME
Qty: 15 G | Refills: 0 | Status: SHIPPED | OUTPATIENT
Start: 2020-04-02 | End: 2020-05-07

## 2020-04-02 RX ORDER — TERCONAZOLE 8 MG/G
1 CREAM VAGINAL AT BEDTIME
Qty: 70 G | Refills: 0 | Status: SHIPPED | OUTPATIENT
Start: 2020-04-02 | End: 2020-04-02

## 2020-04-02 NOTE — TELEPHONE ENCOUNTER
Med re sent to pharmacy with correct amount.    Pt called and notified that meds were sent in.    Mamie Figueroa RN

## 2020-04-02 NOTE — TELEPHONE ENCOUNTER
Pharmacy calling for clarification on Terazol, stating it comes in 20g applicators, how many days is the treatment?  How much total does she need?  It was ordered for a total of 70g.    Mamie Figueroa RN

## 2020-04-02 NOTE — TELEPHONE ENCOUNTER
Called pt, she believes she has had a Penicillin in the past that worked.  (she most recently had Penicillin VK, which did not work, she wants a stronger medication)     Also wondering if she can have a prescription for Terazol and Diflucan (she does diflucan every 3 days) in case she gets a yeast infection.  She states she is prone to them and would like the treatment on hand now to save a future trip to the pharmacy.    [facility] in [place] selected for pharmacy.    Mamie Figueroa RN

## 2020-04-02 NOTE — TELEPHONE ENCOUNTER
rx called in for the patient   Dr. Lisa Church,     Obstetrics and Gynecology  [facility] [place] [facility] [place]

## 2020-04-02 NOTE — TELEPHONE ENCOUNTER
Pt calling back, states she still has burning and itching despite taking the Metrogel.  She also notes some side effects of metallic taste and nausea.    She is wondering if there is something else she can be given without coming in?    She is thinking she may need an oral antibiotic?  She states today she will take the last Cipro she had from another provider and is due to take the Fluconazole today as well.     She notes some slight relief of symptoms but still notes burning, itching and discomfort.    Please advise.    Mamie Figueroa RN

## 2020-04-02 NOTE — TELEPHONE ENCOUNTER
i'm happy to try another, did she had good experience with different antibiotic?   Dr. Lisa Church, DO    Obstetrics and Gynecology  [facility] [place]

## 2020-04-11 ENCOUNTER — TRANSFERRED RECORDS (OUTPATIENT)
Dept: HEALTH INFORMATION MANAGEMENT | Facility: CLINIC | Age: 80
End: 2020-04-11

## 2020-04-16 DIAGNOSIS — E11.9 TYPE 2 DIABETES MELLITUS WITHOUT COMPLICATION, WITHOUT LONG-TERM CURRENT USE OF INSULIN (H): ICD-10-CM

## 2020-04-16 DIAGNOSIS — I10 ESSENTIAL HYPERTENSION, BENIGN: ICD-10-CM

## 2020-04-16 RX ORDER — GLIPIZIDE 10 MG/1
TABLET ORAL
Qty: 180 TABLET | Refills: 0 | Status: SHIPPED | OUTPATIENT
Start: 2020-04-16 | End: 2020-07-27

## 2020-04-16 RX ORDER — LISINOPRIL 10 MG/1
TABLET ORAL
Qty: 90 TABLET | Refills: 0 | Status: SHIPPED | OUTPATIENT
Start: 2020-04-16 | End: 2020-07-08

## 2020-04-16 NOTE — TELEPHONE ENCOUNTER
"Requested Prescriptions   Pending Prescriptions Disp Refills     lisinopril (ZESTRIL) 10 MG tablet [Pharmacy Med Name: Lisinopril 10 MG Oral Tablet] 90 tablet 0     Sig: Take 1 tablet by mouth once daily  Last Written Prescription Date:  10/10/2019  Last Fill Quantity: 90tablet,  # refills: 1   Last Office Visit: 3/27/2020 Diego  Future Office Visit:    Next 5 appointments (look out 90 days)    Apr 28, 2020  9:30 AM CDT  SHORT with Lisa Church,   [facility] [address]  [address]  [facility] [id number] [phone]                ACE Inhibitors (Including Combos) Protocol Passed - 4/16/2020  8:34 AM        Passed - Blood pressure under 140/90 in past 12 months             Passed - Recent (12 mo) or future (30 days) visit within the authorizing provider's specialty     Patient has had an office visit with the authorizing provider or a provider within the authorizing providers department within the previous 12 mos or has a future within next 30 days. See \"Patient Info\" tab in inbasket, or \"Choose Columns\" in Meds & Orders section of the refill encounter.              Passed - Medication is active on med list        Passed - Patient is age 18 or older        Passed - No active pregnancy on record        Passed - Normal serum creatinine on file in past 12 months     Recent Labs   Lab Test 11/26/19  0915   CR 0.92       Ok to refill medication if creatinine is low          Passed - Normal serum potassium on file in past 12 months     Recent Labs   Lab Test 11/26/19  0915   POTASSIUM 5.1             Passed - No positive pregnancy test within past 12 months           glipiZIDE (GLUCOTROL) 10 MG tablet [Pharmacy Med Name: glipiZIDE 10 MG Oral Tablet] 180 tablet 0     Sig: TAKE 1 TABLET BY MOUTH TWICE DAILY BEFORE MEAL(S)  Last Written Prescription Date:  2/11/2020  Last Fill Quantity: 180tablet,  # refills: 0   Last Office Visit: 3/27/2020 " "  Future Office Visit:    Next 5 appointments (look out 90 days)    Apr 28, 2020  9:30 AM CDT  SHORT with Lisa Church DO  [facility] ([facility]) [address]  [address]  [facility] [id number] [phone]                Sulfonylurea Agents Passed - 4/16/2020  8:34 AM        Passed - Patient has documented A1c within the specified period of time.     If HgbA1C is 8 or greater, it needs to be on file within the past 3 months.  If less than 8, must be on file within the past 6 months.     Recent Labs   Lab Test 11/26/19  0915   A1C 6.8*             Passed - Medication is active on med list        Passed - Patient is age 18 or older        Passed - No active pregnancy on record        Passed - Patient has a recent creatinine (normal) within the past 12 mos.     Recent Labs   Lab Test 11/26/19  0915   CR 0.92       Ok to refill medication if creatinine is low          Passed - Patient has not had a positive pregnancy test within the past 12 mos.        Passed - Recent (6 mo) or future (30 days) visit within the authorizing provider's specialty     Patient had office visit in the last 6 months or has a visit in the next 30 days with authorizing provider or within the authorizing provider's specialty.  See \"Patient Info\" tab in inbasket, or \"Choose Columns\" in Meds & Orders section of the refill encounter.                 "

## 2020-04-16 NOTE — TELEPHONE ENCOUNTER
Rxs approved per [facility] protocol.      Claire Sen RN BSN, Pap Tracking

## 2020-04-17 RX ORDER — GLIPIZIDE 10 MG/1
TABLET ORAL
Qty: 180 TABLET | Refills: 0 | OUTPATIENT
Start: 2020-04-17

## 2020-04-17 NOTE — TELEPHONE ENCOUNTER
"Requested Prescriptions   Refused Prescriptions Disp Refills     glipiZIDE (GLUCOTROL) 10 MG tablet [Pharmacy Med Name: glipiZIDE 10 MG Oral Tablet] 180 tablet 0     Sig: TAKE 1 TABLET BY MOUTH TWICE DAILY BEFORE MEAL(S)       Sulfonylurea Agents Passed - 4/17/2020  9:26 AM        Passed - Patient has documented A1c within the specified period of time.     If HgbA1C is 8 or greater, it needs to be on file within the past 3 months.  If less than 8, must be on file within the past 6 months.     Recent Labs   Lab Test 11/26/19  0915   A1C 6.8*             Passed - Medication is active on med list        Passed - Patient is age 18 or older        Passed - No active pregnancy on record        Passed - Patient has a recent creatinine (normal) within the past 12 mos.     Recent Labs   Lab Test 11/26/19  0915   CR 0.92       Ok to refill medication if creatinine is low          Passed - Patient has not had a positive pregnancy test within the past 12 mos.        Passed - Recent (6 mo) or future (30 days) visit within the authorizing provider's specialty     Patient had office visit in the last 6 months or has a visit in the next 30 days with authorizing provider or within the authorizing provider's specialty.  See \"Patient Info\" tab in inbasket, or \"Choose Columns\" in Meds & Orders section of the refill encounter.               Duplicate request was refilled on 04/16/20 for 180.  Ana York RN-Pap Tracking     "

## 2020-04-17 NOTE — TELEPHONE ENCOUNTER
"Requested Prescriptions   Pending Prescriptions Disp Refills     glipiZIDE (GLUCOTROL) 10 MG tablet [Pharmacy Med Name: glipiZIDE 10 MG Oral Tablet] 180 tablet 0     Sig: TAKE 1 TABLET BY MOUTH TWICE DAILY BEFORE MEAL(S)   Last Written Prescription Date:  04/16/2020  Last Fill Quantity: 180,  # refills: 0   Last office visit: 11/26/2020 with prescribing provider:     Future Office Visit:   Next 5 appointments (look out 90 days)    Apr 28, 2020  9:30 AM CDT  SHORT with Lisa Church,   [facility] [address]  [address]  [facility] [address]  [phone]           Sulfonylurea Agents Passed - 4/16/2020  7:31 PM        Passed - Patient has documented A1c within the specified period of time.     If HgbA1C is 8 or greater, it needs to be on file within the past 3 months.  If less than 8, must be on file within the past 6 months.     Recent Labs   Lab Test 11/26/19  0915   A1C 6.8*             Passed - Medication is active on med list        Passed - Patient is age 18 or older        Passed - No active pregnancy on record        Passed - Patient has a recent creatinine (normal) within the past 12 mos.     Recent Labs   Lab Test 11/26/19  0915   CR 0.92       Ok to refill medication if creatinine is low          Passed - Patient has not had a positive pregnancy test within the past 12 mos.        Passed - Recent (6 mo) or future (30 days) visit within the authorizing provider's specialty     Patient had office visit in the last 6 months or has a visit in the next 30 days with authorizing provider or within the authorizing provider's specialty.  See \"Patient Info\" tab in inbasket, or \"Choose Columns\" in Meds & Orders section of the refill encounter.               "

## 2020-04-28 ENCOUNTER — VIRTUAL VISIT (OUTPATIENT)
Dept: OBGYN | Facility: CLINIC | Age: 80
End: 2020-04-28
Payer: COMMERCIAL

## 2020-04-28 VITALS — BODY MASS INDEX: 30.9 KG/M2 | WEIGHT: 180 LBS

## 2020-04-28 DIAGNOSIS — Z87.440 PERSONAL HISTORY OF URINARY TRACT INFECTION: Primary | ICD-10-CM

## 2020-04-28 DIAGNOSIS — E11.9 TYPE 2 DIABETES MELLITUS WITHOUT COMPLICATION, WITHOUT LONG-TERM CURRENT USE OF INSULIN (H): ICD-10-CM

## 2020-04-28 PROCEDURE — 99442 ZZC PHYSICIAN TELEPHONE EVALUATION 11-20 MIN: CPT | Performed by: FAMILY MEDICINE

## 2020-04-28 RX ORDER — DOXYCYCLINE HYCLATE 100 MG
100 TABLET ORAL
COMMUNITY
Start: 2020-04-22 | End: 2020-06-10

## 2020-04-28 NOTE — PROGRESS NOTES
S:  Recurrent vaginal infections,   telephone visit, patient gives verbal consent for telephone visit    Was seen in urgent care vaginal culture done with enterococcus faecium   And is on doxycycline currently, but is feeling better now   Was treated with PCN, which was effective previously, now had vaginal burning   Terrifically at that time, bacteria is resistant to     O: Wt 81.6 kg (180 lb)   BMI 30.90 kg/m         Assessment:  79 year old y/o  presents with following issues:      1. Vaginal infections:  With positive enterococcus faecium   On doxycycline per outside clinic.    2. Hx of UTI:  Prefers urine culture, order placed.    3. Continuous bladder:  Urology didn't have treatment for her at [facility]   Wonders about urogynecology.  Recommend to follow-up with Dr. Forrest.    Info given for Dr. Scales as well   Used to see Dr. Montemayor, but he moved and also didn't have success with treatment.     Time of visit:  11 minutes       Dr. Lisa Church, DO    Obstetrics and Gynecology  [facility] [place] [facility] [place]

## 2020-04-28 NOTE — TELEPHONE ENCOUNTER
Pending Prescriptions:                       Disp   Refills    Alcohol Swabs (B-D SINGLE USE SWABS REGULA*                Sig: USE TO SWAB AREA OF INJECTION/LANCET 6 TIMES PER DAY           OR AS DIRECTED.    Routing refill request to provider for review/approval because:  Drug not on the [facility] refill protocol

## 2020-04-28 NOTE — PROGRESS NOTES
"Lindsey Gary is a 79 year old female who is being evaluated via a billable telephone visit.      The patient has been notified of following:     \"This telephone visit will be conducted via a call between you and your physician/provider. We have found that certain health care needs can be provided without the need for a physical exam.  This service lets us provide the care you need with a short phone conversation.  If a prescription is necessary we can send it directly to your pharmacy.  If lab work is needed we can place an order for that and you can then stop by our lab to have the test done at a later time.    Telephone visits are billed at different rates depending on your insurance coverage. During this emergency period, for some insurers they may be billed the same as an in-person visit.  Please reach out to your insurance provider with any questions.    If during the course of the call the physician/provider feels a telephone visit is not appropriate, you will not be charged for this service.\"    Patient has given verbal consent for Telephone visit?  Yes    How would you like to obtain your AVS? Mail a copy    "

## 2020-04-29 RX ORDER — ISOPROPYL ALCOHOL 0.75 G/1
SWAB TOPICAL
Qty: 600 EACH | Refills: 4 | Status: SHIPPED | OUTPATIENT
Start: 2020-04-29 | End: 2021-07-28

## 2020-05-02 DIAGNOSIS — Z87.440 PERSONAL HISTORY OF URINARY TRACT INFECTION: ICD-10-CM

## 2020-05-02 PROCEDURE — 87086 URINE CULTURE/COLONY COUNT: CPT | Performed by: OBSTETRICS & GYNECOLOGY

## 2020-05-04 LAB
BACTERIA SPEC CULT: NORMAL
SPECIMEN SOURCE: NORMAL

## 2020-05-07 DIAGNOSIS — N94.89 VAGINAL BURNING: ICD-10-CM

## 2020-05-07 RX ORDER — TERCONAZOLE 8 MG/G
CREAM VAGINAL
Qty: 20 G | Refills: 0 | Status: SHIPPED | OUTPATIENT
Start: 2020-05-07 | End: 2020-12-10

## 2020-05-07 NOTE — TELEPHONE ENCOUNTER
Routing refill request to provider for review/approval because:  Drug not active on patient's medication list  Kim Reed RN

## 2020-05-26 DIAGNOSIS — E11.9 TYPE 2 DIABETES MELLITUS WITHOUT COMPLICATION, WITHOUT LONG-TERM CURRENT USE OF INSULIN (H): ICD-10-CM

## 2020-05-26 NOTE — TELEPHONE ENCOUNTER
"Requested Prescriptions   Pending Prescriptions Disp Refills     insulin syringe-needle U-100 (DROPLET INSULIN SYRINGE) 31G X 5/16\" 0.3 ML miscellaneous Last Written Prescription Date:  2/4/2020  Last Fill Quantity: 200 each,  # refills: 0  Last office visit: 11/26/2019 with prescribing provider:  3/27/2020  Future Office Visit:         200 each 1     Sig: Use two syringes daily or as directed.       Diabetic Supplies Protocol Passed - 5/26/2020  3:07 PM        Passed - Medication is active on med list        Passed - Patient is 18 years of age or older        Passed - Recent (6 mo) or future (30 days) visit within the authorizing provider's specialty     Patient had office visit in the last 6 months or has a visit in the next 30 days with authorizing provider.  See \"Patient Info\" tab in inbasket, or \"Choose Columns\" in Meds & Orders section of the refill encounter.               "

## 2020-05-28 RX ORDER — BLOOD SUGAR DIAGNOSTIC
STRIP MISCELLANEOUS
Qty: 200 EACH | Refills: 1 | Status: SHIPPED | OUTPATIENT
Start: 2020-05-28 | End: 2020-11-16

## 2020-05-28 NOTE — TELEPHONE ENCOUNTER
Prescription approved per [facility] Refill Protocol.  Ondina Alston RN

## 2020-05-29 DIAGNOSIS — Z79.4 TYPE 2 DIABETES MELLITUS WITHOUT COMPLICATION, WITH LONG-TERM CURRENT USE OF INSULIN (H): ICD-10-CM

## 2020-05-29 DIAGNOSIS — E11.9 TYPE 2 DIABETES MELLITUS WITHOUT COMPLICATION, WITH LONG-TERM CURRENT USE OF INSULIN (H): ICD-10-CM

## 2020-05-29 DIAGNOSIS — E11.8 TYPE 2 DIABETES MELLITUS WITH COMPLICATION, WITHOUT LONG-TERM CURRENT USE OF INSULIN (H): ICD-10-CM

## 2020-05-29 DIAGNOSIS — E11.9 TYPE 2 DIABETES MELLITUS WITHOUT COMPLICATION, WITHOUT LONG-TERM CURRENT USE OF INSULIN (H): ICD-10-CM

## 2020-05-29 NOTE — TELEPHONE ENCOUNTER
"Requested Prescriptions   Pending Prescriptions Disp Refills     blood glucose (ACCU-CHEK GUIDE) test strip 300 strip 1     Sig: Use to test blood sugar unknown times daily or as directed.  Last Written Prescription Date:  2020  Last Fill Quantity: 300,  # refills: 1   Last office visit: 3/27/2020 with prescribing provider:  Diego Gutierrez Office Visit:                 Diabetic Supplies Protocol Passed - 2020  9:05 AM        Passed - Medication is active on med list        Passed - Patient is 18 years of age or older        Passed - Recent (6 mo) or future (30 days) visit within the authorizing provider's specialty     Patient had office visit in the last 6 months or has a visit in the next 30 days with authorizing provider.  See \"Patient Info\" tab in inbasket, or \"Choose Columns\" in Meds & Orders section of the refill encounter.               Blood Glucose Calibration (ACCU-CHEK GUIDE CONTROL) LIQD 1 each 6     Si Application by In Vitro route as needed  Last Written Prescription Date:  19  Last Fill Quantity: 1,  # refills: 6   Last office visit: 3/27/2020 with prescribing provider:  Chelsea Gutierrez Office Visit:                 Diabetic Supplies Protocol Passed - 2020  9:05 AM        Passed - Medication is active on med list        Passed - Patient is 18 years of age or older        Passed - Recent (6 mo) or future (30 days) visit within the authorizing provider's specialty     Patient had office visit in the last 6 months or has a visit in the next 30 days with authorizing provider.  See \"Patient Info\" tab in inbasket, or \"Choose Columns\" in Meds & Orders section of the refill encounter.               blood glucose monitoring (ACCU-CHEK FASTCLIX) lancets 300 each 1     Sig: TEST BLOOD SUGAR 3 TIMES DAILY AS DIRECTED  Last Written Prescription Date:  2020  Last Fill Quantity: 300,  # refills: 1   Last office visit: 3/27/2020 with prescribing provider:  Diego Gutierrez " "Office Visit:                 Diabetic Supplies Protocol Passed - 5/29/2020  9:05 AM        Passed - Medication is active on med list        Passed - Patient is 18 years of age or older        Passed - Recent (6 mo) or future (30 days) visit within the authorizing provider's specialty     Patient had office visit in the last 6 months or has a visit in the next 30 days with authorizing provider.  See \"Patient Info\" tab in inbasket, or \"Choose Columns\" in Meds & Orders section of the refill encounter.                        "

## 2020-06-01 ENCOUNTER — TELEPHONE (OUTPATIENT)
Dept: INTERNAL MEDICINE | Facility: CLINIC | Age: 80
End: 2020-06-01

## 2020-06-01 RX ORDER — LANCETS
EACH MISCELLANEOUS
Qty: 300 EACH | Refills: 1 | Status: SHIPPED | OUTPATIENT
Start: 2020-06-01 | End: 2021-05-21 | Stop reason: ALTCHOICE

## 2020-06-01 RX ORDER — BLOOD SUGAR DIAGNOSTIC
STRIP MISCELLANEOUS
Qty: 300 STRIP | Refills: 1 | Status: SHIPPED | OUTPATIENT
Start: 2020-06-01 | End: 2020-11-16

## 2020-06-01 RX ORDER — BLOOD GLUCOSE CONTROL HIGH,LOW
1 EACH MISCELLANEOUS PRN
Qty: 1 EACH | Refills: 3 | Status: SHIPPED | OUTPATIENT
Start: 2020-06-01 | End: 2021-05-21 | Stop reason: ALTCHOICE

## 2020-06-01 NOTE — TELEPHONE ENCOUNTER
Patient calling  She read that Metformin from certain vendors are causing people cancer. Patient is concerned. Please advise. Ok to call and alejandra [phone]

## 2020-06-01 NOTE — TELEPHONE ENCOUNTER
Contacted patient, informed her that according to the FDA, only Metformin Extended release pills are affected by the recall at this time. Her prescription is for the immediate release tabs so her medication is not affected by the recall.

## 2020-06-10 ENCOUNTER — TELEPHONE (OUTPATIENT)
Dept: OBGYN | Facility: CLINIC | Age: 80
End: 2020-06-10

## 2020-06-10 DIAGNOSIS — A49.8 ENTEROCOCCUS FAECALIS INFECTION: Primary | ICD-10-CM

## 2020-06-10 RX ORDER — DOXYCYCLINE HYCLATE 100 MG
100 TABLET ORAL 2 TIMES DAILY
Qty: 14 TABLET | Refills: 0 | Status: SHIPPED | OUTPATIENT
Start: 2020-06-10 | End: 2020-07-06

## 2020-06-10 NOTE — TELEPHONE ENCOUNTER
Called in please advise usual dose is 100 mg po twice daily x 7 days, but if she wants to take it 100 mg daily x 14 that is ok too, called in as 100 mg po twice daily x 7 days.   Lisa Church, DO on 6/10/2020 at 2:15 PM

## 2020-06-10 NOTE — TELEPHONE ENCOUNTER
Pt calling, she went to urgent care 5/25 with sx of burning, itching, leg pain, low back pain, nausea.  She had a urinalysis then that she states was normal.  She was told that she had enterococcus.    Was treated with Doxycycline hyclate 100mg daily x 14 days.  Symptoms finally improved with this.    Pt just finished 3 days ago.  Feels her sx are beginning to reoccure.   She wants 14 more days of this if possible.    [facility] [place] selected if Rx can be sent.    Mamie Figueroa RN

## 2020-06-17 DIAGNOSIS — M10.9 GOUT, UNSPECIFIED CAUSE, UNSPECIFIED CHRONICITY, UNSPECIFIED SITE: ICD-10-CM

## 2020-06-18 RX ORDER — ALLOPURINOL 300 MG/1
TABLET ORAL
Qty: 45 TABLET | Refills: 1 | Status: SHIPPED | OUTPATIENT
Start: 2020-06-18 | End: 2020-11-24

## 2020-06-23 ENCOUNTER — TELEPHONE (OUTPATIENT)
Dept: OBGYN | Facility: CLINIC | Age: 80
End: 2020-06-23

## 2020-06-23 DIAGNOSIS — R35.0 URINARY FREQUENCY: Primary | ICD-10-CM

## 2020-06-23 NOTE — TELEPHONE ENCOUNTER
Pt calling with urinary urgency, frequency and dysuria which started last night.   Pt is requesting md to place an order for UA and UC so she can make a lab only appt.     Is this okay?    Chyna HYATT RN

## 2020-06-23 NOTE — TELEPHONE ENCOUNTER
Yes order placed please advise   Dr. Lisa Church, DO    Obstetrics and Gynecology  [facility] [place] [facility] [place]

## 2020-06-24 DIAGNOSIS — R35.0 URINARY FREQUENCY: ICD-10-CM

## 2020-06-24 LAB
ALBUMIN UR-MCNC: 30 MG/DL
APPEARANCE UR: CLEAR
BACTERIA #/AREA URNS HPF: ABNORMAL /HPF
BILIRUB UR QL STRIP: ABNORMAL
COLOR UR AUTO: YELLOW
GLUCOSE UR STRIP-MCNC: NEGATIVE MG/DL
HGB UR QL STRIP: NEGATIVE
HYALINE CASTS #/AREA URNS LPF: ABNORMAL /LPF
KETONES UR STRIP-MCNC: ABNORMAL MG/DL
LEUKOCYTE ESTERASE UR QL STRIP: NEGATIVE
MUCOUS THREADS #/AREA URNS LPF: PRESENT /LPF
NITRATE UR QL: NEGATIVE
PH UR STRIP: 5 PH (ref 5–7)
RBC #/AREA URNS AUTO: ABNORMAL /HPF
SOURCE: ABNORMAL
SP GR UR STRIP: 1.02 (ref 1–1.03)
UROBILINOGEN UR STRIP-ACNC: 0.2 EU/DL (ref 0.2–1)
WBC #/AREA URNS AUTO: ABNORMAL /HPF

## 2020-06-24 PROCEDURE — 81001 URINALYSIS AUTO W/SCOPE: CPT | Performed by: FAMILY MEDICINE

## 2020-06-24 PROCEDURE — 87086 URINE CULTURE/COLONY COUNT: CPT | Performed by: FAMILY MEDICINE

## 2020-06-25 ENCOUNTER — TELEPHONE (OUTPATIENT)
Dept: OBGYN | Facility: CLINIC | Age: 80
End: 2020-06-25

## 2020-06-25 DIAGNOSIS — R35.0 URINARY FREQUENCY: Primary | ICD-10-CM

## 2020-06-25 LAB
BACTERIA SPEC CULT: NO GROWTH
SPECIMEN SOURCE: NORMAL

## 2020-06-25 RX ORDER — CIPROFLOXACIN 250 MG/1
250 TABLET, FILM COATED ORAL 2 TIMES DAILY
Qty: 14 TABLET | Refills: 0 | Status: SHIPPED | OUTPATIENT
Start: 2020-06-25 | End: 2020-07-02

## 2020-06-25 NOTE — TELEPHONE ENCOUNTER
Spoke with pt and advised of urine results.   She would like antibiotics for at least 7 days and would like a repeat culture when done to make sure infection is gone.     Pharmacy selected      Please advise.   Kim Reed RN

## 2020-07-03 ENCOUNTER — TRANSFERRED RECORDS (OUTPATIENT)
Dept: HEALTH INFORMATION MANAGEMENT | Facility: CLINIC | Age: 80
End: 2020-07-03

## 2020-07-06 ENCOUNTER — OFFICE VISIT (OUTPATIENT)
Dept: OBGYN | Facility: CLINIC | Age: 80
End: 2020-07-06
Payer: COMMERCIAL

## 2020-07-06 ENCOUNTER — TELEPHONE (OUTPATIENT)
Dept: OBGYN | Facility: CLINIC | Age: 80
End: 2020-07-06

## 2020-07-06 VITALS
SYSTOLIC BLOOD PRESSURE: 128 MMHG | WEIGHT: 188 LBS | BODY MASS INDEX: 32.1 KG/M2 | DIASTOLIC BLOOD PRESSURE: 62 MMHG | HEIGHT: 64 IN

## 2020-07-06 DIAGNOSIS — N39.8 VOIDING DYSFUNCTION: ICD-10-CM

## 2020-07-06 DIAGNOSIS — Z87.42 HISTORY OF VAGINAL INFECTION: ICD-10-CM

## 2020-07-06 DIAGNOSIS — N95.2 VAGINAL ATROPHY: Primary | ICD-10-CM

## 2020-07-06 LAB
SPECIMEN SOURCE: ABNORMAL
WET PREP SPEC: ABNORMAL

## 2020-07-06 PROCEDURE — 87102 FUNGUS ISOLATION CULTURE: CPT | Performed by: OBSTETRICS & GYNECOLOGY

## 2020-07-06 PROCEDURE — 99215 OFFICE O/P EST HI 40 MIN: CPT | Performed by: OBSTETRICS & GYNECOLOGY

## 2020-07-06 PROCEDURE — 87109 MYCOPLASMA: CPT | Performed by: OBSTETRICS & GYNECOLOGY

## 2020-07-06 PROCEDURE — 87205 SMEAR GRAM STAIN: CPT | Performed by: OBSTETRICS & GYNECOLOGY

## 2020-07-06 PROCEDURE — 87210 SMEAR WET MOUNT SALINE/INK: CPT | Performed by: OBSTETRICS & GYNECOLOGY

## 2020-07-06 PROCEDURE — 87070 CULTURE OTHR SPECIMN AEROBIC: CPT | Performed by: OBSTETRICS & GYNECOLOGY

## 2020-07-06 RX ORDER — ESTRADIOL 1 MG/1
1 TABLET ORAL
Qty: 26 TABLET | Refills: 0 | Status: SHIPPED | OUTPATIENT
Start: 2020-07-06 | End: 2020-07-06

## 2020-07-06 RX ORDER — ESTRADIOL 1 MG/1
1 TABLET ORAL
Qty: 26 TABLET | Refills: 0 | Status: SHIPPED | OUTPATIENT
Start: 2020-07-06 | End: 2020-12-10

## 2020-07-06 ASSESSMENT — MIFFLIN-ST. JEOR: SCORE: 1322.76

## 2020-07-06 NOTE — NURSING NOTE
"Chief Complaint   Patient presents with     Vaginal Problem     Ongoing vaginal infections- wants yeast culture        Initial /62 (BP Location: Right arm, Patient Position: Sitting, Cuff Size: Adult Large)   Ht 1.626 m (5' 4\")   Wt 85.3 kg (188 lb)   BMI 32.27 kg/m   Estimated body mass index is 32.27 kg/m  as calculated from the following:    Height as of this encounter: 1.626 m (5' 4\").    Weight as of this encounter: 85.3 kg (188 lb).  BP completed using cuff size: large    Questioned patient about current smoking habits.  Pt. has never smoked.          The following HM Due: NONE    David Delong CMA                "

## 2020-07-06 NOTE — TELEPHONE ENCOUNTER
Spoke with lab regarding the Mycoplasma & Ureaplasma cultures. They state that you should culture both. The Mycoplasma does not offer a PCR for vaginal swabs, only for penumoniae. The Ureaplasma does offer PCR, but it is very expensive and [place] infection prevention does not recommend getting that test unless you really want it.     David Delong, [facility]

## 2020-07-07 DIAGNOSIS — I10 ESSENTIAL HYPERTENSION, BENIGN: ICD-10-CM

## 2020-07-07 LAB
GRAM STN SPEC: ABNORMAL
Lab: ABNORMAL
SPECIMEN SOURCE: ABNORMAL

## 2020-07-07 NOTE — PROGRESS NOTES
Vaginal burning  Pelvic pressure   Voiding dysfunction    Ms. Lidnsey Gary 79 year old (hx of  x 2) who presents for long standing vulvar/vaginal irritation that she describes a burning, pelvic pressure and voiding dysfunction.   This has been an ongoing saga for many years but it started to worsen about 6 months ago. Given the chronicity of this condition, patient is not the best historian in chronicling her issues in terms of when things happened. At many moments during this visit, the recollection of her complaints/issues took on a tangential route.   At any rate, her issues began to worsen when she had a vaginal culture performed at an urgent care center about 3-4 months ago that returned positive for Enterococcus faecalis. She underwent treatment for this completing a course of doxycycline and noted improvement of her symptoms. However, shortly after, she reports recurrence of symptoms that she primarily describes as burning symptoms of her vagina and vulva, as well as pelvic pressure symptoms. Since then, she has been treated empirically according to her due to the COVID 19 pandemic for yeast infection, bacterial vaginitis and urinary tract infections.   She also has a long standing history of voiding dysfunction which she states she has sought medical attention for in the past and no one has been able to help her with. Her voiding issues include incomplete bladder emptying, initial urinary retention as she begins voiding and incontinence that she describes as dampness she findings on her underpants without knowing that it happened.   She also states that she has been treated for vaginal atrophy recently but that she discontinued Vagifem tablets as they were expensive. She had a total hysterectomy, bilateral salpingo-oophorectomy when she was 51 years of age and acknowledges a history of hormone replacement therapy for a period of time. She then transitioned to topical estrogen cream for a number of years  before discontinuing around 1-2 years ago. She is sexually active but denies dyspareunia and postcoital bleeding.   Lastly, she admits to administering boric acid twice weekly for the last 2-3 years on a sporadic basis. She was prescribed boric acid many years ago for an episode of recurrent bacterial vaginitis.   Also, of note, she had a vulvar biopsy performed about 10 years ago which ruled out lichen sclerosis.      Past Medical History:   Diagnosis Date     Arthritis      Essential hypertension, benign      Mixed hyperlipidemia      Pain in joint, ankle and foot     gout     Type II or unspecified type diabetes mellitus without mention of complication, not stated as uncontrolled     Diabetes mellitus       Past Surgical History:   Procedure Laterality Date     C NONSPECIFIC PROCEDURE      Breast biopsies        C NONSPECIFIC PROCEDURE      Symptomatic left thigh lipomas        C NONSPECIFIC PROCEDURE  6/1/09    pubovaginal sling     COLONOSCOPY       COLONOSCOPY N/A 12/17/2014    Procedure: COMBINED COLONOSCOPY, SINGLE OR MULTIPLE BIOPSY/POLYPECTOMY BY BIOPSY;  Surgeon: Chuy Staton MD;  Location: [facility]     HYSTERECTOMY, PAP NO LONGER INDICATED       HYSTERECTOMY, JAIME  1991    fibroid     SURGICAL HISTORY OF -   10/28/2015    right partial knee        Current Outpatient Medications   Medication     acetaminophen (TYLENOL) 500 MG tablet     Alcohol Swabs (B-D SINGLE USE SWABS REGULAR) PADS     allopurinol (ZYLOPRIM) 300 MG tablet     Ascorbic Acid (VITAMIN C) 500 MG CAPS     ASPIRIN 81 MG OR TABS     betamethasone, augmented, (DIPROLENE) 0.05 % lotion     blood glucose (ACCU-CHEK GUIDE) test strip     Blood Glucose Calibration (ACCU-CHEK GUIDE CONTROL) LIQD     blood glucose monitoring (ACCU-CHEK FASTCLIX) lancets     Blood Glucose Monitoring Suppl (ACCU-CHEK GUIDE) w/Device KIT     boric acid 600 mg vaginal suppository - PHARMACY TO MIX COMPOUND     calcipotriene 0.005 % OINT     calcium-magnesium (CALMAG)  "500-250 MG TABS per tablet     chlorhexidine (PERIDEX) 0.12 % solution     Cholecalciferol (VITAMIN D) 2000 UNITS tablet     CINNAMON 500 MG OR CAPS     estradiol (ESTRACE) 1 MG tablet     estradiol (VAGIFEM) 10 MCG TABS vaginal tablet     fluconazole (DIFLUCAN) 150 MG tablet     fluconazole (DIFLUCAN) 150 MG tablet     furosemide (LASIX) 20 MG tablet     gabapentin (NEURONTIN) 300 MG capsule     glipiZIDE (GLUCOTROL) 10 MG tablet     guaiFENesin-codeine (ROBITUSSIN AC) 100-10 MG/5ML solution     insulin NPH (NOVOLIN N RELION) 100 UNIT/ML vial     insulin syringe-needle U-100 (DROPLET INSULIN SYRINGE) 31G X 5/16\" 0.3 ML miscellaneous     Lactobacillus (PROBIOTIC ACIDOPHILUS) TABS     lisinopril (ZESTRIL) 10 MG tablet     metFORMIN (GLUCOPHAGE) 1000 MG tablet     POTASSIUM CHLORIDE PO     simvastatin (ZOCOR) 40 MG tablet     terconazole (TERAZOL 3) 0.8 % vaginal cream     triamcinolone (ARISTOCORT HP) 0.5 % external cream     TUMS 500 MG OR CHEW     vitamin E 400 UNITS TABS     No current facility-administered medications for this visit.        Allergies   Allergen Reactions     Augmentin      Tongue swelling and rash     Can take PCN K without reaction      Sulfa Drugs      Tongue swelling and rash       Social History     Tobacco Use     Smoking status: Never Smoker     Smokeless tobacco: Never Used   Substance Use Topics     Alcohol use: No     Alcohol/week: 0.0 standard drinks     Drug use: No       Family History   Problem Relation Age of Onset     Cancer Mother         colon ca survivor     Cancer - colorectal Mother      Cerebrovascular Disease Father        C: NEGATIVE for fever, chills, change in weight  HEENT: NEGATIVE for visual changes, runny nose, epistaxis, ear pain, tinnitus, tooth ache, sore throat, difficulty with swallowing, sinus pain  R: NEGATIVE for significant cough or SOB  CV: NEGATIVE for chest pain, palpitations or peripheral edema  BREAST: NEGATIVE For soreness, pain, lumps or nipple " "discharge  GI: NEGATIVE for nausea, abdominal pain, heartburn, or change in bowel habits  : NEGATIVE for frequency, dysuria, hematuria, vaginal discharge, or irregular bleeding  Neuro: NEGATIVE for numbness, tingling, focal weakness, or headache  INT: NEGATIVE for rashes, lesions or pruritis   PSYCH: NEGATIVE for anxiety, depression, or halluciinations    Exam:   /62 (BP Location: Right arm, Patient Position: Sitting, Cuff Size: Adult Large)   Ht 1.626 m (5' 4\")   Wt 85.3 kg (188 lb)   BMI 32.27 kg/m    General Appearance: Well nourished, well developed female, NAD, AOx3  Neurological: Mental Status Normal and Station and Gait Normal   Skin: Normal skin turgor  HEET: Atraumatic, normocephalic, EOMI  Neck: Supple,no adenopathy,thyroid normal  Lungs: Good respiratory effort  Abdomen: Soft, NT, ND, no masses  Pelvic: External genitalia is remarkable for mild uniform erythema of her labia majora, complete merging of labia majora and minora. No external lesions, normal hair distribution, no adenopathy. Speculum exam reveals atrophic vaginal epithelium with no abnormal discharge. Vaginal cuff  appears smooth, pink, with no visible lesions. Grade 1 cystocele is appreciate and hypermobility of the urethra appreciated on valsalva. Bimanual exam noted no obvious pelvic masses and no tenderness elicited.    Extremities: No clubbing, no cyanosis and no edema    A/P:   1) History of vaginal infections  -- wet prep, yeast and bacterial cultures as well as Mycoplasma and Ureaplasma cultures collected  -- will treat accordingly when results return    2) Vaginal atrophy  -- estrace tablet prescribed to be administered vaginally twice weekly as this alternative is more affordable than vagifem tablet  -- suspect that a majority of her symptoms are a sequale of this condition, therefore, reassured patient that of likely resolution of vaginal symptoms with this treatment     3) Voiding dysfunction  -- again, I suspect a lot " of her symptoms are atrophy related and will await to assess for efficacy of treatment above  -- if symptoms are ongoing or do not resolve, then advised patient to go to Uro-gynecologist    Total time spent was 40 minutes; greater than 50% of time was spent in counseling and/or coordination of care for the above listed diagnoses, not including time spent on the procedure.     Roxann Marr MD  [facility]

## 2020-07-08 DIAGNOSIS — R35.0 URINARY FREQUENCY: ICD-10-CM

## 2020-07-08 LAB
ALBUMIN UR-MCNC: ABNORMAL MG/DL
APPEARANCE UR: CLEAR
BILIRUB UR QL STRIP: NEGATIVE
COLOR UR AUTO: YELLOW
GLUCOSE UR STRIP-MCNC: NEGATIVE MG/DL
HGB UR QL STRIP: NEGATIVE
KETONES UR STRIP-MCNC: NEGATIVE MG/DL
LEUKOCYTE ESTERASE UR QL STRIP: NEGATIVE
NITRATE UR QL: NEGATIVE
PH UR STRIP: 5.5 PH (ref 5–7)
RBC #/AREA URNS AUTO: ABNORMAL /HPF
SOURCE: ABNORMAL
SP GR UR STRIP: 1.01 (ref 1–1.03)
UROBILINOGEN UR STRIP-ACNC: 0.2 EU/DL (ref 0.2–1)
WBC #/AREA URNS AUTO: ABNORMAL /HPF

## 2020-07-08 PROCEDURE — 81001 URINALYSIS AUTO W/SCOPE: CPT | Performed by: FAMILY MEDICINE

## 2020-07-08 RX ORDER — LISINOPRIL 10 MG/1
TABLET ORAL
Qty: 90 TABLET | Refills: 1 | Status: SHIPPED | OUTPATIENT
Start: 2020-07-08 | End: 2020-12-01

## 2020-07-09 ENCOUNTER — TELEPHONE (OUTPATIENT)
Dept: OBGYN | Facility: CLINIC | Age: 80
End: 2020-07-09

## 2020-07-09 DIAGNOSIS — B96.89 BACTERIAL VAGINITIS: Primary | ICD-10-CM

## 2020-07-09 DIAGNOSIS — N76.0 BACTERIAL VAGINITIS: Primary | ICD-10-CM

## 2020-07-09 RX ORDER — METRONIDAZOLE 500 MG/1
500 TABLET ORAL 2 TIMES DAILY
Qty: 14 TABLET | Refills: 0 | Status: SHIPPED | OUTPATIENT
Start: 2020-07-09 | End: 2020-07-16

## 2020-07-09 NOTE — TELEPHONE ENCOUNTER
I will prescribe her flagyl oral course BID x 7 days for the presence of clue cells which would indicate possible bacterial vaginitis infection.   But as discussed in clinic, I would re-iterate to her to stop taking boric acid and to be diligent with her estrace tablet administration to address her vaginal atrophy.   We will await the finalization of the other test cultures to determine if additional treatment is indicated.     Roxann Marr MD

## 2020-07-09 NOTE — TELEPHONE ENCOUNTER
"Patient calling regarding lab results.  Some results \"preliminary\"  \"really would like to get going on something\"  Pharmacy selected.  Please review and advise.  Kim Reed RN    "

## 2020-07-11 NOTE — RESULT ENCOUNTER NOTE
Inform patient that her cultures have all return negative. As discussed with patient in clinic, she is to continue on the topical estrogen therapy. And please tell her to not go back to self administering the boric acid.     Roxann Marr MD

## 2020-07-13 ENCOUNTER — TELEPHONE (OUTPATIENT)
Dept: INTERNAL MEDICINE | Facility: CLINIC | Age: 80
End: 2020-07-13

## 2020-07-13 DIAGNOSIS — Z96.659 STATUS POST TOTAL KNEE REPLACEMENT, UNSPECIFIED LATERALITY: Primary | ICD-10-CM

## 2020-07-13 LAB
Lab: NORMAL
SPECIMEN SOURCE: NORMAL
YEAST SPEC QL CULT: NORMAL

## 2020-07-13 RX ORDER — AZITHROMYCIN 250 MG/1
500 TABLET, FILM COATED ORAL DAILY
Qty: 2 TABLET | Refills: 0 | Status: SHIPPED | OUTPATIENT
Start: 2020-07-13 | End: 2020-07-14

## 2020-07-13 NOTE — TELEPHONE ENCOUNTER
Patient states she had both kneed replaced an d needs to be pre medicated for dental work.  Patient states that her dental appointment is tomorrow.  Please send RX.

## 2020-07-13 NOTE — TELEPHONE ENCOUNTER
Patient advised prescription sent to the pharmacy for Azithromycin to take before dental appointment.

## 2020-07-14 ENCOUNTER — TELEPHONE (OUTPATIENT)
Dept: INTERNAL MEDICINE | Facility: CLINIC | Age: 80
End: 2020-07-14

## 2020-07-14 DIAGNOSIS — Z79.2 PROPHYLACTIC ANTIBIOTIC: ICD-10-CM

## 2020-07-14 DIAGNOSIS — Z96.659 STATUS POST TOTAL KNEE REPLACEMENT, UNSPECIFIED LATERALITY: Primary | ICD-10-CM

## 2020-07-14 RX ORDER — CEPHALEXIN 500 MG/1
2000 CAPSULE ORAL ONCE
Qty: 4 CAPSULE | Refills: 1 | Status: SHIPPED | OUTPATIENT
Start: 2020-07-14 | End: 2022-03-15

## 2020-07-14 NOTE — TELEPHONE ENCOUNTER
Patient advised of Dinorah's recommendations. She is upset that she has to  another medication from the pharmacy. Offered to have patient speak to clinic administrator, but she declined.    Called [facility] and spoke to Norma, she confirms they received Keflex prescription and are preparing this for patient. Informed Norma that the Keflex is being ordered for patient to take instead of the Azithromycin that was ordered yesterday due to potential interaction between Azithromycin and Metronidazole.

## 2020-07-14 NOTE — TELEPHONE ENCOUNTER
Patient was prescribed azithromycin for upcoming dental work and she is wanting to know if Dr Townsend knows she is also taking Flagyl for a vaginal infection which she started last week prescribed by her OBGYN downstairs. Patient is questioning if this is azithromycin is ok to take along with flagyl. Call her back to advise at [phone] (home)

## 2020-07-14 NOTE — TELEPHONE ENCOUNTER
Would be better to do keflex 2000 mg once prior to dental work   There are some heart implications with z pack and flagyl- she would probably be ok but to be safe would change medication

## 2020-07-15 ENCOUNTER — TELEPHONE (OUTPATIENT)
Dept: OBGYN | Facility: CLINIC | Age: 80
End: 2020-07-15

## 2020-07-15 DIAGNOSIS — N89.8 VAGINAL ITCHING: Primary | ICD-10-CM

## 2020-07-15 LAB
BACTERIA SPEC CULT: NORMAL
BACTERIA SPEC CULT: NORMAL
Lab: NORMAL
Lab: NORMAL
SPECIMEN SOURCE: NORMAL
SPECIMEN SOURCE: NORMAL

## 2020-07-15 NOTE — TELEPHONE ENCOUNTER
Pt still having itching and discomfort, vaginal burning.    She has one more day of flagyl.  Asks if she should try something else?  Clindamycin?  She does sometimes get yeast inf after abx so she also would like diflucan as well (5-6).    Yoselyn AVERY R.N.

## 2020-07-16 RX ORDER — FLUCONAZOLE 150 MG/1
150 TABLET ORAL DAILY
Qty: 3 TABLET | Refills: 0 | Status: SHIPPED | OUTPATIENT
Start: 2020-07-16 | End: 2020-07-19

## 2020-07-16 RX ORDER — CLINDAMYCIN HCL 300 MG
300 CAPSULE ORAL 2 TIMES DAILY
Qty: 14 CAPSULE | Refills: 0 | Status: SHIPPED | OUTPATIENT
Start: 2020-07-16 | End: 2020-07-23

## 2020-07-16 NOTE — TELEPHONE ENCOUNTER
Okay, I have prescribed her the diflucan for her presumed yeast infection, although I skeptical about this given that she had negative yeast cultures. But I have prescribed it for her nonetheless. Hopefully, it provides relief of her itching.     If this does not work, then we may have to empirically start her on an steroid ointment such as clobetasol to see if that would provide relief of her itching. Her clinical exam did not appear to be consistent with a diagnosis of dermatitis and she had vulvar biopsy done many years ago that ruled out lichen sclerosis. But if it leads to that, then I can prescribe that as well. However, lets first see if the diflucan will help her or not. And again, I would reiterate that she still continue taking the estrogen vaginal therapy and remain diligent in taking it.     Roxann Marr MD

## 2020-07-16 NOTE — TELEPHONE ENCOUNTER
She actually wants a different prescription rather than flagyl, she was hoping to try clindamycin.    The diflucan was for after a different tx for BV.    Yoselyn AVERY R.N.

## 2020-07-23 ENCOUNTER — HOSPITAL ENCOUNTER (OUTPATIENT)
Dept: MAMMOGRAPHY | Facility: CLINIC | Age: 80
Discharge: HOME | End: 2020-07-23
Attending: NURSE PRACTITIONER | Admitting: NURSE PRACTITIONER
Payer: COMMERCIAL

## 2020-07-23 DIAGNOSIS — Z12.31 VISIT FOR SCREENING MAMMOGRAM: ICD-10-CM

## 2020-07-23 PROCEDURE — 77067 SCR MAMMO BI INCL CAD: CPT

## 2020-07-24 ENCOUNTER — TELEPHONE (OUTPATIENT)
Dept: INTERNAL MEDICINE | Facility: CLINIC | Age: 80
End: 2020-07-24

## 2020-07-24 DIAGNOSIS — Z12.11 SPECIAL SCREENING FOR MALIGNANT NEOPLASMS, COLON: Primary | ICD-10-CM

## 2020-07-24 DIAGNOSIS — E11.9 TYPE 2 DIABETES MELLITUS WITHOUT COMPLICATION, WITHOUT LONG-TERM CURRENT USE OF INSULIN (H): ICD-10-CM

## 2020-07-24 NOTE — TELEPHONE ENCOUNTER
Colonoscopy last done 12/2014.     Order pended. Routed to covering provider - Dr. Brady, to review.

## 2020-07-24 NOTE — TELEPHONE ENCOUNTER
Patient would like a referral for a colonoscopy. She was not able to get one last year due to being ill. She has a family history of colon cancer. Call her to advise [phone] (home)

## 2020-07-27 RX ORDER — GLIPIZIDE 10 MG/1
TABLET ORAL
Qty: 180 TABLET | Refills: 0 | Status: SHIPPED | OUTPATIENT
Start: 2020-07-27 | End: 2020-10-21

## 2020-07-27 NOTE — TELEPHONE ENCOUNTER
Pending Prescriptions:                       Disp   Refills    glipiZIDE (GLUCOTROL) 10 MG tablet [Pharma*180 ta*0        Sig: TAKE 1 TABLET BY MOUTH TWICE DAILY BEFORE MEAL(S)    Routing refill request to provider for review/approval because:  Patient fails protocol       Lab Results   Component Value Date    A1C 6.8 11/26/2019    A1C 7.1 03/08/2019    A1C 7.0 12/31/2018    A1C 8.3 08/09/2018    A1C 9.0 01/02/2018

## 2020-07-28 NOTE — TELEPHONE ENCOUNTER
The patient is due for wellness exam and diabetes follow-up.  Please schedule it with Dinorah Wong NP.  Colonoscopy will be discussed at that appointment

## 2020-07-28 NOTE — TELEPHONE ENCOUNTER
Called patient and advised of order. Also advised patient of 7/23/20 mammogram results per request. Patient will call back with further concerns.

## 2020-07-29 DIAGNOSIS — Z11.59 ENCOUNTER FOR SCREENING FOR OTHER VIRAL DISEASES: Primary | ICD-10-CM

## 2020-08-03 DIAGNOSIS — Z11.59 ENCOUNTER FOR SCREENING FOR OTHER VIRAL DISEASES: ICD-10-CM

## 2020-08-03 PROCEDURE — U0003 INFECTIOUS AGENT DETECTION BY NUCLEIC ACID (DNA OR RNA); SEVERE ACUTE RESPIRATORY SYNDROME CORONAVIRUS 2 (SARS-COV-2) (CORONAVIRUS DISEASE [COVID-19]), AMPLIFIED PROBE TECHNIQUE, MAKING USE OF HIGH THROUGHPUT TECHNOLOGIES AS DESCRIBED BY CMS-2020-01-R: HCPCS | Performed by: INTERNAL MEDICINE

## 2020-08-04 ENCOUNTER — TELEPHONE (OUTPATIENT)
Dept: INTERNAL MEDICINE | Facility: CLINIC | Age: 80
End: 2020-08-04

## 2020-08-04 LAB
SARS-COV-2 RNA SPEC QL NAA+PROBE: NOT DETECTED
SPECIMEN SOURCE: NORMAL

## 2020-08-04 NOTE — TELEPHONE ENCOUNTER
Patient advised of recommendations from Dinorah.     Patient has prescription from orthopedics for Meloxicam, asks if she should hold this as well the day of the colonoscopy. Informed patient this medication would not cause any side effects if she skipped this the morning of the colonoscopy.     Patient verbalizes understanding.   
Patient calling  She is having a colonoscopy on 8/8/2020 and wants to know if she can take her medications as she normally does. Ok to call and alejandra [phone]  
Would hold all her vitamins the morning of   Also hold meformin and glipizide morning of - could take later in day when eating or just skip the day   Do not take monring NPH insulin - not eating   Lisinopril only medication I would think she should take   
20.86

## 2020-08-05 ENCOUNTER — TELEPHONE (OUTPATIENT)
Dept: INTERNAL MEDICINE | Facility: CLINIC | Age: 80
End: 2020-08-05

## 2020-08-05 NOTE — TELEPHONE ENCOUNTER
Could do 10 units NPH twice daily and   skip oral diabetic medication for the day and day of procedure     She can skip simvastatin at bedtime   If she is not wanting to take gabapentin at bedtime she can skip it for one night

## 2020-08-05 NOTE — TELEPHONE ENCOUNTER
Answered in phone note yesterday     Would hold all her vitamins the morning of   Also hold meformin and glipizide morning of - could take later in day when eating or just skip the day   Do not take monring NPH insulin - not eating   Lisinopril only medication I would think she should take

## 2020-08-05 NOTE — TELEPHONE ENCOUNTER
Left a detailed message per patient's OK informing patient of message below. Advised patient to call back with further concerns.

## 2020-08-05 NOTE — TELEPHONE ENCOUNTER
Should patient take 20 units of NPH insulin tomorrow AM? Patient will be on clear liquid diet tomorrow during the day, and will start prep drink at 4 pm. Patient intends to eat jello and has soft drinks for tomorrow before 4 pm.    Patient wondering if she should take Glipizide, Metformin, and 20 units of NPH insulin tomorrow at 5:30 pm?    Patient also wondering if she should take simvastatin and gabapentin before bed?    Ok to leave a detailed message at [phone]

## 2020-08-05 NOTE — TELEPHONE ENCOUNTER
Patient calling  She has a colonoscopy this Friday. The instructions state to not take any medication an hour before starting the solution to empty her bowels. Patient takes her insulin and wants to know what she should do. Ok to call and alejandra [phone]

## 2020-08-07 ENCOUNTER — HOSPITAL ENCOUNTER (OUTPATIENT)
Facility: CLINIC | Age: 80
Discharge: HOME | End: 2020-08-07
Attending: INTERNAL MEDICINE | Admitting: INTERNAL MEDICINE
Payer: COMMERCIAL

## 2020-08-07 VITALS
SYSTOLIC BLOOD PRESSURE: 120 MMHG | OXYGEN SATURATION: 97 % | TEMPERATURE: 99.3 F | HEART RATE: 81 BPM | RESPIRATION RATE: 20 BRPM | HEIGHT: 64 IN | DIASTOLIC BLOOD PRESSURE: 69 MMHG | BODY MASS INDEX: 32.1 KG/M2 | WEIGHT: 188 LBS

## 2020-08-07 LAB
COLONOSCOPY: NORMAL
GLUCOSE BLDC GLUCOMTR-MCNC: 204 MG/DL (ref 70–99)

## 2020-08-07 PROCEDURE — 45378 DIAGNOSTIC COLONOSCOPY: CPT | Performed by: INTERNAL MEDICINE

## 2020-08-07 PROCEDURE — 99153 MOD SED SAME PHYS/QHP EA: CPT | Performed by: INTERNAL MEDICINE

## 2020-08-07 PROCEDURE — 25000128 H RX IP 250 OP 636: Performed by: INTERNAL MEDICINE

## 2020-08-07 PROCEDURE — G0105 COLORECTAL SCRN; HI RISK IND: HCPCS | Performed by: INTERNAL MEDICINE

## 2020-08-07 PROCEDURE — 82962 GLUCOSE BLOOD TEST: CPT

## 2020-08-07 PROCEDURE — G0500 MOD SEDAT ENDO SERVICE >5YRS: HCPCS | Performed by: INTERNAL MEDICINE

## 2020-08-07 RX ORDER — NALOXONE HYDROCHLORIDE 0.4 MG/ML
.1-.4 INJECTION, SOLUTION INTRAMUSCULAR; INTRAVENOUS; SUBCUTANEOUS
Status: CANCELLED | OUTPATIENT
Start: 2020-08-07 | End: 2020-08-08

## 2020-08-07 RX ORDER — ONDANSETRON 2 MG/ML
4 INJECTION INTRAMUSCULAR; INTRAVENOUS EVERY 6 HOURS PRN
Status: CANCELLED | OUTPATIENT
Start: 2020-08-07

## 2020-08-07 RX ORDER — ONDANSETRON 2 MG/ML
4 INJECTION INTRAMUSCULAR; INTRAVENOUS
Status: DISCONTINUED | OUTPATIENT
Start: 2020-08-07 | End: 2020-08-07 | Stop reason: HOSPADM

## 2020-08-07 RX ORDER — ONDANSETRON 4 MG/1
4 TABLET, ORALLY DISINTEGRATING ORAL EVERY 6 HOURS PRN
Status: CANCELLED | OUTPATIENT
Start: 2020-08-07

## 2020-08-07 RX ORDER — FENTANYL CITRATE 50 UG/ML
INJECTION, SOLUTION INTRAMUSCULAR; INTRAVENOUS PRN
Status: DISCONTINUED | OUTPATIENT
Start: 2020-08-07 | End: 2020-08-07 | Stop reason: HOSPADM

## 2020-08-07 RX ORDER — LIDOCAINE 40 MG/G
CREAM TOPICAL
Status: DISCONTINUED | OUTPATIENT
Start: 2020-08-07 | End: 2020-08-07 | Stop reason: HOSPADM

## 2020-08-07 RX ORDER — FLUMAZENIL 0.1 MG/ML
0.2 INJECTION, SOLUTION INTRAVENOUS
Status: CANCELLED | OUTPATIENT
Start: 2020-08-07 | End: 2020-08-07

## 2020-08-07 ASSESSMENT — MIFFLIN-ST. JEOR: SCORE: 1317.76

## 2020-08-07 NOTE — H&P
Pre-Endoscopy History and Physical     Lindsey Gary MRN# [id number]   YOB: 1940 Age: 80 year old     Date of Procedure: 8/7/2020  Primary care provider: Dinorah Wong  Type of Endoscopy: Colonoscopy with possible biopsy, possible polypectomy  Reason for Procedure: history of polyp  Type of Anesthesia Anticipated: Conscious Sedation    HPI:    Lindsey is a 80 year old female who will be undergoing the above procedure.      A history and physical has been performed. The patient's medications and allergies have been reviewed. The risks and benefits of the procedure and the sedation options and risks were discussed with the patient.  All questions were answered and informed consent was obtained.      She denies a personal or family history of anesthesia complications or bleeding disorders.     Patient Active Problem List   Diagnosis     Rosacea     Gout     CHONDROCALC NOS-OTHER SITE(aka PSEUDOGOUT)     Essential hypertension     Hyperlipidemia LDL goal <100     Advanced directives, counseling/discussion     Atrophic vaginitis     Chronic foot pain     Hypertension goal BP (blood pressure) < 130/80     Bereavement     Obesity     Type 2 diabetes mellitus without complication (H)     Status post total knee replacement, unspecified laterality     Psoriasis        Past Medical History:   Diagnosis Date     Arthritis      Essential hypertension, benign      Mixed hyperlipidemia      Pain in joint, ankle and foot     gout     Type II or unspecified type diabetes mellitus without mention of complication, not stated as uncontrolled     Diabetes mellitus        Past Surgical History:   Procedure Laterality Date     C NONSPECIFIC PROCEDURE      Breast biopsies        C NONSPECIFIC PROCEDURE      Symptomatic left thigh lipomas        C NONSPECIFIC PROCEDURE  6/1/09    pubovaginal sling     COLONOSCOPY       COLONOSCOPY N/A 12/17/2014    Procedure: COMBINED COLONOSCOPY, SINGLE OR MULTIPLE BIOPSY/POLYPECTOMY BY  BIOPSY;  Surgeon: Chuy Staton MD;  Location: [facility]     HYSTERECTOMY, PAP NO LONGER INDICATED       HYSTERECTOMY, JAIME  1991    fibroid     SURGICAL HISTORY OF -   10/28/2015    right partial knee        Social History     Tobacco Use     Smoking status: Never Smoker     Smokeless tobacco: Never Used   Substance Use Topics     Alcohol use: No     Alcohol/week: 0.0 standard drinks       Family History   Problem Relation Age of Onset     Cancer Mother         colon ca survivor     Cancer - colorectal Mother      Cerebrovascular Disease Father        Prior to Admission medications    Medication Sig Start Date End Date Taking? Authorizing Provider   acetaminophen (TYLENOL) 500 MG tablet Take 1-2 tablets by mouth every morning.   Yes Reported, Patient   allopurinol (ZYLOPRIM) 300 MG tablet Take 1/2 (one-half) tablet by mouth once daily 6/18/20  Yes Maribell Fuller MD   Ascorbic Acid (VITAMIN C) 500 MG CAPS Take 500 mg by mouth daily   Yes Reported, Patient   ASPIRIN 81 MG OR TABS 1 tablet daily 5/22/09  Yes Dano Millan MD   betamethasone, augmented, (DIPROLENE) 0.05 % lotion GINA 10 TO 20 DROPS TOPICALLY AA OF SCALP Q NIGHT UTD 3/30/16  Yes Reported, Patient   calcipotriene 0.005 % OINT Apply 1 Application topically as needed 7/27/16  Yes Reported, Patient   calcium-magnesium (CALMAG) 500-250 MG TABS per tablet Take 1 tablet by mouth daily   Yes Reported, Patient   chlorhexidine (PERIDEX) 0.12 % solution RINSE ONE HALF  OZ 2-3 X D AFTER BREAKFAST BEFORE BEDTIME FOLLOWING BRUSHING AND FLOSSIN 2/22/16  Yes Reported, Patient   Cholecalciferol (VITAMIN D) 2000 UNITS tablet Take 1 tablet by mouth daily   Yes Reported, Patient   CINNAMON 500 MG OR CAPS 2 tablets daily 5/22/09  Yes Dano Millan MD   estradiol (ESTRACE) 1 MG tablet Place 1 tablet (1 mg) vaginally twice a week 7/6/20  Yes Roxann Marr MD   estradiol (VAGIFEM) 10 MCG TABS vaginal tablet Place 1 tablet (10  mcg) vaginally three times a week May use daily for up to 2 weeks, then 2-3 times weekly. 11/22/19  Yes La Dorsey APRN CNP   fluconazole (DIFLUCAN) 150 MG tablet Take 1 tablet (150 mg) by mouth every 72 hours 3/27/20  Yes Zhanna Cortez MD   fluconazole (DIFLUCAN) 150 MG tablet Take 1 tablet (150 mg) by mouth every 3 days 1/8/19  Yes Dinorah Wong APRN CNP   gabapentin (NEURONTIN) 300 MG capsule TAKE 2 CAPSULES AT BEDTIME 2/4/20  Yes Dinorah Wong APRN CNP   glipiZIDE (GLUCOTROL) 10 MG tablet TAKE 1 TABLET BY MOUTH TWICE DAILY BEFORE MEAL(S) 7/27/20  Yes Esther Townsend MD   guaiFENesin-codeine (ROBITUSSIN AC) 100-10 MG/5ML solution Take 10 mLs by mouth every 4 hours as needed for cough 11/26/19  Yes Dinorah Wong APRN CNP   insulin NPH (NOVOLIN N RELION) 100 UNIT/ML vial INJECT 20 UNITS SUBCUTANEOUSLY BEFORE BREAKFAST AND 20 UNITS BEFORE DINNER 3/12/20  Yes Dinorah Wong APRN CNP   Lactobacillus (PROBIOTIC ACIDOPHILUS) TABS Take 1 tablet by mouth daily   Yes Reported, Patient   lisinopril (ZESTRIL) 10 MG tablet Take 1 tablet by mouth once daily 7/8/20  Yes Dinorah Wong APRN CNP   metFORMIN (GLUCOPHAGE) 1000 MG tablet TAKE 1 TABLET BY MOUTH TWICE DAILY WITH MEALS 4/23/20  Yes Dinorah Wong APRN CNP   POTASSIUM CHLORIDE PO Take 1 tablet by mouth daily   Yes Reported, Patient   simvastatin (ZOCOR) 40 MG tablet TAKE 1 TABLET BY MOUTH EVERY DAY AT BEDTIME 1/29/20  Yes Dinorah Wong APRN CNP   terconazole (TERAZOL 3) 0.8 % vaginal cream INSERT 1 APPLICATORFUL (5 GRAMS) VAGINALLY  AT BEDTIME FOR 3 DAYS 5/7/20  Yes Lisa Church DO   TUMS 500 MG OR CHEW 1 TABLET 3 TIMES PRN 6/14/07  Yes Dano Millan MD   vitamin E 400 UNITS TABS Take 400 Units by mouth daily   Yes Reported, Patient   Alcohol Swabs (B-D SINGLE USE SWABS REGULAR) PADS USE TO SWAB AREA OF INJECTION/LANCET 6 TIMES PER DAY OR AS DIRECTED. 4/29/20   Esther Townsend MD  "  azithromycin (ZITHROMAX) 250 MG tablet Take 2 tablets (500 mg) by mouth daily for 1 dose 7/13/20 7/14/20  Esther Townsend MD   blood glucose (ACCU-CHEK GUIDE) test strip TEST BLOOD SUGAR THREE TIMES DAILY  OR AS DIRECTED 6/1/20   Dinorah Wong APRN CNP   Blood Glucose Calibration (ACCU-CHEK GUIDE CONTROL) LIQD 1 Application by In Vitro route as needed 6/1/20   Dinorah Wong APRN CNP   blood glucose monitoring (ACCU-CHEK FASTCLIX) lancets TEST BLOOD SUGAR 3 TIMES DAILY AS DIRECTED 6/1/20   Dinorah Wong APRN CNP   Blood Glucose Monitoring Suppl (ACCU-CHEK GUIDE) w/Device KIT TEST BLOOD SUGAR THREE TIMES DAILY 4/10/19   Dinorah Wong APRN CNP   cephALEXin (KEFLEX) 500 MG capsule Take 4 capsules (2,000 mg) by mouth once for 1 dose Prior to dental work 7/14/20 7/14/20  Dinorah Wong APRN CNP   ciprofloxacin (CIPRO) 250 MG tablet Take 1 tablet (250 mg) by mouth 2 times daily for 7 days 6/25/20 7/2/20  Lisa Church DO   clindamycin (CLEOCIN) 300 MG capsule Take 1 capsule (300 mg) by mouth 2 times daily for 7 days 7/16/20 7/23/20  Roxann Marr MD   fluconazole (DIFLUCAN) 150 MG tablet Take 1 tablet (150 mg) by mouth daily for 3 days 7/16/20 7/19/20  Roxann Marr MD   insulin syringe-needle U-100 (DROPLET INSULIN SYRINGE) 31G X 5/16\" 0.3 ML miscellaneous USE  2 SYRINGES EVERY DAY  OR AS DIRECTED 5/28/20   Dinorah Wong APRN CNP   metroNIDAZOLE (FLAGYL) 500 MG tablet Take 1 tablet (500 mg) by mouth 2 times daily for 7 days 7/9/20 7/16/20  Roxann Marr MD       Allergies   Allergen Reactions     Augmentin      Tongue swelling and rash     Can take PCN K without reaction      Sulfa Drugs      Tongue swelling and rash        REVIEW OF SYSTEMS:   5 point ROS negative except as noted above in HPI, including Gen., Resp., CV, GI &  system review.    PHYSICAL EXAM:   There were no vitals taken for this visit. Estimated body mass " "index is 32.27 kg/m  as calculated from the following:    Height as of 7/6/20: 1.626 m (5' 4\").    Weight as of 7/6/20: 85.3 kg (188 lb).   GENERAL APPEARANCE: alert, and oriented  MENTAL STATUS: alert  AIRWAY EXAM: Mallampatti Class II (visualization of the soft palate, fauces, and uvula)  RESP: lungs clear to auscultation - no rales, rhonchi or wheezes  CV: regular rates and rhythm  DIAGNOSTICS:    Not indicated    IMPRESSION   ASA Class 2 - Mild systemic disease    PLAN:   Plan for Colonoscopy with possible biopsy, possible polypectomy. We discussed the risks, benefits and alternatives and the patient wished to proceed.    The above has been forwarded to the consulting provider.      Signed Electronically by: Chuy Staton MD  August 7, 2020          "

## 2020-08-07 NOTE — LETTER
July 29, 2020      Lindsey Gary  [id number] [address] [facility] [facility] [address]        Dear Lindsey,     Thank you for choosing [facility]. You are scheduled for the following service(s).   Please be aware that coverage of these services is subject to the terms and limitations of your health insurance plan.  Call member services at your health plan with any benefit or coverage questions.  Date:  8/7/2020 Friday       Procedure: COLONOSCOPY  Doctor:   Dr. Chuy Staton         Arrival Time:  10:30 am  *Enter and check in at the Main Hospital Entrance  Procedure Time:  11:00 am    Location:   [facility]        Endoscopy Department, First Floor *         [address] [place], [place] [address]      [phone] or [phone] ([facility]) to reschedule        NuLYTELY  DIVIDED PREP  Colonoscopy is the most accurate test to detect colon polyps and colon cancer; and the only test where polyps can be removed. During this procedure, a doctor examines the lining of your large intestine and rectum through a flexible tube.         Transportation  You must arrange for a ride for the day of your procedure with a responsible adult. A taxi , Uber, etc, is not an option unless you are accompanied by a responsible adult. If you fail to arrange transportation with a responsible adult, your procedure will be cancelled and rescheduled.    Fill your enclosed prescription for NuLYTELY  at your local pharmacy. Please call our office at [phone] if you did not receive a prescription.    COLONOSCOPY PRE-PROCEDURE CHECKLIST  If you have diabetes, ask your regular doctor for diet and medication restrictions.  If you take an anticoagulant or anti-platelet medication (such as Coumadin , Lovenox , Pradaxa , Xarelto , Eliquis , etc.), please call your primary doctor for advice on holding this medication.  If you take aspirin you may continue to do so.  If you are or may be pregnant, please  discuss the risks and benefits of this procedure with your doctor.    PREPARATION FOR COLONOSCOPY    7 days before:    Discontinue fiber supplements and medications containing iron. This includes Metamucil  and Fibercon ; and multivitamins with iron.  3 days before:    Begin a low-fiber diet. A low-fiber diet helps making the cleanout more effective. For additional details on low-fiber diet, please refer to the table on the last page.  2 days before:    Continue the low-fiber diet.     Drink at least 8 glasses of water throughout the day.     Stop eating solid foods at 11:45 pm.  1 day before:    In the morning: begin a clear liquid diet (liquids you can see through).     Examples of a clear liquid diet include: water, clear broth or bouillon, Gatorade, Pedialyte or Powerade, carbonated and non-carbonated soft drinks (Sprite , 7-Up , ginger ale), strained fruit juices without pulp (apple, white grape, white cranberry), Jell-O  and popsicles.     The following are not allowed on a clear liquid diet: red liquids, alcoholic beverages,  dairy products (milk, creamer, and yogurt), protein shakes,  juice with pulp and chewing tobacco.    At 4pm: drink 1 (one) 8 oz glass of NuLYTELY  solution every 15 minutes (approximately 8 glasses of 8 oz or half the bottle). Keep the solution refrigerated. Do not drink any other liquids while you are drinking the NuLYTELY  solution.    Over the course of the evening, drink an additional   liter of clear liquids and continue clear liquid diet.    Day of procedure:    6 hours before the procedure: drink 1 (one) 8 oz glass of NuLYTELY  solution every 15 minutes until the last half of the bottle (approximately 8 glasses of 8 oz) is gone. Keep the solution refrigerated. Do not drink any other liquids while you are drinking the NuLYTELY  solution. After that, you may continue the clear liquid diet until 4 hours before the procedure.    You may take all of your morning medications including  blood pressure medications, blood thinners (if you have not been instructed to stop these by our office), methadone, and anti-seizure medications with sips of water 4 hours prior to your procedure or earlier. Do not take insulin or vitamins prior to your procedure.     4 hours prior:   o STOP consuming all liquids   o Do not take anything by mouth during this time.   o Allow extra time to travel to your procedure as you may need to stop and use a restroom along the way.  You are ready for the procedure, if you followed all instructions and your stool is no longer formed, but clear or yellow liquid. If you are unsure whether your colon is clean, please call our office at [phone] before you leave for your appointment.    COLON CLEANSING TIPS: drink adequate amounts of fluids before and after your colon cleansing to prevent dehydration. Stay near a toilet because you will have diarrhea. Even if you are sitting on the toilet, continue to drink the cleansing solution every 15 minutes. If you feel nauseous or vomit, rinse your mouth with water, take a 15 to 30-minute-break and then continue drinking the solution. You will be uncomfortable until the stool has flushed from your colon (in about 2 to 4 hours). You may feel chilled.    Bring the following to your procedure:  - Insurance Card/Photo ID.   - List of current medications including over-the-counter medications and supplements.   - Your rescue inhaler if you currently use one to control asthma.    Canceling or rescheduling your appointment:   If you must cancel or reschedule your appointment, please call [phone] as soon as possible.    What happens during a colonoscopy?    Plan to spend up to two hours, starting at registration time, at the endoscopy center the day of your procedure. The colonoscopy takes an average of 15 to 30 minutes. Recovery time is about 30 minutes.    Before the exam:    You will change into a gown.    Your medical history and  medication list will be reviewed with you, unless that has been done over the phone prior to the procedure.     A nurse will insert an intravenous (IV) line into your hand or arm.    The doctor will meet with you and will give you a consent form to sign.    During the exam:     Medicine will be given through the IV line to help you relax.     Your heart rate and oxygen levels will be monitored. If your blood pressure is low, you may be given fluids through the IV line.     The doctor will insert a flexible hollow tube, called a colonoscope, into your rectum. The scope will be advanced slowly through the large intestine (colon).    You may have a feeling of fullness or pressure.     If an abnormal tissue or a polyp is found, the doctor may remove it through the endoscope for closer examination, or biopsy. Tissue removal is painless.    After the exam:           Any tissue samples removed during the exam will be sent to a lab for evaluation. It may take 5-7 working days for you to be notified of the results.     A nurse will provide you with complete discharge instructions before you leave the endoscopy center. Be sure to ask the nurse for specific instructions if you take blood thinners such as Aspirin, Coumadin or Plavix.     The doctor will prepare a full report for you and for the physician who referred you for the procedure.     Your doctor will talk with you about the initial results of your exam.      Medication given during the exam will prohibit you from driving for the rest of the day.     Following the exam, you may resume your normal diet. Your first meal should be light, no greasy foods. Avoid alcohol until the next day.     You may resume your regular activities the day after the procedure.     LOW-FIBER DIET  Foods RECOMMENDED Foods to AVOID   Breads, Cereal, Rice and Pasta:   White bread, rolls, biscuits, croissant and sujatha toast.   Waffles, Slovenian toast and pancakes.   White rice, noodles, pasta,  macaroni and peeled cooked potatoes.   Plain crackers and saltines.   Cooked cereals: farina, cream of rice.   Cold cereals: Puffed Rice , Rice Krispies , Corn Flakes  and Special K    Breads, Cereal, Rice and Pasta:   Breads or rolls with nuts, seeds or fruit.   Whole wheat, pumpernickel, rye breads and cornbread.   Potatoes with skin, brown or wild rice, and kasha (buckwheat).     Vegetables:   Tender cooked and canned vegetables without seeds: carrots, asparagus tips, green or wax beans, pumpkin, spinach, lima beans. Vegetables:   Raw or steamed vegetables.   Vegetables with seeds.   Sauerkraut.   Winter squash, peas, broccoli, Brussel sprouts, cabbage, onions, cauliflower, baked beans, peas and corn.   Fruits:   Strained fruit juice.   Canned fruit, except pineapple.   Ripe bananas and melon. Fruits:   Prunes and prune juice.   Raw fruits.   Dried fruits: figs, dates and raisins.   Milk/Dairy:   Milk: plain or flavored.   Yogurt, custard and ice cream.   Cheese and cottage cheese Milk/Dairy:     Meat and other proteins:   ground, well-cooked tender beef, lamb, ham, veal, pork, fish, poultry and organ meats.   Eggs.   Peanut butter without nuts. Meat and other proteins:   Tough, fibrous meats with gristle.   Dry beans, peas and lentils.   Peanut butter with nuts.   Tofu.   Fats, Snack, Sweets, Condiments and Beverages:   Margarine, butter, oils, mayonnaise, sour cream and salad dressing, plain gravy.   Sugar, hard candy, clear jelly, honey and syrup.   Spices, cooked herbs, bouillon, broth and soups made with allowed vegetable, ketchup and mustard.   Coffee, tea and carbonated drinks.   Plain cakes, cookies and pretzels.   Gelatin, plain puddings, custard, ice cream, sherbet and popsicles. Fats, Snack, Sweets, Condiments and Beverages:   Nuts, seeds and coconut.   Jam, marmalade and preserves.   Pickles, olives, relish and horseradish.   All desserts containing nuts, seeds, dried fruit and coconut; or made from  whole grains or bran.   Candy made with nuts or seeds.   Popcorn.     DIRECTIONS TO THE ENDOSCOPY DEPARTMENT    From the north ([place], [place], [place])  Take [address] South, exit on [address] . Get into the left hand amaury, turn left (east), go one-half mile to [address] and turn left. Take [address] to the Main Entrance  From the south ([facility] [place])  Take [address]N to the [address] split and exit on [address] . On [address] , turn left (west) to [address]. Turn right (north) on [address]. Go north to the second  stoplight, take a right and follow [address] to the Main Entrance.  From the east via [address] ([facility])  Take [address] south to [address]  exit. Turn right on [address]. Go west to [address]. Turn right (north) on [address]. Go to the first stoplight, take a right on [address]  to the Main Entrance   From the east via [address] ([facility])ollow on [address]  to the Main Entrance  Take [address] to [address]. Turn left (south) on [address] to [place]. Turn left (east) on on [address]  to the Main Entrance  From the west via [address] ([place], [place])  Take [address] east to [address]. Turn right (south) on [address] to [place]. Turn left (east) on [address]  to the Main Entrance

## 2020-10-02 NOTE — NURSING NOTE
"Chief Complaint   Patient presents with     Diabetes     Follow up. Need lab results from last week.       Initial /60 (BP Location: Left arm, Patient Position: Sitting, Cuff Size: Adult Large)  Pulse 90  Temp 97.8  F (36.6  C) (Oral)  Ht 5' 2.75\" (1.594 m)  Wt 187 lb 8 oz (85 kg)  SpO2 99%  BMI 33.48 kg/m2 Estimated body mass index is 33.48 kg/(m^2) as calculated from the following:    Height as of this encounter: 5' 2.75\" (1.594 m).    Weight as of this encounter: 187 lb 8 oz (85 kg).  Medication Reconciliation: complete   Rhonda Barbour MA      " O-T Advancement Flap Text: The defect edges were debeveled with a #15 scalpel blade.  Given the location of the defect, shape of the defect and the proximity to free margins an O-T advancement flap was deemed most appropriate.  Using a sterile surgical marker, an appropriate advancement flap was drawn incorporating the defect and placing the expected incisions within the relaxed skin tension lines where possible.    The area thus outlined was incised deep to adipose tissue with a #15 scalpel blade.  The skin margins were undermined to an appropriate distance in all directions utilizing iris scissors.

## 2020-10-07 ENCOUNTER — ALLIED HEALTH/NURSE VISIT (OUTPATIENT)
Dept: FAMILY MEDICINE | Facility: CLINIC | Age: 80
End: 2020-10-07
Payer: COMMERCIAL

## 2020-10-07 DIAGNOSIS — Z23 NEED FOR PROPHYLACTIC VACCINATION AND INOCULATION AGAINST INFLUENZA: Primary | ICD-10-CM

## 2020-10-07 PROCEDURE — G0008 ADMIN INFLUENZA VIRUS VAC: HCPCS

## 2020-10-07 PROCEDURE — 99207 PR NO CHARGE NURSE ONLY: CPT

## 2020-10-07 PROCEDURE — 90662 IIV NO PRSV INCREASED AG IM: CPT

## 2020-10-15 ENCOUNTER — VIRTUAL VISIT (OUTPATIENT)
Dept: INTERNAL MEDICINE | Facility: CLINIC | Age: 80
End: 2020-10-15
Payer: COMMERCIAL

## 2020-10-15 DIAGNOSIS — R30.0 DYSURIA: Primary | ICD-10-CM

## 2020-10-15 PROCEDURE — 99213 OFFICE O/P EST LOW 20 MIN: CPT | Mod: 95 | Performed by: NURSE PRACTITIONER

## 2020-10-15 RX ORDER — CIPROFLOXACIN 250 MG/1
250 TABLET, FILM COATED ORAL 2 TIMES DAILY
Qty: 6 TABLET | Refills: 0 | Status: SHIPPED | OUTPATIENT
Start: 2020-10-15 | End: 2020-10-18

## 2020-10-15 NOTE — PROGRESS NOTES
"Lindsey Gary is a 80 year old female who is being evaluated via a billable telephone visit.      The patient has been notified of following:     \"This telephone visit will be conducted via a call between you and your physician/provider. We have found that certain health care needs can be provided without the need for a physical exam.  This service lets us provide the care you need with a short phone conversation.  If a prescription is necessary we can send it directly to your pharmacy.  If lab work is needed we can place an order for that and you can then stop by our lab to have the test done at a later time.    Telephone visits are billed at different rates depending on your insurance coverage. During this emergency period, for some insurers they may be billed the same as an in-person visit.  Please reach out to your insurance provider with any questions.    If during the course of the call the physician/provider feels a telephone visit is not appropriate, you will not be charged for this service.\"    Patient has given verbal consent for Telephone visit?  Yes, Rhonda Barbour MA    What phone number would you like to be contacted at? [phone]    How would you like to obtain your AVS? Patient declined    Subjective     Lindsey Gary is a 80 year old female who presents via phone visit today for the following health issues:    HPI     She has chronic UTI. She started having burning when she urinates and also has low back pain about a week ago. She requests to have a urine culture order send to lab so she could come in to get it done.       Genitourinary - Female  Onset/Duration: 3-4 days  Description:   Painful urination (Dysuria): YES           Frequency: no  Blood in urine (Hematuria): no  Delay in urine (Hesitency): no  Intensity: moderate  Progression of Symptoms:  same  Accompanying Signs & Symptoms:  Fever/chills: no  Flank pain: no  Nausea and vomiting: no  Vaginal symptoms: none  Abdominal/Pelvic Pain: " "no  History:   History of frequent UTI s: YES  History of kidney stones: no  Sexually Active: no  Possibility of pregnancy: No  Precipitating or alleviating factors: None  Therapies tried and outcome: none, pt requests UA/UC    Review of Systems   Constitutional, HEENT, cardiovascular, pulmonary, gi and gu systems are negative, except as otherwise noted.       Objective          Vitals:  No vitals were obtained today due to virtual visit.      PSYCH: Alert and oriented times 3; coherent speech, normal   rate and volume, able to articulate logical thoughts, able   to abstract reason, no tangential thoughts, no hallucinations   or delusions  Her affect is normal  RESP: No cough, no audible wheezing, able to talk in full sentences  Remainder of exam unable to be completed due to telephone visits            Assessment/Plan:    Assessment & Plan     Dysuria    - ciprofloxacin (CIPRO) 250 MG tablet; Take 1 tablet (250 mg) by mouth 2 times daily for 3 days  - UA with Microscopic reflex to Culture; Future     BMI:   Estimated body mass index is 32.27 kg/m  as calculated from the following:    Height as of 8/7/20: 1.626 m (5' 4\").    Weight as of 8/7/20: 85.3 kg (188 lb).            cipro after UA.UC obtained  Push fluids    Desirae Han NP  [facility]    Phone call duration:  6 minutes              "

## 2020-10-16 DIAGNOSIS — R30.0 DYSURIA: ICD-10-CM

## 2020-10-16 DIAGNOSIS — R82.90 NONSPECIFIC FINDING ON EXAMINATION OF URINE: Primary | ICD-10-CM

## 2020-10-16 LAB
ALBUMIN UR-MCNC: 100 MG/DL
APPEARANCE UR: ABNORMAL
BACTERIA #/AREA URNS HPF: ABNORMAL /HPF
BILIRUB UR QL STRIP: NEGATIVE
COLOR UR AUTO: YELLOW
GLUCOSE UR STRIP-MCNC: NEGATIVE MG/DL
HGB UR QL STRIP: ABNORMAL
KETONES UR STRIP-MCNC: ABNORMAL MG/DL
LEUKOCYTE ESTERASE UR QL STRIP: ABNORMAL
NITRATE UR QL: NEGATIVE
PH UR STRIP: 5 PH (ref 5–7)
RBC #/AREA URNS AUTO: ABNORMAL /HPF
SOURCE: ABNORMAL
SP GR UR STRIP: 1.02 (ref 1–1.03)
UROBILINOGEN UR STRIP-ACNC: 0.2 EU/DL (ref 0.2–1)
WBC #/AREA URNS AUTO: ABNORMAL /HPF

## 2020-10-16 PROCEDURE — 87088 URINE BACTERIA CULTURE: CPT | Performed by: NURSE PRACTITIONER

## 2020-10-16 PROCEDURE — 87086 URINE CULTURE/COLONY COUNT: CPT | Performed by: NURSE PRACTITIONER

## 2020-10-16 PROCEDURE — 81001 URINALYSIS AUTO W/SCOPE: CPT | Performed by: NURSE PRACTITIONER

## 2020-10-16 PROCEDURE — 87186 SC STD MICRODIL/AGAR DIL: CPT | Performed by: NURSE PRACTITIONER

## 2020-10-17 LAB
BACTERIA SPEC CULT: ABNORMAL
Lab: ABNORMAL
SPECIMEN SOURCE: ABNORMAL

## 2020-10-18 ENCOUNTER — TELEPHONE (OUTPATIENT)
Dept: INTERNAL MEDICINE | Facility: CLINIC | Age: 80
End: 2020-10-18

## 2020-10-18 DIAGNOSIS — N30.00 ACUTE CYSTITIS WITHOUT HEMATURIA: Primary | ICD-10-CM

## 2020-10-18 RX ORDER — CIPROFLOXACIN 250 MG/1
250 TABLET, FILM COATED ORAL 2 TIMES DAILY
Qty: 6 TABLET | Refills: 0 | Status: SHIPPED | OUTPATIENT
Start: 2020-10-18 | End: 2020-10-21

## 2020-10-18 NOTE — TELEPHONE ENCOUNTER
Please advise pt UA/micro did show UTI and UC showed E coli infection which is sensitive to cipro.  eRx sent for korin Han CNP

## 2020-10-19 DIAGNOSIS — E11.9 TYPE 2 DIABETES MELLITUS WITHOUT COMPLICATION, WITHOUT LONG-TERM CURRENT USE OF INSULIN (H): ICD-10-CM

## 2020-10-19 NOTE — TELEPHONE ENCOUNTER
Call to pt and advised her that she has another Cipro Rx at the pharmacy for 3 more days. She agrees with this. She states she still has burning and low back pain.   Advised her to try AZO OTC and this can help. She agrees.     Advised to call on Thursday if still having symptoms. She agrees.

## 2020-10-19 NOTE — TELEPHONE ENCOUNTER
Patient advised of message. Patient says she has been taking cipro and doesn't feel much better and she is worried three more days of medicine is not going to be enough. She says she normally takes medicine for 7-10 days.

## 2020-10-21 RX ORDER — GLIPIZIDE 10 MG/1
TABLET ORAL
Qty: 180 TABLET | Refills: 0 | Status: SHIPPED | OUTPATIENT
Start: 2020-10-21 | End: 2020-11-24

## 2020-10-27 ENCOUNTER — TELEPHONE (OUTPATIENT)
Dept: INTERNAL MEDICINE | Facility: CLINIC | Age: 80
End: 2020-10-27

## 2020-10-27 DIAGNOSIS — N39.0 RECURRENT UTI: Primary | ICD-10-CM

## 2020-10-27 NOTE — TELEPHONE ENCOUNTER
Spoke to pt and she told me about her ongoing issues with uti as bladder doesn't seal, states has seen several urologists and at [place] for this in the past.  She said you are aware of her ongoing issues with this.  She would really like to have another culture run to see if infection is gone or needs more treatment.      She knows will ultimately end up at urologist, Dr. Kahler, again-but is more concerned about the short term right now as doesn't feel well.      Also was told by ob-gyn should think about seeing Dr. Forrest for her issues, I know he does urodynamic testing and I believe some repair surgery?  Do you think she would benefit from consulting with him?    Advised would call her later today to let her know if she can come leave a specimen today.

## 2020-10-27 NOTE — TELEPHONE ENCOUNTER
Please advis pt that UC from 10/16/20 showed E coli which is sensitive to the cipro prescribed.  Since still symptomatic, will order urology consult for continued evaluation and management.  Desirae Han CNP

## 2020-10-28 DIAGNOSIS — N39.0 RECURRENT UTI: ICD-10-CM

## 2020-10-28 PROCEDURE — 87086 URINE CULTURE/COLONY COUNT: CPT | Performed by: NURSE PRACTITIONER

## 2020-10-28 PROCEDURE — 87186 SC STD MICRODIL/AGAR DIL: CPT | Performed by: NURSE PRACTITIONER

## 2020-10-28 PROCEDURE — 87088 URINE BACTERIA CULTURE: CPT | Performed by: NURSE PRACTITIONER

## 2020-10-30 DIAGNOSIS — N39.0 URINARY TRACT INFECTION WITHOUT HEMATURIA, SITE UNSPECIFIED: Primary | ICD-10-CM

## 2020-10-30 LAB
BACTERIA SPEC CULT: ABNORMAL
Lab: ABNORMAL
SPECIMEN SOURCE: ABNORMAL

## 2020-10-30 RX ORDER — PENICILLIN V POTASSIUM 500 MG/1
500 TABLET, FILM COATED ORAL 2 TIMES DAILY
Qty: 20 TABLET | Refills: 0 | Status: SHIPPED | OUTPATIENT
Start: 2020-10-30 | End: 2020-11-24

## 2020-11-02 ENCOUNTER — OFFICE VISIT (OUTPATIENT)
Dept: INTERNAL MEDICINE | Facility: CLINIC | Age: 80
End: 2020-11-02
Payer: COMMERCIAL

## 2020-11-02 ENCOUNTER — TELEPHONE (OUTPATIENT)
Dept: INTERNAL MEDICINE | Facility: CLINIC | Age: 80
End: 2020-11-02

## 2020-11-02 VITALS
DIASTOLIC BLOOD PRESSURE: 75 MMHG | SYSTOLIC BLOOD PRESSURE: 173 MMHG | WEIGHT: 188 LBS | HEART RATE: 100 BPM | TEMPERATURE: 97.9 F | HEIGHT: 64 IN | BODY MASS INDEX: 32.1 KG/M2 | RESPIRATION RATE: 18 BRPM | OXYGEN SATURATION: 100 %

## 2020-11-02 DIAGNOSIS — N89.8 VAGINAL DISCHARGE: ICD-10-CM

## 2020-11-02 DIAGNOSIS — R30.0 DYSURIA: Primary | ICD-10-CM

## 2020-11-02 LAB
ALBUMIN UR-MCNC: 100 MG/DL
AMORPH CRY #/AREA URNS HPF: ABNORMAL /HPF
APPEARANCE UR: CLEAR
BACTERIA #/AREA URNS HPF: ABNORMAL /HPF
BILIRUB UR QL STRIP: ABNORMAL
COLOR UR AUTO: YELLOW
GLUCOSE UR STRIP-MCNC: NEGATIVE MG/DL
HGB UR QL STRIP: NEGATIVE
HYALINE CASTS #/AREA URNS LPF: ABNORMAL /LPF
KETONES UR STRIP-MCNC: 15 MG/DL
LEUKOCYTE ESTERASE UR QL STRIP: ABNORMAL
NITRATE UR QL: POSITIVE
NON-SQ EPI CELLS #/AREA URNS LPF: ABNORMAL /LPF
PH UR STRIP: 5 PH (ref 5–7)
RBC #/AREA URNS AUTO: ABNORMAL /HPF
SOURCE: ABNORMAL
SP GR UR STRIP: >1.03 (ref 1–1.03)
SPECIMEN SOURCE: ABNORMAL
UROBILINOGEN UR STRIP-ACNC: 0.2 EU/DL (ref 0.2–1)
WBC #/AREA URNS AUTO: ABNORMAL /HPF
WET PREP SPEC: ABNORMAL

## 2020-11-02 PROCEDURE — 87077 CULTURE AEROBIC IDENTIFY: CPT | Performed by: INTERNAL MEDICINE

## 2020-11-02 PROCEDURE — 99213 OFFICE O/P EST LOW 20 MIN: CPT | Performed by: INTERNAL MEDICINE

## 2020-11-02 PROCEDURE — 87086 URINE CULTURE/COLONY COUNT: CPT | Performed by: INTERNAL MEDICINE

## 2020-11-02 PROCEDURE — 87070 CULTURE OTHR SPECIMN AEROBIC: CPT | Mod: 59 | Performed by: INTERNAL MEDICINE

## 2020-11-02 PROCEDURE — 81001 URINALYSIS AUTO W/SCOPE: CPT | Performed by: INTERNAL MEDICINE

## 2020-11-02 PROCEDURE — 87186 SC STD MICRODIL/AGAR DIL: CPT | Performed by: INTERNAL MEDICINE

## 2020-11-02 PROCEDURE — 87210 SMEAR WET MOUNT SALINE/INK: CPT | Performed by: INTERNAL MEDICINE

## 2020-11-02 ASSESSMENT — MIFFLIN-ST. JEOR: SCORE: 1317.76

## 2020-11-02 NOTE — NURSING NOTE
"BP (!) 173/75 (BP Location: Right arm, Patient Position: Sitting, Cuff Size: Adult Regular)   Pulse 100   Temp 97.9  F (36.6  C) (Oral)   Resp 18   Ht 1.626 m (5' 4\")   Wt 85.3 kg (188 lb)   SpO2 100%   BMI 32.27 kg/m      "

## 2020-11-02 NOTE — PROGRESS NOTES
Subjective     Lindsey Gary is a 80 year old female who presents to clinic today for the following health issues:    HPI           Complaints of dysuria: she has had a lot of issues with UTI, vaginal burning. Sometimes she has been treated for vaginal bacterial infections due to abnormal cultures. She was treated with cipro for 6 days. She was treated at an outside clinic with diflucan and flagyl but only took one dose of flagyl. She has been on PCN for a week. She continues to have burning with urination, no itching, vaginal burning. She has had some low back soreness which she says is present when having bladder symptoms. The urine was a little copper colored one day. No fever, chills, some nausea. No blood in the urine, no cloudiness.     Patient Active Problem List   Diagnosis     Rosacea     Gout     CHONDROCALC NOS-OTHER SITE(aka PSEUDOGOUT)     Essential hypertension     Hyperlipidemia LDL goal <100     Advanced directives, counseling/discussion     Atrophic vaginitis     Chronic foot pain     Hypertension goal BP (blood pressure) < 130/80     Bereavement     Obesity     Type 2 diabetes mellitus without complication (H)     Status post total knee replacement, unspecified laterality     Psoriasis     Current Outpatient Medications   Medication Sig Dispense Refill     acetaminophen (TYLENOL) 500 MG tablet Take 1-2 tablets by mouth every morning.       Alcohol Swabs (B-D SINGLE USE SWABS REGULAR) PADS USE TO SWAB AREA OF INJECTION/LANCET 6 TIMES PER DAY OR AS DIRECTED. 600 each 4     allopurinol (ZYLOPRIM) 300 MG tablet Take 1/2 (one-half) tablet by mouth once daily 45 tablet 1     Ascorbic Acid (VITAMIN C) 500 MG CAPS Take 500 mg by mouth daily       ASPIRIN 81 MG OR TABS 1 tablet daily 100 3     betamethasone, augmented, (DIPROLENE) 0.05 % lotion GIAN 10 TO 20 DROPS TOPICALLY AA OF SCALP Q NIGHT UTD       blood glucose (ACCU-CHEK GUIDE) test strip TEST BLOOD SUGAR THREE TIMES DAILY  OR AS DIRECTED 300 strip 1  "    Blood Glucose Calibration (ACCU-CHEK GUIDE CONTROL) LIQD 1 Application by In Vitro route as needed 1 each 3     blood glucose monitoring (ACCU-CHEK FASTCLIX) lancets TEST BLOOD SUGAR 3 TIMES DAILY AS DIRECTED 300 each 1     Blood Glucose Monitoring Suppl (ACCU-CHEK GUIDE) w/Device KIT TEST BLOOD SUGAR THREE TIMES DAILY 1 kit 0     calcipotriene 0.005 % OINT Apply 1 Application topically as needed  1     calcium-magnesium (CALMAG) 500-250 MG TABS per tablet Take 1 tablet by mouth daily       chlorhexidine (PERIDEX) 0.12 % solution RINSE ONE HALF  OZ 2-3 X D AFTER BREAKFAST BEFORE BEDTIME FOLLOWING BRUSHING AND FLOSSIN       Cholecalciferol (VITAMIN D) 2000 UNITS tablet Take 1 tablet by mouth daily       CINNAMON 500 MG OR CAPS 2 tablets daily  0     estradiol (ESTRACE) 1 MG tablet Place 1 tablet (1 mg) vaginally twice a week 26 tablet 0     estradiol (VAGIFEM) 10 MCG TABS vaginal tablet Place 1 tablet (10 mcg) vaginally three times a week May use daily for up to 2 weeks, then 2-3 times weekly. 24 tablet 0     fluconazole (DIFLUCAN) 150 MG tablet Take 1 tablet (150 mg) by mouth every 72 hours 3 tablet 0     fluconazole (DIFLUCAN) 150 MG tablet Take 1 tablet (150 mg) by mouth every 3 days 30 tablet 6     gabapentin (NEURONTIN) 300 MG capsule TAKE 2 CAPSULES AT BEDTIME 180 capsule 3     glipiZIDE (GLUCOTROL) 10 MG tablet TAKE 1 TABLET BY MOUTH TWICE DAILY BEFORE MEAL(S) 180 tablet 0     guaiFENesin-codeine (ROBITUSSIN AC) 100-10 MG/5ML solution Take 10 mLs by mouth every 4 hours as needed for cough 240 mL 1     insulin NPH (NOVOLIN N RELION) 100 UNIT/ML vial INJECT 20 UNITS SUBCUTANEOUSLY BEFORE BREAKFAST AND 20 UNITS BEFORE DINNER 20 mL 0     insulin syringe-needle U-100 (DROPLET INSULIN SYRINGE) 31G X 5/16\" 0.3 ML miscellaneous USE  2 SYRINGES EVERY DAY  OR AS DIRECTED 200 each 1     Lactobacillus (PROBIOTIC ACIDOPHILUS) TABS Take 1 tablet by mouth daily       lisinopril (ZESTRIL) 10 MG tablet Take 1 tablet by " mouth once daily 90 tablet 1     metFORMIN (GLUCOPHAGE) 1000 MG tablet TAKE 1 TABLET BY MOUTH TWICE DAILY WITH MEALS 180 tablet 0     penicillin V (VEETID) 500 MG tablet Take 1 tablet (500 mg) by mouth 2 times daily She has tolerated this medication 20 tablet 0     POTASSIUM CHLORIDE PO Take 1 tablet by mouth daily       simvastatin (ZOCOR) 40 MG tablet TAKE 1 TABLET BY MOUTH ONCE DAILY AT BEDTIME 90 tablet 0     terconazole (TERAZOL 3) 0.8 % vaginal cream INSERT 1 APPLICATORFUL (5 GRAMS) VAGINALLY  AT BEDTIME FOR 3 DAYS 20 g 0     TUMS 500 MG OR CHEW 1 TABLET 3 TIMES PRN 90 0     vitamin E 400 UNITS TABS Take 400 Units by mouth daily       cephALEXin (KEFLEX) 500 MG capsule Take 1 capsule (500 mg) by mouth 3 times daily 21 capsule 0      Social History     Tobacco Use     Smoking status: Never Smoker     Smokeless tobacco: Never Used   Substance Use Topics     Alcohol use: No     Alcohol/week: 0.0 standard drinks     Drug use: No      Review of Systems   No fever, chills, flank pain, abdominal pain      Objective    Pulse 100   Temp 97.9  F (36.6  C) (Oral)   There is no height or weight on file to calculate BMI.  Physical Exam     External genitalia unremarkable, vaginal mucosa with atrophy, swab for wet prep    UA: positive nitrite, small LE, 5-10 wbcs  Wet prep: no clue cells          Assessment & Plan     Dysuria  May be a UTI, will await the UC before changing antibiotic  - UA reflex to Microscopic and Culture  - Urine Microscopic  - Urine Culture Aerobic Bacterial    Vaginal discharge  Burning may be atrophy but she has reported repeated infections and so we sent a culture, will await results  - Wound Culture Aerobic Bacterial  - Wet prep       Jaci Lal MD  [facility]

## 2020-11-03 LAB
BACTERIA SPEC CULT: NORMAL
SPECIMEN SOURCE: NORMAL

## 2020-11-04 LAB
BACTERIA SPEC CULT: ABNORMAL
Lab: ABNORMAL
SPECIMEN SOURCE: ABNORMAL

## 2020-11-06 ENCOUNTER — TELEPHONE (OUTPATIENT)
Dept: INTERNAL MEDICINE | Facility: CLINIC | Age: 80
End: 2020-11-06

## 2020-11-06 DIAGNOSIS — B37.31 YEAST INFECTION OF THE VAGINA: Primary | ICD-10-CM

## 2020-11-06 DIAGNOSIS — N76.0 VAGINAL INFECTION: ICD-10-CM

## 2020-11-06 RX ORDER — CEPHALEXIN 500 MG/1
500 CAPSULE ORAL 3 TIMES DAILY
Qty: 21 CAPSULE | Refills: 0 | Status: SHIPPED | OUTPATIENT
Start: 2020-11-06 | End: 2020-11-24

## 2020-11-06 NOTE — TELEPHONE ENCOUNTER
Patient is calling and says she still feels sick. She is having pain, nausea and burning with urination. She doesn't think the medicine she is taking is working.  She would like to know the results of her cultures.

## 2020-11-06 NOTE — TELEPHONE ENCOUNTER
The urine did not grow any bacteria.  The vaginal culture grew some bacteria which are all types of bacteria found in the colon.  One of them is resistant to the penicillin.  Recommend changing antibiotic to Keflex

## 2020-11-11 NOTE — TELEPHONE ENCOUNTER
Called patient and relayed message.  Could patient come in tomorrow at 5?  Provider has an opening for a virtual with a physical before and after.  Please advise, thanks.

## 2020-11-11 NOTE — TELEPHONE ENCOUNTER
Patient is calling because she says she feels no better. She is nauseated and has burning all the time not just when she urinates. She feels foggy and just dont feel good. Has a back ache and pains in her lower stomach. Patient says she has been dealing with this for about three weeks.

## 2020-11-11 NOTE — TELEPHONE ENCOUNTER
Dinorah's appointment tomorrow at 10am has been taking, but per Dinorah, Dr. Lal has open appointments tomorrow and saw patient last. Patient could schedule an appointment with Dr. Lal instead tomorrow.

## 2020-11-11 NOTE — TELEPHONE ENCOUNTER
Contacted patient, she has another appointment tomorrow morning, scheduled appointment with Dr. Lal on Friday morning for patient.

## 2020-11-12 ENCOUNTER — TELEPHONE (OUTPATIENT)
Dept: OBGYN | Facility: CLINIC | Age: 80
End: 2020-11-12

## 2020-11-12 DIAGNOSIS — N76.0 ACUTE VAGINITIS: Primary | ICD-10-CM

## 2020-11-12 RX ORDER — LEVOFLOXACIN 500 MG/1
500 TABLET, FILM COATED ORAL DAILY
Qty: 7 TABLET | Refills: 0 | Status: SHIPPED | OUTPATIENT
Start: 2020-11-12 | End: 2020-11-19

## 2020-11-12 NOTE — TELEPHONE ENCOUNTER
According to her sensitivity profile of the bacteria isolated in her vagina, it looks like they are all sensitive to levofloxacin.So I will prescribe that for her to take.     Roxann Marr MD

## 2020-11-12 NOTE — TELEPHONE ENCOUNTER
Pt recently had wound culture done by PCP.    They rx'd keflex but it is not helping.    They will not see for this anymore as they want her to see derm.    She asks if you would be willing to look at the culture result and see if there is something that we can change her to as far as abx go.    She does mention some vaginal burning, and burning with urination, and lower back pain.  Do we want her to come leave a urine too?    Yoselyn AVERY R.N.

## 2020-11-14 DIAGNOSIS — Z79.4 TYPE 2 DIABETES MELLITUS WITHOUT COMPLICATION, WITH LONG-TERM CURRENT USE OF INSULIN (H): ICD-10-CM

## 2020-11-14 DIAGNOSIS — E11.9 TYPE 2 DIABETES MELLITUS WITHOUT COMPLICATION, WITH LONG-TERM CURRENT USE OF INSULIN (H): ICD-10-CM

## 2020-11-14 DIAGNOSIS — E11.9 TYPE 2 DIABETES MELLITUS WITHOUT COMPLICATION, WITHOUT LONG-TERM CURRENT USE OF INSULIN (H): ICD-10-CM

## 2020-11-16 RX ORDER — BLOOD SUGAR DIAGNOSTIC
STRIP MISCELLANEOUS
Qty: 300 STRIP | Refills: 1 | Status: SHIPPED | OUTPATIENT
Start: 2020-11-16 | End: 2021-05-21 | Stop reason: ALTCHOICE

## 2020-11-16 RX ORDER — SYRING-NEEDL,DISP,INSUL,0.3 ML 31 GX5/16"
SYRINGE, EMPTY DISPOSABLE MISCELLANEOUS
Qty: 100 EACH | Refills: 6 | Status: SHIPPED | OUTPATIENT
Start: 2020-11-16 | End: 2021-08-06

## 2020-11-16 NOTE — TELEPHONE ENCOUNTER
"Pending Prescriptions:                       Disp   Refills    DROPLET INSULIN SYRINGE 31G X 5/16\" 0.3 M*                    Sig: USE  2 SYRINGES EVERY DAY  OR AS DIRECTED    ACCU-CHEK GUIDE test strip [Pharmacy Med *[address]*1            Sig: TEST BLOOD SUGAR THREE TIMES DAILY OR AS DIRECTED      Prescriptions approved per [facility] Refill Protocol.    "

## 2020-11-18 ENCOUNTER — TELEPHONE (OUTPATIENT)
Dept: OBGYN | Facility: CLINIC | Age: 80
End: 2020-11-18

## 2020-11-18 DIAGNOSIS — R35.0 URINARY FREQUENCY: Primary | ICD-10-CM

## 2020-11-18 DIAGNOSIS — R35.0 URINARY FREQUENCY: ICD-10-CM

## 2020-11-18 LAB
ALBUMIN UR-MCNC: NEGATIVE MG/DL
APPEARANCE UR: CLEAR
BILIRUB UR QL STRIP: NEGATIVE
COLOR UR AUTO: YELLOW
GLUCOSE UR STRIP-MCNC: NEGATIVE MG/DL
HGB UR QL STRIP: NEGATIVE
KETONES UR STRIP-MCNC: NEGATIVE MG/DL
LEUKOCYTE ESTERASE UR QL STRIP: NEGATIVE
NITRATE UR QL: NEGATIVE
PH UR STRIP: 5.5 PH (ref 5–7)
RBC #/AREA URNS AUTO: NORMAL /HPF
SOURCE: NORMAL
SP GR UR STRIP: 1.01 (ref 1–1.03)
UROBILINOGEN UR STRIP-ACNC: 0.2 EU/DL (ref 0.2–1)
WBC #/AREA URNS AUTO: NORMAL /HPF

## 2020-11-18 PROCEDURE — 81001 URINALYSIS AUTO W/SCOPE: CPT | Performed by: OBSTETRICS & GYNECOLOGY

## 2020-11-18 PROCEDURE — 87086 URINE CULTURE/COLONY COUNT: CPT | Performed by: OBSTETRICS & GYNECOLOGY

## 2020-11-18 NOTE — TELEPHONE ENCOUNTER
Pt calling regarding her UTI and vaginal infections.  She states she has been taking the levaquin without any relief.  Is having significant burning.  Added to schedule on Friday.  She will leave a urine sample today, will order UA with culture.    Mamie Figueroa RN

## 2020-11-19 LAB
BACTERIA SPEC CULT: NO GROWTH
Lab: NORMAL
SPECIMEN SOURCE: NORMAL

## 2020-11-19 NOTE — RESULT ENCOUNTER NOTE
Inform patient that her urine analysis is negative, but that her urine culture is pending. Will await results of the urine culture to affirm for sure if there is a UTI we have to treat or not.     Roxann Marr MD

## 2020-11-19 NOTE — TELEPHONE ENCOUNTER
I would recommend her returning into clinic for another pelvic exam to assess whats going on.   We may have to contemplate using steroid ointment to see if that helps to alleviate her symptoms.     Roxann Marr MD

## 2020-11-19 NOTE — TELEPHONE ENCOUNTER
Perfect.  She is scheduled to see you tomorrow.  I called her and reviewed that urine shows no infection, culture pending.    Mamie Figueroa RN

## 2020-11-20 ENCOUNTER — OFFICE VISIT (OUTPATIENT)
Dept: OBGYN | Facility: CLINIC | Age: 80
End: 2020-11-20
Payer: COMMERCIAL

## 2020-11-20 VITALS
BODY MASS INDEX: 32.3 KG/M2 | HEART RATE: 89 BPM | WEIGHT: 188.2 LBS | SYSTOLIC BLOOD PRESSURE: 168 MMHG | DIASTOLIC BLOOD PRESSURE: 80 MMHG

## 2020-11-20 DIAGNOSIS — N95.2 VAGINAL ATROPHY: ICD-10-CM

## 2020-11-20 DIAGNOSIS — N90.89 VULVAR IRRITATION: Primary | ICD-10-CM

## 2020-11-20 PROCEDURE — 87070 CULTURE OTHR SPECIMN AEROBIC: CPT | Performed by: OBSTETRICS & GYNECOLOGY

## 2020-11-20 PROCEDURE — 87186 SC STD MICRODIL/AGAR DIL: CPT | Performed by: OBSTETRICS & GYNECOLOGY

## 2020-11-20 PROCEDURE — 99214 OFFICE O/P EST MOD 30 MIN: CPT | Performed by: OBSTETRICS & GYNECOLOGY

## 2020-11-20 PROCEDURE — 87077 CULTURE AEROBIC IDENTIFY: CPT | Performed by: OBSTETRICS & GYNECOLOGY

## 2020-11-20 RX ORDER — CLOBETASOL PROPIONATE 0.5 MG/G
OINTMENT TOPICAL 2 TIMES DAILY
Qty: 45 G | Refills: 0 | Status: SHIPPED | OUTPATIENT
Start: 2020-11-20 | End: 2021-05-20

## 2020-11-20 NOTE — RESULT ENCOUNTER NOTE
Urine growth shows no growth of bacteria. Therefore, no UTI. She can discontinue the antibiotic she is taking.     Roxann Marr MD

## 2020-11-20 NOTE — NURSING NOTE
"Chief Complaint   Patient presents with     Vaginal Problem     Recurrent vaginal issues        Initial BP (!) 168/80 (BP Location: Left arm, Patient Position: Chair, Cuff Size: Adult Regular)   Pulse 89   Wt 85.4 kg (188 lb 3.2 oz)   Breastfeeding No   BMI 32.30 kg/m   Estimated body mass index is 32.3 kg/m  as calculated from the following:    Height as of 20: 1.626 m (5' 4\").    Weight as of this encounter: 85.4 kg (188 lb 3.2 oz).  BP completed using cuff size: regular    Questioned patient about current smoking habits.  Pt. has never smoked.          The following HM Due: NONE      Colette Reyna CMA on 2020 at 10:56 AM  "

## 2020-11-20 NOTE — PROGRESS NOTES
Follow-up visit    Ms.Sonje LORA Gary 80 year old returns for follow-up visit to address ongoing vaginal burning symptoms. They have become so intense and severe that at one point she almost need to seek emergent medical attention for it.   Since seeing me back in 7/2020, she has had ongoing vaginal burning symptoms. My evaluation back then comprised of yeast culture, ureaplasma and mycoplasma which all returned negative. Her wet prep at that time showed few clue cells so she was treated for bacterial vaginitis. She was note to have vulvovaginal atrophy and was prescribed local estrogen therapy, which she still continues to take.   Since then and while reviewing her Epic charting, she has been treated for an E.coli UTI and also been treated for vaginal cultures positive for Klebsiella, Enterobacter and Enterococcus. Still, she is dealing with burning.   She thinks that a lot of her vaginal burning are attributed to her urinary incontinence in which she has involuntary drips of urine that come out of her urethra, which requires a pad to absorb the urine. She feels that the saturated pads are possibly contributing to the irritation of her vulva and perineum. She has sought many urology specialist and they have not been able to offer her much help. She recalls that in the past she received bulking agents to her urethra from Dr. Briceño, but that this cause mild urinary retention that required the need to bear down strongly to void. She does have a future appointment with urology to discuss management of her incontinence.     Exam:   My medical assistant, Colette Reyna CMA, was present as a chaperone for entire clinic visit including the physical exam.  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 Atrophic changes still noted but more improved than previous exam.  No other external lesions, normal hair distribution, no adenopathy. Speculum and bimanual exam deferred.   Extremities: No clubbing, no cyanosis and no edema    A/P:   1) Vulvar irritation  -- clinical exam findings show some signs of dermatitis, therefore, steroid topical ointment recommended (clobetasol) to assess for resolution; regimen for application reviewed (BID application for 2 weeks, followed once daily application for the subsequent 2 weeks, followed by every other day application for the last 2 weeks); also advised to take BID soak baths and applying petroleum gel to vulvar and perineum to seal in moisture and prevent against dryness   -- I agree that a major component of her vulvar irritation is her urinary incontinence issues and encouraged to seek scheduled urology appointment   -- vaginal cultures collected; expressed to patient my uncertainty as to whether the bacteria isolated in her previous culture are necessarily playing a role in contributing to her symptoms and that they may be a part of her natural david    2) Vaginal atrophy  -- encouraged patient to continue local estrogen therapy    Total time spent was 25 minutes; greater than 50% of time was spent in counseling and/or coordination of care for the above listed diagnoses, not including time spent on the procedure.    Roxann Marr MD  [facility]

## 2020-11-22 LAB
BACTERIA SPEC CULT: ABNORMAL
Lab: ABNORMAL
SPECIMEN SOURCE: ABNORMAL

## 2020-11-23 ENCOUNTER — VIRTUAL VISIT (OUTPATIENT)
Dept: OBGYN | Facility: CLINIC | Age: 80
End: 2020-11-23
Payer: COMMERCIAL

## 2020-11-23 ENCOUNTER — TELEPHONE (OUTPATIENT)
Dept: INTERNAL MEDICINE | Facility: CLINIC | Age: 80
End: 2020-11-23

## 2020-11-23 DIAGNOSIS — A49.8 ENTEROCOCCUS FAECALIS INFECTION: Primary | ICD-10-CM

## 2020-11-23 PROCEDURE — 99214 OFFICE O/P EST MOD 30 MIN: CPT | Mod: 95 | Performed by: OBSTETRICS & GYNECOLOGY

## 2020-11-23 RX ORDER — AMPICILLIN TRIHYDRATE 500 MG
500 CAPSULE ORAL 4 TIMES DAILY
Qty: 28 CAPSULE | Refills: 0 | Status: SHIPPED | OUTPATIENT
Start: 2020-11-23 | End: 2020-11-30

## 2020-11-23 NOTE — TELEPHONE ENCOUNTER
Call received from patient stating she has been having an issue with high blood pressure for the past month. She was seen in an urgent care end of October and blood pressure was 180/90. Patient had follow up appointment with Dr. Lal on 11/2 and blood pressure was 173/75. Patient had follow up with OB/GYN on 11/20 and blood pressure was 168/80. States none of these doctors have addressed her high blood pressure. Patient went to [facility] yesterday and blood pressure was 144/58, 161/66, 164/69 and 163/90. Patient states she has been sick with UTIs and vaginal infections. Patient concerned that this has been going on for this long. Virtual visit  Scheduled with primary care provider tomorrow.

## 2020-11-23 NOTE — NURSING NOTE
"Chief Complaint   Patient presents with     Follow Up     labs results   wound culture        Initial There were no vitals taken for this visit. Estimated body mass index is 32.3 kg/m  as calculated from the following:    Height as of 20: 1.626 m (5' 4\").    Weight as of 20: 85.4 kg (188 lb 3.2 oz).  BP completed using cuff size: NA (Not Taken)    Questioned patient about current smoking habits.  Pt. has never smoked.          The following HM Due: NONE      Colette Reyna, LYNETTE on 2020 at 2:40 PM     "

## 2020-11-23 NOTE — PROGRESS NOTES
"Lindsey Gary is a 80 year old female who is being evaluated via a billable telephone visit.      The patient has been notified of following:     \"This telephone visit will be conducted via a call between you and your physician/provider. We have found that certain health care needs can be provided without the need for a physical exam.  This service lets us provide the care you need with a short phone conversation.  If a prescription is necessary we can send it directly to your pharmacy.  If lab work is needed we can place an order for that and you can then stop by our lab to have the test done at a later time.    Telephone visits are billed at different rates depending on your insurance coverage. During this emergency period, for some insurers they may be billed the same as an in-person visit.  Please reach out to your insurance provider with any questions.    If during the course of the call the physician/provider feels a telephone visit is not appropriate, you will not be charged for this service.\"    Patient has given verbal consent for Telephone visit?  Yes    What phone number would you like to be contacted at? [phone]     How would you like to obtain your AVS? Mail a copy    Patient calling back to review results of vaginal culture. Results were remarkable for presence of Enterococcus faecalis. Patient did reading on-line and found out that this bacteria can cause sepsis and that her mother  two years ago of sepsis. She does not want to experience the same predicament. Is not certain what bacterial strain her mother had that made her have sepsis. With this in mind, patient desires to have her vaginal culture positive Enterococcus faecalis treated.     I empathized with patients concerns and expressed appropriate validation in patients fears. However, I also expressed to patient that presence of this bacteria in her vaginal david is not likely to lead to bacteremia and then sepsis. I explained the " likely scenarios that sepsis might take place ie IV access via IV lines, PICC catheter for example that have the risk of being infected which can then lead to sepsis. I reassured patient that she does not have to worry about this given that it is in the vagina.     I also expressed my skepticism that this bacteria is at all causing her symptoms of vulvar irritation and the the leading contributing cause is likely her urinary incontinence and stagnant urine presence on pad contacting her perineum/vulva. Looking back in her charting, she has vaginal cultures dating back to 4/2015 and presence of enterococcus species as well as faecalis has been present. I informed patient that she may be unnecessarily treating herself for a bacterial strain that may be part of her normal david. I theorized with patient that bacterial strain is likely from her colon david and that given proximity of the anus to the vagina, that it will re-colonize the vagina again. Expressed that overuse of various antibiotic agents frequently can lead to resistant strains of bacteria that can cause legitimate bacterial infections which would make it hard to later treat. Also emphasized that recurrent antibiotic use may lead legitimate vaginitis symptoms from changes in the natural vaginal bacterial david, thus making it an unending perpetual cycle.     With all that said, patient would still like to get this treated with ampicillin as the sensitivities imply. She states that she has never taken this before. I informed patient that she has an allergy to Augmentin and therefore ampicillin may cause an allergic reaction as well. She informed me that she is allergic to the clavulanate component of the Augmentin as she has tried penicillin in the past and this did not lead to an allergic reaction. Allergy precautions were given nonetheless and ampicillin prescribed.     In the meantime, I encouraged her to start clobetasol ointment to assess for resolution  of symptoms if cause of symptoms is related to immune mediated pathophysiology directed at her vulvar skin. She informed me that she picked it up this morning from the pharmacy and has yet to apply it but that she plans to.     All other questions and concerns were addressed.     Phone call duration: 25 minutes    Roxann Marr MD  [facility]

## 2020-11-23 NOTE — RESULT ENCOUNTER NOTE
Inform patient that moderate growth of enterococcus faecalis was seen. Her last vaginal culture had heavy growth of enterococcus and also two additional bacterial species.   Again, I dont think that treatment again is indicated given that its unclear/uncertain that enterococcus faecalis is the culprit for her symptoms. Im unable to find anything in the literature that discusses this particular strain of bacteria and vaginal and vulvar manifestations that it can cause. Also, given the antibiotics that she has already been on, I would not want for her risk creating a resistant strain.   I would assess and see how the clobetasol helps to address her symptoms. Also, as discussed with her in our last clinic visit, I believe the urinary incontinence is the biggest contributor for her irritation with the urine remaining stagnant on her pad and touching her vulva for long periods at a time. So hopefully, Urology will provide help to lessen her urinary incontinence issues.     Roxann Marr MD

## 2020-11-24 ENCOUNTER — VIRTUAL VISIT (OUTPATIENT)
Dept: INTERNAL MEDICINE | Facility: CLINIC | Age: 80
End: 2020-11-24
Payer: COMMERCIAL

## 2020-11-24 ENCOUNTER — ALLIED HEALTH/NURSE VISIT (OUTPATIENT)
Dept: NURSING | Facility: CLINIC | Age: 80
End: 2020-11-24
Payer: COMMERCIAL

## 2020-11-24 VITALS — DIASTOLIC BLOOD PRESSURE: 56 MMHG | SYSTOLIC BLOOD PRESSURE: 126 MMHG

## 2020-11-24 DIAGNOSIS — I10 ESSENTIAL HYPERTENSION: Primary | ICD-10-CM

## 2020-11-24 DIAGNOSIS — E78.5 HYPERLIPIDEMIA LDL GOAL <100: ICD-10-CM

## 2020-11-24 DIAGNOSIS — E78.2 MIXED HYPERLIPIDEMIA: ICD-10-CM

## 2020-11-24 DIAGNOSIS — B37.31 YEAST INFECTION OF THE VAGINA: ICD-10-CM

## 2020-11-24 DIAGNOSIS — M10.9 GOUT, UNSPECIFIED CAUSE, UNSPECIFIED CHRONICITY, UNSPECIFIED SITE: ICD-10-CM

## 2020-11-24 DIAGNOSIS — E55.9 VITAMIN D DEFICIENCY: ICD-10-CM

## 2020-11-24 DIAGNOSIS — E11.9 TYPE 2 DIABETES MELLITUS WITHOUT COMPLICATION, WITHOUT LONG-TERM CURRENT USE OF INSULIN (H): ICD-10-CM

## 2020-11-24 PROCEDURE — 99214 OFFICE O/P EST MOD 30 MIN: CPT | Mod: 95 | Performed by: NURSE PRACTITIONER

## 2020-11-24 RX ORDER — FLUCONAZOLE 150 MG/1
150 TABLET ORAL
Qty: 30 TABLET | Refills: 6 | Status: SHIPPED | OUTPATIENT
Start: 2020-11-24 | End: 2020-12-10

## 2020-11-24 RX ORDER — ALLOPURINOL 300 MG/1
TABLET ORAL
Qty: 45 TABLET | Refills: 1 | Status: SHIPPED | OUTPATIENT
Start: 2020-11-24 | End: 2021-05-20

## 2020-11-24 RX ORDER — GLIPIZIDE 10 MG/1
TABLET ORAL
Qty: 180 TABLET | Refills: 1 | Status: SHIPPED | OUTPATIENT
Start: 2020-11-24 | End: 2021-05-20

## 2020-11-24 RX ORDER — SIMVASTATIN 40 MG
TABLET ORAL
Qty: 90 TABLET | Refills: 0 | Status: SHIPPED | OUTPATIENT
Start: 2020-11-24 | End: 2021-04-15

## 2020-11-24 RX ORDER — LISINOPRIL 30 MG/1
30 TABLET ORAL DAILY
Qty: 90 TABLET | Refills: 3 | Status: SHIPPED | OUTPATIENT
Start: 2020-11-24 | End: 2021-01-05 | Stop reason: DRUGHIGH

## 2020-11-24 NOTE — PROGRESS NOTES
"Lindsey Gary is a 80 year old female who is being evaluated via a billable video visit.      The patient has been notified of following:     \"This video visit will be conducted via a call between you and your physician/provider. We have found that certain health care needs can be provided without the need for an in-person physical exam.  This service lets us provide the care you need with a video conversation.  If a prescription is necessary we can send it directly to your pharmacy.  If lab work is needed we can place an order for that and you can then stop by our lab to have the test done at a later time.    Video visits are billed at different rates depending on your insurance coverage.  Please reach out to your insurance provider with any questions.    If during the course of the call the physician/provider feels a video visit is not appropriate, you will not be charged for this service.\"    Patient has given verbal consent for Video visit? Yes  How would you like to obtain your AVS? Mail a copy  If you are dropped from the video visit, the video invite should be resent to: Text to cell phone: [phone]  Will anyone else be joining your video visit? No    Subjective     Lindsey Gary is a 80 year old female who presents today via video visit for the following health issues:    HPI     Diabetes Follow-up    How often are you checking your blood sugar? Three times daily  Blood sugar testing frequency justification:  Patient modifying lifestyle changes (diet, exercise) with blood sugars  What time of day are you checking your blood sugars (select all that apply)?  Before meals  Have you had any blood sugars above 200?  No  Have you had any blood sugars below 70?  No    What symptoms do you notice when your blood sugar is low?  None    What concerns do you have today about your diabetes? None     Do you have any of these symptoms? (Select all that apply)  No numbness or tingling in feet.  No redness, sores or " blisters on feet.  No complaints of excessive thirst.  No reports of blurry vision.  No significant changes to weight.    Have you had a diabetic eye exam in the last 12 months? No          Hyperlipidemia Follow-Up      Are you regularly taking any medication or supplement to lower your cholesterol?   Yes- ZOCOR    Are you having muscle aches or other side effects that you think could be caused by your cholesterol lowering medication?  No    Hypertension Follow-up      Do you check your blood pressure regularly outside of the clinic? No     Are you following a low salt diet? No    Are your blood pressures ever more than 140 on the top number (systolic) OR more   than 90 on the bottom number (diastolic), for example 140/90? No    BP Readings from Last 2 Encounters:   11/20/20 (!) 168/80   11/02/20 (!) 173/75     Hemoglobin A1C (%)   Date Value   11/26/2019 6.8 (H)   03/08/2019 7.1 (H)     LDL Cholesterol Calculated (mg/dL)   Date Value   11/26/2019 85   12/31/2018 103 (H)         How many servings of fruits and vegetables do you eat daily?  2-3    On average, how many sweetened beverages do you drink each day (Examples: soda, juice, sweet tea, etc.  Do NOT count diet or artificially sweetened beverages)?   0    How many days per week do you exercise enough to make your heart beat faster? 3 or less    How many minutes a day do you exercise enough to make your heart beat faster? 9 or less    How many days per week do you miss taking your medication? 0      1 month ago - walk in   180/90  Then got appointment with Desirae Borjas Dr In G[facility]   163/80  Lisinopril 10 mg daily         Video Start Time: 9:25 AM  Changed to phone call      Review of Systems   Constitutional, HEENT, cardiovascular, pulmonary, GI, , musculoskeletal, neuro, skin, endocrine and psych systems are negative, except as otherwise noted.      Objective           Vitals:  No vitals were obtained today due to virtual visit.    Physical Exam      GENERAL: Healthy, alert and no distress  EYES: Eyes grossly normal to inspection.  No discharge or erythema, or obvious scleral/conjunctival abnormalities.  RESP: No audible wheeze, cough, or visible cyanosis.  No visible retractions or increased work of breathing.    SKIN: Visible skin clear. No significant rash, abnormal pigmentation or lesions.  NEURO: Cranial nerves grossly intact.  Mentation and speech appropriate for age.  PSYCH: Mentation appears normal, affect normal/bright, judgement and insight intact, normal speech and appearance well-groomed.      Lab future         Assessment & Plan     Essential hypertension  Needs improvement   Will increase lisinopril to 30 mg daily   - Comprehensive metabolic panel (BMP + Alb, Alk Phos, ALT, AST, Total. Bili, TP); Future  - TSH with free T4 reflex; Future  - CBC with platelets and differential; Future  - Albumin Random Urine Quantitative with Creat Ratio; Future  - lisinopril (ZESTRIL) 30 MG tablet; Take 1 tablet (30 mg) by mouth daily    Hyperlipidemia LDL goal <100    - Lipid panel reflex to direct LDL Fasting; Future  - Comprehensive metabolic panel (BMP + Alb, Alk Phos, ALT, AST, Total. Bili, TP); Future    Type 2 diabetes mellitus without complication, without long-term current use of insulin (H)  In good range   - Lipid panel reflex to direct LDL Fasting; Future  - Comprehensive metabolic panel (BMP + Alb, Alk Phos, ALT, AST, Total. Bili, TP); Future  - TSH with free T4 reflex; Future  - CBC with platelets and differential; Future  - Hemoglobin A1c; Future  - Albumin Random Urine Quantitative with Creat Ratio; Future  - glipiZIDE (GLUCOTROL) 10 MG tablet; TAKE 1 TABLET BY MOUTH TWICE DAILY BEFORE MEAL(S)  - metFORMIN (GLUCOPHAGE) 1000 MG tablet; TAKE 1 TABLET BY MOUTH TWICE DAILY WITH MEALS  - lisinopril (ZESTRIL) 30 MG tablet; Take 1 tablet (30 mg) by mouth daily    Gout, unspecified cause, unspecified chronicity, unspecified site    - Uric acid;  "Future  - allopurinol (ZYLOPRIM) 300 MG tablet; Take 1/2 (one-half) tablet by mouth once daily    Yeast infection of the vagina  Prn use   - fluconazole (DIFLUCAN) 150 MG tablet; Take 1 tablet (150 mg) by mouth every 3 days      Mixed hyperlipidemia    - simvastatin (ZOCOR) 40 MG tablet; TAKE 1 TABLET BY MOUTH ONCE DAILY AT BEDTIME    Vitamin D deficiency    - Vitamin D Deficiency; Future     BMI:   Estimated body mass index is 32.3 kg/m  as calculated from the following:    Height as of 11/2/20: 1.626 m (5' 4\").    Weight as of 11/20/20: 85.4 kg (188 lb 3.2 oz).            Patient Instructions   Fasting lab when you can   You need to be fasting for 12hrs. You can have water and any meds you are on. But, no food, coffee, tea or diet pop.      Increase lisinopril 30 mg daily       No follow-ups on file.    LILLIAM Sam CNP  [facility]      Video-Visit Details    Type of service:  Video Visit    Video End Time:9:56 AM  33 minute telephone   She was unable to connect to video     Originating Location (pt. Location): Home    Distant Location (provider location):  [facility]     Platform used for Video Visit: Doximity          "

## 2020-11-24 NOTE — PATIENT INSTRUCTIONS
Fasting lab when you can   You need to be fasting for 12hrs. You can have water and any meds you are on. But, no food, coffee, tea or diet pop.      Increase lisinopril 30 mg daily

## 2020-11-27 ENCOUNTER — NURSE TRIAGE (OUTPATIENT)
Dept: NURSING | Facility: CLINIC | Age: 80
End: 2020-11-27

## 2020-11-27 DIAGNOSIS — I10 ESSENTIAL HYPERTENSION: Primary | ICD-10-CM

## 2020-11-28 NOTE — TELEPHONE ENCOUNTER
Has been having BP issues - is seeing Dinorah Wong NP. Increased lisinopril dose from 10 mg to 30 mg daily on Tuesday. BP has still been running high. Currently 171/79. Past couple of weeks it's been 150-177 SBP. Reports having some dizziness at times - not where she'd fall down, but it's a fuzzy feeling. Patient takes Lisinopril in the mornings.     Per protocol advised PCP evaluation on Monday - patient has BP check scheduled on Tuesday. Advised patient triager would send message to clinic to see if Tuesday is ok or if she needs to go in sooner. Patient is very anxious on the phone. Gave patient parameters for going to ER - if she develops any cardiac or neurological symptoms she will need to go to ER over weekend.    Patient plans to bring her BP cuff into the clinic with her for the BP check.    Leana Cedillo RN on 11/27/2020 at 10:33 PM    Additional Information    Negative: Difficult to awaken or acting confused (e.g., disoriented, slurred speech)    Negative: Severe difficulty breathing (e.g., struggling for each breath, speaks in single words)    Negative: [1] Weakness of the face, arm or leg on one side of the body AND [2] new onset    Negative: [1] Numbness (i.e., loss of sensation) of the face, arm or leg on one side of the body AND [2] new onset    Negative: [1] Chest pain lasts > 5 minutes AND [2] history of heart disease  (i.e., heart attack, bypass surgery, angina, angioplasty, CHF)    Negative: [1] Chest pain AND [2] took nitrogylcerin AND [3] pain was not relieved    Negative: Sounds like a life-threatening emergency to the triager    Negative: Symptom is main concern  (e.g., headache, chest pain)    Negative: Low blood pressure is main concern    Negative: [1] Systolic BP  >= 160 OR Diastolic >= 100 AND [2] cardiac or neurologic symptoms (e.g., chest pain, difficulty breathing, unsteady gait, blurred vision)    Negative: [1] Pregnant > 20 weeks (or postpartum < 6 weeks) AND [2] new hand or face  swelling    Negative: [1] Pregnant > 20 weeks AND [2] BP Systolic BP  >= 140 OR Diastolic >= 90    Negative: [1] Systolic BP  >= 200 OR Diastolic >= 120  AND [2] having NO cardiac or neurologic symptoms    Negative: [1] Postpartum < 6 weeks AND [2] BP Systolic BP  >= 140 OR Diastolic >= 90    Negative: [1] Systolic BP  >= 180 OR Diastolic >= 110 AND [2] missed most recent dose of blood pressure medication    Negative: Systolic BP  >= 180 OR Diastolic >= 110    Negative: Ran out of BP medications    Systolic BP  >= 160 OR Diastolic >= 100    Protocols used: HIGH BLOOD PRESSURE-A-AH

## 2020-11-30 RX ORDER — HYDROCHLOROTHIAZIDE 12.5 MG/1
12.5 TABLET ORAL DAILY
Qty: 30 TABLET | Refills: 1 | Status: SHIPPED | OUTPATIENT
Start: 2020-11-30 | End: 2020-12-02

## 2020-11-30 RX ORDER — HYDROCHLOROTHIAZIDE 12.5 MG/1
12.5 TABLET ORAL DAILY
Qty: 30 TABLET | Refills: 1 | Status: SHIPPED | OUTPATIENT
Start: 2020-11-30 | End: 2020-11-30

## 2020-11-30 NOTE — TELEPHONE ENCOUNTER
Patient calling with concerns about her BP running high for the last 3 days or so.  BP this morning 177/89, readings over the past couple of days has been 194-196/80s.  Denies dizziness or vision changes but has had dull frontal headaches on and off for 3-4 weeks. States she has had post nasal drip and thinks the headache may be related to sinus issues.  States she is currently being treated for a UTI and a vaginal infection.  Denies fever.      Had a virtual visit last week and Lisinopril was increased from 10 mg daily to 30 mg daily on 11/24/20.  She will be coming in for nurse only BP check tomorrow 12/1/20 and will bring her own BP monitor in at that time.      Patient is concerned about BP running high because she is diabetic.  She wants to know at what reading she should go to ER.  EDMAR Jaeger R.N.

## 2020-11-30 NOTE — TELEPHONE ENCOUNTER
Lets add a low dose diuretic to the Lisinopril. 12.5 mg once a day. She does not need to go to the ER and unless her bp is over 200 systolic.

## 2020-11-30 NOTE — TELEPHONE ENCOUNTER
Patient informed of Dr. Townsend's recommendations.   Requesting prescription be resent to [facility] in [place] instead of [facility], prescription resent.     Called [facility] and left voice message requesting they cancel prescription.

## 2020-12-01 ENCOUNTER — ALLIED HEALTH/NURSE VISIT (OUTPATIENT)
Dept: NURSING | Facility: CLINIC | Age: 80
End: 2020-12-01
Payer: COMMERCIAL

## 2020-12-01 VITALS — DIASTOLIC BLOOD PRESSURE: 80 MMHG | SYSTOLIC BLOOD PRESSURE: 120 MMHG

## 2020-12-01 DIAGNOSIS — E55.9 VITAMIN D DEFICIENCY: Primary | ICD-10-CM

## 2020-12-01 DIAGNOSIS — I10 ESSENTIAL HYPERTENSION: ICD-10-CM

## 2020-12-01 LAB
BASOPHILS # BLD AUTO: 0 10E9/L (ref 0–0.2)
BASOPHILS NFR BLD AUTO: 0.4 %
DIFFERENTIAL METHOD BLD: ABNORMAL
EOSINOPHIL # BLD AUTO: 0.1 10E9/L (ref 0–0.7)
EOSINOPHIL NFR BLD AUTO: 2 %
ERYTHROCYTE [DISTWIDTH] IN BLOOD BY AUTOMATED COUNT: 13.5 % (ref 10–15)
HBA1C MFR BLD: 7.3 % (ref 0–5.6)
HCT VFR BLD AUTO: 36.8 % (ref 35–47)
HGB BLD-MCNC: 11.6 G/DL (ref 11.7–15.7)
LYMPHOCYTES # BLD AUTO: 2.2 10E9/L (ref 0.8–5.3)
LYMPHOCYTES NFR BLD AUTO: 30.5 %
MCH RBC QN AUTO: 26.5 PG (ref 26.5–33)
MCHC RBC AUTO-ENTMCNC: 31.5 G/DL (ref 31.5–36.5)
MCV RBC AUTO: 84 FL (ref 78–100)
MONOCYTES # BLD AUTO: 0.5 10E9/L (ref 0–1.3)
MONOCYTES NFR BLD AUTO: 6.9 %
NEUTROPHILS # BLD AUTO: 4.3 10E9/L (ref 1.6–8.3)
NEUTROPHILS NFR BLD AUTO: 60.2 %
PLATELET # BLD AUTO: 199 10E9/L (ref 150–450)
RBC # BLD AUTO: 4.37 10E12/L (ref 3.8–5.2)
WBC # BLD AUTO: 7.1 10E9/L (ref 4–11)

## 2020-12-01 PROCEDURE — 83036 HEMOGLOBIN GLYCOSYLATED A1C: CPT | Performed by: NURSE PRACTITIONER

## 2020-12-01 PROCEDURE — 82043 UR ALBUMIN QUANTITATIVE: CPT | Performed by: NURSE PRACTITIONER

## 2020-12-01 PROCEDURE — 84550 ASSAY OF BLOOD/URIC ACID: CPT | Performed by: NURSE PRACTITIONER

## 2020-12-01 PROCEDURE — 80061 LIPID PANEL: CPT | Performed by: NURSE PRACTITIONER

## 2020-12-01 PROCEDURE — 80053 COMPREHEN METABOLIC PANEL: CPT | Performed by: NURSE PRACTITIONER

## 2020-12-01 PROCEDURE — 85025 COMPLETE CBC W/AUTO DIFF WBC: CPT | Performed by: NURSE PRACTITIONER

## 2020-12-01 PROCEDURE — 84443 ASSAY THYROID STIM HORMONE: CPT | Performed by: NURSE PRACTITIONER

## 2020-12-01 PROCEDURE — 82306 VITAMIN D 25 HYDROXY: CPT | Performed by: NURSE PRACTITIONER

## 2020-12-01 PROCEDURE — 36415 COLL VENOUS BLD VENIPUNCTURE: CPT | Performed by: NURSE PRACTITIONER

## 2020-12-02 ENCOUNTER — TELEPHONE (OUTPATIENT)
Dept: INTERNAL MEDICINE | Facility: CLINIC | Age: 80
End: 2020-12-02

## 2020-12-02 DIAGNOSIS — J32.9 SINUSITIS, UNSPECIFIED CHRONICITY, UNSPECIFIED LOCATION: Primary | ICD-10-CM

## 2020-12-02 LAB
ALBUMIN SERPL-MCNC: 4.2 G/DL (ref 3.4–5)
ALP SERPL-CCNC: 84 U/L (ref 40–150)
ALT SERPL W P-5'-P-CCNC: 23 U/L (ref 0–50)
ANION GAP SERPL CALCULATED.3IONS-SCNC: 8 MMOL/L (ref 3–14)
AST SERPL W P-5'-P-CCNC: 18 U/L (ref 0–45)
BILIRUB SERPL-MCNC: 0.8 MG/DL (ref 0.2–1.3)
BUN SERPL-MCNC: 21 MG/DL (ref 7–30)
CALCIUM SERPL-MCNC: 9.4 MG/DL (ref 8.5–10.1)
CHLORIDE SERPL-SCNC: 104 MMOL/L (ref 94–109)
CHOLEST SERPL-MCNC: 180 MG/DL
CO2 SERPL-SCNC: 23 MMOL/L (ref 20–32)
CREAT SERPL-MCNC: 0.66 MG/DL (ref 0.52–1.04)
CREAT UR-MCNC: 11 MG/DL
DEPRECATED CALCIDIOL+CALCIFEROL SERPL-MC: 44 UG/L (ref 20–75)
GFR SERPL CREATININE-BSD FRML MDRD: 84 ML/MIN/{1.73_M2}
GLUCOSE SERPL-MCNC: 134 MG/DL (ref 70–99)
HDLC SERPL-MCNC: 37 MG/DL
LDLC SERPL CALC-MCNC: 108 MG/DL
MICROALBUMIN UR-MCNC: 27 MG/L
MICROALBUMIN/CREAT UR: 255.14 MG/G CR (ref 0–25)
NONHDLC SERPL-MCNC: 143 MG/DL
POTASSIUM SERPL-SCNC: 4.6 MMOL/L (ref 3.4–5.3)
PROT SERPL-MCNC: 7.6 G/DL (ref 6.8–8.8)
SODIUM SERPL-SCNC: 135 MMOL/L (ref 133–144)
TRIGL SERPL-MCNC: 175 MG/DL
TSH SERPL DL<=0.005 MIU/L-ACNC: 0.89 MU/L (ref 0.4–4)
URATE SERPL-MCNC: 4.8 MG/DL (ref 2.6–6)

## 2020-12-02 RX ORDER — AZITHROMYCIN 250 MG/1
TABLET, FILM COATED ORAL
Qty: 6 TABLET | Refills: 0 | Status: SHIPPED | OUTPATIENT
Start: 2020-12-02 | End: 2020-12-07

## 2020-12-02 NOTE — TELEPHONE ENCOUNTER
We can hold the hydrochlorothiazide and see how blood pressure is on the 30 mg lisinopril    z pack sent in

## 2020-12-02 NOTE — TELEPHONE ENCOUNTER
Called patient to clarify her message.     BP questions: Lisinopril increased at 11/24/20 Virtual Visit from 10 mg to 30 mg, patient has only taken for 3 days and asks if she should continue the higher dose or not. She was also given prescription for hydrochlorothiazide due to high BP at home (see 11/27/20 telephone encounter), but has not started taking this since her BP was normal in clinic. She has another Nurse appointment for BP check on Friday and in-person appointment with Dinorah on 12/10/20.    Sinus symptoms: Head feels full, she thought this was initially from BP, but now wonders if this could be from her sinuses. Also having dull headache, ears popping, slight dizziness and pain below the eyes. No drainage from nose, but she can feel drainage down the back of there throat. She has not had a sinus infection for a while, but recalls having some of these same symptoms. Patient would be okay trying antibiotic if Dinorah thought it was appropriate.     Ok to leave detailed voice message when calling back.

## 2020-12-02 NOTE — TELEPHONE ENCOUNTER
Patient calls asking what medications she should be taking now that she came into clinic and got regular readings and returned the defective BP cuff she has purchased.  Patient states she is a little dizzy in the head with ears popping and drainage down her throat.  Patient states she does not think it has anything to do with her BP.  Patient states her at home pule reader is normal.  No known exposure to COVID-19 or travel.  No sore throat but a low grade headache.  She is really thinking she may have a sinus infection.  No loss of taste or smell, chill, or nausea.  Please address medication and sinus issues.  Patient does not want antibiotic over the telephone.

## 2020-12-04 ENCOUNTER — ALLIED HEALTH/NURSE VISIT (OUTPATIENT)
Dept: NURSING | Facility: CLINIC | Age: 80
End: 2020-12-04
Payer: COMMERCIAL

## 2020-12-04 VITALS — SYSTOLIC BLOOD PRESSURE: 120 MMHG | DIASTOLIC BLOOD PRESSURE: 70 MMHG

## 2020-12-04 DIAGNOSIS — I10 ESSENTIAL HYPERTENSION: Primary | ICD-10-CM

## 2020-12-04 PROCEDURE — 99207 PR NO CHARGE NURSE ONLY: CPT

## 2020-12-10 ENCOUNTER — ANCILLARY PROCEDURE (OUTPATIENT)
Dept: GENERAL RADIOLOGY | Facility: CLINIC | Age: 80
End: 2020-12-10
Attending: NURSE PRACTITIONER
Payer: COMMERCIAL

## 2020-12-10 ENCOUNTER — OFFICE VISIT (OUTPATIENT)
Dept: INTERNAL MEDICINE | Facility: CLINIC | Age: 80
End: 2020-12-10
Payer: COMMERCIAL

## 2020-12-10 VITALS
RESPIRATION RATE: 18 BRPM | HEIGHT: 64 IN | HEART RATE: 79 BPM | WEIGHT: 188 LBS | OXYGEN SATURATION: 99 % | TEMPERATURE: 97.5 F | BODY MASS INDEX: 32.1 KG/M2 | DIASTOLIC BLOOD PRESSURE: 72 MMHG | SYSTOLIC BLOOD PRESSURE: 152 MMHG

## 2020-12-10 DIAGNOSIS — M25.552 HIP PAIN, LEFT: ICD-10-CM

## 2020-12-10 DIAGNOSIS — E11.9 TYPE 2 DIABETES MELLITUS WITHOUT COMPLICATION, WITHOUT LONG-TERM CURRENT USE OF INSULIN (H): ICD-10-CM

## 2020-12-10 DIAGNOSIS — M25.552 HIP PAIN, LEFT: Primary | ICD-10-CM

## 2020-12-10 DIAGNOSIS — I10 ESSENTIAL HYPERTENSION: ICD-10-CM

## 2020-12-10 PROCEDURE — 73502 X-RAY EXAM HIP UNI 2-3 VIEWS: CPT | Performed by: RADIOLOGY

## 2020-12-10 PROCEDURE — 99214 OFFICE O/P EST MOD 30 MIN: CPT | Performed by: NURSE PRACTITIONER

## 2020-12-10 RX ORDER — HYDROCHLOROTHIAZIDE 12.5 MG/1
12.5 TABLET ORAL DAILY
Qty: 90 TABLET | Refills: 1 | Status: SHIPPED | OUTPATIENT
Start: 2020-12-10 | End: 2021-01-22

## 2020-12-10 ASSESSMENT — MIFFLIN-ST. JEOR: SCORE: 1317.76

## 2020-12-10 NOTE — NURSING NOTE
"Chief Complaint   Patient presents with     RECHECK     pt had labs done recently and also has BP concerns also wants xray on her left hip     initial BP (!) 152/72   Pulse 79   Temp 97.5  F (36.4  C) (Oral)   Resp 18   Ht 1.626 m (5' 4\")   Wt 85.3 kg (188 lb)   SpO2 99%   BMI 32.27 kg/m   Estimated body mass index is 32.27 kg/m  as calculated from the following:    Height as of this encounter: 1.626 m (5' 4\").    Weight as of this encounter: 85.3 kg (188 lb)..  bp completed using cuff size large  MARIAH LIAO LPN  "

## 2020-12-10 NOTE — PROGRESS NOTES
"Subjective     Sonmichelle Gary is a 80 year old female who presents to clinic today for the following health issues:    HPI       Chief Complaint   Patient presents with     RECHECK     pt had labs done recently and also has BP concerns also wants xray on her left hip   Reviewed labs    Blood pressure elevated today   Will add the hydrochlorothiazide  And see how blood pressure is     microalbuminuria elevated - increased lisinopril     Left hip pain   Wants to check xray     Review of Systems   Constitutional, HEENT, cardiovascular, pulmonary, GI, , musculoskeletal, neuro, skin, endocrine and psych systems are negative, except as otherwise noted.      Objective    BP (!) 152/72   Pulse 79   Temp 97.5  F (36.4  C) (Oral)   Resp 18   Ht 1.626 m (5' 4\")   Wt 85.3 kg (188 lb)   SpO2 99%   BMI 32.27 kg/m    Body mass index is 32.27 kg/m .  Physical Exam   GENERAL:  alert and no distress  HENT: ear canals and TM's normal, nose and mouth without ulcers or lesions  RESP: lungs clear to auscultation - no rales, rhonchi or wheezes  CV: regular rate and rhythm, normal S1 S2, no S3 or S4, no murmur, click or rub, no peripheral edema and peripheral pulses strong  MS: left hip pain and walking slightly affected   PSYCH: mentation appears normal, affect normal/bright    Reviewed previous lab         Assessment & Plan     Hip pain, left    - XR Pelvis and Hip Left 1 View; Future    Essential hypertension  Needs improvement   - hydrochlorothiazide (HYDRODIURIL) 12.5 MG tablet; Take 1 tablet (12.5 mg) by mouth daily  - Basic metabolic panel; Future    Type 2 diabetes mellitus without complication, without long-term current use of insulin (H)  Stable        BMI:   Estimated body mass index is 32.27 kg/m  as calculated from the following:    Height as of this encounter: 1.626 m (5' 4\").    Weight as of this encounter: 85.3 kg (188 lb).            Patient Instructions   Add hydrochlorothiazide 12.5 mg daily     Lisinopril 30 mg " daily     Recheck blood pressure in a month     Hip x ray in [address]          Return in about 4 weeks (around 1/7/2021) for bp and lab .    LILLIAM Sam CNP  [facility]

## 2020-12-10 NOTE — PATIENT INSTRUCTIONS
Add hydrochlorothiazide 12.5 mg daily     Lisinopril 30 mg daily     Recheck blood pressure in a month     Hip x ray in [address]

## 2020-12-15 ENCOUNTER — ALLIED HEALTH/NURSE VISIT (OUTPATIENT)
Dept: NURSING | Facility: CLINIC | Age: 80
End: 2020-12-15
Payer: COMMERCIAL

## 2020-12-15 VITALS — DIASTOLIC BLOOD PRESSURE: 70 MMHG | SYSTOLIC BLOOD PRESSURE: 120 MMHG

## 2020-12-15 DIAGNOSIS — I10 HYPERTENSION GOAL BP (BLOOD PRESSURE) < 130/80: Primary | ICD-10-CM

## 2020-12-15 PROCEDURE — 99207 PR NO CHARGE NURSE ONLY: CPT

## 2020-12-15 NOTE — PROGRESS NOTES
Bobmichelle Gary is a 80 year old patient who comes in today for a Blood Pressure check.  Initial BP:  /70      Data Unavailable  Disposition: follow-up as previously indicated by provider

## 2020-12-22 ENCOUNTER — ALLIED HEALTH/NURSE VISIT (OUTPATIENT)
Dept: NURSING | Facility: CLINIC | Age: 80
End: 2020-12-22
Payer: COMMERCIAL

## 2020-12-22 ENCOUNTER — TELEPHONE (OUTPATIENT)
Dept: OBGYN | Facility: CLINIC | Age: 80
End: 2020-12-22

## 2020-12-22 VITALS — DIASTOLIC BLOOD PRESSURE: 70 MMHG | SYSTOLIC BLOOD PRESSURE: 152 MMHG

## 2020-12-22 DIAGNOSIS — I10 ESSENTIAL HYPERTENSION: ICD-10-CM

## 2020-12-22 DIAGNOSIS — I10 HYPERTENSION: Primary | ICD-10-CM

## 2020-12-22 PROCEDURE — 99207 PR NO CHARGE NURSE ONLY: CPT

## 2020-12-22 NOTE — TELEPHONE ENCOUNTER
Pt calls with vaginal burning.  She states it has not gotten better and would liek some other medication sent in.    Is there any abx that you would feel comfortable sending in for her?    Yoselyn AVERY R.N.

## 2020-12-22 NOTE — TELEPHONE ENCOUNTER
Spoke to pt.  Discussed the use of other barrier creams and sitz bath.    Sitz bath not conducive with pt's life.  She will try barrier cream nightly.  Will also ask her dermatologist as well to get their thoughts.    Yoselyn AVERY R.N.

## 2020-12-22 NOTE — TELEPHONE ENCOUNTER
Does she mean that it has not gotten better with the clobetasol ointment? Did she try this as it was instructed on the prescription?    Roxann Marr MD

## 2020-12-22 NOTE — PROGRESS NOTES
Here for B/P check = 152/70 , Patient states will return for one more B/P check in a week .     Please advise VANESSA Srivastava LPN

## 2020-12-22 NOTE — TELEPHONE ENCOUNTER
I dont think antibiotics are the right solution for her. She has more than exhausted this method of treatment without any improvement in her symptoms. I can not imagine that another round of antibiotics would lead to a different conclusion. Again, I am skeptical about getting vaginal cultures that show bacteria that have not been shown in the literature to cause infection ie enterococcos faecilis.   I am honestly stumped by her case. Its disappointing that the clobetasol ointment did not work. I honestly also feel that her urinary incontinence and continuous exposure of urine on her skin is playing a big role in her ongoing irritation. I encourage that she sees urology as soon as possible as this would hopefully help to alleviate the irritation. Otherwise, I would recommend referral to dermatology to assess for any other alleviating measures since we have pretty much exhausted all treatment options.     Roxann Marr MD

## 2020-12-23 RX ORDER — HYDROCHLOROTHIAZIDE 12.5 MG/1
TABLET ORAL
Qty: 30 TABLET | Refills: 1 | OUTPATIENT
Start: 2020-12-23

## 2020-12-29 ENCOUNTER — ALLIED HEALTH/NURSE VISIT (OUTPATIENT)
Dept: NURSING | Facility: CLINIC | Age: 80
End: 2020-12-29
Payer: COMMERCIAL

## 2020-12-29 VITALS — DIASTOLIC BLOOD PRESSURE: 64 MMHG | SYSTOLIC BLOOD PRESSURE: 142 MMHG

## 2020-12-29 DIAGNOSIS — I10 ESSENTIAL HYPERTENSION: Primary | ICD-10-CM

## 2020-12-30 ENCOUNTER — NURSE TRIAGE (OUTPATIENT)
Dept: NURSING | Facility: CLINIC | Age: 80
End: 2020-12-30

## 2020-12-30 RX ORDER — LISINOPRIL 40 MG/1
40 TABLET ORAL DAILY
Qty: 90 TABLET | Refills: 1 | Status: SHIPPED | OUTPATIENT
Start: 2020-12-30 | End: 2021-05-20

## 2020-12-30 NOTE — TELEPHONE ENCOUNTER
Pt is calling.    [facility] called her and said that she has a new script for Lisinopril with an increase dosage. She was never told about this. She is very upset.  She never got a call back from anyone. She is very upset that she had to hear from the pharmacy that her medication was increased. She was never told this and is very concerned. Would like a call back today with the answer to the below questions.  She is taking hydrochlorothiazide.   Is she supposed to take the increase dose of the Lisinopril 40 mg? Stay on the hydrochlorothiazide?   Please call back today and let her know.    Additional Information    Negative: Drug overdose and triager unable to answer question    Negative: Caller requesting information unrelated to medicine    Negative: Caller requesting a prescription for Strep throat and has a positive culture result    Negative: Rash while taking a medication or within 3 days of stopping it    Negative: Immunization reaction suspected    Negative: Asthma and having symptoms of asthma (cough, wheezing, etc.)    Negative: Breastfeeding questions about mother's medicines and diet    Negative: MORE THAN A DOUBLE DOSE of a prescription or over-the-counter (OTC) drug    Negative: DOUBLE DOSE (an extra dose or lesser amount) of over-the-counter (OTC) drug and any symptoms (e.g., dizziness, nausea, pain, sleepiness)    Negative: DOUBLE DOSE (an extra dose or lesser amount) of prescription drug and any symptoms (e.g., dizziness, nausea, pain, sleepiness)    Negative: Took another person's prescription drug    Negative: DOUBLE DOSE (an extra dose or lesser amount) of prescription drug and NO symptoms (Exception: a double dose of antibiotics)    Negative: Diabetes drug error or overdose (e.g., took wrong type of insulin or took extra dose)    Negative: Caller has medication question about med not prescribed by PCP and triager unable to answer question (e.g., compatibility with other med, storage)    Caller has  urgent medication question about med that PCP prescribed and triager unable to answer question    Negative: Request for URGENT new prescription or refill of 'essential' medication (i.e., likelihood of harm to patient if not taken) and triager unable to fill per department policy    Negative: Prescription not at pharmacy and was prescribed today by PCP    Negative: Pharmacy calling with prescription questions and triager unable to answer question    Protocols used: MEDICATION QUESTION CALL-A-OH    Lizzette Kingston RN  [facility] Nurse Advisor  12/30/2020 at 1:47 PM

## 2021-01-05 ENCOUNTER — ALLIED HEALTH/NURSE VISIT (OUTPATIENT)
Dept: NURSING | Facility: CLINIC | Age: 81
End: 2021-01-05
Payer: COMMERCIAL

## 2021-01-05 VITALS — SYSTOLIC BLOOD PRESSURE: 140 MMHG | DIASTOLIC BLOOD PRESSURE: 60 MMHG

## 2021-01-05 DIAGNOSIS — Z23 NEED FOR PROPHYLACTIC VACCINATION AND INOCULATION AGAINST INFLUENZA: Primary | ICD-10-CM

## 2021-01-05 NOTE — TELEPHONE ENCOUNTER
Patient seen in clinic today for BP check, asks about status of this message as she has not heard back from the clinic.     Apologized to patient that her message was not addressed. Unfortunately, the message was also closed removing this from the in-basket for follow-up.     Confirmed with patient that Dinorah did want her to increase her Lisinopril to 40 mg following 12/29/2020 Nurse appointment for BP check. Patient still has 10 mg and 30 mg Lisinopril tablets at home and asks if she can take these together for the 40 mg dose. Advised patient it is okay to take her remaining 10 mg and 30 mg Lisinopril tablets until they are gone.

## 2021-01-05 NOTE — PROGRESS NOTES
Patient advised of Dinoarh's recommendation to increase Lisinopril to 40 mg daily. Patient is taking HCTZ as ordered. She had Nurse only BP appointment today and plans to schedule another nurse appointment for next Tuesday (1/12/21) to check BP again.

## 2021-01-12 ENCOUNTER — TELEPHONE (OUTPATIENT)
Dept: INTERNAL MEDICINE | Facility: CLINIC | Age: 81
End: 2021-01-12

## 2021-01-12 ENCOUNTER — ALLIED HEALTH/NURSE VISIT (OUTPATIENT)
Dept: NURSING | Facility: CLINIC | Age: 81
End: 2021-01-12
Payer: COMMERCIAL

## 2021-01-12 VITALS — DIASTOLIC BLOOD PRESSURE: 58 MMHG | SYSTOLIC BLOOD PRESSURE: 112 MMHG

## 2021-01-12 DIAGNOSIS — I10 ESSENTIAL HYPERTENSION: Primary | ICD-10-CM

## 2021-01-12 DIAGNOSIS — N76.0 BV (BACTERIAL VAGINOSIS): Primary | ICD-10-CM

## 2021-01-12 DIAGNOSIS — B96.89 BV (BACTERIAL VAGINOSIS): Primary | ICD-10-CM

## 2021-01-12 NOTE — PROGRESS NOTES
Here for B/P check = 112/58 - reports slight amount of Dizziness last night .    Also Patient is concerned regarding albumin Urin mg/gCr being so high =255.14    Provider please advise .VANESSA Srivastava LPN

## 2021-01-12 NOTE — PROGRESS NOTES
Blood pressure is excellent   The microalbumin should improve with blood pressure in control- treatment is ACE /ARB and we increased her lisinopril

## 2021-01-12 NOTE — TELEPHONE ENCOUNTER
Contacted patient, informed her of Dinorah's recommendations. Patient asks if DBP is too low, in the past this has been in the 70s. She states that she doesn't feel quite normal, does not feel things spinning, but doesn't feel quite right. Concerned about Lisinopril dose being possibly too high, she has scheduled another BP check next week to recheck. She has not been drinking much water and will work on increasing this. She asks if Dinorah is concerned about DBP readings.     BP Readings from Last 6 Encounters:   01/12/21 112/58   01/05/21 (!) 140/60   12/29/20 (!) 142/64   12/22/20 (!) 152/70   12/15/20 120/70   12/10/20 (!) 152/72     She is taking Metronidazole, asks if this could be contributing to her BP as well. She was seen at [facility] in [place] and diagnosed with BV (gardernella), but still having burning sensation with a little itching. Patient states the report she received said the bacteria was Gardenella and she read that Clindamyacin can also be used to treat this bacteria and asks if Dinorah could change prescription or if she needs to speak to Urgent Care regarding this.

## 2021-01-13 RX ORDER — CLINDAMYCIN HCL 300 MG
300 CAPSULE ORAL 2 TIMES DAILY
Qty: 14 CAPSULE | Refills: 0 | Status: SHIPPED | OUTPATIENT
Start: 2021-01-13 | End: 2021-04-20

## 2021-01-13 NOTE — TELEPHONE ENCOUNTER
DBP ok   Not worrisome  If she is not feeling well, we may back off on blood pressure medication, but this was first one that was ok ranges so I prefer to not do this

## 2021-01-18 ENCOUNTER — OFFICE VISIT (OUTPATIENT)
Dept: INTERNAL MEDICINE | Facility: CLINIC | Age: 81
End: 2021-01-18
Payer: COMMERCIAL

## 2021-01-18 VITALS
BODY MASS INDEX: 32.1 KG/M2 | HEIGHT: 64 IN | TEMPERATURE: 98.4 F | OXYGEN SATURATION: 99 % | RESPIRATION RATE: 16 BRPM | WEIGHT: 188 LBS | SYSTOLIC BLOOD PRESSURE: 132 MMHG | DIASTOLIC BLOOD PRESSURE: 68 MMHG | HEART RATE: 104 BPM

## 2021-01-18 DIAGNOSIS — H69.93 DYSFUNCTION OF BOTH EUSTACHIAN TUBES: Primary | ICD-10-CM

## 2021-01-18 DIAGNOSIS — H61.21 IMPACTED CERUMEN OF RIGHT EAR: ICD-10-CM

## 2021-01-18 PROCEDURE — 99213 OFFICE O/P EST LOW 20 MIN: CPT | Performed by: INTERNAL MEDICINE

## 2021-01-18 RX ORDER — FLUTICASONE PROPIONATE 50 MCG
1 SPRAY, SUSPENSION (ML) NASAL DAILY
Qty: 9.9 ML | Refills: 0 | Status: SHIPPED | OUTPATIENT
Start: 2021-01-18 | End: 2021-04-20

## 2021-01-18 RX ORDER — CETIRIZINE HYDROCHLORIDE 10 MG/1
10 TABLET ORAL DAILY
Qty: 30 TABLET | Refills: 0 | Status: SHIPPED | OUTPATIENT
Start: 2021-01-18 | End: 2021-04-20

## 2021-01-18 ASSESSMENT — ENCOUNTER SYMPTOMS
CHILLS: 0
FEVER: 0

## 2021-01-18 ASSESSMENT — MIFFLIN-ST. JEOR: SCORE: 1317.76

## 2021-01-18 NOTE — PATIENT INSTRUCTIONS
Patient Education     Earwax Removal    The ear canal makes earwax (cerumen) from the canal s lining. The ears make wax to lubricate and protect the ear canal. The ear canal is the tube that connects the middle ear to the outside of the ear. The wax protects the ear from bacteria, infection, and damage from water or trauma.  The wax that forms in the canal naturally moves toward the outside of the ear and falls out. In some cases, the ear may make too much wax. If the wax causes problems or keeps the healthcare provider from seeing into the ear, the extra wax may be removed.  Too much wax can affect your hearing. It can cause itching. In rare cases, it can be painful. Earwax should not be removed unless it is causing a problem. You should not stick objects including q-tips or cotton into your ear to remove wax unless told to do so by your healthcare provider.  Healthcare providers can remove earwax safely. Often irrigation and syringing will help. At times instruments or suction are used to remove the wax. It is important to stay still during the procedure to avoid damage to the ear canal. But removing earwax generally doesn t hurt. You will not need anesthesia or pain medicine when the provider removes the earwax.  A number of conditions lead to earwax buildup. These include some skin problems, a narrow ear canal, or ears that make too much earwax. Using cotton swabs in the canal pushes earwax deeper into the ear and contributes to the buildup of earwax.  Home care    The healthcare provider may recommend mineral oil or an over-the-counter (OTC)ardrop to use at home to soften the earwax. He or she may also recommend a home irrigation or syringing kit. Use these products only if the provider recommends them. Carefully follow the instructions given.    Don t use mineral oil or OTC eardrops if you might have an ear infection or a ruptured eardrum. Tell your healthcare provider right away if you have diabetes or an  immune disorder.    Don t use cotton swabs in your ears. Cotton swabs may push wax deeper into the ear canal or damage the eardrum. Use cotton gauze or a wet washcloth  to gently remove wax on the outside of the ear and around the opening to the ear canal.    Don't use any probing device or object such as cotton-tipped swabs or yee pins to clean the inside of your ears.    Don t use ear candles to clean your ears. Candling can be dangerous. It can burn the ear canal. It can also make the condition worse instead of better.    Don t use cold water to rinse the ear. This will make you dizzy. If your provider tells you to rinse your ear, use only warm water or follow his or her instructions.    Check the ear for signs of infection or irritation listed below under When to seek medical advice.  Steps for using eardrops  1. Warm the medicine bottle by rubbing it between your hands for a few minutes.  2. Lie down on your side, with the affected ear up.  3. Place the recommended number of drops in the ear. Wet a cotton ball with the medicine. Gently put the cotton ball into the ear opening.    Follow-up care  Follow up with your healthcare provider, or as directed.  When to seek medical advice  Call the provider right away if you have:    Ear pain that gets worse    Fever of 100.4F F (38 C) or higher, or as directed by your healthcare provider    Worsening wax buildup    Severe pain, dizziness, or nausea    Bleeding from the ear    Hearing problems    Signs of irritation from the eardrops, such as burning, stinging, or swelling and tenderness    Foul-smelling fluid draining from the ear    Swelling, redness, or tenderness of the outer ear    Headache, neck pain, or stiff neck  [employer] last reviewed this educational content on 6/1/2017 2000-2020 [employer] [place]. [address] [place] [address], [place] [address]. All rights reserved. This information is not intended as a substitute for professional medical care. Always  follow your healthcare professional's instructions.

## 2021-01-18 NOTE — PROGRESS NOTES
"  Assessment & Plan   Problem List Items Addressed This Visit     None      Visit Diagnoses     Dysfunction of both eustachian tubes    -  Primary    Relevant Medications    cetirizine (ZYRTEC) 10 MG tablet    fluticasone (FLONASE) 50 MCG/ACT nasal spray    Impacted cerumen of right ear        Relevant Medications    cetirizine (ZYRTEC) 10 MG tablet    fluticasone (FLONASE) 50 MCG/ACT nasal spray          Patient had ear wash for impacted wax in right ear.  Will give a trial antihistamine and Flonase spray for eustachian tube dysfunction if symptoms persist patient was advised to follow-up.    Return if symptoms worsen or fail to improve.    Zhanna Cortez MD  [facility] DUDLEY Hensley is a 80 year old who presents to clinic today for the following health issues : bilateral ear pain with crackling.    HPI   C/o bilateral ear cracking sensation for few weeks.  Right ear is plugged now with [place].  Left side is cracking off and on.  Denies ear drainage, fever, chills.  C/o PND.      Review of Systems   Constitutional: Negative for chills and fever.   HENT: Positive for ear pain and hearing loss.           Objective    /68   Pulse 104   Temp 98.4  F (36.9  C) (Oral)   Resp 16   Ht 1.626 m (5' 4\")   Wt 85.3 kg (188 lb)   LMP  (LMP Unknown)   SpO2 99%   Breastfeeding No   BMI 32.27 kg/m    Body mass index is 32.27 kg/m .  Physical Exam  Vitals signs reviewed.   HENT:      Ears:      Comments: Right ear: Impacted wax noted.  Left ear: Minimal wax noted in the ear canal.  Tympanic membrane was visualized and no signs of infection.  Neurological:      Mental Status: She is alert.                "

## 2021-01-20 ENCOUNTER — NURSE TRIAGE (OUTPATIENT)
Dept: NURSING | Facility: CLINIC | Age: 81
End: 2021-01-20

## 2021-01-20 DIAGNOSIS — G62.9 NEUROPATHY: ICD-10-CM

## 2021-01-20 NOTE — TELEPHONE ENCOUNTER
"\"I have been feeling dizzy for the past week. I went to the doctor on Monday 1/18 to get my right ear cleaned out, I thought that would make the dizziness better, but it hasn't. I'm also having some right upper muscle pain(in the back) and it goes into the right side of my neck.\"  Denies other sx  Patient states her BP was good on 1/18, 132/68, denies chest pain, tightness, SOB or other sx  Blood sugar this am was 180.   Triaged and gave home care advice and when to seek emergency care.  Patient already has an appt in am 1/21 with PCP  Call back if needed.  Susan Mclaughlin RN [facility]      COVID 19 Nurse Triage Plan/Patient Instructions    Please be aware that novel coronavirus (COVID-19) may be circulating in the community. If you develop symptoms such as fever, cough, or SOB or if you have concerns about the presence of another infection including coronavirus (COVID-19), please contact your health care provider or visit [email or web address].     Disposition/Instructions    In-Person Visit with provider recommended. Reference Visit Selection Guide.    Thank you for taking steps to prevent the spread of this virus.  o Limit your contact with others.  o Wear a simple mask to cover your cough.  o Wash your hands well and often.    Resources    [facility]: About COVID-19: [email or web address]    CDC: What to Do If You're Sick: www.cdc.gov/coronavirus/2019-ncov/about/steps-when-sick.html    CDC: Ending Home Isolation: www.cdc.gov/coronavirus/2019-ncov/hcp/disposition-in-home-patients.html     CDC: Caring for Someone: www.cdc.gov/coronavirus/2019-ncov/if-you-are-sick/care-for-someone.html     [facility]: Interim Guidance for Hospital Discharge to Home: www.health.[facility].mn.us/diseases/coronavirus/hcp/hospdischarge.pdf    [facility] clinical trials (COVID-19 research studies): [email or web address]/umn-clinical-trials     Below are the COVID-19 hotlines at the [facility]" "Health ([facility]). Interpreters are available.   o For health questions: Call 342-167-2557 or 1-720.838.3577 (7 a.m. to 7 p.m.)  o For questions about schools and childcare: Call 084-542-6747 or 1-917.181.1418 (7 a.m. to 7 p.m.)                     Additional Information    Negative: Severe difficulty breathing (e.g., struggling for each breath, speaks in single words)    Negative: [1] Difficulty breathing or swallowing AND [2] started suddenly after medicine, an allergic food or bee sting    Negative: Shock suspected (e.g., cold/pale/clammy skin, too weak to stand, low BP, rapid pulse)    Negative: Difficult to awaken or acting confused (e.g., disoriented, slurred speech)    Negative: [1] Weakness (i.e., paralysis, loss of muscle strength) of the face, arm or leg on one side of the body AND [2] sudden onset AND [3] present now    Negative: [1] Numbness (i.e., loss of sensation) of the face, arm or leg on one side of the body AND [2] sudden onset AND [3] present now    Negative: [1] Loss of speech or garbled speech AND [2] sudden onset AND [3] present now    Negative: Overdose (accidental or intentional) of medications    Negative: [1] Fainted > 15 minutes ago AND [2] still feels too weak or dizzy to stand    Negative: Heart beating < 50 beats per minute OR > 140 beats per minute    Negative: Sounds like a life-threatening emergency to the triager    Negative: Chest pain    Negative: Rectal bleeding, bloody stool, or tarry-black stool    Negative: [1] Vomiting AND [2] contains red blood or black (\"coffee ground\") material    Negative: Vomiting is main symptom    Negative: Diarrhea is main symptom    Negative: Headache is main symptom    Negative: Patient states that he/she is having an anxiety/panic attack    Negative: Dizziness from low blood sugar (i.e., < 60 mg/dl or 3.5 mmol/l)    Negative: Dizziness is described as a spinning sensation (i.e., vertigo)    Negative: Heat exhaustion suspected (i.e., dehydration from " "heat exposure)    Negative: Difficulty breathing    Negative: SEVERE dizziness (e.g., unable to stand, requires support to walk, feels like passing out now)    Negative: Extra heart beats OR irregular heart beating  (i.e., \"palpitations\")    Negative: [1] Drinking very little AND [2] dehydration suspected (e.g., no urine > 12 hours, very dry mouth, very lightheaded)    Negative: Patient sounds very sick or weak to the triager    Negative: [1] Dizziness caused by heat exposure, sudden standing, or poor fluid intake AND [2] no improvement after 2 hours of rest and fluids    Negative: [1] Fever > 103 F (39.4 C) AND [2] not able to get the fever down using Fever Care Advice    Negative: [1] Fever > 101 F (38.3 C) AND [2] age > 60    Negative: [1] Fever > 100.0 F (37.8 C) AND [2] bedridden (e.g., nursing home patient, CVA, chronic illness, recovering from surgery)    Negative: [1] Fever > 100.0 F (37.8 C) AND [2] diabetes mellitus or weak immune system (e.g., HIV positive, cancer chemo, splenectomy, organ transplant, chronic steroids)    [1] MODERATE dizziness (e.g., interferes with normal activities) AND [2] has NOT been evaluated by physician for this  (Exception: dizziness caused by heat exposure, sudden standing, or poor fluid intake)    Protocols used: DIZZINESS - [id number]      "

## 2021-01-21 ENCOUNTER — OFFICE VISIT (OUTPATIENT)
Dept: INTERNAL MEDICINE | Facility: CLINIC | Age: 81
End: 2021-01-21
Payer: COMMERCIAL

## 2021-01-21 VITALS
TEMPERATURE: 97.8 F | RESPIRATION RATE: 18 BRPM | DIASTOLIC BLOOD PRESSURE: 81 MMHG | SYSTOLIC BLOOD PRESSURE: 151 MMHG | WEIGHT: 188 LBS | OXYGEN SATURATION: 98 % | BODY MASS INDEX: 32.1 KG/M2 | HEIGHT: 64 IN | HEART RATE: 99 BPM

## 2021-01-21 DIAGNOSIS — I10 HYPERTENSION GOAL BP (BLOOD PRESSURE) < 130/80: ICD-10-CM

## 2021-01-21 DIAGNOSIS — R42 DIZZINESS: Primary | ICD-10-CM

## 2021-01-21 DIAGNOSIS — M54.2 NECK PAIN: ICD-10-CM

## 2021-01-21 PROCEDURE — 99214 OFFICE O/P EST MOD 30 MIN: CPT | Performed by: INTERNAL MEDICINE

## 2021-01-21 RX ORDER — GABAPENTIN 300 MG/1
CAPSULE ORAL
Qty: 180 CAPSULE | Refills: 3 | Status: SHIPPED | OUTPATIENT
Start: 2021-01-21 | End: 2022-03-29

## 2021-01-21 RX ORDER — MECLIZINE HCL 12.5 MG 12.5 MG/1
12.5 TABLET ORAL 3 TIMES DAILY PRN
Qty: 30 TABLET | Refills: 0 | Status: SHIPPED | OUTPATIENT
Start: 2021-01-21 | End: 2021-04-20

## 2021-01-21 ASSESSMENT — MIFFLIN-ST. JEOR: SCORE: 1317.76

## 2021-01-21 NOTE — NURSING NOTE
"BP (!) 151/81 (BP Location: Right arm, Patient Position: Sitting, Cuff Size: Adult Regular)   Pulse 99   Temp 97.8  F (36.6  C) (Oral)   Resp 18   Ht 1.626 m (5' 4\")   Wt 85.3 kg (188 lb)   LMP  (LMP Unknown)   SpO2 98%   BMI 32.27 kg/m      "

## 2021-01-21 NOTE — PATIENT INSTRUCTIONS
Plan:  1. Increase the hydrochlorothiazide to 2 tabs a day   2. Voltaren gel twice a day to the neck - as needed   3. Call if you want to go to physical therapy   4. Meclizine 12.5 mg 3 times a day   5. Continue the other meds, same doses for now.

## 2021-01-21 NOTE — PROGRESS NOTES
Patient's instructions / PLAN:                                                        Plan:  1. Increase the hydrochlorothiazide to 2 tabs a day   2. Voltaren gel twice a day to the neck - as needed   3. Call if you want to go to physical therapy   4. Meclizine 12.5 mg 3 times a day   5. Continue the other meds, same doses for now.      ASSESSMENT & PLAN:                                                      80-year-old woman with diabetes, hypertension.  She has history of neck pain treated with deep massage through the neurologist office few years ago.  For the last couple of days she noticed pain on the right side of the neck and shoulder and she is worried that this might be angina which he will end.  She is tender on palpation, she does not have shortness of breath diaphoresis, I reassured her.  She does not have classic vertigo, though she became little dizzy with the head movements during the examination.  She feels clicks in her ears which they look well on the exam.  I agree with my colleague Dr. Cortez that this might be you start your tube dysfunction and I encouraged her to continue with Flonase and cetirizine.  I am also wondering if all her symptoms are just neck shoulder muscle tension and scapular muscle tension.  I advised her for physical therapy and she agrees.  Her blood pressure is not controlled today and I advised her to increase HCTZ dose    (R42) Dizziness  (primary encounter diagnosis)  Comment: We discussed about the new meds, advantages and potential side effects. The patient will read also the info from the pharmacy and call back if questions.   Plan: meclizine (ANTIVERT) 12.5 MG tablet            (M54.2) Neck pain  Comment:   Plan: [facility]            (I10) Hypertension goal BP (blood pressure) < 130/80  Comment:   Plan: as above        Chief Complaint:                                                      Head pressure  Neck  "pain  Clicking in years  Blood pressure    SUBJECTIVE:                                                    History of present illness     Feels pressure in the head  -- she doesn't feel the spinning sensation  -- x 2 weeks  -- she was eval by dr Cortez on Jan 18. Note reviewed She doesn't feel better with the meds prescribed  -- she doesn't feel a difference with positions   R knee and R shoulder pain  -- 3-4 days  -- hard to turn head   -- she had deep tissue massage at neuro office     ROS:                                                      ROS: negative for fever, chills, cough, wheezes, chest pain, shortness of breath, vomiting, abdominal pain, leg swelling     OBJECTIVE:                                                    Physical Exam :    Blood pressure (!) 151/81, pulse 99, temperature 97.8  F (36.6  C), temperature source Oral, resp. rate 18, height 1.626 m (5' 4\"), weight 85.3 kg (188 lb), SpO2 98 %, not currently breastfeeding.   NAD, appears comfortable  Skin: no rashes   HEENT: PERRLA, EOMI, pink conjunctiva, anicteric sclerae, bilateral tympanic membranes are clinically normal, oropharynx is normal color  Heart: S1 S2, RRR, no mgr appreciated  Abdomen: soft, not tender,  Extremities: no edema,   Neurologic: A, Ox3, no focal signs appreciated.  Mild dizziness but not next dark most with changing position of her head  Musculoskeletal: the paraspinal muscles in the cervical, upper trapezius  are tender and tense on palpation especially on the right side    PMHx: reviewed  Past Medical History:   Diagnosis Date     Arthritis      Essential hypertension, benign      Mixed hyperlipidemia      Pain in joint, ankle and foot     gout     Type II or unspecified type diabetes mellitus without mention of complication, not stated as uncontrolled     Diabetes mellitus      PSHx: reviewed  Past Surgical History:   Procedure Laterality Date     COLONOSCOPY       COLONOSCOPY N/A 12/17/2014    Procedure: COMBINED " COLONOSCOPY, SINGLE OR MULTIPLE BIOPSY/POLYPECTOMY BY BIOPSY;  Surgeon: Chuy Staton MD;  Location:  GI     COLONOSCOPY N/A 8/7/2020    Procedure: COLONOSCOPY;  Surgeon: Chuy Staton MD;  Location:  GI     HYSTERECTOMY, PAP NO LONGER INDICATED       HYSTERECTOMY, JAIME  1991    fibroid     SURGICAL HISTORY OF -   10/28/2015    right partial knee      Z NONSPECIFIC PROCEDURE      Breast biopsies        [facility] NONSPECIFIC PROCEDURE      Symptomatic left thigh lipomas        Z NONSPECIFIC PROCEDURE  6/1/09    pubovaginal sling        Meds: reviewed  Current Outpatient Medications   Medication Sig Dispense Refill     ACCU-CHEK GUIDE test strip TEST BLOOD SUGAR THREE TIMES DAILY OR AS DIRECTED 300 strip 1     acetaminophen (TYLENOL) 500 MG tablet Take 1-2 tablets by mouth every morning.       Alcohol Swabs (B-D SINGLE USE SWABS REGULAR) PADS USE TO SWAB AREA OF INJECTION/LANCET 6 TIMES PER DAY OR AS DIRECTED. 600 each 4     allopurinol (ZYLOPRIM) 300 MG tablet Take 1/2 (one-half) tablet by mouth once daily 45 tablet 1     Ascorbic Acid (VITAMIN C) 500 MG CAPS Take 500 mg by mouth daily       ASPIRIN 81 MG OR TABS 1 tablet daily 100 3     betamethasone, augmented, (DIPROLENE) 0.05 % lotion GINA 10 TO 20 DROPS TOPICALLY AA OF SCALP Q NIGHT UTD       Blood Glucose Calibration (ACCU-CHEK GUIDE CONTROL) LIQD 1 Application by In Vitro route as needed 1 each 3     blood glucose monitoring (ACCU-CHEK FASTCLIX) lancets TEST BLOOD SUGAR 3 TIMES DAILY AS DIRECTED 300 each 1     Blood Glucose Monitoring Suppl (ACCU-CHEK GUIDE) w/Device KIT TEST BLOOD SUGAR THREE TIMES DAILY 1 kit 0     calcipotriene 0.005 % OINT Apply 1 Application topically as needed  1     calcium-magnesium (CALMAG) 500-250 MG TABS per tablet Take 1 tablet by mouth daily       cetirizine (ZYRTEC) 10 MG tablet Take 1 tablet (10 mg) by mouth daily 30 tablet 0     chlorhexidine (PERIDEX) 0.12 % solution RINSE ONE HALF  OZ 2-3 X D AFTER BREAKFAST BEFORE  "BEDTIME FOLLOWING BRUSHING AND FLOSSIN       Cholecalciferol (VITAMIN D) 2000 UNITS tablet Take 1 tablet by mouth daily       CINNAMON 500 MG OR CAPS 2 tablets daily  0     clindamycin (CLEOCIN) 300 MG capsule Take 1 capsule (300 mg) by mouth 2 times daily 14 capsule 0     clobetasol (TEMOVATE) 0.05 % external ointment Apply topically 2 times daily for 2 weeks, followed by once daily for 2 weeks, followed by every other day application 45 g 0     DROPLET INSULIN SYRINGE 31G X 5/16\" 0.3 ML miscellaneous USE  2 SYRINGES EVERY DAY  OR AS DIRECTED 100 each 6     estradiol (VAGIFEM) 10 MCG TABS vaginal tablet Place 1 tablet (10 mcg) vaginally three times a week May use daily for up to 2 weeks, then 2-3 times weekly. 24 tablet 0     fluconazole (DIFLUCAN) 150 MG tablet Take 1 tablet (150 mg) by mouth every 72 hours 3 tablet 0     fluticasone (FLONASE) 50 MCG/ACT nasal spray Spray 1 spray into both nostrils daily 9.9 mL 0     gabapentin (NEURONTIN) 300 MG capsule TAKE 2 CAPSULES AT BEDTIME 180 capsule 3     glipiZIDE (GLUCOTROL) 10 MG tablet TAKE 1 TABLET BY MOUTH TWICE DAILY BEFORE MEAL(S) 180 tablet 1     hydrochlorothiazide (HYDRODIURIL) 12.5 MG tablet Take 1 tablet (12.5 mg) by mouth daily 90 tablet 1     insulin NPH (NOVOLIN N RELION) 100 UNIT/ML vial INJECT 20 UNITS SUBCUTANEOUSLY BEFORE BREAKFAST AND 20 UNITS BEFORE DINNER 20 mL 0     Lactobacillus (PROBIOTIC ACIDOPHILUS) TABS Take 1 tablet by mouth daily       lisinopril (ZESTRIL) 40 MG tablet Take 1 tablet (40 mg) by mouth daily 90 tablet 1     metFORMIN (GLUCOPHAGE) 1000 MG tablet TAKE 1 TABLET BY MOUTH TWICE DAILY WITH MEALS 180 tablet 0     simvastatin (ZOCOR) 40 MG tablet TAKE 1 TABLET BY MOUTH ONCE DAILY AT BEDTIME 90 tablet 0     TUMS 500 MG OR CHEW 1 TABLET 3 TIMES PRN 90 0     vitamin E 400 UNITS TABS Take 400 Units by mouth daily       Zinc Sulfate (ZINC 15 PO) Take by mouth daily         Soc Hx: reviewed  Fam Hx: reviewed          Maribell Brady, " MD  Internal Medicine

## 2021-01-21 NOTE — TELEPHONE ENCOUNTER
Pending Prescriptions:                       Disp   Refills    gabapentin (NEURONTIN) 300 MG capsule      180 ca*3          Routing refill request to provider for review/approval because:  Drug not on the [facility] refill protocol

## 2021-01-22 ENCOUNTER — TELEPHONE (OUTPATIENT)
Dept: INTERNAL MEDICINE | Facility: CLINIC | Age: 81
End: 2021-01-22

## 2021-01-22 DIAGNOSIS — I10 ESSENTIAL HYPERTENSION: ICD-10-CM

## 2021-01-22 NOTE — TELEPHONE ENCOUNTER
"Per 1/21/21 office visit nite:  \"Plan:  1. Increase the hydrochlorothiazide to 2 tabs a day\"    Updated sig to reflect new dose. Please sign if appropriate   "

## 2021-01-22 NOTE — TELEPHONE ENCOUNTER
Patient seen Dr Brady 1/21/2021 and she was told she was increasing her hydrochlorothiazide (HYDRODIURIL) 12.5 MG tablet to 2 tablets. Please send in a new rx to [facility] in [facility] to call and lm  Patient wants a 90 days supply  [phone]

## 2021-01-25 RX ORDER — HYDROCHLOROTHIAZIDE 12.5 MG/1
25 TABLET ORAL DAILY
Qty: 60 TABLET | Refills: 1 | Status: SHIPPED | OUTPATIENT
Start: 2021-01-25 | End: 2021-02-02

## 2021-01-26 ENCOUNTER — ALLIED HEALTH/NURSE VISIT (OUTPATIENT)
Dept: NURSING | Facility: CLINIC | Age: 81
End: 2021-01-26
Payer: COMMERCIAL

## 2021-01-26 VITALS — SYSTOLIC BLOOD PRESSURE: 130 MMHG | DIASTOLIC BLOOD PRESSURE: 70 MMHG

## 2021-01-26 DIAGNOSIS — I10 ESSENTIAL HYPERTENSION: Primary | ICD-10-CM

## 2021-01-26 NOTE — PROGRESS NOTES
Her blood pressure is acceptable   Is she taking all of the prescribed medication?  We want it less than 140/90 which she is

## 2021-01-27 NOTE — PROGRESS NOTES
Patient informed BP acceptable and goal is for BP to be less than 140/90. She is taking medications as prescribed and has scheduled another nurse only BP check in 2 weeks to see if BP is still at goal.

## 2021-02-01 ENCOUNTER — THERAPY VISIT (OUTPATIENT)
Dept: PHYSICAL THERAPY | Facility: CLINIC | Age: 81
End: 2021-02-01
Payer: COMMERCIAL

## 2021-02-01 DIAGNOSIS — M54.2 NECK PAIN: ICD-10-CM

## 2021-02-01 DIAGNOSIS — I10 ESSENTIAL HYPERTENSION: ICD-10-CM

## 2021-02-01 PROCEDURE — 97161 PT EVAL LOW COMPLEX 20 MIN: CPT | Mod: GP | Performed by: PHYSICAL THERAPIST

## 2021-02-01 PROCEDURE — 97140 MANUAL THERAPY 1/> REGIONS: CPT | Mod: GP | Performed by: PHYSICAL THERAPIST

## 2021-02-01 PROCEDURE — 97110 THERAPEUTIC EXERCISES: CPT | Mod: GP | Performed by: PHYSICAL THERAPIST

## 2021-02-01 NOTE — PROGRESS NOTES
[facility] Initial Evaluation  Subjective:  The history is provided by the patient. No  was used.   Patient Health History  Lindsey Gary being seen for Neck and upper back tension.     Problem began: 1/21/2021.   Problem occurred: unknown   Pain is reported as 1/10 on pain scale.  General health as reported by patient is fair.  Pertinent medical history includes: diabetes, high blood pressure, numbness/tingling, osteoarthritis and overweight.   Red flags:  None as reported by patient.  Medical allergies: none.   Surgeries include:  Orthopedic surgery and other. Other surgery history details: mellissa, knees, breast biopsy, benign tumors in L leg.    Current medications:  High blood pressure medication.    Current occupation is Retired.                     Therapist Generated HPI Evaluation  Problem details: Patient reports an issue with neck tension and dizziness about 15 years ago. She was evaluated by a neurologist and had PT at their office for deep tissue massage and this relieved her symptoms. She was doing well  Until 1 months ago when she noticed increased neck tension and short burst of dizziness. She says these happen with standing and walking more than sitting and never happen when lying down. She also has a history of bruxism and TMJ issues and has had a night splint which she has been unable to find since she moved 6 months ago. She tends to be a poor sleeper with delayed sleep onset and does not use any medications for sleep. She feels her sleep issues are no worse the past month. She does have issues with the left cornea and poor vision in the left ey. She denies headaches, but does have frontal pressure and tightness. Chief complaints are of right equal to left neck and UB tension and stiffness. She get quick waves of dizziness 1-2 times per day. She drinks 1/2-1 cup of coffee per day and is not currently doing any regular aerobic exercise. She sleeps on her sides  with 1-2 pillows under her head. She is right handed..         Type of problem:  Cervical spine.    This is a recurrent condition.  Condition occurred with:  Insidious onset.  Where condition occurred: for unknown reasons.  Patient reports pain:  Cervical right side and cervical left side.  Pain is described as aching and is intermittent.  Pain radiates to:  Head and face. Pain is worse during the day.  Since onset symptoms are gradually worsening.  Associated symptoms:  Dizziness, loss of motion/stiffness and TMJ. Symptoms are exacerbated by driving  and relieved by rest.    Previous treatment includes physical therapy (15 years ago). There was moderate improvement following previous treatment.                          Objective:  Standing Alignment:    Cervical/Thoracic:  Forward head  Shoulder/UE:  Rounded shoulders, protracted scapula L, elevated scapula L, elevated scapula R and protracted scapula R                                  Cervical/Thoracic Evaluation    AROM:  AROM Cervical:    Flexion:            100% L neck stretch  Extension:       50% with neck pain  Rotation:         Left: 50% with B neck      Right: 70% with B neck pain   Side Bend:      Left: 40% with R neck stretch     Right:  40% with L neck stretch    Strength: Prone shoulder extension grad 3+/5 B  Headaches: cervical (low grade frontal)    DTR's:  not assessed          Cervical Dermatomes:  normal                    Cervical Palpation:  : moderate B hypertonicity with fair to good relaxation with pronelying. Moderate B masseter and temporalis tone.  Tenderness present at Left:    Upper Trap; Levator; Erector Spinae and Suboccipitals  Tenderness present at Right:    Upper Trap; Levator; Erector Spinae and Suboccipitals    Cervical Stability/Joint Clearing:        Negative:ALAR Ligament and TLA AP    Cord Sign:  normal                                            General     ROS    Assessment/Plan:    Patient is a 80 year old female with  cervical complaints.    Patient has the following significant findings with corresponding treatment plan.                Diagnosis 1:  Neck pain    Pain -  manual therapy and education  Decreased ROM/flexibility - manual therapy and therapeutic exercise  Decreased strength - therapeutic exercise and therapeutic activities  Impaired muscle performance - neuro re-education  Decreased function - therapeutic activities  Impaired posture - neuro re-education    Therapy Evaluation Codes:   1) History comprised of:   Personal factors that impact the plan of care:      None.   2)  Examination of Body Systems comprised of:   Body structures and functions that impact the plan of care:      Cervical spine.   Activity limitations that impact the plan of care are:      Driving and Sleeping.  3) Clinical presentation characteristics are:   Stable/Uncomplicated.  4) Decision-Making    Low complexity using standardized patient assessment instrument and/or measureable assessment of functional outcome.  Cumulative Therapy Evaluation is: Low complexity.    Previous and current functional limitations:  (See Goal Flow Sheet for this information)    Short term and Long term goals: (See Goal Flow Sheet for this information)     Communication ability:  Patient appears to be able to clearly communicate and understand verbal and written communication and follow directions correctly.  Treatment Explanation - The following has been discussed with the patient:   RX ordered/plan of care  Anticipated outcomes  Possible risks and side effects  This patient would benefit from PT intervention to resume normal activities.   Rehab potential is good.    Frequency:  1 X week, once daily  Duration:  for 8 weeks  Discharge Plan:  Achieve all LTG.  Independent in home treatment program.  Reach maximal therapeutic benefit.    Please refer to the daily flowsheet for treatment today, total treatment time and time spent performing 1:1 timed codes.

## 2021-02-02 RX ORDER — HYDROCHLOROTHIAZIDE 12.5 MG/1
TABLET ORAL
Qty: 30 TABLET | Refills: 1 | Status: SHIPPED | OUTPATIENT
Start: 2021-02-02 | End: 2021-04-06

## 2021-02-04 ENCOUNTER — TELEPHONE (OUTPATIENT)
Dept: INTERNAL MEDICINE | Facility: CLINIC | Age: 81
End: 2021-02-04

## 2021-02-04 NOTE — TELEPHONE ENCOUNTER
Patient calls with questions regarding the Covid-19 vaccine, she is scheduled to get her first shot tomorrow and asks is she should take her medications as normal tomorrow morning. Informed patient she can take all her medications as normal prior to getting the vaccine. Patient also states that she has had a vaginal yeast infection that is not fully cleared up and asks if this would stop her from getting the vaccine. Informed patient it is okay to get the vaccine if she has a yeast infection.

## 2021-02-05 ENCOUNTER — IMMUNIZATION (OUTPATIENT)
Dept: NURSING | Facility: CLINIC | Age: 81
End: 2021-02-05
Payer: COMMERCIAL

## 2021-02-05 PROCEDURE — 91300 PR COVID VAC PFIZER DIL RECON 30 MCG/0.3 ML IM: CPT

## 2021-02-05 PROCEDURE — 0001A PR COVID VAC PFIZER DIL RECON 30 MCG/0.3 ML IM: CPT

## 2021-02-08 ENCOUNTER — THERAPY VISIT (OUTPATIENT)
Dept: PHYSICAL THERAPY | Facility: CLINIC | Age: 81
End: 2021-02-08
Payer: COMMERCIAL

## 2021-02-08 DIAGNOSIS — M54.2 NECK PAIN: ICD-10-CM

## 2021-02-08 PROCEDURE — 97140 MANUAL THERAPY 1/> REGIONS: CPT | Mod: GP | Performed by: PHYSICAL THERAPIST

## 2021-02-08 PROCEDURE — 97110 THERAPEUTIC EXERCISES: CPT | Mod: GP | Performed by: PHYSICAL THERAPIST

## 2021-02-08 NOTE — PROGRESS NOTES
SUBJECTIVE  Subjective changes as noted by pt:   No changed in neck tension. No dizzy episodes this week. Unable to do prone scap exercises.    Current pain level: 1/10     Changes in function:  Yes (See Goal flowsheet attached for changes in current functional level)     Adverse reaction to treatment or activity:  None    OBJECTIVE  Changes in objective findings:  Yes, CROM Flexion 100%, extension 50%, rotation 70% B, SB 40% B. Minimal B suboccipital and UT tone. Minimal to moderate R and minimal L masseter and temporalis tone. Fair scap control with fair to poor endurance.       ASSESSMENT  Sonje continues to require intervention to meet STG and LTG's: PT  Patient's symptoms are resolving.  Patient is ready to progress to more complex exercises.  Response to therapy has shown an improvement in  ROM , flexibility, posture and incontinence  Progress made towards STG/LTG?  Yes (See Goal flowsheet attached for updates on achievement of STG and LTG)    PLAN  Current treatment program is being advanced to more complex exercises.    PTA/ATC plan:  N/A    Please refer to the daily flowsheet for treatment today, total treatment time and time spent performing 1:1 timed codes.

## 2021-02-11 ENCOUNTER — ALLIED HEALTH/NURSE VISIT (OUTPATIENT)
Dept: NURSING | Facility: CLINIC | Age: 81
End: 2021-02-11
Payer: COMMERCIAL

## 2021-02-11 VITALS — DIASTOLIC BLOOD PRESSURE: 60 MMHG | SYSTOLIC BLOOD PRESSURE: 110 MMHG

## 2021-02-11 DIAGNOSIS — I10 ESSENTIAL HYPERTENSION: Primary | ICD-10-CM

## 2021-02-11 PROCEDURE — 99207 PR NO CHARGE NURSE ONLY: CPT

## 2021-02-16 ENCOUNTER — OFFICE VISIT (OUTPATIENT)
Dept: UROLOGY | Facility: CLINIC | Age: 81
End: 2021-02-16
Attending: NURSE PRACTITIONER
Payer: COMMERCIAL

## 2021-02-16 VITALS
SYSTOLIC BLOOD PRESSURE: 110 MMHG | DIASTOLIC BLOOD PRESSURE: 60 MMHG | HEIGHT: 64 IN | BODY MASS INDEX: 32.27 KG/M2 | WEIGHT: 189 LBS | HEART RATE: 80 BPM

## 2021-02-16 DIAGNOSIS — R32 URINARY INCONTINENCE, UNSPECIFIED TYPE: Primary | ICD-10-CM

## 2021-02-16 DIAGNOSIS — N39.0 RECURRENT UTI: ICD-10-CM

## 2021-02-16 DIAGNOSIS — Z98.890 HISTORY OF SUBURETHRAL SLING PROCEDURE: ICD-10-CM

## 2021-02-16 LAB
ALBUMIN UR-MCNC: NEGATIVE MG/DL
APPEARANCE UR: CLEAR
BILIRUB UR QL STRIP: NEGATIVE
COLOR UR AUTO: YELLOW
GLUCOSE UR STRIP-MCNC: NEGATIVE MG/DL
HGB UR QL STRIP: NEGATIVE
KETONES UR STRIP-MCNC: NEGATIVE MG/DL
LEUKOCYTE ESTERASE UR QL STRIP: NEGATIVE
NITRATE UR QL: NEGATIVE
PH UR STRIP: 5.5 PH (ref 5–7)
RESIDUAL VOLUME (RV) (EXTERNAL): 2
SOURCE: NORMAL
SP GR UR STRIP: 1.02 (ref 1–1.03)
UROBILINOGEN UR STRIP-ACNC: 0.2 EU/DL (ref 0.2–1)

## 2021-02-16 PROCEDURE — 99203 OFFICE O/P NEW LOW 30 MIN: CPT | Mod: 25 | Performed by: UROLOGY

## 2021-02-16 PROCEDURE — 81003 URINALYSIS AUTO W/O SCOPE: CPT | Mod: QW | Performed by: UROLOGY

## 2021-02-16 PROCEDURE — 51798 US URINE CAPACITY MEASURE: CPT | Performed by: UROLOGY

## 2021-02-16 ASSESSMENT — ENCOUNTER SYMPTOMS
SEIZURES: 0
INCREASED ENERGY: 1
NECK PAIN: 1
EXERCISE INTOLERANCE: 0
DOUBLE VISION: 0
ARTHRALGIAS: 1
CHILLS: 0
NERVOUS/ANXIOUS: 1
DYSURIA: 1
WEAKNESS: 0
DIFFICULTY URINATING: 0
DEPRESSION: 0
SYNCOPE: 1
JOINT SWELLING: 0
DIZZINESS: 1
PARALYSIS: 0
INSOMNIA: 1
FLANK PAIN: 0
EYE IRRITATION: 0
HEADACHES: 0
MUSCLE CRAMPS: 0
DECREASED CONCENTRATION: 0
FEVER: 0
MYALGIAS: 1
MEMORY LOSS: 1
EYE REDNESS: 0
HOT FLASHES: 1
POOR WOUND HEALING: 0
MUSCLE WEAKNESS: 0
POLYDIPSIA: 0
ORTHOPNEA: 0
EYE WATERING: 0
NUMBNESS: 1
HEMATURIA: 0
PANIC: 0
LEG PAIN: 0
STIFFNESS: 1
LOSS OF CONSCIOUSNESS: 0
DECREASED LIBIDO: 1
POLYPHAGIA: 0
SLEEP DISTURBANCES DUE TO BREATHING: 0
TINGLING: 1
DISTURBANCES IN COORDINATION: 0
SKIN CHANGES: 0
TREMORS: 0
DECREASED APPETITE: 0
EYE PAIN: 0
LIGHT-HEADEDNESS: 1
FATIGUE: 1
BACK PAIN: 1
ALTERED TEMPERATURE REGULATION: 1
WEIGHT GAIN: 1
HYPOTENSION: 0
SPEECH CHANGE: 0
WEIGHT LOSS: 0
NIGHT SWEATS: 1
PALPITATIONS: 0
HYPERTENSION: 1

## 2021-02-16 ASSESSMENT — PAIN SCALES - GENERAL: PAINLEVEL: NO PAIN (0)

## 2021-02-16 ASSESSMENT — MIFFLIN-ST. JEOR: SCORE: 1322.3

## 2021-02-16 NOTE — NURSING NOTE
Has seen many urologist in past . Last being a urologist at [place] over 7 years ago. Bladder scan was 2ml

## 2021-02-16 NOTE — PROGRESS NOTES
February 16, 2021    Referring Provider/Primary Care Provider: Dinorah Wong    Assessment & Plan     Recurrent UTI  Need to assess for nidus including kidney stone or urologic tumor  - CT Urogram wo & w Contrast; Future    Urinary incontinence, unspecified type/  History of suburethral sling procedure  Given this along with the UTI needs an office cystoscopy to evaluate for any possible sling erosion    RTC for cystoscopy after CT    Shelbi Sosa MD MPH  (she/her/hers)   of Urology  [facility]    HPI:  Lindsey Gary is a 80 year old female who presents for evaluation of her pelvic floor symptoms.      She has seen multiple people for her urinary issues    SPARC with Dr Briceño 2009, still with leakage afterwards and tried a lot of anticholinergics.  Did durasphere.  Last urologist she saw was Dr Rene who told her to decrease evening fluids and a prescription for mirabegron.    Constipation-metamucil daily    Denies gross hematuria, kidney stones, febrile UTI, vaginal bleeding, vaginal bulge.    Vaginal dryness and atrophy.  Has seen a gynecologist in the past    Past Medical History:   Diagnosis Date     Arthritis      Essential hypertension, benign      Gout      Mixed hyperlipidemia      Mumps      Pain in joint, ankle and foot     gout     Type II or unspecified type diabetes mellitus without mention of complication, not stated as uncontrolled     Diabetes mellitus     Past Surgical History:   Procedure Laterality Date     BLADDER SURGERY       COLONOSCOPY       COLONOSCOPY N/A 12/17/2014    Procedure: COMBINED COLONOSCOPY, SINGLE OR MULTIPLE BIOPSY/POLYPECTOMY BY BIOPSY;  Surgeon: hCuy Staton MD;  Location:  GI     COLONOSCOPY N/A 8/7/2020    Procedure: COLONOSCOPY;  Surgeon: Chuy Staton MD;  Location:  GI     CYSTOSCOPY       HYSTERECTOMY, PAP NO LONGER INDICATED       HYSTERECTOMY, JAIME  1991    fibroid     SURGICAL HISTORY OF -   10/28/2015     right partial knee      ZZC NONSPECIFIC PROCEDURE      Breast biopsies        ZZC NONSPECIFIC PROCEDURE      Symptomatic left thigh lipomas        ZZC NONSPECIFIC PROCEDURE  6/1/09    pubovaginal sling     Social History     Socioeconomic History     Marital status:      Spouse name: Not on file     Number of children: Not on file     Years of education: Not on file     Highest education level: Not on file   Occupational History     Not on file   Social Needs     Financial resource strain: Not on file     Food insecurity     Worry: Not on file     Inability: Not on file     Transportation needs     Medical: Not on file     Non-medical: Not on file   Tobacco Use     Smoking status: Never Smoker     Smokeless tobacco: Never Used   Substance and Sexual Activity     Alcohol use: No     Alcohol/week: 0.0 standard drinks     Drug use: No     Sexual activity: Yes     Partners: Male   Lifestyle     Physical activity     Days per week: Not on file     Minutes per session: Not on file     Stress: Not on file   Relationships     Social connections     Talks on phone: Not on file     Gets together: Not on file     Attends Jainism service: Not on file     Active member of club or organization: Not on file     Attends meetings of clubs or organizations: Not on file     Relationship status: Not on file     Intimate partner violence     Fear of current or ex partner: Not on file     Emotionally abused: Not on file     Physically abused: Not on file     Forced sexual activity: Not on file   Other Topics Concern     Parent/sibling w/ CABG, MI or angioplasty before 65F 55M? Not Asked   Social History Narrative     Not on file       Family History   Problem Relation Age of Onset     Cancer Mother         colon ca survivor     Cancer - colorectal Mother      Cerebrovascular Disease Father        Review of Systems     Constitutional:  Positive for weight gain, fatigue, night sweats, recent stressors, height loss and increased  energy. Negative for fever, chills, weight loss, decreased appetite, post-operative complications, incisional pain, hyperactivity and confused.   HENT:  Negative for ear pain, hearing loss, tinnitus, nosebleeds, trouble swallowing, hoarse voice, mouth sores, sore throat, ear discharge, tooth pain, gum tenderness, taste disturbance, smell disturbance, hearing aid, bleeding gums, dry mouth, sinus pain, sinus congestion and neck mass.    Eyes:  Positive for decreased vision, eye dryness and floaters. Negative for double vision, pain, redness, eye pain, eye watering, eye bulging, flashing lights, spots, strabismus, tunnel vision, jaundice and eye irritation.   Respiratory:   Negative for cough, hemoptysis, sputum production, shortness of breath, wheezing, sleep disturbances due to breathing, snores loudly, respiratory pain, dyspnea on exertion, cough disturbing sleep and postural dyspnea.    Cardiovascular:  Positive for hypertension, syncope, few scattered varicosities and light-headedness. Negative for chest pain, dyspnea on exertion, palpitations, orthopnea, fingers/toes turn blue, hypotension, history of heart murmur, pacemaker, leg pain, sleep disturbances due to breathing, exercise intolerance and edema.   Gastrointestinal:  Negative for heartburn, nausea, vomiting, abdominal pain, diarrhea, constipation, blood in stool, melena, rectal pain, bloating, hemorrhoids, bowel incontinence, jaundice, rectal bleeding, coffee ground emesis and change in stool.   Genitourinary:  Positive for bladder incontinence, dysuria, urgency, decreased libido, nocturia, arousal difficulty and hot flashes. Negative for hematuria, flank pain, vaginal discharge, difficulty urinating, genital sores, dyspareunia, voiding less frequently, abnormal vaginal bleeding, excessive menstruation, menstrual changes, vaginal dryness and postmenopausal bleeding.   Musculoskeletal:  Positive for myalgias, back pain, arthralgias, stiffness and neck pain.  Negative for joint swelling, muscle cramps, bone pain, muscle weakness and fracture.   Skin:  Negative for itching, poor wound healing, rash, hair changes, skin changes, warts, poor wound healing, scarring, flaky skin, Raynaud's phenomenon, sensitivity to sunlight and skin thickening.   Neurological:  Positive for dizziness, tingling, light-headedness, numbness, memory loss and difficulty walking. Negative for tremors, speech change, seizures, loss of consciousness, weakness, headaches, disturbances in coordination and paralysis.   Endo/Heme:  Negative for anemia, swollen glands and bruises/bleeds easily.   Psychiatric/Behavioral:  Positive for memory loss and mood swings. Negative for depression, decreased concentration and panic attacks.    Breast:  Negative for breast discharge, breast mass, breast pain and nipple retraction.   Endocrine:  Positive for altered temperature regulation, unwanted hair growth and change in facial hair.Negative for polyphagia and polydipsia.      Allergies   Allergen Reactions     Augmentin      Tongue swelling and rash     Can take PCN K without reaction      Sulfa Drugs      Tongue swelling and rash       Current Outpatient Medications   Medication     ACCU-CHEK GUIDE test strip     acetaminophen (TYLENOL) 500 MG tablet     Alcohol Swabs (B-D SINGLE USE SWABS REGULAR) PADS     allopurinol (ZYLOPRIM) 300 MG tablet     Ascorbic Acid (VITAMIN C) 500 MG CAPS     ASPIRIN 81 MG OR TABS     betamethasone, augmented, (DIPROLENE) 0.05 % lotion     Blood Glucose Calibration (ACCU-CHEK GUIDE CONTROL) LIQD     blood glucose monitoring (ACCU-CHEK FASTCLIX) lancets     Blood Glucose Monitoring Suppl (ACCU-CHEK GUIDE) w/Device KIT     calcipotriene 0.005 % OINT     calcium-magnesium (CALMAG) 500-250 MG TABS per tablet     cetirizine (ZYRTEC) 10 MG tablet     chlorhexidine (PERIDEX) 0.12 % solution     Cholecalciferol (VITAMIN D) 2000 UNITS tablet     CINNAMON 500 MG OR CAPS     clindamycin  "(CLEOCIN) 300 MG capsule     clobetasol (TEMOVATE) 0.05 % external ointment     DROPLET INSULIN SYRINGE 31G X 5/16\" 0.3 ML miscellaneous     estradiol (VAGIFEM) 10 MCG TABS vaginal tablet     fluconazole (DIFLUCAN) 150 MG tablet     fluticasone (FLONASE) 50 MCG/ACT nasal spray     glipiZIDE (GLUCOTROL) 10 MG tablet     hydrochlorothiazide (HYDRODIURIL) 12.5 MG tablet     insulin NPH (NOVOLIN N RELION) 100 UNIT/ML vial     Lactobacillus (PROBIOTIC ACIDOPHILUS) TABS     lisinopril (ZESTRIL) 40 MG tablet     metFORMIN (GLUCOPHAGE) 1000 MG tablet     simvastatin (ZOCOR) 40 MG tablet     TUMS 500 MG OR CHEW     vitamin E 400 UNITS TABS     Zinc Sulfate (ZINC 15 PO)     gabapentin (NEURONTIN) 300 MG capsule     meclizine (ANTIVERT) 12.5 MG tablet     No current facility-administered medications for this visit.      GENERAL: healthy, alert and no distress  NECK: no adenopathy, no asymmetry, masses, or scars and thyroid normal to palpation  RESP: lungs clear to auscultation - no rales, rhonchi or wheezes  CV: regular rate and rhythm, normal S1 S2, no S3 or S4, no murmur, click or rub, no peripheral edema and peripheral pulses strong  ABDOMEN: soft, nontender, no hepatosplenomegaly, no masses and bowel sounds normal  MS: no gross musculoskeletal defects noted, no edema    Urine dip negative    PVR 2 mL by bladder scan    CC  Patient Care Team:  Dinorah Wong APRN CNP as PCP - General (Nurse Practitioner - Family)  Roxann Marr MD as Assigned OBGYN Provider  Dinorah Wong APRN CNP as Assigned PCP  SELF, REFERRED              "

## 2021-02-16 NOTE — PATIENT INSTRUCTIONS
Websites with free information:    [employer] patient website: www.voicesforpfd.org    Total Control Program: www.totalcontrolprogram.com    Supplements to prevent UTI to consider  -Probiotics  -Cranberry (for these products let them know a doctor is recommending them)   Ellura: www.myellura.[employer]   Theracran HP by [employer] [facility] [address]  -d-mannose  -Vitamin C 500-1000mg twice a day    Please return for a cystoscopy (procedure to look in the bladder) and pelvic exam    It was a pleasure meeting with you today.  Thank you for allowing me and my team the privilege of caring for you today.  YOU are the reason we are here, and I truly hope we provided you with the excellent service you deserve.  Please let us know if there is anything else we can do for you so that we can be sure you are leaving completely satisfied with your care experience.    Cystoscopy    Cystoscopy is a procedure that lets your doctor look directly inside your urethra and bladder. It can be used to:    Help diagnose a problem with your urethra, bladder, or kidneys.    Take a sample (biopsy) of bladder or urethral tissue.    Treat certain problems (such as removing kidney stones).    Place a stent to bypass an obstruction.    Take special X-rays of the kidneys.  Based on the findings, your doctor may recommend other tests or treatments.  What is a cystoscope?  A cystoscope is a telescope-like instrument that contains lenses and fiberoptics (small glass wires that make bright light). The cystoscope may be straight and rigid, or flexible to bend around curves in the urethra. The doctor may look directly into the cystoscope, or project the image onto a monitor.  Getting ready    Ask your doctor if you should stop taking any medicines before the procedure.    Follow any other instructions your doctor gives you.  Tell your doctor before the exam if you:    Take any medicines, such as aspirin or blood thinners    Have allergies to any  medicines    Are pregnant   The procedure  Cystoscopy is done in the doctor s office, surgery center, or hospital. The doctor and a nurse are present during the procedure. It takes only a few minutes, longer if a biopsy, X-ray, or treatment needs to be done.  During the procedure:    You lie on an exam table on your back, knees bent and legs apart. You are covered with a drape.    Your urethra and the area around it are washed. Anesthetic jelly may be applied to numb the urethra.    The cystoscope is inserted. A sterile fluid is put into the bladder to expand it. You may feel pressure from this fluid.    When the procedure is done, the cystoscope is removed.  After the procedure   Once you re home:    Drink plenty of fluids.    You may have burning or light bleeding when you urinate--this is normal.    Medicines may be prescribed to ease any discomfort or prevent infection. Take these as directed.    Call your doctor if you have heavy bleeding or blood clots, burning that lasts more than a day, a fever over 100 F  (38  C), or trouble urinating.  Date Last Reviewed: 1/1/2017 2000-2017 [employer]. [address] [place] [address]. All rights reserved. This information is not intended as a substitute for professional medical care. Always follow your healthcare professional's instructions.

## 2021-02-16 NOTE — LETTER
2/16/2021       RE: Lindsey Gary  [id number] [facility] [id number]     Dear Colleague,    Thank you for referring your patient, Lindsey Gary, to the [facility] [place] [facility]. Please see a copy of my visit note below.    February 16, 2021    Referring Provider/Primary Care Provider: Dinorah Wong    Assessment & Plan     Recurrent UTI  Need to assess for nidus including kidney stone or urologic tumor  - CT Urogram wo & w Contrast; Future    Urinary incontinence, unspecified type/  History of suburethral sling procedure  Given this along with the UTI needs an office cystoscopy to evaluate for any possible sling erosion    RTC for cystoscopy after CT    Shelbi Sosa MD MPH  (she/her/hers)   of Urology  [facility]    HPI:  Lindsey Gary is a 80 year old female who presents for evaluation of her pelvic floor symptoms.      She has seen multiple people for her urinary issues    SPARC with Dr Briceño 2009, still with leakage afterwards and tried a lot of anticholinergics.  Did durasphere.  Last urologist she saw was Dr Rene who told her to decrease evening fluids and a prescription for mirabegron.    Constipation-metamucil daily    Denies gross hematuria, kidney stones, febrile UTI, vaginal bleeding, vaginal bulge.    Vaginal dryness and atrophy.  Has seen a gynecologist in the past    Past Medical History:   Diagnosis Date     Arthritis      Essential hypertension, benign      Gout      Mixed hyperlipidemia      Mumps      Pain in joint, ankle and foot     gout     Type II or unspecified type diabetes mellitus without mention of complication, not stated as uncontrolled     Diabetes mellitus     Past Surgical History:   Procedure Laterality Date     BLADDER SURGERY       COLONOSCOPY       COLONOSCOPY N/A 12/17/2014    Procedure: COMBINED COLONOSCOPY, SINGLE OR MULTIPLE BIOPSY/POLYPECTOMY  BY BIOPSY;  Surgeon: Chuy Staton MD;  Location:  GI     COLONOSCOPY N/A 8/7/2020    Procedure: COLONOSCOPY;  Surgeon: Chuy Staton MD;  Location:  GI     CYSTOSCOPY       HYSTERECTOMY, PAP NO LONGER INDICATED       HYSTERECTOMY, JAIME  1991    fibroid     SURGICAL HISTORY OF -   10/28/2015    right partial knee      ZZC NONSPECIFIC PROCEDURE      Breast biopsies        ZZC NONSPECIFIC PROCEDURE      Symptomatic left thigh lipomas        ZZC NONSPECIFIC PROCEDURE  6/1/09    pubovaginal sling     Social History     Socioeconomic History     Marital status:      Spouse name: Not on file     Number of children: Not on file     Years of education: Not on file     Highest education level: Not on file   Occupational History     Not on file   Social Needs     Financial resource strain: Not on file     Food insecurity     Worry: Not on file     Inability: Not on file     Transportation needs     Medical: Not on file     Non-medical: Not on file   Tobacco Use     Smoking status: Never Smoker     Smokeless tobacco: Never Used   Substance and Sexual Activity     Alcohol use: No     Alcohol/week: 0.0 standard drinks     Drug use: No     Sexual activity: Yes     Partners: Male   Lifestyle     Physical activity     Days per week: Not on file     Minutes per session: Not on file     Stress: Not on file   Relationships     Social connections     Talks on phone: Not on file     Gets together: Not on file     Attends Rastafarian service: Not on file     Active member of club or organization: Not on file     Attends meetings of clubs or organizations: Not on file     Relationship status: Not on file     Intimate partner violence     Fear of current or ex partner: Not on file     Emotionally abused: Not on file     Physically abused: Not on file     Forced sexual activity: Not on file   Other Topics Concern     Parent/sibling w/ CABG, MI or angioplasty before 65F 55M? Not Asked   Social History Narrative     Not on  file       Family History   Problem Relation Age of Onset     Cancer Mother         colon ca survivor     Cancer - colorectal Mother      Cerebrovascular Disease Father        Review of Systems     Constitutional:  Positive for weight gain, fatigue, night sweats, recent stressors, height loss and increased energy. Negative for fever, chills, weight loss, decreased appetite, post-operative complications, incisional pain, hyperactivity and confused.   HENT:  Negative for ear pain, hearing loss, tinnitus, nosebleeds, trouble swallowing, hoarse voice, mouth sores, sore throat, ear discharge, tooth pain, gum tenderness, taste disturbance, smell disturbance, hearing aid, bleeding gums, dry mouth, sinus pain, sinus congestion and neck mass.    Eyes:  Positive for decreased vision, eye dryness and floaters. Negative for double vision, pain, redness, eye pain, eye watering, eye bulging, flashing lights, spots, strabismus, tunnel vision, jaundice and eye irritation.   Respiratory:   Negative for cough, hemoptysis, sputum production, shortness of breath, wheezing, sleep disturbances due to breathing, snores loudly, respiratory pain, dyspnea on exertion, cough disturbing sleep and postural dyspnea.    Cardiovascular:  Positive for hypertension, syncope, few scattered varicosities and light-headedness. Negative for chest pain, dyspnea on exertion, palpitations, orthopnea, fingers/toes turn blue, hypotension, history of heart murmur, pacemaker, leg pain, sleep disturbances due to breathing, exercise intolerance and edema.   Gastrointestinal:  Negative for heartburn, nausea, vomiting, abdominal pain, diarrhea, constipation, blood in stool, melena, rectal pain, bloating, hemorrhoids, bowel incontinence, jaundice, rectal bleeding, coffee ground emesis and change in stool.   Genitourinary:  Positive for bladder incontinence, dysuria, urgency, decreased libido, nocturia, arousal difficulty and hot flashes. Negative for hematuria,  flank pain, vaginal discharge, difficulty urinating, genital sores, dyspareunia, voiding less frequently, abnormal vaginal bleeding, excessive menstruation, menstrual changes, vaginal dryness and postmenopausal bleeding.   Musculoskeletal:  Positive for myalgias, back pain, arthralgias, stiffness and neck pain. Negative for joint swelling, muscle cramps, bone pain, muscle weakness and fracture.   Skin:  Negative for itching, poor wound healing, rash, hair changes, skin changes, warts, poor wound healing, scarring, flaky skin, Raynaud's phenomenon, sensitivity to sunlight and skin thickening.   Neurological:  Positive for dizziness, tingling, light-headedness, numbness, memory loss and difficulty walking. Negative for tremors, speech change, seizures, loss of consciousness, weakness, headaches, disturbances in coordination and paralysis.   Endo/Heme:  Negative for anemia, swollen glands and bruises/bleeds easily.   Psychiatric/Behavioral:  Positive for memory loss and mood swings. Negative for depression, decreased concentration and panic attacks.    Breast:  Negative for breast discharge, breast mass, breast pain and nipple retraction.   Endocrine:  Positive for altered temperature regulation, unwanted hair growth and change in facial hair.Negative for polyphagia and polydipsia.      Allergies   Allergen Reactions     Augmentin      Tongue swelling and rash     Can take PCN K without reaction      Sulfa Drugs      Tongue swelling and rash       Current Outpatient Medications   Medication     ACCU-CHEK GUIDE test strip     acetaminophen (TYLENOL) 500 MG tablet     Alcohol Swabs (B-D SINGLE USE SWABS REGULAR) PADS     allopurinol (ZYLOPRIM) 300 MG tablet     Ascorbic Acid (VITAMIN C) 500 MG CAPS     ASPIRIN 81 MG OR TABS     betamethasone, augmented, (DIPROLENE) 0.05 % lotion     Blood Glucose Calibration (ACCU-CHEK GUIDE CONTROL) LIQD     blood glucose monitoring (ACCU-CHEK FASTCLIX) lancets     Blood Glucose  "Monitoring Suppl (ACCU-CHEK GUIDE) w/Device KIT     calcipotriene 0.005 % OINT     calcium-magnesium (CALMAG) 500-250 MG TABS per tablet     cetirizine (ZYRTEC) 10 MG tablet     chlorhexidine (PERIDEX) 0.12 % solution     Cholecalciferol (VITAMIN D) 2000 UNITS tablet     CINNAMON 500 MG OR CAPS     clindamycin (CLEOCIN) 300 MG capsule     clobetasol (TEMOVATE) 0.05 % external ointment     DROPLET INSULIN SYRINGE 31G X 5/16\" 0.3 ML miscellaneous     estradiol (VAGIFEM) 10 MCG TABS vaginal tablet     fluconazole (DIFLUCAN) 150 MG tablet     fluticasone (FLONASE) 50 MCG/ACT nasal spray     glipiZIDE (GLUCOTROL) 10 MG tablet     hydrochlorothiazide (HYDRODIURIL) 12.5 MG tablet     insulin NPH (NOVOLIN N RELION) 100 UNIT/ML vial     Lactobacillus (PROBIOTIC ACIDOPHILUS) TABS     lisinopril (ZESTRIL) 40 MG tablet     metFORMIN (GLUCOPHAGE) 1000 MG tablet     simvastatin (ZOCOR) 40 MG tablet     TUMS 500 MG OR CHEW     vitamin E 400 UNITS TABS     Zinc Sulfate (ZINC 15 PO)     gabapentin (NEURONTIN) 300 MG capsule     meclizine (ANTIVERT) 12.5 MG tablet     No current facility-administered medications for this visit.      GENERAL: healthy, alert and no distress  NECK: no adenopathy, no asymmetry, masses, or scars and thyroid normal to palpation  RESP: lungs clear to auscultation - no rales, rhonchi or wheezes  CV: regular rate and rhythm, normal S1 S2, no S3 or S4, no murmur, click or rub, no peripheral edema and peripheral pulses strong  ABDOMEN: soft, nontender, no hepatosplenomegaly, no masses and bowel sounds normal  MS: no gross musculoskeletal defects noted, no edema    Urine dip negative    PVR 2 mL by bladder scan    CC  Patient Care Team:  Dinorah Wong APRN CNP as PCP - General (Nurse Practitioner - Family)  Roxann Marr MD as Assigned OBGYN Provider  Dinorah Wong APRN CNP as Assigned PCP  SELF, REFERRED    "

## 2021-02-20 ENCOUNTER — NURSE TRIAGE (OUTPATIENT)
Dept: NURSING | Facility: CLINIC | Age: 81
End: 2021-02-20

## 2021-02-20 ENCOUNTER — VIRTUAL VISIT (OUTPATIENT)
Dept: URGENT CARE | Facility: CLINIC | Age: 81
End: 2021-02-20
Payer: COMMERCIAL

## 2021-02-20 DIAGNOSIS — J01.01 ACUTE RECURRENT MAXILLARY SINUSITIS: Primary | ICD-10-CM

## 2021-02-20 PROCEDURE — 99213 OFFICE O/P EST LOW 20 MIN: CPT | Mod: 95

## 2021-02-20 RX ORDER — AZITHROMYCIN 250 MG/1
TABLET, FILM COATED ORAL
Qty: 6 TABLET | Refills: 0 | Status: SHIPPED | OUTPATIENT
Start: 2021-02-20 | End: 2021-02-25

## 2021-02-20 NOTE — PROGRESS NOTES
"Shellie is a 80 year old who is being evaluated via a billable telephone visit.      What phone number would you like to be contacted at? [phone]  How would you like to obtain your AVS? MyChart    Assessment & Plan     Acute recurrent maxillary sinusitis    - azithromycin (ZITHROMAX) 250 MG tablet; Take 2 tablets (500 mg) by mouth daily for 1 day, THEN 1 tablet (250 mg) daily for 4 days.         BMI:     See Patient Instructions    Return in about 5 days (around 2/25/2021) for If not better, sooner if worsening.  Diagnosis and treatment plan were discussed with patient and/or parent. If symptoms worsen or do not improve in the next few days, follow-up with your primary care provider or visit an [facility] urgent care clinic location.  Patient verbalizes understanding of all things discussed. All questions were addressed and answered.     Rosangela Vyas PA-C    [facility]  [facility]    Subjective   Shellie is a 80 year old who presents for the following health issues sinus problem    HPI   Symptoms have been going on for 1-2 months duration.   Head feels full, ears hurting. Saw an ear doctor who did not find anything wrong with her ears. Then went to her doctor at the [facility] who gave her nasal spray and physical therapy because she \"looked tense\". The PT is not helping.  Head still feels full and drainage down her throat. She had blood in her mucus this morning. She has a dull headache with the other symptoms. Her teeth also hurt. She has she has had \"almost dizzy spells.\"  Denies fevers.  She feels slightly congested.   Has had similar symptoms in the past.   Usually takes a zpak for them. Has had an allergy to Augmentin in the past. She still has some of the flonase nasal spray at home.    Review of Systems   Constitutional, HEENT, cardiovascular, pulmonary, gi and gu systems are negative, except as otherwise noted.      Objective           Vitals:  No " vitals were obtained today due to virtual visit.    Physical Exam   healthy, alert and no distress  PSYCH: Alert and oriented times 3; coherent speech, normal   rate and volume, able to articulate logical thoughts, able   to abstract reason, no tangential thoughts, no hallucinations   or delusions  Her affect is normal  RESP: No cough, no audible wheezing, able to talk in full sentences  Remainder of exam unable to be completed due to telephone visits  Has tenderness to percussion on left maxillary and temporal area.            Phone call duration: 11 minutes

## 2021-02-20 NOTE — TELEPHONE ENCOUNTER
"Has been worked up for the last few months for mild feelings of dizziness as well as a \"full head\".  Today, started to note blood in tissue while blowing nose.  Now thinks that she probably has a sinus infection, as these are the symptoms that she has had in the past with sinus infections.   Would like antibiotics prescribed to her, and she does not want to come in person to clinic.  Transferred to scheduling for a telephone  visit appointment.    Additional Information    [1] Mild-moderate nosebleed AND [2] bleeding stopped now    Protocols used: NOSEBLEED-A-AH      "

## 2021-02-20 NOTE — PATIENT INSTRUCTIONS
Patient Education     Sinusitis (Antibiotic Treatment)    The sinuses are air-filled spaces within the bones of the face. They connect to the inside of the nose. Sinusitis is an inflammation of the tissue that lines the sinuses. Sinusitis can occur during a cold. It can also happen due to allergies to pollens and other particles in the air. Sinusitis can cause symptoms of sinus congestion and a feeling of fullness. A sinus infection causes fever, headache, and facial pain. There is often green or yellow fluid draining from the nose or into the back of the throat (post-nasal drip). You have been given antibiotics to treat this condition.   Home care    Take the full course of antibiotics as instructed. Don't stop taking them, even when you feel better.    Drink plenty of water, hot tea, and other liquids as directed by the healthcare provider. This may help thin nasal mucus. It also may help your sinuses drain fluids.    Heat may help soothe painful areas of your face. Use a towel soaked in hot water. Or,  the shower and direct the warm spray onto your face. Using a vaporizer along with a menthol rub at night may also help soothe symptoms.     An expectorant with guaifenesin may help thin nasal mucus and help your sinuses drain fluids. Talk with your provider or pharmacists before taking an over-the-counter (OTC) medicine if you have any questions about it or its side effects..    You can use an OTC decongestant, unless a similar medicine was prescribed to you. Nasal sprays work the fastest. Use one that contains phenylephrine or oxymetazoline. First blow your nose gently. Then use the spray. Don't use these medicines more often than directed on the label. If you do, your symptoms may get worse. You may also take pills that contain pseudoephedrine. Don t use products that combine multiple medicines. This is because side effects may be increased. Read labels. You can also ask the pharmacist for help. (People  with high blood pressure should not use decongestants. They can raise blood pressure.) Talk with your provider or pharmacist if you have any questions about the medicine..    OTC antihistamines may help if allergies contributed to your sinusitis. Talk with your provider or pharmacist if you have any questions about the medicine..    Don't use nasal rinses or irrigation during an acute sinus infection, unless your healthcare provider tells you to. Rinsing may spread the infection to other areas in your sinuses.    Use acetaminophen or ibuprofen to control pain, unless another pain medicine was prescribed to you. If you have chronic liver or kidney disease or ever had a stomach ulcer, talk with your healthcare provider before using these medicines. Never give aspirin to anyone under age 18 who is ill with a fever. It may cause severe liver damage.    Don't smoke. This can make symptoms worse.    Follow-up care  Follow up with your healthcare provider, or as advised.   When to seek medical advice  Call your healthcare provider if any of these occur:     Facial pain or headache that gets worse    Stiff neck    Unusual drowsiness or confusion    Swelling of your forehead or eyelids    Symptoms don't go away in 10 days    Vision problems, such as blurred or double vision    Fever of 100.4 F (38 C) or higher, or as directed by your healthcare provider  Call 911  Call 911 if any of these occur:     Seizure    Trouble breathing    Feeling dizzy or faint    Fingernails, skin or lips look blue, purple , or gray  Prevention  Here are steps you can take to help prevent an infection:     Keep good hand washing habits.    Don t have close contact with people who have sore throats, colds, or other upper respiratory infections.    Don t smoke, and stay away from secondhand smoke.    Stay up to date with of your vaccines.  [employer] last reviewed this educational content on 12/1/2019 2000-2020 [employer] [address]  [place] [address]. All rights reserved. This information is not intended as a substitute for professional medical care. Always follow your healthcare professional's instructions.

## 2021-02-21 ENCOUNTER — NURSE TRIAGE (OUTPATIENT)
Dept: NURSING | Facility: CLINIC | Age: 81
End: 2021-02-21

## 2021-02-21 NOTE — TELEPHONE ENCOUNTER
Pt is calling in to get her antibiotic changed from her UC visit yesterday. Pt thinks she may be having a side effect of the Zithromax she was given. Pt has diarrhea, and is nauseated. Pt has no appetite, and is unable to eat, and she is a diabetic. Pt was transferred to scheduling to get a telephone appointment with the clinic to get her antibiotic changed. [facility]   On call doctor (Dr Spears) called at 5 pm about her insulin dosage she should take tonight. Pt normally takes 20 units of Novolin N at night and also in the morning. Pt checked her BG at 430pm, and it was 104. Pt is going to try to eat something now, but wants to know what insulin dosage she should take tonight. Pt also wants to know if she should stop the Zithromax.  On call doctor, (Dr. Lal) called back at 503 pm with orders to stop Zithromax, and check BG after she is able to eat tonight. If pt is unable to eat, do not take her insulin. If BG after eating is >120 take 1/2 of insulin dose. (10 Units). Pt was advised to call back tonight if she has further questions, or if symptoms worsen. Pt verbalized understanding.     Joe Swann RN on 2/21/2021 at 4:41 PM

## 2021-02-22 ENCOUNTER — VIRTUAL VISIT (OUTPATIENT)
Dept: INTERNAL MEDICINE | Facility: CLINIC | Age: 81
End: 2021-02-22
Payer: COMMERCIAL

## 2021-02-22 ENCOUNTER — TELEPHONE (OUTPATIENT)
Dept: UROLOGY | Facility: CLINIC | Age: 81
End: 2021-02-22

## 2021-02-22 DIAGNOSIS — J01.01 ACUTE RECURRENT MAXILLARY SINUSITIS: Primary | ICD-10-CM

## 2021-02-22 PROCEDURE — 99213 OFFICE O/P EST LOW 20 MIN: CPT | Mod: 95 | Performed by: INTERNAL MEDICINE

## 2021-02-22 RX ORDER — DOXYCYCLINE HYCLATE 100 MG
100 TABLET ORAL 2 TIMES DAILY
Qty: 14 TABLET | Refills: 0 | Status: SHIPPED | OUTPATIENT
Start: 2021-02-22 | End: 2021-04-20

## 2021-02-22 NOTE — TELEPHONE ENCOUNTER
They need you to sign off on your 2/16 progress note so they can do a prior auth on the xray you ordered?? Thanks gabe

## 2021-02-22 NOTE — PROGRESS NOTES
"Shellie is a 80 year old who is being evaluated via a billable telephone visit.      What phone number would you like to be contacted at? [phone]  How would you like to obtain your AVS? MyChart    Assessment & Plan     Acute recurrent maxillary sinusitis  Will change to Doxycyline, advised for side effects, to take after meals.     - doxycycline hyclate (VIBRA-TABS) 100 MG tablet; Take 1 tablet (100 mg) by mouth 2 times daily             BMI:   Estimated body mass index is 32.44 kg/m  as calculated from the following:    Height as of 2/16/21: 1.626 m (5' 4\").    Weight as of 2/16/21: 85.7 kg (189 lb).       See Patient Instructions    Return in about 1 week (around 3/1/2021) for follow up on acute problem if persists.    Antwon Arambula MD  [facility]    Subjective   Shellie is a 80 year old who presents for the following health issues     HPI   Has had sinus infection. Started on Zithromax, 2 days ago. Developed diarrhea, nausea, abdominal pain on the next day. Stopped treatment.   Today her GI symptoms are improved. No fever, chills.     Has symptoms of dull headache, nose , ears are hurting, feel plugged. Has been blowing blood from the nose. Has pressure in the sinuses. Takes nasal spray and antihistamine.   This was the reason to start antibiotic.           Review of Systems   Constitutional, HEENT, cardiovascular, pulmonary, gi and gu systems are negative, except as otherwise noted.      Objective           Vitals:  No vitals were obtained today due to virtual visit.    Physical Exam   healthy, alert and no distress  PSYCH: Alert and oriented times 3; coherent speech, normal   rate and volume, able to articulate logical thoughts, able   to abstract reason, no tangential thoughts, no hallucinations   or delusions  Her affect is normal  RESP: No cough, no audible wheezing, able to talk in full sentences  Remainder of exam unable to be completed due to telephone visits    Office " Visit on 02/16/2021   Component Date Value Ref Range Status     Color Urine 02/16/2021 Yellow   Final     Appearance Urine 02/16/2021 Clear   Final     Glucose Urine 02/16/2021 Negative  NEG^Negative mg/dL Final     Bilirubin Urine 02/16/2021 Negative  NEG^Negative Final     Ketones Urine 02/16/2021 Negative  NEG^Negative mg/dL Final     Specific Gravity Urine 02/16/2021 1.020  1.003 - 1.035 Final     Blood Urine 02/16/2021 Negative  NEG^Negative Final     pH Urine 02/16/2021 5.5  5.0 - 7.0 pH Final     Protein Albumin Urine 02/16/2021 Negative  NEG^Negative mg/dL Final     Urobilinogen Urine 02/16/2021 0.2  0.2 - 1.0 EU/dL Final     Nitrite Urine 02/16/2021 Negative  NEG^Negative Final     Leukocyte Esterase Urine 02/16/2021 Negative  NEG^Negative Final     Source 02/16/2021 Midstream Urine   Final     Residual Volume (RV) (External) 02/16/2021 2   Final               Phone call duration: 12 minutes

## 2021-02-22 NOTE — TELEPHONE ENCOUNTER
Spoke with [facility]. They need signed progress note for their PA. This was faxed to Piotr at [phone]. Also spoke with Lindsey. She's scheduled for her CT Urogram next Friday 3/5 and will have report faxed to Dr. Sosa. Then we can have images pulled into PACS.  EDMAR Peter RN      [facility]    Phone Message    May a detailed message be left on voicemail: yes     Reason for Call: Other: Elizabeth from [facility] is requesting a signature for a prior authorization.  This is in regards to the Pt's UTI.  Please call back at .     Action Taken: Message routed to:  [facility] Urology    Travel Screening: Not Applicable

## 2021-02-24 ENCOUNTER — ALLIED HEALTH/NURSE VISIT (OUTPATIENT)
Dept: NURSING | Facility: CLINIC | Age: 81
End: 2021-02-24
Payer: COMMERCIAL

## 2021-02-24 VITALS — DIASTOLIC BLOOD PRESSURE: 70 MMHG | SYSTOLIC BLOOD PRESSURE: 132 MMHG

## 2021-02-24 DIAGNOSIS — I10 ESSENTIAL HYPERTENSION: Primary | ICD-10-CM

## 2021-02-25 ASSESSMENT — ENCOUNTER SYMPTOMS
DECREASED APPETITE: 0
SORE THROAT: 0
PANIC: 0
TINGLING: 1
DECREASED LIBIDO: 1
HEMATURIA: 0
FATIGUE: 1
HOT FLASHES: 1
JAUNDICE: 0
CONSTIPATION: 0
TREMORS: 0
LOSS OF CONSCIOUSNESS: 0
POSTURAL DYSPNEA: 0
MUSCLE CRAMPS: 0
DIARRHEA: 0
INSOMNIA: 1
COUGH DISTURBING SLEEP: 0
SINUS PAIN: 0
MUSCLE WEAKNESS: 0
ORTHOPNEA: 0
HYPOTENSION: 0
SMELL DISTURBANCE: 0
DECREASED CONCENTRATION: 0
HYPERTENSION: 1
EYE WATERING: 0
DIFFICULTY URINATING: 0
CHILLS: 0
DISTURBANCES IN COORDINATION: 0
NAUSEA: 0
SPUTUM PRODUCTION: 0
HEARTBURN: 0
DIZZINESS: 1
BRUISES/BLEEDS EASILY: 0
WHEEZING: 0
POLYPHAGIA: 0
WEAKNESS: 0
RESPIRATORY PAIN: 0
EXERCISE INTOLERANCE: 0
COUGH: 0
SEIZURES: 0
HOARSE VOICE: 0
DYSURIA: 1
SHORTNESS OF BREATH: 0
RECTAL BLEEDING: 0
NERVOUS/ANXIOUS: 1
ARTHRALGIAS: 1
ABDOMINAL PAIN: 0
BREAST PAIN: 0
VOMITING: 0
RECTAL PAIN: 0
LIGHT-HEADEDNESS: 1
PARALYSIS: 0
POLYDIPSIA: 0
SKIN CHANGES: 0
TROUBLE SWALLOWING: 0
BACK PAIN: 1
STIFFNESS: 1
INCREASED ENERGY: 1
NECK PAIN: 1
NECK MASS: 0
NUMBNESS: 1
EYE IRRITATION: 0
LEG PAIN: 0
EYE PAIN: 0
BLOOD IN STOOL: 0
TASTE DISTURBANCE: 0
POOR WOUND HEALING: 0
MYALGIAS: 1
HEADACHES: 0
BLOATING: 0
WEIGHT LOSS: 0
BREAST MASS: 0
DEPRESSION: 0
SPEECH CHANGE: 0
SLEEP DISTURBANCES DUE TO BREATHING: 0
SWOLLEN GLANDS: 0
FEVER: 0
EYE REDNESS: 0
SNORES LOUDLY: 0
MEMORY LOSS: 1
NIGHT SWEATS: 1
DOUBLE VISION: 0
DYSPNEA ON EXERTION: 0
FLANK PAIN: 0
HEMOPTYSIS: 0
SINUS CONGESTION: 0
BOWEL INCONTINENCE: 0
SYNCOPE: 1
ALTERED TEMPERATURE REGULATION: 1
WEIGHT GAIN: 1
PALPITATIONS: 0
JOINT SWELLING: 0

## 2021-02-26 ENCOUNTER — IMMUNIZATION (OUTPATIENT)
Dept: NURSING | Facility: CLINIC | Age: 81
End: 2021-02-26
Attending: INTERNAL MEDICINE
Payer: COMMERCIAL

## 2021-02-26 PROCEDURE — 91300 PR COVID VAC PFIZER DIL RECON 30 MCG/0.3 ML IM: CPT

## 2021-02-26 PROCEDURE — 0002A PR COVID VAC PFIZER DIL RECON 30 MCG/0.3 ML IM: CPT

## 2021-03-05 ENCOUNTER — TRANSFERRED RECORDS (OUTPATIENT)
Dept: HEALTH INFORMATION MANAGEMENT | Facility: CLINIC | Age: 81
End: 2021-03-05

## 2021-03-10 DIAGNOSIS — I10 ESSENTIAL HYPERTENSION: ICD-10-CM

## 2021-03-11 RX ORDER — HYDROCHLOROTHIAZIDE 12.5 MG/1
TABLET ORAL
Qty: 30 TABLET | Refills: 1 | OUTPATIENT
Start: 2021-03-11

## 2021-03-14 ENCOUNTER — HEALTH MAINTENANCE LETTER (OUTPATIENT)
Age: 81
End: 2021-03-14

## 2021-03-17 ENCOUNTER — ALLIED HEALTH/NURSE VISIT (OUTPATIENT)
Dept: NURSING | Facility: CLINIC | Age: 81
End: 2021-03-17
Payer: COMMERCIAL

## 2021-03-17 VITALS — DIASTOLIC BLOOD PRESSURE: 68 MMHG | SYSTOLIC BLOOD PRESSURE: 130 MMHG

## 2021-03-17 DIAGNOSIS — I10 ESSENTIAL HYPERTENSION: Primary | ICD-10-CM

## 2021-03-29 PROBLEM — M54.2 NECK PAIN: Status: RESOLVED | Noted: 2021-02-01 | Resolved: 2021-03-29

## 2021-04-06 ENCOUNTER — TELEPHONE (OUTPATIENT)
Dept: INTERNAL MEDICINE | Facility: CLINIC | Age: 81
End: 2021-04-06

## 2021-04-06 DIAGNOSIS — I10 ESSENTIAL HYPERTENSION: ICD-10-CM

## 2021-04-06 DIAGNOSIS — R60.9 EDEMA, UNSPECIFIED TYPE: ICD-10-CM

## 2021-04-06 DIAGNOSIS — E78.5 HYPERLIPIDEMIA LDL GOAL <100: Primary | ICD-10-CM

## 2021-04-06 RX ORDER — FUROSEMIDE 20 MG
20 TABLET ORAL DAILY PRN
Qty: 90 TABLET | Refills: 1 | Status: SHIPPED | OUTPATIENT
Start: 2021-04-06 | End: 2021-05-20

## 2021-04-06 NOTE — TELEPHONE ENCOUNTER
Patient calls to say she does not think that the hydrochlorothiazide is working.  Patient says by the end of the day she can not even get her shoes on because her left leg is so swollen at night.  Patient has been on Lasix before and would like to go back on that instead. Patient is requesting a one month supply sent to [facility] to try and after that it would go to [facility] if it works for her. Please advise. OK to leave a detailed message.

## 2021-04-08 ENCOUNTER — NURSE TRIAGE (OUTPATIENT)
Dept: INTERNAL MEDICINE | Facility: CLINIC | Age: 81
End: 2021-04-08

## 2021-04-08 DIAGNOSIS — M79.89 LEFT LEG SWELLING: Primary | ICD-10-CM

## 2021-04-08 DIAGNOSIS — M79.662 PAIN OF LEFT LOWER LEG: ICD-10-CM

## 2021-04-08 NOTE — TELEPHONE ENCOUNTER
"Spoke to patient. She is calling with some left leg swelling and pain. Has been present about one week. Pain and swelling start in the left knee and goes down her calf and into her foot. No redness. No warmth. Patient said she has had this before and they found a baker cyst behind her knee. Patient also mentioned that Dinorah recently changed her diuretic so she is not sure if that has anything to do with it.     Patient is requesting an order for an U/S at [facility] in [place]. She said she has to go there as her insurance is way too expensive to go to [facility] for it. RN did talk to patient about the possibility of a blood clot and going to the ED to get that checked out. Patient said she will not be going to the hospital and would rather only work with Dinorah. She was hoping for an appointment but nothing open. She is requesting an U/S order. She was hoping to get the U/S done tomorrow.     Will route to provider to advise.     ASAD Corona, RN  [facility]      Additional Information    Negative: Looks like a broken bone or dislocated joint (e.g., crooked or deformed)    Negative: Sounds like a life-threatening emergency to the triager    Negative: Followed a hip injury    Negative: Followed a knee injury    Negative: Followed an ankle or foot injury    Negative: Back pain radiating (shooting) into leg(s)    Negative: Foot pain is the main symptom    Negative: Ankle pain is the main symptom    Negative: Knee pain is the main symptom    Negative: Leg swelling is the main symptom    Negative: Chest pain    Negative: Difficulty breathing    Negative: Entire foot is cool or blue in comparison to other side    Negative: Unable to walk    Negative: Fever and red area (or area very tender to touch)    Negative: Fever and swollen joint    Thigh or calf pain in only one leg and present > 1 hour    Answer Assessment - Initial Assessment Questions  1. ONSET: \"When did the pain start?\" " "      Unsure, maybe a week ago  2. LOCATION: \"Where is the pain located?\"       All over left leg  3. PAIN: \"How bad is the pain?\"    (Scale 1-10; or mild, moderate, severe)    -  MILD (1-3): doesn't interfere with normal activities     -  MODERATE (4-7): interferes with normal activities (e.g., work or school) or awakens from sleep, limping     -  SEVERE (8-10): excruciating pain, unable to do any normal activities, unable to walk      4/10  4. WORK OR EXERCISE: \"Has there been any recent work or exercise that involved this part of the body?\"       no  5. CAUSE: \"What do you think is causing the leg pain?\"      unknown  6. OTHER SYMPTOMS: \"Do you have any other symptoms?\" (e.g., chest pain, back pain, breathing difficulty, swelling, rash, fever, numbness, weakness)      no  7. PREGNANCY: \"Is there any chance you are pregnant?\" \"When was your last menstrual period?\"      no    Protocols used: LEG PAIN-A-OH    "

## 2021-04-08 NOTE — TELEPHONE ENCOUNTER
Order done - need to fax to [facility] the order and ask for report to be faxed to us      If pain worsens she should go to ER

## 2021-04-09 ENCOUNTER — TRANSFERRED RECORDS (OUTPATIENT)
Dept: HEALTH INFORMATION MANAGEMENT | Facility: CLINIC | Age: 81
End: 2021-04-09

## 2021-04-16 ENCOUNTER — TRANSFERRED RECORDS (OUTPATIENT)
Dept: HEALTH INFORMATION MANAGEMENT | Facility: CLINIC | Age: 81
End: 2021-04-16

## 2021-04-16 LAB — RETINOPATHY: NEGATIVE

## 2021-04-20 ENCOUNTER — OFFICE VISIT (OUTPATIENT)
Dept: UROLOGY | Facility: CLINIC | Age: 81
End: 2021-04-20
Payer: COMMERCIAL

## 2021-04-20 VITALS
HEART RATE: 80 BPM | WEIGHT: 188 LBS | SYSTOLIC BLOOD PRESSURE: 132 MMHG | DIASTOLIC BLOOD PRESSURE: 70 MMHG | HEIGHT: 64 IN | BODY MASS INDEX: 32.1 KG/M2

## 2021-04-20 DIAGNOSIS — R32 URINARY INCONTINENCE, UNSPECIFIED TYPE: ICD-10-CM

## 2021-04-20 DIAGNOSIS — N95.2 VAGINAL ATROPHY: ICD-10-CM

## 2021-04-20 DIAGNOSIS — Z98.890 HISTORY OF SUBURETHRAL SLING PROCEDURE: ICD-10-CM

## 2021-04-20 DIAGNOSIS — R10.2 VAGINAL PAIN: ICD-10-CM

## 2021-04-20 DIAGNOSIS — N39.0 RECURRENT UTI: Primary | ICD-10-CM

## 2021-04-20 LAB
ALBUMIN UR-MCNC: NEGATIVE MG/DL
APPEARANCE UR: CLEAR
BILIRUB UR QL STRIP: NEGATIVE
COLOR UR AUTO: YELLOW
GLUCOSE UR STRIP-MCNC: NEGATIVE MG/DL
HGB UR QL STRIP: NEGATIVE
KETONES UR STRIP-MCNC: NEGATIVE MG/DL
LEUKOCYTE ESTERASE UR QL STRIP: NEGATIVE
NITRATE UR QL: NEGATIVE
PH UR STRIP: 5.5 PH (ref 5–7)
SOURCE: NORMAL
SP GR UR STRIP: 1.02 (ref 1–1.03)
UROBILINOGEN UR STRIP-ACNC: 0.2 EU/DL (ref 0.2–1)

## 2021-04-20 PROCEDURE — 81003 URINALYSIS AUTO W/O SCOPE: CPT | Mod: QW | Performed by: UROLOGY

## 2021-04-20 PROCEDURE — 52000 CYSTOURETHROSCOPY: CPT | Performed by: UROLOGY

## 2021-04-20 RX ORDER — ESTRADIOL 10 UG/1
10 INSERT VAGINAL
Qty: 36 TABLET | Refills: 0 | Status: SHIPPED | OUTPATIENT
Start: 2021-04-21 | End: 2021-05-20

## 2021-04-20 RX ORDER — LIDOCAINE HYDROCHLORIDE 20 MG/ML
JELLY TOPICAL ONCE
Status: COMPLETED | OUTPATIENT
Start: 2021-04-20 | End: 2021-04-20

## 2021-04-20 RX ORDER — ESTRADIOL 10 UG/1
10 INSERT VAGINAL
Qty: 36 TABLET | Refills: 0 | Status: SHIPPED | OUTPATIENT
Start: 2021-04-21 | End: 2021-04-20

## 2021-04-20 RX ADMIN — LIDOCAINE HYDROCHLORIDE 5 ML: 20 JELLY TOPICAL at 13:15

## 2021-04-20 RX ADMIN — LIDOCAINE HYDROCHLORIDE 5 ML: 20 JELLY TOPICAL at 14:40

## 2021-04-20 ASSESSMENT — PAIN SCALES - GENERAL: PAINLEVEL: NO PAIN (0)

## 2021-04-20 ASSESSMENT — MIFFLIN-ST. JEOR: SCORE: 1317.76

## 2021-04-20 NOTE — PATIENT INSTRUCTIONS
"Websites with free information:    American Urogynecologic Society patient website: www.voicesforpfd.org    Total Control Program: www.totalcontrolprogram.com    If you have vaginal irritation consider some petroleum jelly    Use the vagifem 3x a week at night    Supplements to prevent UTI to consider  -Probiotics  -Cranberry (for these products let them know a doctor is recommending them)   Ellura: www.myellura.HealthPocket   Theracran HP by [employer] [facility] [address]  -d-mannose  -Vitamin C 500-1000mg twice a day    Please see one of the dedicated pelvic floor physical therapist ([facility] [phone])    Please return to see me in 3-4 months, sooner if needed    It was a pleasure meeting with you today.  Thank you for allowing me and my team the privilege of caring for you today.  YOU are the reason we are here, and I truly hope we provided you with the excellent service you deserve.  Please let us know if there is anything else we can do for you so that we can be sure you are leaving completely satisfied with your care experience.           AFTER YOUR CYSTOSCOPY         You have just completed a cystoscopy, or \"cysto\", which allowed your physician to learn more about your bladder (or to remove a stent placed after surgery). We suggest that you continue to avoid caffeine, fruit juice, and alcohol for the next 24 hours, however, you are encouraged to return to your normal activities.       A few things that are considered normal after your cystoscopy:    * small amount of bleeding (or spotting) that clears within the next 24 hours    * slight burning sensation with urination    * sensation to of needing to avoid more frequently    * the feeling of \"air\" in your urine    * mild discomfort that is relieved with Tylonol        Please contact our office promptly if you:    * develop a fever above 101 degrees    * are unable to urinate    * develop bright red blood that does not stop    * severe " pain or swelling        And of course, please contact our office with any concerns or questions [phone]

## 2021-04-20 NOTE — NURSING NOTE
Chief Complaint   Patient presents with     Urinary Incontinence/ History of UTI's     Patient is here for Cystoscopy      Prior to the start of the procedure and with procedural staff participation, I verbally confirmed the patient s identity using two indicators, relevant allergies, that the procedure was appropriate and matched the consent or emergent situation, and that the correct equipment/implants were available. Immediately prior to starting the procedure I conducted the Time Out with the procedural staff and re-confirmed the patient s name, procedure, and site/side. (The Joint Commission universal protocol was followed.)  Yes    Sedation (Moderate or Deep): None  Prior to procedure patient's urethra was cleansed with iodine and draped in sterile fashion. Lidocaine Gel was inserted into urethra. Patient tolerated well.   2% Lidocaine gel Lot#[id number]           Exp:09/22  Prior to procedure patient's urethra was prepped again and was cleansed with iodine and draped in sterile fashion. Lidocaine Gel was inserted into urethra. Patient tolerated well.   2% Lidocaine gel Lot#[id number]           Exp:09/22  Meka Delong LPN

## 2021-04-20 NOTE — PROGRESS NOTES
"April 20, 2021    Return visit    Patient returns today for follow up.  No UTIs recently.  She denies any changes in her health since last visit.    /70 (BP Location: Right arm)   Pulse 80   Ht 1.626 m (5' 4\")   Wt 85.3 kg (188 lb)   LMP  (LMP Unknown)   BMI 32.27 kg/m    She is comfortable, in no distress, non-labored breathing.  Abdomen is soft, non-tender, non-distended.  Normal external female genitalia.  Negative CST.  Pelvic exam is remarkable for atrophic vaginal tissues, there is an area of irregularity of the midurethra but no visible extrusion    Cystoscopy Note: After informed consent was obtained patient was prepped and draped in the standard fashion.  The flexible cystoscope was inserted into a normal appearing urethral meatus.  The urothelium was carefully examined and there were no tumors, masses, stones, foreign bodies, or other urothelial abnormalities noted.  Bilateral ureteral orifices were noted in the normal orthotopic position and both effluxed clear urine.  The cystoscope was retroflexed and the bladder neck was unremarkable.  The urethra was carefully examined upon removing the cystoscope and was the urethral bulking agent noted at the bladder neck and some blue fibers of the midurethral sling noted just under the urothelium of the urethra.  Patient tolerated the procedure without complications noted.      A/P: 80 year old F with history of UTi, suburethral sling and urethral bulking, vaginal atrophy and incontinence of unclear etiology    Discussed findings and recommend vaginal estrogen for the atrohpy, UTIs especially given the small fibers of mesh seen just under the urothelium    For the urinary incontinence of unclear etiology recommend pelvic floor therapy.  Discussed how this works and patient is agreeable    RTC 3- 4 months, sooner if needed     Shelbi Sosa MD MPH   of Urology    CC  Patient Care Team:  Dinorah Wong APRN CNP as PCP - General " (Nurse Practitioner - Family)  Roxann Marr MD as Assigned OBGYN Provider  Dinorah Wong APRN CNP as Assigned PCP  Shelbi Sosa MD as Assigned Surgical Provider

## 2021-04-20 NOTE — LETTER
"4/20/2021       RE: Lindsey Gary  [id number] [address] [facility] [id number]     Dear Colleague,    Thank you for referring your patient, Lindsey Gary, to the [facility] [place] at [facility]. Please see a copy of my visit note below.    April 20, 2021    Return visit    Patient returns today for follow up.  No UTIs recently.  She denies any changes in her health since last visit.    /70 (BP Location: Right arm)   Pulse 80   Ht 1.626 m (5' 4\")   Wt 85.3 kg (188 lb)   LMP  (LMP Unknown)   BMI 32.27 kg/m    She is comfortable, in no distress, non-labored breathing.  Abdomen is soft, non-tender, non-distended.  Normal external female genitalia.  Negative CST.  Pelvic exam is remarkable for atrophic vaginal tissues, there is an area of irregularity of the midurethra but no visible extrusion    Cystoscopy Note: After informed consent was obtained patient was prepped and draped in the standard fashion.  The flexible cystoscope was inserted into a normal appearing urethral meatus.  The urothelium was carefully examined and there were no tumors, masses, stones, foreign bodies, or other urothelial abnormalities noted.  Bilateral ureteral orifices were noted in the normal orthotopic position and both effluxed clear urine.  The cystoscope was retroflexed and the bladder neck was unremarkable.  The urethra was carefully examined upon removing the cystoscope and was the urethral bulking agent noted at the bladder neck and some blue fibers of the midurethral sling noted just under the urothelium of the urethra.  Patient tolerated the procedure without complications noted.      A/P: 80 year old F with history of UTi, suburethral sling and urethral bulking, vaginal atrophy and incontinence of unclear etiology    Discussed findings and recommend vaginal estrogen for the atrohpy, UTIs especially given the small fibers of mesh seen just under the " urothelium    For the urinary incontinence of unclear etiology recommend pelvic floor therapy.  Discussed how this works and patient is agreeable    RTC 3- 4 months, sooner if needed     Shelbi Sosa MD MPH   of Urology    CC  Patient Care Team:  Dinorah Wong APRN CNP as PCP - General (Nurse Practitioner - Family)  Roaxnn Marr MD as Assigned OBGYN Provider  Dinorah Wong APRN CNP as Assigned PCP  Shelbi Sosa MD as Assigned Surgical Provider

## 2021-04-21 ENCOUNTER — TELEPHONE (OUTPATIENT)
Dept: UROLOGY | Facility: CLINIC | Age: 81
End: 2021-04-21

## 2021-04-21 RX ORDER — ESTRADIOL 0.1 MG/G
CREAM VAGINAL
Qty: 42.5 G | Refills: 3 | Status: SHIPPED | OUTPATIENT
Start: 2021-04-21 | End: 2023-03-17

## 2021-04-21 NOTE — TELEPHONE ENCOUNTER
[facility] Call Center    Phone Message    May a detailed message be left on voicemail: yes     Reason for Call: Other: Pt would like a call back to discuss a cheaper medication instead of the Vagifem as it is over 300 dollars      Action Taken: Message routed to:  [facility] ([facility] Urology    Travel Screening: Not Applicable

## 2021-04-21 NOTE — TELEPHONE ENCOUNTER
"Per MD-\"Let her know I sent estrace cream to the pharmacy and see if this is cheaper.  If it is not will recommend the compounded estriol from the compounding pharmacy.\"    Called patient, but her VM does not accept messages. Was unable to leave a message.     Meka Delong LPN    "

## 2021-04-21 NOTE — TELEPHONE ENCOUNTER
Called [facility] pharmacy and they confirmed rx for Estradiol was $20.00.     Meka Delong LPN

## 2021-05-12 ENCOUNTER — OFFICE VISIT (OUTPATIENT)
Dept: FAMILY MEDICINE | Facility: CLINIC | Age: 81
End: 2021-05-12
Payer: COMMERCIAL

## 2021-05-12 VITALS
BODY MASS INDEX: 32.27 KG/M2 | RESPIRATION RATE: 16 BRPM | OXYGEN SATURATION: 99 % | SYSTOLIC BLOOD PRESSURE: 118 MMHG | HEART RATE: 71 BPM | WEIGHT: 188 LBS | DIASTOLIC BLOOD PRESSURE: 64 MMHG | TEMPERATURE: 97.7 F

## 2021-05-12 DIAGNOSIS — N94.89 VAGINAL BURNING: Primary | ICD-10-CM

## 2021-05-12 LAB
ALBUMIN UR-MCNC: NEGATIVE MG/DL
APPEARANCE UR: CLEAR
BACTERIA #/AREA URNS HPF: ABNORMAL /HPF
BILIRUB UR QL STRIP: NEGATIVE
CASTS #/AREA URNS LPF: ABNORMAL /LPF (ref 0–2)
COLOR UR AUTO: YELLOW
GLUCOSE UR STRIP-MCNC: NEGATIVE MG/DL
GRAM STN SPEC: NORMAL
HGB UR QL STRIP: NEGATIVE
KETONES UR STRIP-MCNC: NEGATIVE MG/DL
LEUKOCYTE ESTERASE UR QL STRIP: ABNORMAL
Lab: NORMAL
NITRATE UR QL: NEGATIVE
NON-SQ EPI CELLS #/AREA URNS LPF: ABNORMAL /LPF
PH UR STRIP: 5 PH (ref 5–7)
RBC #/AREA URNS AUTO: ABNORMAL /HPF
SOURCE: ABNORMAL
SP GR UR STRIP: 1.01 (ref 1–1.03)
SPECIMEN SOURCE: NORMAL
SPECIMEN SOURCE: NORMAL
UROBILINOGEN UR STRIP-ACNC: 0.2 EU/DL (ref 0.2–1)
WBC #/AREA URNS AUTO: ABNORMAL /HPF
WET PREP SPEC: NORMAL

## 2021-05-12 PROCEDURE — 81001 URINALYSIS AUTO W/SCOPE: CPT | Performed by: NURSE PRACTITIONER

## 2021-05-12 PROCEDURE — 87205 SMEAR GRAM STAIN: CPT | Performed by: NURSE PRACTITIONER

## 2021-05-12 PROCEDURE — 87210 SMEAR WET MOUNT SALINE/INK: CPT | Performed by: NURSE PRACTITIONER

## 2021-05-12 PROCEDURE — 99213 OFFICE O/P EST LOW 20 MIN: CPT | Performed by: NURSE PRACTITIONER

## 2021-05-12 NOTE — PROGRESS NOTES
"    Assessment & Plan     Vaginal burning  She elected to start flagyl that she had on hand 2 days ago.  No BV on wet prep today though with her seeing improvement in symptoms I have advised her to complete course of flagyl.  Continue to follow-up with GYN and urology for ongoing management.   - UA reflex to Microscopic and Culture  - Wet prep  - Wound Culture Aerobic Bacterial  - Urine Microscopic               No follow-ups on file.    Tania Torres, LILLIAM CNP  [facility] RENUKA Hensley is a 80 year old who presents for the following health issues     HPI     Vaginal Symptoms  Onset/Duration: for about 2 weeks  Description:  Vaginal Discharge: clear sticky   Itching (Pruritis): YES  Burning sensation:  YES  Odor: no  Accompanying Signs & Symptoms:  Urinary symptoms: YES- pt has hx of bladder issues   Abdominal pain: YES  Fever: no  History:   Sexually active: YES  New Partner: no  Possibility of Pregnancy:  no  Recent antibiotic use: just started Flagyl  Previous vaginitis issues: YES  Precipitating or alleviating factors: None  Therapies tried and outcome: Flagyl (metronidazole) ( 2 days) ( states keeping vaginal area dry it helps)    Vagisil    Burning is better.  Still \"doesn't feel right\".  She was seen by urology in April; no infections at that time.  She was seen in UC last week and even though labs were normal was given a Rx for flagyl.  She started this 2 days ago and has seen some improvement in the burning.  Hx of incontinence.  Has seen GYN as well and was given a steroid cream last November.  She doesn't recall ever using this.  Hx of psoriasis and will be seeing derm to see if her symptoms are related. She is requesting a vaginal wound culture as this has needed to be done in the past.       Review of Systems   Constitutional, HEENT, cardiovascular, pulmonary, gi and gu systems are negative, except as otherwise noted.      Objective    /64   Pulse 71   Temp " 97.7  F (36.5  C) (Oral)   Resp 16   Wt 85.3 kg (188 lb)   LMP  (LMP Unknown)   SpO2 99%   BMI 32.27 kg/m    Body mass index is 32.27 kg/m .  Physical Exam   GENERAL: healthy, alert and no distress   (female): normal female external genitalia, normal urethral meatus, vaginal mucosa, normal cervix/adnexa/uterus without masses or discharge   (female): normal female external genitalia, normal urethral meatus , vaginal mucosa pink, moist, and vaginal mucosal atrophy, no discharge     Results for orders placed or performed in visit on 05/12/21 (from the past 24 hour(s))   UA reflex to Microscopic and Culture    Specimen: Midstream Urine   Result Value Ref Range    Color Urine Yellow     Appearance Urine Clear     Glucose Urine Negative NEG^Negative mg/dL    Bilirubin Urine Negative NEG^Negative    Ketones Urine Negative NEG^Negative mg/dL    Specific Gravity Urine 1.010 1.003 - 1.035    Blood Urine Negative NEG^Negative    pH Urine 5.0 5.0 - 7.0 pH    Protein Albumin Urine Negative NEG^Negative mg/dL    Urobilinogen Urine 0.2 0.2 - 1.0 EU/dL    Nitrite Urine Negative NEG^Negative    Leukocyte Esterase Urine Trace (A) NEG^Negative    Source Midstream Urine    Wet prep    Specimen: Vagina   Result Value Ref Range    Specimen Description Vagina     Wet Prep No Trichomonas seen     Wet Prep No clue cells seen     Wet Prep No yeast seen     Wet Prep WBC'S seen  Few        *Note: Due to a large number of results and/or encounters for the requested time period, some results have not been displayed. A complete set of results can be found in Results Review.

## 2021-05-14 ENCOUNTER — NURSE TRIAGE (OUTPATIENT)
Dept: INTERNAL MEDICINE | Facility: CLINIC | Age: 81
End: 2021-05-14

## 2021-05-14 NOTE — TELEPHONE ENCOUNTER
Patient call transferred to triage nurse due to swelling in her left leg. She scheduled an appointment with [facility] for 05/20 and they wanted her to make sure it was okay to wait. Patient believes swelling is from fluid retention, she has dealt with this before and takes Furosemide. Swelling goes away overnight and then comes back throughout the day. Advised patient to keep appointment as scheduled, but to call back if swelling becomes more severe, she has leg pain, chest pain or difficulty breathing. Patient verbalizes understanding.     Next 5 appointments (look out 90 days)    May 20, 2021  8:00 AM  Office Visit with LILLIAM Sam CNP  [facility] [place] ) [address] [facility] [place] [address] [phone]           Reason for Disposition    MILD swelling of both ankles (i.e., pedal edema) AND new onset or worsening    Additional Information    Negative: Sounds like a life-threatening emergency to the triager    Negative: Chest pain    Negative: Small area of swelling and followed an insect bite to the area    Negative: Followed a knee injury    Negative: Ankle or foot injury    Negative: Pregnant with leg swelling or edema    Negative: Difficulty breathing at rest    Negative: Entire foot is cool or blue in comparison to other side    Negative: SEVERE swelling (e.g., swelling extends above knee, entire leg is swollen, weeping fluid)    Negative: Thigh or calf pain and only 1 side and present > 1 hour    Negative: Thigh, calf, or ankle swelling in only one leg    Negative: Thigh, calf, or ankle swelling in both legs, but one side is definitely more swollen (Exception: longstanding difference between legs)    Negative: Cast on leg or ankle and has increasing pain    Negative: Can't walk or can barely stand (new onset)    Negative: Fever and red area (or area very tender to touch)    Negative: Patient sounds very sick or weak to the  "triager    Negative: Swelling of face, arm or hands (Exception: slight puffiness of fingers during hot weather)    Negative: Pregnant > 20 weeks and sudden weight gain (i.e., > 2 lbs, 1 kg in one week)    Negative: MODERATE swelling of both ankles (e.g., swelling extends up to the knees) AND new onset or worsening    Negative: Difficulty breathing with exertion AND worsening or new onset    Negative: Looks like a boil, infected sore, deep ulcer, or other infected rash (spreading redness, pus)    Negative: Patient wants to be seen    Answer Assessment - Initial Assessment Questions  1. ONSET: \"When did the swelling start?\" (e.g., minutes, hours, days)      1 month  2. LOCATION: \"What part of the leg is swollen?\"  \"Are both legs swollen or just one leg?\"      Left leg, from her calf down to ankle and foot  3. SEVERITY: \"How bad is the swelling?\" (e.g., localized; mild, moderate, severe)   - Localized - small area of swelling localized to one leg   - MILD pedal edema - swelling limited to foot and ankle, pitting edema < 1/4 inch (6 mm) deep, rest and elevation eliminate most or all swelling   - MODERATE edema - swelling of lower leg to knee, pitting edema > 1/4 inch (6 mm) deep, rest and elevation only partially reduce swelling   - SEVERE edema - swelling extends above knee, facial or hand swelling present       mild  4. REDNESS: \"Does the swelling look red or infected?\"      no  5. PAIN: \"Is the swelling painful to touch?\" If so, ask: \"How painful is it?\"   (Scale 1-10; mild, moderate or severe)      no  6. FEVER: \"Do you have a fever?\" If so, ask: \"What is it, how was it measured, and when did it start?\"       no  7. CAUSE: \"What do you think is causing the leg swelling?\"      Concerned about fluid retention   8. MEDICAL HISTORY: \"Do you have a history of heart failure, kidney disease, liver failure, or cancer?\"      no  9. RECURRENT SYMPTOM: \"Have you had leg swelling before?\" If so, ask: \"When was the last time?\" " "\"What happened that time?\"      Yes, took diuretic, but it's not working any more  10. OTHER SYMPTOMS: \"Do you have any other symptoms?\" (e.g., chest pain, difficulty breathing)        none  11. PREGNANCY: \"Is there any chance you are pregnant?\" \"When was your last menstrual period?\"        n/a    Protocols used: LEG SWELLING AND EDEMA-A-OH      "

## 2021-05-20 ENCOUNTER — ANCILLARY PROCEDURE (OUTPATIENT)
Dept: GENERAL RADIOLOGY | Facility: CLINIC | Age: 81
End: 2021-05-20
Attending: NURSE PRACTITIONER
Payer: COMMERCIAL

## 2021-05-20 ENCOUNTER — OFFICE VISIT (OUTPATIENT)
Dept: INTERNAL MEDICINE | Facility: CLINIC | Age: 81
End: 2021-05-20
Payer: COMMERCIAL

## 2021-05-20 VITALS
SYSTOLIC BLOOD PRESSURE: 142 MMHG | BODY MASS INDEX: 32.1 KG/M2 | RESPIRATION RATE: 20 BRPM | WEIGHT: 188 LBS | DIASTOLIC BLOOD PRESSURE: 74 MMHG | HEIGHT: 64 IN | HEART RATE: 81 BPM | OXYGEN SATURATION: 100 % | TEMPERATURE: 97.8 F

## 2021-05-20 DIAGNOSIS — M10.9 GOUT, UNSPECIFIED CAUSE, UNSPECIFIED CHRONICITY, UNSPECIFIED SITE: ICD-10-CM

## 2021-05-20 DIAGNOSIS — M25.562 PAIN IN BOTH KNEES, UNSPECIFIED CHRONICITY: Primary | ICD-10-CM

## 2021-05-20 DIAGNOSIS — M79.89 LEFT LEG SWELLING: ICD-10-CM

## 2021-05-20 DIAGNOSIS — N76.0 BV (BACTERIAL VAGINOSIS): ICD-10-CM

## 2021-05-20 DIAGNOSIS — M25.561 PAIN IN BOTH KNEES, UNSPECIFIED CHRONICITY: Primary | ICD-10-CM

## 2021-05-20 DIAGNOSIS — B96.89 BV (BACTERIAL VAGINOSIS): ICD-10-CM

## 2021-05-20 DIAGNOSIS — M25.562 PAIN IN BOTH KNEES, UNSPECIFIED CHRONICITY: ICD-10-CM

## 2021-05-20 DIAGNOSIS — R60.9 EDEMA, UNSPECIFIED TYPE: ICD-10-CM

## 2021-05-20 DIAGNOSIS — E11.9 TYPE 2 DIABETES MELLITUS WITHOUT COMPLICATION, WITHOUT LONG-TERM CURRENT USE OF INSULIN (H): ICD-10-CM

## 2021-05-20 DIAGNOSIS — M25.561 PAIN IN BOTH KNEES, UNSPECIFIED CHRONICITY: ICD-10-CM

## 2021-05-20 DIAGNOSIS — N90.89 VULVAR IRRITATION: ICD-10-CM

## 2021-05-20 DIAGNOSIS — I10 ESSENTIAL HYPERTENSION: ICD-10-CM

## 2021-05-20 DIAGNOSIS — E11.8 TYPE 2 DIABETES MELLITUS WITH COMPLICATION, WITHOUT LONG-TERM CURRENT USE OF INSULIN (H): ICD-10-CM

## 2021-05-20 LAB
ANION GAP SERPL CALCULATED.3IONS-SCNC: 8 MMOL/L (ref 3–14)
BUN SERPL-MCNC: 20 MG/DL (ref 7–30)
CALCIUM SERPL-MCNC: 9 MG/DL (ref 8.5–10.1)
CHLORIDE SERPL-SCNC: 106 MMOL/L (ref 94–109)
CO2 SERPL-SCNC: 25 MMOL/L (ref 20–32)
CREAT SERPL-MCNC: 0.83 MG/DL (ref 0.52–1.04)
GFR SERPL CREATININE-BSD FRML MDRD: 67 ML/MIN/{1.73_M2}
GLUCOSE SERPL-MCNC: 235 MG/DL (ref 70–99)
POTASSIUM SERPL-SCNC: 4.4 MMOL/L (ref 3.4–5.3)
SODIUM SERPL-SCNC: 139 MMOL/L (ref 133–144)

## 2021-05-20 PROCEDURE — 36416 COLLJ CAPILLARY BLOOD SPEC: CPT | Performed by: NURSE PRACTITIONER

## 2021-05-20 PROCEDURE — 99214 OFFICE O/P EST MOD 30 MIN: CPT | Performed by: NURSE PRACTITIONER

## 2021-05-20 PROCEDURE — 80048 BASIC METABOLIC PNL TOTAL CA: CPT | Performed by: NURSE PRACTITIONER

## 2021-05-20 PROCEDURE — 73562 X-RAY EXAM OF KNEE 3: CPT | Mod: LT | Performed by: RADIOLOGY

## 2021-05-20 RX ORDER — GLIPIZIDE 10 MG/1
TABLET ORAL
Qty: 180 TABLET | Refills: 1 | Status: SHIPPED | OUTPATIENT
Start: 2021-05-20 | End: 2021-07-14

## 2021-05-20 RX ORDER — LISINOPRIL 40 MG/1
40 TABLET ORAL DAILY
Qty: 90 TABLET | Refills: 1 | Status: SHIPPED | OUTPATIENT
Start: 2021-05-20 | End: 2021-05-20

## 2021-05-20 RX ORDER — LISINOPRIL 40 MG/1
40 TABLET ORAL DAILY
Qty: 90 TABLET | Refills: 1 | Status: SHIPPED | OUTPATIENT
Start: 2021-05-20 | End: 2022-01-19

## 2021-05-20 RX ORDER — CLOBETASOL PROPIONATE 0.5 MG/G
OINTMENT TOPICAL 2 TIMES DAILY
Qty: 45 G | Refills: 3 | Status: SHIPPED | OUTPATIENT
Start: 2021-05-20 | End: 2021-05-20

## 2021-05-20 RX ORDER — CLOBETASOL PROPIONATE 0.5 MG/G
OINTMENT TOPICAL 2 TIMES DAILY
Qty: 45 G | Refills: 3 | Status: SHIPPED | OUTPATIENT
Start: 2021-05-20 | End: 2021-07-06

## 2021-05-20 RX ORDER — FUROSEMIDE 20 MG
20-40 TABLET ORAL DAILY PRN
Qty: 180 TABLET | Refills: 1 | Status: SHIPPED | OUTPATIENT
Start: 2021-05-20 | End: 2021-11-17

## 2021-05-20 RX ORDER — FUROSEMIDE 20 MG
20-40 TABLET ORAL DAILY PRN
Qty: 180 TABLET | Refills: 1 | Status: SHIPPED | OUTPATIENT
Start: 2021-05-20 | End: 2021-05-20

## 2021-05-20 RX ORDER — METRONIDAZOLE 500 MG/1
500 TABLET ORAL 2 TIMES DAILY
Qty: 14 TABLET | Refills: 0 | Status: SHIPPED | OUTPATIENT
Start: 2021-05-20 | End: 2021-06-07

## 2021-05-20 RX ORDER — METRONIDAZOLE 500 MG/1
500 TABLET ORAL 2 TIMES DAILY
Qty: 14 TABLET | Refills: 0 | Status: SHIPPED | OUTPATIENT
Start: 2021-05-20 | End: 2021-05-20

## 2021-05-20 RX ORDER — GLIPIZIDE 10 MG/1
TABLET ORAL
Qty: 180 TABLET | Refills: 1 | Status: SHIPPED | OUTPATIENT
Start: 2021-05-20 | End: 2021-05-20

## 2021-05-20 RX ORDER — ALLOPURINOL 300 MG/1
TABLET ORAL
Qty: 45 TABLET | Refills: 1 | Status: SHIPPED | OUTPATIENT
Start: 2021-05-20 | End: 2021-12-07

## 2021-05-20 RX ORDER — ALLOPURINOL 300 MG/1
TABLET ORAL
Qty: 45 TABLET | Refills: 1 | Status: SHIPPED | OUTPATIENT
Start: 2021-05-20 | End: 2021-05-20

## 2021-05-20 ASSESSMENT — MIFFLIN-ST. JEOR: SCORE: 1317.76

## 2021-05-20 NOTE — NURSING NOTE
"Chief Complaint   Patient presents with     Leg Swelling     left leg swelling onset x 2 weeks.     initial BP (!) 142/74   Pulse 81   Temp 97.8  F (36.6  C) (Oral)   Resp 20   Ht 1.626 m (5' 4\")   Wt 85.3 kg (188 lb)   LMP  (LMP Unknown)   SpO2 100%   BMI 32.27 kg/m   Estimated body mass index is 32.27 kg/m  as calculated from the following:    Height as of this encounter: 1.626 m (5' 4\").    Weight as of this encounter: 85.3 kg (188 lb)..  bp completed using cuff size large  MARIAH LIAO LPN  "

## 2021-05-20 NOTE — PROGRESS NOTES
Assessment & Plan     Pain in both knees, unspecified chronicity  Will check knee xray and ultrasound   May consider vascular referral  If no clot   Discussed compression socks and she is not a fan   - US Lower Extremity Venous Duplex Left; Future  - XR Knee Bilateral 3 Views; Future    Essential hypertension  Elevated but related to pain   - lisinopril (ZESTRIL) 40 MG tablet; Take 1 tablet (40 mg) by mouth daily  - Basic metabolic panel  - furosemide (LASIX) 20 MG tablet; Take 1-2 tablets (20-40 mg) by mouth daily as needed (edema)    Type 2 diabetes mellitus without complication, without long-term current use of insulin (H)  Stable   - blood glucose (NO BRAND SPECIFIED) test strip; Use to test blood sugar 3 times daily or as directed. Accucheck  - glipiZIDE (GLUCOTROL) 10 MG tablet; TAKE 1 TABLET BY MOUTH TWICE DAILY BEFORE MEAL(S)    Left leg swelling  Needs assessment  Consider compression socks and vascular referral    Could check echo   - US Lower Extremity Venous Duplex Left; Future  - XR Knee Bilateral 3 Views; Future    Gout, unspecified cause, unspecified chronicity, unspecified site  Stable on medication   - allopurinol (ZYLOPRIM) 300 MG tablet; Take 1/2 (one-half) tablet by mouth once daily    Vulvar irritation    - clobetasol (TEMOVATE) 0.05 % external ointment; Apply topically 2 times daily for 2 weeks, followed by once daily for 2 weeks, followed by every other day application    BV (bacterial vaginosis)    - metroNIDAZOLE (FLAGYL) 500 MG tablet; Take 1 tablet (500 mg) by mouth 2 times daily    Edema, unspecified type    - Basic metabolic panel  - furosemide (LASIX) 20 MG tablet; Take 1-2 tablets (20-40 mg) by mouth daily as needed (edema)      44 minutes spent on the date of the encounter doing chart review, history and exam, documentation and further activities per the note       BMI:   Estimated body mass index is 32.27 kg/m  as calculated from the following:    Height as of this encounter:  "1.626 m (5' 4\").    Weight as of this encounter: 85.3 kg (188 lb).       Patient Instructions   Ultrasound leg     Knee x ray in [address]    Flagyl  ordered     Medication refilled         Return in about 4 weeks (around 6/17/2021) for depending on lab and results .    LILLIAM Sam CNP  [facility] DUDLEY Hensley is a 80 year old who presents for the following health issues     HPI   Chief Complaint   Patient presents with     Leg Swelling     left leg swelling onset x 2 weeks.    Started with knee - left leg swelling and feet swelling   Better in am and days goes on it has more swelling   Toes at night burn and hurt   Gabapentin for this     Lotrimin helped with burning but not pain in feet  No rash     Tried hydrochlorothiazide and did not work well   Lasix not working as well as in past       Will try doubling dose   Potassium check today     BV not fully treated - wants to repeat flagyl    DM stable   Lab Results   Component Value Date    A1C 7.3 12/01/2020    A1C 6.8 11/26/2019    A1C 7.1 03/08/2019    A1C 7.0 12/31/2018    A1C 8.3 08/09/2018                    Review of Systems   Constitutional, HEENT, cardiovascular, pulmonary, GI, , musculoskeletal, neuro, skin, endocrine and psych systems are negative, except as otherwise noted.      Objective    BP (!) 142/74   Pulse 81   Temp 97.8  F (36.6  C) (Oral)   Resp 20   Ht 1.626 m (5' 4\")   Wt 85.3 kg (188 lb)   LMP  (LMP Unknown)   SpO2 100%   BMI 32.27 kg/m    Body mass index is 32.27 kg/m .  Physical Exam   GENERAL:  alert and no distress  RESP: lungs clear to auscultation - no rales, rhonchi or wheezes  CV: regular rate and rhythm, normal S1 S2, no S3 or S4, no murmur, click or rub, no peripheral edema and peripheral pulses strong  MS: mild fluffy foot and no pitting right now - but is early in day for her   Left leg more than right   Left leg with pain   PSYCH: mentation appears normal, affect " normal/bright    Lab

## 2021-05-21 ENCOUNTER — TELEPHONE (OUTPATIENT)
Dept: INTERNAL MEDICINE | Facility: CLINIC | Age: 81
End: 2021-05-21

## 2021-05-21 DIAGNOSIS — M79.662 PAIN IN BOTH LOWER LEGS: Primary | ICD-10-CM

## 2021-05-21 DIAGNOSIS — E11.8 TYPE 2 DIABETES MELLITUS WITH COMPLICATION, WITHOUT LONG-TERM CURRENT USE OF INSULIN (H): ICD-10-CM

## 2021-05-21 DIAGNOSIS — M79.661 PAIN IN BOTH LOWER LEGS: Primary | ICD-10-CM

## 2021-05-21 DIAGNOSIS — M79.89 LEG SWELLING: ICD-10-CM

## 2021-05-21 RX ORDER — LANCETS
EACH MISCELLANEOUS
Qty: 1 EACH | Refills: 3 | Status: SHIPPED | OUTPATIENT
Start: 2021-05-21 | End: 2023-09-18

## 2021-05-21 RX ORDER — GLUCOSAMINE HCL/CHONDROITIN SU 500-400 MG
CAPSULE ORAL
Qty: 100 EACH | Refills: 3 | Status: SHIPPED | OUTPATIENT
Start: 2021-05-21 | End: 2024-07-24

## 2021-05-21 NOTE — TELEPHONE ENCOUNTER
Patient called and said she needs an order for contour next meter as her insurance no longer covers the accu chek.    Patient also would like a bilateral ultrasound of her legs. She forgot to mention she was having pain in both legs when she was in for her appointment. There is a current order for only her left leg now.

## 2021-05-21 NOTE — TELEPHONE ENCOUNTER
Called patient and she would like the test to be done at the [place] location ([facility]).  Informed her of prescription being sent to pharmacy.    Faxed referral to [facility] at [phone].

## 2021-05-24 RX ORDER — BLOOD-GLUCOSE METER
EACH MISCELLANEOUS
Qty: 1 KIT | Refills: 0 | Status: SHIPPED | OUTPATIENT
Start: 2021-05-24 | End: 2024-07-24

## 2021-05-25 ENCOUNTER — TRANSFERRED RECORDS (OUTPATIENT)
Dept: HEALTH INFORMATION MANAGEMENT | Facility: CLINIC | Age: 81
End: 2021-05-25

## 2021-05-27 ENCOUNTER — TRANSFERRED RECORDS (OUTPATIENT)
Dept: HEALTH INFORMATION MANAGEMENT | Facility: CLINIC | Age: 81
End: 2021-05-27

## 2021-06-03 ENCOUNTER — THERAPY VISIT (OUTPATIENT)
Dept: PHYSICAL THERAPY | Facility: CLINIC | Age: 81
End: 2021-06-03
Attending: UROLOGY
Payer: COMMERCIAL

## 2021-06-03 DIAGNOSIS — R32 URINARY INCONTINENCE, UNSPECIFIED TYPE: ICD-10-CM

## 2021-06-03 DIAGNOSIS — R10.2 VAGINAL PAIN: ICD-10-CM

## 2021-06-03 DIAGNOSIS — Z98.890 HISTORY OF SUBURETHRAL SLING PROCEDURE: ICD-10-CM

## 2021-06-03 DIAGNOSIS — M62.81 MUSCLE WEAKNESS (GENERALIZED): ICD-10-CM

## 2021-06-03 DIAGNOSIS — N39.46 MIXED INCONTINENCE: ICD-10-CM

## 2021-06-03 DIAGNOSIS — M79.18 MYALGIA OF PELVIC FLOOR: ICD-10-CM

## 2021-06-03 DIAGNOSIS — N95.2 VAGINAL ATROPHY: ICD-10-CM

## 2021-06-03 PROCEDURE — 97110 THERAPEUTIC EXERCISES: CPT | Mod: GP | Performed by: PHYSICAL THERAPIST

## 2021-06-03 PROCEDURE — 97112 NEUROMUSCULAR REEDUCATION: CPT | Mod: GP | Performed by: PHYSICAL THERAPIST

## 2021-06-03 PROCEDURE — 97161 PT EVAL LOW COMPLEX 20 MIN: CPT | Mod: GP | Performed by: PHYSICAL THERAPIST

## 2021-06-03 PROCEDURE — 97535 SELF CARE MNGMENT TRAINING: CPT | Mod: GP | Performed by: PHYSICAL THERAPIST

## 2021-06-03 NOTE — PROGRESS NOTES
"Physical Therapy Initial Evaluation  Subjective:    Patient Health History  Lindsey Gary being seen for Pelvic area.     Date of Onset: many years ago.   Problem occurred: unknown   Pain is reported as 6/10 on pain scale.  General health as reported by patient is fair.  Pertinent medical history includes: high blood pressure, diabetes, overweight, menopausal, osteoarthritis and numbness/tingling.   Red flags:  Calf pain-swelling-warmth and pain at rest/night.  Medical allergies: none.   Surgeries include:  Orthopedic surgery. Other surgery history details: ankle, partial knee replacement both knees, breast.    Current medications:  High blood pressure medication, anti-inflammatory and pain medication. Other medications details: diabetes medication.    Current occupation is Retired.                     Therapist Generated HPI Evaluation  Problem details: Patient reports longstanding incontinence with many different urologists and treatments. She has small leaks throughout the day, with no stress or urge involved. She wears 3 pads/day and 1 pad/night for protection. She has been told in the past that her \"bladder neck never closes\" and feels damp all the time. She also has occasional stress incontinence with cough or sneeze if she has a full bladder. She voids every 30 minutes during the day and gets up 2-3 times during the night to void. She limits fluids quite extensively. History of recurrent urinary tract infections over the past 10 years. Dr Heath treated her with sling surgery >10 years ago as well as injecting bulking agent at bladder neck, both with minimal improvement. She has longstanding constipation that she is managing well with metamucil (BM daily) .                                                        Objective:  System                                 Pelvic Dysfunction Evaluation:    Bladder/Pelvic Problems:    Storage Problem:  Frequency and stress incontinence  Emptying Problem:  Dyspareunia " and postvoid dribbling      Diagnostic Tests:    Pelvic Exam:  Tenderness with exam  UA/Urine Sample:  Frequent UTI's        Cystoscopy:  Recent: normal            Flexibility:    Tightness present at:Adductors; Iliopsoas; Hamstrings and Piriformis    Abdominal Wall:      Trigger Points:  Transverse abdominals, iliopsoas, internal obliques and external obliques    Pelvic Clock Exam:    Ischiocavernosis pain:  -  Bulbocavernosis pain:  -  Transverse Perineal:  -  Levator ANI:  -  Perineal Body:  +    Reflex Testing:  normal    External Assessment:    Skin Condition:  Atrophic    Bearing Down/Coughing:  Normal  Tissue Symmetry:  Normal  Introitus:  Normal  Muscle Contraction/Perineal Mobility:  Slight lift, no urogential triangle descent  Internal Assessment:  Internal assessment pelvic: Severe tightness/shortness levator ani mm, with minimal tenderness. Poor awareness of PFM.    Contraction/Grade:  Weak squeeze, 2 second hold (2)    Accessory Muscle use-Gluteals:  Yes      SEMG Biofeedback:  Semg biofeedback pelvic: next.                  Additional History:  Delivery History:  Vaginal delivery  Number of Pregnancies: 2  Number of Live Births: 2                       General     ROS    Assessment/Plan:    Patient is a 80 year old female with pelvic complaints.    Patient has the following significant findings with corresponding treatment plan.                Diagnosis 1:  Pelvic floor dysfunction  Pain -  manual therapy and self management  Decreased ROM/flexibility - manual therapy, therapeutic exercise and home program  Decreased strength - therapeutic exercise, therapeutic activities and home program  Impaired muscle performance - biofeedback and neuro re-education  Decreased function - therapeutic activities and home program    Therapy Evaluation Codes:   1) History comprised of:   Personal factors that impact the plan of care:      None.    Comorbidity factors that impact the plan of care are:      None.      Medications impacting care: None.  2) Examination of Body Systems comprised of:   Body structures and functions that impact the plan of care:      Pelvis.   Activity limitations that impact the plan of care are:      Frequency, Pelvic Exam and Stress incontinence.  3) Clinical presentation characteristics are:   Stable/Uncomplicated.  4) Decision-Making    Low complexity using standardized patient assessment instrument and/or measureable assessment of functional outcome.  Cumulative Therapy Evaluation is: Low complexity.    Previous and current functional limitations:  (See Goal Flow Sheet for this information)    Short term and Long term goals: (See Goal Flow Sheet for this information)     Communication ability:  Patient appears to be able to clearly communicate and understand verbal and written communication and follow directions correctly.  Treatment Explanation - The following has been discussed with the patient:   RX ordered/plan of care  Anticipated outcomes  Possible risks and side effects  This patient would benefit from PT intervention to resume normal activities.   Rehab potential is good.    Frequency:  1 X week, once daily  Duration:  for 12 weeks  Discharge Plan:  Achieve all LTG.  Independent in home treatment program.  Reach maximal therapeutic benefit.    Please refer to the daily flowsheet for treatment today, total treatment time and time spent performing 1:1 timed codes.

## 2021-06-04 NOTE — PROGRESS NOTES
SUBJECTIVE:                                                   Lindsey Gary is a 80 year old female who presents to clinic today for the following health issue(s):  Patient presents with:  Vaginal Problem: Would like 2nd opinion on vaginal infections- states struggles with psoriasis, and c/o bad burning and vaginal inflamation, was given clobetasol and states it made it worse. Wondering about trying something else, or whether or not should see derm for skin      HPI:  Lindsey presents for a consultation of possible vulvitis or vaginitis in the setting of long standing urinary incontinence. She has been seen by multiple Urologists in multiple systems including [facility] and [facility]. 10 years ago she had a transvaginal sling for stress incontinence and was also placed on Mybetriq. She had no change in her urinary incontinence. She states that she has been told that her bladder neck is open and does not close. She has tried bladder neck injections that did not work as well. She has to wear a pad because she states that she is always damp. She has to void every 30 minutes to limit the leaking she has increased urinary urgency and has nocturia.  She has noted irritation and burning of the skin of the vulva and also of the vagina. She was given topical clobetasol for her vulva but it caused severe burning with no relief. She has not noted any vaginal discharge.    No LMP recorded (lmp unknown). Patient has had a hysterectomy..     Patient is sexually active, .  Using menopause and hysterectomy for contraception.    reports that she has never smoked. She has never used smokeless tobacco.    STD testing offered?  Declined    Health maintenance updated:  yes    Today's PHQ-2 Score:   PHQ-2 (  [employer]) 2021   Q1: Little interest or pleasure in doing things 0   Q2: Feeling down, depressed or hopeless 0   PHQ-2 Score 0       Problem list and histories reviewed & adjusted, as  indicated.  Additional history: as documented.    Patient Active Problem List   Diagnosis     Rosacea     Gout     CHONDROCALC NOS-OTHER SITE(aka PSEUDOGOUT)     Essential hypertension     Hyperlipidemia LDL goal <100     Advanced directives, counseling/discussion     Atrophic vaginitis     Chronic foot pain     Hypertension goal BP (blood pressure) < 130/80     Bereavement     Obesity     Type 2 diabetes mellitus with complication, without long-term current use of insulin (H)     Status post total knee replacement, unspecified laterality     Psoriasis     Muscle weakness (generalized)     Mixed incontinence     Myalgia of pelvic floor     Past Surgical History:   Procedure Laterality Date     BLADDER SURGERY       COLONOSCOPY       COLONOSCOPY N/A 12/17/2014    Procedure: COMBINED COLONOSCOPY, SINGLE OR MULTIPLE BIOPSY/POLYPECTOMY BY BIOPSY;  Surgeon: Chuy Staton MD;  Location:  GI     COLONOSCOPY N/A 08/07/2020    Procedure: COLONOSCOPY;  Surgeon: Chuy Staton MD;  Location:  GI     CYSTOSCOPY       HYSTERECTOMY, PAP NO LONGER INDICATED       HYSTERECTOMY, JAIME  1991    fibroid     SURGICAL HISTORY OF -   10/28/2015    right partial knee      [facility] NONSPECIFIC PROCEDURE      Breast biopsies        [facility] NONSPECIFIC PROCEDURE      Symptomatic left thigh lipomas        Z NONSPECIFIC PROCEDURE  06/01/2009    pubovaginal sling      Social History     Tobacco Use     Smoking status: Never Smoker     Smokeless tobacco: Never Used   Substance Use Topics     Alcohol use: No     Alcohol/week: 0.0 standard drinks      Problem (# of Occurrences) Relation (Name,Age of Onset)    Cancer (1) Mother: colon ca survivor    Cancer - colorectal (1) Mother    Cerebrovascular Disease (1) Father    No Known Problems (6) Sister, Brother, Maternal Grandmother, Maternal Grandfather, Paternal Grandmother, Other            Current Outpatient Medications   Medication Sig     acetaminophen (TYLENOL) 500 MG tablet Take 2 tablets  "by mouth every 8 hours as needed      alcohol swab prep pads Use to swab area of injection/beau as directed     Alcohol Swabs (B-D SINGLE USE SWABS REGULAR) PADS USE TO SWAB AREA OF INJECTION/LANCET 6 TIMES PER DAY OR AS DIRECTED.     allopurinol (ZYLOPRIM) 300 MG tablet Take 1/2 (one-half) tablet by mouth once daily     Ascorbic Acid (VITAMIN C) 500 MG CAPS Take 500 mg by mouth daily     ASPIRIN 81 MG OR TABS 1 tablet daily     betamethasone, augmented, (DIPROLENE) 0.05 % lotion GINA 10 TO 20 DROPS TOPICALLY AA OF SCALP Q NIGHT UTD     blood glucose (NO BRAND SPECIFIED) test strip Use to test blood sugar 3 times daily or as directed. To accompany: Blood Glucose Monitor Brands: per insurance.     blood glucose (NO BRAND SPECIFIED) test strip Use to test blood sugar 3 times daily or as directed. Accucheck     blood glucose calibration (NO BRAND SPECIFIED) solution Use to calibrate blood glucose monitor as needed as directed. To accompany: Blood Glucose Monitor Brands: per insurance.     blood glucose monitoring (CONTOUR NEXT MONITOR W/DEVICE KIT) meter device kit Use to test blood sugar 3 times daily or as directed.     calcipotriene 0.005 % OINT Apply 1 Application topically as needed     calcium-magnesium (CALMAG) 500-250 MG TABS per tablet Take 1 tablet by mouth daily     chlorhexidine (PERIDEX) 0.12 % solution RINSE ONE HALF  OZ 2-3 X D AFTER BREAKFAST BEFORE BEDTIME FOLLOWING BRUSHING AND FLOSSIN     Cholecalciferol (VITAMIN D) 2000 UNITS tablet Take 1 tablet by mouth daily     CINNAMON 500 MG OR CAPS 2 tablets daily     clobetasol (TEMOVATE) 0.05 % external ointment Apply topically 2 times daily for 2 weeks, followed by once daily for 2 weeks, followed by every other day application     DROPLET INSULIN SYRINGE 31G X 5/16\" 0.3 ML miscellaneous USE  2 SYRINGES EVERY DAY  OR AS DIRECTED     estradiol (ESTRACE) 0.1 MG/GM vaginal cream Please use blueberry size amount in the vagina nightly for two weeks and then " "three times a week at night thereafter     fluconazole (DIFLUCAN) 150 MG tablet Take 1 tablet (150 mg) by mouth every 72 hours     furosemide (LASIX) 20 MG tablet Take 1-2 tablets (20-40 mg) by mouth daily as needed (edema)     gabapentin (NEURONTIN) 300 MG capsule TAKE 2 CAPSULES AT BEDTIME     glipiZIDE (GLUCOTROL) 10 MG tablet TAKE 1 TABLET BY MOUTH TWICE DAILY BEFORE MEAL(S)     Ibuprofen (ADVIL PO) Take by mouth every 8 hours as needed for moderate pain     insulin NPH (NOVOLIN N RELION) 100 UNIT/ML vial INJECT 20 UNITS SUBCUTANEOUSLY BEFORE BREAKFAST AND 20 UNITS BEFORE DINNER     Lactobacillus (PROBIOTIC ACIDOPHILUS) TABS Take 1 tablet by mouth daily     lisinopril (ZESTRIL) 40 MG tablet Take 1 tablet (40 mg) by mouth daily     metFORMIN (GLUCOPHAGE) 1000 MG tablet TAKE 1 TABLET BY MOUTH TWICE DAILY WITH MEALS     simvastatin (ZOCOR) 40 MG tablet TAKE 1 TABLET BY MOUTH ONCE DAILY AT BEDTIME     thin (NO BRAND SPECIFIED) lancets Use to test blood sugar 3 times daily or as directed. To accompany: Blood Glucose Monitor Brands: per insurance.     TUMS 500 MG OR CHEW 1 TABLET 3 TIMES PRN     vitamin E 400 UNITS TABS Take 400 Units by mouth daily     Zinc Sulfate (ZINC 15 PO) Take by mouth daily     No current facility-administered medications for this visit.      Allergies   Allergen Reactions     Augmentin      Tongue swelling and rash     Can take PCN K without reaction      Sulfa Drugs      Tongue swelling and rash       ROS:  12 point review of systems negative other than symptoms noted below or in the HPI.  Eyes: Spots  Cardiovascular: Lightheadedness and Lower Extremity Swelling  Genitourinary: Hot Flashes, No Periods, Painful Urination, Vaginal Dryness and Vaginal Itching  Neurologic: Dizziness  Musculoskeletal: Joint Pain  Endocrine: Decreased Libido  No urinary frequency or dysuria, bladder or kidney problems      OBJECTIVE:     /64   Pulse 74   Ht 1.626 m (5' 4\")   Wt 87.6 kg (193 lb 3.2 oz)   " LMP  (LMP Unknown)   BMI 33.16 kg/m    Body mass index is 33.16 kg/m .    Exam:  Constitutional:  Appearance: Well nourished, well developed alert, in no acute distress  Lymphatic: Lymph Nodes:  No other lymphadenopathy present  Skin: General Inspection:  No rashes present, no lesions present, no areas of discoloration.  Neurologic:  Mental Status:  Oriented X3.  Normal strength and tone, sensory exam grossly normal, mentation intact and speech normal.    Psychiatric:  Mentation appears normal and affect normal/bright.  External Genitalia: normal for age. Attenuated labia minora bilaterally. No erythema. No rash. No skin breakdown.  Vagina: scant white discharge in the vaginal vault. Redundant mucosa but no prolapse on valsalva.  On palpation, mesh palpable on the left anterior vaginal wall without any erosion. Tender to palpation. Not palpable on the right side. No visible mesh.    In-Clinic Test Results:  Results for orders placed or performed in visit on 06/07/21 (from the past 24 hour(s))   UA with Microscopic   Result Value Ref Range    Color Urine Yellow     Appearance Urine Clear     Glucose Urine Negative NEG^Negative mg/dL    Bilirubin Urine Negative NEG^Negative    Ketones Urine Negative NEG^Negative mg/dL    Specific Gravity Urine 1.015 1.003 - 1.035    pH Urine 5.0 5.0 - 7.0 pH    Protein Albumin Urine Negative NEG^Negative mg/dL    Urobilinogen Urine 0.2 0.2 - 1.0 EU/dL    Nitrite Urine Negative NEG^Negative    Blood Urine Negative NEG^Negative    Leukocyte Esterase Urine Negative NEG^Negative    Source Midstream Urine     WBC Urine 0 - 5 OTO5^0 - 5 /HPF    RBC Urine O - 2 OTO2^O - 2 /HPF     *Note: Due to a large number of results and/or encounters for the requested time period, some results have not been displayed. A complete set of results can be found in Results Review.       ASSESSMENT/PLAN:                                                        ICD-10-CM    1. Vulvar burning  N94.89 Wound Culture  Aerobic Bacterial   2. Dysuria  R30.0 UA with Microscopic     Urine Culture Aerobic Bacterial   3. Vaginal burning  N94.9      No dermatological disorders based on physical appearance of the vulva. Based on our discussion she opted for a wound culture rather than a gram stain based on her prior results.  Will rule out a UTI as she gets frequent UTIs.  I recommend using cooled vaseline or aquaphor as a skin protectant. Topical steroids will not be helpful in this regard.  She has refractory urinary incontinence. I defer management to Urology. Given the non-functional status of the mesh she has in place she should discuss surgical intervention for stress incontinence. In the interim however I recommended consideration of a pessary with support or the Poise Impressa inserts which are intended for daily use.       Uzma Ashraf MD  [facility] [place]

## 2021-06-07 ENCOUNTER — OFFICE VISIT (OUTPATIENT)
Dept: OBGYN | Facility: CLINIC | Age: 81
End: 2021-06-07
Payer: COMMERCIAL

## 2021-06-07 VITALS
WEIGHT: 193.2 LBS | SYSTOLIC BLOOD PRESSURE: 130 MMHG | HEIGHT: 64 IN | DIASTOLIC BLOOD PRESSURE: 64 MMHG | HEART RATE: 74 BPM | BODY MASS INDEX: 32.98 KG/M2

## 2021-06-07 DIAGNOSIS — N94.89 VULVAR BURNING: Primary | ICD-10-CM

## 2021-06-07 DIAGNOSIS — N94.89 VAGINAL BURNING: ICD-10-CM

## 2021-06-07 DIAGNOSIS — R30.0 DYSURIA: ICD-10-CM

## 2021-06-07 LAB
ALBUMIN UR-MCNC: NEGATIVE MG/DL
APPEARANCE UR: CLEAR
BILIRUB UR QL STRIP: NEGATIVE
COLOR UR AUTO: YELLOW
GLUCOSE UR STRIP-MCNC: NEGATIVE MG/DL
HGB UR QL STRIP: NEGATIVE
KETONES UR STRIP-MCNC: NEGATIVE MG/DL
LEUKOCYTE ESTERASE UR QL STRIP: NEGATIVE
NITRATE UR QL: NEGATIVE
PH UR STRIP: 5 PH (ref 5–7)
RBC #/AREA URNS AUTO: NORMAL /HPF
SOURCE: NORMAL
SP GR UR STRIP: 1.01 (ref 1–1.03)
UROBILINOGEN UR STRIP-ACNC: 0.2 EU/DL (ref 0.2–1)
WBC #/AREA URNS AUTO: NORMAL /HPF

## 2021-06-07 PROCEDURE — 81001 URINALYSIS AUTO W/SCOPE: CPT | Performed by: OBSTETRICS & GYNECOLOGY

## 2021-06-07 PROCEDURE — 87077 CULTURE AEROBIC IDENTIFY: CPT | Performed by: OBSTETRICS & GYNECOLOGY

## 2021-06-07 PROCEDURE — 87086 URINE CULTURE/COLONY COUNT: CPT | Performed by: OBSTETRICS & GYNECOLOGY

## 2021-06-07 PROCEDURE — 87070 CULTURE OTHR SPECIMN AEROBIC: CPT | Mod: 59 | Performed by: OBSTETRICS & GYNECOLOGY

## 2021-06-07 PROCEDURE — 99214 OFFICE O/P EST MOD 30 MIN: CPT | Performed by: OBSTETRICS & GYNECOLOGY

## 2021-06-07 PROCEDURE — 87186 SC STD MICRODIL/AGAR DIL: CPT | Performed by: OBSTETRICS & GYNECOLOGY

## 2021-06-07 ASSESSMENT — MIFFLIN-ST. JEOR: SCORE: 1341.35

## 2021-06-08 LAB
BACTERIA SPEC CULT: NO GROWTH
Lab: NORMAL
SPECIMEN SOURCE: NORMAL

## 2021-06-10 LAB
BACTERIA SPEC CULT: ABNORMAL
Lab: ABNORMAL
SPECIMEN SOURCE: ABNORMAL

## 2021-06-10 RX ORDER — METRONIDAZOLE 500 MG/1
500 TABLET ORAL 2 TIMES DAILY
Qty: 14 TABLET | Refills: 0 | Status: SHIPPED | OUTPATIENT
Start: 2021-06-10 | End: 2021-06-17

## 2021-06-17 ENCOUNTER — THERAPY VISIT (OUTPATIENT)
Dept: PHYSICAL THERAPY | Facility: CLINIC | Age: 81
End: 2021-06-17
Payer: COMMERCIAL

## 2021-06-17 DIAGNOSIS — N39.46 MIXED INCONTINENCE: ICD-10-CM

## 2021-06-17 DIAGNOSIS — M79.18 MYALGIA OF PELVIC FLOOR: ICD-10-CM

## 2021-06-17 DIAGNOSIS — M62.81 MUSCLE WEAKNESS (GENERALIZED): ICD-10-CM

## 2021-06-17 PROCEDURE — 97110 THERAPEUTIC EXERCISES: CPT | Mod: GP | Performed by: PHYSICAL THERAPIST

## 2021-06-17 PROCEDURE — 97112 NEUROMUSCULAR REEDUCATION: CPT | Mod: GP | Performed by: PHYSICAL THERAPIST

## 2021-06-24 ENCOUNTER — TELEPHONE (OUTPATIENT)
Dept: OBGYN | Facility: CLINIC | Age: 81
End: 2021-06-24

## 2021-06-24 ENCOUNTER — NURSE TRIAGE (OUTPATIENT)
Dept: INTERNAL MEDICINE | Facility: CLINIC | Age: 81
End: 2021-06-24

## 2021-06-24 DIAGNOSIS — N94.89 VULVAR BURNING: Primary | ICD-10-CM

## 2021-06-24 RX ORDER — FLUCONAZOLE 150 MG/1
TABLET ORAL
Qty: 2 TABLET | Refills: 0 | Status: SHIPPED | OUTPATIENT
Start: 2021-06-24 | End: 2021-07-06

## 2021-06-24 RX ORDER — METRONIDAZOLE 500 MG/1
500 TABLET ORAL 2 TIMES DAILY
Qty: 14 TABLET | Refills: 0 | Status: SHIPPED | OUTPATIENT
Start: 2021-06-24 | End: 2021-07-01

## 2021-06-24 NOTE — TELEPHONE ENCOUNTER
Let's do this. Let's send flagyl 500mg PO BID X 7 days and also send Fluconazole 150mg PO X Q 72 hours X 2 doses. Sometimes treating bacterial vaginosis will tilt patients towards a yeast infection.    If this does not work then we need to repeat her culture

## 2021-06-24 NOTE — TELEPHONE ENCOUNTER
Symptoms have improved since she has completed the Metronidazole but is still having some vaginal burning wondering if additional rx for the Metronidazole would be beneficial  Wound culture done 6/7  Kera Esquivel RN on 6/24/2021 at 12:23 PM

## 2021-06-24 NOTE — TELEPHONE ENCOUNTER
"SOB X few days, with activity  \"Bone hurting on left arm\" asked about chest pain she states \"a little bit\" but its not my heart its my lung, declined ED, Advised her to go to urgent care now, declines doesn't think its that serious, transferring back to scheduling to assist with appointment, she understands to seek care immediately with any worsening of symptoms     Celi Watson RN, BSN, CMSRN  [facility]      Reason for Disposition    MILD difficulty breathing (SOB only with activity) or per caller's report    Additional Information    Negative: Ribs are pulling in with each breath (retractions)    Negative: Stridor but no difficulty breathing    Negative: Fast breathing, but no difficulty breathing ( > 60 breaths/minute if < 2 mo, > 50 if 2-12 mo, > 40 if 1-5 years, > 30 if 6-11 years, and > 20 if > 12 years)    Negative: Lips or face have turned bluish but only with coughing spells    Negative: Can't take a deep breath because of chest pain    Negative: Hyperventilation attack suspected and new-onset    Negative: Drooling or spitting out saliva (because can't swallow) (Exception: normal drooling in young children)    Negative: Child sounds very sick or weak to the triager    Negative: MODERATE difficulty breathing (e.g., SOB at rest, tight breathing, retractions with each breath)    Negative: Breathing sounds labored or tight when triager listens    Negative: Breathing stopped for >20 seconds but now it's normal    Negative: Confused talking or acting    Negative: Using birth control method (BCPs, patch, ring) that contains estrogen and new onset of rapid breathing or shortness of breath    Negative: Pulmonary embolus risk factors (e.g., recent leg fracture or surgery, central line, prolonged bedrest or immobility)    Negative: Choking    Negative: Anaphylactic reaction (First Aid: Give epinephrine IM, such as Epi-pen, if you have it.)    Negative: Wheezing (high pitched whistling " sound) and previous asthma attacks or use of asthma medicines    Negative: Wheezing (high-pitched purring or whistling sound produced during breathing out) and no history of asthma    Negative: Stridor (harsh, raspy, low-pitched sound on breathing in) and a hoarse, seal-like, barky cough    Negative: Difficulty breathing and only present when coughing    Negative: Difficulty breathing (< 1 year old) from a stuffy nose and relieved by cleaning the nose    Negative: Noisy breathing with snorting sounds from nose and no respiratory distress    Negative: Noisy breathing with rattling sounds from chest and no respiratory distress    Negative: SEVERE difficulty breathing (struggling for each breath, making grunting noises with each breath, severe retractions, unable to speak or cry because of difficulty breathing)    Negative: Breathing stopped and hasn't returned    Negative: Wheezing or stridor starts suddenly after allergic food, new medicine or bee sting    Negative: Slow, shallow, and weak breathing    Negative: Bluish (or gray) lips or face now    Negative: Choked on something, with difficulty breathing now    Negative: Child passed out with difficulty breathing    Negative: Sounds like a life-threatening emergency to the triager    Protocols used: BREATHING DIFFICULTY (RESPIRATORY DISTRESS)-P-OH

## 2021-07-04 ENCOUNTER — HEALTH MAINTENANCE LETTER (OUTPATIENT)
Age: 81
End: 2021-07-04

## 2021-07-06 ENCOUNTER — OFFICE VISIT (OUTPATIENT)
Dept: INTERNAL MEDICINE | Facility: CLINIC | Age: 81
End: 2021-07-06
Payer: COMMERCIAL

## 2021-07-06 VITALS
HEIGHT: 64 IN | SYSTOLIC BLOOD PRESSURE: 120 MMHG | BODY MASS INDEX: 31.92 KG/M2 | TEMPERATURE: 97.7 F | DIASTOLIC BLOOD PRESSURE: 72 MMHG | RESPIRATION RATE: 18 BRPM | WEIGHT: 187 LBS | HEART RATE: 85 BPM | OXYGEN SATURATION: 98 %

## 2021-07-06 DIAGNOSIS — I10 ESSENTIAL HYPERTENSION: ICD-10-CM

## 2021-07-06 DIAGNOSIS — E11.8 TYPE 2 DIABETES MELLITUS WITH COMPLICATION, WITHOUT LONG-TERM CURRENT USE OF INSULIN (H): ICD-10-CM

## 2021-07-06 DIAGNOSIS — E78.5 HYPERLIPIDEMIA LDL GOAL <100: ICD-10-CM

## 2021-07-06 DIAGNOSIS — D64.9 ANEMIA, UNSPECIFIED TYPE: ICD-10-CM

## 2021-07-06 DIAGNOSIS — R07.2 PRECORDIAL PAIN: Primary | ICD-10-CM

## 2021-07-06 LAB
ERYTHROCYTE [DISTWIDTH] IN BLOOD BY AUTOMATED COUNT: 15 % (ref 10–15)
HBA1C MFR BLD: 7.6 % (ref 0–5.6)
HCT VFR BLD AUTO: 34 % (ref 35–47)
HGB BLD-MCNC: 10.7 G/DL (ref 11.7–15.7)
MCH RBC QN AUTO: 26.3 PG (ref 26.5–33)
MCHC RBC AUTO-ENTMCNC: 31.5 G/DL (ref 31.5–36.5)
MCV RBC AUTO: 84 FL (ref 78–100)
PLATELET # BLD AUTO: 194 10E9/L (ref 150–450)
RBC # BLD AUTO: 4.07 10E12/L (ref 3.8–5.2)
RETICS # AUTO: 56.3 10E9/L (ref 25–95)
RETICS/RBC NFR AUTO: 1.4 % (ref 0.5–2)
VIT B12 SERPL-MCNC: 749 PG/ML (ref 193–986)
WBC # BLD AUTO: 5.3 10E9/L (ref 4–11)

## 2021-07-06 PROCEDURE — 83036 HEMOGLOBIN GLYCOSYLATED A1C: CPT | Performed by: INTERNAL MEDICINE

## 2021-07-06 PROCEDURE — 82728 ASSAY OF FERRITIN: CPT | Performed by: INTERNAL MEDICINE

## 2021-07-06 PROCEDURE — 80053 COMPREHEN METABOLIC PANEL: CPT | Performed by: INTERNAL MEDICINE

## 2021-07-06 PROCEDURE — 80061 LIPID PANEL: CPT | Performed by: INTERNAL MEDICINE

## 2021-07-06 PROCEDURE — 36415 COLL VENOUS BLD VENIPUNCTURE: CPT | Performed by: INTERNAL MEDICINE

## 2021-07-06 PROCEDURE — 82746 ASSAY OF FOLIC ACID SERUM: CPT | Performed by: INTERNAL MEDICINE

## 2021-07-06 PROCEDURE — 99214 OFFICE O/P EST MOD 30 MIN: CPT | Performed by: INTERNAL MEDICINE

## 2021-07-06 PROCEDURE — 83550 IRON BINDING TEST: CPT | Performed by: INTERNAL MEDICINE

## 2021-07-06 PROCEDURE — 85045 AUTOMATED RETICULOCYTE COUNT: CPT | Performed by: INTERNAL MEDICINE

## 2021-07-06 PROCEDURE — 82607 VITAMIN B-12: CPT | Performed by: INTERNAL MEDICINE

## 2021-07-06 PROCEDURE — 93000 ELECTROCARDIOGRAM COMPLETE: CPT | Performed by: INTERNAL MEDICINE

## 2021-07-06 PROCEDURE — 84443 ASSAY THYROID STIM HORMONE: CPT | Performed by: INTERNAL MEDICINE

## 2021-07-06 PROCEDURE — 83540 ASSAY OF IRON: CPT | Performed by: INTERNAL MEDICINE

## 2021-07-06 PROCEDURE — 85027 COMPLETE CBC AUTOMATED: CPT | Performed by: INTERNAL MEDICINE

## 2021-07-06 ASSESSMENT — MIFFLIN-ST. JEOR: SCORE: 1313.23

## 2021-07-06 NOTE — PROGRESS NOTES
Dr Brady's note      Patient's instructions / PLAN:                                                        Plan:  1. Heart stress test -- To schedule this test please call [facility] at: [phone]   2. Continue Aspirin and the other meds   3. Electrocardiogram today   4. Follow up with Dinorah Wong few days after the stress test   5. Discuss with Dinorah Wong about esophagram instead of upper endoscopy        ASSESSMENT & PLAN:                                                      80-year-old woman with CAD risk factors: HTN, hyperlipidemia, DM presents with nonspecific chest pain.   I recommended heart stress test to rule out unstable angina.  She has mild chest pain and will do appropriate work-up    (R07.2) Precordial pain  (primary encounter diagnosis)  Comment:   Plan: NM Lexiscan stress test, CBC with platelets,         Comprehensive metabolic panel, EKG 12-lead         complete w/read - Clinics, EKG 12-lead complete        w/read - Clinics            (I10) Essential hypertension  Comment:   Plan: NM Lexiscan stress test, CBC with platelets,         Comprehensive metabolic panel            (E78.5) Hyperlipidemia LDL goal <100  Comment:   Plan: NM Lexiscan stress test, Comprehensive         metabolic panel, Lipid panel reflex to direct         LDL Fasting            (E11.8) Type 2 diabetes mellitus with complication, without long-term current use of insulin (H)  Comment:   Plan: NM Lexiscan stress test, TSH with free T4         reflex, Hemoglobin A1c            (D64.9) Anemia, unspecified type  Comment:   Plan: Ferritin, CBC with platelets, Comprehensive         metabolic panel, Iron and iron binding         capacity, Vitamin B12, Reticulocyte count,         Folate               Chief complaint:                                                      CP     SUBJECTIVE:                                                    History of present illness:    2 weeks ago the ZCP started retrosternal and moved to the L  side  -- hs of L breast cyst  -- hx of esophageal strictures  -- assoc w L upper arm pain  -- she was advised to go to ER but she preferred to wait 2 wk for this alan   -- the CP resolved        Diabetes Follow-up    How often are you checking your blood sugar? Three times daily  Blood sugar testing frequency justification:  Uncontrolled diabetes  What time of day are you checking your blood sugars (select all that apply)?  Before meals  Have you had any blood sugars above 200?  Yes 240 recently  Have you had any blood sugars below 70?  No    What symptoms do you notice when your blood sugar is low?  None    What concerns do you have today about your diabetes? None     Do you have any of these symptoms? (Select all that apply)  Numbness in feet and Blurry vision              Hyperlipidemia Follow-Up      Are you regularly taking any medication or supplement to lower your cholesterol?   Yes- simvastatin    Are you having muscle aches or other side effects that you think could be caused by your cholesterol lowering medication?  No    Hypertension Follow-up      Do you check your blood pressure regularly outside of the clinic? No     Are you following a low salt diet? Yes    Are your blood pressures ever more than 140 on the top number (systolic) OR more   than 90 on the bottom number (diastolic), for example 140/90? No    BP Readings from Last 2 Encounters:   06/07/21 130/64   05/20/21 (!) 142/74     Hemoglobin A1C (%)   Date Value   12/01/2020 7.3 (H)   11/26/2019 6.8 (H)     LDL Cholesterol Calculated (mg/dL)   Date Value   12/01/2020 108 (H)   11/26/2019 85         How many servings of fruits and vegetables do you eat daily?  2-3    On average, how many sweetened beverages do you drink each day (Examples: soda, juice, sweet tea, etc.  Do NOT count diet or artificially sweetened beverages)?   0    How many days per week do you exercise enough to make your heart beat faster? 3 or less    How many minutes a day do you  "exercise enough to make your heart beat faster? 9 or less    How many days per week do you miss taking your medication? 0      Review of Systems:                                                      ROS: negative for fever, chills, cough, wheezes,shortness of breath, vomiting, abdominal pain, leg swelling, positive for chest pain as above    OBJECTIVE:             Physical exam:  Blood pressure 120/72, pulse 85, temperature 97.7  F (36.5  C), temperature source Oral, resp. rate 18, height 1.626 m (5' 4\"), weight 84.8 kg (187 lb), SpO2 98 %, not currently breastfeeding.     NAD, appears comfortable  Skin: no rashes   Neck: supple, no JVD,  No thyroidmegaly. Lymph nodes nonpalpable cervical and supraclavicular.  Chest: clear to auscultation bilaterally, good respiratory effort, no tenderness over her anterior chest  Heart: S1 S2, RRR, no mgr appreciated  Abdomen: soft, not tender, no hepatosplenomegaly or masses appreciated, no abdominal bruit, present bowel sounds  Extremities: no edema,  Neurologic: A, Ox3, no focal signs appreciated    PMHx: reviewed  Past Medical History:   Diagnosis Date     Arthritis      Essential hypertension, benign      Gout      Mixed hyperlipidemia      Mumps      Pain in joint, ankle and foot     gout     Type II or unspecified type diabetes mellitus without mention of complication, not stated as uncontrolled     Diabetes mellitus      PSHx: reviewed  Past Surgical History:   Procedure Laterality Date     BLADDER SURGERY       COLONOSCOPY       COLONOSCOPY N/A 12/17/2014    Procedure: COMBINED COLONOSCOPY, SINGLE OR MULTIPLE BIOPSY/POLYPECTOMY BY BIOPSY;  Surgeon: Chuy Staton MD;  Location:  GI     COLONOSCOPY N/A 08/07/2020    Procedure: COLONOSCOPY;  Surgeon: Chuy Staton MD;  Location:  GI     CYSTOSCOPY       HYSTERECTOMY, PAP NO LONGER INDICATED       HYSTERECTOMY, JAIME  1991    fibroid     SURGICAL HISTORY OF -   10/28/2015    right partial knee      ZZC NONSPECIFIC " PROCEDURE      Breast biopsies        [facility] NONSPECIFIC PROCEDURE      Symptomatic left thigh lipomas        [facility] NONSPECIFIC PROCEDURE  06/01/2009    pubovaginal sling        Meds: reviewed  Current Outpatient Medications   Medication Sig Dispense Refill     acetaminophen (TYLENOL) 500 MG tablet Take 2 tablets by mouth every 8 hours as needed        alcohol swab prep pads Use to swab area of injection/beau as directed 100 each 3     Alcohol Swabs (B-D SINGLE USE SWABS REGULAR) PADS USE TO SWAB AREA OF INJECTION/LANCET 6 TIMES PER DAY OR AS DIRECTED. 600 each 4     allopurinol (ZYLOPRIM) 300 MG tablet Take 1/2 (one-half) tablet by mouth once daily 45 tablet 1     Ascorbic Acid (VITAMIN C) 500 MG CAPS Take 500 mg by mouth daily       ASPIRIN 81 MG OR TABS 1 tablet daily 100 3     betamethasone, augmented, (DIPROLENE) 0.05 % lotion GINA 10 TO 20 DROPS TOPICALLY AA OF SCALP Q NIGHT UTD       blood glucose (NO BRAND SPECIFIED) test strip Use to test blood sugar 3 times daily or as directed. To accompany: Blood Glucose Monitor Brands: per insurance. 300 strip 6     blood glucose (NO BRAND SPECIFIED) test strip Use to test blood sugar 3 times daily or as directed. Accucheck 300 strip 3     blood glucose calibration (NO BRAND SPECIFIED) solution Use to calibrate blood glucose monitor as needed as directed. To accompany: Blood Glucose Monitor Brands: per insurance. 1 each 11     blood glucose monitoring (CONTOUR NEXT MONITOR W/DEVICE KIT) meter device kit Use to test blood sugar 3 times daily or as directed. 1 kit 0     calcipotriene 0.005 % OINT Apply 1 Application topically as needed  1     calcium-magnesium (CALMAG) 500-250 MG TABS per tablet Take 1 tablet by mouth daily       chlorhexidine (PERIDEX) 0.12 % solution RINSE ONE HALF  OZ 2-3 X D AFTER BREAKFAST BEFORE BEDTIME FOLLOWING BRUSHING AND FLOSSIN       Cholecalciferol (VITAMIN D) 2000 UNITS tablet Take 1 tablet by mouth daily       CINNAMON 500 MG OR CAPS 2 tablets  "daily  0     DROPLET INSULIN SYRINGE 31G X 5/16\" 0.3 ML miscellaneous USE  2 SYRINGES EVERY DAY  OR AS DIRECTED 100 each 6     estradiol (ESTRACE) 0.1 MG/GM vaginal cream Please use blueberry size amount in the vagina nightly for two weeks and then three times a week at night thereafter 42.5 g 3     fluconazole (DIFLUCAN) 150 MG tablet Take 1 tablet (150 mg) by mouth every 72 hours 3 tablet 0     furosemide (LASIX) 20 MG tablet Take 1-2 tablets (20-40 mg) by mouth daily as needed (edema) 180 tablet 1     gabapentin (NEURONTIN) 300 MG capsule TAKE 2 CAPSULES AT BEDTIME 180 capsule 3     glipiZIDE (GLUCOTROL) 10 MG tablet TAKE 1 TABLET BY MOUTH TWICE DAILY BEFORE MEAL(S) 180 tablet 1     Ibuprofen (ADVIL PO) Take by mouth every 8 hours as needed for moderate pain       insulin NPH (NOVOLIN N RELION) 100 UNIT/ML vial INJECT 20 UNITS SUBCUTANEOUSLY BEFORE BREAKFAST AND 20 UNITS BEFORE DINNER 20 mL 0     Lactobacillus (PROBIOTIC ACIDOPHILUS) TABS Take 1 tablet by mouth daily       lisinopril (ZESTRIL) 40 MG tablet Take 1 tablet (40 mg) by mouth daily 90 tablet 1     metFORMIN (GLUCOPHAGE) 1000 MG tablet TAKE 1 TABLET BY MOUTH TWICE DAILY WITH MEALS 180 tablet 1     simvastatin (ZOCOR) 40 MG tablet TAKE 1 TABLET BY MOUTH ONCE DAILY AT BEDTIME 90 tablet 1     thin (NO BRAND SPECIFIED) lancets Use to test blood sugar 3 times daily or as directed. To accompany: Blood Glucose Monitor Brands: per insurance. 1 each 3     TUMS 500 MG OR CHEW 1 TABLET 3 TIMES PRN 90 0     vitamin E 400 UNITS TABS Take 400 Units by mouth daily       Zinc Sulfate (ZINC 15 PO) Take by mouth daily         Soc Hx: reviewed  Fam Hx: reviewed          Maribell Brady MD  Internal Medicine     "

## 2021-07-06 NOTE — PATIENT INSTRUCTIONS
Plan:  1. Heart stress test -- To schedule this test please call [facility] at: [phone]   2. Continue Aspirin and the other meds   3. Electrocardiogram today   4. Follow up with Dinorah Wong few days after the stress test   5. Discuss with Dinorah Wong about esophagram instead of upper endoscopy

## 2021-07-07 ENCOUNTER — TELEPHONE (OUTPATIENT)
Dept: INTERNAL MEDICINE | Facility: CLINIC | Age: 81
End: 2021-07-07

## 2021-07-07 LAB
ALBUMIN SERPL-MCNC: 4.1 G/DL (ref 3.4–5)
ALP SERPL-CCNC: 78 U/L (ref 40–150)
ALT SERPL W P-5'-P-CCNC: 24 U/L (ref 0–50)
ANION GAP SERPL CALCULATED.3IONS-SCNC: 11 MMOL/L (ref 3–14)
AST SERPL W P-5'-P-CCNC: 15 U/L (ref 0–45)
BILIRUB SERPL-MCNC: 0.5 MG/DL (ref 0.2–1.3)
BUN SERPL-MCNC: 34 MG/DL (ref 7–30)
CALCIUM SERPL-MCNC: 9.4 MG/DL (ref 8.5–10.1)
CHLORIDE SERPL-SCNC: 104 MMOL/L (ref 94–109)
CHOLEST SERPL-MCNC: 183 MG/DL
CO2 SERPL-SCNC: 23 MMOL/L (ref 20–32)
CREAT SERPL-MCNC: 1.17 MG/DL (ref 0.52–1.04)
FERRITIN SERPL-MCNC: 10 NG/ML (ref 8–252)
FOLATE SERPL-MCNC: 19.8 NG/ML
GFR SERPL CREATININE-BSD FRML MDRD: 44 ML/MIN/{1.73_M2}
GLUCOSE SERPL-MCNC: 161 MG/DL (ref 70–99)
HDLC SERPL-MCNC: 33 MG/DL
IRON SATN MFR SERPL: 9 % (ref 15–46)
IRON SERPL-MCNC: 38 UG/DL (ref 35–180)
LDLC SERPL CALC-MCNC: 106 MG/DL
NONHDLC SERPL-MCNC: 150 MG/DL
POTASSIUM SERPL-SCNC: 4.8 MMOL/L (ref 3.4–5.3)
PROT SERPL-MCNC: 7.1 G/DL (ref 6.8–8.8)
SODIUM SERPL-SCNC: 138 MMOL/L (ref 133–144)
TIBC SERPL-MCNC: 414 UG/DL (ref 240–430)
TRIGL SERPL-MCNC: 218 MG/DL
TSH SERPL DL<=0.005 MIU/L-ACNC: 1.22 MU/L (ref 0.4–4)

## 2021-07-07 NOTE — TELEPHONE ENCOUNTER
Patient calling stating she is just not feeling well and is wondering what her EKG results were from yesterday.  Patient feeling the same as when she was at the office visit yesterday.  Continues to have pain in left upper arm.  Patient advised at clinic visit to have a stress test which patient scheduled for Monday.  Please advise on labs and EKG.  Thanks.        Call back at [phone] to charito

## 2021-07-08 ENCOUNTER — MYC MEDICAL ADVICE (OUTPATIENT)
Dept: INTERNAL MEDICINE | Facility: CLINIC | Age: 81
End: 2021-07-08

## 2021-07-08 NOTE — TELEPHONE ENCOUNTER
Patient calls very concerned that she has still not heard back.  She will be away from home today.  Please call her cell phone asap at [phone],

## 2021-07-08 NOTE — TELEPHONE ENCOUNTER
Patient saw Dr. Brady for appointment. No notes recorded regarding lab results. Called patient and informed her Dr. Brady was out of the office yesterday and has not commented on results yet.     Patient saw lab results on Mychart, concerned about low hemoglobin levels. She also asks about EKG results, she was told that she could leave, but wasn't told if it was okay or not. Patient advised we will call her back once Dr. Brady reviews her message. Patient verbalizes understanding.     Please call her cell phone [phone] as she is traveling today.

## 2021-07-09 ENCOUNTER — TELEPHONE (OUTPATIENT)
Dept: INTERNAL MEDICINE | Facility: CLINIC | Age: 81
End: 2021-07-09

## 2021-07-09 DIAGNOSIS — R13.10 DYSPHAGIA, UNSPECIFIED TYPE: Primary | ICD-10-CM

## 2021-07-09 NOTE — TELEPHONE ENCOUNTER
Dinorah Wong will put in order for EGD as that is what pt wants to have done. Order is done and advised they will call her to set up.MARIAH LIAO LPN

## 2021-07-09 NOTE — TELEPHONE ENCOUNTER
Patient calls asking per Dr. Brady OV 07/06/21,  to Discuss with Dinorah Wong about esophagram instead of upper endoscopy.  Patient would like to have Dinorah Wong's opinion.

## 2021-07-09 NOTE — TELEPHONE ENCOUNTER
I am not sure about the reason for the esophagram vs egd   I think the egd may be more definitive and she ahs history of stricture which could be treated with EGD   I guess I prefer EGD

## 2021-07-12 ENCOUNTER — HOSPITAL ENCOUNTER (OUTPATIENT)
Dept: NUCLEAR MEDICINE | Facility: CLINIC | Age: 81
Setting detail: NUCLEAR MEDICINE
End: 2021-07-12
Attending: INTERNAL MEDICINE
Payer: COMMERCIAL

## 2021-07-12 ENCOUNTER — HOSPITAL ENCOUNTER (OUTPATIENT)
Dept: CARDIOLOGY | Facility: CLINIC | Age: 81
End: 2021-07-12
Attending: INTERNAL MEDICINE
Payer: COMMERCIAL

## 2021-07-12 ENCOUNTER — TELEPHONE (OUTPATIENT)
Dept: INTERNAL MEDICINE | Facility: CLINIC | Age: 81
End: 2021-07-12

## 2021-07-12 VITALS — HEART RATE: 96 BPM | DIASTOLIC BLOOD PRESSURE: 49 MMHG | SYSTOLIC BLOOD PRESSURE: 169 MMHG

## 2021-07-12 DIAGNOSIS — E78.5 HYPERLIPIDEMIA LDL GOAL <100: ICD-10-CM

## 2021-07-12 DIAGNOSIS — R94.39 ABNORMAL CARDIOVASCULAR STRESS TEST: Primary | ICD-10-CM

## 2021-07-12 DIAGNOSIS — I10 ESSENTIAL HYPERTENSION: ICD-10-CM

## 2021-07-12 DIAGNOSIS — E11.8 TYPE 2 DIABETES MELLITUS WITH COMPLICATION, WITHOUT LONG-TERM CURRENT USE OF INSULIN (H): ICD-10-CM

## 2021-07-12 DIAGNOSIS — R07.2 PRECORDIAL PAIN: ICD-10-CM

## 2021-07-12 LAB
CV BLOOD PRESSURE: 72 MMHG
NUC STRESS EJECTION FRACTION: 71 %
STRESS ECHO BASELINE DIASTOLIC HE: 49
STRESS ECHO BASELINE HR: 88
STRESS ECHO BASELINE SYSTOLIC BP: 169
STRESS ECHO TARGET HR: 142
STRESS/REST PERFUSION RATIO: 1.22

## 2021-07-12 PROCEDURE — 250N000011 HC RX IP 250 OP 636: Performed by: INTERNAL MEDICINE

## 2021-07-12 PROCEDURE — 93016 CV STRESS TEST SUPVJ ONLY: CPT | Performed by: INTERNAL MEDICINE

## 2021-07-12 PROCEDURE — 78452 HT MUSCLE IMAGE SPECT MULT: CPT | Mod: 26 | Performed by: INTERNAL MEDICINE

## 2021-07-12 PROCEDURE — 93017 CV STRESS TEST TRACING ONLY: CPT

## 2021-07-12 PROCEDURE — 999N000127 HC STATISTIC PERIPHERAL IV START W US GUIDANCE

## 2021-07-12 PROCEDURE — 93018 CV STRESS TEST I&R ONLY: CPT | Performed by: INTERNAL MEDICINE

## 2021-07-12 PROCEDURE — 343N000001 HC RX 343: Performed by: INTERNAL MEDICINE

## 2021-07-12 PROCEDURE — A9502 TC99M TETROFOSMIN: HCPCS | Performed by: INTERNAL MEDICINE

## 2021-07-12 RX ORDER — REGADENOSON 0.08 MG/ML
INJECTION, SOLUTION INTRAVENOUS
Status: DISCONTINUED
Start: 2021-07-12 | End: 2021-07-13 | Stop reason: HOSPADM

## 2021-07-12 RX ORDER — REGADENOSON 0.08 MG/ML
0.4 INJECTION, SOLUTION INTRAVENOUS ONCE
Status: COMPLETED | OUTPATIENT
Start: 2021-07-12 | End: 2021-07-12

## 2021-07-12 RX ADMIN — REGADENOSON 0.4 MG: 0.08 INJECTION, SOLUTION INTRAVENOUS at 13:16

## 2021-07-12 RX ADMIN — TETROFOSMIN 10 MCI.: 1.38 INJECTION, POWDER, LYOPHILIZED, FOR SOLUTION INTRAVENOUS at 11:40

## 2021-07-12 RX ADMIN — TETROFOSMIN 30 MCI.: 1.38 INJECTION, POWDER, LYOPHILIZED, FOR SOLUTION INTRAVENOUS at 13:20

## 2021-07-12 NOTE — TELEPHONE ENCOUNTER
Patient calls, she has NM Stress Test today and asks if she should hold her Novolin N insulin. Patient has eaten breakfast this morning, was told not to eat anything 3 hours prior to her exam. Informed patient that she can take her morning Insulin if she has eaten today.

## 2021-07-12 NOTE — TELEPHONE ENCOUNTER
Discussed  The stress test w dr Reid  Called the patient about the stress test  Cont present meds  If CP she needs to go to ER  Card ref -- done

## 2021-07-13 ENCOUNTER — HOSPITAL ENCOUNTER (EMERGENCY)
Facility: CLINIC | Age: 81
Discharge: HOME | End: 2021-07-13
Attending: EMERGENCY MEDICINE | Admitting: EMERGENCY MEDICINE
Payer: COMMERCIAL

## 2021-07-13 VITALS
HEART RATE: 92 BPM | RESPIRATION RATE: 18 BRPM | TEMPERATURE: 97.3 F | SYSTOLIC BLOOD PRESSURE: 182 MMHG | OXYGEN SATURATION: 99 % | DIASTOLIC BLOOD PRESSURE: 91 MMHG

## 2021-07-13 DIAGNOSIS — R07.89 ATYPICAL CHEST PAIN: ICD-10-CM

## 2021-07-13 LAB
ANION GAP SERPL CALCULATED.3IONS-SCNC: 12 MMOL/L (ref 3–14)
BASOPHILS # BLD AUTO: 0 10E3/UL (ref 0–0.2)
BASOPHILS NFR BLD AUTO: 0 %
BUN SERPL-MCNC: 28 MG/DL (ref 7–30)
CALCIUM SERPL-MCNC: 9.5 MG/DL (ref 8.5–10.1)
CHLORIDE BLD-SCNC: 107 MMOL/L (ref 94–109)
CO2 SERPL-SCNC: 21 MMOL/L (ref 20–32)
CREAT SERPL-MCNC: 1.09 MG/DL (ref 0.52–1.04)
EOSINOPHIL # BLD AUTO: 0.1 10E3/UL (ref 0–0.7)
EOSINOPHIL NFR BLD AUTO: 1 %
ERYTHROCYTE [DISTWIDTH] IN BLOOD BY AUTOMATED COUNT: 14.7 % (ref 10–15)
GFR SERPL CREATININE-BSD FRML MDRD: 49 ML/MIN/1.73M2
GLUCOSE BLD-MCNC: 149 MG/DL (ref 70–99)
HCT VFR BLD AUTO: 34.9 % (ref 35–47)
HGB BLD-MCNC: 10.7 G/DL (ref 11.7–15.7)
HOLD SPECIMEN: NORMAL
HOLD SPECIMEN: NORMAL
IMM GRANULOCYTES # BLD: 0 10E3/UL
IMM GRANULOCYTES NFR BLD: 0 %
LYMPHOCYTES # BLD AUTO: 2.1 10E3/UL (ref 0.8–5.3)
LYMPHOCYTES NFR BLD AUTO: 31 %
MCH RBC QN AUTO: 25.7 PG (ref 26.5–33)
MCHC RBC AUTO-ENTMCNC: 30.7 G/DL (ref 31.5–36.5)
MCV RBC AUTO: 84 FL (ref 78–100)
MONOCYTES # BLD AUTO: 0.4 10E3/UL (ref 0–1.3)
MONOCYTES NFR BLD AUTO: 7 %
NEUTROPHILS # BLD AUTO: 4.1 10E3/UL (ref 1.6–8.3)
NEUTROPHILS NFR BLD AUTO: 61 %
NRBC # BLD AUTO: 0 10E3/UL
NRBC BLD AUTO-RTO: 0 /100
PLATELET # BLD AUTO: 216 10E3/UL (ref 150–450)
POTASSIUM BLD-SCNC: 4.3 MMOL/L (ref 3.4–5.3)
RBC # BLD AUTO: 4.17 10E6/UL (ref 3.8–5.2)
SODIUM SERPL-SCNC: 140 MMOL/L (ref 133–144)
TROPONIN I SERPL-MCNC: <0.015 UG/L (ref 0–0.04)
WBC # BLD AUTO: 6.7 10E3/UL (ref 4–11)

## 2021-07-13 PROCEDURE — 85025 COMPLETE CBC W/AUTO DIFF WBC: CPT | Performed by: EMERGENCY MEDICINE

## 2021-07-13 PROCEDURE — 36592 COLLECT BLOOD FROM PICC: CPT | Performed by: EMERGENCY MEDICINE

## 2021-07-13 PROCEDURE — 99284 EMERGENCY DEPT VISIT MOD MDM: CPT | Mod: 25

## 2021-07-13 PROCEDURE — 93005 ELECTROCARDIOGRAM TRACING: CPT

## 2021-07-13 PROCEDURE — 85041 AUTOMATED RBC COUNT: CPT | Performed by: EMERGENCY MEDICINE

## 2021-07-13 PROCEDURE — 80048 BASIC METABOLIC PNL TOTAL CA: CPT | Performed by: EMERGENCY MEDICINE

## 2021-07-13 PROCEDURE — 84484 ASSAY OF TROPONIN QUANT: CPT | Performed by: EMERGENCY MEDICINE

## 2021-07-13 PROCEDURE — 36415 COLL VENOUS BLD VENIPUNCTURE: CPT | Performed by: EMERGENCY MEDICINE

## 2021-07-13 RX ORDER — METOPROLOL SUCCINATE 25 MG/1
25 TABLET, EXTENDED RELEASE ORAL DAILY
Qty: 30 TABLET | Refills: 0 | Status: SHIPPED | OUTPATIENT
Start: 2021-07-13 | End: 2021-08-10

## 2021-07-13 ASSESSMENT — ENCOUNTER SYMPTOMS: SHORTNESS OF BREATH: 0

## 2021-07-13 NOTE — ED TRIAGE NOTES
A&O x4.  ABC's intact.      Pt arrives with c/o chest pain x 1 month and had stress test yesterday.

## 2021-07-13 NOTE — TELEPHONE ENCOUNTER
Patient calls back and states she could not get in until August 10th.  Patient states they told her that if Dr. Brady orders referral for cardiology as STAT they will get her in sooner.  Patient states she does not understand the comment Abnormal Herat?  Until her appointment what things should she not be doing?  How serious is this? Patient does not understand the comment to go to the ED if she has persistent pain because the pain never goes away.  Do you mean if it gets worse got to ED?  Please clarify.    Patient is also asking if she should wait to schedule the Upper Endoscopy until she sees cardiology.   Please advise.    OK to leave a detailed message.

## 2021-07-13 NOTE — TELEPHONE ENCOUNTER
Patient calls asking if she needs to be seen in ED. Patient states she was called yesterday by Dr. Brady on echo results and was not told to go to ED at that time. Then she saw the result note attached saying if she is having persistent chest pain she should be seen in ED. And patient states she is having constant chest pain, describes as a dull ache. States she has had this for the past month.     Advised ED for constant chest pain and abnormal echo.     Patient stated an understanding and agreed with plan.     Barbi Contreras RN  [facility]

## 2021-07-13 NOTE — TELEPHONE ENCOUNTER
Patient advised. Patient plans to go to ED due to persistent chest pain & to make follow up appointment with ELLIE Wong.

## 2021-07-13 NOTE — TELEPHONE ENCOUNTER
-- Stat cardio ref - done  --Patient questions would have had an answer by now if she made appointment with Dinorah Wong NP as she was advised on July 6 appointment  --Please help the patient to schedule appointment with Dinorah Wong NP      July 6 appointment Plan:  1. Heart stress test -- To schedule this test please call [facility] at: [phone]   2. Continue Aspirin and the other meds   3. Electrocardiogram today   4. Follow up with Dinorah Wong few days after the stress test   5. Discuss with Dinorah Wong about esophagram instead of upper endoscopy

## 2021-07-14 ENCOUNTER — VIRTUAL VISIT (OUTPATIENT)
Dept: INTERNAL MEDICINE | Facility: CLINIC | Age: 81
End: 2021-07-14
Payer: COMMERCIAL

## 2021-07-14 ENCOUNTER — PATIENT OUTREACH (OUTPATIENT)
Dept: INTERNAL MEDICINE | Facility: CLINIC | Age: 81
End: 2021-07-14

## 2021-07-14 DIAGNOSIS — R07.89 ATYPICAL CHEST PAIN: Primary | ICD-10-CM

## 2021-07-14 DIAGNOSIS — Z87.19 HISTORY OF ESOPHAGEAL STRICTURE: ICD-10-CM

## 2021-07-14 DIAGNOSIS — E11.9 TYPE 2 DIABETES MELLITUS WITHOUT COMPLICATION, WITHOUT LONG-TERM CURRENT USE OF INSULIN (H): ICD-10-CM

## 2021-07-14 DIAGNOSIS — R94.39 ABNORMAL STRESS TEST: ICD-10-CM

## 2021-07-14 DIAGNOSIS — N18.32 STAGE 3B CHRONIC KIDNEY DISEASE (H): ICD-10-CM

## 2021-07-14 PROBLEM — N18.30 CHRONIC KIDNEY DISEASE, STAGE 3 (H): Status: ACTIVE | Noted: 2021-07-14

## 2021-07-14 LAB
ATRIAL RATE - MUSE: 95 BPM
DIASTOLIC BLOOD PRESSURE - MUSE: NORMAL MMHG
INTERPRETATION ECG - MUSE: NORMAL
P AXIS - MUSE: 63 DEGREES
PR INTERVAL - MUSE: 144 MS
QRS DURATION - MUSE: 102 MS
QT - MUSE: 374 MS
QTC - MUSE: 469 MS
R AXIS - MUSE: 54 DEGREES
SYSTOLIC BLOOD PRESSURE - MUSE: NORMAL MMHG
T AXIS - MUSE: 70 DEGREES
VENTRICULAR RATE- MUSE: 95 BPM

## 2021-07-14 PROCEDURE — 99214 OFFICE O/P EST MOD 30 MIN: CPT | Mod: 95 | Performed by: NURSE PRACTITIONER

## 2021-07-14 RX ORDER — FERROUS GLUCONATE 324(38)MG
324 TABLET ORAL EVERY OTHER DAY
COMMUNITY
End: 2023-10-06

## 2021-07-14 RX ORDER — GLIPIZIDE 10 MG/1
TABLET ORAL
Qty: 180 TABLET | Refills: 1 | Status: SHIPPED | OUTPATIENT
Start: 2021-07-14 | End: 2022-04-22

## 2021-07-14 RX ORDER — LACTOBACILLUS RHAMNOSUS GG 10B CELL
1 CAPSULE ORAL 2 TIMES DAILY
COMMUNITY
End: 2021-09-14

## 2021-07-14 RX ORDER — MULTIVITAMIN WITH IRON
1 TABLET ORAL DAILY
COMMUNITY
End: 2022-07-05

## 2021-07-14 NOTE — TELEPHONE ENCOUNTER
IP F/U    Date: 7/13/21  Diagnosis: chest pain   Is patient active in care coordination? No  Was patient in TCU? No    Next 5 appointments (look out 90 days)    Jul 21, 2021  1:00 PM  Return Visit with Chiara Limon PA-C  [facility] ([facility] ) [address] [place] [address] [phone]

## 2021-07-14 NOTE — NURSING NOTE
"Chief Complaint   Patient presents with     ER F/U     initial LMP  (LMP Unknown)  Estimated body mass index is 32.1 kg/m  as calculated from the following:    Height as of 7/6/21: 1.626 m (5' 4\").    Weight as of 7/6/21: 84.8 kg (187 lb)..  bp completed using cuff size NA (Not Taken)  MARIAH LIAO LPN  "

## 2021-07-14 NOTE — PROGRESS NOTES
"Lindsey is a 80 year old who is being evaluated via a billable telephone visit.      What phone number would you like to be contacted at? [phone]  How would you like to obtain your AVS? Mail a copy  Also wants a copy of her stress test  Assessment & Plan     Atypical chest pain  Heart has been ruled out a couple times as far as acute things   Abnormality on stress test echo- needs cardiology input as to next step   Has appointment 8/2021     Type 2 diabetes mellitus without complication, without long-term current use of insulin (H)  Stable   - glipiZIDE (GLUCOTROL) 10 MG tablet; TAKE 1 TABLET BY MOUTH TWICE DAILY BEFORE MEAL(S)  - Basic metabolic panel  (Ca, Cl, CO2, Creat, Gluc, K, Na, BUN); Future    History of esophageal stricture  EGD after cardiology visit     Stage 3b chronic kidney disease  Recheck well hydrated  - Basic metabolic panel  (Ca, Cl, CO2, Creat, Gluc, K, Na, BUN); Future    Abnormal stress test          39 minutes spent on the date of the encounter doing chart review, history and exam, documentation and further activities per the note       BMI:   Estimated body mass index is 32.1 kg/m  as calculated from the following:    Height as of 7/6/21: 1.626 m (5' 4\").    Weight as of 7/6/21: 84.8 kg (187 lb).       Patient Instructions   Recheck lab well hydrated     Cardiology  follow up as planned       No follow-ups on file.    LILLIAM Sam CNP  [facility]    Aidee Portillo is a 80 year old who presents for the following health issues     HPI     ED/UC Followup:    Facility:  [facility]  Date of visit: 7-13-21  Reason for visit: Atypical chest pain  Current Status: chest pain subsided but a little pain in left breast     Any restrictions til 8-10-21 when she sees the cardiologist.    Called and left message 1107 am   Will try closer to appointment   Called 1 pm     Went to ER last night   Took a metoprolol now - ER started medication     Saw [facility] and ordered " stress test   When she called her she was in [place]- did not heart well   Thought stress test had abnormal heart    Discussed finding     Dr Branch In August -cardiology     Creatinine elevated past few visits  She was dehydrated for all   Recheck well hydrated          Review of Systems   Constitutional, HEENT, cardiovascular, pulmonary, GI, , musculoskeletal, neuro, skin, endocrine and psych systems are negative, except as otherwise noted.      Objective           Vitals:  No vitals were obtained today due to virtual visit.    Physical Exam   alert and no distress  PSYCH: Alert and oriented times 3; coherent speech, normal   rate and volume, able to articulate logical thoughts, able   to abstract reason, no tangential thoughts, no hallucinations   or delusions  Her affect is normal  RESP: No cough, no audible wheezing, able to talk in full sentences  Remainder of exam unable to be completed due to telephone visits    Reviewed stress test and lab results              Phone call duration: 38 minutes

## 2021-07-14 NOTE — DISCHARGE INSTRUCTIONS
As we have discussed your evaluation is included blood work and EKG to look for heart muscle damage and these have been negative.  We do know that your stress test had a very slight abnormality.  We have discussed your care with a cardiologist.  As long as you have no chest pain or shortness of breath with exertion and pain is not severe please follow-up with your cardiologist to discuss angiography.  Return to the emergency room with exertional limitations shortness of breath with exertion severe pain.  We are starting you on metoprolol to assist in blood pressure and heart rate control.

## 2021-07-14 NOTE — ED PROVIDER NOTES
"  History   Chief Complaint:  Chest Pain     HPI   Lindsey Gary is a 80 year old female with history of hypertension, hyperlipidemia, and Type II Diabetes who presents with chest pain. She states she developed chest pain a month ago and left arm pain which she associated with her sleep pattern, but persisted and presents as a dull chest pain. She had a Lexiscan stress test yesterday with cardiology. Her cardiologist called her today and informed her she had an \"abnormal heart\" and advised her to come to the emergency department if her chest pain was persistent. Her chest pain has not been as worse as other days today. She complains of left lower leg swelling and as been using compression socks and Lasix to help manage this. She denies shortness of breath. She denies tobacco or alcohol use. She denies any recent surgeries. She denies any recent travels.     Review of Systems   Respiratory: Negative for shortness of breath.    Cardiovascular: Positive for chest pain and leg swelling.   All other systems reviewed and are negative.      Allergies:  Augmentin  Sulfa Drugs    Medications:  Allopurinol   Aspirin 81 mg   Estradiol  Fluconazole  Lasix   Gabapentin   Glipizide  Insulin NPH  Lisinopril   Metformin   Simvastatin     Past Medical History:    Arthritis   Hypertension   Gout   Hyperlipidemia   Type II Diabetes     Past Surgical History:    Bladder Surgery   Colonoscopy x2  Cystoscopy   Hysterectomy   Right Partal Knee Surgery   Breat Biopsies     Family History:    Mother - Colorectal Cancer  Father - Cerebrovascular Disease     Social History:  Arrives unaccompanied.     Physical Exam     Patient Vitals for the past 24 hrs:   BP Temp Temp src Pulse Resp SpO2   07/13/21 1804 (!) 182/91 97.3  F (36.3  C) Temporal 92 18 99 %       Physical Exam  Vitals reviewed.   Constitutional:       Appearance: She is obese.   HENT:      Head: Normocephalic.   Cardiovascular:      Rate and Rhythm: Normal rate and regular rhythm. "   Pulmonary:      Effort: Pulmonary effort is normal.      Breath sounds: Normal breath sounds.   Abdominal:      General: Bowel sounds are normal.      Palpations: Abdomen is soft.   Musculoskeletal:         General: Normal range of motion.   Skin:     General: Skin is warm.      Capillary Refill: Capillary refill takes less than 2 seconds.   Neurological:      General: No focal deficit present.      Mental Status: She is alert.   Psychiatric:         Mood and Affect: Mood normal.           Emergency Department Course   ECG  ECG taken at 1932, ECG read at 1940  Normal sinus rhythm  Nonspecific ST abnormality   Abnormal ECG    Rate 95 bpm. WY interval 144 ms. QRS duration 102 ms. QT/QTc 374/469 ms. P-R-T axes 63 54 70.     Laboratory:   CBC: WBC 6.7, HGB 10.7 (L),   BMP: Glucose 149 (H), Creatinine 1.09 (H), o/w WNL    Troponin (Collected 1808): <0.015  D Dimer: Pending    Emergency Department Course:    Reviewed:  I reviewed nursing notes, vitals, past medical history and care everywhere    Assessments:  2053 I obtained history and examined the patient as noted above.   2223 I rechecked the patient and explained findings.     Consults:   2108 I consulted with Dr. Gonzalez, on call cardiology, regarding the patient's history and presentation here in the emergency department.    Disposition:  The patient was discharged to home.     Impression & Plan     Medical Decision Making:  Patient presents with vague intermittent chest pain that is worse with palpation of her chest and is nonexertional. Patient is recently had a stress test as an outpatient that was slightly abnormal differential diagnosis includes a positive stress test versus breast attenuation. Patient is at moderate to high risk for coronary artery disease due to multiple risk factors age of 80 diabetes high blood pressure and high cholesterol. Care was discussed with the cardiologist is due to lack of beds unclear patient really requires admission at  this time. Patient is relatively sedentary. I did order D-dimer the patient has intermittent symptoms doubt this is pulmonary embolism but lab was severely delayed and D-dimer did not result while the patient was in the emergency room. Due to normal sats normal heart rate and lack of constant unrelenting symptoms I feel unlikely to be PE and discussed with patient admission versus further work-up versus discharge home patient prefers to go home at this time patient is asked if she noticed specifically that is worse with exertion where she more has more dyspnea on exertion to return to the emergency room for admission Dr. Gonzalez will follow up the patient as an outpatient to discuss further work-up electively. Patient was discharged in stable condition. Patient is currently taking aspirin and will add on due to elevated blood pressure and heart rate to a beta-blocker to assist in rate control and blood pressure control as a bridge to follow-up with cardiology for cardioprotective medications.      Diagnosis:    ICD-10-CM    1. Atypical chest pain  R07.89        Discharge Medications:  New Prescriptions    METOPROLOL SUCCINATE ER (TOPROL-XL) 25 MG 24 HR TABLET    Take 1 tablet (25 mg) by mouth daily       Scribe Disclosure:  I, Devin Quick, am serving as a scribe at 8:52 PM on 7/13/2021 to document services personally performed by Kip Dutta MD based on my observations and the provider's statements to me.              Kip Dutta MD  07/15/21 9310

## 2021-07-16 ENCOUNTER — TELEPHONE (OUTPATIENT)
Dept: INTERNAL MEDICINE | Facility: CLINIC | Age: 81
End: 2021-07-16

## 2021-07-16 NOTE — TELEPHONE ENCOUNTER
If she needs ibuprofen she can take   Some risk of increased blood pressure   Tylenol preferred if she gets relief from this   But if needed may use ibuprofen with metoprolol

## 2021-07-16 NOTE — TELEPHONE ENCOUNTER
"S-(situation): currently taking Extra strength tylenol and ibuprofen. Cardiology started her on Metoprolol.     B-(background): Seeing Cardiologist but unsure of issue.  Patient talking to a family member and he tells her \"my cardiologist says don't take ibuprofen if you have cardiac issues\"  This RN assured her class of NSAIDS usually can't be taken with anticoagulants which she is not taking.      A-(assessment): Patient wanted this RN to ask provider if there are any contraindications of taking both metoprolol and ibuprofen?    R-(recommendations): looked up micromedex     Concurrent use of BETA-ADRENERGIC BLOCKERS and NSAIDS may result in increased blood pressure.      Please advise.  thanks    "

## 2021-07-23 ENCOUNTER — TELEPHONE (OUTPATIENT)
Dept: INTERNAL MEDICINE | Facility: CLINIC | Age: 81
End: 2021-07-23

## 2021-07-23 NOTE — TELEPHONE ENCOUNTER
Call received from patient with concerns regarding recent lab results. States she received the after visit summary from recent virtual visit  and it says she has chronic kidney disease. Discussed recent lab results and that there is actually a slight improvement since last labs. Also discussed virtual visit notes stating it was felt that patient was dehydrated at the time of labs. Patient agrees. Patient concerned that lab appointment is not scheduled until  8/2. Advised patient may go to hospital lab as walk in at any time. Patient will do so. Patient also requesting appointment with Dinorah after labs to discuss results. Scheduled.

## 2021-07-26 ENCOUNTER — ALLIED HEALTH/NURSE VISIT (OUTPATIENT)
Dept: INTERNAL MEDICINE | Facility: CLINIC | Age: 81
End: 2021-07-26

## 2021-07-26 ENCOUNTER — APPOINTMENT (OUTPATIENT)
Dept: LAB | Facility: CLINIC | Age: 81
End: 2021-07-26
Payer: COMMERCIAL

## 2021-07-26 VITALS — DIASTOLIC BLOOD PRESSURE: 58 MMHG | SYSTOLIC BLOOD PRESSURE: 138 MMHG

## 2021-07-26 DIAGNOSIS — E11.9 TYPE 2 DIABETES MELLITUS WITHOUT COMPLICATION, WITHOUT LONG-TERM CURRENT USE OF INSULIN (H): ICD-10-CM

## 2021-07-26 DIAGNOSIS — I10 ESSENTIAL HYPERTENSION: Primary | ICD-10-CM

## 2021-07-26 DIAGNOSIS — N18.32 STAGE 3B CHRONIC KIDNEY DISEASE (H): ICD-10-CM

## 2021-07-26 LAB
ANION GAP SERPL CALCULATED.3IONS-SCNC: 6 MMOL/L (ref 3–14)
BUN SERPL-MCNC: 22 MG/DL (ref 7–30)
CALCIUM SERPL-MCNC: 8.6 MG/DL (ref 8.5–10.1)
CHLORIDE BLD-SCNC: 109 MMOL/L (ref 94–109)
CO2 SERPL-SCNC: 23 MMOL/L (ref 20–32)
CREAT SERPL-MCNC: 1 MG/DL (ref 0.52–1.04)
GFR SERPL CREATININE-BSD FRML MDRD: 54 ML/MIN/1.73M2
GLUCOSE BLD-MCNC: 217 MG/DL (ref 70–99)
POTASSIUM BLD-SCNC: 4.6 MMOL/L (ref 3.4–5.3)
SODIUM SERPL-SCNC: 138 MMOL/L (ref 133–144)

## 2021-07-26 PROCEDURE — 99207 PR NO CHARGE NURSE ONLY: CPT | Performed by: NURSE PRACTITIONER

## 2021-07-26 PROCEDURE — 36415 COLL VENOUS BLD VENIPUNCTURE: CPT

## 2021-07-26 PROCEDURE — 80048 BASIC METABOLIC PNL TOTAL CA: CPT

## 2021-07-26 NOTE — PROGRESS NOTES
Lindsey Gary was evaluated at [facility] on July 26, 2021 at which time her blood pressure was:    BP Readings from Last 3 Encounters:   07/26/21 138/58   07/13/21 (!) 182/91   07/12/21 (!) 169/49     Pulse Readings from Last 3 Encounters:   07/13/21 92   07/12/21 96   07/06/21 85       Reviewed lifestyle modifications for blood pressure control and reduction: including making healthy food choices, managing weight, getting regular exercise, smoking cessation, reducing alcohol consumption, monitoring blood pressure regularly.     Symptoms: None    BP Goal:< 140/90 mmHg    BP Assessment:  BP at goal    Potential Reasons for BP too high: NA - Not applicable    BP Follow-Up Plan: Recheck BP in 6 months at pharmacy    Recommendation to Provider: recheck in 6 months     Note completed by: Barbi Plascencia, PharmD  [facility]

## 2021-07-27 ENCOUNTER — DOCUMENTATION ONLY (OUTPATIENT)
Dept: LAB | Facility: CLINIC | Age: 81
End: 2021-07-27

## 2021-07-27 NOTE — PROGRESS NOTES
Lindsey Gary has an upcoming lab appointment:    Future Appointments   Date Time Provider Department Center   7/28/2021 11:40 AM Dinorah Wong, APRN CNP [facility]   8/2/2021  3:30 PM [facility]   8/4/2021 10:40 AM Emley Gar, PT [facility]   8/10/2021  9:15 AM Andrea Branch MD [facility]   8/18/2021  2:30 PM Chiara Limon PA-C [facility]     Patient is scheduled for the following lab(s):     Patient either has no future order, or has Health Maintenance labs due. Please review and place either future orders or HMPO (Review of Health Maintenance Protocol Orders), as appropriate.    Health Maintenance Due   Topic     ANNUAL REVIEW OF HM ORDERS      Amalia Scruggs MLT

## 2021-07-28 ENCOUNTER — VIRTUAL VISIT (OUTPATIENT)
Dept: INTERNAL MEDICINE | Facility: CLINIC | Age: 81
End: 2021-07-28
Payer: COMMERCIAL

## 2021-07-28 VITALS — BODY MASS INDEX: 31.58 KG/M2 | WEIGHT: 184 LBS

## 2021-07-28 DIAGNOSIS — I10 HYPERTENSION GOAL BP (BLOOD PRESSURE) < 130/80: ICD-10-CM

## 2021-07-28 DIAGNOSIS — I10 ESSENTIAL HYPERTENSION: ICD-10-CM

## 2021-07-28 DIAGNOSIS — N18.32 STAGE 3B CHRONIC KIDNEY DISEASE (H): ICD-10-CM

## 2021-07-28 DIAGNOSIS — E11.8 TYPE 2 DIABETES MELLITUS WITH COMPLICATION, WITHOUT LONG-TERM CURRENT USE OF INSULIN (H): Primary | ICD-10-CM

## 2021-07-28 DIAGNOSIS — D50.9 IRON DEFICIENCY ANEMIA, UNSPECIFIED IRON DEFICIENCY ANEMIA TYPE: ICD-10-CM

## 2021-07-28 PROCEDURE — 99214 OFFICE O/P EST MOD 30 MIN: CPT | Mod: 95 | Performed by: NURSE PRACTITIONER

## 2021-07-28 NOTE — NURSING NOTE
"Chief Complaint   Patient presents with     Telephone     discuss labs specifically kidney function tests also discuss lower BP readings     initial Wt 83.5 kg (184 lb)   LMP  (LMP Unknown)   BMI 31.58 kg/m   Estimated body mass index is 31.58 kg/m  as calculated from the following:    Height as of 7/6/21: 1.626 m (5' 4\").    Weight as of this encounter: 83.5 kg (184 lb)..  bp completed using cuff size NA (Not Taken)  MARIAH LIAO LPN  "

## 2021-07-28 NOTE — PROGRESS NOTES
"Lindsey is a 81 year old who is being evaluated via a billable telephone visit.      What phone number would you like to be contacted at? [phone]  How would you like to obtain your AVS? Mail a copy    Assessment & Plan     Type 2 diabetes mellitus with complication, without long-term current use of insulin (H)  Lab Results   Component Value Date    A1C 7.6 07/06/2021    A1C 7.3 12/01/2020    A1C 6.8 11/26/2019    A1C 7.1 03/08/2019    A1C 7.0 12/31/2018   Doing well     - CBC with platelets and differential; Future  - Hemoglobin A1c; Future  - Comprehensive metabolic panel (BMP + Alb, Alk Phos, ALT, AST, Total. Bili, TP); Future    Essential hypertension  In good range   - CBC with platelets and differential; Future  - Comprehensive metabolic panel (BMP + Alb, Alk Phos, ALT, AST, Total. Bili, TP); Future    Hypertension goal BP (blood pressure) < 130/80    - Iron and iron binding capacity; Future  - Ferritin; Future  - CBC with platelets and differential; Future  - Comprehensive metabolic panel (BMP + Alb, Alk Phos, ALT, AST, Total. Bili, TP); Future    Stage 3b chronic kidney disease  Back to her normal  Lower with dehydration - Comprehensive metabolic panel (BMP + Alb, Alk Phos, ALT, AST, Total. Bili, TP); Future    Iron deficiency anemia, unspecified iron deficiency anemia type  On iron - recheck in 3-4 months   - Iron and iron binding capacity; Future  - Ferritin; Future  - CBC with platelets and differential; Future      23 minutes spent on the date of the encounter doing chart review, history and exam, documentation and further activities per the note       BMI:   Estimated body mass index is 31.58 kg/m  as calculated from the following:    Height as of 7/6/21: 1.626 m (5' 4\").    Weight as of this encounter: 83.5 kg (184 lb).       Patient Instructions   Lab recheck 3-4 months for iron and a1c       Return in about 3 months (around 10/28/2021).    LILLIAM Sam CNP  [facility] " "DUDLEY Portillo is a 81 year old who presents for the following health issues     HPI   Chief Complaint   Patient presents with     Telephone     discuss labs specifically kidney function tests also discuss lower BP readings     Discussed kidney function - she had GFR 44-49,but now back to 59, which is about her normal for past few years   Lower numbers are most related to dehydration with lab draw    Seeing Dr Branch to ok her EGD -August 10 th   She feels chest pain related to possible stricture in esophagus   Hopes to get EGD done       Has a new kamran \"fanci\" keeping her busy     Review of Systems   Constitutional, HEENT, cardiovascular, pulmonary, GI, , musculoskeletal, neuro, skin, endocrine and psych systems are negative, except as otherwise noted.      Objective           Vitals:  No vitals were obtained today due to virtual visit.    Physical Exam   alert and no distress  PSYCH: Alert and oriented times 3; coherent speech, normal   rate and volume, able to articulate logical thoughts, able   to abstract reason, no tangential thoughts, no hallucinations   or delusions  Her affect is normal  RESP: No cough, no audible wheezing, able to talk in full sentences  Remainder of exam unable to be completed due to telephone visits    Reviewed lab             Phone call duration: 22 minutes  "

## 2021-08-06 DIAGNOSIS — E11.9 TYPE 2 DIABETES MELLITUS WITHOUT COMPLICATION, WITHOUT LONG-TERM CURRENT USE OF INSULIN (H): ICD-10-CM

## 2021-08-06 RX ORDER — BLOOD SUGAR DIAGNOSTIC
STRIP MISCELLANEOUS
Qty: 200 EACH | Refills: 1 | Status: SHIPPED | OUTPATIENT
Start: 2021-08-06 | End: 2024-07-24

## 2021-08-06 NOTE — TELEPHONE ENCOUNTER
Pending Prescriptions:                       Disp   Refills    insulin syringe-needle U-100 (DROPLET INS*200 ea*1            Sig: Use two syringes daily or as directed.    Prescription approved per [facility] Refill Protocol.

## 2021-08-10 ENCOUNTER — TELEPHONE (OUTPATIENT)
Dept: CARDIOLOGY | Facility: CLINIC | Age: 81
End: 2021-08-10

## 2021-08-10 ENCOUNTER — OFFICE VISIT (OUTPATIENT)
Dept: CARDIOLOGY | Facility: CLINIC | Age: 81
End: 2021-08-10
Attending: INTERNAL MEDICINE
Payer: COMMERCIAL

## 2021-08-10 VITALS
WEIGHT: 194.5 LBS | SYSTOLIC BLOOD PRESSURE: 140 MMHG | OXYGEN SATURATION: 98 % | BODY MASS INDEX: 33.2 KG/M2 | DIASTOLIC BLOOD PRESSURE: 70 MMHG | HEIGHT: 64 IN | HEART RATE: 65 BPM

## 2021-08-10 DIAGNOSIS — I10 HYPERTENSION GOAL BP (BLOOD PRESSURE) < 130/80: ICD-10-CM

## 2021-08-10 DIAGNOSIS — R94.39 ABNORMAL CARDIOVASCULAR STRESS TEST: ICD-10-CM

## 2021-08-10 DIAGNOSIS — E78.5 HYPERLIPIDEMIA LDL GOAL <100: Primary | ICD-10-CM

## 2021-08-10 PROCEDURE — 99204 OFFICE O/P NEW MOD 45 MIN: CPT | Performed by: INTERNAL MEDICINE

## 2021-08-10 RX ORDER — ROSUVASTATIN CALCIUM 40 MG/1
40 TABLET, COATED ORAL DAILY
Qty: 90 TABLET | Refills: 3 | Status: SHIPPED | OUTPATIENT
Start: 2021-08-10 | End: 2022-07-05

## 2021-08-10 RX ORDER — METOPROLOL SUCCINATE 50 MG/1
50 TABLET, EXTENDED RELEASE ORAL DAILY
Qty: 90 TABLET | Refills: 3 | Status: SHIPPED | OUTPATIENT
Start: 2021-08-10 | End: 2022-07-05

## 2021-08-10 ASSESSMENT — MIFFLIN-ST. JEOR: SCORE: 1342.25

## 2021-08-10 NOTE — LETTER
8/10/2021    Dinorah Wong, APRN CNP  [address] [facility] [address]    RE: Lindsey Gary       Dear Colleague,    I had the pleasure of seeing Lindsey Gary in the [facility].    HISTORY:    Lindsey Gary is a very pleasant 81-year-old female with no previous cardiac history with a history of hyperlipidemia, hypertension, CKD with GFR of 49, type 2 diabetes, and obesity who presents today to cardiology clinic with concerns about chest pain and an abnormal stress test.    Mrs. Gary describes a chest pain that was located just to the left of center in the upper left sternal area.  It was a constant pain which lasted several weeks.  There was no pleuritic component and it was nonexertional.  On occasion it seemed to radiate into her shoulder and left arm but she thought that this might be due to how she slept at night.  There is no associated nausea shortness of breath or diaphoresis and it was unaffected by exercise.  Pain was continuous for this 3-week period but has now mostly resolved.  She had a nuclear stress test done to evaluate her chest pain and this was abnormal.  She went into the emergency room at the instruction of her physician after her nuclear stress test was read.  In the emergency room her ECG showed normal sinus rhythm with mildly widened QRS and some lateral T wave flattening with a normal troponin level.  The physician examining her felt that she had some palpable chest pain.    Mrs. Gary reports that she feels that her stamina is normal although gradually declining as her age progresses.  She has not had any exertional chest, arm, neck arm or jaw discomfort nor has she had any problems with palpitations, PND/orthopnea, strokelike symptoms, syncope or near syncope, significant peripheral edema, or symptoms of claudication.  Does have some anemia and she reports that she has had esophageal strictures in the past.    The  nuclear stress test completed on  showed small area of ischemia in the distal anterior and apex of the left ventricle, although the specificity was limited by significant overlying soft tissue and breast attenuation artifact.  The resting and rest EF for both in the low 70s LV cavity size was normal.    Mrs. Gary also describes the fact that her younger brother  suddenly last summer of a ruptured ascending aortic aneurysm.  This had been unrecognized.      ASSESSMENT/PLAN:    1.  Chest pain.  This patient had more than 3 weeks of continuous nonexertional chest pain.  During this time she was seen in the emergency room where her troponin was negative and her ECG was unremarkable.  This clearly does not represent cardiac pain.  She has not had any exertional chest pain.  I do not think further evaluation is necessary since her pain has now resolved.  2.  Abnormal nuclear stress test.  The stress test is mildly abnormal and in the absence of cardiac symptoms does not demand further attention.  I think our approach should be to watch for future episodes of angina-like chest pain.  I described this to the patient and asked her to contact me if she begins to experience such discomfort.  I also think that we should take aggressive measures to minimize risk factors, see below.  I have increased the patient's dose of Toprol-XL from 25 mg to 50 mg for both cardioprotection and hypertension.  3.  Hypertension.  Blood pressure has tended to be a bit on the high side and I am hoping the additional dosage of metoprolol will help to suppress this.  4.  Hyperlipidemia.  Patient has been using simvastatin 40 mg daily for many years and her LDL cholesterol remains greater than 110.  I have switched her off of simvastatin and initiated Crestor 40 mg daily.  We will have her lipid profile checked in about 2 months.  5.  Family member with ruptured ascending aorta.  Since there is a familial tendency to this disorder I will  arrange an echocardiogram to make sure that Mrs. Gary does not have the same problem.  We will contact her with results.    Thank you for inviting me to participate in your patient's care.  Please do not hesitate to call if I can be of further assistance.  I spent > 45 minutes today reviewing the chart, interviewing and examining the patient, and documenting our visit.    Chart documentation was completed, in part, with [employer] voice-recognition software. Even though reviewed, some grammatical, spelling, and word errors may remain.       Orders Placed This Encounter   Procedures     Follow-Up with Cardiologist     Echocardiogram Complete     Orders Placed This Encounter   Medications     rosuvastatin (CRESTOR) 40 MG tablet     Sig: Take 1 tablet (40 mg) by mouth daily     Dispense:  90 tablet     Refill:  3     metoprolol succinate ER (TOPROL-XL) 50 MG 24 hr tablet     Sig: Take 1 tablet (50 mg) by mouth daily     Dispense:  90 tablet     Refill:  3     Medications Discontinued During This Encounter   Medication Reason     simvastatin (ZOCOR) 40 MG tablet Alternate therapy     metoprolol succinate ER (TOPROL-XL) 25 MG 24 hr tablet Reorder       10 year ASCVD risk: The 10-year ASCVD risk score (Cusick WILLIE Jr., et al., 2013) is: 54.7%    Values used to calculate the score:      Age: 81 years      Sex: Female      Is Non- : No      Diabetic: Yes      Tobacco smoker: No      Systolic Blood Pressure: 140 mmHg      Is BP treated: Yes      HDL Cholesterol: 33 mg/dL      Total Cholesterol: 183 mg/dL    Encounter Diagnoses   Name Primary?     Abnormal cardiovascular stress test      Hyperlipidemia LDL goal <100 Yes     Hypertension goal BP (blood pressure) < 130/80        CURRENT MEDICATIONS:  Current Outpatient Medications   Medication Sig Dispense Refill     acetaminophen (TYLENOL) 500 MG tablet Take 2 tablets by mouth every 8 hours as needed        alcohol swab prep pads Use to swab area of  injection/beau as directed 100 each 3     allopurinol (ZYLOPRIM) 300 MG tablet Take 1/2 (one-half) tablet by mouth once daily 45 tablet 1     Ascorbic Acid (VITAMIN C) 500 MG CAPS Take 500 mg by mouth daily       ASPIRIN 81 MG OR TABS 1 tablet daily 100 3     blood glucose (NO BRAND SPECIFIED) test strip Use to test blood sugar 3 times daily or as directed. Accucheck 300 strip 3     blood glucose calibration (NO BRAND SPECIFIED) solution Use to calibrate blood glucose monitor as needed as directed. To accompany: Blood Glucose Monitor Brands: per insurance. 1 each 11     blood glucose monitoring (CONTOUR NEXT MONITOR W/DEVICE KIT) meter device kit Use to test blood sugar 3 times daily or as directed. 1 kit 0     calcipotriene 0.005 % OINT Apply 1 Application topically as needed  1     chlorhexidine (PERIDEX) 0.12 % solution RINSE ONE HALF  OZ 2-3 X D AFTER BREAKFAST BEFORE BEDTIME FOLLOWING BRUSHING AND FLOSSIN       Cholecalciferol (VITAMIN D) 2000 UNITS tablet Take 1 tablet by mouth daily       CINNAMON 500 MG OR CAPS 2 tablets daily  0     estradiol (ESTRACE) 0.1 MG/GM vaginal cream Please use blueberry size amount in the vagina nightly for two weeks and then three times a week at night thereafter 42.5 g 3     ferrous gluconate (FERGON) 324 (38 Fe) MG tablet Take 324 mg by mouth daily (with breakfast)       fluconazole (DIFLUCAN) 150 MG tablet Take 1 tablet (150 mg) by mouth every 72 hours 3 tablet 0     furosemide (LASIX) 20 MG tablet Take 1-2 tablets (20-40 mg) by mouth daily as needed (edema) 180 tablet 1     gabapentin (NEURONTIN) 300 MG capsule TAKE 2 CAPSULES AT BEDTIME 180 capsule 3     glipiZIDE (GLUCOTROL) 10 MG tablet TAKE 1 TABLET BY MOUTH TWICE DAILY BEFORE MEAL(S) 180 tablet 1     Ibuprofen (ADVIL PO) Take by mouth every 8 hours as needed for moderate pain       insulin NPH (NOVOLIN N RELION) 100 UNIT/ML vial INJECT 20 UNITS SUBCUTANEOUSLY BEFORE BREAKFAST AND 20 UNITS BEFORE DINNER 20 mL 0      "insulin syringe-needle U-100 (DROPLET INSULIN SYRINGE) 31G X 5/16\" 0.3 ML miscellaneous Use two syringes daily or as directed. 200 each 1     Lactobacillus (PROBIOTIC ACIDOPHILUS) TABS Take 1 tablet by mouth daily       lactobacillus rhamnosus, GG, (CULTURELL) capsule Take 1 capsule by mouth 2 times daily       lisinopril (ZESTRIL) 40 MG tablet Take 1 tablet (40 mg) by mouth daily 90 tablet 1     magnesium 250 MG tablet Take 1 tablet by mouth daily       metFORMIN (GLUCOPHAGE) 1000 MG tablet TAKE 1 TABLET BY MOUTH TWICE DAILY WITH MEALS 180 tablet 1     metoprolol succinate ER (TOPROL-XL) 50 MG 24 hr tablet Take 1 tablet (50 mg) by mouth daily 90 tablet 3     OMEPRAZOLE PO Take 20 mg by mouth       rosuvastatin (CRESTOR) 40 MG tablet Take 1 tablet (40 mg) by mouth daily 90 tablet 3     thin (NO BRAND SPECIFIED) lancets Use to test blood sugar 3 times daily or as directed. To accompany: Blood Glucose Monitor Brands: per insurance. 1 each 3     TUMS 500 MG OR CHEW 1 TABLET 3 TIMES PRN 90 0     vitamin E 400 UNITS TABS Take 400 Units by mouth daily       Zinc Sulfate (ZINC 15 PO) Take by mouth daily       betamethasone, augmented, (DIPROLENE) 0.05 % lotion GINA 10 TO 20 DROPS TOPICALLY AA OF SCALP Q NIGHT UTD       Misc Natural Products (GLUCOSAMINE CHOND CMP ADVANCED PO)  (Patient not taking: Reported on 8/10/2021)         ALLERGIES     Allergies   Allergen Reactions     Augmentin      Tongue swelling and rash     Can take PCN K without reaction      Sulfa Drugs      Tongue swelling and rash       PAST MEDICAL HISTORY:  Past Medical History:   Diagnosis Date     Arthritis      Essential hypertension, benign      Gout      Mixed hyperlipidemia      Mumps      Pain in joint, ankle and foot     gout     Type II or unspecified type diabetes mellitus without mention of complication, not stated as uncontrolled     Diabetes mellitus       PAST SURGICAL HISTORY:  Past Surgical History:   Procedure Laterality Date     BLADDER " SURGERY       COLONOSCOPY       COLONOSCOPY N/A 12/17/2014    Procedure: COMBINED COLONOSCOPY, SINGLE OR MULTIPLE BIOPSY/POLYPECTOMY BY BIOPSY;  Surgeon: Chuy Staton MD;  Location:  GI     COLONOSCOPY N/A 08/07/2020    Procedure: COLONOSCOPY;  Surgeon: Chuy Staton MD;  Location:  GI     CYSTOSCOPY       HYSTERECTOMY, PAP NO LONGER INDICATED       HYSTERECTOMY, JAIME  1991    fibroid     SURGICAL HISTORY OF -   10/28/2015    right partial knee      ZZC NONSPECIFIC PROCEDURE      Breast biopsies        ZZC NONSPECIFIC PROCEDURE      Symptomatic left thigh lipomas        ZZC NONSPECIFIC PROCEDURE  06/01/2009    pubovaginal sling       FAMILY HISTORY:  Family History   Problem Relation Age of Onset     Cancer Mother         colon ca survivor     Cancer - colorectal Mother      Cerebrovascular Disease Father      No Known Problems Sister      No Known Problems Brother      No Known Problems Maternal Grandmother      No Known Problems Maternal Grandfather      No Known Problems Paternal Grandmother      No Known Problems Other        SOCIAL HISTORY:  Social History     Socioeconomic History     Marital status:      Spouse name: None     Number of children: None     Years of education: None     Highest education level: None   Occupational History     None   Tobacco Use     Smoking status: Never Smoker     Smokeless tobacco: Never Used   Substance and Sexual Activity     Alcohol use: No     Alcohol/week: 0.0 standard drinks     Drug use: No     Sexual activity: Yes     Partners: Male     Birth control/protection: Post-menopausal, Female Surgical     Comment: hyst   Other Topics Concern     Parent/sibling w/ CABG, MI or angioplasty before 65F 55M? Not Asked   Social History Narrative     None     Social Determinants of Health     Financial Resource Strain:      Difficulty of Paying Living Expenses:    Food Insecurity:      Worried About Running Out of Food in the Last Year:      Ran Out of Food in the  "Last Year:    Transportation Needs:      Lack of Transportation (Medical):      Lack of Transportation (Non-Medical):    Physical Activity:      Days of Exercise per Week:      Minutes of Exercise per Session:    Stress:      Feeling of Stress :    Social Connections:      Frequency of Communication with Friends and Family:      Frequency of Social Gatherings with Friends and Family:      Attends [employer] Services:      Active Member of Clubs or Organizations:      Attends Club or Organization Meetings:      Marital Status:    Intimate Partner Violence:      Fear of Current or Ex-Partner:      Emotionally Abused:      Physically Abused:      Sexually Abused:        Review of Systems:  Skin:  Negative     Eyes:  Positive for glasses  ENT:  Negative    Respiratory:  Negative    Cardiovascular:  Negative    Gastroenterology: Positive for constipation  Genitourinary:  Negative    Musculoskeletal:  Positive for joint pain;arthritis;back pain  Neurologic:  Positive for numbness or tingling of feet  Psychiatric:  Negative    Heme/Lymph/Imm:  Negative    Endocrine:  Positive for diabetes    Physical Exam:  Vitals: BP (!) 140/70 (BP Location: Right arm, Patient Position: Sitting, Cuff Size: Adult Large)   Pulse 65   Ht 1.626 m (5' 4\")   Wt 88.2 kg (194 lb 8 oz)   LMP  (LMP Unknown)   SpO2 98%   BMI 33.39 kg/m      Constitutional:  cooperative, alert and oriented, well developed, well nourished, in no acute distress obese      Skin:  warm and dry to the touch, no apparent skin lesions or masses noted        Head:  normocephalic, no masses or lesions        Eyes:  sclera white;no xanthalasma;no nystagmus        ENT:  no pallor or cyanosis   Masked    Neck:  carotid pulses are full and equal bilaterally, JVP normal, no carotid bruit        Chest:  normal breath sounds, clear to auscultation, normal A-P diameter, normal symmetry, normal respiratory excursion, no use of accessory muscles        Cardiac:                "     Abdomen:  abdomen soft;BS normoactive;non-tender        Vascular: pulses full and equal                                      Extremities and Muscular Skeletal:        bilateral LE edema;trace     Neurological:  no gross motor deficits        Psych:  affect appropriate, oriented to time, person and place     Recent Lab Results:  LIPID RESULTS:  Lab Results   Component Value Date    CHOL 183 07/06/2021    HDL 33 (L) 07/06/2021     (H) 07/06/2021    TRIG 218 (H) 07/06/2021    CHOLHDLRATIO 4.3 12/09/2014       LIVER ENZYME RESULTS:  Lab Results   Component Value Date    AST 15 07/06/2021    ALT 24 07/06/2021       CBC RESULTS:  Lab Results   Component Value Date    WBC 6.7 07/13/2021    WBC 5.3 07/06/2021    RBC 4.17 07/13/2021    RBC 4.07 07/06/2021    HGB 10.7 (L) 07/13/2021    HGB 10.7 (L) 07/06/2021    HCT 34.9 (L) 07/13/2021    HCT 34.0 (L) 07/06/2021    MCV 84 07/13/2021    MCV 84 07/06/2021    MCH 25.7 (L) 07/13/2021    MCH 26.3 (L) 07/06/2021    MCHC 30.7 (L) 07/13/2021    MCHC 31.5 07/06/2021    RDW 14.7 07/13/2021    RDW 15.0 07/06/2021     07/13/2021     07/06/2021       BMP RESULTS:  Lab Results   Component Value Date     07/26/2021     07/06/2021    POTASSIUM 4.6 07/26/2021    POTASSIUM 4.8 07/06/2021    CHLORIDE 109 07/26/2021    CHLORIDE 104 07/06/2021    CO2 23 07/26/2021    CO2 23 07/06/2021    ANIONGAP 6 07/26/2021    ANIONGAP 11 07/06/2021     (H) 07/26/2021     (H) 07/06/2021    BUN 22 07/26/2021    BUN 34 (H) 07/06/2021    CR 1.00 07/26/2021    CR 1.17 (H) 07/06/2021    GFRESTIMATED 54 (L) 07/26/2021    GFRESTIMATED 44 (L) 07/06/2021    GFRESTBLACK 51 (L) 07/06/2021    BISHOP 8.6 07/26/2021    BISHOP 9.4 07/06/2021        A1C RESULTS:  Lab Results   Component Value Date    A1C 7.6 (H) 07/06/2021       INR RESULTS:  No results found for: INR      Andrea Branch MD, FACC    CC  Maribell Fuller MD  [address] [place]  [address]                      Thank you for allowing me to participate in the care of your patient.      Sincerely,     Andrea Branch MD     [facility]  cc:   Maribell Fuller MD  [facility] [place] [address] [place] [address]

## 2021-08-10 NOTE — PROGRESS NOTES
HISTORY:    Lindsey Gary is a very pleasant 81-year-old female with no previous cardiac history with a history of hyperlipidemia, hypertension, CKD with GFR of 49, type 2 diabetes, and obesity who presents today to cardiology clinic with concerns about chest pain and an abnormal stress test.    Mrs. Gary describes a chest pain that was located just to the left of center in the upper left sternal area.  It was a constant pain which lasted several weeks.  There was no pleuritic component and it was nonexertional.  On occasion it seemed to radiate into her shoulder and left arm but she thought that this might be due to how she slept at night.  There is no associated nausea shortness of breath or diaphoresis and it was unaffected by exercise.  Pain was continuous for this 3-week period but has now mostly resolved.  She had a nuclear stress test done to evaluate her chest pain and this was abnormal.  She went into the emergency room at the instruction of her physician after her nuclear stress test was read.  In the emergency room her ECG showed normal sinus rhythm with mildly widened QRS and some lateral T wave flattening with a normal troponin level.  The physician examining her felt that she had some palpable chest pain.    Mrs. Gary reports that she feels that her stamina is normal although gradually declining as her age progresses.  She has not had any exertional chest, arm, neck arm or jaw discomfort nor has she had any problems with palpitations, PND/orthopnea, strokelike symptoms, syncope or near syncope, significant peripheral edema, or symptoms of claudication.  Does have some anemia and she reports that she has had esophageal strictures in the past.    The nuclear stress test completed on 712 showed small area of ischemia in the distal anterior and apex of the left ventricle, although the specificity was limited by significant overlying soft tissue and breast attenuation artifact.  The resting and rest EF  for both in the low 70s LV cavity size was normal.    Mrs. Gary also describes the fact that her younger brother  suddenly last summer of a ruptured ascending aortic aneurysm.  This had been unrecognized.      ASSESSMENT/PLAN:    1.  Chest pain.  This patient had more than 3 weeks of continuous nonexertional chest pain.  During this time she was seen in the emergency room where her troponin was negative and her ECG was unremarkable.  This clearly does not represent cardiac pain.  She has not had any exertional chest pain.  I do not think further evaluation is necessary since her pain has now resolved.  2.  Abnormal nuclear stress test.  The stress test is mildly abnormal and in the absence of cardiac symptoms does not demand further attention.  I think our approach should be to watch for future episodes of angina-like chest pain.  I described this to the patient and asked her to contact me if she begins to experience such discomfort.  I also think that we should take aggressive measures to minimize risk factors, see below.  I have increased the patient's dose of Toprol-XL from 25 mg to 50 mg for both cardioprotection and hypertension.  3.  Hypertension.  Blood pressure has tended to be a bit on the high side and I am hoping the additional dosage of metoprolol will help to suppress this.  4.  Hyperlipidemia.  Patient has been using simvastatin 40 mg daily for many years and her LDL cholesterol remains greater than 110.  I have switched her off of simvastatin and initiated Crestor 40 mg daily.  We will have her lipid profile checked in about 2 months.  5.  Family member with ruptured ascending aorta.  Since there is a familial tendency to this disorder I will arrange an echocardiogram to make sure that Mrs. Gary does not have the same problem.  We will contact her with results.    Thank you for inviting me to participate in your patient's care.  Please do not hesitate to call if I can be of further assistance.   I spent > 45 minutes today reviewing the chart, interviewing and examining the patient, and documenting our visit.    Chart documentation was completed, in part, with [employer] voice-recognition software. Even though reviewed, some grammatical, spelling, and word errors may remain.       Orders Placed This Encounter   Procedures     Follow-Up with Cardiologist     Echocardiogram Complete     Orders Placed This Encounter   Medications     rosuvastatin (CRESTOR) 40 MG tablet     Sig: Take 1 tablet (40 mg) by mouth daily     Dispense:  90 tablet     Refill:  3     metoprolol succinate ER (TOPROL-XL) 50 MG 24 hr tablet     Sig: Take 1 tablet (50 mg) by mouth daily     Dispense:  90 tablet     Refill:  3     Medications Discontinued During This Encounter   Medication Reason     simvastatin (ZOCOR) 40 MG tablet Alternate therapy     metoprolol succinate ER (TOPROL-XL) 25 MG 24 hr tablet Reorder       10 year ASCVD risk: The 10-year ASCVD risk score (Viviana TAPIA Jr., et al., 2013) is: 54.7%    Values used to calculate the score:      Age: 81 years      Sex: Female      Is Non- : No      Diabetic: Yes      Tobacco smoker: No      Systolic Blood Pressure: 140 mmHg      Is BP treated: Yes      HDL Cholesterol: 33 mg/dL      Total Cholesterol: 183 mg/dL    Encounter Diagnoses   Name Primary?     Abnormal cardiovascular stress test      Hyperlipidemia LDL goal <100 Yes     Hypertension goal BP (blood pressure) < 130/80        CURRENT MEDICATIONS:  Current Outpatient Medications   Medication Sig Dispense Refill     acetaminophen (TYLENOL) 500 MG tablet Take 2 tablets by mouth every 8 hours as needed        alcohol swab prep pads Use to swab area of injection/beau as directed 100 each 3     allopurinol (ZYLOPRIM) 300 MG tablet Take 1/2 (one-half) tablet by mouth once daily 45 tablet 1     Ascorbic Acid (VITAMIN C) 500 MG CAPS Take 500 mg by mouth daily       ASPIRIN 81 MG OR TABS 1 tablet daily 100 3     blood  "glucose (NO BRAND SPECIFIED) test strip Use to test blood sugar 3 times daily or as directed. Accucheck 300 strip 3     blood glucose calibration (NO BRAND SPECIFIED) solution Use to calibrate blood glucose monitor as needed as directed. To accompany: Blood Glucose Monitor Brands: per insurance. 1 each 11     blood glucose monitoring (CONTOUR NEXT MONITOR W/DEVICE KIT) meter device kit Use to test blood sugar 3 times daily or as directed. 1 kit 0     calcipotriene 0.005 % OINT Apply 1 Application topically as needed  1     chlorhexidine (PERIDEX) 0.12 % solution RINSE ONE HALF  OZ 2-3 X D AFTER BREAKFAST BEFORE BEDTIME FOLLOWING BRUSHING AND FLOSSIN       Cholecalciferol (VITAMIN D) 2000 UNITS tablet Take 1 tablet by mouth daily       CINNAMON 500 MG OR CAPS 2 tablets daily  0     estradiol (ESTRACE) 0.1 MG/GM vaginal cream Please use blueberry size amount in the vagina nightly for two weeks and then three times a week at night thereafter 42.5 g 3     ferrous gluconate (FERGON) 324 (38 Fe) MG tablet Take 324 mg by mouth daily (with breakfast)       fluconazole (DIFLUCAN) 150 MG tablet Take 1 tablet (150 mg) by mouth every 72 hours 3 tablet 0     furosemide (LASIX) 20 MG tablet Take 1-2 tablets (20-40 mg) by mouth daily as needed (edema) 180 tablet 1     gabapentin (NEURONTIN) 300 MG capsule TAKE 2 CAPSULES AT BEDTIME 180 capsule 3     glipiZIDE (GLUCOTROL) 10 MG tablet TAKE 1 TABLET BY MOUTH TWICE DAILY BEFORE MEAL(S) 180 tablet 1     Ibuprofen (ADVIL PO) Take by mouth every 8 hours as needed for moderate pain       insulin NPH (NOVOLIN N RELION) 100 UNIT/ML vial INJECT 20 UNITS SUBCUTANEOUSLY BEFORE BREAKFAST AND 20 UNITS BEFORE DINNER 20 mL 0     insulin syringe-needle U-100 (DROPLET INSULIN SYRINGE) 31G X 5/16\" 0.3 ML miscellaneous Use two syringes daily or as directed. 200 each 1     Lactobacillus (PROBIOTIC ACIDOPHILUS) TABS Take 1 tablet by mouth daily       lactobacillus rhamnosus, GG, (CULTURELL) capsule " Take 1 capsule by mouth 2 times daily       lisinopril (ZESTRIL) 40 MG tablet Take 1 tablet (40 mg) by mouth daily 90 tablet 1     magnesium 250 MG tablet Take 1 tablet by mouth daily       metFORMIN (GLUCOPHAGE) 1000 MG tablet TAKE 1 TABLET BY MOUTH TWICE DAILY WITH MEALS 180 tablet 1     metoprolol succinate ER (TOPROL-XL) 50 MG 24 hr tablet Take 1 tablet (50 mg) by mouth daily 90 tablet 3     OMEPRAZOLE PO Take 20 mg by mouth       rosuvastatin (CRESTOR) 40 MG tablet Take 1 tablet (40 mg) by mouth daily 90 tablet 3     thin (NO BRAND SPECIFIED) lancets Use to test blood sugar 3 times daily or as directed. To accompany: Blood Glucose Monitor Brands: per insurance. 1 each 3     TUMS 500 MG OR CHEW 1 TABLET 3 TIMES PRN 90 0     vitamin E 400 UNITS TABS Take 400 Units by mouth daily       Zinc Sulfate (ZINC 15 PO) Take by mouth daily       betamethasone, augmented, (DIPROLENE) 0.05 % lotion GINA 10 TO 20 DROPS TOPICALLY AA OF SCALP Q NIGHT UTD       Misc Natural Products (GLUCOSAMINE CHOND CMP ADVANCED PO)  (Patient not taking: Reported on 8/10/2021)         ALLERGIES     Allergies   Allergen Reactions     Augmentin      Tongue swelling and rash     Can take PCN K without reaction      Sulfa Drugs      Tongue swelling and rash       PAST MEDICAL HISTORY:  Past Medical History:   Diagnosis Date     Arthritis      Essential hypertension, benign      Gout      Mixed hyperlipidemia      Mumps      Pain in joint, ankle and foot     gout     Type II or unspecified type diabetes mellitus without mention of complication, not stated as uncontrolled     Diabetes mellitus       PAST SURGICAL HISTORY:  Past Surgical History:   Procedure Laterality Date     BLADDER SURGERY       COLONOSCOPY       COLONOSCOPY N/A 12/17/2014    Procedure: COMBINED COLONOSCOPY, SINGLE OR MULTIPLE BIOPSY/POLYPECTOMY BY BIOPSY;  Surgeon: Chuy Staton MD;  Location:  GI     COLONOSCOPY N/A 08/07/2020    Procedure: COLONOSCOPY;  Surgeon:  Chuy Staton MD;  Location:  GI     CYSTOSCOPY       HYSTERECTOMY, PAP NO LONGER INDICATED       HYSTERECTOMY, JAIME  1991    fibroid     SURGICAL HISTORY OF -   10/28/2015    right partial knee      Z NONSPECIFIC PROCEDURE      Breast biopsies        [facility] NONSPECIFIC PROCEDURE      Symptomatic left thigh lipomas        Z NONSPECIFIC PROCEDURE  06/01/2009    pubovaginal sling       FAMILY HISTORY:  Family History   Problem Relation Age of Onset     Cancer Mother         colon ca survivor     Cancer - colorectal Mother      Cerebrovascular Disease Father      No Known Problems Sister      No Known Problems Brother      No Known Problems Maternal Grandmother      No Known Problems Maternal Grandfather      No Known Problems Paternal Grandmother      No Known Problems Other        SOCIAL HISTORY:  Social History     Socioeconomic History     Marital status:      Spouse name: None     Number of children: None     Years of education: None     Highest education level: None   Occupational History     None   Tobacco Use     Smoking status: Never Smoker     Smokeless tobacco: Never Used   Substance and Sexual Activity     Alcohol use: No     Alcohol/week: 0.0 standard drinks     Drug use: No     Sexual activity: Yes     Partners: Male     Birth control/protection: Post-menopausal, Female Surgical     Comment: hyst   Other Topics Concern     Parent/sibling w/ CABG, MI or angioplasty before 65F 55M? Not Asked   Social History Narrative     None     Social Determinants of Health     Financial Resource Strain:      Difficulty of Paying Living Expenses:    Food Insecurity:      Worried About Running Out of Food in the Last Year:      Ran Out of Food in the Last Year:    Transportation Needs:      Lack of Transportation (Medical):      Lack of Transportation (Non-Medical):    Physical Activity:      Days of Exercise per Week:      Minutes of Exercise per Session:    Stress:      Feeling of Stress :    Social  "Connections:      Frequency of Communication with Friends and Family:      Frequency of Social Gatherings with Friends and Family:      Attends [employer] Services:      Active Member of Clubs or Organizations:      Attends Club or Organization Meetings:      Marital Status:    Intimate Partner Violence:      Fear of Current or Ex-Partner:      Emotionally Abused:      Physically Abused:      Sexually Abused:        Review of Systems:  Skin:  Negative     Eyes:  Positive for glasses  ENT:  Negative    Respiratory:  Negative    Cardiovascular:  Negative    Gastroenterology: Positive for constipation  Genitourinary:  Negative    Musculoskeletal:  Positive for joint pain;arthritis;back pain  Neurologic:  Positive for numbness or tingling of feet  Psychiatric:  Negative    Heme/Lymph/Imm:  Negative    Endocrine:  Positive for diabetes    Physical Exam:  Vitals: BP (!) 140/70 (BP Location: Right arm, Patient Position: Sitting, Cuff Size: Adult Large)   Pulse 65   Ht 1.626 m (5' 4\")   Wt 88.2 kg (194 lb 8 oz)   LMP  (LMP Unknown)   SpO2 98%   BMI 33.39 kg/m      Constitutional:  cooperative, alert and oriented, well developed, well nourished, in no acute distress obese      Skin:  warm and dry to the touch, no apparent skin lesions or masses noted        Head:  normocephalic, no masses or lesions        Eyes:  sclera white;no xanthalasma;no nystagmus        ENT:  no pallor or cyanosis   Masked    Neck:  carotid pulses are full and equal bilaterally, JVP normal, no carotid bruit        Chest:  normal breath sounds, clear to auscultation, normal A-P diameter, normal symmetry, normal respiratory excursion, no use of accessory muscles        Cardiac:                    Abdomen:  abdomen soft;BS normoactive;non-tender        Vascular: pulses full and equal                                      Extremities and Muscular Skeletal:        bilateral LE edema;trace     Neurological:  no gross motor deficits        Psych:  affect " appropriate, oriented to time, person and place     Recent Lab Results:  LIPID RESULTS:  Lab Results   Component Value Date    CHOL 183 07/06/2021    HDL 33 (L) 07/06/2021     (H) 07/06/2021    TRIG 218 (H) 07/06/2021    CHOLHDLRATIO 4.3 12/09/2014       LIVER ENZYME RESULTS:  Lab Results   Component Value Date    AST 15 07/06/2021    ALT 24 07/06/2021       CBC RESULTS:  Lab Results   Component Value Date    WBC 6.7 07/13/2021    WBC 5.3 07/06/2021    RBC 4.17 07/13/2021    RBC 4.07 07/06/2021    HGB 10.7 (L) 07/13/2021    HGB 10.7 (L) 07/06/2021    HCT 34.9 (L) 07/13/2021    HCT 34.0 (L) 07/06/2021    MCV 84 07/13/2021    MCV 84 07/06/2021    MCH 25.7 (L) 07/13/2021    MCH 26.3 (L) 07/06/2021    MCHC 30.7 (L) 07/13/2021    MCHC 31.5 07/06/2021    RDW 14.7 07/13/2021    RDW 15.0 07/06/2021     07/13/2021     07/06/2021       BMP RESULTS:  Lab Results   Component Value Date     07/26/2021     07/06/2021    POTASSIUM 4.6 07/26/2021    POTASSIUM 4.8 07/06/2021    CHLORIDE 109 07/26/2021    CHLORIDE 104 07/06/2021    CO2 23 07/26/2021    CO2 23 07/06/2021    ANIONGAP 6 07/26/2021    ANIONGAP 11 07/06/2021     (H) 07/26/2021     (H) 07/06/2021    BUN 22 07/26/2021    BUN 34 (H) 07/06/2021    CR 1.00 07/26/2021    CR 1.17 (H) 07/06/2021    GFRESTIMATED 54 (L) 07/26/2021    GFRESTIMATED 44 (L) 07/06/2021    GFRESTBLACK 51 (L) 07/06/2021    BISHOP 8.6 07/26/2021    BISHOP 9.4 07/06/2021        A1C RESULTS:  Lab Results   Component Value Date    A1C 7.6 (H) 07/06/2021       INR RESULTS:  No results found for: INR      Andrea Branch MD, FAC    CC  Mariebll Fuller MD  [address] [place] [address]

## 2021-08-17 DIAGNOSIS — N39.0 RECURRENT UTI: Primary | ICD-10-CM

## 2021-08-17 NOTE — TELEPHONE ENCOUNTER
Patient notified of recommendations to have FLP /ALT drawn at PCP around October 10-10-21. Patient verbalized understanding and agreed to plan of care.

## 2021-08-18 ENCOUNTER — OFFICE VISIT (OUTPATIENT)
Dept: UROLOGY | Facility: CLINIC | Age: 81
End: 2021-08-18
Payer: COMMERCIAL

## 2021-08-18 VITALS
SYSTOLIC BLOOD PRESSURE: 122 MMHG | HEIGHT: 64 IN | WEIGHT: 182 LBS | BODY MASS INDEX: 31.07 KG/M2 | DIASTOLIC BLOOD PRESSURE: 62 MMHG

## 2021-08-18 DIAGNOSIS — N39.46 MIXED INCONTINENCE: ICD-10-CM

## 2021-08-18 DIAGNOSIS — N39.0 RECURRENT UTI: Primary | ICD-10-CM

## 2021-08-18 DIAGNOSIS — N95.2 ATROPHIC VAGINITIS: ICD-10-CM

## 2021-08-18 PROCEDURE — 99214 OFFICE O/P EST MOD 30 MIN: CPT | Performed by: PHYSICIAN ASSISTANT

## 2021-08-18 ASSESSMENT — ENCOUNTER SYMPTOMS
CHILLS: 0
FREQUENCY: 0
SHORTNESS OF BREATH: 0
HEMATURIA: 0
DYSURIA: 0
NAUSEA: 0
VOMITING: 0
FEVER: 0

## 2021-08-18 ASSESSMENT — MIFFLIN-ST. JEOR: SCORE: 1285.55

## 2021-08-18 ASSESSMENT — PAIN SCALES - GENERAL: PAINLEVEL: NO PAIN (0)

## 2021-08-18 NOTE — PATIENT INSTRUCTIONS
Increase estrogen cream to three times a week.  Pea sized amount by the urethra and vaginal introitus at bedtime three times a week (Monday, Wednesday, and Friday).     Restart pelvic PT when large medical issues resolved.      Can do Poise Impressa over the counter if you would like for leakage.  Pessary fitting would be at the [facility].    Follow up in 1 year for UA, PVR, and office visit.      Can call if issues in the interim.  Can call if need UA/UC for possible infection.

## 2021-08-18 NOTE — NURSING NOTE
Chief Complaint   Patient presents with     UTI     Here for follow up on estrogen cream   Nila Mccollum LPN

## 2021-08-18 NOTE — PROGRESS NOTES
Subjective      CHIEF COMPLAINT/REASON FOR VISIT   Patient of Dr. Sosa's seen on my calendar  Recurrent UTIs  Urinary incontience     HISTORY OF PRESENT ILLNESS   Ms. Gary is a pleasant 81 year old female, who presents today for follow up on recurrent UTIs and urinary incontinence.  She was last seen by Dr. Sosa in April of this year.    Exam at that time showed atrophic vaginal tissues as well as an area of irregularity in the urethra.  Patient has a history of bulking agent and string.  There was some blue fibers of the mid urethral string noted just under the urothelium of the urethra.  She was recommended to start topical estrogen for the atrophy, recurrent UTIs, especially given the small fibers of mesh seen under the urothelium.  She was also recommended to start pelvic floor physical therapy for her urinary incontinence, which she attended.  This has had to be postponed due to other medical concerns.     10 years ago she had a transvaginal sling for stress incontinence and was also placed on Mybetriq. She had no change in her urinary incontinence. She states that she has been told that her bladder neck is open and does not close. She has tried bladder neck injections that did not work as well. She has to wear a pad because she states that she is always damp. She has to void every 30 minutes to limit the leaking she has increased urinary urgency and has nocturia. Gynecology recommended that she discuss pessary or Poise Impressa.  She has looked at these and is not particularly interested.    Doing well on topical estrogen twice a week.  No recent UTIs.  Still has some urinary incontinence.    The following portions of the patient's history were reviewed and updated as appropriate: allergies, current medications, past family history, past medical history, past social history, past surgical history, and problem list.     REVIEW OF SYSTEMS   Review of Systems   Constitutional: Negative for chills and fever.  "  Respiratory: Negative for shortness of breath.    Cardiovascular: Negative for chest pain.   Gastrointestinal: Negative for nausea and vomiting.   Genitourinary: Negative for dysuria, frequency, hematuria and urgency.      Per HPI.     Patient Active Problem List   Diagnosis     Rosacea     Gout     CHONDROCALC NOS-OTHER SITE(aka PSEUDOGOUT)     Essential hypertension     Hyperlipidemia LDL goal <100     Advanced directives, counseling/discussion     Atrophic vaginitis     Chronic foot pain     Hypertension goal BP (blood pressure) < 130/80     Bereavement     Obesity     Type 2 diabetes mellitus with complication, without long-term current use of insulin (H)     Status post total knee replacement, unspecified laterality     Psoriasis     Muscle weakness (generalized)     Mixed incontinence     Myalgia of pelvic floor     Chronic kidney disease, stage 3      Past Medical History:   Diagnosis Date     Arthritis      Essential hypertension, benign      Gout      Mixed hyperlipidemia      Mumps      Pain in joint, ankle and foot     gout     Type II or unspecified type diabetes mellitus without mention of complication, not stated as uncontrolled     Diabetes mellitus      Objective      PHYSICAL EXAM   /62   Ht 1.626 m (5' 4\")   Wt 82.6 kg (182 lb)   LMP  (LMP Unknown)   BMI 31.24 kg/m     Physical Exam  Constitutional:       Appearance: Normal appearance.   HENT:      Head: Normocephalic.   Eyes:      General: No scleral icterus.  Skin:     General: Skin is warm.   Neurological:      General: No focal deficit present.      Mental Status: She is alert and oriented to person, place, and time.   Psychiatric:         Mood and Affect: Mood normal.         Behavior: Behavior normal.       LABORATORY       Recent Labs   Lab Test 06/07/21  1038   COLOR Yellow   APPEARANCE Clear   URINEGLC Negative   URINEBILI Negative   URINEKETONE Negative   SG 1.015   UBLD Negative   URINEPH 5.0   PROTEIN Negative   UROBILINOGEN " 0.2   NITRITE Negative   LEUKEST Negative   RBCU O - 2   WBCU 0 - 5     UC in June 2021: No growth.    Assessment & Plan    1. Recurrent UTI    2. Atrophic vaginitis    3. Mixed incontinence        I had the pleasure today meeting with Ms. Gary to discuss her atrophic vaginitis, urinary incontinence, and recurrent UTIs.    She is doing well with the UTIs and using topical estrogen.  Would recommend increasing topical estrogen to 3 times a week. Can consider Hiprex and Vitamin C for prevention, if UTIs return.      -Increase estrogen cream to three times a week.  Pea sized amount by the urethra and vaginal introitus at bedtime three times a week (Monday, Wednesday, and Friday).     -Restart pelvic PT when large medical issues resolved.      -Can do Poise Impressa over the counter if you would like for leakage.  Pessary fitting would be at the [facility].    -Follow up in 1 year for UA, PVR, and office visit.  Will refill estrogen at that time.    -Can call if issues in the interim.  Can call if need UA/UC for possible infection.      Discussed how topical estrogen is helping atrophy, rUTIs, and possible incontinence.      -Possible repeat cystoscopy if recurrent infection or worsening incontinence to check for erosion of mesh.  Treatment would likely be removal.    I spent a total of 34 minutes obtaining the HPI, examining the patient, documentation, counseling the patient on diagnosis and treatment on the day of the visit.    Signed by:       Chiara Limon PA-C 8/18/2021

## 2021-08-18 NOTE — LETTER
8/18/2021       RE: Lindsey Gary  [id number] [id number]     Dear Colleague,    Thank you for referring your patient, Lindsey Gary, to the [facility]OLOG[facility] [place] at [facility]. Please see a copy of my visit note below.    Subjective      CHIEF COMPLAINT/REASON FOR VISIT   Patient of Dr. Sosa's seen on my calendar  Recurrent UTIs  Urinary incontience     HISTORY OF PRESENT ILLNESS   Ms. Gary is a pleasant 81 year old female, who presents today for follow up on recurrent UTIs and urinary incontinence.  She was last seen by Dr. Sosa in April of this year.    Exam at that time showed atrophic vaginal tissues as well as an area of irregularity in the urethra.  Patient has a history of bulking agent and string.  There was some blue fibers of the mid urethral string noted just under the urothelium of the urethra.  She was recommended to start topical estrogen for the atrophy, recurrent UTIs, especially given the small fibers of mesh seen under the urothelium.  She was also recommended to start pelvic floor physical therapy for her urinary incontinence, which she attended.  This has had to be postponed due to other medical concerns.     10 years ago she had a transvaginal sling for stress incontinence and was also placed on Mybetriq. She had no change in her urinary incontinence. She states that she has been told that her bladder neck is open and does not close. She has tried bladder neck injections that did not work as well. She has to wear a pad because she states that she is always damp. She has to void every 30 minutes to limit the leaking she has increased urinary urgency and has nocturia. Gynecology recommended that she discuss pessary or Poise Impressa.  She has looked at these and is not particularly interested.    Doing well on topical estrogen twice a week.  No recent UTIs.  Still has some urinary incontinence.    The  "following portions of the patient's history were reviewed and updated as appropriate: allergies, current medications, past family history, past medical history, past social history, past surgical history, and problem list.     REVIEW OF SYSTEMS   Review of Systems   Constitutional: Negative for chills and fever.   Respiratory: Negative for shortness of breath.    Cardiovascular: Negative for chest pain.   Gastrointestinal: Negative for nausea and vomiting.   Genitourinary: Negative for dysuria, frequency, hematuria and urgency.      Per HPI.     Patient Active Problem List   Diagnosis     Rosacea     Gout     CHONDROCALC NOS-OTHER SITE(aka PSEUDOGOUT)     Essential hypertension     Hyperlipidemia LDL goal <100     Advanced directives, counseling/discussion     Atrophic vaginitis     Chronic foot pain     Hypertension goal BP (blood pressure) < 130/80     Bereavement     Obesity     Type 2 diabetes mellitus with complication, without long-term current use of insulin (H)     Status post total knee replacement, unspecified laterality     Psoriasis     Muscle weakness (generalized)     Mixed incontinence     Myalgia of pelvic floor     Chronic kidney disease, stage 3      Past Medical History:   Diagnosis Date     Arthritis      Essential hypertension, benign      Gout      Mixed hyperlipidemia      Mumps      Pain in joint, ankle and foot     gout     Type II or unspecified type diabetes mellitus without mention of complication, not stated as uncontrolled     Diabetes mellitus      Objective      PHYSICAL EXAM   /62   Ht 1.626 m (5' 4\")   Wt 82.6 kg (182 lb)   LMP  (LMP Unknown)   BMI 31.24 kg/m     Physical Exam  Constitutional:       Appearance: Normal appearance.   HENT:      Head: Normocephalic.   Eyes:      General: No scleral icterus.  Skin:     General: Skin is warm.   Neurological:      General: No focal deficit present.      Mental Status: She is alert and oriented to person, place, and time. "   Psychiatric:         Mood and Affect: Mood normal.         Behavior: Behavior normal.       LABORATORY       Recent Labs   Lab Test 06/07/21  1038   COLOR Yellow   APPEARANCE Clear   URINEGLC Negative   URINEBILI Negative   URINEKETONE Negative   SG 1.015   UBLD Negative   URINEPH 5.0   PROTEIN Negative   UROBILINOGEN 0.2   NITRITE Negative   LEUKEST Negative   RBCU O - 2   WBCU 0 - 5     UC in June 2021: No growth.    Assessment & Plan    1. Recurrent UTI    2. Atrophic vaginitis    3. Mixed incontinence        I had the pleasure today meeting with Ms. Gary to discuss her atrophic vaginitis, urinary incontinence, and recurrent UTIs.    She is doing well with the UTIs and using topical estrogen.  Would recommend increasing topical estrogen to 3 times a week. Can consider Hiprex and Vitamin C for prevention, if UTIs return.      -Increase estrogen cream to three times a week.  Pea sized amount by the urethra and vaginal introitus at bedtime three times a week (Monday, Wednesday, and Friday).     -Restart pelvic PT when large medical issues resolved.      -Can do Poise Impressa over the counter if you would like for leakage.  Pessary fitting would be at the [facility].    -Follow up in 1 year for UA, PVR, and office visit.  Will refill estrogen at that time.    -Can call if issues in the interim.  Can call if need UA/UC for possible infection.      Discussed how topical estrogen is helping atrophy, rUTIs, and possible incontinence.      -Possible repeat cystoscopy if recurrent infection or worsening incontinence to check for erosion of mesh.  Treatment would likely be removal.    I spent a total of 34 minutes obtaining the HPI, examining the patient, documentation, counseling the patient on diagnosis and treatment on the day of the visit.    Signed by:       Chiara Limon PA-C 8/18/2021

## 2021-08-23 ENCOUNTER — TELEPHONE (OUTPATIENT)
Dept: CARDIOLOGY | Facility: CLINIC | Age: 81
End: 2021-08-23

## 2021-08-23 NOTE — TELEPHONE ENCOUNTER
Received call from PA team and patient does not need PA for echo and PA team was going to inform patient.

## 2021-08-23 NOTE — TELEPHONE ENCOUNTER
Reason for Call:  Other Prior Auth     Detailed comments: The patient is scheduled for an Echo Complete on 8/30 and per her insurance she needs a Prior Auth to cover this     Phone Number Patient can be reached at: Home number on file [phone] (home)    Best Time: When PA has been completed, she may have to r/s     Can we leave a detailed message on this number? YES    Call taken on 8/23/2021 at 12:15 PM by Michelle Jewell

## 2021-08-25 NOTE — TELEPHONE ENCOUNTER
Patient called today 8-25-21 and had not received a call from the PA team.  Notified patient that a PA was not needed for her echocardiogram. Patient verbalized understanding and agreed to plan of care. Gloria Abraham RN on 8/25/2021 at 12:34 PM

## 2021-08-30 ENCOUNTER — HOSPITAL ENCOUNTER (OUTPATIENT)
Dept: CARDIOLOGY | Facility: CLINIC | Age: 81
Discharge: HOME | End: 2021-08-30
Attending: INTERNAL MEDICINE | Admitting: INTERNAL MEDICINE
Payer: COMMERCIAL

## 2021-08-30 DIAGNOSIS — I10 HYPERTENSION GOAL BP (BLOOD PRESSURE) < 130/80: ICD-10-CM

## 2021-08-30 DIAGNOSIS — E78.5 HYPERLIPIDEMIA LDL GOAL <100: ICD-10-CM

## 2021-08-30 DIAGNOSIS — R94.39 ABNORMAL CARDIOVASCULAR STRESS TEST: ICD-10-CM

## 2021-08-30 LAB — LVEF ECHO: NORMAL

## 2021-08-30 PROCEDURE — 93306 TTE W/DOPPLER COMPLETE: CPT

## 2021-08-30 PROCEDURE — 93306 TTE W/DOPPLER COMPLETE: CPT | Mod: 26 | Performed by: INTERNAL MEDICINE

## 2021-09-01 ENCOUNTER — THERAPY VISIT (OUTPATIENT)
Dept: PHYSICAL THERAPY | Facility: CLINIC | Age: 81
End: 2021-09-01
Payer: COMMERCIAL

## 2021-09-01 DIAGNOSIS — N39.46 MIXED INCONTINENCE: ICD-10-CM

## 2021-09-01 DIAGNOSIS — M62.81 MUSCLE WEAKNESS (GENERALIZED): ICD-10-CM

## 2021-09-01 DIAGNOSIS — M79.18 MYALGIA OF PELVIC FLOOR: ICD-10-CM

## 2021-09-01 PROCEDURE — 97110 THERAPEUTIC EXERCISES: CPT | Mod: GP | Performed by: PHYSICAL THERAPIST

## 2021-09-01 PROCEDURE — 97112 NEUROMUSCULAR REEDUCATION: CPT | Mod: GP | Performed by: PHYSICAL THERAPIST

## 2021-09-05 ENCOUNTER — OFFICE VISIT (OUTPATIENT)
Dept: URGENT CARE | Facility: URGENT CARE | Age: 81
End: 2021-09-05
Payer: COMMERCIAL

## 2021-09-05 VITALS
DIASTOLIC BLOOD PRESSURE: 77 MMHG | TEMPERATURE: 97.6 F | HEART RATE: 64 BPM | RESPIRATION RATE: 16 BRPM | SYSTOLIC BLOOD PRESSURE: 158 MMHG | OXYGEN SATURATION: 99 %

## 2021-09-05 DIAGNOSIS — R30.0 DYSURIA: Primary | ICD-10-CM

## 2021-09-05 DIAGNOSIS — B37.31 YEAST VAGINITIS: ICD-10-CM

## 2021-09-05 DIAGNOSIS — R39.89 URINARY PROBLEM: ICD-10-CM

## 2021-09-05 LAB
ALBUMIN UR-MCNC: NEGATIVE MG/DL
APPEARANCE UR: CLEAR
BILIRUB UR QL STRIP: NEGATIVE
CLUE CELLS: ABNORMAL
COLOR UR AUTO: YELLOW
GLUCOSE UR STRIP-MCNC: NEGATIVE MG/DL
HGB UR QL STRIP: ABNORMAL
KETONES UR STRIP-MCNC: NEGATIVE MG/DL
LEUKOCYTE ESTERASE UR QL STRIP: NEGATIVE
NITRATE UR QL: NEGATIVE
PH UR STRIP: 5 [PH] (ref 5–7)
RBC #/AREA URNS AUTO: NORMAL /HPF
SP GR UR STRIP: 1.01 (ref 1–1.03)
TRICHOMONAS, WET PREP: ABNORMAL
UROBILINOGEN UR STRIP-ACNC: 0.2 E.U./DL
WBC #/AREA URNS AUTO: NORMAL /HPF
WBC'S/HIGH POWER FIELD, WET PREP: ABNORMAL
YEAST, WET PREP: PRESENT

## 2021-09-05 PROCEDURE — 87210 SMEAR WET MOUNT SALINE/INK: CPT | Performed by: PHYSICIAN ASSISTANT

## 2021-09-05 PROCEDURE — 99213 OFFICE O/P EST LOW 20 MIN: CPT | Performed by: PHYSICIAN ASSISTANT

## 2021-09-05 PROCEDURE — 81001 URINALYSIS AUTO W/SCOPE: CPT | Performed by: PHYSICIAN ASSISTANT

## 2021-09-05 PROCEDURE — 87070 CULTURE OTHR SPECIMN AEROBIC: CPT | Performed by: PHYSICIAN ASSISTANT

## 2021-09-05 NOTE — PATIENT INSTRUCTIONS
Patient Education     Vaginal Infection: Yeast (Candidiasis)  Yeast infection occurs when yeast in the vagina increase and attacks the vaginal tissues. Yeast is a type of fungus. These infections are often caused by a type of yeast called Candida albicans. Other species of yeast can also cause infections. Factors that may make infection more likely include recent antibiotic use, douching, or increased sex. Yeast infections are more common in women who have diabetes, or are obese or pregnant, or have a weak immune system.   Symptoms of yeast infection    Clumpy or thin, white discharge, which may look like cottage cheese    No odor or minimal odor    Severe vaginal itching or burning    Burning with urination    Swelling, redness of vulva    Pain during sex    Treating yeast infection  Yeast infection is treated with a vaginal antifungal cream. In some cases, antifungal pills are prescribed instead. During treatment:     Finish all of your medicine, even if your symptoms go away.    Apply the cream before going to bed. Lie flat after applying so that it doesn't drip out.    Don't douche or use tampons.    Don't rely on a diaphragm or condoms, since the cream may weaken them.    Avoid intercourse if advised by your healthcare provider.  Should I treat a yeast infection myself?  Discuss with your healthcare provider whether you should use over-the-counter medicines to treat a yeast infection. Self-treatment may depend on whether:     You've had a yeast infection in the past.    You're at risk for sexually transmitted infections (STIs).  Call your healthcare provider if symptoms don't go away or come back after treatment.   [employer] last reviewed this educational content on 4/1/2020 2000-2021 [employer]. All rights reserved. This information is not intended as a substitute for professional medical care. Always follow your healthcare professional's instructions.

## 2021-09-05 NOTE — PROGRESS NOTES
Assessment & Plan     Dysuria  Symptoms for the past 3 weeks.  Wet prep today is positive for yeast.  Patient has fluconazole at home that she can take.  She requested vulvar skin culture today as she has had it done in the past before.  This is completed today.  We will be awaiting the vulvar culture result and treatment will be tailored as appropriate depending on bacterial growth.  Ultimately advise follow-up with OB/GYN due to recurrent issues.  Follow-up if any worsening symptoms.  She agrees with the plan.  - Urine Culture Aerobic Bacterial - lab collect  - Swab Aerobic Bacterial Culture Routine    Yeast vaginitis  Wet prep today is positive for yeast.  Patient has fluconazole at home that she can take.    Urinary problem  Urinalysis not suggestive of infection today.  - UA Macro with Reflex to Micro and Culture - lab collect  - Wet prep - lab collect  - UA Macro with Reflex to Micro and Culture - lab collect  - Wet prep - lab collect  - Urine Microscopic         Return in about 1 week (around 9/12/2021) for Symptoms failing to improve.    Farideh Maki PA-C  [facility] RUPALI Portillo is a 81 year old female who presents to clinic today for the following health issues:  Chief Complaint   Patient presents with     Urgent Care     Urinary Problem     Burning w/urination , vaginal itching and lower abdominal pain-Started 3wks ago      HPI      Dysuria    Onset of symptoms was 3 week(s) ago.  Course of illness is same  Severity moderate  Current and associated symptoms dysuria, thinner vaginal discharge, vaginal itching.  Treatment and measures tried took fluconazole 4 days ago.  Also took Flagyl 3 weeks ago.  Predisposing factors include:  recurrent yeast and BV infection, Hx of recurrent UTI, atrophic vaginitis. Last saw urology 2 weeks ago.   Patient denies rigors, flank pain, temperature > 101 degrees F. and vomiting          Review of Systems  Constitutional, HEENT,  cardiovascular, pulmonary, GI, , musculoskeletal, neuro, skin, endocrine and psych systems are negative, except as otherwise noted.      Objective    BP (!) 158/77   Pulse 64   Temp 97.6  F (36.4  C) (Tympanic)   Resp 16   LMP  (LMP Unknown)   SpO2 99%   Breastfeeding No   Physical Exam   GENERAL: healthy, alert and no distress   (female): normal female external genitalia, normal urethral meatus, vaginal mucosa.    Results for orders placed or performed in visit on 09/05/21 (from the past 24 hour(s))   UA Macro with Reflex to Micro and Culture - lab collect    Specimen: Urine, Midstream   Result Value Ref Range    Color Urine Yellow Colorless, Straw, Light Yellow, Yellow    Appearance Urine Clear Clear    Glucose Urine Negative Negative mg/dL    Bilirubin Urine Negative Negative    Ketones Urine Negative Negative mg/dL    Specific Gravity Urine 1.010 1.003 - 1.035    Blood Urine Small (A) Negative    pH Urine 5.0 5.0 - 7.0    Protein Albumin Urine Negative Negative mg/dL    Urobilinogen Urine 0.2 0.2, 1.0 E.U./dL    Nitrite Urine Negative Negative    Leukocyte Esterase Urine Negative Negative   Wet prep - lab collect    Specimen: Vagina; Swab   Result Value Ref Range    Trichomonas Absent Absent    Yeast Present (A) Absent    Clue Cells Absent Absent    WBCs/high power field 2+ (A) None   Urine Microscopic   Result Value Ref Range    RBC Urine 0-2 0-2 /HPF /HPF    WBC Urine 0-5 0-5 /HPF /HPF    Narrative    Urine Culture not indicated     *Note: Due to a large number of results and/or encounters for the requested time period, some results have not been displayed. A complete set of results can be found in Results Review.

## 2021-09-07 ENCOUNTER — LAB (OUTPATIENT)
Dept: LAB | Facility: CLINIC | Age: 81
End: 2021-09-07
Payer: COMMERCIAL

## 2021-09-07 DIAGNOSIS — I10 ESSENTIAL HYPERTENSION: ICD-10-CM

## 2021-09-07 DIAGNOSIS — I10 HYPERTENSION GOAL BP (BLOOD PRESSURE) < 130/80: ICD-10-CM

## 2021-09-07 DIAGNOSIS — N18.32 STAGE 3B CHRONIC KIDNEY DISEASE (H): ICD-10-CM

## 2021-09-07 DIAGNOSIS — D50.9 IRON DEFICIENCY ANEMIA, UNSPECIFIED IRON DEFICIENCY ANEMIA TYPE: ICD-10-CM

## 2021-09-07 DIAGNOSIS — R30.0 DYSURIA: ICD-10-CM

## 2021-09-07 DIAGNOSIS — E11.8 TYPE 2 DIABETES MELLITUS WITH COMPLICATION, WITHOUT LONG-TERM CURRENT USE OF INSULIN (H): ICD-10-CM

## 2021-09-07 LAB
ALBUMIN SERPL-MCNC: 4.3 G/DL (ref 3.4–5)
ALP SERPL-CCNC: 77 U/L (ref 40–150)
ALT SERPL W P-5'-P-CCNC: 21 U/L (ref 0–50)
ANION GAP SERPL CALCULATED.3IONS-SCNC: 10 MMOL/L (ref 3–14)
AST SERPL W P-5'-P-CCNC: 22 U/L (ref 0–45)
BACTERIA SPEC CULT: NORMAL
BASOPHILS # BLD AUTO: 0 10E3/UL (ref 0–0.2)
BASOPHILS NFR BLD AUTO: 1 %
BILIRUB SERPL-MCNC: 0.9 MG/DL (ref 0.2–1.3)
BUN SERPL-MCNC: 23 MG/DL (ref 7–30)
CALCIUM SERPL-MCNC: 8.7 MG/DL (ref 8.5–10.1)
CHLORIDE BLD-SCNC: 98 MMOL/L (ref 94–109)
CO2 SERPL-SCNC: 26 MMOL/L (ref 20–32)
CREAT SERPL-MCNC: 1.08 MG/DL (ref 0.52–1.04)
EOSINOPHIL # BLD AUTO: 0.1 10E3/UL (ref 0–0.7)
EOSINOPHIL NFR BLD AUTO: 1 %
ERYTHROCYTE [DISTWIDTH] IN BLOOD BY AUTOMATED COUNT: 14.6 % (ref 10–15)
FERRITIN SERPL-MCNC: 13 NG/ML (ref 8–252)
GFR SERPL CREATININE-BSD FRML MDRD: 49 ML/MIN/1.73M2
GLUCOSE BLD-MCNC: 130 MG/DL (ref 70–99)
HBA1C MFR BLD: 7.2 % (ref 0–5.6)
HCT VFR BLD AUTO: 36.6 % (ref 35–47)
HGB BLD-MCNC: 11.3 G/DL (ref 11.7–15.7)
IMM GRANULOCYTES # BLD: 0 10E3/UL
IMM GRANULOCYTES NFR BLD: 0 %
IRON SATN MFR SERPL: 15 % (ref 15–46)
IRON SERPL-MCNC: 69 UG/DL (ref 35–180)
LYMPHOCYTES # BLD AUTO: 2.7 10E3/UL (ref 0.8–5.3)
LYMPHOCYTES NFR BLD AUTO: 31 %
MCH RBC QN AUTO: 26 PG (ref 26.5–33)
MCHC RBC AUTO-ENTMCNC: 30.9 G/DL (ref 31.5–36.5)
MCV RBC AUTO: 84 FL (ref 78–100)
MONOCYTES # BLD AUTO: 0.6 10E3/UL (ref 0–1.3)
MONOCYTES NFR BLD AUTO: 7 %
NEUTROPHILS # BLD AUTO: 5.3 10E3/UL (ref 1.6–8.3)
NEUTROPHILS NFR BLD AUTO: 60 %
NRBC # BLD AUTO: 0 10E3/UL
NRBC BLD AUTO-RTO: 0 /100
PLATELET # BLD AUTO: 205 10E3/UL (ref 150–450)
POTASSIUM BLD-SCNC: 3.8 MMOL/L (ref 3.4–5.3)
PROT SERPL-MCNC: 7.9 G/DL (ref 6.8–8.8)
RBC # BLD AUTO: 4.35 10E6/UL (ref 3.8–5.2)
SODIUM SERPL-SCNC: 134 MMOL/L (ref 133–144)
TIBC SERPL-MCNC: 458 UG/DL (ref 240–430)
WBC # BLD AUTO: 8.7 10E3/UL (ref 4–11)

## 2021-09-07 PROCEDURE — 87086 URINE CULTURE/COLONY COUNT: CPT

## 2021-09-07 PROCEDURE — 82728 ASSAY OF FERRITIN: CPT

## 2021-09-07 PROCEDURE — 83550 IRON BINDING TEST: CPT

## 2021-09-07 PROCEDURE — 83036 HEMOGLOBIN GLYCOSYLATED A1C: CPT

## 2021-09-07 PROCEDURE — 85004 AUTOMATED DIFF WBC COUNT: CPT

## 2021-09-07 PROCEDURE — 36415 COLL VENOUS BLD VENIPUNCTURE: CPT

## 2021-09-07 PROCEDURE — 82040 ASSAY OF SERUM ALBUMIN: CPT

## 2021-09-08 ENCOUNTER — TELEPHONE (OUTPATIENT)
Dept: INTERNAL MEDICINE | Facility: CLINIC | Age: 81
End: 2021-09-08

## 2021-09-08 DIAGNOSIS — B37.31 YEAST INFECTION OF THE VAGINA: Primary | ICD-10-CM

## 2021-09-08 LAB — BACTERIA UR CULT: NO GROWTH

## 2021-09-08 RX ORDER — TERCONAZOLE 0.4 %
1 CREAM WITH APPLICATOR VAGINAL AT BEDTIME
Qty: 45 G | Refills: 1 | Status: SHIPPED | OUTPATIENT
Start: 2021-09-08 | End: 2022-08-09

## 2021-09-08 NOTE — TELEPHONE ENCOUNTER
Patient advised prescription sent to pharmacy.    Florence Walker RN  [facility]

## 2021-09-08 NOTE — TELEPHONE ENCOUNTER
Contacted patient. She has been to 2 Urgent Care clinics for vaginal symptoms. The first clinic could not find a cause of her symptoms, but the second clinic diagnosed her with vaginal yeast infection and told her to take Diflucan. Patient has been taking Diflucan without improvement. She asks if she can get a prescription for Terazol-7 cream, she states Dr. Millan used to give this to her with Diflucan for yeast infections.     Florence Walker RN  [facility]

## 2021-09-08 NOTE — TELEPHONE ENCOUNTER
Reason for Call:  Other prescription    Detailed comments: wants to get a prescription for yeast infection, the one that she is on now is not working.     Phone Number Patient can be reached at: Home number on file [phone] (home)    Best Time: any    Can we leave a detailed message on this number? YES    Call taken on 9/8/2021 at 9:00 AM by Shiresa H. Ormond

## 2021-09-14 ENCOUNTER — VIRTUAL VISIT (OUTPATIENT)
Dept: INTERNAL MEDICINE | Facility: CLINIC | Age: 81
End: 2021-09-14
Payer: COMMERCIAL

## 2021-09-14 DIAGNOSIS — I10 ESSENTIAL HYPERTENSION: Primary | ICD-10-CM

## 2021-09-14 DIAGNOSIS — D50.9 IRON DEFICIENCY ANEMIA, UNSPECIFIED IRON DEFICIENCY ANEMIA TYPE: ICD-10-CM

## 2021-09-14 DIAGNOSIS — N18.31 STAGE 3A CHRONIC KIDNEY DISEASE (H): ICD-10-CM

## 2021-09-14 DIAGNOSIS — L29.3 PERINEAL ITCHING, FEMALE: ICD-10-CM

## 2021-09-14 DIAGNOSIS — E11.8 TYPE 2 DIABETES MELLITUS WITH COMPLICATION, WITHOUT LONG-TERM CURRENT USE OF INSULIN (H): ICD-10-CM

## 2021-09-14 PROCEDURE — 99214 OFFICE O/P EST MOD 30 MIN: CPT | Mod: 95 | Performed by: NURSE PRACTITIONER

## 2021-09-14 NOTE — NURSING NOTE
"Chief Complaint   Patient presents with     RECHECK     pt to discuss results of labs     initial LMP  (LMP Unknown)  Estimated body mass index is 31.24 kg/m  as calculated from the following:    Height as of 8/18/21: 1.626 m (5' 4\").    Weight as of 8/18/21: 82.6 kg (182 lb)..  bp completed using cuff size NA (Not Taken)  MARIAH LIAO LPN  "

## 2021-09-27 ENCOUNTER — ALLIED HEALTH/NURSE VISIT (OUTPATIENT)
Dept: FAMILY MEDICINE | Facility: CLINIC | Age: 81
End: 2021-09-27
Payer: COMMERCIAL

## 2021-09-27 DIAGNOSIS — Z23 NEED FOR PROPHYLACTIC VACCINATION AND INOCULATION AGAINST INFLUENZA: Primary | ICD-10-CM

## 2021-09-27 PROCEDURE — G0008 ADMIN INFLUENZA VIRUS VAC: HCPCS

## 2021-09-27 PROCEDURE — 99207 PR NO CHARGE NURSE ONLY: CPT

## 2021-09-27 PROCEDURE — 90662 IIV NO PRSV INCREASED AG IM: CPT

## 2021-10-07 ENCOUNTER — TELEPHONE (OUTPATIENT)
Dept: INTERNAL MEDICINE | Facility: CLINIC | Age: 81
End: 2021-10-07

## 2021-10-07 NOTE — TELEPHONE ENCOUNTER
Patient is calling to ask which OTC decongestants she can take safely with all of her prescription medications.

## 2021-10-08 ENCOUNTER — TRANSFERRED RECORDS (OUTPATIENT)
Dept: HEALTH INFORMATION MANAGEMENT | Facility: CLINIC | Age: 81
End: 2021-10-08

## 2021-10-08 LAB — RETINOPATHY: POSITIVE

## 2021-10-11 ENCOUNTER — IMMUNIZATION (OUTPATIENT)
Dept: NURSING | Facility: CLINIC | Age: 81
End: 2021-10-11
Payer: COMMERCIAL

## 2021-10-11 ENCOUNTER — LAB (OUTPATIENT)
Dept: LAB | Facility: CLINIC | Age: 81
End: 2021-10-11
Payer: COMMERCIAL

## 2021-10-11 DIAGNOSIS — E78.5 HYPERLIPIDEMIA LDL GOAL <100: ICD-10-CM

## 2021-10-11 LAB
ALT SERPL W P-5'-P-CCNC: 16 U/L (ref 0–50)
CHOLEST SERPL-MCNC: 123 MG/DL
FASTING STATUS PATIENT QL REPORTED: YES
HDLC SERPL-MCNC: 31 MG/DL
LDLC SERPL CALC-MCNC: 63 MG/DL
NONHDLC SERPL-MCNC: 92 MG/DL
TRIGL SERPL-MCNC: 144 MG/DL

## 2021-10-11 PROCEDURE — 0004A PR COVID VAC PFIZER DIL RECON 30 MCG/0.3 ML IM: CPT

## 2021-10-11 PROCEDURE — 36415 COLL VENOUS BLD VENIPUNCTURE: CPT | Performed by: INTERNAL MEDICINE

## 2021-10-11 PROCEDURE — 91300 PR COVID VAC PFIZER DIL RECON 30 MCG/0.3 ML IM: CPT

## 2021-10-11 PROCEDURE — 84460 ALANINE AMINO (ALT) (SGPT): CPT | Performed by: INTERNAL MEDICINE

## 2021-10-11 PROCEDURE — 80061 LIPID PANEL: CPT | Performed by: INTERNAL MEDICINE

## 2021-10-12 ENCOUNTER — OFFICE VISIT (OUTPATIENT)
Dept: OBGYN | Facility: CLINIC | Age: 81
End: 2021-10-12
Payer: COMMERCIAL

## 2021-10-12 VITALS
DIASTOLIC BLOOD PRESSURE: 70 MMHG | WEIGHT: 184 LBS | BODY MASS INDEX: 31.41 KG/M2 | HEIGHT: 64 IN | SYSTOLIC BLOOD PRESSURE: 120 MMHG

## 2021-10-12 DIAGNOSIS — N89.8 VAGINAL ITCHING: ICD-10-CM

## 2021-10-12 DIAGNOSIS — N64.4 BREAST PAIN: Primary | ICD-10-CM

## 2021-10-12 DIAGNOSIS — N95.2 ATROPHIC VAGINITIS: ICD-10-CM

## 2021-10-12 DIAGNOSIS — N39.46 MIXED INCONTINENCE: ICD-10-CM

## 2021-10-12 LAB
CLUE CELLS: ABNORMAL
TRICHOMONAS, WET PREP: ABNORMAL
WBC'S/HIGH POWER FIELD, WET PREP: ABNORMAL
YEAST, WET PREP: ABNORMAL

## 2021-10-12 PROCEDURE — 99417 PROLNG OP E/M EACH 15 MIN: CPT | Performed by: OBSTETRICS & GYNECOLOGY

## 2021-10-12 PROCEDURE — 87210 SMEAR WET MOUNT SALINE/INK: CPT | Performed by: OBSTETRICS & GYNECOLOGY

## 2021-10-12 PROCEDURE — 99215 OFFICE O/P EST HI 40 MIN: CPT | Performed by: OBSTETRICS & GYNECOLOGY

## 2021-10-12 PROCEDURE — 87102 FUNGUS ISOLATION CULTURE: CPT | Performed by: OBSTETRICS & GYNECOLOGY

## 2021-10-12 ASSESSMENT — MIFFLIN-ST. JEOR: SCORE: 1294.62

## 2021-10-12 NOTE — NURSING NOTE
"Chief Complaint   Patient presents with     Vaginal Problem       Initial /70 (BP Location: Left arm, Patient Position: Chair, Cuff Size: Adult Regular)   Ht 1.626 m (5' 4\")   Wt 83.5 kg (184 lb)   LMP  (LMP Unknown)   Breastfeeding No   BMI 31.58 kg/m   Estimated body mass index is 31.58 kg/m  as calculated from the following:    Height as of this encounter: 1.626 m (5' 4\").    Weight as of this encounter: 83.5 kg (184 lb).  BP completed using cuff size: regular    Questioned patient about current smoking habits.  Pt. has never smoked.          The following HM Due: NONE    Starr Hood CMA    "

## 2021-10-13 RX ORDER — TRIAMCINOLONE ACETONIDE 5 MG/G
CREAM TOPICAL 2 TIMES DAILY PRN
Qty: 15 G | Refills: 3 | Status: SHIPPED | OUTPATIENT
Start: 2021-10-13 | End: 2022-08-10

## 2021-10-14 NOTE — PROGRESS NOTES
SUBJECTIVE:                                                   CC:  Patient presents with:  Vaginal Problem      HPI:  Lindsey Gary is a 81 year old  who presents with itching and burning of the vagina/vulva.    It is longstanding, and she has been seen for it by many different MDs for many years.  She has been seen in the past by Dr Church, Dr Marr, and Dr Ashraf, she has seen urology and Urogyn (most recently Dr Sosa and her associate Chiara Limon PA-C last month).  She also has a history of a bladder sling, ongoing urinary leakage, and history of bladder bulking.  She has a history of biopsy (about 10 years ago) which was negative for lichen sclerosus.      She has been on a large variety of treatments in the past years.  Her current regimen which seems to be helping the most is vaginal estradiol three times a week and pelvic floor physical therapy.  Occasionally in the past she felt like boric acid helped.  Also she has some 0.5% triamcinolone from Dr Church that she feels like does help a little bit.  She feels like the area hurts and burns more than it should, this is her primary complaint today.  Is already also on gabapentin due to diabetes.      She also notes that her left breast hurts, and when she went to get her usual mammogram they requested she be seen by a physician prior to having the mammogram so it could be ordered as a diagnostic.     Per last note from Dr Marr:  Ms.Sonje LORA Gary 80 year old returns for follow-up visit to address ongoing vaginal burning symptoms. They have become so intense and severe that at one point she almost need to seek emergent medical attention for it.   Since seeing me back in 2020, she has had ongoing vaginal burning symptoms. My evaluation back then comprised of yeast culture, ureaplasma and mycoplasma which all returned negative. Her wet prep at that time showed few clue cells so she was treated for bacterial vaginitis. She was note to  "have vulvovaginal atrophy and was prescribed local estrogen therapy, which she still continues to take.   Since then and while reviewing her Epic charting, she has been treated for an E.coli UTI and also been treated for vaginal cultures positive for Klebsiella, Enterobacter and Enterococcus. Still, she is dealing with burning.   She thinks that a lot of her vaginal burning are attributed to her urinary incontinence in which she has involuntary drips of urine that come out of her urethra, which requires a pad to absorb the urine. She feels that the saturated pads are possibly contributing to the irritation of her vulva and perineum. She has sought many urology specialist and they have not been able to offer her much help. She recalls that in the past she received bulking agents to her urethra from Dr. Briceño, but that this cause mild urinary retention that required the need to bear down strongly to void. She does have a future appointment with urology to discuss management of her incontinence.       Gyn History:  No LMP recorded (lmp unknown). Patient has had a hysterectomy.     PMH, PSH, Soc Hx, Fam Hx, Meds, and allergies reviewed in Epic.    OBJECTIVE:     /70 (BP Location: Left arm, Patient Position: Chair, Cuff Size: Adult Regular)   Ht 1.626 m (5' 4\")   Wt 83.5 kg (184 lb)   LMP  (LMP Unknown)   Breastfeeding No   BMI 31.58 kg/m      Gen: Healthy appearing obese female, no acute distress, comfortable, appears stated age  Psych: mentation appears normal and affect bright  Breasts: pendulous, no palpable masses or skin changes of the breast tissue.  There is one cherry angioma on the left breast which is slightly raised and she is going to see derm for it.  No skin dimpling or peau d'orange.  Left breast is slightly TTP  : Normal external female genitalia.  No external lesions. No extreme loss of architecture of changes outrightly suspicious for LS.  No erythema or abnormal discharge.  No " excoriations.    SSE: Speculum exam reveals vaginal epithelium  Atrophic c/w menopausal status with normal physiologic discharge.  There is an extra redundant area of tissue on the anterior vaginal wall, presumably an area related to her prior sling procedure, although I can't see any exposed mesh today.    Also did an exam for vulvodynia, and she does not have the burning pain or any pain at all, when touched in multiple locations on the perineum, vulva, vestibule, with a cotton tipped applicator    ASSESSMENT/PLAN:                                                      1. Breast pain  - MA Diagnostic Digital Left; Future    2. Vaginal itching  Reviewed her extensive history and notes from prior providers.  We reviewed things that have worked well in the past and other things that have worsened her symptoms.  She thinks the steroid did help, although she had a biopsy which showed no lichen and she doesn't have an exam consistent with that today either.  Exam not consistent with vulvodynia, and she is already on pelvic floor PT, estradiol, and gabapentin already.  I suspect she could have chronic irritation of the skin from bladder leakage, which she feels is improving with pelvic floor PT and estradiol and I encouraged her to continue follow-up with her PASTEPHEN Guadarrama from the Urogynecology office as this is probably the specialist who can help her the most at this time.  Also obtained wet prep and yeast cultures today to again rule out active infection, although there is nothing to suggest this on exam today.    - Wet prep - Clinic Collect  - Yeast culture  - triamcinolone (ARISTOCORT HP) 0.5 % external cream; Apply topically 2 times daily as needed for irritation  Dispense: 15 g; Refill: 3    3. Mixed incontinence  Following with urogyn    4. Atrophic vaginitis  On estradiol per urogyn    Corina Cerrato MD, MPH  Obstetrics and Gynecology      Total time spent was 76 minutes; greater than 50% of time was spent in  counseling and/or coordination of care for the above listed diagnoses, not including time spent on the procedure.

## 2021-10-17 DIAGNOSIS — E11.9 TYPE 2 DIABETES MELLITUS WITHOUT COMPLICATION, WITHOUT LONG-TERM CURRENT USE OF INSULIN (H): ICD-10-CM

## 2021-10-18 LAB — BACTERIA VAG AEROBE CULT: NO GROWTH

## 2021-10-22 ENCOUNTER — HOSPITAL ENCOUNTER (OUTPATIENT)
Dept: ULTRASOUND IMAGING | Facility: CLINIC | Age: 81
End: 2021-10-22
Attending: OBSTETRICS & GYNECOLOGY
Payer: COMMERCIAL

## 2021-10-22 ENCOUNTER — HOSPITAL ENCOUNTER (OUTPATIENT)
Dept: MAMMOGRAPHY | Facility: CLINIC | Age: 81
End: 2021-10-22
Attending: OBSTETRICS & GYNECOLOGY
Payer: COMMERCIAL

## 2021-10-22 DIAGNOSIS — N64.4 BREAST PAIN: ICD-10-CM

## 2021-10-22 PROCEDURE — 76642 ULTRASOUND BREAST LIMITED: CPT | Mod: LT

## 2021-10-22 PROCEDURE — 77062 BREAST TOMOSYNTHESIS BI: CPT

## 2021-10-26 PROBLEM — N39.46 MIXED INCONTINENCE: Status: RESOLVED | Noted: 2021-06-03 | Resolved: 2021-10-26

## 2021-10-26 PROBLEM — M62.81 MUSCLE WEAKNESS (GENERALIZED): Status: RESOLVED | Noted: 2021-06-03 | Resolved: 2021-10-26

## 2021-10-26 PROBLEM — M79.18 MYALGIA OF PELVIC FLOOR: Status: RESOLVED | Noted: 2021-06-03 | Resolved: 2021-10-26

## 2021-11-01 ENCOUNTER — TRANSFERRED RECORDS (OUTPATIENT)
Dept: HEALTH INFORMATION MANAGEMENT | Facility: CLINIC | Age: 81
End: 2021-11-01
Payer: COMMERCIAL

## 2021-11-08 ENCOUNTER — TRANSFERRED RECORDS (OUTPATIENT)
Dept: HEALTH INFORMATION MANAGEMENT | Facility: CLINIC | Age: 81
End: 2021-11-08
Payer: COMMERCIAL

## 2021-11-11 ENCOUNTER — TRANSFERRED RECORDS (OUTPATIENT)
Dept: HEALTH INFORMATION MANAGEMENT | Facility: CLINIC | Age: 81
End: 2021-11-11
Payer: COMMERCIAL

## 2021-11-12 ENCOUNTER — TELEPHONE (OUTPATIENT)
Dept: INTERNAL MEDICINE | Facility: CLINIC | Age: 81
End: 2021-11-12
Payer: COMMERCIAL

## 2021-11-12 NOTE — TELEPHONE ENCOUNTER
No aspirin or iron or vit E from now til procedure     No glipizide or metformin day of procedure     insulin  NPH 15 units night prior   NPH 10 units morning of procedure     Morning would take   metorpolol   Lisinopril

## 2021-11-12 NOTE — TELEPHONE ENCOUNTER
Reason for Call:  Other call back    Detailed comments: patient is having a procedure on 11/16/21 and want to know what medications she can or can't take.    Phone Number Patient can be reached at: Home number on file [phone] (home)    Best Time: any    Can we leave a detailed message on this number? YES    Call taken on 11/12/2021 at 10:31 AM by Shiresa H. Ormond

## 2021-11-12 NOTE — TELEPHONE ENCOUNTER
Patient advised and reads back medication directions from Dinorah. She verbalizes understanding and will call back with further questions.     Florence Walker RN  [facility]

## 2021-11-12 NOTE — TELEPHONE ENCOUNTER
Called patient, she is scheduled an Endoscopy 11/16/21 at 9:30 am. Her instructions state that at 11:45 pm the night to start clear liquids only, then NPO 3 hours before procedure and that she should take all medications including BP medication prior to her procedure, but she wanted to check with Dinorah to make sure she should do this.    Florence Walker RN  [facility]

## 2021-11-14 DIAGNOSIS — I10 ESSENTIAL HYPERTENSION: ICD-10-CM

## 2021-11-14 DIAGNOSIS — R60.9 EDEMA, UNSPECIFIED TYPE: ICD-10-CM

## 2021-11-16 ENCOUNTER — TRANSFERRED RECORDS (OUTPATIENT)
Dept: HEALTH INFORMATION MANAGEMENT | Facility: CLINIC | Age: 81
End: 2021-11-16
Payer: COMMERCIAL

## 2021-11-17 RX ORDER — FUROSEMIDE 20 MG
TABLET ORAL
Qty: 180 TABLET | Refills: 0 | Status: SHIPPED | OUTPATIENT
Start: 2021-11-17 | End: 2021-11-19

## 2021-11-17 NOTE — TELEPHONE ENCOUNTER
Routing refill request to provider for review/approval because:  Labs out of range:      Pending Prescriptions:                       Disp   Refills    furosemide (LASIX) 20 MG tablet [Pharmacy *180 ta*0        Sig: TAKE 1 TO 2 TABLETS BY MOUTH ONCE DAILY AS NEEDED FOR           EDEMA

## 2021-11-18 DIAGNOSIS — R60.9 EDEMA, UNSPECIFIED TYPE: ICD-10-CM

## 2021-11-18 DIAGNOSIS — I10 ESSENTIAL HYPERTENSION: ICD-10-CM

## 2021-11-19 RX ORDER — FUROSEMIDE 20 MG
TABLET ORAL
Qty: 180 TABLET | Refills: 0 | Status: SHIPPED | OUTPATIENT
Start: 2021-11-19 | End: 2022-06-06

## 2021-11-19 NOTE — TELEPHONE ENCOUNTER
Pending Prescriptions:                       Disp   Refills    furosemide (LASIX) 20 MG tablet [Pharmacy *180 ta*0        Sig: TAKE 1 TO 2 TABLETS BY MOUTH ONCE DAILY AS NEEDED FOR           EDEMA  Routing refill request to provider for review/approval because:  Fails protocol

## 2021-12-01 ENCOUNTER — LAB (OUTPATIENT)
Dept: LAB | Facility: CLINIC | Age: 81
End: 2021-12-01
Payer: COMMERCIAL

## 2021-12-01 DIAGNOSIS — R39.89 SUSPECTED UTI: Primary | ICD-10-CM

## 2021-12-01 DIAGNOSIS — R39.89 SUSPECTED UTI: ICD-10-CM

## 2021-12-02 LAB
ALBUMIN UR-MCNC: NEGATIVE MG/DL
APPEARANCE UR: CLEAR
BILIRUB UR QL STRIP: NEGATIVE
COLOR UR AUTO: ABNORMAL
GLUCOSE UR STRIP-MCNC: NEGATIVE MG/DL
HGB UR QL STRIP: NEGATIVE
KETONES UR STRIP-MCNC: NEGATIVE MG/DL
LEUKOCYTE ESTERASE UR QL STRIP: ABNORMAL
NITRATE UR QL: NEGATIVE
PH UR STRIP: 5.5 [PH] (ref 5–7)
SP GR UR STRIP: 1.01 (ref 1–1.03)
UROBILINOGEN UR STRIP-MCNC: NORMAL MG/DL

## 2021-12-02 PROCEDURE — 81003 URINALYSIS AUTO W/O SCOPE: CPT | Mod: QW

## 2021-12-02 PROCEDURE — 87086 URINE CULTURE/COLONY COUNT: CPT | Performed by: PHYSICIAN ASSISTANT

## 2021-12-03 ENCOUNTER — TELEPHONE (OUTPATIENT)
Dept: UROLOGY | Facility: CLINIC | Age: 81
End: 2021-12-03
Payer: COMMERCIAL

## 2021-12-03 DIAGNOSIS — N39.0 URINARY TRACT INFECTION: Primary | ICD-10-CM

## 2021-12-03 RX ORDER — NITROFURANTOIN 25; 75 MG/1; MG/1
100 CAPSULE ORAL 2 TIMES DAILY
Qty: 6 CAPSULE | Refills: 0 | Status: SHIPPED | OUTPATIENT
Start: 2021-12-03 | End: 2021-12-06

## 2021-12-04 LAB — BACTERIA UR CULT: ABNORMAL

## 2021-12-06 ENCOUNTER — TELEPHONE (OUTPATIENT)
Dept: UROLOGY | Facility: CLINIC | Age: 81
End: 2021-12-06
Payer: COMMERCIAL

## 2021-12-06 DIAGNOSIS — M10.9 GOUT, UNSPECIFIED CAUSE, UNSPECIFIED CHRONICITY, UNSPECIFIED SITE: ICD-10-CM

## 2021-12-06 NOTE — TELEPHONE ENCOUNTER
Health Call Center    Phone Message    May a detailed message be left on voicemail: yes     Reason for Call: Medication Question or concern regarding medication   Prescription Clarification  Name of Medication: Cipro vs Mocrobid  Prescribing Provider: Chiara Limon   Pharmacy: [facility]/PHARMACY #[id number] [facility] [id number] [address]   What on the order needs clarification? Lindsey calling as she is highly confused and frustrated about her medications.  She received the Cipro Friday evening as her pharmacy contacted her that they received the Rx (not listed in medication list) and someone from the clinic told her that Cipro was going to be prescribed.  She started taking that and then received a message from the clinic that Macrobid was prescribed.  She did not get an alert from the pharmacy about the Macrobid and is confused on what is going on with her medications and which one she is supposed to take.  Please call her back to discuss          Action Taken: Message routed to:  [facility]  Urology    Travel Screening: Not Applicable

## 2021-12-07 RX ORDER — ALLOPURINOL 300 MG/1
TABLET ORAL
Qty: 45 TABLET | Refills: 1 | Status: SHIPPED | OUTPATIENT
Start: 2021-12-07 | End: 2022-01-28

## 2021-12-07 RX ORDER — CEFDINIR 300 MG/1
300 CAPSULE ORAL 2 TIMES DAILY
Qty: 14 CAPSULE | Refills: 0 | Status: SHIPPED | OUTPATIENT
Start: 2021-12-07 | End: 2021-12-14

## 2021-12-07 NOTE — TELEPHONE ENCOUNTER
Routing refill request to provider for review/approval because:  Failed protocol    Pt has appt in 2 days

## 2021-12-07 NOTE — TELEPHONE ENCOUNTER
"Informed Chiara that Levaquin had a lot of contraindications including high sensitivirty with Glipizide. Per Chiara -\"Give her cefdinir 300 mg BID for 7 days.  There is a small chance of crossreactivity with her penicillin allergy, but she doesn't want macrobid and is allergic to sulfa.\"    Called patient and patient informed me that her augmentin allergy is not related to penicillin it is the other drug in augmentin. Patient will try this med and knows of the low risk associated with crossensitivity.     Meka Delong, DARVINN      "

## 2021-12-07 NOTE — TELEPHONE ENCOUNTER
Pt was called  by a different nurse and prescribed omnicef.  Pt is confused.  I told her not to forget I called and go with what the other nurse said.  JASON Coe [facility] [address]  12-7-2021 11:52am  ===============================  ----- Message from Chiara Limon PA-C sent at 12/6/2021 11:36 AM CST -----  Regarding: RE: PT STILL HAS UTI SC  You can do Levaquin 500 mg once daily for 5 days.  She has allergies listed to sulfa and penicillins, which does limit more antibiotic choices.  Please warn her of possible tendon rupture or GI upset with this.  ----- Message -----  From: Rylee Coe, [facility] [address]  Sent: 12/6/2021  11:13 AM CST  To: Chiara Limon PA-C  Subject: PT STILL HAS UTI SC                              SUSHMA CHANDLER-  HAPPY Monday!  I SPOKE TO PT AND SHE STILL HAS DYSURIA AND BURNING.  SHE WOULD LIKE A DIFFERENT ABX TO TRY.  SHE DOESN'T THINK MACROBID WORKS  AND PT STATES IT HAS NOT WORKED IN THE PAST.  AND SHE SAYS 3 DAYS OF ABX IS NEVER ENOUGH.  WHAT WOULD YOU LIKE TO DO?  UC IN Epic FROM 12-2-21.    THANK YOU!  ~RYLEE

## 2021-12-17 ENCOUNTER — TELEPHONE (OUTPATIENT)
Dept: UROLOGY | Facility: CLINIC | Age: 81
End: 2021-12-17
Payer: COMMERCIAL

## 2021-12-17 DIAGNOSIS — R39.89 SUSPECTED UTI: Primary | ICD-10-CM

## 2021-12-17 RX ORDER — NITROFURANTOIN 25; 75 MG/1; MG/1
100 CAPSULE ORAL 2 TIMES DAILY
Qty: 14 CAPSULE | Refills: 0 | Status: SHIPPED | OUTPATIENT
Start: 2021-12-17 | End: 2021-12-24

## 2021-12-17 NOTE — TELEPHONE ENCOUNTER
ABX SENT.  PT NOTIFIED.  AND I TOLD HER IF SHE IS STILL HAVING SX SHE WILL NEED TO REPEAT UAUC.  SHE WILL CALL BACK IF NEEDED.  JASON Coe CMA

## 2021-12-17 NOTE — TELEPHONE ENCOUNTER
[facility] Call Center    Phone Message    May a detailed message be left on voicemail: yes     Reason for Call: Medication Question or concern regarding medication   Prescription Clarification  Name of Medication: Macrobid  Prescribing Provider: Chiara Limon PA-C   [place] [facility] [id number] [phone]   What on the order needs clarification? Pt stated she was prescribed a medication that isn't helping with her infection, she couldn't recall the name of it, she would like a prescription for macrobid, please call Lindsey, thank you        Action Taken: Message routed to:  Other: uro    Travel Screening: Not Applicable

## 2021-12-23 NOTE — PROGRESS NOTES
"Lindsey is a 81 year old who is being evaluated via a billable telephone visit.      What phone number would you like to be contacted at? [phone]  How would you like to obtain your AVS? Mail a copy    Assessment & Plan     Essential hypertension  In good range   - CBC with platelets and differential; Future  - Basic metabolic panel  (Ca, Cl, CO2, Creat, Gluc, K, Na, BUN); Future    Type 2 diabetes mellitus with complication, without long-term current use of insulin (H)  In good range   Lab Results   Component Value Date    A1C 7.2 09/07/2021    A1C 7.6 07/06/2021    A1C 7.3 12/01/2020    A1C 6.8 11/26/2019    A1C 7.1 03/08/2019    A1C 7.0 12/31/2018       - Hemoglobin A1c; Future  - Basic metabolic panel  (Ca, Cl, CO2, Creat, Gluc, K, Na, BUN); Future    Stage 3a chronic kidney disease    - Basic metabolic panel  (Ca, Cl, CO2, Creat, Gluc, K, Na, BUN); Future    Iron deficiency anemia, unspecified iron deficiency anemia type  Improved   - CBC with platelets and differential; Future  - Iron and iron binding capacity; Future  - Ferritin; Future    Perineal itching, female    - Adult Dermatology Referral; Future             BMI:   Estimated body mass index is 31.24 kg/m  as calculated from the following:    Height as of 8/18/21: 1.626 m (5' 4\").    Weight as of 8/18/21: 82.6 kg (182 lb).       Patient Instructions   Lab in 3 months      Return in about 3 months (around 12/14/2021) for lab.    LILLIAM Sam [facility]    Aidee Portillo is a 81 year old who presents for the following health issues     HPI   Chief Complaint   Patient presents with     RECHECK     pt to discuss results of labs     Discussed lab and followup     Will recheck in 3-4 months     Review of Systems   Constitutional, HEENT, cardiovascular, pulmonary, GI, , musculoskeletal, neuro, skin, endocrine and psych systems are negative, except as otherwise noted.      Objective           Vitals:  No vitals " Assumed care of patient. Patient has already been taken to ultrasound. Ada Fry RN  12/23/21 0877 were obtained today due to virtual visit.    Physical Exam   alert and no distress  PSYCH: Alert and oriented times 3; coherent speech, normal   rate and volume, able to articulate logical thoughts, able   to abstract reason, no tangential thoughts, no hallucinations   or delusions  Her affect is normal  RESP: No cough, no audible wheezing, able to talk in full sentences  Remainder of exam unable to be completed due to telephone visits    Reviewed lab   Future lab ordered             Phone call duration: 28 minutes

## 2021-12-27 ENCOUNTER — OFFICE VISIT (OUTPATIENT)
Dept: INTERNAL MEDICINE | Facility: CLINIC | Age: 81
End: 2021-12-27
Payer: COMMERCIAL

## 2021-12-27 VITALS
TEMPERATURE: 98.7 F | RESPIRATION RATE: 18 BRPM | HEIGHT: 64 IN | BODY MASS INDEX: 29.88 KG/M2 | HEART RATE: 82 BPM | DIASTOLIC BLOOD PRESSURE: 70 MMHG | WEIGHT: 175 LBS | SYSTOLIC BLOOD PRESSURE: 135 MMHG | OXYGEN SATURATION: 98 %

## 2021-12-27 DIAGNOSIS — J32.9 SINUSITIS, UNSPECIFIED CHRONICITY, UNSPECIFIED LOCATION: Primary | ICD-10-CM

## 2021-12-27 DIAGNOSIS — R30.0 DYSURIA: ICD-10-CM

## 2021-12-27 LAB
ALBUMIN UR-MCNC: NEGATIVE MG/DL
APPEARANCE UR: CLEAR
BACTERIA #/AREA URNS HPF: ABNORMAL /HPF
BILIRUB UR QL STRIP: NEGATIVE
COLOR UR AUTO: YELLOW
GLUCOSE UR STRIP-MCNC: NEGATIVE MG/DL
HGB UR QL STRIP: ABNORMAL
KETONES UR STRIP-MCNC: NEGATIVE MG/DL
LEUKOCYTE ESTERASE UR QL STRIP: NEGATIVE
NITRATE UR QL: NEGATIVE
PH UR STRIP: 5.5 [PH] (ref 5–7)
RBC #/AREA URNS AUTO: ABNORMAL /HPF
SP GR UR STRIP: <=1.005 (ref 1–1.03)
SQUAMOUS #/AREA URNS AUTO: ABNORMAL /LPF
TRANS CELLS #/AREA URNS HPF: ABNORMAL /HPF
UROBILINOGEN UR STRIP-ACNC: 0.2 E.U./DL
WBC #/AREA URNS AUTO: ABNORMAL /HPF

## 2021-12-27 PROCEDURE — 99213 OFFICE O/P EST LOW 20 MIN: CPT | Performed by: PHYSICIAN ASSISTANT

## 2021-12-27 PROCEDURE — 81001 URINALYSIS AUTO W/SCOPE: CPT | Performed by: PHYSICIAN ASSISTANT

## 2021-12-27 PROCEDURE — 87086 URINE CULTURE/COLONY COUNT: CPT | Performed by: PHYSICIAN ASSISTANT

## 2021-12-27 RX ORDER — CEFDINIR 300 MG/1
300 CAPSULE ORAL 2 TIMES DAILY
Qty: 14 CAPSULE | Refills: 0 | Status: SHIPPED | OUTPATIENT
Start: 2021-12-27 | End: 2022-01-03

## 2021-12-27 ASSESSMENT — MIFFLIN-ST. JEOR: SCORE: 1253.79

## 2021-12-27 NOTE — PROGRESS NOTES
Assessment & Plan     Sinusitis, unspecified chronicity, unspecified location  Discussed that sinus symptoms can be viral, bacterial, or allergic in etiology. Given her concurrent urinary symptoms, I will start her on cefdinir to ideally treat both problems. I recommended Mucinex and sinus rinses to help with symptoms as well.  - cefdinir (OMNICEF) 300 MG capsule; Take 1 capsule (300 mg) by mouth 2 times daily for 7 days    Dysuria  UA not particularly concerning for infection, but symptoms have returned. UC pending. Start cefdinir.  - UA macro with reflex to Microscopic and Culture - Clinc Collect  - Urine Microscopic  - cefdinir (OMNICEF) 300 MG capsule; Take 1 capsule (300 mg) by mouth 2 times daily for 7 days  - Urine Culture Aerobic Bacterial; Future    We discussed continuing to check her blood sugars three times per day. Call with any significant elevations. Monitor for hypoglycemia and treat accordingly.     25 minutes spent on the date of the encounter doing chart review, history and exam, documentation and further activities per the note       No follow-ups on file.    Nely Santa PA-C  [facility] Port[facility] is a 81 year old who presents for the following health issues     HPI     Possible sinusitis:  Runny nose started a couple of weeks ago  Says it never felt like a cold  Dull HA for about a week  Sinus pain started this morning  Her teeth and eyes hurt  Home COVID test on Fri, negative  Fatigue  Not feeling warmer  No chills  Some looser stool and stomach pains  Diabetes--on insulin  Some nausea  Mucus getting thicker  Has acid reflux too  Feeling postnasal drainage  No cough    Tried some coricidin HBP    Reports history of sinus problems    Pfizer x2 plus booster and flu shot    She has also been having urinary symptoms recently. Following with urology. Took cefdinir then Macrobid earlier this month.  Having dysuria again. Left a urine sample today    Review  "of Systems   Constitutional, HEENT, cardiovascular, pulmonary, gi and gu systems are negative, except as otherwise noted.      Objective    /70 (BP Location: Right arm, Patient Position: Sitting, Cuff Size: Adult Large)   Pulse 82   Temp 98.7  F (37.1  C) (Oral)   Resp 18   Ht 1.626 m (5' 4\")   Wt 79.4 kg (175 lb)   LMP  (LMP Unknown)   SpO2 98%   BMI 30.04 kg/m    Body mass index is 30.04 kg/m .  Physical Exam   GENERAL: healthy, alert and no distress  EYES: Eyes grossly normal to inspection, PERRL and conjunctivae and sclerae normal  HENT: normal cephalic/atraumatic, ear canals and TM's normal, nose and mouth without ulcers or lesions, oropharynx clear, oral mucous membranes moist and sinuses: maxillary, frontal tenderness on both sides  NECK: no adenopathy, no asymmetry, masses, or scars and thyroid normal to palpation  RESP: lungs clear to auscultation - no rales, rhonchi or wheezes  CV: regular rate and rhythm, normal S1 S2, no S3 or S4, no murmur, click or rub, no peripheral edema and peripheral pulses strong  MS: no gross musculoskeletal defects noted, no edema  SKIN: no suspicious lesions or rashes          "

## 2021-12-27 NOTE — NURSING NOTE
"/70 (BP Location: Right arm, Patient Position: Sitting, Cuff Size: Adult Large)   Pulse 82   Temp 98.7  F (37.1  C) (Oral)   Resp 18   Ht 1.626 m (5' 4\")   Wt 79.4 kg (175 lb)   LMP  (LMP Unknown)   SpO2 98%   BMI 30.04 kg/m      "

## 2021-12-29 LAB — BACTERIA UR CULT: NO GROWTH

## 2022-01-07 DIAGNOSIS — N89.8 VAGINAL DISCHARGE: ICD-10-CM

## 2022-01-07 DIAGNOSIS — E11.9 TYPE 2 DIABETES MELLITUS WITHOUT COMPLICATION, WITHOUT LONG-TERM CURRENT USE OF INSULIN (H): ICD-10-CM

## 2022-01-07 DIAGNOSIS — N94.89 VAGINAL BURNING: ICD-10-CM

## 2022-01-11 RX ORDER — FLUCONAZOLE 150 MG/1
TABLET ORAL
Qty: 30 TABLET | Refills: 0 | Status: SHIPPED | OUTPATIENT
Start: 2022-01-11 | End: 2022-07-05

## 2022-01-11 NOTE — TELEPHONE ENCOUNTER
Pending Prescriptions:                       Disp   Refills    fluconazole (DIFLUCAN) 150 MG tablet [Phar*30 tab*0        Sig: TAKE ONE TABLET BY MOUTH EVERY 3 DAYS    Routing refill request to provider for review/approval because:  Needs provider review

## 2022-01-17 DIAGNOSIS — I10 ESSENTIAL HYPERTENSION: ICD-10-CM

## 2022-01-18 ENCOUNTER — TELEPHONE (OUTPATIENT)
Dept: INTERNAL MEDICINE | Facility: CLINIC | Age: 82
End: 2022-01-18
Payer: COMMERCIAL

## 2022-01-18 DIAGNOSIS — J32.9 SINUSITIS, UNSPECIFIED CHRONICITY, UNSPECIFIED LOCATION: ICD-10-CM

## 2022-01-18 DIAGNOSIS — R82.90 URINE ABNORMALITY: Primary | ICD-10-CM

## 2022-01-18 NOTE — TELEPHONE ENCOUNTER
Patient is calling because she was seen here on 12/27/21 and she was treated with omnicef for her sinus infection and her UTI symptoms of urgency, frequency and burning.  She says the medication didn't help her at all with any of it.  She has an appointment coming up with Dinorah but cant wait that long and is hoping Dinorah will order a new antibiotic and a UA for her. Then she will keep her 1/27/22 appointment with Dionrah also.

## 2022-01-19 RX ORDER — LISINOPRIL 40 MG/1
TABLET ORAL
Qty: 90 TABLET | Refills: 0 | Status: SHIPPED | OUTPATIENT
Start: 2022-01-19 | End: 2022-04-12

## 2022-01-19 NOTE — TELEPHONE ENCOUNTER
Called patient and advised patient of Dinorah's recommendation regarding urinary symptoms. Patient will plan to provide urine sample when she goes to Outpatient lab for blood work.    Patient also states that she has been dealing with sinus infection since her appointment with Nely Santa the end of December. Patient states Omnicef did not change any of her sinus symptoms and she continues to have pain under left eye, headache and pain into teeth. She asks if Dinorah could order another round of antibiotic for sinus symptoms prior to her appointment 1/27/22.     Florence Walker RN  [facility]

## 2022-01-19 NOTE — TELEPHONE ENCOUNTER
"Please see messages below. Patient continues to have urgency, frequency and burning. Patient reports the Cefdinir (Omnicef) did not help her urinary symptoms. Patient requesting another UA and antibiotic be sent.     From 12/27/2021 office visit with MARILU Santa:  \"UA not particularly concerning for infection, but symptoms have returned. UC pending. Start cefdinir.  - UA macro with reflex to Microscopic and Culture - Clinc Collect  - Urine Microscopic  - cefdinir (OMNICEF) 300 MG capsule; Take 1 capsule (300 mg) by mouth 2 times daily for 7 days  - Urine Culture Aerobic Bacterial; Future\"    UC from 12/27/2021: \"No growth on urine culture\"      Provider, do you want to order another UA?    Brianna MUNGUIA RN   [facility]     "

## 2022-01-19 NOTE — TELEPHONE ENCOUNTER
She has no growth on urine culture   Not sure what I am treating     She can do a urine culture but that will take time to come back   Regular urine will be negative because of recent antibiotic use     With negative urine culture I am not comfortable giving another antibiotic     She should be following up with urology

## 2022-01-19 NOTE — TELEPHONE ENCOUNTER
Patient calling  She is wanting to start a medication today. She wants to go to the hospital lab to have her blood work done but also wants a urine test for possible UTI.  Please advise  Ok to call and alejandra [phone]

## 2022-01-20 RX ORDER — DOXYCYCLINE 100 MG/1
100 CAPSULE ORAL 2 TIMES DAILY
Qty: 20 CAPSULE | Refills: 0 | Status: SHIPPED | OUTPATIENT
Start: 2022-01-20 | End: 2022-07-05

## 2022-01-20 NOTE — TELEPHONE ENCOUNTER
Patient informed prescription sent to [facility] for Doxycycline.     Florence Walker RN  [facility]

## 2022-01-25 ENCOUNTER — LAB (OUTPATIENT)
Dept: LAB | Facility: CLINIC | Age: 82
End: 2022-01-25
Payer: COMMERCIAL

## 2022-01-25 DIAGNOSIS — D50.9 IRON DEFICIENCY ANEMIA, UNSPECIFIED IRON DEFICIENCY ANEMIA TYPE: ICD-10-CM

## 2022-01-25 DIAGNOSIS — I10 ESSENTIAL HYPERTENSION: ICD-10-CM

## 2022-01-25 DIAGNOSIS — E11.8 TYPE 2 DIABETES MELLITUS WITH COMPLICATION, WITHOUT LONG-TERM CURRENT USE OF INSULIN (H): ICD-10-CM

## 2022-01-25 DIAGNOSIS — N18.31 STAGE 3A CHRONIC KIDNEY DISEASE (H): ICD-10-CM

## 2022-01-25 LAB
ANION GAP SERPL CALCULATED.3IONS-SCNC: 7 MMOL/L (ref 3–14)
BASOPHILS # BLD AUTO: 0 10E3/UL (ref 0–0.2)
BASOPHILS NFR BLD AUTO: 1 %
BUN SERPL-MCNC: 28 MG/DL (ref 7–30)
CALCIUM SERPL-MCNC: 9 MG/DL (ref 8.5–10.1)
CHLORIDE BLD-SCNC: 105 MMOL/L (ref 94–109)
CO2 SERPL-SCNC: 28 MMOL/L (ref 20–32)
CREAT SERPL-MCNC: 0.98 MG/DL (ref 0.52–1.04)
EOSINOPHIL # BLD AUTO: 0.1 10E3/UL (ref 0–0.7)
EOSINOPHIL NFR BLD AUTO: 1 %
ERYTHROCYTE [DISTWIDTH] IN BLOOD BY AUTOMATED COUNT: 15.7 % (ref 10–15)
FERRITIN SERPL-MCNC: 10 NG/ML (ref 8–252)
GFR SERPL CREATININE-BSD FRML MDRD: 58 ML/MIN/1.73M2
GLUCOSE BLD-MCNC: 214 MG/DL (ref 70–99)
HBA1C MFR BLD: 6.7 % (ref 0–5.6)
HCT VFR BLD AUTO: 36.5 % (ref 35–47)
HGB BLD-MCNC: 11.5 G/DL (ref 11.7–15.7)
IMM GRANULOCYTES # BLD: 0 10E3/UL
IMM GRANULOCYTES NFR BLD: 0 %
IRON SATN MFR SERPL: 11 % (ref 15–46)
IRON SERPL-MCNC: 44 UG/DL (ref 35–180)
LYMPHOCYTES # BLD AUTO: 2.2 10E3/UL (ref 0.8–5.3)
LYMPHOCYTES NFR BLD AUTO: 33 %
MCH RBC QN AUTO: 28.5 PG (ref 26.5–33)
MCHC RBC AUTO-ENTMCNC: 31.5 G/DL (ref 31.5–36.5)
MCV RBC AUTO: 90 FL (ref 78–100)
MONOCYTES # BLD AUTO: 0.6 10E3/UL (ref 0–1.3)
MONOCYTES NFR BLD AUTO: 9 %
NEUTROPHILS # BLD AUTO: 3.6 10E3/UL (ref 1.6–8.3)
NEUTROPHILS NFR BLD AUTO: 56 %
NRBC # BLD AUTO: 0 10E3/UL
NRBC BLD AUTO-RTO: 0 /100
PLATELET # BLD AUTO: 162 10E3/UL (ref 150–450)
POTASSIUM BLD-SCNC: 4.2 MMOL/L (ref 3.4–5.3)
RBC # BLD AUTO: 4.04 10E6/UL (ref 3.8–5.2)
SODIUM SERPL-SCNC: 140 MMOL/L (ref 133–144)
TIBC SERPL-MCNC: 402 UG/DL (ref 240–430)
WBC # BLD AUTO: 6.5 10E3/UL (ref 4–11)

## 2022-01-25 PROCEDURE — 82728 ASSAY OF FERRITIN: CPT

## 2022-01-25 PROCEDURE — 83550 IRON BINDING TEST: CPT

## 2022-01-25 PROCEDURE — 36415 COLL VENOUS BLD VENIPUNCTURE: CPT

## 2022-01-25 PROCEDURE — 83036 HEMOGLOBIN GLYCOSYLATED A1C: CPT

## 2022-01-25 PROCEDURE — 80048 BASIC METABOLIC PNL TOTAL CA: CPT

## 2022-01-25 PROCEDURE — 85025 COMPLETE CBC W/AUTO DIFF WBC: CPT

## 2022-01-27 ENCOUNTER — OFFICE VISIT (OUTPATIENT)
Dept: INTERNAL MEDICINE | Facility: CLINIC | Age: 82
End: 2022-01-27
Payer: COMMERCIAL

## 2022-01-27 VITALS
DIASTOLIC BLOOD PRESSURE: 62 MMHG | TEMPERATURE: 97.1 F | BODY MASS INDEX: 30.56 KG/M2 | HEART RATE: 68 BPM | OXYGEN SATURATION: 99 % | SYSTOLIC BLOOD PRESSURE: 134 MMHG | WEIGHT: 179 LBS | HEIGHT: 64 IN | RESPIRATION RATE: 20 BRPM

## 2022-01-27 DIAGNOSIS — I10 ESSENTIAL HYPERTENSION: ICD-10-CM

## 2022-01-27 DIAGNOSIS — E11.9 TYPE 2 DIABETES MELLITUS WITHOUT COMPLICATION, WITHOUT LONG-TERM CURRENT USE OF INSULIN (H): ICD-10-CM

## 2022-01-27 DIAGNOSIS — M10.9 GOUT, UNSPECIFIED CAUSE, UNSPECIFIED CHRONICITY, UNSPECIFIED SITE: ICD-10-CM

## 2022-01-27 DIAGNOSIS — E11.8 TYPE 2 DIABETES MELLITUS WITH COMPLICATION, WITHOUT LONG-TERM CURRENT USE OF INSULIN (H): Primary | ICD-10-CM

## 2022-01-27 PROCEDURE — 99214 OFFICE O/P EST MOD 30 MIN: CPT | Performed by: NURSE PRACTITIONER

## 2022-01-27 RX ORDER — ALLOPURINOL 300 MG/1
150 TABLET ORAL DAILY
Qty: 90 TABLET | Refills: 3 | Status: SHIPPED | OUTPATIENT
Start: 2022-01-27 | End: 2022-01-28

## 2022-01-27 ASSESSMENT — MIFFLIN-ST. JEOR: SCORE: 1271.94

## 2022-01-27 NOTE — NURSING NOTE
"Chief Complaint   Patient presents with     Consult     pt has many things to discuss     initial /62   Pulse 68   Temp 97.1  F (36.2  C) (Oral)   Resp 20   Ht 1.626 m (5' 4\")   Wt 81.2 kg (179 lb)   LMP  (LMP Unknown)   SpO2 99%   BMI 30.73 kg/m   Estimated body mass index is 30.73 kg/m  as calculated from the following:    Height as of this encounter: 1.626 m (5' 4\").    Weight as of this encounter: 81.2 kg (179 lb)..  bp completed using cuff size large  MARIAH LIAO LPN  "

## 2022-01-27 NOTE — PATIENT INSTRUCTIONS
[facility] website for monoclonal antibodies    Or call us to consider oral treatment  within 5 days    Refill on metformin     Discussed raising the head of the bed 4 to 6 inches; avoiding chocolate, coffee, peppermint, fruit juices, tomatoes,NSAID's, greasy and spicy foods. Encouraged moderation of alcoholic beverages

## 2022-01-27 NOTE — PROGRESS NOTES
"Assessment & Plan     Type 2 diabetes mellitus without complication, without long-term current use of insulin (H)  Stable on medication   - metFORMIN (GLUCOPHAGE) 1000 MG tablet; TAKE 1 TABLET BY MOUTH TWICE DAILY WITH MEALS    Type 2 diabetes mellitus with complication, without long-term current use of insulin (H)  Stable     Essential hypertension  In good range     Gout, unspecified cause, unspecified chronicity, unspecified site  tolerating twice daily 150 mg dose   - allopurinol (ZYLOPRIM) 300 MG tablet; Take 0.5 tablets (150 mg) by mouth daily      30 minutes spent on the date of the encounter doing chart review, history and exam, documentation and further activities per the note       BMI:   Estimated body mass index is 30.73 kg/m  as calculated from the following:    Height as of this encounter: 1.626 m (5' 4\").    Weight as of this encounter: 81.2 kg (179 lb).       Patient Instructions   [place] department of health website for monoclonal antibodies    Or call us to consider oral treatment  within 5 days    Refill on metformin     Discussed raising the head of the bed 4 to 6 inches; avoiding chocolate, coffee, peppermint, fruit juices, tomatoes,NSAID's, greasy and spicy foods. Encouraged moderation of alcoholic beverages               Return in about 6 months (around 7/27/2022).    LILLIAM Sam CNP  [facility] DUDLEY Portillo is a 81 year old who presents for the following health issues     HPI   Chief Complaint   Patient presents with     Consult     pt has many things to discuss     Taking allopurinol 1/2 twice daily for gout     Discussed covid - testing and treatment     Metformin refill needed   Her glucose have been good  Lab Results   Component Value Date    A1C 6.7 01/25/2022    A1C 7.2 09/07/2021    A1C 7.6 07/06/2021    A1C 7.3 12/01/2020    A1C 6.8 11/26/2019    A1C 7.1 03/08/2019    A1C 7.0 12/31/2018           Review of Systems   Constitutional, " "HEENT, cardiovascular, pulmonary, GI, , musculoskeletal, neuro, skin, endocrine and psych systems are negative, except as otherwise noted.      Objective    /62   Pulse 68   Temp 97.1  F (36.2  C) (Oral)   Resp 20   Ht 1.626 m (5' 4\")   Wt 81.2 kg (179 lb)   LMP  (LMP Unknown)   SpO2 99%   BMI 30.73 kg/m    Body mass index is 30.73 kg/m .  Physical Exam   GENERAL:  alert and no distress  RESP: lungs clear   CV: regular rate and rhythm  PSYCH: mentation appears normal, affect normal/bright    Reviewed lab previously done             "

## 2022-01-28 ENCOUNTER — TELEPHONE (OUTPATIENT)
Dept: INTERNAL MEDICINE | Facility: CLINIC | Age: 82
End: 2022-01-28
Payer: COMMERCIAL

## 2022-01-28 DIAGNOSIS — M10.9 GOUT, UNSPECIFIED CAUSE, UNSPECIFIED CHRONICITY, UNSPECIFIED SITE: ICD-10-CM

## 2022-01-28 RX ORDER — ALLOPURINOL 300 MG/1
150 TABLET ORAL 2 TIMES DAILY
Qty: 90 TABLET | Refills: 3 | Status: SHIPPED | OUTPATIENT
Start: 2022-01-28 | End: 2023-01-04

## 2022-01-28 NOTE — TELEPHONE ENCOUNTER
Left message for patient.  Asking if her taking half tablet twice daily was something she did on her own?  If patient needs prescription within the next two days. She needs to see if she can remember about how long this change has been done.    Will close encounter due to telling patient if she has enough. Her new prescription has been updated and will be available in two day.

## 2022-01-28 NOTE — TELEPHONE ENCOUNTER
[facility] calls needing verification on medication dosing    Allopurinol   Sig states 300 mg daily take 1/2 tablet daily  Patient states taking 1/2 tablet BID leaving her to run out      From OV yesterday 1/27/2022  Gout, unspecified cause, unspecified chronicity, unspecified site  tolerating twice daily 150 mg dose   - allopurinol (ZYLOPRIM) 300 MG tablet; Take 0.5 tablets (150 mg) by mouth daily    Due to insurance purposes and to explain why she would be running out too soon.  Can provider please correct sig and resend?    Also is there anyway provider remembers approximately when patient started taking 1/2 tablet BID?  So insurance can get explanation from [facility].    Please advise  thanks

## 2022-01-31 ENCOUNTER — TELEPHONE (OUTPATIENT)
Dept: INTERNAL MEDICINE | Facility: CLINIC | Age: 82
End: 2022-01-31
Payer: COMMERCIAL

## 2022-01-31 DIAGNOSIS — J32.9 SINUSITIS, UNSPECIFIED CHRONICITY, UNSPECIFIED LOCATION: Primary | ICD-10-CM

## 2022-01-31 NOTE — TELEPHONE ENCOUNTER
Reason for call:  Patient reporting a symptom    Symptom or request: sinus pressure     Duration (how long have symptoms been present): over a week    Have you been treated for this before? Yes    Additional comments: states medication that she was put on is not working and wants to get another medication    Phone Number patient can be reached at:  Home number on file [phone] (home)    Best Time:  any    Can we leave a detailed message on this number:  YES    Call taken on 1/31/2022 at 11:44 AM by Shiresa H. Ormond

## 2022-01-31 NOTE — TELEPHONE ENCOUNTER
Patient was given prescription for Doxycycline on 1/20/22 for sinus symptoms. Called patient, she states that symptoms have not really changed. Patient forgot to mention this at her appointment with Dinorah last week. Patient has been dealing with symptoms for over a month at this point - initially treated with Omnicef by Nely Santa that did not help either. Patient not experiencing any new symptoms. She did go to dentist today as a precaution and they said that symptoms are not related to her teeth.     Patient asking for prescription for a different antibiotic, does not want doxycycline again. Patient is aware Dinorah is out of the office today and okay to wait for her to return tomorrow to review message.    Florence Walker RN  [facility]

## 2022-02-01 RX ORDER — LEVOFLOXACIN 500 MG/1
500 TABLET, FILM COATED ORAL DAILY
Qty: 7 TABLET | Refills: 0 | Status: SHIPPED | OUTPATIENT
Start: 2022-02-01 | End: 2022-02-08

## 2022-02-01 NOTE — TELEPHONE ENCOUNTER
Patient advised prescription sent for Levaquin, but Dinorah recommends seeing ENT for ongoing sinus infection.     She states that she has seen ENT recently and they did not address sinus issues. Advised patient that if antibiotic does not work, she should see ENT again and she can see another provider there for evaluation. Patient agrees with plan.

## 2022-02-01 NOTE — TELEPHONE ENCOUNTER
If she is having ongoing sinus issues we should have her see ENT   Referral done   levaquin sent in

## 2022-03-15 ENCOUNTER — TELEPHONE (OUTPATIENT)
Dept: INTERNAL MEDICINE | Facility: CLINIC | Age: 82
End: 2022-03-15
Payer: COMMERCIAL

## 2022-03-15 DIAGNOSIS — Z79.2 PROPHYLACTIC ANTIBIOTIC: ICD-10-CM

## 2022-03-15 DIAGNOSIS — Z96.659 STATUS POST TOTAL KNEE REPLACEMENT, UNSPECIFIED LATERALITY: ICD-10-CM

## 2022-03-15 RX ORDER — CEPHALEXIN 500 MG/1
2000 CAPSULE ORAL ONCE
Qty: 4 CAPSULE | Refills: 1 | Status: SHIPPED | OUTPATIENT
Start: 2022-03-15 | End: 2022-03-15

## 2022-03-15 NOTE — TELEPHONE ENCOUNTER
Patient is calling to ask if she needs to take an antibiotic prior to having a derm procedure done on 3/24/22. She said she always has to do this before she goes to her dentist for a teeth cleaning so she thought maybe this works the same way.    She also has a dental cleaning coming up that she wants to request the antibiotics for.

## 2022-03-15 NOTE — TELEPHONE ENCOUNTER
No need for antibiotics prior to derm procedure   Dental work needs it because they are accessing the bone which is directly related to the blood supply and could potentially affect knee

## 2022-03-15 NOTE — TELEPHONE ENCOUNTER
Called patient. She takes antibiotic prior to dental appointments following knee replacement.     Asked for more information regarding dermatology procedure. Patient states they are removing 2 areas of basal cell carcinoma at the base of her neck, procedure will be done in the Dermatology clinic. Informed patient that antibiotic should not be needed prior to this type of procedure, but will have Dinorah review too.    Patient will need prescription sent to pharmacy for antibiotic prior to upcoming dental appointment. Patient does not remember what she took last. Reviewed medication history, Keflex last ordered for dental appointment on 7/14/2020.     Florence Walker RN  [facility]

## 2022-03-15 NOTE — TELEPHONE ENCOUNTER
Call to patient. No answer and voicemail not yet set up.     Nursing to call again later, as able.     Brianna MUNGUIA RN   [facility]

## 2022-03-16 NOTE — TELEPHONE ENCOUNTER
Call placed to patient. Advised of prescription and note from Dinorah. No further questions at this time.

## 2022-03-17 ENCOUNTER — TELEPHONE (OUTPATIENT)
Dept: INTERNAL MEDICINE | Facility: CLINIC | Age: 82
End: 2022-03-17
Payer: COMMERCIAL

## 2022-03-17 NOTE — TELEPHONE ENCOUNTER
Patient is calling to ask if she should stop her low dose aspirin before her derm procedure. Her instructions say to stop aspirin but she is wondering if she needs to do this since it is a low dose.

## 2022-03-18 NOTE — TELEPHONE ENCOUNTER
Contacted patient and advised her to stop low dose aspirin prior to Dermatology procedure. Patient verbalizes understanding.     Florence Walker RN  [facility]

## 2022-03-23 ENCOUNTER — TELEPHONE (OUTPATIENT)
Dept: INTERNAL MEDICINE | Facility: CLINIC | Age: 82
End: 2022-03-23
Payer: COMMERCIAL

## 2022-03-23 NOTE — TELEPHONE ENCOUNTER
Pt calling for recommendation.     She went to [facility] 3/20 unmasked. She shook the 's hand, but did not speak with him. She received an e-mail today that he was positive for Covid. She is suppose to go to DERM tomorrow for removal of an area w/ basal cell carcinoma. She is wondering what to do.     She is vaccinated and boosted.   CDC recommends   IF YOU  Were exposed to COVID-19 and are up-to-date on COVID-19 vaccinations    No quarantine  You do not need to stay home unless you develop symptoms.    Get tested  Even if you don t develop symptoms, get tested at least 5 days after you last had close contact with someone with COVID-19.    She is going to test tomorrow.   She is going to call DERM for their recommendations as well.     Jenny ROMAN RN

## 2022-03-28 DIAGNOSIS — G62.9 NEUROPATHY: ICD-10-CM

## 2022-03-29 RX ORDER — GABAPENTIN 300 MG/1
CAPSULE ORAL
Qty: 180 CAPSULE | Refills: 3 | Status: SHIPPED | OUTPATIENT
Start: 2022-03-29 | End: 2022-08-10

## 2022-03-29 NOTE — TELEPHONE ENCOUNTER
Routing refill request to provider for review/approval because:  Drug not on the [facility] refill protocol     Yoselyn Montelongo RN

## 2022-04-08 ENCOUNTER — TELEPHONE (OUTPATIENT)
Dept: INTERNAL MEDICINE | Facility: CLINIC | Age: 82
End: 2022-04-08
Payer: COMMERCIAL

## 2022-04-08 NOTE — TELEPHONE ENCOUNTER
Patient is calling because she is scheduled on 04/12/2022 for her second covid booster. She had a skin biopsy done yesterday on her back and she is wondering if this will have any negative affect?

## 2022-04-10 ENCOUNTER — HEALTH MAINTENANCE LETTER (OUTPATIENT)
Age: 82
End: 2022-04-10

## 2022-04-11 DIAGNOSIS — I10 ESSENTIAL HYPERTENSION: ICD-10-CM

## 2022-04-12 ENCOUNTER — IMMUNIZATION (OUTPATIENT)
Dept: NURSING | Facility: CLINIC | Age: 82
End: 2022-04-12
Payer: COMMERCIAL

## 2022-04-12 VITALS — DIASTOLIC BLOOD PRESSURE: 60 MMHG | SYSTOLIC BLOOD PRESSURE: 132 MMHG

## 2022-04-12 DIAGNOSIS — I10 ESSENTIAL HYPERTENSION: ICD-10-CM

## 2022-04-12 DIAGNOSIS — Z23 NEED FOR VACCINATION: Primary | ICD-10-CM

## 2022-04-12 PROCEDURE — 91305 COVID-19,PF,PFIZER (12+ YRS): CPT

## 2022-04-12 PROCEDURE — 0054A COVID-19,PF,PFIZER (12+ YRS): CPT

## 2022-04-12 RX ORDER — LISINOPRIL 40 MG/1
TABLET ORAL
Qty: 90 TABLET | Refills: 2 | Status: SHIPPED | OUTPATIENT
Start: 2022-04-12 | End: 2022-09-20

## 2022-04-12 NOTE — TELEPHONE ENCOUNTER
Pending Prescriptions:                       Disp   Refills    lisinopril (ZESTRIL) 40 MG tablet [Pharma*90 tab*2            Sig: Take 1 tablet by mouth once daily      Prescription approved per [facility] Refill Protocol.      Brianna MUNGUIA RN   [facility]     negative...

## 2022-04-21 DIAGNOSIS — E11.9 TYPE 2 DIABETES MELLITUS WITHOUT COMPLICATION, WITHOUT LONG-TERM CURRENT USE OF INSULIN (H): ICD-10-CM

## 2022-04-22 RX ORDER — GLIPIZIDE 10 MG/1
TABLET ORAL
Qty: 180 TABLET | Refills: 0 | Status: SHIPPED | OUTPATIENT
Start: 2022-04-22 | End: 2022-10-20

## 2022-04-27 ENCOUNTER — TELEPHONE (OUTPATIENT)
Dept: INTERNAL MEDICINE | Facility: CLINIC | Age: 82
End: 2022-04-27
Payer: COMMERCIAL

## 2022-04-27 NOTE — TELEPHONE ENCOUNTER
Patient calling  She wants to know if she should still take aspirin   Ok to call and alejandra [phone]

## 2022-04-27 NOTE — TELEPHONE ENCOUNTER
Spoke with patient.  States she is currently taking ASA 81mg daily.    She heard on the news last night that patients who are taking ASA should check with their provider to see if they should continue taking it.  Patient said she didn't listen to why it was suggested to check with provider for continued use.    Please advise, thanks.

## 2022-05-02 ENCOUNTER — TRANSFERRED RECORDS (OUTPATIENT)
Dept: HEALTH INFORMATION MANAGEMENT | Facility: CLINIC | Age: 82
End: 2022-05-02
Payer: COMMERCIAL

## 2022-05-02 LAB — RETINOPATHY: POSITIVE

## 2022-05-04 ENCOUNTER — TELEPHONE (OUTPATIENT)
Dept: INTERNAL MEDICINE | Facility: CLINIC | Age: 82
End: 2022-05-04
Payer: COMMERCIAL

## 2022-05-04 NOTE — TELEPHONE ENCOUNTER
Patient calling stating she has a dermatology appointment today and did not remember what provider said about needing antibiotics before this procedure.      Referred back to TE from 3/15/22:  No need for antibiotics prior to derm procedure     Relayed message from provider to patient and she verbalized understanding.

## 2022-05-06 ENCOUNTER — TELEPHONE (OUTPATIENT)
Dept: INTERNAL MEDICINE | Facility: CLINIC | Age: 82
End: 2022-05-06
Payer: COMMERCIAL

## 2022-05-06 NOTE — TELEPHONE ENCOUNTER
Patient is already tylenol extra strength along with advil for arthritis pain. Advil was prescribed by Orthopedic Dr. So patient wants to know if she should be taking all medications all together or not? And if her kidneys are doing okay or not?

## 2022-05-06 NOTE — TELEPHONE ENCOUNTER
Patient calls reporting concern regarding a medication interaction.    Patient heard a news report last night that there are interactions between blood pressure medications and NSAIDS. Patient states that the news report said you were not to take blood pressure medication and NSAIDS together.     Patient is specifically concerned about the interaction between her lisinopril and advil. Patient takes advil for arthritis pain.    Patient is requesting that Dinorah evaluate if she should take lisinopril and advil together.    Best call back number is home number listed in chart.

## 2022-05-06 NOTE — TELEPHONE ENCOUNTER
NSAID can impact blood pressure - her blood pressure has been ok   If possible, and with high blood pressure, tylenol preferred   It can also negatively affect kidney function

## 2022-05-09 ENCOUNTER — TELEPHONE (OUTPATIENT)
Dept: CARDIOLOGY | Facility: CLINIC | Age: 82
End: 2022-05-09
Payer: COMMERCIAL

## 2022-05-09 NOTE — TELEPHONE ENCOUNTER
Call place to pt to review current COVID restriction. Advised pt to call of they should develop cough, fever, SOB prior to OV. Pt verbalized understanding. Will be sure to wear mask and distance as able.   FROYLAN Tyson RN, BSN. 05/09/22 10:31 AM

## 2022-05-09 NOTE — TELEPHONE ENCOUNTER
Health Call Center    Phone Message    May a detailed message be left on voicemail: yes     Reason for Call: Other: Lindsey called stating that she was directly exposed to Covid on 5/4/22 at her dermatologists office. Lindsey stated that she has no symptoms and is fully vaccinated with both boosters. Lindsey is wondering if she is still able to come in to her appointment. Lindsey stated she will be out of the house for a bit today, so a detailed message can be left. Please advise. Thank you.      Action Taken: Message routed to:  Other: [place]    Travel Screening: Not Applicable

## 2022-05-09 NOTE — TELEPHONE ENCOUNTER
Her kidney function is stable   It is consistent for her age   If she needs to use ibuprofen it appears her kidney function is tolerating it

## 2022-05-09 NOTE — TELEPHONE ENCOUNTER
Attempted to contact patient. Left voice message to call back.     Florence Walker RN  [facility]

## 2022-05-10 ENCOUNTER — OFFICE VISIT (OUTPATIENT)
Dept: CARDIOLOGY | Facility: CLINIC | Age: 82
End: 2022-05-10
Attending: INTERNAL MEDICINE
Payer: COMMERCIAL

## 2022-05-10 VITALS
DIASTOLIC BLOOD PRESSURE: 60 MMHG | HEIGHT: 64 IN | HEART RATE: 54 BPM | SYSTOLIC BLOOD PRESSURE: 122 MMHG | WEIGHT: 179.8 LBS | BODY MASS INDEX: 30.7 KG/M2

## 2022-05-10 DIAGNOSIS — R94.39 ABNORMAL CARDIOVASCULAR STRESS TEST: ICD-10-CM

## 2022-05-10 DIAGNOSIS — E78.5 HYPERLIPIDEMIA LDL GOAL <100: ICD-10-CM

## 2022-05-10 DIAGNOSIS — I10 HYPERTENSION GOAL BP (BLOOD PRESSURE) < 130/80: ICD-10-CM

## 2022-05-10 PROCEDURE — 99213 OFFICE O/P EST LOW 20 MIN: CPT | Performed by: INTERNAL MEDICINE

## 2022-05-10 NOTE — LETTER
5/10/2022    Dinorah Wong, LILLIAM CNP  [address] [facility] [address]    RE: Lindsey Gary       Dear Colleague,     I had the pleasure of seeing Lindsey Gary in the [facility].  HISTORY:    Lindsey Gary is a very pleasant 81-year-old female who was seen in cardiology consultation last August with concerns about chest discomfort.  She has a history of hyperlipidemia, hypertension, stage III CKD, type 2 diabetes, and obesity.  She presented at that time with a multiweek prolonged episode of chest pain which was clearly noncardiac, as well as a nuclear stress test which was mildly abnormal but probably just due to artifact.  We elected to take a conservative approach but she was noted to be hypertensive and we increased her dose of metoprolol and switch her over to Crestor for better cholesterol control.    The patient reports that her chest pain resolved many months ago and has not recurred.  She has not had any cardiovascular symptoms of concern, including no exertional chest arm neck shoulder or jaw discomfort, palpitations, syncope or near syncope, strokelike symptoms, peripheral edema, or claudication.    An echocardiogram was done because her brother had  of a ruptured ascending aorta.  Fortunately, Mrs. Gary's ascending aorta is normal in size and does not need further following.    Mrs. Gary reports that she does not check her blood pressure at home but her recent blood pressures in the clinic have all been within normal limits.  She is mildly bradycardic and describes occasional mild brief episodes of orthostatic dizziness when she first stands up, but no significant problems with dizziness or lightheadedness.      ASSESSMENT/PLAN:    1.  Chest pain.  This was noncardiac.  She will let us know if she develops further chest pain.  Her nuclear stress test showed normal ejection fraction with some mild abnormalities that were almost certainly artifact due to breast  attenuation.  No further evaluation is needed unless symptoms change.  2.  Hypertension.  Very well controlled blood pressure on current medications.  She is mildly bradycardic but this is harmless.  I would continue these medications indefinitely as long as well-tolerated.  I would ask her primary care physician to assume refilling her Toprol.  3.  Hyperlipidemia.  I had switch her to Crestor to achieve better lipid control and her follow-up lipids were excellent.  I would also ask her primary care physician to follow this long-term as well.    Thank you for inviting me to participate in the care of your patient.  Please don't hesitate to call if I can be of further assistance.  20 minutes were spent today reviewing the chart and other records, interviewing and examining the patient, and documenting our visit.  Fortunately, Mrs. Gary has no ongoing cardiology issues that need to be followed.  I am hoping her primary care giver will continue her current cardiology medications long-term.  I would certainly would be happy to see Mrs. Gary again should further cardiology issues arise.    Chart documentation was completed, in part, with [employer] voice-recognition software. Even though reviewed, some grammatical, spelling, and word errors may remain.       No orders of the defined types were placed in this encounter.    No orders of the defined types were placed in this encounter.    There are no discontinued medications.    10 year ASCVD risk: The ASCVD Risk score ([place] WILLIE Jr., et al., 2013) failed to calculate for the following reasons:    The valid total cholesterol range is 130 to 320 mg/dL    Encounter Diagnoses   Name Primary?     Abnormal cardiovascular stress test      Hyperlipidemia LDL goal <100      Hypertension goal BP (blood pressure) < 130/80        CURRENT MEDICATIONS:  Current Outpatient Medications   Medication Sig Dispense Refill     acetaminophen (TYLENOL) 500 MG tablet Take 2 tablets by mouth every 8  "hours as needed        alcohol swab prep pads Use to swab area of injection/beau as directed 100 each 3     allopurinol (ZYLOPRIM) 300 MG tablet Take 0.5 tablets (150 mg) by mouth 2 times daily 90 tablet 3     Ascorbic Acid (VITAMIN C) 500 MG CAPS Take 500 mg by mouth daily       ASPIRIN 81 MG OR TABS 1 tablet daily 100 3     blood glucose (NO BRAND SPECIFIED) test strip Use to test blood sugar 3 times daily or as directed. Accucheck 300 strip 3     blood glucose calibration (NO BRAND SPECIFIED) solution Use to calibrate blood glucose monitor as needed as directed. To accompany: Blood Glucose Monitor Brands: per insurance. 1 each 11     blood glucose monitoring (CONTOUR NEXT MONITOR W/DEVICE KIT) meter device kit Use to test blood sugar 3 times daily or as directed. 1 kit 0     chlorhexidine (PERIDEX) 0.12 % solution RINSE ONE HALF  OZ 2-3 X D AFTER BREAKFAST BEFORE BEDTIME FOLLOWING BRUSHING AND FLOSSIN       Cholecalciferol (VITAMIN D) 2000 UNITS tablet Take 1 tablet by mouth daily       CINNAMON 500 MG OR CAPS 2 tablets daily  0     estradiol (ESTRACE) 0.1 MG/GM vaginal cream Please use blueberry size amount in the vagina nightly for two weeks and then three times a week at night thereafter 42.5 g 3     ferrous gluconate (FERGON) 324 (38 Fe) MG tablet Take 324 mg by mouth daily (with breakfast)       furosemide (LASIX) 20 MG tablet TAKE 1 TO 2 TABLETS BY MOUTH ONCE DAILY AS NEEDED FOR EDEMA 180 tablet 0     gabapentin (NEURONTIN) 300 MG capsule TAKE 2 CAPSULES BY MOUTH AT BEDTIME 180 capsule 3     glipiZIDE (GLUCOTROL) 10 MG tablet TAKE 1 TABLET BY MOUTH TWICE DAILY BEFORE MEAL(S) 180 tablet 0     Ibuprofen (ADVIL PO) Take by mouth every 8 hours as needed for moderate pain       insulin NPH (NOVOLIN N RELION) 100 UNIT/ML vial INJECT 20 UNITS SUBCUTANEOUSLY BEFORE BREAKFAST AND 20 UNITS BEFORE DINNER 20 mL 0     insulin syringe-needle U-100 (DROPLET INSULIN SYRINGE) 31G X 5/16\" 0.3 ML miscellaneous Use two " syringes daily or as directed. 200 each 1     Lactobacillus (PROBIOTIC ACIDOPHILUS) TABS Take 1 tablet by mouth daily       lisinopril (ZESTRIL) 40 MG tablet Take 1 tablet by mouth once daily 90 tablet 2     metFORMIN (GLUCOPHAGE) 1000 MG tablet TAKE 1 TABLET BY MOUTH TWICE DAILY WITH MEALS 180 tablet 3     metoprolol succinate ER (TOPROL-XL) 50 MG 24 hr tablet Take 1 tablet (50 mg) by mouth daily 90 tablet 3     rosuvastatin (CRESTOR) 40 MG tablet Take 1 tablet (40 mg) by mouth daily 90 tablet 3     terconazole (TERAZOL 7) 0.4 % vaginal cream Place 1 applicator vaginally At Bedtime 45 g 1     thin (NO BRAND SPECIFIED) lancets Use to test blood sugar 3 times daily or as directed. To accompany: Blood Glucose Monitor Brands: per insurance. 1 each 3     TUMS 500 MG OR CHEW 1 TABLET 3 TIMES PRN 90 0     Zinc Sulfate (ZINC 15 PO) Take by mouth daily       betamethasone, augmented, (DIPROLENE) 0.05 % lotion GINA 10 TO 20 DROPS TOPICALLY AA OF SCALP Q NIGHT UTD (Patient not taking: No sig reported)       calcipotriene 0.005 % OINT Apply 1 Application topically as needed (Patient not taking: No sig reported)  1     doxycycline hyclate (VIBRAMYCIN) 100 MG capsule Take 1 capsule (100 mg) by mouth 2 times daily (Patient not taking: Reported on 5/10/2022) 20 capsule 0     fluconazole (DIFLUCAN) 150 MG tablet TAKE ONE TABLET BY MOUTH EVERY 3 DAYS (Patient not taking: Reported on 5/10/2022) 30 tablet 0     magnesium 250 MG tablet Take 1 tablet by mouth daily (Patient not taking: Reported on 5/10/2022)       Misc Natural Products (GLUCOSAMINE CHOND CMP ADVANCED PO)  (Patient not taking: No sig reported)       triamcinolone (ARISTOCORT HP) 0.5 % external cream Apply topically 2 times daily as needed for irritation (Patient not taking: No sig reported) 15 g 3     vitamin E 400 UNITS TABS Take 400 Units by mouth daily  (Patient not taking: Reported on 5/10/2022)         ALLERGIES     Allergies   Allergen Reactions     Augmentin       Tongue swelling and rash     Can take PCN K without reaction      Sulfa Drugs      Tongue swelling and rash       PAST MEDICAL HISTORY:  Past Medical History:   Diagnosis Date     Arthritis      Essential hypertension, benign      Gout      Mixed hyperlipidemia      Mumps      Pain in joint, ankle and foot     gout     Type II or unspecified type diabetes mellitus without mention of complication, not stated as uncontrolled     Diabetes mellitus       PAST SURGICAL HISTORY:  Past Surgical History:   Procedure Laterality Date     BLADDER SURGERY       COLONOSCOPY       COLONOSCOPY N/A 12/17/2014    Procedure: COMBINED COLONOSCOPY, SINGLE OR MULTIPLE BIOPSY/POLYPECTOMY BY BIOPSY;  Surgeon: Chuy Staton MD;  Location:  GI     COLONOSCOPY N/A 08/07/2020    Procedure: COLONOSCOPY;  Surgeon: Chuy Staton MD;  Location:  GI     CYSTOSCOPY       HYSTERECTOMY, PAP NO LONGER INDICATED       HYSTERECTOMY, JAIME  1991    fibroid     SURGICAL HISTORY OF -   10/28/2015    right partial knee      ZZC NONSPECIFIC PROCEDURE      Breast biopsies        Z NONSPECIFIC PROCEDURE      Symptomatic left thigh lipomas        ZZC NONSPECIFIC PROCEDURE  06/01/2009    pubovaginal sling       FAMILY HISTORY:  Family History   Problem Relation Age of Onset     Cancer Mother         colon ca survivor     Cancer - colorectal Mother      Cerebrovascular Disease Father      No Known Problems Sister      No Known Problems Brother      No Known Problems Maternal Grandmother      No Known Problems Maternal Grandfather      No Known Problems Paternal Grandmother      No Known Problems Other        SOCIAL HISTORY:  Social History     Socioeconomic History     Marital status:      Spouse name: None     Number of children: None     Years of education: None     Highest education level: None   Tobacco Use     Smoking status: Never Smoker     Smokeless tobacco: Never Used   Vaping Use     Vaping Use: Never used   Substance and Sexual  "Activity     Alcohol use: No     Alcohol/week: 0.0 standard drinks     Drug use: No     Sexual activity: Yes     Partners: Male     Birth control/protection: Post-menopausal, Female Surgical     Comment: hyst       Review of Systems:  Skin:  Positive for     Eyes:  Positive for visual blurring  ENT:  Negative    Respiratory:  Negative    Cardiovascular:    edema;Positive for  Gastroenterology: Positive for reflux  Genitourinary:  not assessed    Musculoskeletal:  Positive for back pain  Neurologic:  Positive for    Psychiatric:  Negative    Heme/Lymph/Imm:  Negative    Endocrine:  Positive for thyroid disorder;diabetes    Physical Exam:  Vitals: /60   Pulse 54   Ht 1.626 m (5' 4\")   Wt 81.6 kg (179 lb 12.8 oz)   LMP  (LMP Unknown)   BMI 30.86 kg/m      Constitutional:  cooperative, alert and oriented, well developed, well nourished, in no acute distress overweight      Skin:           Head:  normocephalic        Eyes:  sclera white;no xanthalasma;no nystagmus        ENT:  no pallor or cyanosis   Masked    Neck:           Chest:           Cardiac:                    Abdomen:           Vascular:                                        Extremities and Muscular Skeletal:              Neurological:  no gross motor deficits        Psych:  affect appropriate, oriented to time, person and place     Recent Lab Results:  LIPID RESULTS:  Lab Results   Component Value Date    CHOL 123 10/11/2021    CHOL 183 07/06/2021    HDL 31 (L) 10/11/2021    HDL 33 (L) 07/06/2021    LDL 63 10/11/2021     (H) 07/06/2021    TRIG 144 10/11/2021    TRIG 218 (H) 07/06/2021    CHOLHDLRATIO 4.3 12/09/2014       LIVER ENZYME RESULTS:  Lab Results   Component Value Date    AST 22 09/07/2021    AST 15 07/06/2021    ALT 16 10/11/2021    ALT 24 07/06/2021       CBC RESULTS:  Lab Results   Component Value Date    WBC 6.5 01/25/2022    WBC 5.3 07/06/2021    RBC 4.04 01/25/2022    RBC 4.07 07/06/2021    HGB 11.5 (L) 01/25/2022    HGB 10.7 " (L) 07/06/2021    HCT 36.5 01/25/2022    HCT 34.0 (L) 07/06/2021    MCV 90 01/25/2022    MCV 84 07/06/2021    MCH 28.5 01/25/2022    MCH 26.3 (L) 07/06/2021    MCHC 31.5 01/25/2022    MCHC 31.5 07/06/2021    RDW 15.7 (H) 01/25/2022    RDW 15.0 07/06/2021     01/25/2022     07/06/2021       BMP RESULTS:  Lab Results   Component Value Date     01/25/2022     07/06/2021    POTASSIUM 4.2 01/25/2022    POTASSIUM 4.8 07/06/2021    CHLORIDE 105 01/25/2022    CHLORIDE 104 07/06/2021    CO2 28 01/25/2022    CO2 23 07/06/2021    ANIONGAP 7 01/25/2022    ANIONGAP 11 07/06/2021     (H) 01/25/2022     (H) 07/06/2021    BUN 28 01/25/2022    BUN 34 (H) 07/06/2021    CR 0.98 01/25/2022    CR 1.17 (H) 07/06/2021    GFRESTIMATED 58 (L) 01/25/2022    GFRESTIMATED 44 (L) 07/06/2021    GFRESTBLACK 51 (L) 07/06/2021    BISHOP 9.0 01/25/2022    BISHOP 9.4 07/06/2021        A1C RESULTS:  Lab Results   Component Value Date    A1C 6.7 (H) 01/25/2022    A1C 7.6 (H) 07/06/2021       INR RESULTS:  No results found for: INR      Andrea Branch MD, FACC    CC  Andrea Branch MD  [address] [place] [id number]    Thank you for allowing me to participate in the care of your patient.      Sincerely,     Andrea Branch MD     [facility]

## 2022-05-10 NOTE — PROGRESS NOTES
HISTORY:    Lindsey Gary is a very pleasant 81-year-old female who was seen in cardiology consultation last August with concerns about chest discomfort.  She has a history of hyperlipidemia, hypertension, stage III CKD, type 2 diabetes, and obesity.  She presented at that time with a multiweek prolonged episode of chest pain which was clearly noncardiac, as well as a nuclear stress test which was mildly abnormal but probably just due to artifact.  We elected to take a conservative approach but she was noted to be hypertensive and we increased her dose of metoprolol and switch her over to Crestor for better cholesterol control.    The patient reports that her chest pain resolved many months ago and has not recurred.  She has not had any cardiovascular symptoms of concern, including no exertional chest arm neck shoulder or jaw discomfort, palpitations, syncope or near syncope, strokelike symptoms, peripheral edema, or claudication.    An echocardiogram was done because her brother had  of a ruptured ascending aorta.  Fortunately, Mrs. Gary's ascending aorta is normal in size and does not need further following.    Mrs. Gary reports that she does not check her blood pressure at home but her recent blood pressures in the clinic have all been within normal limits.  She is mildly bradycardic and describes occasional mild brief episodes of orthostatic dizziness when she first stands up, but no significant problems with dizziness or lightheadedness.      ASSESSMENT/PLAN:    1.  Chest pain.  This was noncardiac.  She will let us know if she develops further chest pain.  Her nuclear stress test showed normal ejection fraction with some mild abnormalities that were almost certainly artifact due to breast attenuation.  No further evaluation is needed unless symptoms change.  2.  Hypertension.  Very well controlled blood pressure on current medications.  She is mildly bradycardic but this is harmless.  I would continue  these medications indefinitely as long as well-tolerated.  I would ask her primary care physician to assume refilling her Toprol.  3.  Hyperlipidemia.  I had switch her to Crestor to achieve better lipid control and her follow-up lipids were excellent.  I would also ask her primary care physician to follow this long-term as well.    Thank you for inviting me to participate in the care of your patient.  Please don't hesitate to call if I can be of further assistance.  20 minutes were spent today reviewing the chart and other records, interviewing and examining the patient, and documenting our visit.  Fortunately, Mrs. Gary has no ongoing cardiology issues that need to be followed.  I am hoping her primary care giver will continue her current cardiology medications long-term.  I would certainly would be happy to see Mrs. Gary again should further cardiology issues arise.    Chart documentation was completed, in part, with [employer] voice-recognition software. Even though reviewed, some grammatical, spelling, and word errors may remain.       No orders of the defined types were placed in this encounter.    No orders of the defined types were placed in this encounter.    There are no discontinued medications.    10 year ASCVD risk: The ASCVD Risk score (Viviana DC Jr., et al., 2013) failed to calculate for the following reasons:    The valid total cholesterol range is 130 to 320 mg/dL    Encounter Diagnoses   Name Primary?     Abnormal cardiovascular stress test      Hyperlipidemia LDL goal <100      Hypertension goal BP (blood pressure) < 130/80        CURRENT MEDICATIONS:  Current Outpatient Medications   Medication Sig Dispense Refill     acetaminophen (TYLENOL) 500 MG tablet Take 2 tablets by mouth every 8 hours as needed        alcohol swab prep pads Use to swab area of injection/beau as directed 100 each 3     allopurinol (ZYLOPRIM) 300 MG tablet Take 0.5 tablets (150 mg) by mouth 2 times daily 90 tablet 3      "Ascorbic Acid (VITAMIN C) 500 MG CAPS Take 500 mg by mouth daily       ASPIRIN 81 MG OR TABS 1 tablet daily 100 3     blood glucose (NO BRAND SPECIFIED) test strip Use to test blood sugar 3 times daily or as directed. Accucheck 300 strip 3     blood glucose calibration (NO BRAND SPECIFIED) solution Use to calibrate blood glucose monitor as needed as directed. To accompany: Blood Glucose Monitor Brands: per insurance. 1 each 11     blood glucose monitoring (CONTOUR NEXT MONITOR W/DEVICE KIT) meter device kit Use to test blood sugar 3 times daily or as directed. 1 kit 0     chlorhexidine (PERIDEX) 0.12 % solution RINSE ONE HALF  OZ 2-3 X D AFTER BREAKFAST BEFORE BEDTIME FOLLOWING BRUSHING AND FLOSSIN       Cholecalciferol (VITAMIN D) 2000 UNITS tablet Take 1 tablet by mouth daily       CINNAMON 500 MG OR CAPS 2 tablets daily  0     estradiol (ESTRACE) 0.1 MG/GM vaginal cream Please use blueberry size amount in the vagina nightly for two weeks and then three times a week at night thereafter 42.5 g 3     ferrous gluconate (FERGON) 324 (38 Fe) MG tablet Take 324 mg by mouth daily (with breakfast)       furosemide (LASIX) 20 MG tablet TAKE 1 TO 2 TABLETS BY MOUTH ONCE DAILY AS NEEDED FOR EDEMA 180 tablet 0     gabapentin (NEURONTIN) 300 MG capsule TAKE 2 CAPSULES BY MOUTH AT BEDTIME 180 capsule 3     glipiZIDE (GLUCOTROL) 10 MG tablet TAKE 1 TABLET BY MOUTH TWICE DAILY BEFORE MEAL(S) 180 tablet 0     Ibuprofen (ADVIL PO) Take by mouth every 8 hours as needed for moderate pain       insulin NPH (NOVOLIN N RELION) 100 UNIT/ML vial INJECT 20 UNITS SUBCUTANEOUSLY BEFORE BREAKFAST AND 20 UNITS BEFORE DINNER 20 mL 0     insulin syringe-needle U-100 (DROPLET INSULIN SYRINGE) 31G X 5/16\" 0.3 ML miscellaneous Use two syringes daily or as directed. 200 each 1     Lactobacillus (PROBIOTIC ACIDOPHILUS) TABS Take 1 tablet by mouth daily       lisinopril (ZESTRIL) 40 MG tablet Take 1 tablet by mouth once daily 90 tablet 2     metFORMIN " (GLUCOPHAGE) 1000 MG tablet TAKE 1 TABLET BY MOUTH TWICE DAILY WITH MEALS 180 tablet 3     metoprolol succinate ER (TOPROL-XL) 50 MG 24 hr tablet Take 1 tablet (50 mg) by mouth daily 90 tablet 3     rosuvastatin (CRESTOR) 40 MG tablet Take 1 tablet (40 mg) by mouth daily 90 tablet 3     terconazole (TERAZOL 7) 0.4 % vaginal cream Place 1 applicator vaginally At Bedtime 45 g 1     thin (NO BRAND SPECIFIED) lancets Use to test blood sugar 3 times daily or as directed. To accompany: Blood Glucose Monitor Brands: per insurance. 1 each 3     TUMS 500 MG OR CHEW 1 TABLET 3 TIMES PRN 90 0     Zinc Sulfate (ZINC 15 PO) Take by mouth daily       betamethasone, augmented, (DIPROLENE) 0.05 % lotion GINA 10 TO 20 DROPS TOPICALLY AA OF SCALP Q NIGHT UTD (Patient not taking: No sig reported)       calcipotriene 0.005 % OINT Apply 1 Application topically as needed (Patient not taking: No sig reported)  1     doxycycline hyclate (VIBRAMYCIN) 100 MG capsule Take 1 capsule (100 mg) by mouth 2 times daily (Patient not taking: Reported on 5/10/2022) 20 capsule 0     fluconazole (DIFLUCAN) 150 MG tablet TAKE ONE TABLET BY MOUTH EVERY 3 DAYS (Patient not taking: Reported on 5/10/2022) 30 tablet 0     magnesium 250 MG tablet Take 1 tablet by mouth daily (Patient not taking: Reported on 5/10/2022)       Misc Natural Products (GLUCOSAMINE CHOND CMP ADVANCED PO)  (Patient not taking: No sig reported)       triamcinolone (ARISTOCORT HP) 0.5 % external cream Apply topically 2 times daily as needed for irritation (Patient not taking: No sig reported) 15 g 3     vitamin E 400 UNITS TABS Take 400 Units by mouth daily  (Patient not taking: Reported on 5/10/2022)         ALLERGIES     Allergies   Allergen Reactions     Augmentin      Tongue swelling and rash     Can take PCN K without reaction      Sulfa Drugs      Tongue swelling and rash       PAST MEDICAL HISTORY:  Past Medical History:   Diagnosis Date     Arthritis      Essential hypertension,  benign      Gout      Mixed hyperlipidemia      Mumps      Pain in joint, ankle and foot     gout     Type II or unspecified type diabetes mellitus without mention of complication, not stated as uncontrolled     Diabetes mellitus       PAST SURGICAL HISTORY:  Past Surgical History:   Procedure Laterality Date     BLADDER SURGERY       COLONOSCOPY       COLONOSCOPY N/A 12/17/2014    Procedure: COMBINED COLONOSCOPY, SINGLE OR MULTIPLE BIOPSY/POLYPECTOMY BY BIOPSY;  Surgeon: Chuy Staton MD;  Location:  GI     COLONOSCOPY N/A 08/07/2020    Procedure: COLONOSCOPY;  Surgeon: Chuy Staton MD;  Location:  GI     CYSTOSCOPY       HYSTERECTOMY, PAP NO LONGER INDICATED       HYSTERECTOMY, JAIME  1991    fibroid     SURGICAL HISTORY OF -   10/28/2015    right partial knee      ZZC NONSPECIFIC PROCEDURE      Breast biopsies        ZZC NONSPECIFIC PROCEDURE      Symptomatic left thigh lipomas        ZZC NONSPECIFIC PROCEDURE  06/01/2009    pubovaginal sling       FAMILY HISTORY:  Family History   Problem Relation Age of Onset     Cancer Mother         colon ca survivor     Cancer - colorectal Mother      Cerebrovascular Disease Father      No Known Problems Sister      No Known Problems Brother      No Known Problems Maternal Grandmother      No Known Problems Maternal Grandfather      No Known Problems Paternal Grandmother      No Known Problems Other        SOCIAL HISTORY:  Social History     Socioeconomic History     Marital status:      Spouse name: None     Number of children: None     Years of education: None     Highest education level: None   Tobacco Use     Smoking status: Never Smoker     Smokeless tobacco: Never Used   Vaping Use     Vaping Use: Never used   Substance and Sexual Activity     Alcohol use: No     Alcohol/week: 0.0 standard drinks     Drug use: No     Sexual activity: Yes     Partners: Male     Birth control/protection: Post-menopausal, Female Surgical     Comment: hyst       Review  "of Systems:  Skin:  Positive for     Eyes:  Positive for visual blurring  ENT:  Negative    Respiratory:  Negative    Cardiovascular:    edema;Positive for  Gastroenterology: Positive for reflux  Genitourinary:  not assessed    Musculoskeletal:  Positive for back pain  Neurologic:  Positive for    Psychiatric:  Negative    Heme/Lymph/Imm:  Negative    Endocrine:  Positive for thyroid disorder;diabetes    Physical Exam:  Vitals: /60   Pulse 54   Ht 1.626 m (5' 4\")   Wt 81.6 kg (179 lb 12.8 oz)   LMP  (LMP Unknown)   BMI 30.86 kg/m      Constitutional:  cooperative, alert and oriented, well developed, well nourished, in no acute distress overweight      Skin:           Head:  normocephalic        Eyes:  sclera white;no xanthalasma;no nystagmus        ENT:  no pallor or cyanosis   Masked    Neck:           Chest:           Cardiac:                    Abdomen:           Vascular:                                        Extremities and Muscular Skeletal:              Neurological:  no gross motor deficits        Psych:  affect appropriate, oriented to time, person and place     Recent Lab Results:  LIPID RESULTS:  Lab Results   Component Value Date    CHOL 123 10/11/2021    CHOL 183 07/06/2021    HDL 31 (L) 10/11/2021    HDL 33 (L) 07/06/2021    LDL 63 10/11/2021     (H) 07/06/2021    TRIG 144 10/11/2021    TRIG 218 (H) 07/06/2021    CHOLHDLRATIO 4.3 12/09/2014       LIVER ENZYME RESULTS:  Lab Results   Component Value Date    AST 22 09/07/2021    AST 15 07/06/2021    ALT 16 10/11/2021    ALT 24 07/06/2021       CBC RESULTS:  Lab Results   Component Value Date    WBC 6.5 01/25/2022    WBC 5.3 07/06/2021    RBC 4.04 01/25/2022    RBC 4.07 07/06/2021    HGB 11.5 (L) 01/25/2022    HGB 10.7 (L) 07/06/2021    HCT 36.5 01/25/2022    HCT 34.0 (L) 07/06/2021    MCV 90 01/25/2022    MCV 84 07/06/2021    MCH 28.5 01/25/2022    MCH 26.3 (L) 07/06/2021    MCHC 31.5 01/25/2022    MCHC 31.5 07/06/2021    RDW 15.7 (H) " 01/25/2022    RDW 15.0 07/06/2021     01/25/2022     07/06/2021       BMP RESULTS:  Lab Results   Component Value Date     01/25/2022     07/06/2021    POTASSIUM 4.2 01/25/2022    POTASSIUM 4.8 07/06/2021    CHLORIDE 105 01/25/2022    CHLORIDE 104 07/06/2021    CO2 28 01/25/2022    CO2 23 07/06/2021    ANIONGAP 7 01/25/2022    ANIONGAP 11 07/06/2021     (H) 01/25/2022     (H) 07/06/2021    BUN 28 01/25/2022    BUN 34 (H) 07/06/2021    CR 0.98 01/25/2022    CR 1.17 (H) 07/06/2021    GFRESTIMATED 58 (L) 01/25/2022    GFRESTIMATED 44 (L) 07/06/2021    GFRESTBLACK 51 (L) 07/06/2021    BISHOP 9.0 01/25/2022    BISHOP 9.4 07/06/2021        A1C RESULTS:  Lab Results   Component Value Date    A1C 6.7 (H) 01/25/2022    A1C 7.6 (H) 07/06/2021       INR RESULTS:  No results found for: INR      Andrea Branch MD, FACC    CC  Andrea Branch MD  [address] [place] [id number]

## 2022-05-24 ENCOUNTER — OFFICE VISIT (OUTPATIENT)
Dept: INTERNAL MEDICINE | Facility: CLINIC | Age: 82
End: 2022-05-24
Payer: COMMERCIAL

## 2022-05-24 VITALS
DIASTOLIC BLOOD PRESSURE: 68 MMHG | HEART RATE: 67 BPM | SYSTOLIC BLOOD PRESSURE: 122 MMHG | WEIGHT: 179 LBS | RESPIRATION RATE: 18 BRPM | HEIGHT: 64 IN | TEMPERATURE: 97.2 F | BODY MASS INDEX: 30.56 KG/M2 | OXYGEN SATURATION: 98 %

## 2022-05-24 DIAGNOSIS — E11.9 TYPE 2 DIABETES MELLITUS WITHOUT COMPLICATION, WITH LONG-TERM CURRENT USE OF INSULIN (H): ICD-10-CM

## 2022-05-24 DIAGNOSIS — Z79.4 TYPE 2 DIABETES MELLITUS WITHOUT COMPLICATION, WITH LONG-TERM CURRENT USE OF INSULIN (H): ICD-10-CM

## 2022-05-24 DIAGNOSIS — R39.9 LOWER URINARY TRACT SYMPTOMS (LUTS): ICD-10-CM

## 2022-05-24 DIAGNOSIS — N18.31 STAGE 3A CHRONIC KIDNEY DISEASE (H): ICD-10-CM

## 2022-05-24 DIAGNOSIS — N90.89 VULVAR IRRITATION: Primary | ICD-10-CM

## 2022-05-24 PROCEDURE — 87210 SMEAR WET MOUNT SALINE/INK: CPT | Performed by: INTERNAL MEDICINE

## 2022-05-24 PROCEDURE — 99214 OFFICE O/P EST MOD 30 MIN: CPT | Performed by: INTERNAL MEDICINE

## 2022-05-24 NOTE — PROGRESS NOTES
"  ASSESSMENT/PLAN                                                      (N90.89) Vulvar irritation  (primary encounter diagnosis)  Comment: normal exam today.  Plan: wet prep obtained; patient educated re: gentle cleansing of vulvar/vaginal area, avoiding OTC topical preparations (and less directed to use these by a provider), and wearing breathable clothing to promote airflow.    (R39.9) Lower urinary tract symptoms (LUTS)  Plan: UA reflex ordered -patient will obtain at a later date (unable to urinate at the time of visit).    (E11.9,  Z79.4) Type 2 diabetes mellitus without complication, with long-term current use of insulin (H)  Comment: well-controlled, but certainly places patient at increased risk for yeast infections.    (N18.31) Stage 3a chronic kidney disease (H)  Comment: known issue that I take into account for their medical decisions, no current exacerbations or new concerns.    Marley Vick MD   [address] [address]  T: [phone], F: [phone]    SUBJECTIVE                                                      Lindsey Gary is a very pleasant 81 year old female who presents with vulvar/vaginal burning:    Ongoing for the last month. Has been using OTC Monistat and has been prescribed fluconazole for possible yeast infection with no significant improvement in symptoms. Patient does endorse associated urinary symptoms including frequency, urgency, and dysuria. No hematuria. No fevers or chills. No pelvic, abdominal, or flank pain.    Patient is not sexually active currently or recently.    PMH significant for well-controlled insulin-dependent diabetes mellitus (HgbA1c 6.3%) and CKD stage IIIa.    OBJECTIVE                                                      /68 (BP Location: Left arm, Patient Position: Chair, Cuff Size: Adult Regular)   Pulse 67   Temp 97.2  F (36.2  C) (Temporal)   Resp 18   Ht 1.626 m (5' 4\")   Wt 81.2 kg (179 lb)   LMP  " (LMP Unknown)   SpO2 98%   BMI 30.73 kg/m    Constitutional: well-appearing  Genitourinary: external genitalia, urethral meatus, and vagina normal; no abnormal discharge noted    ---    (Note documentation was completed, in part, with [employer] voice-recognition software. Documentation was reviewed, but some grammatical, spelling, and word errors may remain.)

## 2022-05-24 NOTE — PATIENT INSTRUCTIONS
Wet prep pending.    Please go to any outpatient [facility] lab for a urine sample (order is in place).    Avoid scrubbing and direct soap application to vulvar area. Warm water or warm soapy water running over the area for a couple of minutes/day is adequate.     Stop all topical preparations in that area (except if prescribed or recommended by a provider).    Wear cotton underwear and loose breathable pants/shorts/skirts/dresses to promote air circulation.

## 2022-05-25 ENCOUNTER — TELEPHONE (OUTPATIENT)
Dept: INTERNAL MEDICINE | Facility: CLINIC | Age: 82
End: 2022-05-25
Payer: COMMERCIAL

## 2022-05-25 NOTE — TELEPHONE ENCOUNTER
Patient is calling to ask if Dinorah will prescribe her flagyl. She was seen yesterday and couldn't understand why the provider she saw said she never has heard of a vaginal culture. Patient said she was told her wet prep was negative for yeast. Patient reports having a lot of burning.

## 2022-05-26 ENCOUNTER — LAB (OUTPATIENT)
Dept: LAB | Facility: CLINIC | Age: 82
End: 2022-05-26
Payer: COMMERCIAL

## 2022-05-26 DIAGNOSIS — R39.9 LOWER URINARY TRACT SYMPTOMS (LUTS): ICD-10-CM

## 2022-05-26 DIAGNOSIS — R39.9 LOWER URINARY TRACT SYMPTOMS (LUTS): Primary | ICD-10-CM

## 2022-05-26 LAB
ALBUMIN UR-MCNC: 30 MG/DL
APPEARANCE UR: CLEAR
BILIRUB UR QL STRIP: NEGATIVE
COLOR UR AUTO: ABNORMAL
GLUCOSE UR STRIP-MCNC: NEGATIVE MG/DL
HGB UR QL STRIP: NEGATIVE
HYALINE CASTS: 12 /LPF
KETONES UR STRIP-MCNC: NEGATIVE MG/DL
LEUKOCYTE ESTERASE UR QL STRIP: ABNORMAL
MUCOUS THREADS #/AREA URNS LPF: PRESENT /LPF
NITRATE UR QL: NEGATIVE
PH UR STRIP: 5.5 [PH] (ref 5–7)
RBC URINE: 1 /HPF
SP GR UR STRIP: 1.02 (ref 1–1.03)
SQUAMOUS EPITHELIAL: 1 /HPF
UROBILINOGEN UR STRIP-MCNC: NORMAL MG/DL
WBC URINE: 6 /HPF

## 2022-05-26 PROCEDURE — 81001 URINALYSIS AUTO W/SCOPE: CPT

## 2022-05-26 PROCEDURE — 87086 URINE CULTURE/COLONY COUNT: CPT

## 2022-05-26 NOTE — TELEPHONE ENCOUNTER
Patient advised Dinorah reviewed message and stated wet prep did not show clue cells so she does not have bacterial vaginosis and Flagyl not appropriate. Patient verbalizes understanding.    Florence Walker RN  [facility]

## 2022-05-27 ENCOUNTER — NURSE TRIAGE (OUTPATIENT)
Dept: INTERNAL MEDICINE | Facility: CLINIC | Age: 82
End: 2022-05-27
Payer: COMMERCIAL

## 2022-05-27 NOTE — TELEPHONE ENCOUNTER
Technically the culture was not indicated based on the UA results (it did not trigger a culture). I put in the culture add-on myself because she requested a culture. The culture should be back today and it will be reviewed in a timely manner. If she is not able to tolerate PO, she should proceed to the ER.

## 2022-05-27 NOTE — TELEPHONE ENCOUNTER
"Patient called requesting prescription for UTI.  She states she started to throw up due to burning pain in the vaginal area.  No blood in urine, no fever.  Rates pain at 6/7.    Patient states she can not wait for Culture to return.  Asking for Prescription now.  States mother recently passed away from sepsis and is concerned about sepsis.    Advised if needed to be seen over the weekend, Urgent Care is available.      Answer Assessment - Initial Assessment Questions  1. SYMPTOM: \"What's the main symptom you're concerned about?\" (e.g., frequency, incontinence)      Burning  2. ONSET: \"When did the  Urinary symptoms  start?\"      5/17/2022  3. PAIN: \"Is there any pain?\" If so, ask: \"How bad is it?\" (Scale: 1-10; mild, moderate, severe)      6/7  4. CAUSE: \"What do you think is causing the symptoms?\"      UTI  5. OTHER SYMPTOMS: \"Do you have any other symptoms?\" (e.g., fever, flank pain, blood in urine, pain with urination)      Pain with urination  6. PREGNANCY: \"Is there any chance you are pregnant?\" \"When was your last menstrual period?\"      no    Protocols used: URINARY SYMPTOMS-A-[place]    Mee Sanders RN    "

## 2022-05-27 NOTE — TELEPHONE ENCOUNTER
Received another call from the patient asking about her culture results. Informed patient the culture is still in progress.    Patient would like a call back once the culture has come back with results.    Neisha Telles RN

## 2022-05-27 NOTE — TELEPHONE ENCOUNTER
Called pt with providers response. Pt declines ER stating she doesn't want to sit there for 6 hours. Pt agrees to wait for results.

## 2022-05-28 LAB — BACTERIA UR CULT: NORMAL

## 2022-06-03 DIAGNOSIS — R60.9 EDEMA, UNSPECIFIED TYPE: ICD-10-CM

## 2022-06-03 DIAGNOSIS — I10 ESSENTIAL HYPERTENSION: ICD-10-CM

## 2022-06-06 RX ORDER — FUROSEMIDE 20 MG
TABLET ORAL
Qty: 180 TABLET | Refills: 1 | Status: SHIPPED | OUTPATIENT
Start: 2022-06-06 | End: 2022-07-05

## 2022-06-06 NOTE — TELEPHONE ENCOUNTER
Pending Prescriptions:                       Disp   Refills    furosemide (LASIX) 20 MG tablet [Pharmacy*180 ta*1            Sig: TAKE 1 TO 2 TABLETS BY MOUTH ONCE DAILY AS NEEDED           FOR EDEMA      Prescription approved per [facility] Refill Protocol.

## 2022-06-14 ENCOUNTER — TRANSFERRED RECORDS (OUTPATIENT)
Dept: HEALTH INFORMATION MANAGEMENT | Facility: CLINIC | Age: 82
End: 2022-06-14
Payer: COMMERCIAL

## 2022-06-14 LAB — RETINOPATHY: POSITIVE

## 2022-06-18 DIAGNOSIS — E11.9 TYPE 2 DIABETES MELLITUS WITHOUT COMPLICATION, WITHOUT LONG-TERM CURRENT USE OF INSULIN (H): ICD-10-CM

## 2022-06-21 NOTE — TELEPHONE ENCOUNTER
Pending Prescriptions:                       Disp   Refills    CONTOUR NEXT TEST test strip [Pharmacy Me*[address]*0            Sig: USE 1 STRIP TO CHECK GLUCOSE THREE TIMES DAILY OR           AS  DIRECTED    Prescription approved per [facility] Refill Protocol.

## 2022-07-05 ENCOUNTER — OFFICE VISIT (OUTPATIENT)
Dept: PHARMACY | Facility: CLINIC | Age: 82
End: 2022-07-05
Payer: COMMERCIAL

## 2022-07-05 VITALS — DIASTOLIC BLOOD PRESSURE: 62 MMHG | SYSTOLIC BLOOD PRESSURE: 134 MMHG | WEIGHT: 179 LBS | BODY MASS INDEX: 30.73 KG/M2

## 2022-07-05 DIAGNOSIS — N89.8 VAGINAL DISCHARGE: ICD-10-CM

## 2022-07-05 DIAGNOSIS — N94.89 VAGINAL BURNING: ICD-10-CM

## 2022-07-05 DIAGNOSIS — M19.90 ARTHRITIS: ICD-10-CM

## 2022-07-05 DIAGNOSIS — B37.31 YEAST INFECTION OF THE VAGINA: ICD-10-CM

## 2022-07-05 DIAGNOSIS — K21.00 GASTROESOPHAGEAL REFLUX DISEASE WITH ESOPHAGITIS, UNSPECIFIED WHETHER HEMORRHAGE: ICD-10-CM

## 2022-07-05 DIAGNOSIS — Z78.9 TAKES DIETARY SUPPLEMENTS: ICD-10-CM

## 2022-07-05 DIAGNOSIS — M1A.9XX0 CHRONIC GOUT WITHOUT TOPHUS, UNSPECIFIED CAUSE, UNSPECIFIED SITE: ICD-10-CM

## 2022-07-05 DIAGNOSIS — E78.5 HYPERLIPIDEMIA LDL GOAL <100: ICD-10-CM

## 2022-07-05 DIAGNOSIS — E11.9 TYPE 2 DIABETES MELLITUS WITHOUT COMPLICATION, WITHOUT LONG-TERM CURRENT USE OF INSULIN (H): ICD-10-CM

## 2022-07-05 DIAGNOSIS — G62.9 NEUROPATHY: ICD-10-CM

## 2022-07-05 DIAGNOSIS — I10 HYPERTENSION GOAL BP (BLOOD PRESSURE) < 130/80: ICD-10-CM

## 2022-07-05 DIAGNOSIS — E11.8 TYPE 2 DIABETES MELLITUS WITH COMPLICATION, WITHOUT LONG-TERM CURRENT USE OF INSULIN (H): Primary | ICD-10-CM

## 2022-07-05 DIAGNOSIS — M10.9 GOUT, UNSPECIFIED CAUSE, UNSPECIFIED CHRONICITY, UNSPECIFIED SITE: ICD-10-CM

## 2022-07-05 PROCEDURE — 99607 MTMS BY PHARM ADDL 15 MIN: CPT | Performed by: PHARMACIST

## 2022-07-05 PROCEDURE — 99605 MTMS BY PHARM NP 15 MIN: CPT | Performed by: PHARMACIST

## 2022-07-05 RX ORDER — FUROSEMIDE 20 MG
40 TABLET ORAL DAILY
Qty: 180 TABLET | Refills: 1 | COMMUNITY
Start: 2022-07-05 | End: 2022-10-20

## 2022-07-05 RX ORDER — METOPROLOL SUCCINATE 50 MG/1
50 TABLET, EXTENDED RELEASE ORAL DAILY
Qty: 90 TABLET | Refills: 3 | Status: SHIPPED | OUTPATIENT
Start: 2022-07-05 | End: 2023-07-06

## 2022-07-05 RX ORDER — SEMAGLUTIDE 1.34 MG/ML
INJECTION, SOLUTION SUBCUTANEOUS
Qty: 1.5 ML | Refills: 1 | Status: SHIPPED | OUTPATIENT
Start: 2022-07-05 | End: 2022-08-30

## 2022-07-05 RX ORDER — HUMAN INSULIN 100 [IU]/ML
INJECTION, SUSPENSION SUBCUTANEOUS
Qty: 20 ML | Refills: 0 | COMMUNITY
Start: 2022-07-05 | End: 2022-08-09

## 2022-07-05 RX ORDER — FLUCONAZOLE 150 MG/1
TABLET ORAL
Qty: 30 TABLET | Refills: 0 | Status: SHIPPED | OUTPATIENT
Start: 2022-07-05 | End: 2022-10-10

## 2022-07-05 RX ORDER — FAMOTIDINE 20 MG/1
20 TABLET, FILM COATED ORAL PRN
COMMUNITY

## 2022-07-05 RX ORDER — ROSUVASTATIN CALCIUM 40 MG/1
40 TABLET, COATED ORAL DAILY
Qty: 90 TABLET | Refills: 3 | Status: SHIPPED | OUTPATIENT
Start: 2022-07-05 | End: 2023-07-06

## 2022-07-05 NOTE — LETTER
_  Medication List        Prepared on: 7/5/2022     Bring your Medication List when you go to the doctor, hospital, or   emergency room. And, share it with your family or caregivers.     Note any changes to how you take your medications.  Cross out medications when you no longer use them.    Medication How I take it Why I use it Prescriber   acetaminophen (TYLENOL) 500 MG tablet Take 2 tablets (100 mg) by mouth every 8 hours as needed  Arthritis Patient Reported   allopurinol (ZYLOPRIM) 300 MG tablet Take 0.5 tablets (150 mg) by mouth 2 times daily Gout, unspecified cause, unspecified chronicity, unspecified site LILLIAM Sam CNP   Ascorbic Acid (VITAMIN C) 500 MG CAPS Take 1 capsul (500 mg) by mouth daily Diet Supplement Patient Reported   ASPIRIN 81 MG OR TABS Take 1 tablet (81 mg) by mouth daily Heart Health Dano Millan MD   betamethasone, augmented, (DIPROLENE) 0.05 % lotion GINA 10 TO 20 DROPS TOPICALLY to Scalp at night Rash Dr. Andrea Fitzgerald   calcipotriene 0.005 % OINT Apply 1 Application topically as needed Rash Dr. Andrea Fitzgerald   chlorhexidine (PERIDEX) 0.12 % solution RINSE ONE HALF  OZ 2-3 time per day AFTER BREAKFAST BEFORE BEDTIME FOLLOWING BRUSHING AND FLOSSIN Mouth Rinse Dinorah Wong   Cholecalciferol (VITAMIN D) 2000 UNITS tablet Take 1 tablet (2000 units) by mouth daily Diet Supplement Patient Reported   CINNAMON 500 MG OR CAPS Take 2 tablets (1000 mg) by mouth daily Diet Supplement Dano Millan MD   estradiol (ESTRACE) 0.1 MG/GM vaginal cream Please use blueberry size amount in the vagina nightly for two weeks and then three times a week at night thereafter Recurrent UTI; Vaginal Atrophy Shelbi Junior Sosa MD   famotidine (PEPCID) 20 MG tablet Take 1 tablet (20 mg) by mouth 2 times daily Heart Burn Patient Reported   ferrous gluconate (FERGON) 324 (38 Fe) MG tablet Take 1 tablet (324 mg) by mouth daily (with breakfast) Iron supplement Patient Reported    fluconazole (DIFLUCAN) 150 MG tablet TAKE ONE TABLET (150 mg) BY MOUTH EVERY 3 DAYS as needed Vaginal Burning; Vaginal Discharge; Type 2 diabetes mellitus without complication, without long-term current use of insulin (H) LILLIAM Sam CNP   furosemide (LASIX) 20 MG tablet Take 2 tablets (40 mg) by mouth daily Swelling LILLIAM Sam CNP   gabapentin (NEURONTIN) 300 MG capsule TAKE 2 CAPSULES (600 mg) BY MOUTH AT BEDTIME Neuropathy LILLIAM Sam CNP   glipiZIDE (GLUCOTROL) 10 MG tablet TAKE 1 TABLET (10 mg) BY MOUTH TWICE DAILY BEFORE MEAL(S) Type 2 diabetes mellitus without complication, without long-term current use of insulin (H) LILLIAM Sam CNP   Ibuprofen (ADVIL PO) Take 1 tablet (200 mg) by mouth every 8 hours as needed for moderate pain Pain Patient Reported   insulin NPH (NOVOLIN N RELION) 100 UNIT/ML vial INJECT 21 UNITS under the skin BEFORE BREAKFAST AND 20 UNITS BEFORE DINNER Diabetes LILLIAM Sam CNP   Lactobacillus (PROBIOTIC ACIDOPHILUS) TABS Take 1 tablet by mouth daily Gut Health Patient Reported   lisinopril (ZESTRIL) 40 MG tablet Take 1 tablet (40 mg) by mouth once daily Essential Hypertension LILLIAM Sam CNP   metFORMIN (GLUCOPHAGE) 1000 MG tablet TAKE 1 TABLET (1000 mg) BY MOUTH TWICE DAILY WITH MEALS Type 2 diabetes mellitus without complication, without long-term current use of insulin (H) LILLIAM Sam CNP   metoprolol succinate ER (TOPROL XL) 50 MG 24 hr tablet Take 1 tablet (50 mg) by mouth daily Hypertension Goal BP (Blood Pressure) < 130/80 LILLIAM Sam CNP   rosuvastatin (CRESTOR) 40 MG tablet Take 1 tablet (40 mg) by mouth daily Hyperlipidemia LDL Goal <100 LILLIAM Sam CNP   semaglutide (OZEMPIC, 0.25 OR 0.5 MG/DOSE,) 2 MG/1.5ML SOPN pen Inject 0.25 mg Subcutaneous once a week for 28 days, THEN 0.5 mg once a week for 28 days. Type 2 diabetes mellitus with complication, without  long-term current use of insulin (H) LILLIAM Sam CNP   terconazole (TERAZOL 7) 0.4 % vaginal cream Place 1 applicator vaginally At Bedtime as needed Yeast Infection of the Vagina LILLIAM Sam CNP   triamcinolone (ARISTOCORT HP) 0.5 % external cream Apply topically 2 times daily as needed for irritation Vaginal Itching Corina Cerrato MD   TUMS 500 MG OR CHEW Chew 1 TABLET (500 mg ) 3 TIMES as needed for heart burn Heart Burn Dano Millan MD   vitamin E 400 UNITS TABS Take 1 tablet (400 Units)  by mouth daily Diet Supplement Patient Reported   Zinc Sulfate (ZINC 15 PO) Take 1 tablet (15 mg) by mouth daily Diet Supplement Patient Reported         Add new medications, over-the-counter drugs, herbals, vitamins, or  minerals in the blank rows below.    Medication How I take it Why I use it Prescriber                          Allergies:      augmentin; sulfa drugs        Side effects I have had:               Other Information:              My notes and questions:

## 2022-07-05 NOTE — LETTER
July 5, 2022  Lindsey PAULINO Abdirahman  [address] [address] [id number]    Dear Ms. Gary, [facility]        Thank you for talking with me on Jul 5, 2022 about your health and medications. As a follow-up to our conversation, I have included two documents:      1. Your Recommended To-Do List has steps you should take to get the best results from your medications.  2. Your Medication List will help you keep track of your medications and how to take them.    If you want to talk about these documents, please call Beti Jacobo RPH at phone: [phone], Monday-Friday 8-4:30pm.    I look forward to working with you and your doctors to make sure your medications work well for you.    Sincerely,    Beti Jacobo RPH  [facility] Pharmacist, [facility]

## 2022-07-05 NOTE — PROGRESS NOTES
Medication Therapy Management (MTM) Encounter    ASSESSMENT:                            Medication Adherence/Access: See below for considerations    Type 2 Diabetes:  A1c at goal.  Reviewed timing of medications - has not been taking them as prescribed.  Reviewed how to treat low blood sugars - 15 gm of carbohydrates.  Will recheck A1c.  Ideally patient could start GLP1 for benefit of weight loss and cardiac health  -if not covered referred [facility] [employer] [facility] [phone].  If she is able to start, would discontinue glipizide.    Neuropathy: if uric acid labs at goal, will discuss increasing gabapentin dose to help with neuropathy pains.    Arthritis:  Scheduled with primary care provider to discuss ongoing arthritis pain.    Gout: will recheck uric acid labs    Hypertension: stable    Hyperlipidemia: stable    GERD: okay to take TUMS or Rolaids as needed.  Additional weight loss would help with symptom control.    Supplements: stable    Yeast Infection: requesting fluconazole refill    PLAN:                            1.  TUMS or Rolaids as needed.  2.  Metformin and glipizide before breakfast and before dinner.  Continue dosing insulin before breakfast and before dinner.   3.  Recommend 15 grams of carbohydrates (about 5 lifesavers) if your blood sugars go too low  4.  Ozempic 0.25 mg weekly for 4 weeks, then increase to 0.5 mg weekly.  If this is not covered, please connect with the [employer] [facility] [phone].  5.  Check uric acid and A1c lab  6.  Fluconazole refill request to [facility]        Follow-up: Return in about 1 month (around 8/5/2022) for Medication Therapy Management.    SUBJECTIVE/OBJECTIVE:                          Lindsey Gary is a 81 year old female coming in for an initial visit. She was referred to me from [facility] Wong.      Reason for visit: medication review    Allergies/ADRs: Reviewed in chart  Tobacco: She reports that she has never smoked.  She has never used smokeless tobacco.  Alcohol: not currently using    Medication Adherence/Access: no issues reported    Type 2 Diabetes:  Currently taking metformin 1000 mg twice daily (breakfast and lunch), glipizide 10 mg twice daily (breakfast and lunch) and NPH 21 units twice daily (breakfast and dinner). Patient is not experiencing side effects.  Blood sugar monitorin-3 time(s) daily. Ranges (patient reported):   Fasting 130-170  Before Dinner 130-140; recently over 200  Bedtime lower    Symptoms of low blood sugar? Could happen at bedtime if will happen; will take 1 lifesaver  Symptoms of high blood sugar? none  Eye exam: up to date  Foot exam: due, reports burning and pain in toes  Aspirin: Taking 81mg daily for primary prevention   Statin: Yes:    ACEi/ARB: Yes: .   Urine Albumin:   Lab Results   Component Value Date    UMALCR 255.14 (H) 2020      Lab Results   Component Value Date    A1C 6.7 2022    A1C 7.2 2021    A1C 7.6 2021    A1C 7.3 2020    A1C 6.8 2019    A1C 7.1 2019    A1C 7.0 2018         Neuropathy: Taking gabapentin 600 mg at bedtime. Continues to have burning in feet and toes at night.  Unsure if symptoms from arthritis, gout or neuropathy.  No concerns with side effects from the gabapentin.    Arthritis:  Taking Tylenol and Ibuprofen daily. Questions if she should be seeing an Rheumatologist.    Gout: Currently taking allopurinol 150 mg twice daily. Patient reports increased pain -  All toes. Patient is experiencing the following medication side effects: none.   Uric Acid   Date Value Ref Range Status   2020 4.8 2.6 - 6.0 mg/dL Final   ]    Hypertension: Current medications include lisinopril 40 mg daily, metoprolol ER 50 mg daily and furosemide 40 mg daily.  Patient does not self-monitor blood pressure.  Patient reports no current medication side effects.  BP Readings from Last 3 Encounters:   22 122/68   05/10/22 122/60    04/12/22 132/60     Potassium   Date Value Ref Range Status   01/25/2022 4.2 3.4 - 5.3 mmol/L Final   07/06/2021 4.8 3.4 - 5.3 mmol/L Final       Hyperlipidemia: Current therapy includes rosuvastatin 40 mg daily.  Patient reports no significant myalgias or other side effects.  Recently 'discharged' by Cardiology.    Recent Labs   Lab Test 10/11/21  0917 07/06/21  0911 12/15/15  0906 12/09/14  0946 09/17/14  0918   CHOL 123 183   < > 149 164   HDL 31* 33*   < > 35* 34*   LDL 63 106*   < > 83 87   TRIG 144 218*   < > 154* 213*   CHOLHDLRATIO  --   --   --  4.3 4.8    < > = values in this interval not displayed.       GERD: Current medications include: Pepcid (famotidine) 20 mg  twice daily. Changed from omeprazole but doesn't feel the famotidine has been as helpful.  Feels like something in her throat.  Endoscopy from Nov revealed hiatal hernia- has raised bed, working on weight loss, diet changes.      Supplements: Currently taking Zinc, vitamin E, Cinnamon, vitaminC, Probiotic, ferrous sulfate daily and vitamin D. No reported issues at this time.     Hemoglobin   Date Value Ref Range Status   01/25/2022 11.5 (L) 11.7 - 15.7 g/dL Final   07/06/2021 10.7 (L) 11.7 - 15.7 g/dL Final   ]      Yeast Infection: Has fluconazole on hand to use as needed.  Also using triamcinolone as needed.  No symptoms at this time.      Today's Vitals: LMP  (LMP Unknown)   ----------------      I spent 60 minutes with this patient today. All changes were made via collaborative practice agreement with LILLIAM Sam CNP. A copy of the visit note was provided to the patient's provider(s).    The patient was given a summary of these recommendations.     Beti Jacobo , Pharm D  [phone] (phone)  Medication Therapy Management Pharmacist        Medication Therapy Recommendations  Gastroesophageal reflux disease with esophagitis, unspecified whether hemorrhage    Current Medication: famotidine (PEPCID) 20 MG tablet   Rationale:  Synergistic therapy - Needs additional medication therapy - Indication   Recommendation: Start Medication   Status: Accepted - no CPA Needed         Type 2 diabetes mellitus with complication, without long-term current use of insulin (H)    Current Medication: metFORMIN (GLUCOPHAGE) 1000 MG tablet   Rationale: Incorrect administration - Dosage too low - Effectiveness   Recommendation: Increase Frequency   Status: Patient Agreed - Adherence/Education          Current Medication: metFORMIN (GLUCOPHAGE) 1000 MG tablet   Rationale: Synergistic therapy - Needs additional medication therapy - Indication   Recommendation: Start Medication   Status: Accepted per CPA

## 2022-07-05 NOTE — PATIENT INSTRUCTIONS
"Recommendations from today's MTM visit:                                                    MTM (medication therapy management) is a service provided by a clinical pharmacist designed to help you get the most of out of your medicines.   Today we reviewed what your medicines are for, how to know if they are working, that your medicines are safe and how to make your medicine regimen as easy as possible.      1.  TUMS or Rolaids as needed for heartburn  2.  Take metformin and glipizide before breakfast and before dinner.  Continue dosing insulin before breakfast and before dinner.   3.  Recommend 15 grams of carbohydrates (about 5 lifesavers) if your blood sugars go too low  4.  Start Ozempic 0.25 mg weekly for 4 weeks, then increase to 0.5 mg weekly.  If this is not covered, please connect with the [facility] [employer] [facility] [phone].  5.  Check uric acid and A1c lab     Follow-up: Return in about 1 month (around 8/5/2022) for Medication Therapy Management.    It was great speaking with you today.  I value your experience and would be very thankful for your time in providing feedback in our clinic survey. In the next few days, you may receive an email or text message from [employer] with a link to a survey related to your  clinical pharmacist.\"     To schedule another MTM appointment, please call the clinic directly or you may call the MTM scheduling line at [phone] or toll-free at [phone].     My Clinical Pharmacist's contact information:                                                      Please feel free to contact me with any questions or concerns you have.      Beti Jacobo , Pharm D  [phone] (phone)  Medication Therapy Management Pharmacist     "

## 2022-07-05 NOTE — LETTER
"Recommended To-Do List      Prepared on: 7/5/2022     You can get the best results from your medications by completing the items on this \"To-Do List.\"      Bring your To-Do List when you go to your doctor. And, share it with your family or caregivers.    My To-Do List:  What we talked about: What I should do:   A new medication that may be of benefit to you    Start taking Ozempic 0.25 mg weekly for diabetes          What we talked about: What I should do:   Your medication timing    Take metformin and glipizide before breakfast and before dinner          What we talked about: What I should do:   A new medication that may be of benefit to you    Start taking TUMS as needed for heartburn          What we talked about: What I should do:                       "

## 2022-07-07 ENCOUNTER — LAB (OUTPATIENT)
Dept: LAB | Facility: CLINIC | Age: 82
End: 2022-07-07
Payer: COMMERCIAL

## 2022-07-07 DIAGNOSIS — E11.8 TYPE 2 DIABETES MELLITUS WITH COMPLICATION, WITHOUT LONG-TERM CURRENT USE OF INSULIN (H): ICD-10-CM

## 2022-07-07 DIAGNOSIS — M10.9 GOUT, UNSPECIFIED CAUSE, UNSPECIFIED CHRONICITY, UNSPECIFIED SITE: ICD-10-CM

## 2022-07-07 LAB
ANION GAP SERPL CALCULATED.3IONS-SCNC: 9 MMOL/L (ref 3–14)
BUN SERPL-MCNC: 21 MG/DL (ref 7–30)
CALCIUM SERPL-MCNC: 9.4 MG/DL (ref 8.5–10.1)
CHLORIDE BLD-SCNC: 105 MMOL/L (ref 94–109)
CO2 SERPL-SCNC: 29 MMOL/L (ref 20–32)
CREAT SERPL-MCNC: 1.21 MG/DL (ref 0.52–1.04)
GFR SERPL CREATININE-BSD FRML MDRD: 45 ML/MIN/1.73M2
GLUCOSE BLD-MCNC: 163 MG/DL (ref 70–99)
HBA1C MFR BLD: 7.2 % (ref 0–5.6)
POTASSIUM BLD-SCNC: 3.8 MMOL/L (ref 3.4–5.3)
SODIUM SERPL-SCNC: 143 MMOL/L (ref 133–144)
URATE SERPL-MCNC: 4.4 MG/DL (ref 2.6–6)

## 2022-07-07 PROCEDURE — 36415 COLL VENOUS BLD VENIPUNCTURE: CPT

## 2022-07-07 PROCEDURE — 82310 ASSAY OF CALCIUM: CPT

## 2022-07-07 PROCEDURE — 83036 HEMOGLOBIN GLYCOSYLATED A1C: CPT

## 2022-07-07 PROCEDURE — 84550 ASSAY OF BLOOD/URIC ACID: CPT

## 2022-07-11 ENCOUNTER — TELEPHONE (OUTPATIENT)
Dept: PHARMACY | Facility: CLINIC | Age: 82
End: 2022-07-11

## 2022-07-11 NOTE — TELEPHONE ENCOUNTER
Lindsey called with concerns of high blood sugars.  Numbers back down over the weekend but she has not been eating as much.  This morning 139 which is the lowest for the past couple of months; before eating 130-150 over the weekend.        She has contacted the [place] Prescription Assistance Program [phone] for the Ozempic; her cost was about $90 per month.      Lab Results   Component Value Date    A1C 7.2 07/07/2022    A1C 6.7 01/25/2022    A1C 7.2 09/07/2021    A1C 7.6 07/06/2021    A1C 7.3 12/01/2020    A1C 6.8 11/26/2019    A1C 7.1 03/08/2019    A1C 7.0 12/31/2018         Plan:  Continue current regimen.  Starting increasing protein in diet and snacks.      Taking ibuprofen 400 mg twice daily and APAP 1000 mg twice daily.  Most recent Scr elevated and GFR 45.  Recommended stopping ibuprofen, increasing APAP 1000 mg three times daily and using the Voltaren gel.

## 2022-07-12 ENCOUNTER — TELEPHONE (OUTPATIENT)
Dept: UROLOGY | Facility: CLINIC | Age: 82
End: 2022-07-12

## 2022-07-12 DIAGNOSIS — R39.89 SUSPECTED UTI: Primary | ICD-10-CM

## 2022-07-12 NOTE — TELEPHONE ENCOUNTER
[facility] Call Center    Phone Message    May a detailed message be left on voicemail: yes     Reason for Call: Order(s): Other:   Reason for requested: UA/UC  Date needed: ASAP  Provider name: Chiara Limon    Patient is experiencing painful  Urination and would like to have UA/UC done to rule out infection. Would like to go to [facility] tomorrow if possible. Please reach out when orders have been placed. Thank you!      Action Taken: Message routed to:  [facility] ([facility]): Uro    Travel Screening: Not Applicable

## 2022-07-13 ENCOUNTER — LAB (OUTPATIENT)
Dept: LAB | Facility: CLINIC | Age: 82
End: 2022-07-13
Payer: COMMERCIAL

## 2022-07-13 DIAGNOSIS — R39.89 SUSPECTED UTI: ICD-10-CM

## 2022-07-13 LAB
ALBUMIN UR-MCNC: NEGATIVE MG/DL
APPEARANCE UR: CLEAR
BILIRUB UR QL STRIP: NEGATIVE
COLOR UR AUTO: NORMAL
GLUCOSE UR STRIP-MCNC: NEGATIVE MG/DL
HGB UR QL STRIP: NEGATIVE
KETONES UR STRIP-MCNC: NEGATIVE MG/DL
LEUKOCYTE ESTERASE UR QL STRIP: NEGATIVE
NITRATE UR QL: NEGATIVE
PH UR STRIP: 5 [PH] (ref 5–7)
SP GR UR STRIP: 1.01 (ref 1–1.03)
UROBILINOGEN UR STRIP-MCNC: NORMAL MG/DL

## 2022-07-13 PROCEDURE — 87086 URINE CULTURE/COLONY COUNT: CPT

## 2022-07-13 PROCEDURE — 81003 URINALYSIS AUTO W/O SCOPE: CPT | Mod: QW

## 2022-07-14 ENCOUNTER — OFFICE VISIT (OUTPATIENT)
Dept: OBGYN | Facility: CLINIC | Age: 82
End: 2022-07-14
Payer: COMMERCIAL

## 2022-07-14 VITALS
WEIGHT: 179 LBS | DIASTOLIC BLOOD PRESSURE: 84 MMHG | HEART RATE: 88 BPM | BODY MASS INDEX: 30.56 KG/M2 | HEIGHT: 64 IN | SYSTOLIC BLOOD PRESSURE: 132 MMHG

## 2022-07-14 DIAGNOSIS — R32 URINARY INCONTINENCE, UNSPECIFIED TYPE: ICD-10-CM

## 2022-07-14 DIAGNOSIS — R10.2 VULVAR PAIN: Primary | ICD-10-CM

## 2022-07-14 PROCEDURE — 99215 OFFICE O/P EST HI 40 MIN: CPT | Performed by: OBSTETRICS & GYNECOLOGY

## 2022-07-14 NOTE — NURSING NOTE
"Chief Complaint   Patient presents with     Vaginal Problem     Vaginal itching and burning x several years.        Initial /84   Pulse 88   Ht 1.626 m (5' 4\")   Wt 81.2 kg (179 lb)   LMP  (LMP Unknown)   Breastfeeding No   BMI 30.73 kg/m   Estimated body mass index is 30.73 kg/m  as calculated from the following:    Height as of this encounter: 1.626 m (5' 4\").    Weight as of this encounter: 81.2 kg (179 lb).  BP completed using cuff size: regular    Questioned patient about current smoking habits.  Pt. has never smoked.          The following HM Due: NONE      The following patient reported/Care Every where data was sent to:  P ABSTRACT QUALITY INITIATIVES [[id number]]  Melisa Gurrola LPN               " 69

## 2022-07-14 NOTE — PROGRESS NOTES
SUBJECTIVE:                                                   Lindsey Gary is a 81 year old female  who presents to clinic today for longstanding vulvar pain.  Extensive review of her chart reveals multiple visits every year for yeast infections and vaginal infections dating back to .  She has a history of diabetes mellitus, has at times been suboptimally controlled.  When she was initially being seen, it was typically for symptoms of infection, typically yeast, and these were usually treated easily with Diflucan or Terazol.  She did not at that time have the chronic vulvar burning that she has now.    At this point, she reports burning, sometimes also with a feeling of itching, on a daily basis.  She cannot remember the last time she did not have this discomfort.  The intensity does increase and decrease at times, but she is never without discomfort.  It can disrupt her sleep at times though usually it does not.  She has been treated with several things in the past, including topical steroids (triamcinolone cream most recently, clobetasol in the past), vaginal estrogen, oral antibiotics for positive vaginal cultures (never treated for Ureaplasma or mycoplasma, as cultures for those have been negative).    Most recent visits to gynecology for this particular concern are highlighted below.    10/21/2021 visit with Dr. José Miguel Cerrato:  Reviewed her extensive history and notes from prior providers.  We reviewed things that have worked well in the past and other things that have worsened her symptoms.  She thinks the steroid did help, although she had a biopsy which showed no lichen and she doesn't have an exam consistent with that today either.  Exam not consistent with vulvodynia, and she is already on pelvic floor PT, estradiol, and gabapentin already.  I suspect she could have chronic irritation of the skin from bladder leakage, which she feels is improving with pelvic floor PT and estradiol and I  encouraged her to continue follow-up with her PERLA Guadarrama from the Urogynecology office as this is probably the specialist who can help her the most at this time.  Also obtained wet prep and yeast cultures today to again rule out active infection, although there is nothing to suggest this on exam today.    - Wet prep - Clinic Collect  - Yeast culture  - triamcinolone (ARISTOCORT HP) 0.5 % external cream; Apply topically 2 times daily as needed for irritation  Dispense: 15 g; Refill: 3    6/7/2021 [facility] ([facility] [place]):  1. Vulvar burning  N94.89 Wound Culture Aerobic Bacterial   2. Dysuria  R30.0 UA with Microscopic       Urine Culture Aerobic Bacterial   3. Vaginal burning  N94.9        No dermatological disorders based on physical appearance of the vulva. Based on our discussion she opted for a wound culture rather than a gram stain based on her prior results.  Will rule out a UTI as she gets frequent UTIs.  I recommend using cooled vaseline or aquaphor as a skin protectant. Topical steroids will not be helpful in this regard.  She has refractory urinary incontinence. I defer management to Urology. Given the non-functional status of the mesh she has in place she should discuss surgical intervention for stress incontinence. In the interim however I recommended consideration of a pessary with support or the Poise Impressa inserts which are intended for daily use.   Culture: E. Faecalis. Prescription for metronidazole.    11/20/2020 Dr. 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 Atrophic changes still noted but more improved than previous exam    1) Vulvar irritation  -- clinical exam findings show some signs of dermatitis, therefore, steroid topical ointment recommended (clobetasol) to assess for resolution; regimen for application reviewed (BID application for 2  weeks, followed once daily application for the subsequent 2 weeks, followed by every other day application for the last 2 weeks); also advised to take BID soak baths and applying petroleum gel to vulvar and perineum to seal in moisture and prevent against dryness   -- I agree that a major component of her vulvar irritation is her urinary incontinence issues and encouraged to seek scheduled urology appointment   -- vaginal cultures collected; expressed to patient my uncertainty as to whether the bacteria isolated in her previous culture are necessarily playing a role in contributing to her symptoms and that they may be a part of her natural david  Culture results: E. Faecalis, prescription for amoxicillin. Also given clobetasol and E2 vaginally as above.    1/20/2020 Dr. Church   Dx: Vaginal Atrophy/Irritation  1)  Wet Prep and Wound Culture Today   2)  Continue boric acid.   3)  Start estradiol cream and recommended a daily probiotic   4)  Rx fluconazole and Rx metronidazole to take as needed    5)  Return: in three months for recheck      Rx:   - estradiol (ESTRACE) 0.1 MG/GM vaginal cream; Place 2 g vaginally twice a week    - fluconazole (DIFLUCAN) 150 MG tablet; Take 1 tablet (150 mg) by mouth daily    - metroNIDAZOLE (FLAGYL) 500 MG tablet; Take 1 tablet (500 mg) by mouth 2 times daily     12/21/2015 by me for an acute infection:  Has prescription for fluconazole that she uses as needed for recurrent yeast infections (longstanding.) Alternates with terazol and would like refill before she leaves for the winter ([place]). This will be provided. Discussed yeast cultures as she has diabetes and gets recurrent infections, is at higher risk for C. Glabrata or another atypical yeast that might not respond to azoles. She requests bacterial culture as well. We did discuss that this is nonspecific and current guidelines do not suggest bacterial culture is useful for diagnosis and treatment as cultured bacteria are  "usually not the culprit for symptoms. However, treatment of enterococcus has worked for her in the past and so cultures were obtained today. Info on vulvar hygiene and vaginitis provided.      Prior vulvar dermatoses diagnosed? NO.  Vulvar biopsy was done in October 2013.  It was negative, but at the time she did not have any signs or symptoms of dermatoses.  Duration of symptoms: at least 10 years  Location of symptoms: diffuse on the vulva    Itching? YES at times  Redness/change in color? YES on some exams, has had redness of the labia majora  Fissures, sores, lesions? YES she notes \"donn\" or hardened areas scattered on the vulva.  No other lesions.  Pain with intercourse? NO  Discharge? NO  Odor? NO    Pain or itching is: diffuse    Treatments tried: Diflucan 150mg po once, steroid cream or ointment, Flagyl 500mg po TID X 7 days, estrogen cream and boric acid.    Exacerbating factors: Present all the time    Relieving factors: Other medications: She does feel that the triamcinolone is soothing and has been using that nightly for a couple of months.  Of note, this is triamcinolone cream not ointment.  She has intermittently used Aquaphor in the past.    Inciting event or infection?   YES see above.  This appears to have started after a long period of chronic yeast infections.  She also has significant history of bladder problems.  I refer you to the urology notes for complete details, but she has had multiple surgeries including TVT and bulking agents.  She has been on multiple medications.  She has seen physical therapy and in combination with the use of vaginal estrogen she does feel that recently this has provided her with some minimal relief of her urinary symptoms.  Typically what she describes is just uncontrollable slow dribbling or loss of urine without sensation.  She stays damp all the time, and constantly wears a pad.  She currently switched to poise pads from always pads as that was recommended to " her.  She does use wipes for bowel movements, a brand from [employer] but does not know what they contain.  She will look at this and let me know.  I have recommended that she minimize the use of wipes is much as possible or use water wipes only.  She has used Aquaphor from time to time as well.    She does have a history of psoriasis, plaque form.  She has not ever noticed plaques on the vulva.  She does have a follow-up for this tomorrow with dermatology.    Problem list and histories reviewed & adjusted, as indicated.  Additional history: as documented.    Patient Active Problem List   Diagnosis     Rosacea     Gout     CHONDROCALC NOS-OTHER SITE(aka PSEUDOGOUT)     Essential hypertension     Hyperlipidemia LDL goal <100     Advanced directives, counseling/discussion     Atrophic vaginitis     Chronic foot pain     Hypertension goal BP (blood pressure) < 130/80     Bereavement     Obesity     Type 2 diabetes mellitus with complication, without long-term current use of insulin (H)     Status post total knee replacement, unspecified laterality     Psoriasis     Chronic kidney disease, stage 3 (H)     Past Surgical History:   Procedure Laterality Date     BLADDER SURGERY       COLONOSCOPY       COLONOSCOPY N/A 12/17/2014    Procedure: COMBINED COLONOSCOPY, SINGLE OR MULTIPLE BIOPSY/POLYPECTOMY BY BIOPSY;  Surgeon: Chuy Staton MD;  Location:  GI     COLONOSCOPY N/A 08/07/2020    Procedure: COLONOSCOPY;  Surgeon: Chuy Staton MD;  Location:  GI     CYSTOSCOPY       HYSTERECTOMY, PAP NO LONGER INDICATED       HYSTERECTOMY, JAIME  1991    fibroid     SURGICAL HISTORY OF -   10/28/2015    right partial knee      Z NONSPECIFIC PROCEDURE      Breast biopsies        [facility] NONSPECIFIC PROCEDURE      Symptomatic left thigh lipomas        Z NONSPECIFIC PROCEDURE  06/01/2009    pubovaginal sling      Social History     Tobacco Use     Smoking status: Never Smoker     Smokeless tobacco: Never Used   Substance Use  Topics     Alcohol use: No     Alcohol/week: 0.0 standard drinks      Problem (# of Occurrences) Relation (Name,Age of Onset)    Cancer (1) Mother: colon ca survivor    Cancer - colorectal (1) Mother    Cerebrovascular Disease (1) Father    No Known Problems (6) Sister, Brother, Maternal Grandmother, Maternal Grandfather, Paternal Grandmother, Other            acetaminophen (TYLENOL) 500 MG tablet, Take 2 tablets by mouth every 8 hours as needed   alcohol swab prep pads, Use to swab area of injection/beau as directed  allopurinol (ZYLOPRIM) 300 MG tablet, Take 0.5 tablets (150 mg) by mouth 2 times daily  Ascorbic Acid (VITAMIN C) 500 MG CAPS, Take 500 mg by mouth daily  ASPIRIN 81 MG OR TABS, 1 tablet daily  betamethasone, augmented, (DIPROLENE) 0.05 % lotion, GINA 10 TO 20 DROPS TOPICALLY AA OF SCALP Q NIGHT UTD  blood glucose (CONTOUR NEXT TEST) test strip, Use to test blood sugar 4 times daily or as directed.  blood glucose calibration (NO BRAND SPECIFIED) solution, Use to calibrate blood glucose monitor as needed as directed. To accompany: Blood Glucose Monitor Brands: per insurance.  blood glucose monitoring (CONTOUR NEXT MONITOR W/DEVICE KIT) meter device kit, Use to test blood sugar 3 times daily or as directed.  calcipotriene 0.005 % OINT, Apply 1 Application topically as needed  chlorhexidine (PERIDEX) 0.12 % solution, RINSE ONE HALF  OZ 2-3 X D AFTER BREAKFAST BEFORE BEDTIME FOLLOWING BRUSHING AND FLOSSIN  Cholecalciferol (VITAMIN D) 2000 UNITS tablet, Take 1 tablet by mouth daily  CINNAMON 500 MG OR CAPS, 2 tablets daily  famotidine (PEPCID) 20 MG tablet, Take 20 mg by mouth 2 times daily  ferrous gluconate (FERGON) 324 (38 Fe) MG tablet, Take 324 mg by mouth daily (with breakfast)  furosemide (LASIX) 20 MG tablet, Take 2 tablets (40 mg) by mouth daily  gabapentin (NEURONTIN) 300 MG capsule, TAKE 2 CAPSULES BY MOUTH AT BEDTIME  glipiZIDE (GLUCOTROL) 10 MG tablet, TAKE 1 TABLET BY MOUTH TWICE DAILY BEFORE  "MEAL(S)  Ibuprofen (ADVIL PO), Take 200 mg by mouth every 8 hours as needed for moderate pain  insulin NPH (NOVOLIN N RELION) 100 UNIT/ML vial, INJECT 21 UNITS SUBCUTANEOUSLY BEFORE BREAKFAST AND 20 UNITS BEFORE DINNER  insulin syringe-needle U-100 (DROPLET INSULIN SYRINGE) 31G X 5/16\" 0.3 ML miscellaneous, Use two syringes daily or as directed.  Lactobacillus (PROBIOTIC ACIDOPHILUS) TABS, Take 1 tablet by mouth daily  lisinopril (ZESTRIL) 40 MG tablet, Take 1 tablet by mouth once daily  metFORMIN (GLUCOPHAGE) 1000 MG tablet, TAKE 1 TABLET BY MOUTH TWICE DAILY WITH MEALS  metoprolol succinate ER (TOPROL XL) 50 MG 24 hr tablet, Take 1 tablet (50 mg) by mouth daily  rosuvastatin (CRESTOR) 40 MG tablet, Take 1 tablet (40 mg) by mouth daily  semaglutide (OZEMPIC, 0.25 OR 0.5 MG/DOSE,) 2 MG/1.5ML SOPN pen, Inject 0.25 mg Subcutaneous once a week for 28 days, THEN 0.5 mg once a week for 28 days.  thin (NO BRAND SPECIFIED) lancets, Use to test blood sugar 3 times daily or as directed. To accompany: Blood Glucose Monitor Brands: per insurance.  triamcinolone (ARISTOCORT HP) 0.5 % external cream, Apply topically 2 times daily as needed for irritation  TUMS 500 MG OR CHEW, 1 TABLET 3 TIMES PRN  vitamin E 400 UNITS TABS, Take 400 Units by mouth daily  Zinc Sulfate (ZINC 15 PO), Take by mouth daily  estradiol (ESTRACE) 0.1 MG/GM vaginal cream, Please use blueberry size amount in the vagina nightly for two weeks and then three times a week at night thereafter  fluconazole (DIFLUCAN) 150 MG tablet, TAKE ONE TABLET BY MOUTH EVERY 3 DAYS PRN (Patient not taking: Reported on 7/14/2022)  terconazole (TERAZOL 7) 0.4 % vaginal cream, Place 1 applicator vaginally At Bedtime (Patient not taking: Reported on 7/14/2022)    No current facility-administered medications on file prior to visit.    Allergies   Allergen Reactions     Augmentin      Tongue swelling and rash     Can take PCN K without reaction      Sulfa Drugs      Tongue swelling " and rash       Further ROS: Skin: No rashes,worrisome lesions or skin problems. History of psoriasis.      OBJECTIVE:     Exam:  Constitutional:  Appearance: Well nourished, well developed alert, in no acute distress    Mons pubis Normal   Groin Normal   Labia majora Abnormal: Diffuse generalized redness consistent with irritation from pads and urine. Also scattered epidermal cysts in the areas where she has noted small bumps.  Perineum Normal   Anus Normal   Labia minora Abnormal: Slight attenuation noted, which could be consistent with lichen sclerosis, but this is the first time I am examining her in many years.  Prepuce Abnormal: As above, there is attenuation and some loss of architecture, but it is unclear at this point if this is a new finding or has changed over time.  Clitoris Abnormal: See above  Intralabial folds Normal   Vestibule Normal other than vulvovaginal atrophy  Urethral meatus Normal   Vagina I did not do a speculum examination today as she had no vaginal complaints  Pelvic floor tone normal       In-Clinic Test Results:  No results found. However, due to the size of the patient record, not all encounters were searched. Please check Results Review for a complete set of results.    ASSESSMENT/PLAN:                                                        ICD-10-CM    1. Vulvar pain  R10.2    2. Urinary incontinence, unspecified type  R32      Discussed that vulvar/vaginal pain of a chronic nature frequently is multifactorial in origin, and therefore often requires a multidisciplinary approach for optimal treatment. This may include medication, physical therapy, pain clinic, and in some cases, psychologic treatment. Because this develops over a period of time, there will not be an immediate recovery with any of these options over the short term. The goal is always to maximize function and minimize symptoms, to the extent that is possible.     Risks, benefits, and alternatives to options were  discussed today. She was given written information on vulvar pain and vulvar hygiene. Decision was made to proceed with discontinuation of the nightly medium potency steroid cream at this point, and then follow up in 4 weeks. She is in agreement with the plan.  We may need to biopsy the vulva at that time, if changes consistent with lichen sclerosis are evident, or simply to rule this out as a diagnosis.  Even though her biopsy was negative for lichen sclerosis in 2013, certainly this could have developed in the interim.  She does have some changes consistent with this, but her exam is not pathognomonic.    The estrogen cream seems to be helping some of her bladder symptoms.  I think she should continue this, but I also am in agreement with her that it is unlikely to cause any change in her chronic vulvar pain.  It is always helpful to rule out genitourinary syndrome of menopause as a contributing factor to vulvodynia.    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  She is currently taking gabapentin but in a low dose.  We discussed that treatments for neuropathy can sometimes be helpful for vulvodynia as well.  They are also localized and topical therapies that can be helpful.  I do not want to assume that this is vulvodynia as that is a diagnosis of exclusion, but certainly a deep dive into her chart makes it likely that that is the case.  We discussed that it can be difficult to treat, but that there are usually treatments that will provide relief to patients, even though there is no cure.  It does tend to be a waxing and waning condition, which needs to be managed long-term.    Despite all of the above, I also think that there is a significant component of vulvar irritation from chronic exposure to urine and  "pads.  She has some slight erythema of the labia majora today consistent with this.  She is not using anything as a barrier to keep her skin dry, and she notes that she has constant wetness.  She did just change her brand of pad.  She is using wipes but does not know what type they are.  She typically uses these just for bowel movements, but will sometimes use them on the vulva as well.  I have asked her to check the ingredients of the wipes, and if she cannot confirm that it is almost all water, to switch to water wipes.  We also discussed the use of of the day.  I think she may benefit from sitz baths several times a day with the application of plain petroleum jelly or Aquaphor following this.  We refer to this as \"soak and seal\".  I will put instructions regarding this into the after visit summary for her.    I will plan to see her back in 4 weeks.  We may perform vulvar biopsy at that time, so that was discussed today.  She will change her wipes, and continue soak and seal as needed for comfort.  She should continue the estradiol per instructions from her urology team.  She should continue with physical therapy as well if she plans to do so, as that can be helpful also.  In the future, we could consider neuromodulators either topically as a compound or systemically.  She may also benefit from pain clinic if that is not helpful.    She was reassured that the epidermal cysts are normal and common, and that no follow-up is needed for this specifically.     Prescriptions provided: none today    60 minutes spent on the date of the encounter doing chart review, review of test results, interpretation of tests, patient visit and documentation         Noemí Sanon MD  [facility]      "

## 2022-07-14 NOTE — PATIENT INSTRUCTIONS
"Instructions  Stop the triamcinolone cream.  Follow up with me in 1 month. We may need to do a biopsy of the vulva (skin) at that time.  You can do \"soak and seal\" (instructions below) at any time for comfort. Some people also find a cool wash cloth or gel pads (reusable and can be frozen or kept in the fridge) on the vulva to be soothing. You can also keep the aquaphor or vasoline in the fridge.  Do soak and seal at least three times a day, as this will help provide a barrier against the urine leakage and irritation from that and the pad.      Rinse skin off with plain water frequently.  Use tap water, distilled water, sitz baths, squirt bottles, or bidets.  Additionally, hand held showers can be helpful for rinsing the vulva.  After rinsing, pat the skin gently dry. It is okay to then apply a thin layer of plain petroleum jelly (Vasoline) if this helps with discomfort. This is often referred to as the \"soak and seal\" method.     Some Suggested Vulvar Pain & Itching Measures    The vulva is the external genitalia in the female.  The skin of the vulva can be quite sensitive.  Because it is moist and frequently subjected to friction while sitting and moving, this area can be easily injured.  There are various strategies that can be used to prevent irritation and allow the vulva to heal.  Keeping this area dry can accelerate healing.  Chemicals found in toilet tissues, laundry soaps and detergents that come in contact with the vulva can cause irritation.  Avoiding contact with potential irritants that contain chemicals is important.  Fabric softeners in undergarments, chemicals in deodorant soaps, bubble baths, feminine hygiene spray and panty liners, etc., can all cause irritation to the vulva.  The following recommendations are specific measures that can help minimize vulvar irritation.    Wear white 100% cotton underwear and do not wear underwear at night.  Do not wear pantyhose, tights, or other close-fitting " "clothes.  Enclosing this area with synthetic fibers holds both heat and moisture in the skin, conditions which potentiate the development of infections.  Tight-fitting clothes may also increase your symptoms of discomfort.    After washing underwear, put it through at least one whole cycle with water only.  Some women have suffered needlessly from irritants in detergents whose residue was left in clothes by incomplete rinsing.  Rinsing clothes thoroughly is more important than which detergent is used although to be on the safe side, the milder the soap, the better.  Wash new underwear before wearing.  Fabric softeners and dryer sheets should not be used.    Rinse skin off with plain water frequently.  Use tap water, distilled water, sitz baths, squirt bottles, or bidets.  Additionally, hand held showers can be helpful for rinsing the vulva.  After rinsing, pat the skin gently dry. It is okay to then apply a thin layer of plain petroleum jelly (Vasoline) if this helps with discomfort. This is often referred to as the \"soak and seal\" method.    Use very mild soap for bathing.  It is best not to use any soaps on the vulva.  The vulva should be rinsed with warm water.  Bars of soap such as Dove or Basis are gentle to the other skin areas.  We do not recommend bath or shower gels or liquid soaps. If you feel you need to use something on the vulvar skin, I recommend Cetaphil or Cerave sensitive skin facial cleanser.  Remember that frequent baths with soaps may increase the irritation.  You cannot wash away your symptoms.    Some patients find the use of cool gel packs on the vulva to be helpful. These can be kept in the refrigerator or the freezer and reused as needed.    Do not use products with benzocaine. In general, do not use any products over the counter that you haven't discussed with your provider, as many products that are marketed for the vulvar area can actually make things worse.    Consider using 100% cotton " "menstrual pads and tampons.  Many women with vulvar pain experience a significant increase in irritation and pain every month when they use commercial paper pads or tampons.  This monthly increase in pain can often be reduced by using 100% washable and reusable cotton menstrual pads.  Pure cotton tampons are available. Some women will find that menstrual cups or the use of absorbant undergarments meant to be used during the period will negate the need for pads or tampons.    Don't sit or remain in a wet bathing suit for prolonged periods. Change underwear shortly after exercising as well.    For everyday relief of irritation or fissures, if recommended:  Rinse skin off with plain water frequently.  Use tap water, distilled water, sitz baths, squirt bottles, or bidets.  Additionally, hand held showers can be helpful for rinsing the vulva.  After rinsing, pat the skin gently dry. It is okay to then apply a thin layer of plain petroleum jelly (Vasoline) if this helps with discomfort. This is often referred to as the \"soak and seal\" method.      Adapted From:  The Vulvar Pain Foundation, \"Natural and Prophylactic Measures Suggested\", Vulvar Pain   Newsletter 1993: Sprin-6  The [employer] Vulvar Pain handout       "

## 2022-07-15 LAB — BACTERIA UR CULT: NORMAL

## 2022-07-27 ENCOUNTER — TELEPHONE (OUTPATIENT)
Dept: INTERNAL MEDICINE | Facility: CLINIC | Age: 82
End: 2022-07-27

## 2022-07-27 NOTE — TELEPHONE ENCOUNTER
Emailed patients portion of the free drug program to them. Going to follow up with patient that they did in fact get the e-mail. Sent message to provider to see how they would like to receive their portion.

## 2022-07-28 NOTE — TELEPHONE ENCOUNTER
Patient is calling and would like to ask Beti about the ozempic. She wants to talk more about the price and also she heard this could have a negative effect on her kidneys.

## 2022-07-28 NOTE — TELEPHONE ENCOUNTER
Cost of Ozempic was $94 for patient; 2nd month $47 per month.  Discussed concerns with side effects of medication.      Lab Results   Component Value Date    A1C 7.2 07/07/2022    A1C 6.7 01/25/2022    A1C 7.2 09/07/2021    A1C 7.6 07/06/2021    A1C 7.3 12/01/2020    A1C 6.8 11/26/2019    A1C 7.1 03/08/2019    A1C 7.0 12/31/2018

## 2022-08-03 ENCOUNTER — TELEPHONE (OUTPATIENT)
Dept: INTERNAL MEDICINE | Facility: CLINIC | Age: 82
End: 2022-08-03

## 2022-08-03 NOTE — TELEPHONE ENCOUNTER
Patient calls to ask if she is to take Ozempic along with Metformin and Glipizide? Patient stated it is ok to leave detailed message.         Best number to call back [phone]

## 2022-08-04 NOTE — TELEPHONE ENCOUNTER
Called patient - she has not picked up the Ozempic yet.  Would recommend stopping the glipizide once she stopped the Ozempic.      Her cost is about $47 per month but concerned about going in the donut hole.  Her insurance recommended tier exception for Ozempic coverage.  Doesn't qualify for free medication program.      She has an appointment with me on 8/9 and will review how to use the pen at that time.

## 2022-08-09 ENCOUNTER — OFFICE VISIT (OUTPATIENT)
Dept: PHARMACY | Facility: CLINIC | Age: 82
End: 2022-08-09
Payer: COMMERCIAL

## 2022-08-09 VITALS — DIASTOLIC BLOOD PRESSURE: 58 MMHG | SYSTOLIC BLOOD PRESSURE: 128 MMHG

## 2022-08-09 DIAGNOSIS — E11.8 TYPE 2 DIABETES MELLITUS WITH COMPLICATION, WITHOUT LONG-TERM CURRENT USE OF INSULIN (H): Primary | ICD-10-CM

## 2022-08-09 DIAGNOSIS — I10 HYPERTENSION GOAL BP (BLOOD PRESSURE) < 130/80: ICD-10-CM

## 2022-08-09 DIAGNOSIS — M19.90 ARTHRITIS: ICD-10-CM

## 2022-08-09 DIAGNOSIS — G62.9 NEUROPATHY: ICD-10-CM

## 2022-08-09 DIAGNOSIS — M10.9 GOUT, UNSPECIFIED CAUSE, UNSPECIFIED CHRONICITY, UNSPECIFIED SITE: ICD-10-CM

## 2022-08-09 PROCEDURE — 99607 MTMS BY PHARM ADDL 15 MIN: CPT | Performed by: PHARMACIST

## 2022-08-09 PROCEDURE — 99606 MTMS BY PHARM EST 15 MIN: CPT | Performed by: PHARMACIST

## 2022-08-09 RX ORDER — HUMAN INSULIN 100 [IU]/ML
INJECTION, SUSPENSION SUBCUTANEOUS
Qty: 20 ML | Refills: 0 | COMMUNITY
Start: 2022-08-09 | End: 2023-04-21

## 2022-08-09 NOTE — PROGRESS NOTES
Medication Therapy Management (MTM) Encounter    ASSESSMENT:                            Medication Adherence/Access: No issues identified    Type 2 Diabetes:  A1c at goal <8%.  Home reading variable. Majority of fasting readings at goal.  Will adjust NPH to 22 units twice daily.    She may consider starting Ozempic in the next 1-2 months- good time to try so she doesn't go into the coverage gap before the end of the year.  May consider stopping aspirin for primary prevention; will discuss at follow-up.    Neuropathy: seeing Dinorah tomorrow.  Dose of gabapentin could be increased to 900 mg/day in 2-3 doses based on renal function if pain determined to be due to neuropathy.    Arthritis:  Continues to have pain in her feet and hands.  Discussed trial of Tylenol Arthritis.  Seeing primary care provider tomorrow.  May consider referral to Rheumatologist.    Gout: uric acid at goal <6    Hypertension: stable    PLAN:                            1.  NPH 22 units twice daily  2.  Consider starting Ozempic in Sept or Oct  3.  Tylenol Arthritis 650 mg 2 tablets three times daily - consider referral to Rheumatology    Follow-up: Return in about 1 month (around 9/9/2022) for Medication Therapy Management.    SUBJECTIVE/OBJECTIVE:                          Lindsey Gary is a 82 year old female coming in for a follow-up visit.  Today's visit is a follow-up MTM visit from 7/5/22.     Reason for visit: medication     Allergies/ADRs: Reviewed in chart  Tobacco: She reports that she has never smoked. She has never used smokeless tobacco.  Alcohol: not currently using    Medication Adherence/Access: no issues reported  90-day RX for 2 copays    Type 2 Diabetes:  Currently taking metformin 1000 mg twice daily, glipizide 10 mg twice daily and NPH 21 units every morning and 22 units in the evening (breakfast and dinner). Patient is not experiencing side effects.  Ozempic prescribed at last visit - she has not started yet.  Very concerned with  going in the Medicare Coverage Gap if she started this.    Blood sugar monitorin-3 time(s) daily. Ranges (patient reported):   Fasting 145, 183, 158, 139, 109, 133, 148, 101, 179, 139, 185, 130, 129  Before Dinner 133, 214, 272, 132, 194, 171, 213, 159, 115, 99, 170  Bedtime lower 123, 164, 138, 86, 190, 198, 83, 138, 123, 156    Symptoms of low blood sugar? Could happen at bedtime if will happen; will take 1 lifesaver  Symptoms of high blood sugar? none  Eye exam: up to date  Foot exam: due, reports burning and pain in toes  Aspirin: Taking 81mg daily for primary prevention   Statin: Yes:    ACEi/ARB: Yes: .   Urine Albumin:   Lab Results   Component Value Date    UMALCR 255.14 (H) 2020      Lab Results   Component Value Date    A1C 7.2 2022    A1C 6.7 2022    A1C 7.2 2021    A1C 7.6 2021    A1C 7.3 2020    A1C 6.8 2019    A1C 7.1 2019    A1C 7.0 2018         Neuropathy: Taking gabapentin 600 mg at bedtime. Continues to have burning in feet and toes at night.  Unsure if symptoms from arthritis, gout or neuropathy.  No concerns with side effects from the gabapentin.  Scheduled with Dinorah tomorrow.    Arthritis:  Taking Tylenol 1000 mg twice daily. Questions if she should be seeing an Rheumatologist.  Saw one in the past, told she did not have RA but did not address OA.  Continues to have pain in her feet and hands.  Does use Voltaren and that is helpful.    Gout: Currently taking allopurinol 150 mg twice daily. Patient reports increased pain -  All toes. Worse at night.  Seeing Dinorah tomorrow.  Patient is experiencing the following medication side effects: none.   Uric Acid   Date Value Ref Range Status   2022 4.4 2.6 - 6.0 mg/dL Final   2020 4.8 2.6 - 6.0 mg/dL Final       Hypertension: Current medications include lisinopril 40 mg daily, metoprolol ER 50 mg daily and furosemide 40 mg daily.  Patient does not self-monitor blood pressure.  Patient  reports no current medication side effects.  BP Readings from Last 3 Encounters:   07/14/22 132/84   07/05/22 134/62   05/24/22 122/68       Potassium   Date Value Ref Range Status   07/07/2022 3.8 3.4 - 5.3 mmol/L Final   07/06/2021 4.8 3.4 - 5.3 mmol/L Final         Today's Vitals: /58   LMP  (LMP Unknown)   ----------------      I spent 30 minutes with this patient today. All changes were made via collaborative practice agreement with LILLIAM Sam CNP. A copy of the visit note was provided to the patient's provider(s).    The patient was given a summary of these recommendations.     Beti Jacobo , Pharm D  [phone] (phone)  Medication Therapy Management Pharmacist        Medication Therapy Recommendations  Arthritis    Current Medication: acetaminophen (TYLENOL) 500 MG tablet   Rationale: Condition refractory to medication - Ineffective medication - Effectiveness   Recommendation: Change Medication   Status: Accepted - no CPA Needed         Type 2 diabetes mellitus with complication, without long-term current use of insulin (H)    Current Medication: insulin NPH (NOVOLIN N RELION) 100 UNIT/ML vial   Rationale: Dose too low - Dosage too low - Effectiveness   Recommendation: Increase Dose   Status: Accepted per CPA

## 2022-08-09 NOTE — PATIENT INSTRUCTIONS
"Recommendations from today's MTM visit:                                                         1.  NPH 22 units twice daily    2.  Consider starting Ozempic in Sept or Oct    3.  Tylenol Arthritis 650 mg 2 tablets three times daily for arthritis - consider referral for Rheumatology    Follow-up: Return in about 1 month (around 9/9/2022) for Medication Therapy Management.    It was great speaking with you today.  I value your experience and would be very thankful for your time in providing feedback in our clinic survey. In the next few days, you may receive an email or text message from [employer] with a link to a survey related to your  clinical pharmacist.\"     To schedule another MTM appointment, please call the clinic directly or you may call the MTM scheduling line at [phone] or toll-free at [phone].     My Clinical Pharmacist's contact information:                                                      Please feel free to contact me with any questions or concerns you have.      Beti Jacobo , Pharm D  [phone] (phone)  Medication Therapy Management Pharmacist     "

## 2022-08-10 ENCOUNTER — TELEPHONE (OUTPATIENT)
Dept: INTERNAL MEDICINE | Facility: CLINIC | Age: 82
End: 2022-08-10

## 2022-08-10 ENCOUNTER — OFFICE VISIT (OUTPATIENT)
Dept: INTERNAL MEDICINE | Facility: CLINIC | Age: 82
End: 2022-08-10
Payer: COMMERCIAL

## 2022-08-10 VITALS
BODY MASS INDEX: 30.73 KG/M2 | DIASTOLIC BLOOD PRESSURE: 82 MMHG | RESPIRATION RATE: 16 BRPM | WEIGHT: 179 LBS | TEMPERATURE: 97.1 F | OXYGEN SATURATION: 99 % | HEART RATE: 66 BPM | SYSTOLIC BLOOD PRESSURE: 132 MMHG

## 2022-08-10 DIAGNOSIS — G56.01 CARPAL TUNNEL SYNDROME OF RIGHT WRIST: ICD-10-CM

## 2022-08-10 DIAGNOSIS — D50.9 IRON DEFICIENCY ANEMIA, UNSPECIFIED IRON DEFICIENCY ANEMIA TYPE: ICD-10-CM

## 2022-08-10 DIAGNOSIS — G62.9 NEUROPATHY: ICD-10-CM

## 2022-08-10 DIAGNOSIS — E66.811 CLASS 1 OBESITY DUE TO EXCESS CALORIES WITH SERIOUS COMORBIDITY AND BODY MASS INDEX (BMI) OF 30.0 TO 30.9 IN ADULT: ICD-10-CM

## 2022-08-10 DIAGNOSIS — M10.9 GOUT, UNSPECIFIED CAUSE, UNSPECIFIED CHRONICITY, UNSPECIFIED SITE: ICD-10-CM

## 2022-08-10 DIAGNOSIS — E66.09 CLASS 1 OBESITY DUE TO EXCESS CALORIES WITH SERIOUS COMORBIDITY AND BODY MASS INDEX (BMI) OF 30.0 TO 30.9 IN ADULT: ICD-10-CM

## 2022-08-10 DIAGNOSIS — E78.5 HYPERLIPIDEMIA LDL GOAL <100: ICD-10-CM

## 2022-08-10 DIAGNOSIS — M79.622 PAIN OF LEFT UPPER ARM: ICD-10-CM

## 2022-08-10 DIAGNOSIS — N18.32 STAGE 3B CHRONIC KIDNEY DISEASE (H): ICD-10-CM

## 2022-08-10 DIAGNOSIS — E11.8 TYPE 2 DIABETES MELLITUS WITH COMPLICATION, WITHOUT LONG-TERM CURRENT USE OF INSULIN (H): ICD-10-CM

## 2022-08-10 DIAGNOSIS — I10 HYPERTENSION GOAL BP (BLOOD PRESSURE) < 130/80: ICD-10-CM

## 2022-08-10 DIAGNOSIS — M19.90 ARTHRITIS: Primary | ICD-10-CM

## 2022-08-10 PROCEDURE — 99215 OFFICE O/P EST HI 40 MIN: CPT | Performed by: NURSE PRACTITIONER

## 2022-08-10 RX ORDER — GABAPENTIN 300 MG/1
CAPSULE ORAL
Qty: 270 CAPSULE | Refills: 3 | Status: SHIPPED | OUTPATIENT
Start: 2022-08-10 | End: 2022-08-10

## 2022-08-10 RX ORDER — GABAPENTIN 300 MG/1
CAPSULE ORAL
Qty: 270 CAPSULE | Refills: 3 | Status: SHIPPED | OUTPATIENT
Start: 2022-08-10 | End: 2023-07-06

## 2022-08-10 ASSESSMENT — ANXIETY QUESTIONNAIRES
GAD7 TOTAL SCORE: 2
5. BEING SO RESTLESS THAT IT IS HARD TO SIT STILL: NOT AT ALL
3. WORRYING TOO MUCH ABOUT DIFFERENT THINGS: SEVERAL DAYS
1. FEELING NERVOUS, ANXIOUS, OR ON EDGE: NOT AT ALL
2. NOT BEING ABLE TO STOP OR CONTROL WORRYING: SEVERAL DAYS
4. TROUBLE RELAXING: NOT AT ALL
7. FEELING AFRAID AS IF SOMETHING AWFUL MIGHT HAPPEN: NOT AT ALL
GAD7 TOTAL SCORE: 2
7. FEELING AFRAID AS IF SOMETHING AWFUL MIGHT HAPPEN: NOT AT ALL
GAD7 TOTAL SCORE: 2
IF YOU CHECKED OFF ANY PROBLEMS ON THIS QUESTIONNAIRE, HOW DIFFICULT HAVE THESE PROBLEMS MADE IT FOR YOU TO DO YOUR WORK, TAKE CARE OF THINGS AT HOME, OR GET ALONG WITH OTHER PEOPLE: NOT DIFFICULT AT ALL
8. IF YOU CHECKED OFF ANY PROBLEMS, HOW DIFFICULT HAVE THESE MADE IT FOR YOU TO DO YOUR WORK, TAKE CARE OF THINGS AT HOME, OR GET ALONG WITH OTHER PEOPLE?: NOT DIFFICULT AT ALL
6. BECOMING EASILY ANNOYED OR IRRITABLE: NOT AT ALL

## 2022-08-10 ASSESSMENT — PATIENT HEALTH QUESTIONNAIRE - PHQ9
SUM OF ALL RESPONSES TO PHQ QUESTIONS 1-9: 3
10. IF YOU CHECKED OFF ANY PROBLEMS, HOW DIFFICULT HAVE THESE PROBLEMS MADE IT FOR YOU TO DO YOUR WORK, TAKE CARE OF THINGS AT HOME, OR GET ALONG WITH OTHER PEOPLE: NOT DIFFICULT AT ALL
SUM OF ALL RESPONSES TO PHQ QUESTIONS 1-9: 3

## 2022-08-10 NOTE — TELEPHONE ENCOUNTER
Patient's home number message on machine to call back.  Patient had to leave appointment today before we set her up with a wrist brace. Please ask patient if she would like us to set a brace up front for her to  or if she will plan to get one at the drug store.

## 2022-08-10 NOTE — PROGRESS NOTES
"  Assessment & Plan     Arthritis  Discussed pain and rheumatology   - Pain Management  Referral; Future    Type 2 diabetes mellitus with complication, without long-term current use of insulin (H)  stable   Going to start ozempic when it makes sense financially     Pain of left upper arm  Sounds like a nerve pain   Check x ray and consider neurology referral / neurosurgery   - XR Cervical Spine 2/3 Views; Future    Carpal tunnel syndrome of right wrist  Discussed brace at night   - Miscellaneous Order for DME - ONLY FOR DME    Neuropathy  Need   - gabapentin (NEURONTIN) 300 MG capsule; TAKE 3 CAPSULES BY MOUTH AT BEDTIME      40 minutes spent on the date of the encounter doing chart review, history and exam, documentation and further activities per the note       BMI:   Estimated body mass index is 30.73 kg/m  as calculated from the following:    Height as of 7/14/22: 1.626 m (5' 4\").    Weight as of this encounter: 81.2 kg (179 lb).       Patient Instructions     X ray in [address] neck     Rheumatology   [facility] [address]  [address] [address] , [place] [address]  1.4 miles  Directions   [phone]      Pain clinic   [facility] [place] [facility]   [address] [facility] [address]   [facility] [address]   [facility] [address]   Phone: [phone]       Increase gabapentin to 900 mg at bedtime       Return in about 4 months (around 12/10/2022) for Routine preventive and lab .    LILLIAM Sam CNP  [facility] DUDLEY Portillo is a 82 year old, presenting for the following health issues:  Pain (Pain in hands, left arm from a fall last Fall, right collar bone, pain level 5/10)      Pain    History of Present Illness       Reason for visit:  Pain  Symptom onset:  More than a month  Symptom intensity:  Moderate  Symptom progression:  Worsening  Had these symptoms before:  Yes  Has tried/received treatment for these symptoms: "  Yes  Previous treatment was successful:  No  What makes it worse:  No  What makes it better:  No    She eats 2-3 servings of fruits and vegetables daily.She consumes 0 sweetened beverage(s) daily.She exercises with enough effort to increase her heart rate 9 or less minutes per day.  She exercises with enough effort to increase her heart rate 3 or less days per week.   She is taking medications regularly.    Today's PHQ-9         PHQ-9 Total Score: 3    PHQ-9 Q9 Thoughts of better off dead/self-harm past 2 weeks :   Not at all    How difficult have these problems made it for you to do your work, take care of things at home, or get along with other people: Not difficult at all  Today's PERLA-7 Score: 2     Toe are burning at night   2 gabapentin at bedtime currently     Arthritis worse   Pain in joints top pf hands are painful     Fell at [facility] - a while ago   Top shoulder to pinkie pain   Check cervical spine today     Right hand numb at night   Discussed carpal tunnel           Review of Systems   Constitutional, HEENT, cardiovascular, pulmonary, GI, , musculoskeletal, neuro, skin, endocrine and psych systems are negative, except as otherwise noted.      Objective    /82 (BP Location: Left arm, Patient Position: Sitting, Cuff Size: Adult Regular)   Pulse 66   Temp 97.1  F (36.2  C) (Tympanic)   Resp 16   Wt 81.2 kg (179 lb)   LMP  (LMP Unknown)   SpO2 99%   BMI 30.73 kg/m    Body mass index is 30.73 kg/m .  Physical Exam   GENERAL: alert and no distress  RESP: lungs clear   CV: regular rate and rhythm  PSYCH: mentation appears normal, affect normal/bright    X ray may have some fusion in neck   Await radiology read                 .  ..

## 2022-08-10 NOTE — PATIENT INSTRUCTIONS
X ray in [address] neck     Rheumatology   [facility] [place] [address] [address]  [address] , [place] [address]  1.4 miles  Directions   [phone]      Pain clinic   [facility] PA[facility] [place] [facility]   [address] [address] [address]   Phone: [phone]       Increase gabapentin to 900 mg at bedtime

## 2022-08-12 DIAGNOSIS — M54.2 NECK PAIN: Primary | ICD-10-CM

## 2022-08-15 NOTE — TELEPHONE ENCOUNTER
Attempted to contact patient. Left voice message to call back.     See message below and x-ray result note when patient calls back.    Florence Walker RN  [facility]

## 2022-08-22 ENCOUNTER — TELEPHONE (OUTPATIENT)
Dept: INTERNAL MEDICINE | Facility: CLINIC | Age: 82
End: 2022-08-22

## 2022-08-22 NOTE — TELEPHONE ENCOUNTER
Patient wants her ears flushed out. Built up ear wax. Was wondering if Dinorah Wong can put in a order for that or if patient needs to be seen first. Please call patient.     Phone#: [phone]

## 2022-08-23 NOTE — TELEPHONE ENCOUNTER
Attempted to contact patient. Left voice message to call back.     Please inform patient that she would need to schedule office visit for evaluation before having ear wash.     Florence Walker RN  [facility]

## 2022-08-29 ENCOUNTER — OFFICE VISIT (OUTPATIENT)
Dept: INTERNAL MEDICINE | Facility: CLINIC | Age: 82
End: 2022-08-29
Payer: COMMERCIAL

## 2022-08-29 VITALS
TEMPERATURE: 98.4 F | OXYGEN SATURATION: 95 % | DIASTOLIC BLOOD PRESSURE: 50 MMHG | HEART RATE: 78 BPM | SYSTOLIC BLOOD PRESSURE: 110 MMHG | RESPIRATION RATE: 16 BRPM | WEIGHT: 186 LBS | HEIGHT: 64 IN | BODY MASS INDEX: 31.76 KG/M2

## 2022-08-29 DIAGNOSIS — H93.8X1 EAR FULLNESS, RIGHT: Primary | ICD-10-CM

## 2022-08-29 PROCEDURE — 99213 OFFICE O/P EST LOW 20 MIN: CPT

## 2022-08-29 RX ORDER — MULTIVITAMIN WITH IRON
1 TABLET ORAL DAILY
COMMUNITY
End: 2023-07-24

## 2022-08-29 NOTE — PROGRESS NOTES
"  Assessment & Plan     (H93.8X1) Ear fullness, right  (primary encounter diagnosis)  Comment: Right side of face feels full and R ear feels plugged. Notes crackling sound in bilateral eras and dull headache with mild rhinitis and sinus congestion. Does note she may have some postnasal drainage.  Denies any fevers, chills, coughing, sore throat. Does have hx of cerumen impaction in bilateral ears. Denies any history of allergies.     Plan: Pt had B/L ear wash today. TM is now visible. No signs of infection visible. We discussed using OTC flonase to help with the congestion and likely postnasal drainage that may be causing some of the fullness.          BMI:   Estimated body mass index is 31.93 kg/m  as calculated from the following:    Height as of this encounter: 1.626 m (5' 4\").    Weight as of this encounter: 84.4 kg (186 lb).         No follow-ups on file.    Ewelina Roth, APRN CNP  [facility]    Aidee Portillo is a 82 year old, presenting for the following health issues:  Ear Problem        Objective    /50   Pulse 78   Temp 98.4  F (36.9  C) (Oral)   Resp 16   Ht 1.626 m (5' 4\")   Wt 84.4 kg (186 lb)   LMP  (LMP Unknown)   SpO2 95%   BMI 31.93 kg/m    Body mass index is 31.93 kg/m .     Physical Exam  Constitutional:       General: She is not in acute distress.     Appearance: Normal appearance. She is not ill-appearing, toxic-appearing or diaphoretic.   HENT:      Head: Normocephalic and atraumatic.      Right Ear: Tympanic membrane, ear canal and external ear normal.      Left Ear: Tympanic membrane, ear canal and external ear normal.      Mouth/Throat:      Mouth: Mucous membranes are moist.      Pharynx: No pharyngeal swelling, oropharyngeal exudate or posterior oropharyngeal erythema.      Tonsils: No tonsillar exudate.   Cardiovascular:      Rate and Rhythm: Normal rate and regular rhythm.      Heart sounds: Normal heart sounds.   Pulmonary:      Effort: " Pulmonary effort is normal.      Breath sounds: Normal breath sounds.   Skin:     General: Skin is warm and dry.   Neurological:      Mental Status: She is alert.   Psychiatric:         Mood and Affect: Mood normal.         Behavior: Behavior normal.         Thought Content: Thought content normal.         Judgment: Judgment normal.          .

## 2022-09-20 DIAGNOSIS — I10 ESSENTIAL HYPERTENSION: ICD-10-CM

## 2022-09-20 RX ORDER — LISINOPRIL 40 MG/1
40 TABLET ORAL DAILY
Qty: 90 TABLET | Refills: 2 | Status: SHIPPED | OUTPATIENT
Start: 2022-09-20 | End: 2023-06-19

## 2022-09-20 NOTE — TELEPHONE ENCOUNTER
Routing refill request to provider for review/approval because:  Labs out of range:  Creatinine      Creatinine   Date Value Ref Range Status   07/07/2022 1.21 (H) 0.52 - 1.04 mg/dL Final   07/06/2021 1.17 (H) 0.52 - 1.04 mg/dL Final      Florence Walker RN  [facility]

## 2022-09-20 NOTE — TELEPHONE ENCOUNTER
Patient calling  9/13/2022 she rec'd a refill of lisinopril (ZESTRIL) 40 MG tablet but for only 20 tablets. Patient wants to know why only 20 tablets. Writer does not see this refill was done.  Ok to call and alejandra [phone]

## 2022-09-20 NOTE — TELEPHONE ENCOUNTER
Lisinopril last ordered for quantity of 90, called [facility].     They state patient enrolled their medication sync program, which works to get all the patients medications filled on the same day so they gave her small quantity with this fill to get the prescriptions to line up. She will be eligible for the full 90 day supply with her next refill, but they will need a new order as they only have the partial quantity of 70 tabs left.     Patient informed of message from pharmacy and verbalizes understanding. Refill request started for Lisinopril.     Florence Walker RN  [facility]

## 2022-09-28 ENCOUNTER — OFFICE VISIT (OUTPATIENT)
Dept: INTERNAL MEDICINE | Facility: CLINIC | Age: 82
End: 2022-09-28
Payer: COMMERCIAL

## 2022-09-28 VITALS
HEIGHT: 64 IN | OXYGEN SATURATION: 99 % | BODY MASS INDEX: 31.07 KG/M2 | WEIGHT: 182 LBS | DIASTOLIC BLOOD PRESSURE: 60 MMHG | SYSTOLIC BLOOD PRESSURE: 118 MMHG | RESPIRATION RATE: 20 BRPM | TEMPERATURE: 98.1 F | HEART RATE: 75 BPM

## 2022-09-28 DIAGNOSIS — G62.9 NEUROPATHY: ICD-10-CM

## 2022-09-28 DIAGNOSIS — N18.32 STAGE 3B CHRONIC KIDNEY DISEASE (H): ICD-10-CM

## 2022-09-28 DIAGNOSIS — M19.90 ARTHRITIS: ICD-10-CM

## 2022-09-28 DIAGNOSIS — E66.09 CLASS 1 OBESITY DUE TO EXCESS CALORIES WITH SERIOUS COMORBIDITY AND BODY MASS INDEX (BMI) OF 30.0 TO 30.9 IN ADULT: ICD-10-CM

## 2022-09-28 DIAGNOSIS — Z23 NEED FOR PROPHYLACTIC VACCINATION AND INOCULATION AGAINST INFLUENZA: ICD-10-CM

## 2022-09-28 DIAGNOSIS — I10 HYPERTENSION GOAL BP (BLOOD PRESSURE) < 130/80: ICD-10-CM

## 2022-09-28 DIAGNOSIS — E11.8 TYPE 2 DIABETES MELLITUS WITH COMPLICATION, WITHOUT LONG-TERM CURRENT USE OF INSULIN (H): Primary | ICD-10-CM

## 2022-09-28 DIAGNOSIS — M79.645 PAIN OF FINGER OF LEFT HAND: ICD-10-CM

## 2022-09-28 DIAGNOSIS — Z12.31 VISIT FOR SCREENING MAMMOGRAM: ICD-10-CM

## 2022-09-28 DIAGNOSIS — E66.811 CLASS 1 OBESITY DUE TO EXCESS CALORIES WITH SERIOUS COMORBIDITY AND BODY MASS INDEX (BMI) OF 30.0 TO 30.9 IN ADULT: ICD-10-CM

## 2022-09-28 DIAGNOSIS — M10.9 GOUT, UNSPECIFIED CAUSE, UNSPECIFIED CHRONICITY, UNSPECIFIED SITE: ICD-10-CM

## 2022-09-28 DIAGNOSIS — M79.622 PAIN OF LEFT UPPER ARM: ICD-10-CM

## 2022-09-28 DIAGNOSIS — D50.9 IRON DEFICIENCY ANEMIA, UNSPECIFIED IRON DEFICIENCY ANEMIA TYPE: ICD-10-CM

## 2022-09-28 DIAGNOSIS — E78.5 HYPERLIPIDEMIA LDL GOAL <100: ICD-10-CM

## 2022-09-28 PROCEDURE — G0008 ADMIN INFLUENZA VIRUS VAC: HCPCS | Performed by: NURSE PRACTITIONER

## 2022-09-28 PROCEDURE — 90662 IIV NO PRSV INCREASED AG IM: CPT | Performed by: NURSE PRACTITIONER

## 2022-09-28 PROCEDURE — 99215 OFFICE O/P EST HI 40 MIN: CPT | Mod: 25 | Performed by: NURSE PRACTITIONER

## 2022-09-28 ASSESSMENT — ANXIETY QUESTIONNAIRES
7. FEELING AFRAID AS IF SOMETHING AWFUL MIGHT HAPPEN: NOT AT ALL
7. FEELING AFRAID AS IF SOMETHING AWFUL MIGHT HAPPEN: NOT AT ALL
2. NOT BEING ABLE TO STOP OR CONTROL WORRYING: SEVERAL DAYS
4. TROUBLE RELAXING: SEVERAL DAYS
GAD7 TOTAL SCORE: 4
8. IF YOU CHECKED OFF ANY PROBLEMS, HOW DIFFICULT HAVE THESE MADE IT FOR YOU TO DO YOUR WORK, TAKE CARE OF THINGS AT HOME, OR GET ALONG WITH OTHER PEOPLE?: NOT DIFFICULT AT ALL
1. FEELING NERVOUS, ANXIOUS, OR ON EDGE: SEVERAL DAYS
GAD7 TOTAL SCORE: 4
6. BECOMING EASILY ANNOYED OR IRRITABLE: NOT AT ALL
5. BEING SO RESTLESS THAT IT IS HARD TO SIT STILL: NOT AT ALL
IF YOU CHECKED OFF ANY PROBLEMS ON THIS QUESTIONNAIRE, HOW DIFFICULT HAVE THESE PROBLEMS MADE IT FOR YOU TO DO YOUR WORK, TAKE CARE OF THINGS AT HOME, OR GET ALONG WITH OTHER PEOPLE: NOT DIFFICULT AT ALL
3. WORRYING TOO MUCH ABOUT DIFFERENT THINGS: SEVERAL DAYS
GAD7 TOTAL SCORE: 4

## 2022-09-28 ASSESSMENT — PATIENT HEALTH QUESTIONNAIRE - PHQ9
SUM OF ALL RESPONSES TO PHQ QUESTIONS 1-9: 4
SUM OF ALL RESPONSES TO PHQ QUESTIONS 1-9: 4

## 2022-09-28 NOTE — NURSING NOTE
"Chief Complaint   Patient presents with     Pain     Pt has pain in left arm and hand.     initial /60   Pulse 75   Temp 98.1  F (36.7  C) (Oral)   Resp 20   Ht 1.626 m (5' 4\")   Wt 82.6 kg (182 lb)   LMP  (LMP Unknown)   SpO2 99%   BMI 31.24 kg/m   Estimated body mass index is 31.24 kg/m  as calculated from the following:    Height as of this encounter: 1.626 m (5' 4\").    Weight as of this encounter: 82.6 kg (182 lb)..  bp completed using cuff size large  MARIAH LIAO LPN  "

## 2022-09-28 NOTE — LETTER
January 3, 2023      Lindsey Gary  [address] [facility] [id number]        Dear ,    We are writing to inform you of your test results.    Your labs are within acceptable limits.    Resulted Orders   Ferritin   Result Value Ref Range    Ferritin 25 11 - 328 ng/mL   Iron and iron binding capacity   Result Value Ref Range    Iron 49 37 - 145 ug/dL    Iron Binding Capacity 429 240 - 430 ug/dL    Iron Sat Index 11 (L) 15 - 46 %   Uric acid   Result Value Ref Range    Uric Acid 3.7 2.4 - 5.7 mg/dL   Albumin Random Urine Quantitative with Creat Ratio   Result Value Ref Range    Albumin Urine mg/L 12.1 mg/L      Comment:      The reference ranges have not been established in urine albumin. The results should be integrated into the clinical context for interpretation.    Albumin Urine mg/g Cr 31.93 (H) 0.00 - 25.00 mg/g Cr      Comment:      Microalbuminuria is defined as an albumin:creatinine ratio of 17 to 299 for males and 25 to 299 for females. A ratio of albumin:creatinine of 300 or higher is indicative of overt proteinuria.  Due to biologic variability, positive results should be confirmed by a second, first-morning random or 24-hour timed urine specimen. If there is discrepancy, a third specimen is recommended. When 2 out of 3 results are in the microalbuminuria range, this is evidence for incipient nephropathy and warrants increased efforts at glucose control, blood pressure control, and institution of therapy with an angiotensin-converting-enzyme (ACE) inhibitor (if the patient can tolerate it).      Creatinine Urine mg/dL 37.9 mg/dL      Comment:      The reference ranges have not been established in urine creatinine. The results should be integrated into the clinical context for interpretation.   Hemoglobin A1c   Result Value Ref Range    Hemoglobin A1C 7.1 (H) 0.0 - 5.6 %      Comment:      Normal <5.7%   Prediabetes 5.7-6.4%    Diabetes 6.5% or higher     Note: Adopted from ADA consensus  guidelines.   TSH with free T4 reflex   Result Value Ref Range    TSH 1.88 0.30 - 4.20 uIU/mL   Comprehensive metabolic panel (BMP + Alb, Alk Phos, ALT, AST, Total. Bili, TP)   Result Value Ref Range    Sodium 140 136 - 145 mmol/L    Potassium 4.3 3.4 - 5.3 mmol/L    Chloride 101 98 - 107 mmol/L    Carbon Dioxide (CO2) 25 22 - 29 mmol/L    Anion Gap 14 7 - 15 mmol/L    Urea Nitrogen 17.8 8.0 - 23.0 mg/dL    Creatinine 0.97 (H) 0.51 - 0.95 mg/dL    Calcium 10.4 (H) 8.8 - 10.2 mg/dL    Glucose 163 (H) 70 - 99 mg/dL    Alkaline Phosphatase 65 35 - 104 U/L    AST 30 10 - 35 U/L    ALT 21 10 - 35 U/L    Protein Total 7.4 6.4 - 8.3 g/dL    Albumin 4.7 3.5 - 5.2 g/dL    Bilirubin Total 0.6 <=1.2 mg/dL    GFR Estimate 59 (L) >60 mL/min/1.73m2      Comment:      Effective December 21, 2021 eGFRcr in adults is calculated using the 2021 CKD-EPI creatinine equation which includes age and gender (Nitin et al., NE, DOI: 10.1056/ZEFOgb2533228)   CBC with platelets and differential   Result Value Ref Range    WBC Count 5.7 4.0 - 11.0 10e3/uL    RBC Count 4.25 3.80 - 5.20 10e6/uL    Hemoglobin 12.1 11.7 - 15.7 g/dL    Hematocrit 37.6 35.0 - 47.0 %    MCV 89 78 - 100 fL    MCH 28.5 26.5 - 33.0 pg    MCHC 32.2 31.5 - 36.5 g/dL    RDW 13.6 10.0 - 15.0 %    Platelet Count 225 150 - 450 10e3/uL    % Neutrophils 57 %    % Lymphocytes 34 %    % Monocytes 6 %    % Eosinophils 2 %    % Basophils 1 %    % Immature Granulocytes 0 %    Absolute Neutrophils 3.2 1.6 - 8.3 10e3/uL    Absolute Lymphocytes 1.9 0.8 - 5.3 10e3/uL    Absolute Monocytes 0.3 0.0 - 1.3 10e3/uL    Absolute Eosinophils 0.1 0.0 - 0.7 10e3/uL    Absolute Basophils 0.0 0.0 - 0.2 10e3/uL    Absolute Immature Granulocytes 0.0 <=0.4 10e3/uL       If you have any questions or concerns, please call the clinic at the number listed above.       Sincerely,      LILLIAM Sam CNP    gordon

## 2022-09-28 NOTE — PROGRESS NOTES
Assessment & Plan     Type 2 diabetes mellitus with complication, without long-term current use of insulin (H)  Stable   Seeing Beti Jacobo pharmacist   - Ferritin  - Iron and iron binding capacity  - Uric acid  - Albumin Random Urine Quantitative with Creat Ratio  - Hemoglobin A1c  - CBC with platelets and differential  - TSH with free T4 reflex  - Comprehensive metabolic panel (BMP + Alb, Alk Phos, ALT, AST, Total. Bili, TP)  - Lipid panel reflex to direct LDL Fasting; Future  - Comprehensive metabolic panel (BMP + Alb, Alk Phos, ALT, AST, Total. Bili, TP); Future  - TSH with free T4 reflex; Future  - CBC with platelets and differential; Future  - Albumin Random Urine Quantitative with Creat Ratio; Future  - Hemoglobin A1c; Future    Iron deficiency anemia, unspecified iron deficiency anemia type  Recheck   She is taking iron intermittently   - Ferritin  - Iron and iron binding capacity  - CBC with platelets and differential  - CBC with platelets and differential; Future  - Iron and iron binding capacity; Future  - Ferritin; Future    Stage 3b chronic kidney disease (H)    - Comprehensive metabolic panel (BMP + Alb, Alk Phos, ALT, AST, Total. Bili, TP)  - Comprehensive metabolic panel (BMP + Alb, Alk Phos, ALT, AST, Total. Bili, TP); Future    Hypertension goal BP (blood pressure) < 130/80  In range   - CBC with platelets and differential  - TSH with free T4 reflex  - Comprehensive metabolic panel (BMP + Alb, Alk Phos, ALT, AST, Total. Bili, TP)  - Comprehensive metabolic panel (BMP + Alb, Alk Phos, ALT, AST, Total. Bili, TP); Future  - TSH with free T4 reflex; Future  - CBC with platelets and differential; Future    Gout, unspecified cause, unspecified chronicity, unspecified site  Stable on medication   - Uric acid  - Uric acid; Future    Class 1 obesity due to excess calories with serious comorbidity and body mass index (BMI) of 30.0 to 30.9 in adult    - Comprehensive metabolic panel (BMP + Alb, Alk Phos,  ALT, AST, Total. Bili, TP)    Visit for screening mammogram    - MA SCREENING DIGITAL BILAT - Future  (s+30); Future    Arthritis    - Uric acid    Neuropathy  Having pain in little finger left hand   - CBC with platelets and differential  - TSH with free T4 reflex  - Comprehensive metabolic panel (BMP + Alb, Alk Phos, ALT, AST, Total. Bili, TP)  - MR Cervical Spine w/o Contrast; Future    Hyperlipidemia LDL goal <100    - Comprehensive metabolic panel (BMP + Alb, Alk Phos, ALT, AST, Total. Bili, TP)  - Lipid panel reflex to direct LDL Fasting; Future  - Comprehensive metabolic panel (BMP + Alb, Alk Phos, ALT, AST, Total. Bili, TP); Future    Pain of finger of left hand    - MR Cervical Spine w/o Contrast; Future    Pain of left upper arm    - MR Cervical Spine w/o Contrast; Future    Need for prophylactic vaccination and inoculation against influenza    - INFLUENZA, QUAD, HIGH DOSE, PF, 65YR + (FLUZONE HD)      53 minutes spent on the date of the encounter doing chart review, history and exam, documentation and further activities per the note       Patient Instructions   Lab in [address]    [facility]   [phone]           Return in about 6 months (around 3/28/2023).    LILLIAM Sam CNP  [facility] DUDLEY Portillo is a 82 year old, presenting for the following health issues:  Chief Complaint   Patient presents with     Pain     Pt has pain in left arm and hand.       History of Present Illness       Reason for visit:  Several reasons  Symptom onset:  More than a month  Symptoms include:  Pain in left arm and hand. Pain in left side of neck  Symptom intensity:  Moderate  Symptom progression:  Staying the same  Had these symptoms before:  No  What makes it worse:  Certain movements  What makes it better:  Tylenol    She eats 2-3 servings of fruits and vegetables daily.She consumes 1 sweetened beverage(s) daily.She exercises with enough effort to increase  "her heart rate 9 or less minutes per day.  She exercises with enough effort to increase her heart rate 3 or less days per week.   She is taking medications regularly.    Today's PHQ-9         PHQ-9 Total Score: 4    PHQ-9 Q9 Thoughts of better off dead/self-harm past 2 weeks :   Not at all      Today's PERLA-7 Score: 4     Right side neck behind ear and to collarbone     Went to dentist and was ok     Wax in ear     Fell and thought she fell on her knee  But her left arm is painful pinkie pain and goes up arm through outer side             Review of Systems   Constitutional, HEENT, cardiovascular, pulmonary, GI, , musculoskeletal, neuro, skin, endocrine and psych systems are negative, except as otherwise noted.      Objective    /60   Pulse 75   Temp 98.1  F (36.7  C) (Oral)   Resp 20   Ht 1.626 m (5' 4\")   Wt 82.6 kg (182 lb)   LMP  (LMP Unknown)   SpO2 99%   BMI 31.24 kg/m    Body mass index is 31.24 kg/m .  Physical Exam   GENERAL: alert and no distress  HENT: ear canals and TM's normal, nose and mouth without ulcers or lesions  RESP: lungs clear to auscultation - no rales, rhonchi or wheezes  CV: regular rate and rhythm, normal S1 S2, no S3 or S4, no murmur, click or rub, no peripheral edema and peripheral pulses strong  ABDOMEN: soft, nontender, no hepatosplenomegaly, no masses and bowel sounds normal  MS: no gross musculoskeletal defects noted, no edema  NEURO: Normal strength and tone, mentation intact and speech normal  PSYCH: mentation appears normal, affect normal/bright    Lab                   Answers for HPI/ROS submitted by the patient on 9/28/2022  PHQ9 TOTAL SCORE: 4  PERLA 7 TOTAL SCORE: 4      "

## 2022-09-30 ENCOUNTER — OFFICE VISIT (OUTPATIENT)
Dept: OBGYN | Facility: CLINIC | Age: 82
End: 2022-09-30
Payer: COMMERCIAL

## 2022-09-30 VITALS
WEIGHT: 182 LBS | HEIGHT: 64 IN | BODY MASS INDEX: 31.07 KG/M2 | SYSTOLIC BLOOD PRESSURE: 114 MMHG | DIASTOLIC BLOOD PRESSURE: 80 MMHG

## 2022-09-30 DIAGNOSIS — R32 URINARY INCONTINENCE, UNSPECIFIED TYPE: ICD-10-CM

## 2022-09-30 DIAGNOSIS — R10.2 VULVAR PAIN: Primary | ICD-10-CM

## 2022-09-30 PROCEDURE — 99213 OFFICE O/P EST LOW 20 MIN: CPT | Performed by: OBSTETRICS & GYNECOLOGY

## 2022-09-30 NOTE — PROGRESS NOTES
SUBJECTIVE:                                                   Lindsey Gary is a 82 year old female who presents to clinic today to follow up for a history of vulvar pain. Please see my last note for complete details.    At her last visit, I recommended that she change the type of pad she was using from and always menstrual pad to a pad specifically for urinary incontinence.  She also changed from the wipes that she was using to plain water wipes.  She is using Aquaphor regularly for soak and seal, especially at night to protect her skin from the pad and from urinary incontinence.  She continues to use the vaginal estrogen prescribed by urology.  With the combination of these changes, she states that she is feeling much better.  She does not have any pain or itching, and really has no concerns at all today.  She declines to undress and be examined secondary to the fact that she has no symptoms today.      Problem list and histories reviewed & adjusted, as indicated.  Additional history: as documented.    Patient Active Problem List   Diagnosis     Rosacea     Gout     CHONDROCALC NOS-OTHER SITE(aka PSEUDOGOUT)     Essential hypertension     Hyperlipidemia LDL goal <100     Advanced directives, counseling/discussion     Atrophic vaginitis     Chronic foot pain     Hypertension goal BP (blood pressure) < 130/80     Bereavement     Obesity     Type 2 diabetes mellitus with complication, without long-term current use of insulin (H)     Status post total knee replacement, unspecified laterality     Psoriasis     Chronic kidney disease, stage 3 (H)     Past Surgical History:   Procedure Laterality Date     BLADDER SURGERY       COLONOSCOPY       COLONOSCOPY N/A 12/17/2014    Procedure: COMBINED COLONOSCOPY, SINGLE OR MULTIPLE BIOPSY/POLYPECTOMY BY BIOPSY;  Surgeon: Chuy Staton MD;  Location:  GI     COLONOSCOPY N/A 08/07/2020    Procedure: COLONOSCOPY;  Surgeon: Chuy Staton MD;  Location:  GI      CYSTOSCOPY       HYSTERECTOMY, PAP NO LONGER INDICATED       HYSTERECTOMY, JAIME  1991    fibroid     SURGICAL HISTORY OF -   10/28/2015    right partial knee      [facility] NONSPECIFIC PROCEDURE      Breast biopsies        [facility] NONSPECIFIC PROCEDURE      Symptomatic left thigh lipomas        [facility] NONSPECIFIC PROCEDURE  06/01/2009    pubovaginal sling      Social History     Tobacco Use     Smoking status: Never Smoker     Smokeless tobacco: Never Used   Substance Use Topics     Alcohol use: No     Alcohol/week: 0.0 standard drinks      Problem (# of Occurrences) Relation (Name,Age of Onset)    Cancer (1) Mother: colon ca survivor    Cancer - colorectal (1) Mother    Cerebrovascular Disease (1) Father    No Known Problems (8) Maternal Grandmother, Maternal Grandfather, Paternal Grandmother, Brother (1), Sister (1), Son (1), Daughter (1), Other            acetaminophen (TYLENOL) 500 MG tablet, Take 2 tablets by mouth every 8 hours as needed   alcohol swab prep pads, Use to swab area of injection/beau as directed  allopurinol (ZYLOPRIM) 300 MG tablet, Take 0.5 tablets (150 mg) by mouth 2 times daily  Ascorbic Acid (VITAMIN C) 500 MG CAPS, Take 500 mg by mouth daily  ASPIRIN 81 MG OR TABS, 1 tablet daily  blood glucose (CONTOUR NEXT TEST) test strip, Use to test blood sugar 4 times daily or as directed.  blood glucose calibration (NO BRAND SPECIFIED) solution, Use to calibrate blood glucose monitor as needed as directed. To accompany: Blood Glucose Monitor Brands: per insurance.  blood glucose monitoring (CONTOUR NEXT MONITOR W/DEVICE KIT) meter device kit, Use to test blood sugar 3 times daily or as directed.  chlorhexidine (PERIDEX) 0.12 % solution, RINSE ONE HALF  OZ 2-3 X D AFTER BREAKFAST BEFORE BEDTIME FOLLOWING BRUSHING AND FLOSSIN  Cholecalciferol (VITAMIN D) 2000 UNITS tablet, Take 1 tablet by mouth daily  CINNAMON 500 MG OR CAPS, 2 tablets daily  diclofenac (VOLTAREN) 1 % topical gel, Apply topically 4 times daily as  "needed for moderate pain  estradiol (ESTRACE) 0.1 MG/GM vaginal cream, Please use blueberry size amount in the vagina nightly for two weeks and then three times a week at night thereafter  famotidine (PEPCID) 20 MG tablet, Take 20 mg by mouth 2 times daily  ferrous gluconate (FERGON) 324 (38 Fe) MG tablet, Take 324 mg by mouth daily (with breakfast)  fluconazole (DIFLUCAN) 150 MG tablet, TAKE ONE TABLET BY MOUTH EVERY 3 DAYS PRN  furosemide (LASIX) 20 MG tablet, Take 2 tablets (40 mg) by mouth daily  gabapentin (NEURONTIN) 300 MG capsule, TAKE 3 CAPSULES BY MOUTH AT BEDTIME  glipiZIDE (GLUCOTROL) 10 MG tablet, TAKE 1 TABLET BY MOUTH TWICE DAILY BEFORE MEAL(S)  insulin NPH (NOVOLIN N RELION) 100 UNIT/ML vial, INJECT 22 UNITS SUBCUTANEOUSLY BEFORE BREAKFAST AND 22 UNITS BEFORE DINNER  insulin syringe-needle U-100 (DROPLET INSULIN SYRINGE) 31G X 5/16\" 0.3 ML miscellaneous, Use two syringes daily or as directed.  Lactobacillus (PROBIOTIC ACIDOPHILUS) TABS, Take 1 tablet by mouth daily  lisinopril (ZESTRIL) 40 MG tablet, Take 1 tablet (40 mg) by mouth daily  magnesium 250 MG tablet, Take 1 tablet by mouth daily  metFORMIN (GLUCOPHAGE) 1000 MG tablet, TAKE 1 TABLET BY MOUTH TWICE DAILY WITH MEALS  metoprolol succinate ER (TOPROL XL) 50 MG 24 hr tablet, Take 1 tablet (50 mg) by mouth daily  rosuvastatin (CRESTOR) 40 MG tablet, Take 1 tablet (40 mg) by mouth daily  thin (NO BRAND SPECIFIED) lancets, Use to test blood sugar 3 times daily or as directed. To accompany: Blood Glucose Monitor Brands: per insurance.  TUMS 500 MG OR CHEW, 1 TABLET 3 TIMES PRN    No current facility-administered medications on file prior to visit.    Allergies   Allergen Reactions     Augmentin      Tongue swelling and rash     Can take PCN K without reaction      Sulfa Drugs      Tongue swelling and rash       ROS:  Negative except as noted in HPI.    OBJECTIVE:     No exam was necessary today as this was entirely a counseling " appointment.    In-Clinic Test Results:  No results found. However, due to the size of the patient record, not all encounters were searched. Please check Results Review for a complete set of results.    ASSESSMENT/PLAN:                                                        ICD-10-CM    1. Vulvar pain  R10.2    2. Urinary incontinence, unspecified type  R32          She is doing well with the current regimen, and will continue it.  If she develops any new symptoms, I would want to see her shortly after to rule out infection and assess for any skin changes.  Otherwise, she will continue these changes and will follow-up with me in 2 to 3 months for repeat exam.    Noemí Sanon MD  [facility]

## 2022-09-30 NOTE — NURSING NOTE
"Chief Complaint   Patient presents with     Follow Up     Follow up. States she is doing better.        Initial /80   Ht 1.626 m (5' 4\")   Wt 82.6 kg (182 lb)   LMP  (LMP Unknown)   Breastfeeding No   BMI 31.24 kg/m   Estimated body mass index is 31.24 kg/m  as calculated from the following:    Height as of this encounter: 1.626 m (5' 4\").    Weight as of this encounter: 82.6 kg (182 lb).  BP completed using cuff size: regular    Questioned patient about current smoking habits.  Pt. has never smoked.          The following HM Due: NONE      The following patient reported/Care Every where data was sent to:  P ABSTRACT QUALITY INITIATIVES [[id number]]  Melisa Gurrola LPN               "

## 2022-10-06 ENCOUNTER — TELEPHONE (OUTPATIENT)
Dept: INTERNAL MEDICINE | Facility: CLINIC | Age: 82
End: 2022-10-06

## 2022-10-06 DIAGNOSIS — R30.0 DYSURIA: Primary | ICD-10-CM

## 2022-10-06 NOTE — TELEPHONE ENCOUNTER
Patient is also requesting a UA/Urine Culture lab order entered in. Patient is having UTI symptoms. She will get her labs done at the hospital sometime next week.

## 2022-10-06 NOTE — TELEPHONE ENCOUNTER
Lab notified. Will advise pt at appt tomorrow.   NELSON Ardon    
Patient is in lab ans wants to know if needs another urin to check albumin ?  
Would rather do in a couple of months   
No

## 2022-10-08 DIAGNOSIS — N89.8 VAGINAL DISCHARGE: ICD-10-CM

## 2022-10-08 DIAGNOSIS — N94.89 VAGINAL BURNING: ICD-10-CM

## 2022-10-08 DIAGNOSIS — E11.9 TYPE 2 DIABETES MELLITUS WITHOUT COMPLICATION, WITHOUT LONG-TERM CURRENT USE OF INSULIN (H): ICD-10-CM

## 2022-10-10 ENCOUNTER — LAB (OUTPATIENT)
Dept: LAB | Facility: CLINIC | Age: 82
End: 2022-10-10
Payer: COMMERCIAL

## 2022-10-10 DIAGNOSIS — E78.5 HYPERLIPIDEMIA LDL GOAL <100: ICD-10-CM

## 2022-10-10 DIAGNOSIS — R30.0 DYSURIA: ICD-10-CM

## 2022-10-10 DIAGNOSIS — E11.8 TYPE 2 DIABETES MELLITUS WITH COMPLICATION, WITHOUT LONG-TERM CURRENT USE OF INSULIN (H): ICD-10-CM

## 2022-10-10 DIAGNOSIS — N18.32 STAGE 3B CHRONIC KIDNEY DISEASE (H): ICD-10-CM

## 2022-10-10 DIAGNOSIS — I10 HYPERTENSION GOAL BP (BLOOD PRESSURE) < 130/80: ICD-10-CM

## 2022-10-10 DIAGNOSIS — M10.9 GOUT, UNSPECIFIED CAUSE, UNSPECIFIED CHRONICITY, UNSPECIFIED SITE: ICD-10-CM

## 2022-10-10 DIAGNOSIS — D50.9 IRON DEFICIENCY ANEMIA, UNSPECIFIED IRON DEFICIENCY ANEMIA TYPE: ICD-10-CM

## 2022-10-10 LAB
ALBUMIN SERPL BCG-MCNC: 4.1 G/DL (ref 3.5–5.2)
ALBUMIN UR-MCNC: 10 MG/DL
ALP SERPL-CCNC: 64 U/L (ref 35–104)
ALT SERPL W P-5'-P-CCNC: 23 U/L (ref 10–35)
ANION GAP SERPL CALCULATED.3IONS-SCNC: 12 MMOL/L (ref 7–15)
APPEARANCE UR: CLEAR
AST SERPL W P-5'-P-CCNC: 27 U/L (ref 10–35)
BASOPHILS # BLD AUTO: 0 10E3/UL (ref 0–0.2)
BASOPHILS NFR BLD AUTO: 1 %
BILIRUB SERPL-MCNC: 0.7 MG/DL
BILIRUB UR QL STRIP: NEGATIVE
BUN SERPL-MCNC: 18.4 MG/DL (ref 8–23)
CALCIUM SERPL-MCNC: 9.4 MG/DL (ref 8.8–10.2)
CHLORIDE SERPL-SCNC: 100 MMOL/L (ref 98–107)
CHOLEST SERPL-MCNC: 113 MG/DL
COLOR UR AUTO: ABNORMAL
CREAT SERPL-MCNC: 0.96 MG/DL (ref 0.51–0.95)
CREAT UR-MCNC: 58.4 MG/DL
DEPRECATED HCO3 PLAS-SCNC: 26 MMOL/L (ref 22–29)
EOSINOPHIL # BLD AUTO: 0.2 10E3/UL (ref 0–0.7)
EOSINOPHIL NFR BLD AUTO: 3 %
ERYTHROCYTE [DISTWIDTH] IN BLOOD BY AUTOMATED COUNT: 14.2 % (ref 10–15)
FERRITIN SERPL-MCNC: 26 NG/ML (ref 11–328)
GFR SERPL CREATININE-BSD FRML MDRD: 60 ML/MIN/1.73M2
GLUCOSE SERPL-MCNC: 218 MG/DL (ref 70–99)
GLUCOSE UR STRIP-MCNC: 100 MG/DL
HBA1C MFR BLD: 7.5 %
HCT VFR BLD AUTO: 36 % (ref 35–47)
HDLC SERPL-MCNC: 29 MG/DL
HGB BLD-MCNC: 10.9 G/DL (ref 11.7–15.7)
HGB UR QL STRIP: NEGATIVE
IMM GRANULOCYTES # BLD: 0 10E3/UL
IMM GRANULOCYTES NFR BLD: 0 %
IRON BINDING CAPACITY (ROCHE): 375 UG/DL (ref 240–430)
IRON SATN MFR SERPL: 14 % (ref 15–46)
IRON SERPL-MCNC: 51 UG/DL (ref 37–145)
KETONES UR STRIP-MCNC: NEGATIVE MG/DL
LDLC SERPL CALC-MCNC: 43 MG/DL
LEUKOCYTE ESTERASE UR QL STRIP: NEGATIVE
LYMPHOCYTES # BLD AUTO: 1.6 10E3/UL (ref 0.8–5.3)
LYMPHOCYTES NFR BLD AUTO: 32 %
MCH RBC QN AUTO: 28.3 PG (ref 26.5–33)
MCHC RBC AUTO-ENTMCNC: 30.3 G/DL (ref 31.5–36.5)
MCV RBC AUTO: 94 FL (ref 78–100)
MICROALBUMIN UR-MCNC: 19.7 MG/L
MICROALBUMIN/CREAT UR: 33.73 MG/G CR (ref 0–25)
MONOCYTES # BLD AUTO: 0.3 10E3/UL (ref 0–1.3)
MONOCYTES NFR BLD AUTO: 6 %
NEUTROPHILS # BLD AUTO: 2.9 10E3/UL (ref 1.6–8.3)
NEUTROPHILS NFR BLD AUTO: 58 %
NITRATE UR QL: NEGATIVE
NONHDLC SERPL-MCNC: 84 MG/DL
NRBC # BLD AUTO: 0 10E3/UL
NRBC BLD AUTO-RTO: 0 /100
PH UR STRIP: 5.5 [PH] (ref 5–7)
PLATELET # BLD AUTO: 178 10E3/UL (ref 150–450)
POTASSIUM SERPL-SCNC: 4.6 MMOL/L (ref 3.4–5.3)
PROT SERPL-MCNC: 6.5 G/DL (ref 6.4–8.3)
RBC # BLD AUTO: 3.85 10E6/UL (ref 3.8–5.2)
RBC URINE: <1 /HPF
SODIUM SERPL-SCNC: 138 MMOL/L (ref 136–145)
SP GR UR STRIP: 1.01 (ref 1–1.03)
SQUAMOUS EPITHELIAL: <1 /HPF
TRIGL SERPL-MCNC: 205 MG/DL
TSH SERPL DL<=0.005 MIU/L-ACNC: 1.19 UIU/ML (ref 0.3–4.2)
URATE SERPL-MCNC: 3 MG/DL (ref 2.4–5.7)
UROBILINOGEN UR STRIP-MCNC: NORMAL MG/DL
WBC # BLD AUTO: 5 10E3/UL (ref 4–11)
WBC URINE: <1 /HPF

## 2022-10-10 PROCEDURE — 83550 IRON BINDING TEST: CPT | Performed by: NURSE PRACTITIONER

## 2022-10-10 PROCEDURE — 82728 ASSAY OF FERRITIN: CPT

## 2022-10-10 PROCEDURE — 85025 COMPLETE CBC W/AUTO DIFF WBC: CPT

## 2022-10-10 PROCEDURE — 36415 COLL VENOUS BLD VENIPUNCTURE: CPT

## 2022-10-10 PROCEDURE — 80053 COMPREHEN METABOLIC PANEL: CPT

## 2022-10-10 PROCEDURE — 82043 UR ALBUMIN QUANTITATIVE: CPT

## 2022-10-10 PROCEDURE — 80061 LIPID PANEL: CPT

## 2022-10-10 PROCEDURE — 84443 ASSAY THYROID STIM HORMONE: CPT

## 2022-10-10 PROCEDURE — 81001 URINALYSIS AUTO W/SCOPE: CPT

## 2022-10-10 PROCEDURE — 83036 HEMOGLOBIN GLYCOSYLATED A1C: CPT

## 2022-10-10 PROCEDURE — 84550 ASSAY OF BLOOD/URIC ACID: CPT

## 2022-10-10 RX ORDER — FLUCONAZOLE 150 MG/1
TABLET ORAL
Qty: 30 TABLET | Refills: 0 | Status: SHIPPED | OUTPATIENT
Start: 2022-10-10 | End: 2023-01-05

## 2022-10-10 NOTE — TELEPHONE ENCOUNTER
Routing refill request to provider for review/approval because:  Drug not on the [facility] refill protocol   Appears to be a script patient keeps on hand?  Remigio Gomez RN, BSN

## 2022-10-12 ENCOUNTER — TELEPHONE (OUTPATIENT)
Dept: OBGYN | Facility: CLINIC | Age: 82
End: 2022-10-12

## 2022-10-12 DIAGNOSIS — B96.89 BV (BACTERIAL VAGINOSIS): Primary | ICD-10-CM

## 2022-10-12 DIAGNOSIS — N76.0 BV (BACTERIAL VAGINOSIS): Primary | ICD-10-CM

## 2022-10-12 RX ORDER — METRONIDAZOLE 500 MG/1
500 TABLET ORAL 2 TIMES DAILY
Qty: 14 TABLET | Refills: 0 | Status: SHIPPED | OUTPATIENT
Start: 2022-10-12 | End: 2022-10-19

## 2022-10-12 NOTE — TELEPHONE ENCOUNTER
"Pt calling to follow-up on sx after OV on 9/30.  States the next day after seeing Dr Sanon she had sx start.    Sx are vulvar burning, burning with urination (UTI ruled out)  Slight odor, no discharge.  Low abdominal/back pain as well.  Fatigue.  States she has had this before and has been bacteria that was treated with metronidazole.    Had a UA done 10/10 which was negative.  Has taken fluconazole that she has on hand due to her diabetes with no relief.    Per Dr Sanon's note:  \"If she develops any new symptoms, I would want to see her shortly after to rule out infection and assess for any skin changes.\"    Dr Sanon out until Tuesday when she is on call.  No openings that day and provider is not in clinic the rest of the week.  Pt requests Rx for metronidazole be sent in.  Pharmacy selected if needed.  OK to leave detailed message if needed on phone number.    Sending to on call and Dr Sanon to review when she is back.    Mamie Figueroa RN              "

## 2022-10-12 NOTE — TELEPHONE ENCOUNTER
Pt advised.  I did add her onto the next opening on a call day on 10/28.  Advised if sx completely resolve to call us and see if the appt is still needed.    Mamie Figueroa RN

## 2022-10-12 NOTE — TELEPHONE ENCOUNTER
Advise Pt I sent Rx to pharm for Flagyl 500 mg BID x 7 days.  However she needs appt w/ Dr. Sanon in case this doesn't relieve sx's.

## 2022-10-13 DIAGNOSIS — I10 ESSENTIAL HYPERTENSION: Primary | ICD-10-CM

## 2022-10-13 RX ORDER — FUROSEMIDE 20 MG
40 TABLET ORAL DAILY
Qty: 180 TABLET | Refills: 1 | OUTPATIENT
Start: 2022-10-13

## 2022-10-18 DIAGNOSIS — E11.9 TYPE 2 DIABETES MELLITUS WITHOUT COMPLICATION, WITHOUT LONG-TERM CURRENT USE OF INSULIN (H): ICD-10-CM

## 2022-10-20 RX ORDER — FUROSEMIDE 20 MG
40 TABLET ORAL DAILY
Qty: 180 TABLET | Refills: 1 | Status: SHIPPED | OUTPATIENT
Start: 2022-10-20 | End: 2023-04-05

## 2022-10-20 RX ORDER — GLIPIZIDE 10 MG/1
TABLET ORAL
Qty: 180 TABLET | Refills: 0 | Status: SHIPPED | OUTPATIENT
Start: 2022-10-20 | End: 2023-01-04

## 2022-10-20 NOTE — TELEPHONE ENCOUNTER
[facility] calls to check on status of refill request. Refill was denied stating too soon for refill. [facility] has enrolled patient in their medication synchronization program so they used a partial fill to help get all her medications to be due for refills at the same time and now requesting full quantity to dispense.     Routing refill request to provider for review/approval because:  Medication is reported/historical       Florence Walker RN  [facility]

## 2022-10-20 NOTE — TELEPHONE ENCOUNTER
Routing refill request to provider for review/approval because:  Labs out of range:  Creatinine   Creatinine   Date Value Ref Range Status   10/10/2022 0.96 (H) 0.51 - 0.95 mg/dL Final   07/06/2021 1.17 (H) 0.52 - 1.04 mg/dL Final       Florence Walker RN  [facility]

## 2022-10-24 ENCOUNTER — LAB (OUTPATIENT)
Dept: LAB | Facility: CLINIC | Age: 82
End: 2022-10-24
Payer: COMMERCIAL

## 2022-10-24 DIAGNOSIS — R30.0 DYSURIA: ICD-10-CM

## 2022-10-24 LAB
ALBUMIN UR-MCNC: NEGATIVE MG/DL
APPEARANCE UR: CLEAR
BILIRUB UR QL STRIP: NEGATIVE
COLOR UR AUTO: YELLOW
GLUCOSE UR STRIP-MCNC: NEGATIVE MG/DL
HGB UR QL STRIP: NEGATIVE
KETONES UR STRIP-MCNC: NEGATIVE MG/DL
LEUKOCYTE ESTERASE UR QL STRIP: NEGATIVE
NITRATE UR QL: NEGATIVE
PH UR STRIP: 5.5 [PH] (ref 5–7)
SP GR UR STRIP: <=1.005 (ref 1–1.03)
UROBILINOGEN UR STRIP-ACNC: 0.2 E.U./DL

## 2022-10-24 PROCEDURE — 87086 URINE CULTURE/COLONY COUNT: CPT

## 2022-10-24 PROCEDURE — 81003 URINALYSIS AUTO W/O SCOPE: CPT

## 2022-10-26 LAB — BACTERIA UR CULT: NO GROWTH

## 2022-10-28 ENCOUNTER — OFFICE VISIT (OUTPATIENT)
Dept: OBGYN | Facility: CLINIC | Age: 82
End: 2022-10-28
Payer: COMMERCIAL

## 2022-10-28 VITALS
WEIGHT: 182 LBS | BODY MASS INDEX: 31.07 KG/M2 | DIASTOLIC BLOOD PRESSURE: 60 MMHG | HEART RATE: 84 BPM | SYSTOLIC BLOOD PRESSURE: 122 MMHG | HEIGHT: 64 IN

## 2022-10-28 DIAGNOSIS — N89.8 VAGINAL IRRITATION: Primary | ICD-10-CM

## 2022-10-28 PROCEDURE — 99213 OFFICE O/P EST LOW 20 MIN: CPT | Performed by: OBSTETRICS & GYNECOLOGY

## 2022-10-28 PROCEDURE — 87210 SMEAR WET MOUNT SALINE/INK: CPT | Performed by: OBSTETRICS & GYNECOLOGY

## 2022-10-28 NOTE — NURSING NOTE
"Chief Complaint   Patient presents with     Follow Up       Initial /60   Pulse 84   Ht 1.626 m (5' 4\")   Wt 82.6 kg (182 lb)   LMP  (LMP Unknown)   BMI 31.24 kg/m   Estimated body mass index is 31.24 kg/m  as calculated from the following:    Height as of this encounter: 1.626 m (5' 4\").    Weight as of this encounter: 82.6 kg (182 lb).  BP completed using cuff size: regular    Questioned patient about current smoking habits.  Pt. has never smoked.          The following HM Due: NONE      The following patient reported/Care Every where data was sent to:  P ABSTRACT QUALITY INITIATIVES [[id number]]  Melisa Gurrola LPN             "

## 2022-11-01 NOTE — PROGRESS NOTES
"  SUBJECTIVE:                                                     Lindsey Gary is a 82 year old female  who presents today for vulvar irritation.    She had a prescription for Flagyl sent in when I was out of town, for what she was certain was bacterial vaginosis, which she has experienced in the past.  It did help her to feel better, and now she is no longer having burning but just some irritation.  At the time the prescription was called in she was having burning pain and \"felt like everything was on fire.\"  She does continue to use vaginocele topically because of the irritation she was feeling.  She wanted to check in and be sure, because of her history of recurrent infections, that everything was cleared up.    She denies any fevers or chills.  She denies itching or discharge.  She has no urinary symptoms.  Her picture is complicated by type 2 diabetes which is historically not been under good control at all times.  Because of this she has a history of recurrent yeast.  She is also being treated for atrophic vaginitis.      Problem list and histories reviewed & adjusted, as indicated.  Additional history: as documented.    Patient Active Problem List   Diagnosis     Rosacea     Gout     CHONDROCALC NOS-OTHER SITE(aka PSEUDOGOUT)     Essential hypertension     Hyperlipidemia LDL goal <100     Advanced directives, counseling/discussion     Atrophic vaginitis     Chronic foot pain     Hypertension goal BP (blood pressure) < 130/80     Bereavement     Obesity     Type 2 diabetes mellitus with complication, without long-term current use of insulin (H)     Status post total knee replacement, unspecified laterality     Psoriasis     Chronic kidney disease, stage 3 (H)     Past Surgical History:   Procedure Laterality Date     BLADDER SURGERY       COLONOSCOPY       COLONOSCOPY N/A 2014    Procedure: COMBINED COLONOSCOPY, SINGLE OR MULTIPLE BIOPSY/POLYPECTOMY BY BIOPSY;  Surgeon: Chuy Staton MD;  " Location:  GI     COLONOSCOPY N/A 08/07/2020    Procedure: COLONOSCOPY;  Surgeon: Chuy Staton MD;  Location:  GI     CYSTOSCOPY       HYSTERECTOMY, PAP NO LONGER INDICATED       HYSTERECTOMY, JAIME  1991    fibroid     SURGICAL HISTORY OF -   10/28/2015    right partial knee      [facility] NONSPECIFIC PROCEDURE      Breast biopsies        [facility] NONSPECIFIC PROCEDURE      Symptomatic left thigh lipomas        [facility] NONSPECIFIC PROCEDURE  06/01/2009    pubovaginal sling      Social History     Tobacco Use     Smoking status: Never     Smokeless tobacco: Never   Substance Use Topics     Alcohol use: No     Alcohol/week: 0.0 standard drinks      Problem (# of Occurrences) Relation (Name,Age of Onset)    Cancer (1) Mother: colon ca survivor    Cerebrovascular Disease (1) Father    Cancer - colorectal (1) Mother    No Known Problems (8) Maternal Grandmother, Maternal Grandfather, Paternal Grandmother, Brother (1), Sister (1), Son (1), Daughter (1), Other            acetaminophen (TYLENOL) 500 MG tablet, Take 2 tablets by mouth every 8 hours as needed   alcohol swab prep pads, Use to swab area of injection/beau as directed  allopurinol (ZYLOPRIM) 300 MG tablet, Take 0.5 tablets (150 mg) by mouth 2 times daily  Ascorbic Acid (VITAMIN C) 500 MG CAPS, Take 500 mg by mouth daily  ASPIRIN 81 MG OR TABS, 1 tablet daily  blood glucose (CONTOUR NEXT TEST) test strip, Use to test blood sugar 4 times daily or as directed.  blood glucose calibration (NO BRAND SPECIFIED) solution, Use to calibrate blood glucose monitor as needed as directed. To accompany: Blood Glucose Monitor Brands: per insurance.  blood glucose monitoring (CONTOUR NEXT MONITOR W/DEVICE KIT) meter device kit, Use to test blood sugar 3 times daily or as directed.  chlorhexidine (PERIDEX) 0.12 % solution, RINSE ONE HALF  OZ 2-3 X D AFTER BREAKFAST BEFORE BEDTIME FOLLOWING BRUSHING AND FLOSSIN  Cholecalciferol (VITAMIN D) 2000 UNITS tablet, Take 1 tablet by mouth  "daily  CINNAMON 500 MG OR CAPS, 2 tablets daily  diclofenac (VOLTAREN) 1 % topical gel, Apply topically 4 times daily as needed for moderate pain  estradiol (ESTRACE) 0.1 MG/GM vaginal cream, Please use blueberry size amount in the vagina nightly for two weeks and then three times a week at night thereafter  famotidine (PEPCID) 20 MG tablet, Take 20 mg by mouth 2 times daily  ferrous gluconate (FERGON) 324 (38 Fe) MG tablet, Take 324 mg by mouth daily (with breakfast)  furosemide (LASIX) 20 MG tablet, Take 2 tablets (40 mg) by mouth daily  gabapentin (NEURONTIN) 300 MG capsule, TAKE 3 CAPSULES BY MOUTH AT BEDTIME  glipiZIDE (GLUCOTROL) 10 MG tablet, TAKE 1 TABLET BY MOUTH TWICE DAILY BEFORE MEAL(S)  insulin NPH (NOVOLIN N RELION) 100 UNIT/ML vial, INJECT 22 UNITS SUBCUTANEOUSLY BEFORE BREAKFAST AND 22 UNITS BEFORE DINNER  insulin syringe-needle U-100 (DROPLET INSULIN SYRINGE) 31G X 5/16\" 0.3 ML miscellaneous, Use two syringes daily or as directed.  Lactobacillus (PROBIOTIC ACIDOPHILUS) TABS, Take 1 tablet by mouth daily  lisinopril (ZESTRIL) 40 MG tablet, Take 1 tablet (40 mg) by mouth daily  magnesium 250 MG tablet, Take 1 tablet by mouth daily  metFORMIN (GLUCOPHAGE) 1000 MG tablet, TAKE 1 TABLET BY MOUTH TWICE DAILY WITH MEALS  metoprolol succinate ER (TOPROL XL) 50 MG 24 hr tablet, Take 1 tablet (50 mg) by mouth daily  rosuvastatin (CRESTOR) 40 MG tablet, Take 1 tablet (40 mg) by mouth daily  thin (NO BRAND SPECIFIED) lancets, Use to test blood sugar 3 times daily or as directed. To accompany: Blood Glucose Monitor Brands: per insurance.  TUMS 500 MG OR CHEW, 1 TABLET 3 TIMES PRN  fluconazole (DIFLUCAN) 150 MG tablet, TAKE 1 TABLET BY MOUTH EVERY 72 HOURS AS NEEDED (Patient not taking: Reported on 10/28/2022)    No current facility-administered medications on file prior to visit.    Allergies   Allergen Reactions     Augmentin      Tongue swelling and rash     Can take PCN K without reaction      Sulfa Drugs     "  Tongue swelling and rash       ROS:  Negative other than as noted in HPI.    OBJECTIVE:     Exam:  Constitutional:  Appearance: Well nourished, well developed alert, in no acute distress  Neurologic/Psychiatric:  Mental Status:  Oriented X3   Pelvis: EGBUS notable for just some slight erythema especially of the labia majora bilaterally.  No fissuring of the labia minora, interlabial sulci are normal, clitoral chappell is normal, introitus is normal.  Perineal body is normal.  No lesions are noted.      In-Clinic Test Results:  No results found. However, due to the size of the patient record, not all encounters were searched. Please check Results Review for a complete set of results.    ASSESSMENT/PLAN:                                                        ICD-10-CM    1. Vaginal irritation  N89.8 Wet prep - Clinic Collect            Wet prep is negative.  We discussed the continuation of soak and seal, discontinuation of vaginal sill.  We discussed that benzocaine not infrequently causes sensitization or allergic reaction, and I recommend against this use routinely.  Reviewed that with her complicated history, it is best for her not to use anything other than Aquaphor or plain petroleum jelly on the vulva and less it has been prescribed by our clinic.    For now, she will continue to use soak and seal, will notify me if things are getting worse instead of better.  I suspected that this will resolve with the discontinuation of the benzocaine and continued care of the vulvar skin as outlined above.    Noemí Sanon MD  [facility]

## 2022-11-03 ENCOUNTER — HOSPITAL ENCOUNTER (OUTPATIENT)
Dept: MAMMOGRAPHY | Facility: CLINIC | Age: 82
Discharge: HOME | End: 2022-11-03
Attending: NURSE PRACTITIONER | Admitting: NURSE PRACTITIONER
Payer: COMMERCIAL

## 2022-11-03 DIAGNOSIS — Z12.31 VISIT FOR SCREENING MAMMOGRAM: ICD-10-CM

## 2022-11-03 PROCEDURE — 77067 SCR MAMMO BI INCL CAD: CPT

## 2022-11-10 ENCOUNTER — TELEPHONE (OUTPATIENT)
Dept: PHARMACY | Facility: CLINIC | Age: 82
End: 2022-11-10

## 2022-11-10 NOTE — TELEPHONE ENCOUNTER
Called patient to follow-up. Left message for patient to call back.    Have not been able to connect with patient after multiple attempts.  No further attempts will be made at this time.  I would be happy to assist in the care of this patient in the future if needed.    Beti Jacobo , Pharm D  [phone] (phone)  [phone] (pager)  Medication Therapy Management Pharmacist

## 2022-11-22 ENCOUNTER — VIRTUAL VISIT (OUTPATIENT)
Dept: FAMILY MEDICINE | Facility: CLINIC | Age: 82
End: 2022-11-22
Payer: COMMERCIAL

## 2022-11-22 DIAGNOSIS — J01.90 ACUTE SINUSITIS WITH SYMPTOMS > 10 DAYS: Primary | ICD-10-CM

## 2022-11-22 PROCEDURE — 99213 OFFICE O/P EST LOW 20 MIN: CPT | Mod: 95 | Performed by: PHYSICIAN ASSISTANT

## 2022-11-22 RX ORDER — AMOXICILLIN 875 MG
875 TABLET ORAL 2 TIMES DAILY
Qty: 14 TABLET | Refills: 0 | Status: SHIPPED | OUTPATIENT
Start: 2022-11-22 | End: 2023-04-24

## 2022-11-22 NOTE — PROGRESS NOTES
Lindsey is a 82 year old who is being evaluated via a billable telephone visit.      What phone number would you like to be contacted at? [phone]  How would you like to obtain your AVS? Mail a copy    Assessment and Plan:     (J01.90) Acute sinusitis with symptoms > 10 days  (primary encounter diagnosis)  Comment: left-sided facial pain, dental pain, congestion and eye watering x one month, no fever/chills, discussed likely viral nature of illness but given duration of symptoms will try abx, she has tolerated amox well in the past  Plan: amoxicillin (AMOXIL) 875 MG tablet  Symptomatic cares with flonase and/or saline spray  See pcp if not better in 1-2 weeks      Blossom Lares PA-C        Subjective   Lindsey is a 82 year old, presenting for the following health issues:  Headache and Sinus Problem      HPI     Lindsey is seeing me for congestion x one month  She has had achey left dental monik  She has seen her dentist and he said she doesn't have any dental infection  She also has had post nasal drip, left runny eye, left ear pain and congestion with her symptoms  She wonders if she could have a sinus infection  She denies fever/chills, shortness of breath, cough  She has taken amoxicillin in the past w/out issue, she is allergic to augmentin    Review of Systems   See above      Objective           Vitals:  No vitals were obtained today due to virtual visit.    Physical Exam     No exam, phone visit        Phone call duration: 9 minutes

## 2022-11-23 ENCOUNTER — TELEPHONE (OUTPATIENT)
Dept: INTERNAL MEDICINE | Facility: CLINIC | Age: 82
End: 2022-11-23

## 2022-11-23 DIAGNOSIS — J32.9 SINUSITIS, UNSPECIFIED CHRONICITY, UNSPECIFIED LOCATION: Primary | ICD-10-CM

## 2022-11-23 RX ORDER — DOXYCYCLINE 100 MG/1
100 CAPSULE ORAL 2 TIMES DAILY
Qty: 20 CAPSULE | Refills: 0 | Status: SHIPPED | OUTPATIENT
Start: 2022-11-23 | End: 2023-04-24

## 2022-11-23 NOTE — TELEPHONE ENCOUNTER
Patient calling.  Had a virtual visit 11/22/22 - diagnosed with a sinus infection and given Amox 875mg.    Patient read online that people who are on Allopurinol and are diabetic, should not take higher dose of Amox.    Patient asking if its safe for her to take medication?  Or would provider recommend an alternate antibiotic?  If so, please send in antibiotic.    Please advise, thanks.  Routed to covering provider(s).

## 2022-11-23 NOTE — TELEPHONE ENCOUNTER
It is unlikely that she would have any issues taking the higher dose of amoxicillin, but I can submit a prescription for an alternative antibiotic, such as Z-Janes, if she is reluctant to continue with amoxicillin.

## 2022-11-23 NOTE — TELEPHONE ENCOUNTER
Patient informed of provider's message below.    States, in July 2019, was put on Doxycycline for a sinus infection.  She is ok with either Zpack or Doxy.    Please advise, thanks.

## 2022-11-28 ENCOUNTER — OFFICE VISIT (OUTPATIENT)
Dept: PHARMACY | Facility: CLINIC | Age: 82
End: 2022-11-28
Payer: COMMERCIAL

## 2022-11-28 VITALS
SYSTOLIC BLOOD PRESSURE: 118 MMHG | HEART RATE: 63 BPM | OXYGEN SATURATION: 97 % | BODY MASS INDEX: 30.93 KG/M2 | DIASTOLIC BLOOD PRESSURE: 58 MMHG | WEIGHT: 180.2 LBS

## 2022-11-28 DIAGNOSIS — E61.1 IRON DEFICIENCY: ICD-10-CM

## 2022-11-28 DIAGNOSIS — E78.5 HYPERLIPIDEMIA LDL GOAL <100: ICD-10-CM

## 2022-11-28 DIAGNOSIS — M19.90 ARTHRITIS: ICD-10-CM

## 2022-11-28 DIAGNOSIS — K21.00 GASTROESOPHAGEAL REFLUX DISEASE WITH ESOPHAGITIS, UNSPECIFIED WHETHER HEMORRHAGE: ICD-10-CM

## 2022-11-28 DIAGNOSIS — E11.9 TYPE 2 DIABETES MELLITUS WITHOUT COMPLICATION, WITHOUT LONG-TERM CURRENT USE OF INSULIN (H): Primary | ICD-10-CM

## 2022-11-28 DIAGNOSIS — Z71.85 VACCINE COUNSELING: ICD-10-CM

## 2022-11-28 DIAGNOSIS — M10.9 GOUT, UNSPECIFIED CAUSE, UNSPECIFIED CHRONICITY, UNSPECIFIED SITE: ICD-10-CM

## 2022-11-28 DIAGNOSIS — I10 HYPERTENSION GOAL BP (BLOOD PRESSURE) < 130/80: ICD-10-CM

## 2022-11-28 PROCEDURE — 99606 MTMS BY PHARM EST 15 MIN: CPT | Performed by: PHARMACIST

## 2022-11-28 PROCEDURE — 99607 MTMS BY PHARM ADDL 15 MIN: CPT | Performed by: PHARMACIST

## 2022-11-28 NOTE — PATIENT INSTRUCTIONS
"Recommendations from today's MTM visit:                                                       1.  Take ferrous gluconate with vitamin C every other day  2.  You can try the Voltaren for toe pain as needed  3.  Will refer to Diabetes Liaison team for help with patient assistance program on Ozempic    Follow-up: Return in about 9 weeks (around 1/30/2023) for Medication Therapy Management.    It was great speaking with you today.  I value your experience and would be very thankful for your time in providing feedback in our clinic survey. In the next few days, you may receive an email or text message from [employer] with a link to a survey related to your  clinical pharmacist.\"     To schedule another MTM appointment, please call the clinic directly or you may call the MTM scheduling line at [phone] or toll-free at [phone].     My Clinical Pharmacist's contact information:                                                      Please feel free to contact me with any questions or concerns you have.      Beti Jacobo , Pharm D  [phone] (phone)  Medication Therapy Management Pharmacist     "

## 2022-11-28 NOTE — Clinical Note
Diabetes Liason Team- could you help start the Ozempic PAP forms for Lindsey?  She wants to see if she qualifies for help before starting.

## 2022-11-28 NOTE — PROGRESS NOTES
Medication Therapy Management (MTM) Encounter    ASSESSMENT:                            Medication Adherence/Access: No issues identified    Macular Degenerative Edema:  Follow-up with provider as planned.    Type 2 Diabetes:  A1c at goal <8%.  Home readings at goal per patient report.  She is interested in trying Ozempic for the additional benefits of weight loss and cardiac health but very concerned with starting and not being able to continue once she hits the coverage gap.  Reviewed changes to Medicare coverage in 2023 - patients paying 25% in gap coverage.  She still would like to see if she qualifies for the patient assistance program to help prevent her from getting into the gap.    Arthritis:  She can try Voltaren on her toes for ongoing pain.    Gout: stable. Uric acid at goal <6.    Anemia:  Iron labs and HGB improved but still below goal.  Advised to take iron with vitamin C every other day.    Hypertension: stable    Hyperlipidemia:  stable    GERD/Hiatal Hernia: stable. Could consider restarting PPI if symptoms persistent; she was not interested at this time.    Vaccines: due for COVID Bivalent booster - plans to get soon but not wanting today    PLAN:                            1.  Take ferrous gluconate with vitamin C every other day  2.  You can try the Voltaren for toe pain as needed  3.  Will refer to Diabetes Liaison team for help with patient assistance program on Ozempic    Follow-up: Return in about 9 weeks (around 1/30/2023) for Medication Therapy Management.    SUBJECTIVE/OBJECTIVE:                          Lindsey Gary is a 82 year old female coming in for a follow-up visit.  Today's visit is a follow-up MTM visit from 8/9/22/     Reason for visit: diabetes    Allergies/ADRs: Reviewed in chart  Tobacco: She reports that she has never smoked. She has never used smokeless tobacco.  Alcohol: not currently using    Medication Adherence/Access: no issues reported    Macular Degenerative Edema:   Avastin Injections monthly in left eye- four injections so far.  Has discussed changing therapy due to lack of response.    Also has to have cornea transplant, scheduled on .      Type 2 Diabetes:  Currently taking metformin 1000 mg twice daily, glipizide 10 mg twice daily and NPH 22 units every morning and 22 units in the evening (breakfast and dinner). Patient is not experiencing side effects.  Ozempic covered for $47 until she reaches coverage gap; very concerned about getting into the coverage gap next year.  Is interested in seeing if she qualifies for Ozempic patient assistance.      Blood sugar monitorin-3 time(s) daily. Ranges (patient reported): 2 weeks average 145-147s    Symptoms of low blood sugar? Feel lows when in the 100s; not often in that low.  A couple of weeks ago was 57 when she didn't eat most of the day.    Symptoms of high blood sugar? none  Eye exam: up to date  Foot exam: due, reports burning and pain in toes  Aspirin: Taking 81mg daily for primary prevention   Statin: Yes:    ACEi/ARB: Yes: .   Urine Albumin:   Lab Results   Component Value Date    UMALCR 255.14 (H) 2020      Lab Results   Component Value Date    A1C 7.5 10/10/2022    A1C 7.2 2022    A1C 6.7 2022    A1C 7.2 2021    A1C 7.6 2021    A1C 7.3 2020    A1C 6.8 2019    A1C 7.1 2019    A1C 7.0 2018       Arthritis:  Taking Tylenol 1000 mg twice daily. Questions if she should be seeing an Rheumatologist.  Saw one in the past, told she did not have RA but did not address OA.  Continues to have pain in her feet and hands.  Does use Voltaren and that is helpful for her hip.    Gout: Currently taking allopurinol 150 mg twice daily. Reports pain in toes.   Patient is experiencing the following medication side effects: none.   Uric Acid   Date Value Ref Range Status   10/10/2022 3.0 2.4 - 5.7 mg/dL Final   2020 4.8 2.6 - 6.0 mg/dL Final       Anemia:  Taking ferrous  gluconate 324 mg daily and vitamin C 500 mg daily but not at the same time.  Has had more nausea but no other side effects.    Hemoglobin   Date Value Ref Range Status   10/10/2022 10.9 (L) 11.7 - 15.7 g/dL Final   07/06/2021 10.7 (L) 11.7 - 15.7 g/dL Final   ]        Hypertension: Current medications include lisinopril 40 mg daily, metoprolol ER 50 mg daily and furosemide 40 mg daily (taking more as needed but needing 5 out of 7 days per week).  Patient does not self-monitor blood pressure.  Patient reports no current medication side effects.  BP Readings from Last 3 Encounters:   10/28/22 122/60   09/30/22 114/80   09/28/22 118/60     Potassium   Date Value Ref Range Status   10/10/2022 4.6 3.4 - 5.3 mmol/L Final   07/07/2022 3.8 3.4 - 5.3 mmol/L Final   07/06/2021 4.8 3.4 - 5.3 mmol/L Final       Hyperlipidemia:  Taking rosuvastatin 40 mg daily.  No concerns at this time.  Recent Labs   Lab Test 10/10/22  1101 10/11/21  0917 12/15/15  0906 12/09/14  0946 09/17/14  0918   CHOL 113 123   < > 149 164   HDL 29* 31*   < > 35* 34*   LDL 43 63   < > 83 87   TRIG 205* 144   < > 154* 213*   CHOLHDLRATIO  --   --   --  4.3 4.8    < > = values in this interval not displayed.     GERD/Hiatal Hernia: Current medications include: Pepcid (famotidine) 20 mg twice daily and TUMS as needed- changed from PPI by ENT provider.   Patient reports always feeling like she has something stuck in her throat.  Doesn't feel she felt better on PPI.  Avoids triggers in her diet and elevates her bed.      Today's Vitals: /58   Pulse 63   Wt 180 lb 3.2 oz (81.7 kg)   LMP  (LMP Unknown)   SpO2 97%   BMI 30.93 kg/m    ----------------      I spent 45 minutes with this patient today. All changes were made via collaborative practice agreement with LILLIAM Sam CNP. A copy of the visit note was provided to the patient's provider(s).    The patient was given a summary of these recommendations.     Beti Jacobo , Pharm  [facility]  [phone] (phone)  Medication Therapy Management Pharmacist         Distant Location (provider location):  On-site  Provider has received verbal consent for a visit from the patient? Yes     Medication Therapy Recommendations  Arthritis    Current Medication: diclofenac (VOLTAREN) 1 % topical gel   Rationale: Synergistic therapy - Needs additional medication therapy - Indication   Recommendation: Start Medication   Status: Accepted - no CPA Needed         Iron deficiency    Current Medication: ferrous gluconate (FERGON) 324 (38 Fe) MG tablet   Rationale: Incorrect administration - Dosage too low - Effectiveness   Recommendation: Increase Dose   Status: Patient Agreed - Adherence/Education

## 2022-12-05 ENCOUNTER — TELEPHONE (OUTPATIENT)
Dept: INTERNAL MEDICINE | Facility: CLINIC | Age: 82
End: 2022-12-05

## 2022-12-05 NOTE — TELEPHONE ENCOUNTER
Reason for Call:  Appointment Request    Patient requesting this type of appt: Pre-op    Requested provider: Dinorah Wong    Reason patient unable to be scheduled: Not within requested timeframe    When does patient want to be seen/preferred time: 1-2 weeks    Comments: Patient is having Eye Surgery on 01/19/23 with [facility] and they would like her preop to before done before January. No openings with anybody at the clinic, can she be fitted into the schedule?    Could we send this information to you in Text A Cab or would you prefer to receive a phone call?:   Patient would prefer a phone call   Okay to leave a detailed message?: Yes at Home number on file [phone] (home)    Genaro Wheatley   [facility]  Central Scheduler  Call taken on 12/5/2022 at 12:30 PM by GENARO WHEATLEY

## 2022-12-07 ENCOUNTER — TELEPHONE (OUTPATIENT)
Dept: INTERNAL MEDICINE | Facility: CLINIC | Age: 82
End: 2022-12-07

## 2022-12-07 DIAGNOSIS — Z79.2 PROPHYLACTIC ANTIBIOTIC: Primary | ICD-10-CM

## 2022-12-07 RX ORDER — CLINDAMYCIN HCL 300 MG
600 CAPSULE ORAL
Qty: 2 CAPSULE | Refills: 3 | Status: SHIPPED | OUTPATIENT
Start: 2022-12-07 | End: 2023-10-06

## 2022-12-07 NOTE — TELEPHONE ENCOUNTER
Patient calling to ask for a rx for an antibiotic because she is going to the dentist for a cleaning on 12/12/22.

## 2022-12-30 ENCOUNTER — OFFICE VISIT (OUTPATIENT)
Dept: INTERNAL MEDICINE | Facility: CLINIC | Age: 82
End: 2022-12-30
Payer: COMMERCIAL

## 2022-12-30 VITALS
RESPIRATION RATE: 20 BRPM | DIASTOLIC BLOOD PRESSURE: 72 MMHG | TEMPERATURE: 97.6 F | WEIGHT: 179 LBS | SYSTOLIC BLOOD PRESSURE: 152 MMHG | BODY MASS INDEX: 30.56 KG/M2 | HEART RATE: 60 BPM | OXYGEN SATURATION: 99 % | HEIGHT: 64 IN

## 2022-12-30 DIAGNOSIS — E11.8 TYPE 2 DIABETES MELLITUS WITH COMPLICATION, WITHOUT LONG-TERM CURRENT USE OF INSULIN (H): ICD-10-CM

## 2022-12-30 DIAGNOSIS — Z01.818 PRE-OP EXAM: Primary | ICD-10-CM

## 2022-12-30 DIAGNOSIS — E66.811 CLASS 1 OBESITY DUE TO EXCESS CALORIES WITH SERIOUS COMORBIDITY AND BODY MASS INDEX (BMI) OF 30.0 TO 30.9 IN ADULT: ICD-10-CM

## 2022-12-30 DIAGNOSIS — I10 ESSENTIAL HYPERTENSION: ICD-10-CM

## 2022-12-30 DIAGNOSIS — I10 HYPERTENSION GOAL BP (BLOOD PRESSURE) < 130/80: ICD-10-CM

## 2022-12-30 DIAGNOSIS — H18.512 FUCHS' CORNEAL DYSTROPHY OF LEFT EYE: ICD-10-CM

## 2022-12-30 DIAGNOSIS — E78.5 HYPERLIPIDEMIA LDL GOAL <100: ICD-10-CM

## 2022-12-30 DIAGNOSIS — E66.09 CLASS 1 OBESITY DUE TO EXCESS CALORIES WITH SERIOUS COMORBIDITY AND BODY MASS INDEX (BMI) OF 30.0 TO 30.9 IN ADULT: ICD-10-CM

## 2022-12-30 DIAGNOSIS — R82.90 URINE ABNORMALITY: ICD-10-CM

## 2022-12-30 DIAGNOSIS — N18.32 STAGE 3B CHRONIC KIDNEY DISEASE (H): ICD-10-CM

## 2022-12-30 LAB
ALBUMIN SERPL BCG-MCNC: 4.7 G/DL (ref 3.5–5.2)
ALP SERPL-CCNC: 65 U/L (ref 35–104)
ALT SERPL W P-5'-P-CCNC: 21 U/L (ref 10–35)
ANION GAP SERPL CALCULATED.3IONS-SCNC: 14 MMOL/L (ref 7–15)
AST SERPL W P-5'-P-CCNC: 30 U/L (ref 10–35)
BASOPHILS # BLD AUTO: 0 10E3/UL (ref 0–0.2)
BASOPHILS NFR BLD AUTO: 1 %
BILIRUB SERPL-MCNC: 0.6 MG/DL
BUN SERPL-MCNC: 17.8 MG/DL (ref 8–23)
CALCIUM SERPL-MCNC: 10.4 MG/DL (ref 8.8–10.2)
CHLORIDE SERPL-SCNC: 101 MMOL/L (ref 98–107)
CHOLEST SERPL-MCNC: 128 MG/DL
CREAT SERPL-MCNC: 0.97 MG/DL (ref 0.51–0.95)
DEPRECATED HCO3 PLAS-SCNC: 25 MMOL/L (ref 22–29)
EOSINOPHIL # BLD AUTO: 0.1 10E3/UL (ref 0–0.7)
EOSINOPHIL NFR BLD AUTO: 2 %
ERYTHROCYTE [DISTWIDTH] IN BLOOD BY AUTOMATED COUNT: 13.6 % (ref 10–15)
FERRITIN SERPL-MCNC: 25 NG/ML (ref 11–328)
GFR SERPL CREATININE-BSD FRML MDRD: 59 ML/MIN/1.73M2
GLUCOSE SERPL-MCNC: 163 MG/DL (ref 70–99)
HBA1C MFR BLD: 7.1 % (ref 0–5.6)
HCT VFR BLD AUTO: 37.6 % (ref 35–47)
HDLC SERPL-MCNC: 31 MG/DL
HGB BLD-MCNC: 12.1 G/DL (ref 11.7–15.7)
IMM GRANULOCYTES # BLD: 0 10E3/UL
IMM GRANULOCYTES NFR BLD: 0 %
IRON BINDING CAPACITY (ROCHE): 429 UG/DL (ref 240–430)
IRON SATN MFR SERPL: 11 % (ref 15–46)
IRON SERPL-MCNC: 49 UG/DL (ref 37–145)
LDLC SERPL CALC-MCNC: 64 MG/DL
LYMPHOCYTES # BLD AUTO: 1.9 10E3/UL (ref 0.8–5.3)
LYMPHOCYTES NFR BLD AUTO: 34 %
MCH RBC QN AUTO: 28.5 PG (ref 26.5–33)
MCHC RBC AUTO-ENTMCNC: 32.2 G/DL (ref 31.5–36.5)
MCV RBC AUTO: 89 FL (ref 78–100)
MONOCYTES # BLD AUTO: 0.3 10E3/UL (ref 0–1.3)
MONOCYTES NFR BLD AUTO: 6 %
NEUTROPHILS # BLD AUTO: 3.2 10E3/UL (ref 1.6–8.3)
NEUTROPHILS NFR BLD AUTO: 57 %
NONHDLC SERPL-MCNC: 97 MG/DL
PLATELET # BLD AUTO: 225 10E3/UL (ref 150–450)
POTASSIUM SERPL-SCNC: 4.3 MMOL/L (ref 3.4–5.3)
PROT SERPL-MCNC: 7.4 G/DL (ref 6.4–8.3)
RBC # BLD AUTO: 4.25 10E6/UL (ref 3.8–5.2)
SODIUM SERPL-SCNC: 140 MMOL/L (ref 136–145)
TRIGL SERPL-MCNC: 164 MG/DL
TSH SERPL DL<=0.005 MIU/L-ACNC: 1.88 UIU/ML (ref 0.3–4.2)
URATE SERPL-MCNC: 3.7 MG/DL (ref 2.4–5.7)
WBC # BLD AUTO: 5.7 10E3/UL (ref 4–11)

## 2022-12-30 PROCEDURE — 80061 LIPID PANEL: CPT | Performed by: NURSE PRACTITIONER

## 2022-12-30 PROCEDURE — 83540 ASSAY OF IRON: CPT | Performed by: NURSE PRACTITIONER

## 2022-12-30 PROCEDURE — 82728 ASSAY OF FERRITIN: CPT | Performed by: NURSE PRACTITIONER

## 2022-12-30 PROCEDURE — 99214 OFFICE O/P EST MOD 30 MIN: CPT | Performed by: NURSE PRACTITIONER

## 2022-12-30 PROCEDURE — 84443 ASSAY THYROID STIM HORMONE: CPT | Performed by: NURSE PRACTITIONER

## 2022-12-30 PROCEDURE — 80053 COMPREHEN METABOLIC PANEL: CPT | Performed by: NURSE PRACTITIONER

## 2022-12-30 PROCEDURE — 85025 COMPLETE CBC W/AUTO DIFF WBC: CPT | Performed by: NURSE PRACTITIONER

## 2022-12-30 PROCEDURE — 84550 ASSAY OF BLOOD/URIC ACID: CPT | Performed by: NURSE PRACTITIONER

## 2022-12-30 PROCEDURE — 83550 IRON BINDING TEST: CPT | Performed by: NURSE PRACTITIONER

## 2022-12-30 PROCEDURE — 36415 COLL VENOUS BLD VENIPUNCTURE: CPT | Performed by: NURSE PRACTITIONER

## 2022-12-30 PROCEDURE — 83036 HEMOGLOBIN GLYCOSYLATED A1C: CPT | Performed by: NURSE PRACTITIONER

## 2022-12-30 PROCEDURE — 93000 ELECTROCARDIOGRAM COMPLETE: CPT | Performed by: NURSE PRACTITIONER

## 2022-12-30 NOTE — PATIENT INSTRUCTIONS
Lab in [address]    Hold aspirin for 7 days prior to procedure     Morning of surgery DO NOT TAKE  Iron   Lasix  Glipizide   Lisinopril   Metformin     May take morning   Pepcid   Metoprolol   Crestor     NPH hold until you are home and eating   If late appointment may do 10 units NPH in am     [facility]  [email or web address]  [address], [place] [address]   ~4.7 mi  [phone]

## 2022-12-30 NOTE — PROGRESS NOTES
[place] [address]  [address]  [facility] [address]  Phone: [phone]  Primary Provider: Dinorah Wong  Pre-op Performing Provider: DINORAH WONG      PREOPERATIVE EVALUATION:  Today's date: 12/30/2022    Lindsey Gary is a 82 year old female who presents for a preoperative evaluation.    Surgical Information:  Surgery/Procedure: left eye cornea implant  Surgery Location: [facility]  Surgeon: Rob  Surgery Date: 1-19-23  Time of Surgery: tbd  Where patient plans to recover: At home with family  Fax number for surgical facility: [phone] and [phone]    Type of Anesthesia Anticipated: to be determined    Assessment & Plan     The proposed surgical procedure is considered LOW risk.    Pre-op exam    - EKG 12-lead complete w/read - Clinics  - Basic metabolic panel  (Ca, Cl, CO2, Creat, Gluc, K, Na, BUN); Future    Fuchs' corneal dystrophy of left eye  needs corneal transplant     Type 2 diabetes mellitus with complication, without long-term current use of insulin (H)  Controlled   - Basic metabolic panel  (Ca, Cl, CO2, Creat, Gluc, K, Na, BUN); Future  - Hemoglobin A1c; Future  - Lipid panel reflex to direct LDL Fasting    Essential hypertension  Elevated in clinic today but has been well controlled      Stage 3b chronic kidney disease (H)    Class 1 obesity due to excess calories with serious comorbidity and body mass index (BMI) of 30.0 to 30.9 in adult    - Lipid panel reflex to direct LDL Fasting    Hypertension goal BP (blood pressure) < 130/80    - Lipid panel reflex to direct LDL Fasting    Hyperlipidemia LDL goal <100    - Lipid panel reflex to direct LDL Fasting           Risks and Recommendations:  The patient has the following additional risks and recommendations for perioperative complications:  Diabetes:  - Patient is on insulin therapy; diabetic NPO guidelines provided and discussed.    Medication Instructions:  Patient is to  take all scheduled medications on the day of surgery EXCEPT for modifications listed below:    Hold aspirin for 7 days prior to procedure     Morning of surgery DO NOT TAKE  Iron   Lasix  Glipizide   Lisinopril   Metformin     May take morning   Pepcid   Metoprolol   Crestor     NPH hold until you are home and eating   If late appointment may do 10 units NPH in am     RECOMMENDATION:  APPROVAL GIVEN to proceed with proposed procedure, without further diagnostic evaluation.      33 minutes spent on the date of the encounter doing chart review, history and exam, documentation and further activities per the note      Subjective     HPI related to upcoming procedure: fuchs dystrophy - corneal transplant to be done       Preop Questions 12/30/2022   1. Have you ever had a heart attack or stroke? No   2. Have you ever had surgery on your heart or blood vessels, such as a stent placement, a coronary artery bypass, or surgery on an artery in your head, neck, heart, or legs? No   3. Do you have chest pain with activity? No   4. Do you have a history of  heart failure? No   5. Do you currently have a cold, bronchitis or symptoms of other infection? No   6. Do you have a cough, shortness of breath, or wheezing? No   7. Do you or anyone in your family have previous history of blood clots? No   8. Do you or does anyone in your family have a serious bleeding problem such as prolonged bleeding following surgeries or cuts? No   9. Have you ever had problems with anemia or been told to take iron pills? YES - currently on iron  Hemoglobin   Date Value Ref Range Status   10/10/2022 10.9 (L) 11.7 - 15.7 g/dL Final   07/06/2021 10.7 (L) 11.7 - 15.7 g/dL Final   ]     10. Have you had any abnormal blood loss such as black, tarry or bloody stools, or abnormal vaginal bleeding? No   11. Have you ever had a blood transfusion? UNKNOWN - possibly with first child 1967   12. Are you willing to have a blood transfusion if it is medically needed  before, during, or after your surgery? Yes   13. Have you or any of your relatives ever had problems with anesthesia? No   14. Do you have sleep apnea, excessive snoring or daytime drowsiness? No   15. Do you have any artifical heart valves or other implanted medical devices like a pacemaker, defibrillator, or continuous glucose monitor? No   16. Do you have artificial joints? YES - bilateral knee replacement    17. Are you allergic to latex? No     Health Care Directive:  Patient does not have a Health Care Directive or Living Will: Discussed advance care planning with patient; however, patient declined at this time.    Preoperative Review of :   reviewed - no record of controlled substances prescribed.      Status of Chronic Conditions:  DIABETES - Patient has a longstanding history of DiabetesType Type II . Patient is being treated with oral agents and insulin injections and denies significant side effects. Control has been good.      HYPERLIPIDEMIA - Patient has a long history of significant Hyperlipidemia requiring medication for treatment with recent good control. Patient reports no problems or side effects with the medication.     HYPERTENSION - Patient has longstanding history of HTN , currently denies any symptoms referable to elevated blood pressure. Specifically denies chest pain, palpitations, dyspnea, orthopnea, PND or peripheral edema. Blood pressure readings have been in normal range. Current medication regimen is as listed below. Patient denies any side effects of medication.     RENAL INSUFFICIENCY - Patient has a longstanding history of moderate-severe chronic renal insufficiency. Last Cr   Creatinine   Date Value Ref Range Status   10/10/2022 0.96 (H) 0.51 - 0.95 mg/dL Final   07/06/2021 1.17 (H) 0.52 - 1.04 mg/dL Final     .       Review of Systems  CONSTITUTIONAL: NEGATIVE for fever, chills, change in weight  INTEGUMENTARY/SKIN: NEGATIVE for worrisome rashes, moles or lesions  EYES: known  corneal issue  ENT/MOUTH: NEGATIVE for ear, mouth and throat problems  RESP: NEGATIVE for significant cough or SOB  CV: NEGATIVE for chest pain, palpitations or peripheral edema  GI: NEGATIVE for nausea, abdominal pain, heartburn, or change in bowel habits  : NEGATIVE for frequency, dysuria, or hematuria  MUSCULOSKELETAL: NEGATIVE for significant arthralgias or myalgia  NEURO: NEGATIVE for weakness, dizziness or paresthesias  ENDOCRINE: NEGATIVE for temperature intolerance, skin/hair changes  HEME: NEGATIVE for bleeding problems  PSYCHIATRIC: NEGATIVE for changes in mood or affect    Patient Active Problem List    Diagnosis Date Noted     Hyperlipidemia LDL goal <100 10/31/2010     Priority: High     Essential hypertension 12/24/2006     Priority: High     Problem list name updated by automated process. Provider to review       Chronic kidney disease, stage 3 (H) 07/14/2021     Priority: Medium     Psoriasis 12/09/2016     Priority: Medium     Status post total knee replacement, unspecified laterality 09/12/2016     Priority: Medium     Type 2 diabetes mellitus with complication, without long-term current use of insulin (H) 10/16/2015     Priority: Medium     Bereavement 10/28/2013     Priority: Medium     Obesity 10/28/2013     Priority: Medium     Hypertension goal BP (blood pressure) < 130/80 07/16/2013     Priority: Medium     Chronic foot pain 12/26/2012     Priority: Medium     CHONDROCALC NOS-OTHER SITE(aka PSEUDOGOUT) 11/11/2004     Priority: Medium     Gout 07/15/2004     Priority: Medium     Problem list name updated by automated process. Provider to review       Rosacea 07/25/2002     Priority: Medium     Atrophic vaginitis 04/27/2012     Priority: Low     Advanced directives, counseling/discussion 03/30/2012     Priority: Low     Discussed Advance Directive planning with patient; information given to patient to review.        Past Medical History:   Diagnosis Date     Arthritis      Essential  hypertension, benign      Gout      Mixed hyperlipidemia      Mumps      Pain in joint, ankle and foot     gout     Type II or unspecified type diabetes mellitus without mention of complication, not stated as uncontrolled     Diabetes mellitus     Past Surgical History:   Procedure Laterality Date     BLADDER SURGERY       COLONOSCOPY       COLONOSCOPY N/A 12/17/2014    Procedure: COMBINED COLONOSCOPY, SINGLE OR MULTIPLE BIOPSY/POLYPECTOMY BY BIOPSY;  Surgeon: Chuy Staton MD;  Location:  GI     COLONOSCOPY N/A 08/07/2020    Procedure: COLONOSCOPY;  Surgeon: Chuy Staton MD;  Location:  GI     CYSTOSCOPY       HYSTERECTOMY, PAP NO LONGER INDICATED       HYSTERECTOMY, JAIME  1991    fibroid     SURGICAL HISTORY OF -   10/28/2015    right partial knee      [facility] NONSPECIFIC PROCEDURE      Breast biopsies        [facility] NONSPECIFIC PROCEDURE      Symptomatic left thigh lipomas        Z NONSPECIFIC PROCEDURE  06/01/2009    pubovaginal sling     Current Outpatient Medications   Medication Sig Dispense Refill     acetaminophen (TYLENOL) 500 MG tablet Take 2 tablets by mouth every 8 hours as needed        alcohol swab prep pads Use to swab area of injection/beau as directed 100 each 3     allopurinol (ZYLOPRIM) 300 MG tablet Take 0.5 tablets (150 mg) by mouth 2 times daily 90 tablet 3     amoxicillin (AMOXIL) 875 MG tablet Take 1 tablet (875 mg) by mouth 2 times daily 14 tablet 0     Ascorbic Acid (VITAMIN C) 500 MG CAPS Take 500 mg by mouth daily       ASPIRIN 81 MG OR TABS 1 tablet daily 100 3     blood glucose (CONTOUR NEXT TEST) test strip Use to test blood sugar 4 times daily or as directed. 150 strip 11     blood glucose calibration (NO BRAND SPECIFIED) solution Use to calibrate blood glucose monitor as needed as directed. To accompany: Blood Glucose Monitor Brands: per insurance. 1 each 11     blood glucose monitoring (CONTOUR NEXT MONITOR W/DEVICE KIT) meter device kit Use to test blood sugar 3 times  "daily or as directed. 1 kit 0     chlorhexidine (PERIDEX) 0.12 % solution RINSE ONE HALF  OZ 2-3 X D AFTER BREAKFAST BEFORE BEDTIME FOLLOWING BRUSHING AND FLOSSIN       Cholecalciferol (VITAMIN D) 2000 UNITS tablet Take 1 tablet by mouth daily       CINNAMON 500 MG OR CAPS 2 tablets daily  0     clindamycin (CLEOCIN) 300 MG capsule Take 2 capsules (600 mg) by mouth once as needed For dental visit 2 capsule 3     diclofenac (VOLTAREN) 1 % topical gel Apply topically 4 times daily as needed for moderate pain       doxycycline hyclate (VIBRAMYCIN) 100 MG capsule Take 1 capsule (100 mg) by mouth 2 times daily 20 capsule 0     estradiol (ESTRACE) 0.1 MG/GM vaginal cream Please use blueberry size amount in the vagina nightly for two weeks and then three times a week at night thereafter 42.5 g 3     famotidine (PEPCID) 20 MG tablet Take 20 mg by mouth 2 times daily       ferrous gluconate (FERGON) 324 (38 Fe) MG tablet Take 324 mg by mouth every other day       fluconazole (DIFLUCAN) 150 MG tablet TAKE 1 TABLET BY MOUTH EVERY 72 HOURS AS NEEDED 30 tablet 0     furosemide (LASIX) 20 MG tablet Take 2 tablets (40 mg) by mouth daily 180 tablet 1     gabapentin (NEURONTIN) 300 MG capsule TAKE 3 CAPSULES BY MOUTH AT BEDTIME 270 capsule 3     glipiZIDE (GLUCOTROL) 10 MG tablet TAKE 1 TABLET BY MOUTH TWICE DAILY BEFORE MEAL(S) 180 tablet 0     insulin NPH (NOVOLIN N RELION) 100 UNIT/ML vial INJECT 22 UNITS SUBCUTANEOUSLY BEFORE BREAKFAST AND 22 UNITS BEFORE DINNER 20 mL 0     insulin syringe-needle U-100 (DROPLET INSULIN SYRINGE) 31G X 5/16\" 0.3 ML miscellaneous Use two syringes daily or as directed. 200 each 1     Lactobacillus (PROBIOTIC ACIDOPHILUS) TABS Take 1 tablet by mouth daily       lisinopril (ZESTRIL) 40 MG tablet Take 1 tablet (40 mg) by mouth daily 90 tablet 2     magnesium 250 MG tablet Take 1 tablet by mouth daily       metFORMIN (GLUCOPHAGE) 1000 MG tablet TAKE 1 TABLET BY MOUTH TWICE DAILY WITH MEALS 180 tablet 3 "     metoprolol succinate ER (TOPROL XL) 50 MG 24 hr tablet Take 1 tablet (50 mg) by mouth daily 90 tablet 3     rosuvastatin (CRESTOR) 40 MG tablet Take 1 tablet (40 mg) by mouth daily 90 tablet 3     thin (NO BRAND SPECIFIED) lancets Use to test blood sugar 3 times daily or as directed. To accompany: Blood Glucose Monitor Brands: per insurance. 1 each 3     TUMS 500 MG OR CHEW 1 TABLET 3 TIMES PRN 90 0       Allergies   Allergen Reactions     Augmentin      Tongue swelling and rash     Can take PCN K without reaction      Sulfa Drugs      Tongue swelling and rash        Social History     Tobacco Use     Smoking status: Never     Smokeless tobacco: Never   Substance Use Topics     Alcohol use: No     Alcohol/week: 0.0 standard drinks     Family History   Problem Relation Age of Onset     Cancer Mother         colon ca survivor     Cancer - colorectal Mother      Cerebrovascular Disease Father      No Known Problems Maternal Grandmother      No Known Problems Maternal Grandfather      No Known Problems Paternal Grandmother      No Known Problems Brother      No Known Problems Sister      No Known Problems Son      No Known Problems Daughter      No Known Problems Other      History   Drug Use No         Objective     LMP  (LMP Unknown)     Physical Exam    GENERAL APPEARANCE: alert and no distress     EYES: left eye known corneal issue      HENT: ear canals and TM's normal and nose and mouth without ulcers or lesions     NECK: no adenopathy, no asymmetry, masses, or scars and thyroid normal to palpation     RESP: lungs clear to auscultation - no rales, rhonchi or wheezes     CV: regular rates and rhythm, normal S1 S2, no S3 or S4 and no murmur, click or rub     ABDOMEN:  soft, nontender, no HSM or masses and bowel sounds normal     MS: extremities normal- no gross deformities noted, no evidence of inflammation in joints, FROM in all extremities.     SKIN: no suspicious lesions or rashes     NEURO: Normal strength  and tone, sensory exam grossly normal, mentation intact and speech normal     PSYCH: mentation appears normal. and affect normal/bright    Recent Labs   Lab Test 10/10/22  1102 10/10/22  1059 07/07/22  0844 01/25/22  1018   HGB 10.9*  --   --  11.5*     --   --  162   NA  --  138 143 140   POTASSIUM  --  4.6 3.8 4.2   CR  --  0.96* 1.21* 0.98   A1C 7.5*  --  7.2* 6.7*        Diagnostics:  Labs pending at this time.  Results will be reviewed when available.   EKG: appears normal, NSR, unchanged from previous tracings    Revised Cardiac Risk Index (RCRI):  The patient has the following serious cardiovascular risks for perioperative complications:   - No serious cardiac risks = 0 points     RCRI Interpretation: 1 point: Class II (low risk - 0.9% complication rate)           Signed Electronically by: LILLIAM Sam CNP  Copy of this evaluation report is provided to requesting physician.

## 2023-01-02 LAB
CREAT UR-MCNC: 37.9 MG/DL
MICROALBUMIN UR-MCNC: 12.1 MG/L
MICROALBUMIN/CREAT UR: 31.93 MG/G CR (ref 0–25)

## 2023-01-02 PROCEDURE — 87086 URINE CULTURE/COLONY COUNT: CPT | Performed by: NURSE PRACTITIONER

## 2023-01-02 PROCEDURE — 82043 UR ALBUMIN QUANTITATIVE: CPT | Performed by: NURSE PRACTITIONER

## 2023-01-02 PROCEDURE — 82570 ASSAY OF URINE CREATININE: CPT | Performed by: NURSE PRACTITIONER

## 2023-01-03 DIAGNOSIS — M10.9 GOUT, UNSPECIFIED CAUSE, UNSPECIFIED CHRONICITY, UNSPECIFIED SITE: ICD-10-CM

## 2023-01-03 DIAGNOSIS — E11.9 TYPE 2 DIABETES MELLITUS WITHOUT COMPLICATION, WITHOUT LONG-TERM CURRENT USE OF INSULIN (H): ICD-10-CM

## 2023-01-04 LAB — BACTERIA UR CULT: NO GROWTH

## 2023-01-04 RX ORDER — ALLOPURINOL 300 MG/1
TABLET ORAL
Qty: 90 TABLET | Refills: 1 | Status: SHIPPED | OUTPATIENT
Start: 2023-01-04 | End: 2023-07-06

## 2023-01-04 RX ORDER — GLIPIZIDE 10 MG/1
TABLET ORAL
Qty: 180 TABLET | Refills: 1 | Status: SHIPPED | OUTPATIENT
Start: 2023-01-04 | End: 2023-07-06

## 2023-01-04 NOTE — TELEPHONE ENCOUNTER
Routing refill request to provider for review/approval because:  Labs out of range:    Creatinine   Date Value Ref Range Status   12/30/2022 0.97 (H) 0.51 - 0.95 mg/dL Final   07/06/2021 1.17 (H) 0.52 - 1.04 mg/dL Final     Remigio MUNGUIA RN, BSN

## 2023-01-04 NOTE — TELEPHONE ENCOUNTER
"Requested Prescriptions   Pending Prescriptions Disp Refills     ACCU-CHEK GUIDE test strip [Pharmacy Med Name: ACCU-CHEK GUIDE   Strip] 300 strip 0     Sig: TEST BLOOD SUGAR THREE TIMES DAILY  OR AS DIRECTED       Diabetic Supplies Protocol Passed - 11/19/2019  9:37 AM        Passed - Medication is active on med list        Passed - Patient is 18 years of age or older        Passed - Recent (6 mo) or future (30 days) visit within the authorizing provider's specialty     Patient had office visit in the last 6 months or has a visit in the next 30 days with authorizing provider.  See \"Patient Info\" tab in inbasket, or \"Choose Columns\" in Meds & Orders section of the refill encounter.            Last Written Prescription Date:  4/11/19  Last Fill Quantity: 300,  # refills: 1   Last office visit: 8/19/2019 with prescribing provider:  7/18/19   Future Office Visit:   Next 5 appointments (look out 90 days)    Nov 26, 2019  7:40 AM CST  SHORT with LILLIAM Sam CNP  [facility] ([facility] [address]  [facility] [id number] [phone]           " Onel Bowie)  Urology  [address] [place] [address], [place] [address]  Phone: [phone]  Fax: [phone]  Follow Up Time:

## 2023-01-05 DIAGNOSIS — N89.8 VAGINAL DISCHARGE: ICD-10-CM

## 2023-01-05 DIAGNOSIS — N94.89 VAGINAL BURNING: ICD-10-CM

## 2023-01-05 DIAGNOSIS — E11.9 TYPE 2 DIABETES MELLITUS WITHOUT COMPLICATION, WITHOUT LONG-TERM CURRENT USE OF INSULIN (H): ICD-10-CM

## 2023-01-05 RX ORDER — FLUCONAZOLE 150 MG/1
TABLET ORAL
Qty: 30 TABLET | Refills: 0 | Status: SHIPPED | OUTPATIENT
Start: 2023-01-05 | End: 2023-04-05

## 2023-01-06 ENCOUNTER — TELEPHONE (OUTPATIENT)
Dept: INTERNAL MEDICINE | Facility: CLINIC | Age: 83
End: 2023-01-06

## 2023-01-06 NOTE — TELEPHONE ENCOUNTER
Kidney function good for her age   Lisinopril kidney protective and micro albumin improved slightly   Limit NSAIDS  Keep hydration up

## 2023-01-06 NOTE — TELEPHONE ENCOUNTER
Called patient and relayed message from provider.  Patient verbalized relief and appreciation. Patient will follow up in June 2023.

## 2023-01-06 NOTE — TELEPHONE ENCOUNTER
Called patient and she is concerned regarding her CKD.  Stated she doesn't know much about her CKD other than its stage 3.  Patient wondering if any of her supplements are causing her kidneys to get worse.  Advised we are protecting her kidneys with Lisinopril and monitoring her kidney values.  Advised that her creatinine is only mildly elevated.  Patient still has concerns, but feels better now.      Please make any other recommendations to keep kidneys healthy and provide reassurance.  Thanks!

## 2023-01-06 NOTE — TELEPHONE ENCOUNTER
Patient calls to ask for more detailed information about her lab results. In particular the Albumin.     [facility] call [phone]

## 2023-01-12 ENCOUNTER — TELEPHONE (OUTPATIENT)
Dept: INTERNAL MEDICINE | Facility: CLINIC | Age: 83
End: 2023-01-12

## 2023-01-12 NOTE — TELEPHONE ENCOUNTER
"Patient informed of provider's message below.  She states she was told differently at preop appointment 12/30/22.    Per office visit dictation, states \"NPH hold until you are home and eating.  If late appointment may do 10 units NPH in am\".    Reports her procedure is at 1:00pm.  She will take 10 unit(s) in the morning.  Once she is back home and eating, will take her regular afternoon dose.  "

## 2023-01-12 NOTE — TELEPHONE ENCOUNTER
Patient calling regarding her upcoming surgery.  Patient is to take 10 units of NPH the day of surgery.  Patient is concerned that she will not be able to keep her sugars up.  Surgery is at 1 pm.    FBS 88 this am.

## 2023-02-23 ENCOUNTER — TELEPHONE (OUTPATIENT)
Dept: PHARMACY | Facility: CLINIC | Age: 83
End: 2023-02-23

## 2023-02-23 DIAGNOSIS — E11.9 TYPE 2 DIABETES MELLITUS WITHOUT COMPLICATION, WITHOUT LONG-TERM CURRENT USE OF INSULIN (H): Primary | ICD-10-CM

## 2023-02-23 RX ORDER — SEMAGLUTIDE 1.34 MG/ML
INJECTION, SOLUTION SUBCUTANEOUS
Qty: 1.5 ML
Start: 2023-02-23 | End: 2023-04-19

## 2023-02-23 NOTE — TELEPHONE ENCOUNTER
Patient seen 11/28/2022   She has been waiting to hear about help starting Ozempic. Please advise  Ok to call and alejandra [phone]

## 2023-02-23 NOTE — TELEPHONE ENCOUNTER
Patient interested in trying Ozempic.  Prescription was sent last year and not affordable.  Will ask diabetes liaison team for price quote and then assistance with PAP forms if needed.

## 2023-02-24 ENCOUNTER — TELEPHONE (OUTPATIENT)
Dept: INTERNAL MEDICINE | Facility: CLINIC | Age: 83
End: 2023-02-24

## 2023-02-24 DIAGNOSIS — R30.0 DYSURIA: Primary | ICD-10-CM

## 2023-02-24 NOTE — TELEPHONE ENCOUNTER
"Spoke with patient.  C/o dysuria, headache, and low back pain x 2-4 wks.  Reports she recently had a corneal transplant so has not had a chance to \"deal with this\".    History of UTIs.    Ok for a future UA order in chart (pended)?    Please advise, thanks.  "

## 2023-02-24 NOTE — TELEPHONE ENCOUNTER
Patient calls asking for UTI order. She says if she does not feel better by tomorrow she would like to go in for this lab. She said she would just go to the hospital lab.      If PCP cannot order this, can another provider?

## 2023-02-27 NOTE — TELEPHONE ENCOUNTER
Call placed to patient. Advised of order. Patient endorses symptoms. Patient to submit UA sample as able.

## 2023-02-28 ENCOUNTER — LAB (OUTPATIENT)
Dept: LAB | Facility: CLINIC | Age: 83
End: 2023-02-28
Payer: COMMERCIAL

## 2023-02-28 DIAGNOSIS — R30.0 DYSURIA: ICD-10-CM

## 2023-02-28 LAB
ALBUMIN UR-MCNC: 30 MG/DL
APPEARANCE UR: CLEAR
BILIRUB UR QL STRIP: NEGATIVE
COLOR UR AUTO: YELLOW
GLUCOSE UR STRIP-MCNC: NEGATIVE MG/DL
HGB UR QL STRIP: NEGATIVE
HYALINE CASTS: 2 /LPF
KETONES UR STRIP-MCNC: NEGATIVE MG/DL
LEUKOCYTE ESTERASE UR QL STRIP: NEGATIVE
MUCOUS THREADS #/AREA URNS LPF: PRESENT /LPF
NITRATE UR QL: NEGATIVE
PH UR STRIP: 5.5 [PH] (ref 5–7)
RBC URINE: 1 /HPF
SP GR UR STRIP: 1.03 (ref 1–1.03)
SQUAMOUS EPITHELIAL: <1 /HPF
UROBILINOGEN UR STRIP-MCNC: NORMAL MG/DL
WBC URINE: 1 /HPF

## 2023-02-28 PROCEDURE — 81001 URINALYSIS AUTO W/SCOPE: CPT

## 2023-03-02 ENCOUNTER — TELEPHONE (OUTPATIENT)
Dept: INTERNAL MEDICINE | Facility: CLINIC | Age: 83
End: 2023-03-02
Payer: COMMERCIAL

## 2023-03-17 ENCOUNTER — VIRTUAL VISIT (OUTPATIENT)
Dept: FAMILY MEDICINE | Facility: CLINIC | Age: 83
End: 2023-03-17
Payer: COMMERCIAL

## 2023-03-17 DIAGNOSIS — Z87.440 PERSONAL HISTORY OF URINARY TRACT INFECTION: ICD-10-CM

## 2023-03-17 DIAGNOSIS — N39.0 RECURRENT UTI: ICD-10-CM

## 2023-03-17 DIAGNOSIS — N95.2 VAGINAL ATROPHY: ICD-10-CM

## 2023-03-17 DIAGNOSIS — N94.89 VAGINAL BURNING: Primary | ICD-10-CM

## 2023-03-17 DIAGNOSIS — R30.0 DYSURIA: ICD-10-CM

## 2023-03-17 PROCEDURE — 99213 OFFICE O/P EST LOW 20 MIN: CPT | Mod: 95 | Performed by: NURSE PRACTITIONER

## 2023-03-17 RX ORDER — METRONIDAZOLE 500 MG/1
500 TABLET ORAL 2 TIMES DAILY
Qty: 14 TABLET | Refills: 0 | Status: SHIPPED | OUTPATIENT
Start: 2023-03-17 | End: 2023-03-24

## 2023-03-17 RX ORDER — ESTRADIOL 0.1 MG/G
CREAM VAGINAL
Qty: 42.5 G | Refills: 3 | Status: SHIPPED | OUTPATIENT
Start: 2023-03-17 | End: 2024-07-24

## 2023-03-17 RX ORDER — MENTHOL AND ZINC OXIDE .44; 20.625 G/100G; G/100G
OINTMENT TOPICAL 2 TIMES DAILY PRN
Qty: 113 G | Refills: 4 | Status: SHIPPED | OUTPATIENT
Start: 2023-03-17 | End: 2023-10-06

## 2023-03-17 NOTE — PROGRESS NOTES
"Lindsey is a 82 year old who is being evaluated via a billable telephone visit.      What phone number would you like to be contacted at? [phone]  How would you like to obtain your AVS? MyChart    Distant Location (provider location):  On-site    Assessment & Plan     Vaginal burning    - menthol-zinc oxide (CALMOSEPTINE) 0.44-20.625 % OINT ointment; Apply topically 2 times daily as needed for skin protection or irritation  - metroNIDAZOLE (FLAGYL) 500 MG tablet; Take 1 tablet (500 mg) by mouth 2 times daily for 7 days    Personal history of urinary tract infection    - Urine Culture Aerobic Bacterial - lab collect; Future    Recurrent UTI    - estradiol (ESTRACE) 0.1 MG/GM vaginal cream; Please use blueberry size amount in the vagina nightly for two weeks and then three times a week at night thereafter    Vaginal atrophy    - estradiol (ESTRACE) 0.1 MG/GM vaginal cream; Please use blueberry size amount in the vagina nightly for two weeks and then three times a week at night thereafter    Dysuria    - Urine Culture Aerobic Bacterial - lab collect; Future             BMI:   Estimated body mass index is 30.73 kg/m  as calculated from the following:    Height as of 12/30/22: 1.626 m (5' 4\").    Weight as of 12/30/22: 81.2 kg (179 lb).       FUTURE APPOINTMENTS:       - Follow up in 1 week for persistent symptoms, sooner for new or worsening symptoms.     See Patient Instructions    No follow-ups on file.    LILLIAM Hardy CNP  [facility]   Lindsey is a 82 year old, presenting for the following health issues:  Vaginal Problem      HPI     Vaginal Symptoms  Onset/Duration: 2-3 weeks   Description:  Vaginal Discharge: none   Itching (Pruritis): No  Burning sensation:  YES  Odor: YES- slight   Accompanying Signs & Symptoms:  Urinary symptoms: No  Abdominal pain: YES- lower abdomen and lower back   Fever: No  History:   Sexually active: No  New Partner: No  Possibility of " Pregnancy:  No  Recent antibiotic use: No  Previous vaginitis issues: YES  Precipitating or alleviating factors: None  Therapies tried and outcome: Diflucan and a triamcinolone cream-slight improvement - Has been off the Estrogen cream- lost it in a recent move. Hx of UTI and urinary incontinence. Hx of BV treated with Flagyl that she reports had these same symptoms.       Review of Systems   Constitutional, HEENT, cardiovascular, pulmonary, gi and gu systems are negative, except as otherwise noted.      Objective           Vitals:  No vitals were obtained today due to virtual visit.    Physical Exam   alert and no distress  PSYCH: Alert and oriented times 3; coherent speech, normal   rate and volume, able to articulate logical thoughts, able   to abstract reason, no tangential thoughts, no hallucinations   or delusions  Her affect is normal  RESP: No cough, no audible wheezing, able to talk in full sentences  Remainder of exam unable to be completed due to telephone visits                Phone call duration: 12 minutes

## 2023-03-22 ENCOUNTER — LAB (OUTPATIENT)
Dept: LAB | Facility: CLINIC | Age: 83
End: 2023-03-22
Payer: COMMERCIAL

## 2023-03-22 DIAGNOSIS — Z87.440 PERSONAL HISTORY OF URINARY TRACT INFECTION: ICD-10-CM

## 2023-03-22 DIAGNOSIS — R30.0 DYSURIA: ICD-10-CM

## 2023-03-22 PROCEDURE — 87086 URINE CULTURE/COLONY COUNT: CPT

## 2023-03-24 LAB — BACTERIA UR CULT: NO GROWTH

## 2023-03-24 NOTE — RESULT ENCOUNTER NOTE
Elodia Portillo    Your urine culture is negative. Please let us know if you have any questions.     Take care,    LILLIAM Darnell CNP

## 2023-03-30 ENCOUNTER — TRANSFERRED RECORDS (OUTPATIENT)
Dept: HEALTH INFORMATION MANAGEMENT | Facility: CLINIC | Age: 83
End: 2023-03-30
Payer: COMMERCIAL

## 2023-03-30 LAB — RETINOPATHY: POSITIVE

## 2023-04-03 DIAGNOSIS — N89.8 VAGINAL DISCHARGE: ICD-10-CM

## 2023-04-03 DIAGNOSIS — I10 ESSENTIAL HYPERTENSION: ICD-10-CM

## 2023-04-03 DIAGNOSIS — E11.9 TYPE 2 DIABETES MELLITUS WITHOUT COMPLICATION, WITHOUT LONG-TERM CURRENT USE OF INSULIN (H): ICD-10-CM

## 2023-04-03 DIAGNOSIS — N94.89 VAGINAL BURNING: ICD-10-CM

## 2023-04-05 RX ORDER — FLUCONAZOLE 150 MG/1
TABLET ORAL
Qty: 2 TABLET | Refills: 1 | Status: SHIPPED | OUTPATIENT
Start: 2023-04-05 | End: 2023-07-14

## 2023-04-05 RX ORDER — FUROSEMIDE 20 MG
TABLET ORAL
Qty: 180 TABLET | Refills: 0 | Status: SHIPPED | OUTPATIENT
Start: 2023-04-05 | End: 2023-07-06

## 2023-04-05 NOTE — TELEPHONE ENCOUNTER
Pending Prescriptions:                       Disp   Refills    furosemide (LASIX) 20 MG tablet [Pharmacy *180 ta*0        Sig: Take 2 tablets by mouth once daily    fluconazole (DIFLUCAN) 150 MG tablet [Phar*30 tab*0        Sig: TAKE 1 TABLET BY MOUTH EVERY 72 HOURS AS NEEDED    Routing refill request to provider for review/approval because:  Needs provider review

## 2023-04-07 ENCOUNTER — TELEPHONE (OUTPATIENT)
Dept: FAMILY MEDICINE | Facility: CLINIC | Age: 83
End: 2023-04-07

## 2023-04-07 ENCOUNTER — OFFICE VISIT (OUTPATIENT)
Dept: FAMILY MEDICINE | Facility: CLINIC | Age: 83
End: 2023-04-07
Payer: COMMERCIAL

## 2023-04-07 VITALS
WEIGHT: 179 LBS | HEART RATE: 70 BPM | DIASTOLIC BLOOD PRESSURE: 67 MMHG | OXYGEN SATURATION: 100 % | BODY MASS INDEX: 30.73 KG/M2 | SYSTOLIC BLOOD PRESSURE: 106 MMHG | RESPIRATION RATE: 20 BRPM

## 2023-04-07 DIAGNOSIS — R10.2 VAGINAL PAIN: Primary | ICD-10-CM

## 2023-04-07 DIAGNOSIS — N18.32 STAGE 3B CHRONIC KIDNEY DISEASE (H): ICD-10-CM

## 2023-04-07 DIAGNOSIS — N30.00 ACUTE CYSTITIS WITHOUT HEMATURIA: ICD-10-CM

## 2023-04-07 LAB
ALBUMIN UR-MCNC: 30 MG/DL
APPEARANCE UR: CLEAR
BACTERIA #/AREA URNS HPF: ABNORMAL /HPF
BILIRUB UR QL STRIP: NEGATIVE
CLUE CELLS: ABNORMAL
COLOR UR AUTO: YELLOW
GLUCOSE UR STRIP-MCNC: NEGATIVE MG/DL
HGB UR QL STRIP: ABNORMAL
KETONES UR STRIP-MCNC: NEGATIVE MG/DL
LEUKOCYTE ESTERASE UR QL STRIP: ABNORMAL
NITRATE UR QL: NEGATIVE
PH UR STRIP: 5.5 [PH] (ref 5–7)
RBC #/AREA URNS AUTO: ABNORMAL /HPF
SP GR UR STRIP: 1.01 (ref 1–1.03)
SQUAMOUS #/AREA URNS AUTO: ABNORMAL /LPF
TRICHOMONAS, WET PREP: ABNORMAL
UROBILINOGEN UR STRIP-ACNC: 0.2 E.U./DL
WBC #/AREA URNS AUTO: >100 /HPF
WBC CLUMPS #/AREA URNS HPF: PRESENT /HPF
WBC'S/HIGH POWER FIELD, WET PREP: ABNORMAL
YEAST, WET PREP: ABNORMAL

## 2023-04-07 PROCEDURE — 87210 SMEAR WET MOUNT SALINE/INK: CPT | Performed by: STUDENT IN AN ORGANIZED HEALTH CARE EDUCATION/TRAINING PROGRAM

## 2023-04-07 PROCEDURE — 99214 OFFICE O/P EST MOD 30 MIN: CPT | Performed by: STUDENT IN AN ORGANIZED HEALTH CARE EDUCATION/TRAINING PROGRAM

## 2023-04-07 PROCEDURE — 81001 URINALYSIS AUTO W/SCOPE: CPT | Performed by: STUDENT IN AN ORGANIZED HEALTH CARE EDUCATION/TRAINING PROGRAM

## 2023-04-07 PROCEDURE — 87086 URINE CULTURE/COLONY COUNT: CPT | Performed by: STUDENT IN AN ORGANIZED HEALTH CARE EDUCATION/TRAINING PROGRAM

## 2023-04-07 RX ORDER — CEFDINIR 300 MG/1
300 CAPSULE ORAL 2 TIMES DAILY
Qty: 14 CAPSULE | Refills: 0 | Status: SHIPPED | OUTPATIENT
Start: 2023-04-07 | End: 2023-04-24

## 2023-04-07 RX ORDER — CEFDINIR 300 MG/1
300 CAPSULE ORAL 2 TIMES DAILY
Qty: 14 CAPSULE | Refills: 0 | Status: SHIPPED | OUTPATIENT
Start: 2023-04-07 | End: 2023-04-07

## 2023-04-07 NOTE — TELEPHONE ENCOUNTER
Skye Castellanos PA-C-    Patient requests prescription sent to [facility] as [facility] is closed over the weekend. Patient requests repeat UA to make sure UTI is gone after antibiotics. RN pended.     Spoke to patient reports she typically takes antibiotics for 10 days. Patient requests repeat UA to be done when she finished the antibiotic. RN pended.     Patient reports she will follow-up with OBGYN when she is feeling better. She is already using aquaphor/petroleum as advised per OBGYN.

## 2023-04-07 NOTE — TELEPHONE ENCOUNTER
Spoke to patient she also wants to get the medication novolin n vials from the company, reaching out to [employer] to help get providers portion signed again.

## 2023-04-07 NOTE — TELEPHONE ENCOUNTER
----- Message from Skye Castellanos PA-C sent at 4/7/2023  3:39 PM CDT -----  Please call patient and let her know wet prep didn't show yeast or bacterial vaginosis, but her urine does show a UTI. I am sending in an antibiotic to treat the UTI. Cefdinir 300 mg 2 times daily for 7 days. I would still recommend that she call the OB/Gyn provider she has been seeing and reconnect with them. In the meantime, she can also try aquaphor or plain petroleum jelly on the irritated area. OB/Gyn might  have something else they recommend, but I would defer to their recommendation

## 2023-04-07 NOTE — RESULT ENCOUNTER NOTE
Please call patient and let her know wet prep didn't show yeast or bacterial vaginosis, but her urine does show a UTI. I am sending in an antibiotic to treat the UTI. Cefdinir 300 mg 2 times daily for 7 days. I would still recommend that she call the OB/Gyn provider she has been seeing and reconnect with them. In the meantime, she can also try aquaphor or plain petroleum jelly on the irritated area. OB/Gyn might  have something else they recommend, but I would defer to their recommendation

## 2023-04-07 NOTE — PROGRESS NOTES
Assessment & Plan     Vaginal pain  UA and wet prep collected in clinic today. Patient has history of recurrent UTIs as well as recurrent bacterial vaginosis and yeast infections, complicated by her history of diabetes. 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 Has been followed by OB for this in the past, but has not seen them since October 2022  - UA Macro with Reflex to Micro and Culture - lab collect; Future  - Wet prep - lab collect; Future  - UA Microscopic with Reflex to Culture  - Urine Culture  - Encouraged her to call her OB/Gyn clinic and reestablish care with them for recurrent vulvar irritation  - She can try applying a barrier such as aquaphor or plain petroleum jelly to the affected area, but would defer to OB/Gyn to suggest any other topical agents given her complicated history     Acute cystitis without hematuria  UA with evidence of UTI. Treat with omnicef x7 days.     Stage 3b chronic kidney disease (H)  Labs completed 3 months ago show eGFR 59, with microalbuminuria         [facility]    Aidee Portillo is a 82 year old, presenting for the following health issues:  Vaginal Problem      HPI     Vaginal Symptoms  Onset/Duration: x about a few weeks   Description:  Vaginal Discharge: none   Itching (Pruritis): No  Burning sensation:  YES, constant   Odor: No  Accompanying Signs & Symptoms:  Urinary symptoms: YES- intense burning   Abdominal pain: YES-lower   Fever: No  History:   Sexually active: No  New Partner: No  Possibility of Pregnancy:  No  Recent antibiotic use: No  Previous vaginitis issues: YES  Precipitating or alleviating factors: worse at night.   Therapies tried and outcome: CALMOSEPTINE and Flagyl- did not help, made it worse; diflucan- helped     Pt states she has a skin condition and requesting a culture done of the inner and outer vagina since she states she  feels the burning sensation both in and out. Also check for BV and yeast infection.     Was previously seen for a virtual visit 3 weeks ago, flagyl did not help, and calmoseptine made the burning worse so she stopped using it after a few days      Review of Systems   As noted in HPI       Objective    /67   Pulse 70   Resp 20   Wt 81.2 kg (179 lb)   LMP  (LMP Unknown)   SpO2 100%   BMI 30.73 kg/m    Body mass index is 30.73 kg/m .  Physical Exam   GENERAL: healthy, alert and no distress  NECK: no adenopathy, no asymmetry, masses, or scars and thyroid normal to palpation  RESP: lungs clear to auscultation - no rales, rhonchi or wheezes  CV: regular rate and rhythm, normal S1 S2, no S3 or S4, no murmur, click or rub, no peripheral edema and peripheral pulses strong  ABDOMEN: soft, nontender, no hepatosplenomegaly, no masses and bowel sounds normal   (female): diffuse erythema over bilateral labia majora and perineum, no lesions noted, speculum exam deferred due to patient discomfort, wet prep swab collected   MS: no gross musculoskeletal defects noted, no edema  SKIN: no suspicious lesions or rashes  NEURO: Normal strength and tone, mentation intact and speech normal  PSYCH: mentation appears normal, affect normal/bright    Results for orders placed or performed in visit on 04/07/23   UA Macro with Reflex to Micro and Culture - lab collect     Status: Abnormal    Specimen: Urine, Clean Catch   Result Value Ref Range    Color Urine Yellow Colorless, Straw, Light Yellow, Yellow    Appearance Urine Clear Clear    Glucose Urine Negative Negative mg/dL    Bilirubin Urine Negative Negative    Ketones Urine Negative Negative mg/dL    Specific Gravity Urine 1.015 1.003 - 1.035    Blood Urine Small (A) Negative    pH Urine 5.5 5.0 - 7.0    Protein Albumin Urine 30 (A) Negative mg/dL    Urobilinogen Urine 0.2 0.2, 1.0 E.U./dL    Nitrite Urine Negative Negative    Leukocyte Esterase Urine Small (A) Negative   UA  Microscopic with Reflex to Culture     Status: Abnormal   Result Value Ref Range    Bacteria Urine Few (A) None Seen /HPF    RBC Urine None Seen 0-2 /HPF /HPF    WBC Urine >100 (A) 0-5 /HPF /HPF    Squamous Epithelials Urine Few (A) None Seen /LPF    WBC Clumps Urine Present (A) None Seen /HPF   Wet prep - lab collect     Status: Abnormal    Specimen: Vagina; Swab   Result Value Ref Range    Trichomonas Absent Absent    Yeast Absent Absent    Clue Cells Absent Absent    WBCs/high power field 1+ (A) None   Urine Culture     Status: None    Specimen: Urine, Clean Catch   Result Value Ref Range    Culture No Growth

## 2023-04-09 LAB — BACTERIA UR CULT: NO GROWTH

## 2023-04-10 ENCOUNTER — TELEPHONE (OUTPATIENT)
Dept: FAMILY MEDICINE | Facility: CLINIC | Age: 83
End: 2023-04-10
Payer: COMMERCIAL

## 2023-04-10 DIAGNOSIS — N30.00 ACUTE CYSTITIS WITHOUT HEMATURIA: Primary | ICD-10-CM

## 2023-04-10 NOTE — TELEPHONE ENCOUNTER
Patient calling regarding urine culture.  Patient states she was to get call today to tell her if she is to continue antibiotic she was given or change antibiotic.  Discussed no growth on culture so would not need to change med.  Started med Saturday.  Has taken 6 pills.  Still hurts when see urinates, still feels some burning and does just not feel right.  Having nausea, pain in lower abdomen and lower back pain.  No fever.  States has been pretty sick with this per patient.  Not feeling better and wondering what to do?  Please advise.  Isabelle Miranda RN

## 2023-04-12 ENCOUNTER — TELEPHONE (OUTPATIENT)
Dept: FAMILY MEDICINE | Facility: CLINIC | Age: 83
End: 2023-04-12
Payer: COMMERCIAL

## 2023-04-12 DIAGNOSIS — N30.00 ACUTE CYSTITIS WITHOUT HEMATURIA: Primary | ICD-10-CM

## 2023-04-12 RX ORDER — NITROFURANTOIN 25; 75 MG/1; MG/1
100 CAPSULE ORAL 2 TIMES DAILY
Qty: 14 CAPSULE | Refills: 0 | Status: SHIPPED | OUTPATIENT
Start: 2023-04-12 | End: 2023-04-19

## 2023-04-12 NOTE — TELEPHONE ENCOUNTER
"Pt calls, .    S-(situation): saw Skye 4/10/23, symptoms not improving    B-(background): informed of negative urine culture, pt still has  Dysuria, same symptoms as visit, denies n-v-d, fever or abdominal pain, see 4/10/23 follow up call notes, pt usually takes PCN or macrobid, feels cefdinir not helping    A-(assessment): f/u uti     R-(recommendations): routed to [facility], please review and advise follow up plan, route to INFORM pt of plan at home  [phone] (home)       \"Please call patient and let her know wet prep didn't show yeast or bacterial vaginosis, but her urine does show a UTI. I am sending in an antibiotic to treat the UTI. Cefdinir 300 mg 2 times daily for 7 days. I would still recommend that she call the OB/Gyn provider she has been seeing and reconnect with them. In the meantime, she can also try aquaphor or plain petroleum jelly on the irritated area. OB/Gyn might  have something else they recommend, but I would defer to their recommendation\"    Jaci Rodriguez, RN, BSN  [facility]     "

## 2023-04-19 DIAGNOSIS — E11.9 TYPE 2 DIABETES MELLITUS WITHOUT COMPLICATION, WITHOUT LONG-TERM CURRENT USE OF INSULIN (H): ICD-10-CM

## 2023-04-19 NOTE — TELEPHONE ENCOUNTER
Free Drug Application Approved  Medication: Novolin and Ozempic  Effective Dates: 04/18/2023-12/31/2023  Patient notified: yes  Additional Information: can start re enrollment on or after 10/15/2023      Got a voucher for ozempic and novolin:    Novolin:  Bin: [id number]  PCN:[id number]  Rx group: [id number]  ID:[id number]    Ozempic  Bin: [id number]  PCN:[id number]  Rx group: [id number]  ID: [id number]

## 2023-04-20 NOTE — TELEPHONE ENCOUNTER
Was wondering if you can send a new rx from novolin. Patient is wanting to  novolin from the pharmacy and drove there thinking it was already sent.

## 2023-04-21 ENCOUNTER — TELEPHONE (OUTPATIENT)
Dept: INTERNAL MEDICINE | Facility: CLINIC | Age: 83
End: 2023-04-21
Payer: COMMERCIAL

## 2023-04-21 RX ORDER — SEMAGLUTIDE 1.34 MG/ML
INJECTION, SOLUTION SUBCUTANEOUS
Qty: 1.5 ML | Refills: 1 | Status: SHIPPED | OUTPATIENT
Start: 2023-04-21 | End: 2023-04-24

## 2023-04-21 RX ORDER — HUMAN INSULIN 100 [IU]/ML
INJECTION, SUSPENSION SUBCUTANEOUS
Qty: 20 ML | Refills: 0 | Status: SHIPPED | OUTPATIENT
Start: 2023-04-21 | End: 2023-09-19

## 2023-04-21 RX ORDER — SEMAGLUTIDE 0.68 MG/ML
INJECTION, SOLUTION SUBCUTANEOUS
Qty: 3 ML | Refills: 1 | Status: SHIPPED | OUTPATIENT
Start: 2023-04-21 | End: 2023-06-02

## 2023-04-21 NOTE — TELEPHONE ENCOUNTER
Beti is there any way you can help get at least the novolin rx released to the pharmacy. We need an actual prescription there in order to use the voucher

## 2023-04-21 NOTE — TELEPHONE ENCOUNTER
Reason for Call:  Appointment Request    Patient requesting this type of appt:  OV    Requested provider: Dinorah Wong    Reason patient unable to be scheduled: Not within requested timeframe    When does patient want to be seen/preferred time: 3-7 days    Comments: Bladder or vaginal issues with stomach pain     [facility] or would you prefer to receive a phone call?:   Patient would prefer a phone call   Okay to leave a detailed message?: Yes at Home number on file [phone] (home)    Call taken on 4/21/2023 at 1:57 PM by Melly Mercado

## 2023-04-21 NOTE — TELEPHONE ENCOUNTER
Pharmacy is calling to say the ozempic is no longer available in the 2mg/1.5ML.  They need the rx written for 2MG/3ML.

## 2023-04-22 ENCOUNTER — NURSE TRIAGE (OUTPATIENT)
Dept: NURSING | Facility: CLINIC | Age: 83
End: 2023-04-22
Payer: COMMERCIAL

## 2023-04-22 NOTE — TELEPHONE ENCOUNTER
Pt seen by Rheumatologist and started on Prednisone (20mg) yesterday and today.  Today blood sugar before breakfast 147, now before supper 302.  Pt does not have a sliding scale.    Pt is on Day 2 of Prednisone for arthritis and will be on for 7 days.  Pt wanting to know if she should increase her Insulin or her oral hyperglycemic meds.  Please advise pt.  Thank you  Sandy Nuñez RN  [facility]NA Nurse Advisor         Reason for Disposition    [1] Blood glucose > 300 mg/dL (16.7 mmol/L) AND [2] uses insulin (e.g., insulin-dependent, all people with type 1 diabetes)    Additional Information    Negative: Unconscious or difficult to awaken    Negative: Acting confused (e.g., disoriented, slurred speech)    Negative: Very weak (e.g., can't stand)    Negative: Sounds like a life-threatening emergency to the triager    Negative: [1] Vomiting AND [2] signs of dehydration (e.g., very dry mouth, lightheaded, dark urine)    Negative: [1] Blood glucose > 240 mg/dL (13.3 mmol/L) AND [2] rapid breathing    Negative: Blood glucose > 500 mg/dL (27.8 mmol/L)    Negative: [1] Blood glucose > 240 mg/dL (13.3 mmol/L) AND [2] urine ketones moderate-large (or more than 1+)    Negative: [1] Blood glucose > 240 mg/dL (13.3 mmol/L) AND [2] blood ketones > 1.4 mmol/L    Negative: [1] Blood glucose > 240 mg/dL (13.3 mmol/L) AND [2] vomiting AND [3] unable to check for ketones (in blood or urine)    Negative: [1] New-onset diabetes suspected (e.g., frequent urination, weak, weight loss) AND [2] vomiting or rapid breathing    Negative: Vomiting lasts > 4 hours    Negative: Patient sounds very sick or weak to the triager    Negative: Fever > 100.4 F (38.0 C)    Negative: Blood glucose > 400 mg/dL (22.2 mmol/L)    Negative: [1] Blood glucose > 300 mg/dL (16.7 mmol/L) AND [2] two or more times in a row    Negative: Urine ketones moderate - large (or blood ketones > 1.4 mmol/L)    Negative: [1] Caller has URGENT medication or insulin pump question AND  [2] triager unable to answer question    Negative: [1] Symptoms of high blood sugar (e.g., frequent urination, weak, weight loss) AND [2] not able to test blood glucose    Negative: New-onset diabetes suspected (e.g., frequent urination, weakness, weight loss)    Negative: [1] Caller has NON-URGENT medication or insulin pump question AND [2] triager unable to answer question    Protocols used: DIABETES - HIGH BLOOD SUGAR-A-AH

## 2023-04-23 ENCOUNTER — NURSE TRIAGE (OUTPATIENT)
Dept: NURSING | Facility: CLINIC | Age: 83
End: 2023-04-23
Payer: COMMERCIAL

## 2023-04-23 NOTE — TELEPHONE ENCOUNTER
Pt calling with hyperglycemia.    Blood sugar is 253 this morning. Pt recently started a steroid taper and is concerned that her blood sugars will continue to rise if she doesn't adjust her insulin. Also called triage yesterday when her glucose levels were elevated.    Pt denies any symptoms of hyperglycemia at this time.     Protocol recommends home care. Should she have two or more BG > 300, should call back. Care advice given including increasing water intake to 6-8 glasses today. Pt does have appointment tomorrow morning with the [facility]. Will also route message to her PCP and care team for awareness.    Laquita Castaneda, RN, BSN  [facility]   Triage Nurse Advisor      Reason for Disposition    [1] Blood glucose 240 - 300 mg/dL (13.3 - 16.7 mmol/L) AND [2] uses insulin (e.g., insulin-dependent, all people with type 1 diabetes)    Additional Information    Negative: Unconscious or difficult to awaken    Negative: Acting confused (e.g., disoriented, slurred speech)    Negative: Very weak (e.g., can't stand)    Negative: Sounds like a life-threatening emergency to the triager    Negative: [1] Vomiting AND [2] signs of dehydration (e.g., very dry mouth, lightheaded, dark urine)    Negative: [1] Blood glucose > 240 mg/dL (13.3 mmol/L) AND [2] rapid breathing    Negative: Blood glucose > 500 mg/dL (27.8 mmol/L)    Negative: [1] Blood glucose > 240 mg/dL (13.3 mmol/L) AND [2] urine ketones moderate-large (or more than 1+)    Negative: [1] Blood glucose > 240 mg/dL (13.3 mmol/L) AND [2] blood ketones > 1.4 mmol/L    Negative: [1] Blood glucose > 240 mg/dL (13.3 mmol/L) AND [2] vomiting AND [3] unable to check for ketones (in blood or urine)    Negative: [1] New-onset diabetes suspected (e.g., frequent urination, weak, weight loss) AND [2] vomiting or rapid breathing    Negative: Vomiting lasts > 4 hours    Negative: Patient sounds very sick or weak to the triager    Negative: Fever > 100.4 F (38.0 C)    Negative: Blood  glucose > 400 mg/dL (22.2 mmol/L)    Negative: [1] Blood glucose > 300 mg/dL (16.7 mmol/L) AND [2] two or more times in a row    Negative: Urine ketones moderate - large (or blood ketones > 1.4 mmol/L)    Negative: [1] Caller has URGENT medication or insulin pump question AND [2] triager unable to answer question    Negative: [1] Symptoms of high blood sugar (e.g., frequent urination, weak, weight loss) AND [2] not able to test blood glucose    Negative: New-onset diabetes suspected (e.g., frequent urination, weakness, weight loss)    Negative: [1] Caller has NON-URGENT medication or insulin pump question AND [2] triager unable to answer question    Negative: [1] Blood glucose > 300 mg/dL (16.7 mmol/L) AND [2] uses insulin (e.g., insulin-dependent, all people with type 1 diabetes)    Protocols used: DIABETES - HIGH BLOOD SUGAR-A-

## 2023-04-24 ENCOUNTER — OFFICE VISIT (OUTPATIENT)
Dept: PHARMACY | Facility: CLINIC | Age: 83
End: 2023-04-24
Payer: COMMERCIAL

## 2023-04-24 VITALS
BODY MASS INDEX: 30.73 KG/M2 | DIASTOLIC BLOOD PRESSURE: 69 MMHG | OXYGEN SATURATION: 99 % | HEART RATE: 62 BPM | WEIGHT: 179 LBS | SYSTOLIC BLOOD PRESSURE: 140 MMHG

## 2023-04-24 DIAGNOSIS — M19.90 ARTHRITIS: ICD-10-CM

## 2023-04-24 DIAGNOSIS — N30.00 ACUTE CYSTITIS WITHOUT HEMATURIA: ICD-10-CM

## 2023-04-24 DIAGNOSIS — E11.9 TYPE 2 DIABETES MELLITUS WITHOUT COMPLICATION, WITHOUT LONG-TERM CURRENT USE OF INSULIN (H): Primary | ICD-10-CM

## 2023-04-24 DIAGNOSIS — I10 HYPERTENSION GOAL BP (BLOOD PRESSURE) < 130/80: ICD-10-CM

## 2023-04-24 PROCEDURE — 99607 MTMS BY PHARM ADDL 15 MIN: CPT | Performed by: PHARMACIST

## 2023-04-24 PROCEDURE — 99605 MTMS BY PHARM NP 15 MIN: CPT | Performed by: PHARMACIST

## 2023-04-24 RX ORDER — PREDNISONE 5 MG/1
TABLET ORAL
COMMUNITY
Start: 2023-04-19 | End: 2023-04-28

## 2023-04-24 RX ORDER — PREDNISOLONE ACETATE 10 MG/ML
1 SUSPENSION/ DROPS OPHTHALMIC
COMMUNITY
Start: 2023-01-29 | End: 2023-12-28

## 2023-04-24 RX ORDER — CYCLOSPORINE 0.5 MG/ML
1 EMULSION OPHTHALMIC 2 TIMES DAILY
COMMUNITY
Start: 2022-11-04

## 2023-04-24 NOTE — PATIENT INSTRUCTIONS
"Recommendations from today's MTM visit:                                                       1.  If you need prednisone in the future, recommend calling Dinorah for adjustment in your insulin dose prior to starting. We would increase your dose of insulin in the morning to help prevent the blood sugar spikes in the afternoon/evening.    2.  I'll follow-up with Cristal to see if there is anything she needs to get the Ozempic covered.  Once Ozempic is delivered, we can meet to review how to use the pens.     Follow-up: Return in about 3 months (around 7/24/2023) for Medication Therapy Management.    It was great speaking with you today.  I value your experience and would be very thankful for your time in providing feedback in our clinic survey. In the next few days, you may receive an email or text message from "[employer] with a link to a survey related to your  clinical pharmacist.\"     To schedule another MTM appointment, please call the clinic directly or you may call the MTM scheduling line at [phone] or toll-free at [phone].     My Clinical Pharmacist's contact information:                                                      Please feel free to contact me with any questions or concerns you have.      Beti Jacobo , Pharm D  [phone] (phone)  Medication Therapy Management Pharmacist     "

## 2023-04-24 NOTE — TELEPHONE ENCOUNTER
Routed to covering provider - Dr. Brady, to review.   Patient meeting with MTM today.   Appointments in Next Year    Apr 24, 2023 11:00 AM  MTM New with Beti Jacobo RPH  [facility] ([facility] [phone]   May 09, 2023  1:30 PM  (Arrive by 1:10 PM)  Provider Visit with Suyapa Dawn MD  [facility] ([facility] [phone]         Florence Walker RN  [facility]

## 2023-04-24 NOTE — TELEPHONE ENCOUNTER
Patients Urine culture negative, see telephone encounter 4/12/23 for patient recommendations/ follow-up plan.     Bhakti GALEANO RN, BSN, PHN  [facility]  [phone]

## 2023-04-24 NOTE — LETTER
"Recommended To-Do List      Prepared on: Apr 24, 2023       You can get the best results from your medications by completing the items on this \"To-Do List.\"      Bring your To-Do List when you go to your doctor. And, share it with your family or caregivers.    My To-Do List:  What we talked about: What I should do:    What my medicines are for, how to know if my medicines are working, made sure my medicines are safe for me and reviewed how to take my medicines.     Take my medicines every day               "

## 2023-04-24 NOTE — LETTER
April 24, 2023  Lindsey PAULINO Abdirahman  [address] [address]    Dear Ms. Gary, [facility]     Thank you for talking with me on Apr 24, 2023 about your health and medications. As a follow-up to our conversation, I have included two documents:      1. Your Recommended To-Do List has steps you should take to get the best results from your medications.  2. Your Medication List will help you keep track of your medications and how to take them.    If you want to talk about these documents, please call Beti Jacobo RPH at phone: [phone], Monday-Friday 8-4:30pm.    I look forward to working with you and your doctors to make sure your medications work well for you.    Sincerely,  Beti Jacobo RPH  [facility] Pharmacist, [facility]

## 2023-04-24 NOTE — PROGRESS NOTES
Medication Therapy Management (MTM) Encounter    ASSESSMENT:                            Medication Adherence/Access: No issues identified    Macular Degenerative Edema:  Follow-up with provider as planned.    Type 2 Diabetes:  A1c at goal <8%.  Home readings improving since this weekend, no longer seeing values in the 300s as she did after starting prednisone.  Approved for Ozempic PAP - reviewed how to use the Ozempic pen today.  Offered to meet with her again when supply is available to start.     Arthritis:  Taking prednisone as ordered.  Discussed reaching out for insulin adjustment if prednisone prescribed in the future.    Hypertension: blood pressure slightly elevated today, when checked earlier this month it was on the low side.  Will continue to monitor for now.    GERD:  Stable    PLAN:                            1.  If you need prednisone in the future, would recommend calling Dinorah for adjustment in your insulin dose prior to starting. We would increase your dose of insulin in the morning to help prevent the spikes in the afternoon/evening.    2.  I'll follow-up with Cristal to see if there is anything she needs to get the Ozempic covered.  Patient also wanting extra vial of insulin.        Follow-up: Return in about 3 months (around 7/24/2023) for Medication Therapy Management.    SUBJECTIVE/OBJECTIVE:                          Lindsey Gary is a 82 year old female coming in for a follow-up visit.  Today's visit is a follow-up MTM visit from 11/28/23.     Reason for visit: started on 7-day course of prednisone by Rheum    Allergies/ADRs: Reviewed in chart  Tobacco: She reports that she has never smoked. She has never used smokeless tobacco.  Alcohol: not currently using    Medication Adherence/Access: no issues reported    Macular Degenerative Edema:  s/p cornea transplant 1/19.  Using eye drops Prednisolone, Restatis and Refresh tears. Has follow-up on Wednesday.  Continues to have blurry eyes, itching.       Type 2 Diabetes:  Currently taking metformin 1000 mg twice daily, glipizide 10 mg twice daily and NPH 22 units every morning and 22 units in the evening (breakfast and dinner). Patient is not experiencing side effects.  Has been working with Diabetes Liaison team for Ozempic and Insulin PAP; insulin delivered to her the other day.    Patient contacted nurse triage line on  and  with concerns of hyperglycemia.    Blood sugar monitorin-3 time(s) daily. Ranges (patient reported):  147 last Friday morning, this morning 171, last night 202; Friday night and Saturday night her blood sugars in the 300s.    Symptoms of low blood sugar? Before starting prednisone, sometimes readings were too low before going to sleep  Symptoms of high blood sugar? none  Eye exam: up to date  Foot exam: due, reports burning and pain in toes  Aspirin: Taking 81mg daily for primary prevention   Statin: Yes:    ACEi/ARB: Yes: .   Urine Albumin:   Lab Results   Component Value Date    UMALCR 255.14 (H) 2020      Lab Results   Component Value Date    A1C 7.1 2022    A1C 7.5 10/10/2022    A1C 7.2 2022    A1C 6.7 2022    A1C 7.2 2021    A1C 7.6 2021    A1C 7.3 2020    A1C 6.8 2019    A1C 7.1 2019    A1C 7.0 2018         Arthritis:  Taking Tylenol 1000 mg twice daily, Voltaren as needed and prednisone 15 mg for 2 days taper (started last week by Rheum). Hands still puffy and feels a little pain in thumb.  Has follow-up with Rheum in three months.      Hypertension: Current medications include lisinopril 40 mg daily, metoprolol ER 50 mg daily and furosemide 40 mg daily as needed (takes most days).  Patient does not self-monitor blood pressure.  Patient reports no current medication side effects.  BP Readings from Last 3 Encounters:   23 106/67   22 (!) 152/72   22 118/58     Potassium   Date Value Ref Range Status   2022 4.3 3.4 - 5.3 mmol/L Final    07/07/2022 3.8 3.4 - 5.3 mmol/L Final   07/06/2021 4.8 3.4 - 5.3 mmol/L Final     GERD: Current medications include: Pepcid (famotidine) 20 mg twice daily and Maalox/mylanta/tums as needed. Patient reports no current symptoms.  Patient feels that current regimen is effective.        Today's Vitals: BP (!) 140/69   Pulse 62   Wt 179 lb (81.2 kg)   LMP  (LMP Unknown)   SpO2 99%   BMI 30.73 kg/m    ----------------      I spent 40 minutes with this patient today. All changes were made via collaborative practice agreement with LILLIAM Sam CNP. A copy of the visit note was provided to the patient's provider(s).    A summary of these recommendations was given to the patient.    Beti Jacobo , Pharm D  [phone] (phone)  Medication Therapy Management Pharmacist        Medication Therapy Recommendations  No medication therapy recommendations to display

## 2023-04-24 NOTE — LETTER
_  Medication List        Prepared on: Apr 24, 2023     Bring your Medication List when you go to the doctor, hospital, or   emergency room. And, share it with your family or caregivers.     Note any changes to how you take your medications.  Cross out medications when you no longer use them.    Medication How I take it Why I use it Prescriber   acetaminophen (TYLENOL) 500 MG tablet Take 2 tablets by mouth every 8 hours as needed  Mixed Hyperlipidemia; Gout, Unspecified; Type 2 Diabetes, HbA1c Goal < 7% (H); Hyperlipidemia LDL Goal <100 Patient Reported   allopurinol (ZYLOPRIM) 300 MG tablet Take 1/2 (one-half) tablet by mouth twice daily Gout, unspecified cause, unspecified chronicity, unspecified site LILLIAM Sam CNP   Ascorbic Acid (VITAMIN C) 500 MG CAPS Take 500 mg by mouth daily  Supplement Patient Reported   ASPIRIN 81 MG OR TABS 1 tablet daily  Heart Health Dano Millan MD   chlorhexidine (PERIDEX) 0.12 % solution RINSE ONE HALF  OZ 2-3 X D AFTER BREAKFAST BEFORE BEDTIME FOLLOWING BRUSHING AND FLOSSIN  Oral Rinse Dinorah oWng   Cholecalciferol (VITAMIN D) 2000 UNITS tablet Take 1 tablet by mouth daily  Supplement Patient Reported   CINNAMON 500 MG OR CAPS 2 tablets daily  Supplement Dano Millan MD   clindamycin (CLEOCIN) 300 MG capsule Take 2 capsules (600 mg) by mouth once as needed For dental visit Prophylactic Antibiotic LILLIAM Sam CNP   cycloSPORINE (RESTASIS) 0.05 % ophthalmic emulsion 1 drop in left eye  Dry Eye Dinorah Wong   diclofenac (VOLTAREN) 1 % topical gel Apply topically 4 times daily as needed for moderate pain  Pain Patient Reported   estradiol (ESTRACE) 0.1 MG/GM vaginal cream Please use blueberry size amount in the vagina nightly for two weeks and then three times a week at night thereafter Recurrent UTI; Vaginal Atrophy LILLIAM Hardy CNP   famotidine (PEPCID) 20 MG tablet Take 20 mg by mouth 2 times daily  Heartburn Patient  Reported   ferrous gluconate (FERGON) 324 (38 Fe) MG tablet Take 324 mg by mouth every other day  supplemenbt Patient Reported   fluconazole (DIFLUCAN) 150 MG tablet TAKE 1 TABLET BY MOUTH EVERY 72 HOURS AS NEEDED Vaginal Burning; Vaginal Discharge; Type 2 diabetes mellitus without complication, without long-term current use of insulin (H) LILLIAM Sam CNP   furosemide (LASIX) 20 MG tablet Take 2 tablets by mouth once daily Essential Hypertension LILLIAM Sam CNP   gabapentin (NEURONTIN) 300 MG capsule TAKE 3 CAPSULES BY MOUTH AT BEDTIME Neuropathy LILLIAM Sam CNP   glipiZIDE (GLUCOTROL) 10 MG tablet TAKE 1 TABLET BY MOUTH TWICE DAILY BEFORE MEAL(S) Type 2 diabetes mellitus without complication, without long-term current use of insulin (H) LILLIAM Sam CNP   insulin NPH (NOVOLIN N RELION) 100 UNIT/ML vial INJECT 22 UNITS SUBCUTANEOUSLY BEFORE BREAKFAST AND 22 UNITS BEFORE DINNER Type 2 diabetes mellitus without complication, without long-term current use of insulin (H) LILLIAM Sam CNP   Lactobacillus (PROBIOTIC ACIDOPHILUS) TABS Take 1 tablet by mouth daily  Supplements Patient Reported   lisinopril (ZESTRIL) 40 MG tablet Take 1 tablet (40 mg) by mouth daily Essential Hypertension LILLIAM Sam CNP   magnesium 250 MG tablet Take 1 tablet by mouth daily  Supplements Patient Reported   metFORMIN (GLUCOPHAGE) 1000 MG tablet TAKE 1 TABLET BY MOUTH TWICE DAILY WITH MEALS Type 2 diabetes mellitus without complication, without long-term current use of insulin (H) LILLIAM Sam CNP   metoprolol succinate ER (TOPROL XL) 50 MG 24 hr tablet Take 1 tablet (50 mg) by mouth daily Hyperlipidemia LDL Goal <100; Hypertension Goal BP (Blood Pressure) < 130/80 LILLIAM Sam CNP   prednisoLONE acetate (PRED FORTE) 1 % ophthalmic suspension 1 drop  Cornea Replacement Dinorah Wong   predniSONE (DELTASONE) 5 MG tablet  Take 3 tablets by mouth  daily, taper off as directed Arthritis Dr. Chawla   rosuvastatin (CRESTOR) 40 MG tablet Take 1 tablet (40 mg) by mouth daily Hyperlipidemia LDL Goal <100 LILLIAM Sam CNP   semaglutide (OZEMPIC, 0.25 OR 0.5 MG/DOSE,) 2 MG/3ML pen Inject 0.25 mg Subcutaneous once a week for 28 days, THEN 0.5 mg once a week for 14 days. Type 2 diabetes mellitus without complication, without long-term current use of insulin (H) LILLIAM Sam CNP   TUMS 500 MG OR CHEW 1 TABLET 3 TIMES PRN  Heart Burn Dano Millan MD         Add new medications, over-the-counter drugs, herbals, vitamins, or  minerals in the blank rows below.    Medication How I take it Why I use it Prescriber                                      Allergies:      augmentin; sulfa drugs        Side effects I have had:               Other Information:              My notes and questions:

## 2023-04-25 ENCOUNTER — TELEPHONE (OUTPATIENT)
Dept: INTERNAL MEDICINE | Facility: CLINIC | Age: 83
End: 2023-04-25
Payer: COMMERCIAL

## 2023-04-25 DIAGNOSIS — R30.0 DYSURIA: Primary | ICD-10-CM

## 2023-04-25 NOTE — TELEPHONE ENCOUNTER
Patient initially treated with Cefdinir but then Macrobid.    Annette is out of the office today. I did order the UA and added a wet prep too.

## 2023-04-25 NOTE — TELEPHONE ENCOUNTER
Pt calling. Pt saw Villalobos PA-C on 4/7/23 for UTI. Pt informs she finished Macrobid and has not had much improvement with symptoms (current symptoms are burning with and without irritation and infrequent itching).      Pt informs she will be at [facility] today and requests a repeat UA order be placed for to complete while she is there. Pt informs she spoke with a nurse about this previously. Upon review of pt's chart, this was discussed on 4/7/23 (see relevant notes below)  Bhakti Masterson, NATALIYA     BS    4/7/23  5:03 PM  Note     Skye Castellanos PA-C-     Patient requests prescription sent to [facility] as [facility] is closed over the weekend. Patient requests repeat UA to make sure UTI is gone after antibiotics. RN pended.      Spoke to patient reports she typically takes antibiotics for 10 days. Patient requests repeat UA to be done when she finished the antibiotic. RN pended.      Patient reports she will follow-up with OBGYN when she is feeling better. She is already using aquaphor/petroleum as advised per OBGYN.        Pt requests a call back at [phone] to inform if this is approved.    T'd up UA order and routing to provider to review and advise.    Radha Vera RN

## 2023-04-25 NOTE — TELEPHONE ENCOUNTER
Patient met with MTM 4/24/23, discussed blood sugar concerns.     Florence Walker RN  [facility]

## 2023-04-25 NOTE — TELEPHONE ENCOUNTER
Patient is requesting an order for urine culture no matter what. Her urologist used do this for her.    Patient plans to have labs done at [facility].    Claire Delong RN

## 2023-04-27 ENCOUNTER — LAB (OUTPATIENT)
Dept: LAB | Facility: CLINIC | Age: 83
End: 2023-04-27
Payer: COMMERCIAL

## 2023-04-27 ENCOUNTER — TELEPHONE (OUTPATIENT)
Dept: FAMILY MEDICINE | Facility: CLINIC | Age: 83
End: 2023-04-27

## 2023-04-27 ENCOUNTER — TELEPHONE (OUTPATIENT)
Dept: UROLOGY | Facility: CLINIC | Age: 83
End: 2023-04-27

## 2023-04-27 DIAGNOSIS — R30.0 DYSURIA: ICD-10-CM

## 2023-04-27 DIAGNOSIS — N30.00 ACUTE CYSTITIS WITHOUT HEMATURIA: Primary | ICD-10-CM

## 2023-04-27 LAB
ALBUMIN UR-MCNC: 10 MG/DL
APPEARANCE UR: ABNORMAL
BACTERIA #/AREA URNS HPF: ABNORMAL /HPF
BILIRUB UR QL STRIP: NEGATIVE
CLUE CELLS: ABNORMAL
COLOR UR AUTO: YELLOW
GLUCOSE UR STRIP-MCNC: NEGATIVE MG/DL
HGB UR QL STRIP: ABNORMAL
HYALINE CASTS: 12 /LPF
KETONES UR STRIP-MCNC: NEGATIVE MG/DL
LEUKOCYTE ESTERASE UR QL STRIP: ABNORMAL
MUCOUS THREADS #/AREA URNS LPF: PRESENT /LPF
NITRATE UR QL: POSITIVE
PH UR STRIP: 5 [PH] (ref 5–7)
RBC URINE: 63 /HPF
SP GR UR STRIP: 1.01 (ref 1–1.03)
SQUAMOUS EPITHELIAL: <1 /HPF
TRICHOMONAS, WET PREP: ABNORMAL
UROBILINOGEN UR STRIP-MCNC: NORMAL MG/DL
WBC CLUMPS #/AREA URNS HPF: PRESENT /HPF
WBC URINE: >182 /HPF
WBC'S/HIGH POWER FIELD, WET PREP: ABNORMAL
YEAST, WET PREP: ABNORMAL

## 2023-04-27 PROCEDURE — 81001 URINALYSIS AUTO W/SCOPE: CPT

## 2023-04-27 PROCEDURE — 87210 SMEAR WET MOUNT SALINE/INK: CPT

## 2023-04-27 PROCEDURE — 87086 URINE CULTURE/COLONY COUNT: CPT

## 2023-04-27 RX ORDER — CEPHALEXIN 500 MG/1
500 CAPSULE ORAL 3 TIMES DAILY
Qty: 30 CAPSULE | Refills: 0 | Status: SHIPPED | OUTPATIENT
Start: 2023-04-27 | End: 2023-05-07

## 2023-04-27 RX ORDER — FERROUS GLUCONATE 324(38)MG
324 TABLET ORAL
COMMUNITY
End: 2023-07-24

## 2023-04-27 NOTE — TELEPHONE ENCOUNTER
Called  Patient  Looks like primary ordered   The culture and antibiotics.  She will start taking them as she waits for the final culture

## 2023-04-27 NOTE — TELEPHONE ENCOUNTER
Result note already sent please see result note.    Of note Cipro not ideal due to patient being on prednisone. Higher risk of tendon rupture due to age and being on prednisone.

## 2023-04-27 NOTE — TELEPHONE ENCOUNTER
Patient calling regarding results.  Patient not feeling well and wants to be better.  Does not want Macorbid.  States that has never worked for her.  She states she used to always get Cipro.  Wondering if not given anymore due to C-Diff.  Please advise.  Isabelle Miranda RN

## 2023-04-27 NOTE — TELEPHONE ENCOUNTER
[facility] Call Center    Phone Message    May a detailed message be left on voicemail: yes     Reason for Call: Patient is calling to see if Chiara can take a look at her UA results from today. She has not been feeling well. She is concerned that this infection will land her in the hospital. Please reach out to patient. Thank you    Action Taken: Message routed to:  [facility] U[facility]    Travel Screening: Not Applicable

## 2023-04-28 ENCOUNTER — TELEPHONE (OUTPATIENT)
Dept: FAMILY MEDICINE | Facility: CLINIC | Age: 83
End: 2023-04-28
Payer: COMMERCIAL

## 2023-04-28 LAB — BACTERIA UR CULT: ABNORMAL

## 2023-04-28 NOTE — TELEPHONE ENCOUNTER
Notify of below as well as Sharla's message below.  What pharmacy and do you want mychart or call.  Isabelle Miranda RN      April 27, 2023  Sharla Beckford PA-C         4/27/23  3:53 PM  Note     Result note already sent please see result note.     Of note Cipro not ideal due to patient being on prednisone. Higher risk of tendon rupture due to age and being on prednisone.                 4/27/23  2:53 PM  You routed this conversation to Sharla Beckford PA-C   [facility]    4/27/23  2:53 PM  Note     Patient calling regarding results.  Patient not feeling well and wants to be better.  Does not want Macorbid.  States that has never worked for her.  She states she used to always get Cipro.  Wondering if not given anymore due to C-Diff.  Please advise.  Isabelle Miranda RN

## 2023-04-28 NOTE — TELEPHONE ENCOUNTER
Patient calling.  Patient has cephalexin but she is very concerned that she will need to change med once sensitives return.  Worried sensitivities will come back on weekend and she will not get med changed and she is very sick.  Patient wants to know what happens when comes back on weekend.  Please advise.  Patient wants call back.  Isabelle Miranda RN

## 2023-04-28 NOTE — TELEPHONE ENCOUNTER
I can take a look over the weekend and reach out to her if treatment needs to be changed (would she prefer a phone call or MyChart if this is the case and confirm pharmacy). In general treatments are limited (due to allergies, interactions with current medications and previously used medications) so hopefully the culture shows no resistance.

## 2023-04-28 NOTE — TELEPHONE ENCOUNTER
Called patient back.  Discussed Cipro message and that Sharla would check for result.  T'd up pharmacy- [facility]-  and she prefers a phone call.  Patient states she will talk to provider tomorrow.  Discussed this is not a visit and provider checking on her personal time out of the kindness of her heart.  States she is a patient and does not like to hear me say that.  Again discussed the call is only to advise of result and if med needs to be changed.  Offered phone visit Monday for ongoing questions and concerns.  She again wants to wait to speak to provider tomorrow to decide.  Discussed if waits visit may not be available.  Discussed can cancel if not needed.  Feels she wants to go back to her clinic.  Still advised calling now to schedule or may not be able to get appt.  Isabelle Miranda RN

## 2023-04-29 NOTE — TELEPHONE ENCOUNTER
I called patient to inform her of the culture results showing pan-sensitivity. Antibiotic should work for infection. She had no questions. I recommended she reach out if symptoms not improving. She agreed.

## 2023-05-08 ENCOUNTER — TELEPHONE (OUTPATIENT)
Dept: UROLOGY | Facility: CLINIC | Age: 83
End: 2023-05-08
Payer: COMMERCIAL

## 2023-05-08 ENCOUNTER — TELEPHONE (OUTPATIENT)
Dept: FAMILY MEDICINE | Facility: CLINIC | Age: 83
End: 2023-05-08
Payer: COMMERCIAL

## 2023-05-08 DIAGNOSIS — N30.00 ACUTE CYSTITIS WITHOUT HEMATURIA: Primary | ICD-10-CM

## 2023-05-08 NOTE — TELEPHONE ENCOUNTER
Pt is very uncomfortable and was hoping to get a call back before end of day. The provider pt saw in [place] told pt she was to only talk to Urology.   Please call pt Thank you

## 2023-05-08 NOTE — TELEPHONE ENCOUNTER
[facility] Call Center    Phone Message    May a detailed message be left on voicemail: yes     Reason for Call: Patient is calling because she had a UA/UC and was given Keflex by her pcp. States that medication is not working and wants to change it, but her pcp wont do that. Wondering if Chiaar could review and prescribe her a different medication. Please reach out. Thank you    Action Taken: Message routed to:  [facility] URO    Travel Screening: Not Applicable

## 2023-05-08 NOTE — TELEPHONE ENCOUNTER
Patient calling and states she is not any better.  Having backache, stomach ache, painful when urinates, frothy urine.  No fever.  States she needs another antibiotic that shows will work on culture.  Did advise urology but she states she can not get in with them.  Discussed antibiotic she was given was sensitive and should have worked.  States if Hsarla will not help her she will need to go to ER.  Discussed that may be next step if this sick.  Patient has appt this am for her cornea surgery at 10 am.  Please advise.  Isabelle Miranda RN

## 2023-05-08 NOTE — TELEPHONE ENCOUNTER
Patient did contact Urology.  See below.  Will see if patient calls back or if urology will give another antibiotic.  Isabelle Miranda, RN    May 8, 2023  Nila Mccollum LPN  to Chiara Limon PA-C TR    5/8/23  8:37 AM  They gave her 10 days of antibiotics,do you want a repeat culture?   Nila Mccollum LPN        BE    5/8/23  8:34 AM  Aarti Loera routed this conversation to  Clinical Pool   Aarti Loera     BE    5/8/23  8:33 AM  Note     [facility] Call Center     Phone Message     May a detailed message be left on voicemail: yes      Reason for Call: Patient is calling because she had a UA/UC and was given Keflex by her pcp. States that medication is not working and wants to change it, but her pcp wont do that. Wondering if Chiara could review and prescribe her a different medication. Please reach out. Thank you     Action Taken: Message routed to:  [facility] URO     Travel Screening: Not Applicable

## 2023-05-08 NOTE — TELEPHONE ENCOUNTER
Called patient and discussed below.  Did discuss we would need to call to see if they can take her.  Did discuss limited number they can see.  She wants to check with her insurance and think about it and will call back.  Isabelle Miranda RN

## 2023-05-09 NOTE — TELEPHONE ENCOUNTER
Spoke with patient and explained to patient that she was on 10 days of antibiotics,which should of treated the infection.  Message sent to Chiara for plan going forward.  Nila Mccollum LPN

## 2023-05-10 ENCOUNTER — LAB (OUTPATIENT)
Dept: LAB | Facility: CLINIC | Age: 83
End: 2023-05-10
Payer: COMMERCIAL

## 2023-05-10 DIAGNOSIS — R39.89 SUSPECTED UTI: ICD-10-CM

## 2023-05-10 DIAGNOSIS — R39.89 SUSPECTED UTI: Primary | ICD-10-CM

## 2023-05-10 LAB
ALBUMIN UR-MCNC: NEGATIVE MG/DL
APPEARANCE UR: CLEAR
BILIRUB UR QL STRIP: NEGATIVE
COLOR UR AUTO: ABNORMAL
GLUCOSE UR STRIP-MCNC: NEGATIVE MG/DL
HGB UR QL STRIP: ABNORMAL
KETONES UR STRIP-MCNC: NEGATIVE MG/DL
LEUKOCYTE ESTERASE UR QL STRIP: NEGATIVE
NITRATE UR QL: NEGATIVE
PH UR STRIP: 5 [PH] (ref 5–7)
SP GR UR STRIP: 1.01 (ref 1–1.03)
UROBILINOGEN UR STRIP-MCNC: NORMAL MG/DL

## 2023-05-10 PROCEDURE — 81003 URINALYSIS AUTO W/O SCOPE: CPT | Mod: QW

## 2023-05-10 PROCEDURE — 87086 URINE CULTURE/COLONY COUNT: CPT

## 2023-05-10 NOTE — TELEPHONE ENCOUNTER
[facility] Call Center    Phone Message    May a detailed message be left on voicemail: yes     Reason for Call: Other: Lindsey is calling again asking about the plan going forward. She states she is not feeling well and does not want to keep waiting for a call back. She states that she really wants an order place for a UA/ UC so she can go get another test. Please review and call back, thanks.      Action Taken: Other: ub uro    Travel Screening: Not Applicable

## 2023-05-11 LAB — BACTERIA UR CULT: NO GROWTH

## 2023-06-01 ENCOUNTER — HEALTH MAINTENANCE LETTER (OUTPATIENT)
Age: 83
End: 2023-06-01

## 2023-06-01 NOTE — TELEPHONE ENCOUNTER
Occupational Therapy    Visit Type: initial evaluation    Relevant History/Co-morbidities: Admitted when patient could not get up from toilet. AMS     24/7 CG but not confused at baseline. Pt gets (A) w/ all ADLs but walks w/ rollator and SBA PTA.     SUBJECTIVE  Patient agreed to participate in therapy this date.  Patient verbally agrees to allow the following to be present during session: daughter    \"Bianca Noriega\"  (answering A&O questions)   Patient / Family Goal: maximize function    Pain   Patient does not demonstrate pain behaviors.    At onset of session (out of 10): 0    OBJECTIVE     Cognitive Status   Orientation    - Oriented to: person   - Disoriented to: place, time and situation  Functional Communication   - Overall Status: impaired   - Forms of Communication: verbal, slurred and delayed responses  Attention Span    - Attention: impaired   - Attention impairment: distractibility  Following Direction   - does not follow commands  Executive Function    - Problem Solving: impaired   - Termination: impaired   - Initiation: impaired    Patient Activity Tolerance: 1 to 2 activity to rest    Hand Dominance: right-handed      Range of Motion (ROM)   (degrees unless noted; active unless noted; norms in ( ); negative=lacking to 0, positive=beyond 0)  Comments: Patient moves bilateral UEs very slightly, per daughter reports left upper extremity pain d/t blood draws and IVs. Patient unable to get right upper extremity to face when attempting hand-over-hand grooming. Daughter reports shoulder difficulties (pain, arthritis, and she believes patient tore her right rotator cuff some time ago) at baseline but not to this extent.            Activities of Daily Living (ADLs)  Grooming/Oral Hygiene:   - Grooming assist: maximal assist       - Oral hygiene assist: maximal assist  - Position: supine/bed  - Assist needed for: verbal cueing, increased time to complete, teeth care and wash/dry face  Interventions    Thanks, needs just for 3 days please advise   Dr. Lisa Church, DO    Obstetrics and Gynecology  [facility] [place] [facility] [place]          Shoulder abduction: bilateral, supine, PROM, 10 reps, 1 sets   Shoulder flexion: bilateral, supine, PROM, 10 reps, 1 sets   Shoulder horizontal abduction: bilateral, supine, PROM, 10 reps, 1 sets   Elbow extension: bilateral, supine, PROM, 10 reps, 1 sets   Elbow flexion: bilateral, supine, PROM, 10 reps, 1 sets   Forearm pronation: bilateral, supine, PROM, 10 reps, 1 sets   Forearm supination: bilateral, supine, PROM, 10 reps, 1 sets   Wrist extension: bilateral, supine, PROM, 10 reps, 1 sets   Wrist flexion: bilateral, supine, PROM, 10 reps sets  Training provided: activity tolerance and ADL training  Skilled input: verbal instruction/cues and tactile instruction/cues  Verbal Consent: Writer verbally educated and received verbal consent for hand placement, positioning of patient, and techniques to be performed today from patient          ASSESSMENT   Patient will benefit from inpatient skilled therapy to address current assessed functional limitations and impairments.  Interferring components: cognitive deficits, decreased activity tolerance, decreased insight into deficit and minimal participation    Summary of function and discharge needs based on today's assessment:  - Current level of function: significantly below baseline level of function  - Therapy needs at discharge: therapy 5 or more times per week  - Activities of daily living (ADLs) requiring support at discharge: bed mobility, bathing, toileting, ambulation, transfers, feeding, dressing and grooming  - Instrumental activities of daily living (IADLs) requiring support at discharge: community mobility  - Impairments that require further therapy intervention: balance, pain, executive functioning, safety awareness, ROM, strength, activity tolerance, cognition and motor planning  AM-PAC  - Prior Level of Function: Needs a little help ([facility] 12-21)       Key: MOD A=moderate assistance, IND/MOD I=independent/modified independent  - Generalized Current Level  of Function     - Current Self-Cares: 8       Scoring Key= >21 Modified Independent; 20-21 Supervision; 18-19 Minimal assist; 13-18 Moderate assist; 9-12 Max assist; <9 Total assist    Patient's daughter present reporting that patient and spouse have a 24/7 CG to help w/ IADLs but she typically helps w/ all ADLs. Patient's daughter reports patient is typically very cognitively intact.        • Personal Occupations Profile Affected: bathing/showering, personal device care, lower body dressing, upper body dressing, personal hygiene/grooming, functional mobility/transfers, toileting/toilet hygiene, shopping     • Clinical decision making: Low - Patient has few limitations (1-3), comorbidities and/or complexities, as noted in problem focused assessment noted above, that impact their occupational profile.  Resulting in few treatment options and no task modification consistent with low clinical decision making complexity.    PLAN  Suggestions for next session as indicated:   OT Frequency: 3-5 x per week         Interventions: ADL retraining, functional transfer training, upper extremity strengthening/ROM, patient/family training, caregiver training, HEP training and transfer training      GOALS  Long Term Goals: (to be met by time of discharge from hospital)  Grooming: Patient will complete grooming tasks in sitting minimal assist.  Lower body dressing: Patient will complete lower body dressing in sitting and in standing minimal assist.  Toileting: Patient will complete toileting minimal assist.  Toilet transfer: Patient will complete toilet transfer with minimal assist.         Documented in the chart in the following areas: Prior Level of Function. Assessment/Plan.    Patient at End of Session:   Location: in bed  Safety measures: alarm system in place/re-engaged  Handoff to: nurse      Therapy procedure time and total treatment time can be found documented on the Time Entry flowsheet

## 2023-06-08 ENCOUNTER — TRANSFERRED RECORDS (OUTPATIENT)
Dept: HEALTH INFORMATION MANAGEMENT | Facility: CLINIC | Age: 83
End: 2023-06-08
Payer: COMMERCIAL

## 2023-06-08 LAB — RETINOPATHY: NEGATIVE

## 2023-06-16 ENCOUNTER — TELEPHONE (OUTPATIENT)
Dept: UROLOGY | Facility: CLINIC | Age: 83
End: 2023-06-16
Payer: COMMERCIAL

## 2023-06-16 DIAGNOSIS — R39.89 SUSPECTED UTI: Primary | ICD-10-CM

## 2023-06-16 NOTE — TELEPHONE ENCOUNTER
[facility] Call Center    Phone Message    May a detailed message be left on voicemail: yes     Reason for Call: Other: pt [facility] culture. pt is sure she has another infection and getting worse, please advise     Action Taken: Other: urology    Travel Screening: Not Applicable

## 2023-06-19 ENCOUNTER — LAB (OUTPATIENT)
Dept: LAB | Facility: CLINIC | Age: 83
End: 2023-06-19
Payer: COMMERCIAL

## 2023-06-19 DIAGNOSIS — R39.89 SUSPECTED UTI: ICD-10-CM

## 2023-06-19 LAB
ALBUMIN UR-MCNC: 10 MG/DL
APPEARANCE UR: CLEAR
BILIRUB UR QL STRIP: NEGATIVE
COLOR UR AUTO: ABNORMAL
GLUCOSE UR STRIP-MCNC: NEGATIVE MG/DL
HGB UR QL STRIP: NEGATIVE
KETONES UR STRIP-MCNC: NEGATIVE MG/DL
LEUKOCYTE ESTERASE UR QL STRIP: NEGATIVE
NITRATE UR QL: NEGATIVE
PH UR STRIP: 5 [PH] (ref 5–7)
SP GR UR STRIP: 1.01 (ref 1–1.03)
UROBILINOGEN UR STRIP-MCNC: NORMAL MG/DL

## 2023-06-19 PROCEDURE — 81003 URINALYSIS AUTO W/O SCOPE: CPT | Mod: QW

## 2023-06-19 PROCEDURE — 87086 URINE CULTURE/COLONY COUNT: CPT

## 2023-06-21 LAB — BACTERIA UR CULT: NORMAL

## 2023-06-30 DIAGNOSIS — I10 HYPERTENSION GOAL BP (BLOOD PRESSURE) < 130/80: ICD-10-CM

## 2023-06-30 DIAGNOSIS — G62.9 NEUROPATHY: ICD-10-CM

## 2023-06-30 DIAGNOSIS — E11.9 TYPE 2 DIABETES MELLITUS WITHOUT COMPLICATION, WITHOUT LONG-TERM CURRENT USE OF INSULIN (H): ICD-10-CM

## 2023-06-30 DIAGNOSIS — M10.9 GOUT, UNSPECIFIED CAUSE, UNSPECIFIED CHRONICITY, UNSPECIFIED SITE: ICD-10-CM

## 2023-06-30 DIAGNOSIS — I10 ESSENTIAL HYPERTENSION: ICD-10-CM

## 2023-06-30 DIAGNOSIS — E78.5 HYPERLIPIDEMIA LDL GOAL <100: ICD-10-CM

## 2023-07-03 NOTE — TELEPHONE ENCOUNTER
Routing refill request to provider for review/approval because:  Drug not on the [facility] refill protocol   Labs out of range:    Blood pressure out of protocol range      Lab Test 12/30/22  1028   CR 0.97*     BP Readings from Last 3 Encounters:   04/24/23 (!) 140/69   04/07/23 106/67   12/30/22 (!) 152/72

## 2023-07-06 RX ORDER — GABAPENTIN 300 MG/1
CAPSULE ORAL
Qty: 270 CAPSULE | Refills: 0 | Status: SHIPPED | OUTPATIENT
Start: 2023-07-06 | End: 2023-12-13

## 2023-07-06 RX ORDER — ROSUVASTATIN CALCIUM 40 MG/1
TABLET, COATED ORAL
Qty: 90 TABLET | Refills: 0 | Status: SHIPPED | OUTPATIENT
Start: 2023-07-06 | End: 2023-09-18

## 2023-07-06 RX ORDER — ALLOPURINOL 300 MG/1
TABLET ORAL
Qty: 90 TABLET | Refills: 0 | Status: SHIPPED | OUTPATIENT
Start: 2023-07-06 | End: 2023-10-16

## 2023-07-06 RX ORDER — LISINOPRIL 40 MG/1
TABLET ORAL
Qty: 90 TABLET | Refills: 0 | Status: SHIPPED | OUTPATIENT
Start: 2023-07-06 | End: 2024-02-07

## 2023-07-06 RX ORDER — GLIPIZIDE 10 MG/1
TABLET ORAL
Qty: 180 TABLET | Refills: 0 | Status: SHIPPED | OUTPATIENT
Start: 2023-07-06 | End: 2023-07-24

## 2023-07-06 RX ORDER — FUROSEMIDE 20 MG
TABLET ORAL
Qty: 180 TABLET | Refills: 0 | Status: SHIPPED | OUTPATIENT
Start: 2023-07-06 | End: 2023-11-27

## 2023-07-06 RX ORDER — METOPROLOL SUCCINATE 50 MG/1
TABLET, EXTENDED RELEASE ORAL
Qty: 90 TABLET | Refills: 0 | Status: SHIPPED | OUTPATIENT
Start: 2023-07-06 | End: 2023-10-16

## 2023-07-06 NOTE — TELEPHONE ENCOUNTER
Patient calling very mad. She has less than 3 days of medication and she can not wait until Dinorah comes back. Please advise  Ok to call and  [phone]

## 2023-07-14 ENCOUNTER — OFFICE VISIT (OUTPATIENT)
Dept: INTERNAL MEDICINE | Facility: CLINIC | Age: 83
End: 2023-07-14
Payer: COMMERCIAL

## 2023-07-14 ENCOUNTER — TELEPHONE (OUTPATIENT)
Dept: INTERNAL MEDICINE | Facility: CLINIC | Age: 83
End: 2023-07-14

## 2023-07-14 VITALS
DIASTOLIC BLOOD PRESSURE: 62 MMHG | BODY MASS INDEX: 29.53 KG/M2 | SYSTOLIC BLOOD PRESSURE: 120 MMHG | WEIGHT: 173 LBS | TEMPERATURE: 97.9 F | HEIGHT: 64 IN | HEART RATE: 60 BPM | OXYGEN SATURATION: 99 % | RESPIRATION RATE: 18 BRPM

## 2023-07-14 DIAGNOSIS — B37.31 YEAST INFECTION OF THE VAGINA: ICD-10-CM

## 2023-07-14 DIAGNOSIS — E78.5 HYPERLIPIDEMIA LDL GOAL <100: ICD-10-CM

## 2023-07-14 DIAGNOSIS — Z13.820 ENCOUNTER FOR OSTEOPOROSIS SCREENING IN ASYMPTOMATIC POSTMENOPAUSAL PATIENT: ICD-10-CM

## 2023-07-14 DIAGNOSIS — I10 ESSENTIAL HYPERTENSION: ICD-10-CM

## 2023-07-14 DIAGNOSIS — Z78.0 ENCOUNTER FOR OSTEOPOROSIS SCREENING IN ASYMPTOMATIC POSTMENOPAUSAL PATIENT: ICD-10-CM

## 2023-07-14 DIAGNOSIS — Z13.0 SCREENING FOR IRON DEFICIENCY ANEMIA: ICD-10-CM

## 2023-07-14 DIAGNOSIS — E11.8 TYPE 2 DIABETES MELLITUS WITH COMPLICATION, WITHOUT LONG-TERM CURRENT USE OF INSULIN (H): Primary | ICD-10-CM

## 2023-07-14 LAB
ANION GAP SERPL CALCULATED.3IONS-SCNC: 14 MMOL/L (ref 7–15)
BUN SERPL-MCNC: 28 MG/DL (ref 8–23)
CALCIUM SERPL-MCNC: 9.6 MG/DL (ref 8.8–10.2)
CHLORIDE SERPL-SCNC: 104 MMOL/L (ref 98–107)
CHOLEST SERPL-MCNC: 109 MG/DL
CREAT SERPL-MCNC: 1.3 MG/DL (ref 0.51–0.95)
DEPRECATED HCO3 PLAS-SCNC: 24 MMOL/L (ref 22–29)
ERYTHROCYTE [DISTWIDTH] IN BLOOD BY AUTOMATED COUNT: 13.9 % (ref 10–15)
GFR SERPL CREATININE-BSD FRML MDRD: 41 ML/MIN/1.73M2
GLUCOSE SERPL-MCNC: 157 MG/DL (ref 70–99)
HBA1C MFR BLD: 7 % (ref 0–5.6)
HCT VFR BLD AUTO: 34.1 % (ref 35–47)
HDLC SERPL-MCNC: 27 MG/DL
HGB BLD-MCNC: 11 G/DL (ref 11.7–15.7)
LDLC SERPL CALC-MCNC: 48 MG/DL
MCH RBC QN AUTO: 28.7 PG (ref 26.5–33)
MCHC RBC AUTO-ENTMCNC: 32.3 G/DL (ref 31.5–36.5)
MCV RBC AUTO: 89 FL (ref 78–100)
NONHDLC SERPL-MCNC: 82 MG/DL
PLATELET # BLD AUTO: 166 10E3/UL (ref 150–450)
POTASSIUM SERPL-SCNC: 4.5 MMOL/L (ref 3.4–5.3)
RBC # BLD AUTO: 3.83 10E6/UL (ref 3.8–5.2)
SODIUM SERPL-SCNC: 142 MMOL/L (ref 136–145)
TRIGL SERPL-MCNC: 169 MG/DL
WBC # BLD AUTO: 5.7 10E3/UL (ref 4–11)

## 2023-07-14 PROCEDURE — 36415 COLL VENOUS BLD VENIPUNCTURE: CPT

## 2023-07-14 PROCEDURE — 80061 LIPID PANEL: CPT

## 2023-07-14 PROCEDURE — 83036 HEMOGLOBIN GLYCOSYLATED A1C: CPT

## 2023-07-14 PROCEDURE — 85027 COMPLETE CBC AUTOMATED: CPT

## 2023-07-14 PROCEDURE — 80048 BASIC METABOLIC PNL TOTAL CA: CPT

## 2023-07-14 PROCEDURE — 99214 OFFICE O/P EST MOD 30 MIN: CPT

## 2023-07-14 PROCEDURE — 99207 PR FOOT EXAM NO CHARGE: CPT

## 2023-07-14 RX ORDER — FLUCONAZOLE 150 MG/1
150 TABLET ORAL ONCE
Qty: 30 TABLET | Refills: 0 | Status: CANCELLED | OUTPATIENT
Start: 2023-07-14 | End: 2023-07-14

## 2023-07-14 RX ORDER — FLUCONAZOLE 150 MG/1
150 TABLET ORAL ONCE
Qty: 30 TABLET | Refills: 0 | Status: SHIPPED | OUTPATIENT
Start: 2023-07-14 | End: 2023-07-14

## 2023-07-14 RX ORDER — FLUCONAZOLE 150 MG/1
150 TABLET ORAL ONCE
Qty: 15 TABLET | Refills: 0 | Status: SHIPPED | OUTPATIENT
Start: 2023-07-14 | End: 2023-07-14

## 2023-07-14 ASSESSMENT — PAIN SCALES - GENERAL: PAINLEVEL: NO PAIN (0)

## 2023-07-14 NOTE — PROGRESS NOTES
"  Assessment & Plan     (E11.8) Type 2 diabetes mellitus with complication, without long-term current use of insulin (H)  (primary encounter diagnosis)  Comment: Diabetes is well controlled-- A1C around 7.0%. On Insulin NPH 22units BID, Metformin 1000MG BID, Glipizide 10MG   Occasionally having hypoglycemic episodes at bedtime with readings in the 60's.   We will see what A1C looks like today and make medication adjustments as needed. May need to decrease Glipizide or insulin dosing to help prevent future hypoglycemic episodes.   Plan: HEMOGLOBIN A1C, Albumin Random Urine         Quantitative with Creat Ratio           (I10) Essential hypertension  Comment: BP at goal at 120/62. Continue Metoprolol XL 50MG, Lisinopril 40MG  Plan: Basic metabolic panel  (Ca, Cl, CO2, Creat,         Gluc, K, Na, BUN)            (E78.5) Hyperlipidemia LDL goal <100  Comment: Update LDL. Continue Crestor 40MG  Plan: Lipid panel reflex to direct LDL Fasting            (B37.31) Yeast infection of the vagina  Comment: Notes frequent yeast infections. PCP in past has given diflucan to use sparingly when she has yeast infections  Plan: fluconazole (DIFLUCAN) 150 MG tablet            (Z13.0) Screening for iron deficiency anemia  Plan: CBC with platelets            (Z13.820,  Z78.0) Encounter for osteoporosis screening in asymptomatic postmenopausal patient  Plan: DEXA HIP/PELVIS/SPINE - Future          She is following with ID for recent finding of latent TB (incidental finding by Rheumatology). She sought out Rheum d/t ongoing pain with osteoarrthritis.             [facility]ILLIAM [facility] is a 82 year old, presenting for the following health issues:  Vaginal (Patient is being seen for vaginal issues.)        7/14/2023     9:30 AM   Additional Questions   Roomed by Rachel Ortiz     HPI           Objective    Resp 18   Ht 1.626 m (5' 4\")   LMP  (LMP Unknown)   Breastfeeding No   " BMI 30.73 kg/m    Body mass index is 30.73 kg/m .            Physical Exam  Constitutional:       General: She is not in acute distress.     Appearance: Normal appearance. She is not ill-appearing, toxic-appearing or diaphoretic.   HENT:      Head: Normocephalic and atraumatic.   Eyes:      Conjunctiva/sclera: Conjunctivae normal.   Cardiovascular:      Rate and Rhythm: Normal rate and regular rhythm.      Heart sounds: Normal heart sounds.   Pulmonary:      Effort: Pulmonary effort is normal.      Breath sounds: Normal breath sounds.   Skin:     General: Skin is warm and dry.   Neurological:      Mental Status: She is alert and oriented to person, place, and time.   Psychiatric:         Mood and Affect: Mood normal.         Behavior: Behavior normal.         Thought Content: Thought content normal.         Judgment: Judgment normal.     Diabetic foot exam: normal DP and PT pulses, no trophic changes or ulcerative lesions and normal sensory exam

## 2023-07-14 NOTE — TELEPHONE ENCOUNTER
No. She has received this in the past from her PCP. She knows to take only one at onset of yeast infection-- she asked for multiple so she doesn't have to request for frequent refills. I can re order this so it reads more clearly-- I will give 15 actually as this should be plenty

## 2023-07-14 NOTE — TELEPHONE ENCOUNTER
Pharmacy calling regarding prescription for Diflucan that was sent today.    Patient was given 30 tablets.  Was this in error, per sig-take one tablet once.  Please advise, thanks.

## 2023-07-14 NOTE — TELEPHONE ENCOUNTER
Called and spoke with pharmacy staff to relay provider message, that patient will take one tablet as needed for onset of symptoms.    Thank you,  Everton Wynn, Triage RN [facility]  10:48 AM 7/14/2023

## 2023-07-17 LAB
CREAT UR-MCNC: 17.9 MG/DL
MICROALBUMIN UR-MCNC: 35.5 MG/L
MICROALBUMIN/CREAT UR: 198.32 MG/G CR (ref 0–25)

## 2023-07-17 PROCEDURE — 82570 ASSAY OF URINE CREATININE: CPT

## 2023-07-17 PROCEDURE — 82043 UR ALBUMIN QUANTITATIVE: CPT

## 2023-07-23 NOTE — PROGRESS NOTES
Medication Therapy Management (MTM) Encounter    ASSESSMENT:                            Medication Adherence/Access: No issues identified    Latent tuberculosis infection: provided more information on isoniazid today including common side effects/risk of hepatotoxicity with isoniazid but minimal drug interactions which is beneficial.    Type 2 Diabetes: Patient is meeting A1c goal of < 8%. Self monitoring of blood glucose is at goal of fasting  mg/dL and post prandial < 180 mg/dL. Consider reducing insulin dose next visit if still having hypoglycemia. Patient not ready to start Ozempic, can discuss more next visit. Recommend to stop aspirin due to age since risk for bleeding may be higher than CV benefit.     Hypertension: stable  Hyperlipidemia: stable  Gout: stable  Arthritis: follow Rheum plan of care  GERD: stable  Supplements: stable    PLAN:                            Stop aspirin   Send [employer] message to me if you have any low blood sugar    Follow-up: 2 months    SUBJECTIVE/OBJECTIVE:                          Lindsey Gary is a 83 year old female coming in for a follow-up visit.  Today's visit is a follow-up MTM visit from 4/24/23. She was following with Beti Jacobo but needs to establish with a new MTM.     Reason for visit: MTM follow-up    Allergies/ADRs: Reviewed in chart  Tobacco: She reports that she has never smoked. She has never been exposed to tobacco smoke. She has never used smokeless tobacco.  Alcohol: not currently using    Medication Adherence/Access: no issues reported  Has been working with Diabetes Liaison team for Ozempic and Insulin PAP.     Latent tuberculosis infection: patient met with ID at [facility]. Will need treatment for latent tuberculosis infection if she starts Otezla with Rheumatology. They recommended isoniazid plus vitamin B6 for 9 months if she needs treatment.     Type 2 Diabetes:  Currently taking metformin 1000 mg twice daily, glipizide 10 mg once daily, and  NPH 22 units every morning (8 am) and 22 units in the evening (5 pm). Patient is having some hypoglycemia at night. Glipizide dose reduced starting a few days ago. Treats with life savers. She isn't sure if she wants to start Ozempic. She's worried about long-term cost since it would likely be a chronic medication if tolerated. Has box of Ozempic at home.   Blood sugar monitorin-3 time(s) daily. Ranges (patient reported):  fastin-160s. Pre-prandial dinner: 200s. Bedtime: varies. 110-175. No hypoglycemia since reducing glipizide dose.   Symptoms of low blood sugar? Aware of hypo symptoms (feels weird, out of body experience).   Symptoms of high blood sugar? Burning/pain in toes, on gabapentin  Eye exam: up to date  Foot exam: up to date  Aspirin: Taking 81mg daily for primary prevention. Some blood in urine, treated for UTI.  Statin: Yes:    ACEi/ARB: Yes:   Urine Albumin:   Lab Results   Component Value Date    UMALCR 198.32 (H) 2023      Lab Results   Component Value Date    A1C 7.0 (H) 2023     Hypertension:   Metoprolol ER 25 mg once daily  Lisinopril 40 mg once daily   Furosemide 40 mg once daily   Patient reports no current medication side effects.  Patient does not self-monitor blood pressure.    BP Readings from Last 3 Encounters:   23 120/80   23 120/62   23 (!) 140/69     Pulse Readings from Last 3 Encounters:   23 60   23 62   23 70     Potassium   Date Value Ref Range Status   2023 4.5 3.4 - 5.3 mmol/L Final   2022 3.8 3.4 - 5.3 mmol/L Final   2021 4.8 3.4 - 5.3 mmol/L Final     Hyperlipidemia:   rosuvastatin 40mg daily  Patient reports no significant myalgias or other side effects.  Recent Labs   Lab Test 23  1041 22  1028   CHOL 109 128   HDL 27* 31*   LDL 48 64   TRIG 169* 164*     Gout:   Allopurinol 150 mg 2 times daily   No side effects or recent flares  Uric acid: 3.8 on 23    Arthritis:  Taking Tylenol  1000 mg twice daily and Voltaren as needed which helps. Following with Rheumatology. May try trial of Otezela but not sure yet.     GERD: Current medications include: Pepcid (famotidine) 20 mg twice daily and Maalox/mylanta/tums as needed. Patient reports no current symptoms.  Patient feels that current regimen is effective.    Supplements:   Vitamin D 2000 units once daily   Probiotic once daily   Zinc gluconate 50 mg once daily   No reported issues at this time.       Today's Vitals: LMP  (LMP Unknown)   ----------------    I spent 40 minutes with this patient today. All changes were made via collaborative practice agreement with LILLIAM Sam CNP. A copy of the visit note was provided to the patient's provider(s).    A summary of these recommendations was given to the patient.    Dasia Boyle, PharmD, AAHIVP  Medication Therapy Management Pharmacist   July 23, 2023     Medication Therapy Recommendations  Type 2 diabetes mellitus with complication, without long-term current use of insulin (H)    Current Medication: ASPIRIN 81 MG OR TABS   Rationale: No medical indication at this time - Unnecessary medication therapy - Indication   Recommendation: Discontinue Medication   Status: Accepted per CPA

## 2023-07-24 ENCOUNTER — OFFICE VISIT (OUTPATIENT)
Dept: PHARMACY | Facility: CLINIC | Age: 83
End: 2023-07-24
Payer: COMMERCIAL

## 2023-07-24 ENCOUNTER — OFFICE VISIT (OUTPATIENT)
Dept: OBGYN | Facility: CLINIC | Age: 83
End: 2023-07-24
Payer: COMMERCIAL

## 2023-07-24 VITALS — BODY MASS INDEX: 29.7 KG/M2 | DIASTOLIC BLOOD PRESSURE: 80 MMHG | HEIGHT: 64 IN | SYSTOLIC BLOOD PRESSURE: 120 MMHG

## 2023-07-24 DIAGNOSIS — I10 HYPERTENSION GOAL BP (BLOOD PRESSURE) < 130/80: ICD-10-CM

## 2023-07-24 DIAGNOSIS — N90.89 VULVAR IRRITATION: Primary | ICD-10-CM

## 2023-07-24 DIAGNOSIS — E11.9 TYPE 2 DIABETES MELLITUS WITHOUT COMPLICATION, WITHOUT LONG-TERM CURRENT USE OF INSULIN (H): Primary | ICD-10-CM

## 2023-07-24 DIAGNOSIS — K21.00 GASTROESOPHAGEAL REFLUX DISEASE WITH ESOPHAGITIS, UNSPECIFIED WHETHER HEMORRHAGE: ICD-10-CM

## 2023-07-24 DIAGNOSIS — Z78.9 TAKES DIETARY SUPPLEMENTS: ICD-10-CM

## 2023-07-24 DIAGNOSIS — M10.9 GOUT, UNSPECIFIED CAUSE, UNSPECIFIED CHRONICITY, UNSPECIFIED SITE: ICD-10-CM

## 2023-07-24 DIAGNOSIS — E78.5 HYPERLIPIDEMIA LDL GOAL <100: ICD-10-CM

## 2023-07-24 DIAGNOSIS — Z22.7 LATENT TUBERCULOSIS: ICD-10-CM

## 2023-07-24 DIAGNOSIS — M19.90 ARTHRITIS: ICD-10-CM

## 2023-07-24 LAB
BACTERIAL VAGINOSIS VAG-IMP: NEGATIVE
CANDIDA DNA VAG QL NAA+PROBE: NOT DETECTED
CANDIDA GLABRATA / CANDIDA KRUSEI DNA: NOT DETECTED
CLUE CELLS: NORMAL
T VAGINALIS DNA VAG QL NAA+PROBE: NOT DETECTED
TRICHOMONAS, WET PREP: NORMAL
WBC'S/HIGH POWER FIELD, WET PREP: NORMAL
YEAST, WET PREP: NORMAL

## 2023-07-24 PROCEDURE — 99607 MTMS BY PHARM ADDL 15 MIN: CPT | Performed by: PHARMACIST

## 2023-07-24 PROCEDURE — 0352U MULTIPLEX VAGINAL PANEL BY PCR: CPT | Performed by: FAMILY MEDICINE

## 2023-07-24 PROCEDURE — 99213 OFFICE O/P EST LOW 20 MIN: CPT | Performed by: FAMILY MEDICINE

## 2023-07-24 PROCEDURE — 99606 MTMS BY PHARM EST 15 MIN: CPT | Performed by: PHARMACIST

## 2023-07-24 PROCEDURE — 87210 SMEAR WET MOUNT SALINE/INK: CPT | Performed by: FAMILY MEDICINE

## 2023-07-24 RX ORDER — GLIPIZIDE 10 MG/1
10 TABLET ORAL
Qty: 90 TABLET | Refills: 1 | Status: SHIPPED | OUTPATIENT
Start: 2023-07-24 | End: 2023-11-27

## 2023-07-24 RX ORDER — FLUCONAZOLE 150 MG/1
TABLET ORAL
COMMUNITY
Start: 2023-07-14 | End: 2023-10-06

## 2023-07-24 RX ORDER — ZINC GLUCONATE 50 MG
50 TABLET ORAL DAILY
COMMUNITY
End: 2023-09-18

## 2023-07-24 NOTE — PATIENT INSTRUCTIONS
"Recommendations from today's MTM visit:                                                      Stop aspirin   Send mychart message to me if you have any low blood sugar    Follow-up: 2 months    It was great speaking with you today.  I value your experience and would be very thankful for your time in providing feedback in our clinic survey. In the next few days, you may receive an email or text message from [employer] with a link to a survey related to your  clinical pharmacist.\"     To schedule another MTM appointment, please call the clinic directly or you may call the MTM scheduling line at [phone] or toll-free at [phone].     My Clinical Pharmacist's contact information:                                                      Please feel free to contact me with any questions or concerns you have.      Dasia Boyle, PharmD, AAUniversity Hospitals [facility] Center  Medication Therapy Management Pharmacist       "

## 2023-07-24 NOTE — NURSING NOTE
"Chief Complaint   Patient presents with    Vaginal Problem     Burning and redness--notice a sore inside labia/lip--using estradiol cream       Initial /80   Ht 1.626 m (5' 4\")   LMP  (LMP Unknown)   BMI 29.70 kg/m   Estimated body mass index is 29.7 kg/m  as calculated from the following:    Height as of this encounter: 1.626 m (5' 4\").    Weight as of 23: 78.5 kg (173 lb).  BP completed using cuff size: regular    Questioned patient about current smoking habits.  Pt. has never smoked.          The following HM Due: NONE    "

## 2023-07-24 NOTE — PATIENT INSTRUCTIONS
Will notify of results     Dr. Lisa Church, DO    Obstetrics and Gynecology  [facility] [place] [facility] [place]

## 2023-07-24 NOTE — RESULT ENCOUNTER NOTE
Called patient and she did see the message and is only taking 1 tablet a day.     Janneth Royal MA

## 2023-07-24 NOTE — PROGRESS NOTES
SUBJECTIVE:  Lindsey Gary is an 83 year old   woman who presents for   gynecology consult for vulvar burning, occurring chronically.  This has been occurring   On and off for years.   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  She notes that if she keeps the skin dry from urine leakage it is much better.   The vulva seems to get more irritated if urine stays on the skin, like if she would sleep through   The night.  She leaks urine daily.  The urologist tried injecting the urethra which didn't work.    She tends to leak urine constantly.  She applies 3 x weekly estradiol.      No LMP recorded (lmp unknown). Patient has had a hysterectomy.       Past Medical History:   Diagnosis Date    Arthritis     Essential hypertension, benign     Gout     Mixed hyperlipidemia     Mumps     Pain in joint, ankle and foot     gout    Type II or unspecified type diabetes mellitus without mention of complication, not stated as uncontrolled     Diabetes mellitus          Family History   Problem Relation Age of Onset    Cancer Mother         colon ca survivor    Cancer - colorectal Mother     Cerebrovascular Disease Father     No Known Problems Maternal Grandmother     No Known Problems Maternal Grandfather     No Known Problems Paternal Grandmother     No Known Problems Brother     No Known Problems Sister     No Known Problems Son     No Known Problems Daughter     No Known Problems Other        Past Surgical History:   Procedure Laterality Date    BLADDER SURGERY      COLONOSCOPY      COLONOSCOPY N/A 2014    Procedure: COMBINED COLONOSCOPY, SINGLE OR MULTIPLE BIOPSY/POLYPECTOMY BY BIOPSY;  Surgeon: Chyu Staton MD;  Location:  GI    COLONOSCOPY N/A 2020    Procedure: COLONOSCOPY;  Surgeon: Chuy Staton MD;  Location:  GI    CYSTOSCOPY      HYSTERECTOMY, PAP NO LONGER INDICATED      HYSTERECTOMY, JAIME      fibroid    ORTHOPEDIC SURGERY Left 2016  "   partial knee replacement    SURGICAL HISTORY OF -   10/28/2015    right partial knee     [facility] NONSPECIFIC PROCEDURE      Breast biopsies       [facility] NONSPECIFIC PROCEDURE      Symptomatic left thigh lipomas       [facility] NONSPECIFIC PROCEDURE  06/01/2009    pubovaginal sling       Current Outpatient Medications   Medication    acetaminophen (TYLENOL) 500 MG tablet    alcohol swab prep pads    allopurinol (ZYLOPRIM) 300 MG tablet    Ascorbic Acid (VITAMIN C) 500 MG CAPS    ASPIRIN 81 MG OR TABS    blood glucose (CONTOUR NEXT TEST) test strip    blood glucose calibration (NO BRAND SPECIFIED) solution    blood glucose monitoring (CONTOUR NEXT MONITOR W/DEVICE KIT) meter device kit    chlorhexidine (PERIDEX) 0.12 % solution    Cholecalciferol (VITAMIN D) 2000 UNITS tablet    clindamycin (CLEOCIN) 300 MG capsule    cycloSPORINE (RESTASIS) 0.05 % ophthalmic emulsion    diclofenac (VOLTAREN) 1 % topical gel    estradiol (ESTRACE) 0.1 MG/GM vaginal cream    famotidine (PEPCID) 20 MG tablet    ferrous gluconate (FERGON) 324 (38 Fe) MG tablet    fluconazole (DIFLUCAN) 150 MG tablet    furosemide (LASIX) 20 MG tablet    gabapentin (NEURONTIN) 300 MG capsule    glipiZIDE (GLUCOTROL) 10 MG tablet    insulin NPH (NOVOLIN N RELION) 100 UNIT/ML vial    insulin syringe-needle U-100 (DROPLET INSULIN SYRINGE) 31G X 5/16\" 0.3 ML miscellaneous    Lactobacillus (PROBIOTIC ACIDOPHILUS) TABS    lisinopril (ZESTRIL) 40 MG tablet    menthol-zinc oxide (CALMOSEPTINE) 0.44-20.625 % OINT ointment    metFORMIN (GLUCOPHAGE) 1000 MG tablet    metoprolol succinate ER (TOPROL XL) 50 MG 24 hr tablet    prednisoLONE acetate (PRED FORTE) 1 % ophthalmic suspension    rosuvastatin (CRESTOR) 40 MG tablet    thin (NO BRAND SPECIFIED) lancets    TUMS 500 MG OR CHEW    zinc gluconate 50 MG tablet     No current facility-administered medications for this visit.     Allergies   Allergen Reactions    Amoxicillin-Pot Clavulanate      Tongue swelling and rash     Can " "take PCN K without reaction     Sulfa Antibiotics      Tongue swelling and rash       Social History     Tobacco Use    Smoking status: Never     Passive exposure: Never    Smokeless tobacco: Never   Substance Use Topics    Alcohol use: No     Alcohol/week: 0.0 standard drinks of alcohol       Review Of Systems  Ears/Nose/Throat: negative  Respiratory: No shortness of breath, dyspnea on exertion, cough, or hemoptysis  Cardiovascular: negative  Gastrointestinal: negative  Genitourinary: negative  Constitutional, HEENT, cardiovascular, pulmonary, GI, , musculoskeletal, neuro, skin, endocrine and psych systems are negative, except as otherwise noted.    OBJECTIVE:  /80   Ht 1.626 m (5' 4\")   LMP  (LMP Unknown)   BMI 29.70 kg/m    General appearance: healthy, alert, and no distress  Skin: Skin color, texture, turgor normal. No rashes or lesions.  Ears: negative  Nose/Sinuses: Nares normal. Septum midline. Mucosa normal. No drainage or sinus tenderness.  Oropharynx: Lips, mucosa, and tongue normal. Teeth and gums normal.  Neck: Neck supple. No adenopathy. Thyroid symmetric, normal size,, Carotids without bruits.  Lungs: negative, Percussion normal. Good diaphragmatic excursion. Lungs clear  Heart: negative, PMI normal. No lifts, heaves, or thrills. RRR. No murmurs, clicks gallops or rub  Breasts: deferred  Abdomen: Abdomen soft, non-tender. BS normal. No masses, organomegaly  Pelvic: Pelvic:  Pelvic examination with no pap/no Gonorrhea and Chlamydia   including  External genitalia with some erythema   and vagina normal rugatted  atrophic  Examination of urethra  normal no masses, tenderness, scarring  bladder, no masses or tenderness      ASSESSMENT:  Lindsey Gary is an 83 year old   woman who presents for   gynecology consult for vulvar burning, occurring chronically.  This has been occurring   On and off for years.   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  She notes that if she keeps the skin dry from urine leakage it is much better.   The vulva seems to get more irritated if urine stays on the skin, like if she would sleep through   The night.  She leaks urine daily.  The urologist tried injecting the urethra which didn't work.    She tends to leak urine constantly.  She applies 3 x weekly estradiol.      PLAN:  Dx:  1)  Chronic vulvar burning, which waxes and wanes:  wet prep pending today. Sometimes it has a response to antibiotics and   A genital culture is obtained.   Leaks urine continuously, recommend incontinence underwear  Has a failed bladder surgery previously.        Labs were reviewed in [employer]   Imaging was reviewed in Epic   Tests and documents were reviewed.   Discussion of management or test interpretation   Diagnosis or treatment significantly limited by social determinant       Dr. Lisa Church, DO    Obstetrics and Gynecology  [facility]

## 2023-07-25 NOTE — PROGRESS NOTES
Wet prep and Multiplex specimens done at clinic visit. Vaginal culture was ordered. I called lab and asked if they can do a culture on the Multiplex specimen. Lab said a culture needs to be done with an E-swab.    Patient and Dr. Church had left the clinic. I cancelled the culture order and place the Multiplex order.    Elise Mcclure CMA

## 2023-07-27 ENCOUNTER — TELEPHONE (OUTPATIENT)
Dept: INTERNAL MEDICINE | Facility: CLINIC | Age: 83
End: 2023-07-27
Payer: COMMERCIAL

## 2023-07-27 NOTE — TELEPHONE ENCOUNTER
Pt calls, saw MTM recently, had question on Ferrous Gluconate on med list,  informed this is iron pill, pt forget and agrees she takes this, no other action needed  Jaci Rodriguez RN, BSN  [facility]

## 2023-07-28 ENCOUNTER — TELEPHONE (OUTPATIENT)
Dept: OBGYN | Facility: CLINIC | Age: 83
End: 2023-07-28
Payer: COMMERCIAL

## 2023-07-28 DIAGNOSIS — N90.89 VULVAR IRRITATION: Primary | ICD-10-CM

## 2023-07-28 RX ORDER — METRONIDAZOLE 7.5 MG/G
GEL TOPICAL 2 TIMES DAILY
Qty: 45 G | Refills: 0 | Status: SHIPPED | OUTPATIENT
Start: 2023-07-28 | End: 2023-10-06

## 2023-07-28 NOTE — TELEPHONE ENCOUNTER
Patient calling -    LOV 7/24/23    Continuing to have vulvar irritation.  Asking for metrogel prescription as mentioned in the result note.    Medication order placed, sent to preferred pharmacy.  Will route to provider as a FYI.    Sally PAULINO RN

## 2023-08-10 ENCOUNTER — OFFICE VISIT (OUTPATIENT)
Dept: INTERNAL MEDICINE | Facility: CLINIC | Age: 83
End: 2023-08-10
Payer: COMMERCIAL

## 2023-08-10 VITALS
DIASTOLIC BLOOD PRESSURE: 60 MMHG | OXYGEN SATURATION: 98 % | SYSTOLIC BLOOD PRESSURE: 114 MMHG | TEMPERATURE: 97.9 F | HEIGHT: 64 IN | WEIGHT: 180 LBS | HEART RATE: 64 BPM | RESPIRATION RATE: 18 BRPM | BODY MASS INDEX: 30.73 KG/M2

## 2023-08-10 DIAGNOSIS — F40.240 CLAUSTROPHOBIA: Primary | ICD-10-CM

## 2023-08-10 DIAGNOSIS — R30.0 DYSURIA: ICD-10-CM

## 2023-08-10 DIAGNOSIS — E11.8 TYPE 2 DIABETES MELLITUS WITH COMPLICATION, WITHOUT LONG-TERM CURRENT USE OF INSULIN (H): ICD-10-CM

## 2023-08-10 DIAGNOSIS — I10 ESSENTIAL HYPERTENSION: ICD-10-CM

## 2023-08-10 DIAGNOSIS — I10 HYPERTENSION GOAL BP (BLOOD PRESSURE) < 130/80: ICD-10-CM

## 2023-08-10 DIAGNOSIS — N18.32 STAGE 3B CHRONIC KIDNEY DISEASE (H): ICD-10-CM

## 2023-08-10 PROCEDURE — 99215 OFFICE O/P EST HI 40 MIN: CPT | Performed by: NURSE PRACTITIONER

## 2023-08-10 RX ORDER — LORAZEPAM 1 MG/1
1 TABLET ORAL ONCE
Qty: 4 TABLET | Refills: 0 | Status: SHIPPED | OUTPATIENT
Start: 2023-08-10 | End: 2023-08-10

## 2023-08-10 NOTE — PROGRESS NOTES
Assessment & Plan     Claustrophobia  She is going to have an MRI for her neck pain and pain into her left hand.  This was ordered almost a year ago and she has not done it but she feels like she would like to do it now.  Her new rheumatologist also wanted to do hand in the foot MRI.  Hopefully she can do them all at the same time.  She does not like MRIs.  She does do the open sided.  She request to have sedation so she can use it if she needs it  - LORazepam (ATIVAN) 1 MG tablet; Take 1 tablet (1 mg) by mouth once for 1 dose Prior to MRI and may repeat x 1 if needed    Essential hypertension  In good range no change in meds    Hypertension goal BP (blood pressure) < 130/80  Anxiety in good range    Type 2 diabetes mellitus with complication, without long-term current use of insulin (H)  Acceptable A1c last was 7.0  - Basic metabolic panel  (Ca, Cl, CO2, Creat, Gluc, K, Na, BUN); Future    Dysuria  Requests a urine today  - UA Macroscopic with reflex to Microscopic and Culture - Lab Collect    Stage 3b chronic kidney disease (H)  We did discuss possibility of taking her metformin away, and adding Rybelsus in its place or something else.  Which may improve her GFR.  She is going to think about it      40 minutes spent by me on the date of the encounter doing chart review, history and exam, documentation and further activities per the note       Patient Instructions   Ativan 1 mg 15-20 minutes prior to procedure     Consider Rybelsus for diabetes in future      Recheck lab well hydrated in a week or two    LILLIAM Sam CNP  [facility] is a 83 year old, presenting for the following health issues:  Follow Up      8/10/2023     9:19 AM   Additional Questions   Roomed by Calista MENDEZ CMA       History of Present Illness       CKD: She uses over the counter pain medication, including tylenol, two times daily.    She eats 2-3 servings of fruits and vegetables  "daily.She consumes 0 sweetened beverage(s) daily.She exercises with enough effort to increase her heart rate 9 or less minutes per day.  She exercises with enough effort to increase her heart rate 3 or less days per week.   She is taking medications regularly.       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      Objective    /60 (BP Location: Right arm, Patient Position: Sitting, Cuff Size: Adult Large)   Pulse 64   Temp 97.9  F (36.6  C) (Oral)   Resp 18   Ht 1.626 m (5' 4\")   Wt 81.6 kg (180 lb)   LMP  (LMP Unknown)   SpO2 98%   Breastfeeding No   BMI 30.90 kg/m    Body mass index is 30.9 kg/m .  Physical Exam   GENERAL: alert and no distress  RESP: lungs clear to auscultation - no rales, rhonchi or wheezes  CV: regular rate and rhythm,   ABDOMEN: soft, nontender, nd bowel sounds normal  MS: no gross musculoskeletal defects noted, no edema    Reviewed previous lab   Future lab                   "

## 2023-08-10 NOTE — PATIENT INSTRUCTIONS
Ativan 1 mg 15-20 minutes prior to procedure     Consider Rybelsus for diabetes in future      Recheck lab well hydrated in a week or two

## 2023-08-29 ENCOUNTER — OFFICE VISIT (OUTPATIENT)
Dept: FAMILY MEDICINE | Facility: CLINIC | Age: 83
End: 2023-08-29
Payer: COMMERCIAL

## 2023-08-29 VITALS
WEIGHT: 179 LBS | SYSTOLIC BLOOD PRESSURE: 112 MMHG | DIASTOLIC BLOOD PRESSURE: 70 MMHG | RESPIRATION RATE: 18 BRPM | HEIGHT: 64 IN | BODY MASS INDEX: 30.56 KG/M2 | OXYGEN SATURATION: 98 % | TEMPERATURE: 97.6 F | HEART RATE: 55 BPM

## 2023-08-29 DIAGNOSIS — K14.6 SORENESS OF TONGUE: Primary | ICD-10-CM

## 2023-08-29 PROCEDURE — 99213 OFFICE O/P EST LOW 20 MIN: CPT | Performed by: FAMILY MEDICINE

## 2023-08-29 ASSESSMENT — PAIN SCALES - GENERAL: PAINLEVEL: NO PAIN (0)

## 2023-08-29 NOTE — PROGRESS NOTES
Assessment & Plan     Soreness of tongue  Normal exam, will place referral. Stop OTC vitamins, hosea zinc, possibly having side effects   - Adult ENT  Referral; Future                 Ana Ross MD  [facility] RENUKA Portillo is a 83 year old, presenting for the following health issues:  Oral Swelling      8/29/2023     8:15 AM   Additional Questions   Roomed by Juanpablo CARCAMO   Accompanied by No one         8/29/2023     8:15 AM   Patient Reported Additional Medications   Patient reports taking the following new medications None       History of Present Illness       Reason for visit:  Swelling of the tongue  Symptom onset:  1-2 weeks ago    She eats 2-3 servings of fruits and vegetables daily.She consumes 0 sweetened beverage(s) daily.She exercises with enough effort to increase her heart rate 20 to 29 minutes per day.  She exercises with enough effort to increase her heart rate 4 days per week.   She is taking medications regularly.       Concern - tongue  Onset: 1 week   Description: white on under the tongue and left side of the mouth, has tiny taste.   Intensity: mild  Progression of Symptoms:  improving  Accompanying Signs & Symptoms: NA  Previous history of similar problem: No  Precipitating factors:        Worsened by: NA  Alleviating factors:        Improved by: has taken some tylenol but not sure if that is helping   Therapies tried and outcome: tried taking some tylenol, and prescribed mouth wash from the dentist.   Not worse or better, nothing is helping, tried salt water, still using her mouth wash.  Eating more yogurt and a probiotic, she took diflucan as well(for vaginal yeast), so took this for self dx thrush.  She is not sure if there is white or not, looked under her tongue and looked bit loke white. And side of tongue is sore, all on the left.  Not sure if swollen,  but might be?  Is not eating on that side, not sure if it would make it worse, just  "avoids that side, hot or acid drinks do not make it worse.  New vitamin, not taking iron every day, just as needed, zinc is also only taken once per week, vit C for years and vit D    GERD-taking pepcid, but still with some sx, did take prilosec for awhile and changed to pepcid awhile ago and sx about the same    Clindamycin only for teath cleaning    Latent TB, just dx, will be taking treatment soon.            Review of Systems   CONSTITUTIONAL: NEGATIVE for fever, chills, change in weight  ENT/MOUTH: NEGATIVE for ear, mouth and throat problems  RESP: NEGATIVE for significant cough or SOB  CV: NEGATIVE for chest pain, palpitations or peripheral edema      Objective    /70 (BP Location: Right arm, Patient Position: Sitting, Cuff Size: Adult Regular)   Pulse 55   Temp 97.6  F (36.4  C) (Oral)   Resp 18   Ht 1.626 m (5' 4\")   Wt 81.2 kg (179 lb)   LMP  (LMP Unknown)   SpO2 98%   BMI 30.73 kg/m    Body mass index is 30.73 kg/m .  Physical Exam   GENERAL: healthy, alert and no distress  EYES: Eyes grossly normal to inspection, and conjunctivae and sclerae normal  HENT: ear canals and TM's normal, nose and mouth without ulcers or lesions  Normal tongue  NECK: no adenopathy, no asymmetry, masses, or scars and thyroid normal to palpation  RESP: lungs clear to auscultation - no rales, rhonchi or wheezes                      "

## 2023-09-07 ENCOUNTER — ANCILLARY PROCEDURE (OUTPATIENT)
Dept: BONE DENSITY | Facility: CLINIC | Age: 83
End: 2023-09-07
Payer: COMMERCIAL

## 2023-09-07 DIAGNOSIS — Z13.820 ENCOUNTER FOR OSTEOPOROSIS SCREENING IN ASYMPTOMATIC POSTMENOPAUSAL PATIENT: ICD-10-CM

## 2023-09-07 DIAGNOSIS — Z78.0 ENCOUNTER FOR OSTEOPOROSIS SCREENING IN ASYMPTOMATIC POSTMENOPAUSAL PATIENT: ICD-10-CM

## 2023-09-07 PROCEDURE — 77085 DXA BONE DENSITY AXL VRT FX: CPT | Performed by: INTERNAL MEDICINE

## 2023-09-18 ENCOUNTER — OFFICE VISIT (OUTPATIENT)
Dept: PHARMACY | Facility: CLINIC | Age: 83
End: 2023-09-18
Payer: COMMERCIAL

## 2023-09-18 VITALS — HEART RATE: 55 BPM | SYSTOLIC BLOOD PRESSURE: 104 MMHG | DIASTOLIC BLOOD PRESSURE: 63 MMHG

## 2023-09-18 DIAGNOSIS — Z79.4 TYPE 2 DIABETES MELLITUS WITH DIABETIC PERIPHERAL ANGIOPATHY WITHOUT GANGRENE, WITH LONG-TERM CURRENT USE OF INSULIN (H): ICD-10-CM

## 2023-09-18 DIAGNOSIS — K21.00 GASTROESOPHAGEAL REFLUX DISEASE WITH ESOPHAGITIS, UNSPECIFIED WHETHER HEMORRHAGE: ICD-10-CM

## 2023-09-18 DIAGNOSIS — Z22.7 LATENT TUBERCULOSIS: ICD-10-CM

## 2023-09-18 DIAGNOSIS — E61.1 IRON DEFICIENCY: ICD-10-CM

## 2023-09-18 DIAGNOSIS — I10 HYPERTENSION GOAL BP (BLOOD PRESSURE) < 130/80: ICD-10-CM

## 2023-09-18 DIAGNOSIS — Z78.9 TAKES DIETARY SUPPLEMENTS: ICD-10-CM

## 2023-09-18 DIAGNOSIS — E11.51 TYPE 2 DIABETES MELLITUS WITH DIABETIC PERIPHERAL ANGIOPATHY WITHOUT GANGRENE, WITH LONG-TERM CURRENT USE OF INSULIN (H): ICD-10-CM

## 2023-09-18 DIAGNOSIS — M10.9 GOUT, UNSPECIFIED CAUSE, UNSPECIFIED CHRONICITY, UNSPECIFIED SITE: ICD-10-CM

## 2023-09-18 DIAGNOSIS — E11.8 TYPE 2 DIABETES MELLITUS WITH COMPLICATION, WITHOUT LONG-TERM CURRENT USE OF INSULIN (H): ICD-10-CM

## 2023-09-18 DIAGNOSIS — K59.00 CONSTIPATION, UNSPECIFIED CONSTIPATION TYPE: ICD-10-CM

## 2023-09-18 DIAGNOSIS — E78.5 HYPERLIPIDEMIA LDL GOAL <100: Primary | ICD-10-CM

## 2023-09-18 DIAGNOSIS — M19.90 ARTHRITIS: ICD-10-CM

## 2023-09-18 PROCEDURE — 99606 MTMS BY PHARM EST 15 MIN: CPT | Performed by: PHARMACIST

## 2023-09-18 PROCEDURE — 99607 MTMS BY PHARM ADDL 15 MIN: CPT | Performed by: PHARMACIST

## 2023-09-18 RX ORDER — LORAZEPAM 1 MG/1
1 TABLET ORAL
COMMUNITY
Start: 2023-08-10 | End: 2024-02-23

## 2023-09-18 RX ORDER — COLCHICINE 0.6 MG/1
0.3 TABLET ORAL DAILY
COMMUNITY
Start: 2023-08-03 | End: 2023-11-01

## 2023-09-18 RX ORDER — BLOOD-GLUCOSE SENSOR
1 EACH MISCELLANEOUS
Qty: 2 EACH | Refills: 5 | Status: SHIPPED | OUTPATIENT
Start: 2023-09-18 | End: 2023-10-06

## 2023-09-18 RX ORDER — CEPHALEXIN 500 MG/1
500 CAPSULE ORAL
COMMUNITY
Start: 2023-09-08 | End: 2023-09-18

## 2023-09-18 RX ORDER — ISONIAZID 300 MG/1
1 TABLET ORAL DAILY
COMMUNITY
Start: 2023-09-15 | End: 2023-10-06

## 2023-09-18 RX ORDER — ROSUVASTATIN CALCIUM 20 MG/1
20 TABLET, COATED ORAL DAILY
Qty: 90 TABLET | Refills: 3 | Status: SHIPPED | OUTPATIENT
Start: 2023-09-18 | End: 2024-09-03

## 2023-09-18 RX ORDER — LANCETS
EACH MISCELLANEOUS
Qty: 200 EACH | Refills: 11 | Status: SHIPPED | OUTPATIENT
Start: 2023-09-18 | End: 2024-07-24

## 2023-09-18 NOTE — PATIENT INSTRUCTIONS
"Recommendations from today's MTM visit:                                                      Decrease metformin to 500 mg 2 times daily   Increase NPH insulin to 26 units in the morning   Continue NPH insulin 20 units at night   Look into Ozempic PAP and Freestyle isrrael coverage     Follow-up: 1 month     It was great speaking with you today.  I value your experience and would be very thankful for your time in providing feedback in our clinic survey. In the next few days, you may receive an email or text message from [employer] with a link to a survey related to your  clinical pharmacist.\"     To schedule another MTM appointment, please call the clinic directly or you may call the MTM scheduling line at [phone] or toll-free at [phone].     My Clinical Pharmacist's contact information:                                                      Please feel free to contact me with any questions or concerns you have.     Dasia Boyle, PharmD, AAHIVP  Medication Therapy Management Pharmacist   September 18, 2023      "

## 2023-09-18 NOTE — PROGRESS NOTES
Medication Therapy Management (MTM) Encounter    ASSESSMENT:                            Medication Adherence/Access: No issues identified    Type 2 Diabetes: Patient is meeting A1c goal of < 8%. Self monitoring of blood glucose is mostly goal of fasting  mg/dL and post prandial < 180 mg/dL. Due to renal function, will reduce metformin dose and increase morning NPH dose. Will decrease evening NPH dose due to patient's worries about hypos. Will look into CGM coverage to help with monitoring. Will confirm if patient qualifies for ozempic PAP. Provided counseling on ozempic. Reviewed appropriate treatment for hypoglycemia.     Hypertension: stable. Consider de-escalating if blood pressure if blood pressure stays low due to age/fall risk    Hyperlipidemia: increased risk for myalgia/rhabdomyolysis with statin/colchicine. Since no established ASCVD and LDL <70, will reduce rosuvastatin dose to 20 mg/day.    Latent tuberculosis infection: follow ID plan of care  Gout/arthritis: follow Rheum plan of care  GERD: stable    Anemia: discuss more next visit    Constipation: discuss more next visit    Supplements: stable    Of note, patient would like to discuss last DEXA results at next appointment. States she was never told the results.     PLAN:                            Decrease metformin to 500 mg 2 times daily   Increase NPH insulin to 26 units in the morning   Continue NPH insulin 20 units at night   Look into Ozempic PAP and CGM coverage   Reduce rosuvastatin to 20 mg per day and recheck lipid panel in 3-6 months.    Follow-up: 1 month     SUBJECTIVE/OBJECTIVE:                          Lindsey Gary is a 83 year old female coming in for a follow-up visit.  Today's visit is a follow-up MTM visit from 7/24.    Reason for visit: MTM follow-up    Allergies/ADRs: Reviewed in chart  Tobacco: She reports that she has never smoked. She has never been exposed to tobacco smoke. She has never used smokeless tobacco.  Alcohol:  not currently using    Medication Adherence/Access: no issues reported  Has been working with Diabetes Liaison team for Ozempic and Insulin PAP. Needs insulin PAP renewal.    Type 2 Diabetes:  Currently taking metformin 1000 mg twice daily, glipizide 10 mg once daily, and NPH 22-24 units every morning (8 am) and 22-24 units in the evening (5 pm). No side effects. Hesitant to start ozempic because isn't sure if it will cause rapid weight loss/side effects. Frequent yeast infections at baseline.     She's nervous about overnight hypoglycemia and sometimes stays up until midnight to make sure blood sugar is okay. No daytime hypos recently. No overnight hypos either but will sometimes take 2 lifesavers before bed if blood sugar is around 100. Sometimes checks blood sugar 5-6 times per day and is running out of strips.    Blood sugar monitorin-3 time(s) daily. Ranges (patient reported):  fastin-160s. Pre-prandial dinner: 200s. Bedtime: varies. 110-175. No hypoglycemia since reducing glipizide dose.     Blood sugar readings:   AM: 141, 101, 170, 157, 154  PM: 117 to 213, lowest 96    Symptoms of low blood sugar? Aware of hypo symptoms (feels weird, out of body experience).   Symptoms of high blood sugar? Burning/pain in toes, on gabapentin  Eye exam: up to date  Foot exam: up to date  Aspirin: Taking 81mg daily for primary prevention.   Statin: Yes:    ACEi/ARB: Yes:   Urine Albumin:   Lab Results   Component Value Date    UMALCR 198.32 (H) 2023      Lab Results   Component Value Date    A1C 7.0 (H) 2023     Last Comprehensive Metabolic Panel:  Lab Results   Component Value Date     2023    POTASSIUM 4.5 2023    CHLORIDE 104 2023    CO2 24 2023    ANIONGAP 14 2023     (H) 2023    BUN 28.0 (H) 2023    CR 1.30 (H) 2023    GFRESTIMATED 41 (L) 2023    BISHOP 9.6 2023     Hypertension:   Metoprolol ER 25 mg once daily  Lisinopril 40 mg  once daily   Furosemide 40 mg once daily   Occasional orthostatic hypotension.  Patient does not self-monitor blood pressure.    BP Readings from Last 3 Encounters:   09/18/23 104/63   08/29/23 112/70   08/10/23 114/60     Pulse Readings from Last 3 Encounters:   09/18/23 55   08/29/23 55   08/10/23 64     Potassium   Date Value Ref Range Status   07/14/2023 4.5 3.4 - 5.3 mmol/L Final   07/07/2022 3.8 3.4 - 5.3 mmol/L Final   07/06/2021 4.8 3.4 - 5.3 mmol/L Final     Latent tuberculosis infection: patient met with ID at [facility]. Planning to start isoniazid plus vitamin B6 for 6-9 months. She's unsure if she will start Otezela since it's over 1k per month.     Hyperlipidemia:   rosuvastatin 40mg daily  Patient reports no significant myalgias or other side effects.  Recent Labs   Lab Test 07/14/23  1041 12/30/22  1028   CHOL 109 128   HDL 27* 31*   LDL 48 64   TRIG 169* 164*     Inflammation arthritis/Gout:   Allopurinol 150 mg 2 times daily   Colchicine 0.3 mg 2 times daily   Tylenol 1000 mg twice daily   Voltaren as needed  No side effects or recent gout flares. Following with Rheumatology. Otezela may be too expensive (>1K per month)  Uric acid: 3.8 on 4/19/23    GERD: Current medications include: Pepcid (famotidine) 20 mg twice daily and Maalox/mylanta/tums as needed. Patient reports no current symptoms.  Patient feels that current regimen is effective.    Anemia:   Ferrous gluconate - not taking regularly since it causes constipation.   Lab Results   Component Value Date    HGB 11.0 (L) 07/14/2023    HGB 12.1 12/30/2022    HGB 10.9 (L) 10/10/2022     Constipation:   Metamucil once daily as needed   This is effective     Supplements:   Vitamin D 2000 units once daily   Probiotic once daily   No reported issues at this time.       Today's Vitals: /63   Pulse 55   LMP  (LMP Unknown)   ----------------    I spent 50 minutes with this patient today. All changes were made via collaborative practice  agreement with LILLIAM Sam CNP. A copy of the visit note was provided to the patient's provider(s).    A summary of these recommendations was given to the patient.    Dasia Boyle, PharmD, AAHIVP  Medication Therapy Management Pharmacist      Medication Therapy Recommendations  Hyperlipidemia LDL goal <100    Current Medication: rosuvastatin (CRESTOR) 40 MG tablet (Discontinued)   Rationale: Medication interaction - Dosage too high - Safety   Recommendation: Decrease Dose   Status: Accepted per CPA         Type 2 diabetes mellitus with diabetic peripheral angiopathy without gangrene, with long-term current use of insulin (H)    Current Medication: blood glucose (CONTOUR NEXT TEST) test strip (Discontinued)   Rationale: More effective medication available - Ineffective medication - Effectiveness   Recommendation: FreeStyle Juan Francisco 3 Sensor Misc   Status: Accepted per CPA          Current Medication: insulin NPH (NOVOLIN N RELION) 100 UNIT/ML vial   Rationale: Dose too low - Dosage too low - Effectiveness   Recommendation: Increase Dose   Status: Accepted per CPA          Current Medication: metFORMIN (GLUCOPHAGE) 1000 MG tablet (Discontinued)   Rationale: Dose too high - Dosage too high - Safety   Recommendation: Decrease Dose   Status: Accepted per CPA

## 2023-09-19 ENCOUNTER — TELEPHONE (OUTPATIENT)
Dept: INTERNAL MEDICINE | Facility: CLINIC | Age: 83
End: 2023-09-19
Payer: COMMERCIAL

## 2023-09-19 DIAGNOSIS — E11.9 TYPE 2 DIABETES MELLITUS WITHOUT COMPLICATION, WITHOUT LONG-TERM CURRENT USE OF INSULIN (H): ICD-10-CM

## 2023-09-19 RX ORDER — HUMAN INSULIN 100 [IU]/ML
INJECTION, SUSPENSION SUBCUTANEOUS
Qty: 20 ML | Refills: 3 | Status: SHIPPED | OUTPATIENT
Start: 2023-09-19 | End: 2023-10-05

## 2023-09-19 NOTE — TELEPHONE ENCOUNTER
Linus Copeland,    I just spoke with patient in regards to her needing a refill on her novolin vials. She is currently getting this medication from the Free drug company called [employer]. I did add the refill form to the patients media tab. All I need from you is to review it , sign it , and fax directly to the company. I did want to relay that the patient has changed their dose due to instructions from [facility] Dasia Boyle. If you can print form, review it, sign it , and fax it directly to the company and a copy to me as well at [phone] that would be appreciated. Patient won't get a refill until this form is completed and faxed to the company.      If the dose change is okay which can be found in [facility] visit from 09/18/2023 please update the current rx in patients medication tab.    Thank you,  Roseanna Quinones, [facility]  Diabetes Weight Management Clinic Liaison     [facility]    [email or web address]  DEPT-PHARM-CLINIC-DIABETES-LIAISON        Form for Refill located on patients MEDIA TAB. If need be I can e-mail you.      Phone: [phone]  Fax: [phone]

## 2023-09-22 ENCOUNTER — MYC MEDICAL ADVICE (OUTPATIENT)
Dept: PHARMACY | Facility: CLINIC | Age: 83
End: 2023-09-22
Payer: COMMERCIAL

## 2023-10-04 ENCOUNTER — PATIENT OUTREACH (OUTPATIENT)
Dept: CARE COORDINATION | Facility: CLINIC | Age: 83
End: 2023-10-04
Payer: COMMERCIAL

## 2023-10-05 ENCOUNTER — TRANSFERRED RECORDS (OUTPATIENT)
Dept: HEALTH INFORMATION MANAGEMENT | Facility: CLINIC | Age: 83
End: 2023-10-05
Payer: COMMERCIAL

## 2023-10-05 DIAGNOSIS — E11.9 TYPE 2 DIABETES MELLITUS WITHOUT COMPLICATION, WITHOUT LONG-TERM CURRENT USE OF INSULIN (H): ICD-10-CM

## 2023-10-05 RX ORDER — HUMAN INSULIN 100 [IU]/ML
INJECTION, SUSPENSION SUBCUTANEOUS
Qty: 20 ML | Refills: 3 | Status: SHIPPED | OUTPATIENT
Start: 2023-10-05 | End: 2023-10-06

## 2023-10-05 NOTE — TELEPHONE ENCOUNTER
Please send a refill into the [facility] pharmacy so patient can use a one time voucher from the patient assistance program.

## 2023-10-06 ENCOUNTER — VIRTUAL VISIT (OUTPATIENT)
Dept: INTERNAL MEDICINE | Facility: CLINIC | Age: 83
End: 2023-10-06
Payer: COMMERCIAL

## 2023-10-06 DIAGNOSIS — G62.9 NEUROPATHY: ICD-10-CM

## 2023-10-06 DIAGNOSIS — E11.9 TYPE 2 DIABETES MELLITUS WITHOUT COMPLICATION, WITHOUT LONG-TERM CURRENT USE OF INSULIN (H): Primary | ICD-10-CM

## 2023-10-06 DIAGNOSIS — M79.601 PAIN OF RIGHT UPPER EXTREMITY: ICD-10-CM

## 2023-10-06 DIAGNOSIS — K21.00 GASTROESOPHAGEAL REFLUX DISEASE WITH ESOPHAGITIS WITHOUT HEMORRHAGE: ICD-10-CM

## 2023-10-06 DIAGNOSIS — Z79.2 PROPHYLACTIC ANTIBIOTIC: ICD-10-CM

## 2023-10-06 DIAGNOSIS — Z12.31 VISIT FOR SCREENING MAMMOGRAM: ICD-10-CM

## 2023-10-06 DIAGNOSIS — M85.80 OSTEOPENIA, UNSPECIFIED LOCATION: ICD-10-CM

## 2023-10-06 DIAGNOSIS — N90.89 VULVAR IRRITATION: ICD-10-CM

## 2023-10-06 DIAGNOSIS — M25.522 PAIN IN JOINT INVOLVING UPPER ARM, LEFT: ICD-10-CM

## 2023-10-06 PROCEDURE — 99214 OFFICE O/P EST MOD 30 MIN: CPT | Mod: 95 | Performed by: NURSE PRACTITIONER

## 2023-10-06 RX ORDER — ISONIAZID 300 MG/1
300 TABLET ORAL DAILY
COMMUNITY
Start: 2023-10-06 | End: 2024-08-15

## 2023-10-06 RX ORDER — METRONIDAZOLE 7.5 MG/G
GEL TOPICAL 2 TIMES DAILY
Qty: 45 G | Refills: 3 | Status: SHIPPED | OUTPATIENT
Start: 2023-10-06 | End: 2024-07-24

## 2023-10-06 RX ORDER — CLINDAMYCIN HCL 300 MG
600 CAPSULE ORAL
Qty: 2 CAPSULE | Refills: 3 | Status: SHIPPED | OUTPATIENT
Start: 2023-10-06 | End: 2024-01-29

## 2023-10-06 RX ORDER — HUMAN INSULIN 100 [IU]/ML
INJECTION, SUSPENSION SUBCUTANEOUS
Qty: 20 ML | Refills: 3 | COMMUNITY
Start: 2023-10-06 | End: 2023-10-11

## 2023-10-06 NOTE — PROGRESS NOTES
"Lindsey is a 83 year old who is being evaluated via a billable telephone visit.      What phone number would you like to be contacted at? [phone]  How would you like to obtain your AVS? Mail a copy    Distant Location (provider location):  On-site    Assessment & Plan     Type 2 diabetes mellitus without complication, without long-term current use of insulin (H)  Adjusted insulin  Lab Results   Component Value Date    A1C 7.0 07/14/2023    A1C 7.1 12/30/2022    A1C 7.5 10/10/2022    A1C 7.2 07/07/2022    A1C 6.7 01/25/2022    A1C 7.6 07/06/2021    A1C 7.3 12/01/2020    A1C 6.8 11/26/2019    A1C 7.1 03/08/2019    A1C 7.0 12/31/2018       - insulin NPH (NOVOLIN N RELION) 100 UNIT/ML vial; INJECT 26 UNITS SUBCUTANEOUSLY BEFORE BREAKFAST AND 22 UNITS BEFORE DINNER    Prophylactic antibiotic  Needs for dental work   - clindamycin (CLEOCIN) 300 MG capsule; Take 2 capsules (600 mg) by mouth once as needed For dental visit    Gastroesophageal reflux disease with esophagitis without hemorrhage  Needs improvement   Pepcid not working well enough  - omeprazole (PRILOSEC) 20 MG DR capsule; Take 1 capsule (20 mg) by mouth daily    Neuropathy    - MR Cervical Spine w/o Contrast; Future    Visit for screening mammogram    - MA SCREENING DIGITAL BILAT - Future  (s+30); Future    Pain of right upper extremity    - MR Cervical Spine w/o Contrast; Future    Pain in joint involving upper arm, left    - MR Cervical Spine w/o Contrast; Future    Vulvar irritation  Uses per GYN   - metroNIDAZOLE (METROGEL) 0.75 % external gel; Apply topically 2 times daily      22 minutes spent by me on the date of the encounter doing chart review, history and exam, documentation and further activities per the note       BMI:   Estimated body mass index is 30.73 kg/m  as calculated from the following:    Height as of 8/29/23: 1.626 m (5' 4\").    Weight as of 8/29/23: 81.2 kg (179 lb).       Patient Instructions   Mri cervical spine [place] [facility] "     Cleocin for dental work     Vitamin d daily         LILLIAM Sam CNP  [facility]illo is a 83 year old, presenting for the following health issues:          10/6/2023     9:44 AM   Additional Questions   Roomed by Jeannette DAMON       HPI   Discuss results of Dexa, re order for MRI for neck, hiatal hernia discuss med     Will do calcium and vitamin D for now - does not want to do bone strengthening medication even though it was recommended   We discussed medication and side effects    Needs new order for MRI cervical spine   Did not get done in time and order is too old   For neck pain and arm pain    Refill on metrogel - remedios - Ranjit gave originally     Needs prescription for omeprazole - pepcid not working well enough for GERD     Review of Systems   Constitutional, HEENT, cardiovascular, pulmonary, GI, , musculoskeletal, neuro, skin, endocrine and psych systems are negative, except as otherwise noted.      Objective    Vitals - Patient Reported  Weight (Patient Reported): 79.4 kg (175 lb)      Vitals:  No vitals were obtained today due to virtual visit.    Physical Exam   alert and no distress  PSYCH: Alert and oriented times 3; coherent speech, normal   rate and volume, able to articulate logical thoughts, able   to abstract reason, no tangential thoughts, no hallucinations   or delusions  Her affect is normal  RESP: No cough, no audible wheezing, able to talk in full sentences  Remainder of exam unable to be completed due to telephone visits                Phone call duration: 22 minutes       Bcc Histology Text: There were numerous aggregates of basaloid cells.

## 2023-10-10 ENCOUNTER — ALLIED HEALTH/NURSE VISIT (OUTPATIENT)
Dept: FAMILY MEDICINE | Facility: CLINIC | Age: 83
End: 2023-10-10
Payer: COMMERCIAL

## 2023-10-10 DIAGNOSIS — Z23 NEED FOR PROPHYLACTIC VACCINATION AND INOCULATION AGAINST INFLUENZA: Primary | ICD-10-CM

## 2023-10-10 PROCEDURE — G0008 ADMIN INFLUENZA VIRUS VAC: HCPCS

## 2023-10-10 PROCEDURE — 99207 PR NO CHARGE NURSE ONLY: CPT

## 2023-10-10 PROCEDURE — 90662 IIV NO PRSV INCREASED AG IM: CPT

## 2023-10-11 DIAGNOSIS — E11.9 TYPE 2 DIABETES MELLITUS WITHOUT COMPLICATION, WITHOUT LONG-TERM CURRENT USE OF INSULIN (H): ICD-10-CM

## 2023-10-11 RX ORDER — HUMAN INSULIN 100 [IU]/ML
INJECTION, SUSPENSION SUBCUTANEOUS
Qty: 20 ML | Refills: 3 | Status: SHIPPED | OUTPATIENT
Start: 2023-10-11 | End: 2023-11-27

## 2023-10-11 NOTE — TELEPHONE ENCOUNTER
Please send a rx to the pharmacy [facility] in [place]. Patient went there on Monday this week only to find out the refill that was sent on 10/6 was also then canceled on 10/6. Patient is completely out of medication and needs a refill to sent to the pharmacy that way they can use the free month voucher which was given to her by [employer]. Please send ASAP!

## 2023-10-16 DIAGNOSIS — M10.9 GOUT, UNSPECIFIED CAUSE, UNSPECIFIED CHRONICITY, UNSPECIFIED SITE: ICD-10-CM

## 2023-10-16 DIAGNOSIS — E78.5 HYPERLIPIDEMIA LDL GOAL <100: ICD-10-CM

## 2023-10-16 DIAGNOSIS — I10 HYPERTENSION GOAL BP (BLOOD PRESSURE) < 130/80: ICD-10-CM

## 2023-10-16 RX ORDER — METOPROLOL SUCCINATE 50 MG/1
TABLET, EXTENDED RELEASE ORAL
Qty: 90 TABLET | Refills: 3 | Status: SHIPPED | OUTPATIENT
Start: 2023-10-16 | End: 2024-09-30

## 2023-10-16 RX ORDER — ALLOPURINOL 300 MG/1
TABLET ORAL
Qty: 90 TABLET | Refills: 3 | Status: SHIPPED | OUTPATIENT
Start: 2023-10-16 | End: 2024-09-30

## 2023-10-19 ENCOUNTER — ALLIED HEALTH/NURSE VISIT (OUTPATIENT)
Dept: FAMILY MEDICINE | Facility: CLINIC | Age: 83
End: 2023-10-19
Payer: COMMERCIAL

## 2023-10-19 ENCOUNTER — OFFICE VISIT (OUTPATIENT)
Dept: PHARMACY | Facility: CLINIC | Age: 83
End: 2023-10-19
Payer: COMMERCIAL

## 2023-10-19 VITALS — DIASTOLIC BLOOD PRESSURE: 78 MMHG | SYSTOLIC BLOOD PRESSURE: 128 MMHG

## 2023-10-19 DIAGNOSIS — Z79.4 TYPE 2 DIABETES MELLITUS WITH DIABETIC PERIPHERAL ANGIOPATHY WITHOUT GANGRENE, WITH LONG-TERM CURRENT USE OF INSULIN (H): Primary | ICD-10-CM

## 2023-10-19 DIAGNOSIS — Z22.7 LATENT TUBERCULOSIS: ICD-10-CM

## 2023-10-19 DIAGNOSIS — E11.51 TYPE 2 DIABETES MELLITUS WITH DIABETIC PERIPHERAL ANGIOPATHY WITHOUT GANGRENE, WITH LONG-TERM CURRENT USE OF INSULIN (H): Primary | ICD-10-CM

## 2023-10-19 DIAGNOSIS — I10 HYPERTENSION GOAL BP (BLOOD PRESSURE) < 130/80: ICD-10-CM

## 2023-10-19 DIAGNOSIS — M19.90 ARTHRITIS: ICD-10-CM

## 2023-10-19 DIAGNOSIS — E78.5 HYPERLIPIDEMIA LDL GOAL <100: ICD-10-CM

## 2023-10-19 DIAGNOSIS — K21.00 GASTROESOPHAGEAL REFLUX DISEASE WITH ESOPHAGITIS, UNSPECIFIED WHETHER HEMORRHAGE: ICD-10-CM

## 2023-10-19 DIAGNOSIS — Z23 NEED FOR COVID-19 VACCINE: Primary | ICD-10-CM

## 2023-10-19 DIAGNOSIS — M10.9 GOUT, UNSPECIFIED CAUSE, UNSPECIFIED CHRONICITY, UNSPECIFIED SITE: ICD-10-CM

## 2023-10-19 PROCEDURE — 91320 SARSCV2 VAC 30MCG TRS-SUC IM: CPT

## 2023-10-19 PROCEDURE — 99606 MTMS BY PHARM EST 15 MIN: CPT

## 2023-10-19 PROCEDURE — 99207 PR NO CHARGE NURSE ONLY: CPT

## 2023-10-19 PROCEDURE — 90480 ADMN SARSCOV2 VAC 1/ONLY CMP: CPT

## 2023-10-19 PROCEDURE — 99607 MTMS BY PHARM ADDL 15 MIN: CPT

## 2023-10-19 NOTE — PATIENT INSTRUCTIONS
"Recommendations from today's MTM visit:                                                         Move glipizide to before lunch (before dinner if you forget or if you skip lunch)  Increase morning dose of NPH insulin to 28 units, continue 22 units at dinner  Freestyle Juan Francisco Instructions:  1. The first hour after you place the sensor is the warm up period (black out period).  You will need to carry your blood glucose meter and check blood glucose during this time.     2. Check blood sugar if feeling symptoms of hypoglycemia but meter is not displaying a low number- may be some variability between sensor and meter at times.     3. Change sensor every 14 days.     4. Read/scan blood sugars every 8 hours to ensure entirety of data is available.      5. Watch trend arrows- will let you know if blood sugars are rising, falling or stable at the time you check.     6. It is okay to shower, bathe, and swim (up to 3 feet deep for 30 minutes) with sensor. Do not get reader wet.     7. Remove the sensor if you need to have an MRI or CT scan.     8. Do not cover the sensor with extra adhesive (the small hole in the center of the sensor must remain uncovered).  If you have trouble with sensor falling off, these are approved products to help with sensor adhesion: Torbot Skin Tac, SKIN-PREP Protective Barrier Wipe and Mastisol Liquid Adhesive'      It was great speaking with you today.  I value your experience and would be very thankful for your time in providing feedback in our clinic survey. In the next few days, you may receive an email or text message from [employer] with a link to a survey related to your  clinical pharmacist.\"     To schedule another MTM appointment, please call the clinic directly or you may call the MTM scheduling line at [phone] or toll-free at [phone].     My Clinical Pharmacist's contact information:                                                      Please feel free to contact me with any " questions or concerns you have.      Colette Nguyen, PharmD, [facility]  Medication Therapy Management Provider, [facility]

## 2023-10-19 NOTE — PROGRESS NOTES
Medication Therapy Management (MTM) Encounter    ASSESSMENT:                            Medication Adherence/Access: No issues identified    Diabetes: Self monitoring of blood glucose is not at goal of fasting  mg/dL and post prandial < 150 mg/dL.  Patient would benefit from trying to get Ozempic through Patient Assistance in the future, but for now may benefit from an increase in the morning NPH dose and moving glipizide dose to later when post-prandial blood glucose are higher.  Discussed that higher metformin dose would help control blood sugars, but that it was reduced due to worsening kidney function.  Reasonable to recheck. Additionally, patient may find a CGM helpful for monitoring blood sugars and adjusting diet and exercise, as well as assisting in future medication therapy recommendations and adjustments.  A future consideration would be to switch NPH insulin to Lantus for convenience and predictability of blood glucose readings, if able to be covered by insurance.      Hypertension: Stable.  Meeting goal of < 130/80 mmHg    Hyperlipidemia: Stable. Patient is on high intensity statin which is indicated based on 2019 ACC/AHA guidelines for lipid management.  Patient is meeting goal of < 100 mg/dL.    Inflammation arthritis/gout: Stable.  Patient not symptomatic, but would benefit from stopping colchicine due to difficulty with breaking the tablets and has achieved symptom control.  Depending on indication for colchicine, it may be reasonable to discontinue therapy, as it has been 3 months and symptom control has been achieved.  If intended for long-term use for an alternate indication, patient will need refills.    GERD: Patient still symptomatic, would benefit from increasing famotidine to 40 mg daily.  Future consideration to try a different proton pump inhibitor like pantoprazole if this is not effective.    Latent TB: Recommend to start medication and continue to follow with ID (  Damari    PLAN:                            Move glipizide to before lunch (before dinner if you forget or if you skip lunch)  Increase morning dose of NPH insulin to 28 units, continue 22 units at dinner  Sent for Freestyle Juan Francisco sensor and reader, bring to next appointment  Will contact Dr. Chawla about stopping colchicine.     Follow-up: Return in 6 weeks (on 2023) for Follow up, with MTM Pharmacist, in person.    SUBJECTIVE/OBJECTIVE:                          Lindsey Gary is a 83 year old female coming in for a follow-up visit from 2023 with Dasia Boyle, RadhaD.       Reason for visit: diabetes follow-up, DEXA results.    Allergies/ADRs: Reviewed in chart  Past Medical History: Reviewed in chart  Tobacco: She reports that she has never smoked. She has never been exposed to tobacco smoke. She has never used smokeless tobacco.  Alcohol: not currently using    Medication Adherence/Access: no issues reported    Type 2 Diabetes   Metformin 500 mg twice daily  Glipizide 10 mg daily in the morning  NPH insulin 26 units at 8 am and 22 units at 5 pm  Aspirin 81mg daily  Patient is not experiencing side effects currently.  Noted that since glipizide decrease last time, hasn't been experiencing blood sugar lows.  Blood sugar monitorin time(s) daily, was not able to get CGM still; Ranges: (from glucometer) avg 192 mg/dL  Date FBG dinner/ 5pm   10/19 287    10/18 131 283   10/17 164 208   10/16 196 159   10/15 150 225   10/14 159 131   10/13 169 208        Eye exam is up to date  Foot exam is up to date  Urine Albumin:   Lab Results   Component Value Date    UMALCR 198.32 (H) 2023      Lab Results   Component Value Date    A1C 7.8 (H) 10/26/2023       Hypertension   Metoprolol ER 25 mg daily  Lisinopril 40 mg daily  Furosemide 40 mg daily    Patient does not self-monitor blood pressure.       BP Readings from Last 3 Encounters:   23 128/68   23 120/62   10/19/23 128/78     Pulse Readings  from Last 3 Encounters:   11/02/23 68   09/18/23 55   08/29/23 55     Hyperlipidemia   rosuvastatin 20mg daily       Recent Labs   Lab Test 07/14/23  1041 12/30/22  1028   CHOL 109 128   HDL 27* 31*   LDL 48 64   TRIG 169* 164*       Inflammation arthritis/Gout:   Allopurinol 150 mg 2 times daily   Colchicine 0.3 mg daily   Tylenol 1000 mg twice daily - did not assess at this visit  Voltaren as needed - did not assess at this visit  No side effects or recent gout flares. Following with Rheumatology. Otezela may be too expensive (>1K per month).  States she struggles with cutting colchicine tablets in half    8/3/23 Initial trial of colchicine 0.6 mg daily in note by Dr. Chawla, with note to monitor renal function and muscle symptoms (interaction with statin).  Statin was lowered in September by Dasia Boyle PharmD.  Original prescription written on 8/3 for 0.3 mg daily d/t interaction with fluconazole.    Uric acid: 3.8 on 4/19/23  Uric Acid   Date Value Ref Range Status   12/30/2022 3.7 2.4 - 5.7 mg/dL Final   12/01/2020 4.8 2.6 - 6.0 mg/dL Final      GERD:   Pepcid (famotidine) 20 mg at bedtime  omeprazole 20 mg daily  Maalox/mylanta/tums as needed. - did not assess at this visit  Patient reports something stuck in her throat.  Asked if she could increase her famotidine dose.     Latent TB:  Isoniazid 300 mg daily - didn't start yet, but plans to start      Today's Vitals: /78   LMP  (LMP Unknown)   ----------------    I spent 30 minutes with this patient today. All changes were made via collaborative practice agreement with LILLIAM Sam CNP. A copy of the visit note was provided to the patient's provider(s).    A summary of these recommendations was given to the patient.    Nan Posadas, Jim  Pharmacy Resident  Pager #[phone]    Colette Nguyen PharmD, BCACP  Medication Therapy Management Provider, [facility]  Pager: [phone]     Medication Therapy  Recommendations  Gout    Current Medication: colchicine (COLCRYS) 0.6 MG tablet ()   Rationale: Patient prefers not to take - Adherence - Adherence   Recommendation: Discontinue Medication   Status: Accepted with Changes per Provider         Type 2 diabetes mellitus with complication, without long-term current use of insulin (H)    Current Medication: glipiZIDE (GLUCOTROL) 10 MG tablet (Discontinued)   Rationale: Frequency inappropriate - Dosage too low - Effectiveness   Recommendation: Change Administration Time   Status: Accepted per CPA   Note: changed the time of administration to lunch/dinner          Current Medication: insulin NPH (NOVOLIN N RELION) 100 UNIT/ML vial   Rationale: Dose too low - Dosage too low - Effectiveness   Recommendation: Increase Dose - NovoLIN N VIAL 100 UNIT/ML susp   Status: Accepted per CPA          Current Medication: insulin NPH (NOVOLIN N RELION) 100 UNIT/ML vial (Discontinued)   Rationale: Dose too low - Dosage too low - Effectiveness   Recommendation: Increase Dose   Status: Accepted per CPA

## 2023-10-19 NOTE — PROGRESS NOTES
Prior to immunization administration, verified patients identity using patient s name and date of birth. Please see Immunization Activity for additional information.     Screening Questionnaire for Adult Immunization    Are you sick today?   No   Do you have allergies to medications, food, a vaccine component or latex?   No   Have you ever had a serious reaction after receiving a vaccination?   No   Do you have a long-term health problem with heart, lung, kidney, or metabolic disease (e.g., diabetes), asthma, a blood disorder, no spleen, complement component deficiency, a cochlear implant, or a spinal fluid leak?  Are you on long-term aspirin therapy?   No   Do you have cancer, leukemia, HIV/AIDS, or any other immune system problem?   No   Do you have a parent, brother, or sister with an immune system problem?   No   In the past 3 months, have you taken medications that affect  your immune system, such as prednisone, other steroids, or anticancer drugs; drugs for the treatment of rheumatoid arthritis, Crohn s disease, or psoriasis; or have you had radiation treatments?   No   Have you had a seizure, or a brain or other nervous system problem?   No   During the past year, have you received a transfusion of blood or blood    products, or been given immune (gamma) globulin or antiviral drug?   No   For women: Are you pregnant or is there a chance you could become       pregnant during the next month?   No   Have you received any vaccinations in the past 4 weeks?   No     Immunization questionnaire answers were all negative.    I have reviewed the following standing orders:   This patient is due and qualifies for the Covid-19 vaccine.     Click here for COVID-19 Standing Order    I have reviewed the vaccines inclusion and exclusion criteria; No concerns regarding eligibility.     Patient instructed to remain in clinic for 15 minutes afterwards, and to report any adverse reactions.     Screening performed by Leena  LYNETTE Santa on 10/19/2023 at 2:22 PM.

## 2023-10-24 ENCOUNTER — TELEPHONE (OUTPATIENT)
Dept: PHARMACY | Facility: CLINIC | Age: 83
End: 2023-10-24
Payer: COMMERCIAL

## 2023-10-24 NOTE — TELEPHONE ENCOUNTER
Linus Portillo,    I called your rheumatologist's office to check in on the colchicine prescription we talked about at our visit last week.  I spoke with a nurse (NATALIYA Sommer) that works with the rheumatologist, who said they were using it short-term for you and that it is okay to discontinue the colchicine.    Let us know if you have any further questions.    Nan Posadas, PharmD  Pharmacy Resident  Pager #[phone]

## 2023-10-24 NOTE — TELEPHONE ENCOUNTER
----- Message from Colette Nguyen [facility] sent at 10/24/2023 10:50 AM CDT -----  Great! Can you send a OKKAMhart message to donta and follow-up on this for her?  ----- Message -----  From: Nan Posadas [facility]  Sent: 10/24/2023  10:36 AM CDT  To: Colette Nguyen [facility]    I called the office and spoke to Ros (NATALIYA), who stated that colchicine was a trial and that they do not intend to prescribe refills.  OK to discontinue.    ----- Message -----  From: Colette Nguyen [facility]  Sent: 10/24/2023  10:01 AM CDT  To: Nan Posadas [facility]    I have not heard back from the rheumatologist so can you call their office to find out recommended duration of colchicine and if we can stop now?

## 2023-10-26 ENCOUNTER — LAB (OUTPATIENT)
Dept: LAB | Facility: CLINIC | Age: 83
End: 2023-10-26
Payer: COMMERCIAL

## 2023-10-26 ENCOUNTER — DOCUMENTATION ONLY (OUTPATIENT)
Dept: PHARMACY | Facility: CLINIC | Age: 83
End: 2023-10-26

## 2023-10-26 DIAGNOSIS — Z79.4 TYPE 2 DIABETES MELLITUS WITH DIABETIC PERIPHERAL ANGIOPATHY WITHOUT GANGRENE, WITH LONG-TERM CURRENT USE OF INSULIN (H): ICD-10-CM

## 2023-10-26 DIAGNOSIS — Z79.4 TYPE 2 DIABETES MELLITUS WITH DIABETIC PERIPHERAL ANGIOPATHY WITHOUT GANGRENE, WITH LONG-TERM CURRENT USE OF INSULIN (H): Primary | ICD-10-CM

## 2023-10-26 DIAGNOSIS — E11.51 TYPE 2 DIABETES MELLITUS WITH DIABETIC PERIPHERAL ANGIOPATHY WITHOUT GANGRENE, WITH LONG-TERM CURRENT USE OF INSULIN (H): Primary | ICD-10-CM

## 2023-10-26 DIAGNOSIS — E11.51 TYPE 2 DIABETES MELLITUS WITH DIABETIC PERIPHERAL ANGIOPATHY WITHOUT GANGRENE, WITH LONG-TERM CURRENT USE OF INSULIN (H): ICD-10-CM

## 2023-10-26 DIAGNOSIS — E11.8 TYPE 2 DIABETES MELLITUS WITH COMPLICATION, WITHOUT LONG-TERM CURRENT USE OF INSULIN (H): Primary | ICD-10-CM

## 2023-10-26 LAB
ALBUMIN UR-MCNC: 30 MG/DL
ANION GAP SERPL CALCULATED.3IONS-SCNC: 13 MMOL/L (ref 7–15)
APPEARANCE UR: CLEAR
BACTERIA #/AREA URNS HPF: ABNORMAL /HPF
BILIRUB UR QL STRIP: NEGATIVE
BUN SERPL-MCNC: 35.9 MG/DL (ref 8–23)
CALCIUM SERPL-MCNC: 10 MG/DL (ref 8.8–10.2)
CHLORIDE SERPL-SCNC: 101 MMOL/L (ref 98–107)
COLOR UR AUTO: YELLOW
CREAT SERPL-MCNC: 1.44 MG/DL (ref 0.51–0.95)
DEPRECATED HCO3 PLAS-SCNC: 22 MMOL/L (ref 22–29)
EGFRCR SERPLBLD CKD-EPI 2021: 36 ML/MIN/1.73M2
GLUCOSE SERPL-MCNC: 170 MG/DL (ref 70–99)
GLUCOSE UR STRIP-MCNC: NEGATIVE MG/DL
HBA1C MFR BLD: 7.8 % (ref 0–5.6)
HGB UR QL STRIP: NEGATIVE
HOLD SPECIMEN: NORMAL
HYALINE CASTS #/AREA URNS LPF: ABNORMAL /LPF
KETONES UR STRIP-MCNC: NEGATIVE MG/DL
LEUKOCYTE ESTERASE UR QL STRIP: NEGATIVE
NITRATE UR QL: NEGATIVE
PH UR STRIP: 5 [PH] (ref 5–7)
POTASSIUM SERPL-SCNC: 4.6 MMOL/L (ref 3.4–5.3)
RBC #/AREA URNS AUTO: ABNORMAL /HPF
SODIUM SERPL-SCNC: 136 MMOL/L (ref 135–145)
SP GR UR STRIP: 1.02 (ref 1–1.03)
UROBILINOGEN UR STRIP-ACNC: 0.2 E.U./DL
WBC #/AREA URNS AUTO: ABNORMAL /HPF

## 2023-10-26 PROCEDURE — 80048 BASIC METABOLIC PNL TOTAL CA: CPT

## 2023-10-26 PROCEDURE — 36415 COLL VENOUS BLD VENIPUNCTURE: CPT

## 2023-10-26 PROCEDURE — 81001 URINALYSIS AUTO W/SCOPE: CPT | Performed by: NURSE PRACTITIONER

## 2023-10-26 PROCEDURE — 83036 HEMOGLOBIN GLYCOSYLATED A1C: CPT

## 2023-10-26 NOTE — PROGRESS NOTES
Patient needs A1C order. Please add-on to existing specimen as an extra purple was drawn. Thank you!

## 2023-11-02 ENCOUNTER — OFFICE VISIT (OUTPATIENT)
Dept: INTERNAL MEDICINE | Facility: CLINIC | Age: 83
End: 2023-11-02
Payer: COMMERCIAL

## 2023-11-02 ENCOUNTER — TELEPHONE (OUTPATIENT)
Dept: INTERNAL MEDICINE | Facility: CLINIC | Age: 83
End: 2023-11-02

## 2023-11-02 VITALS
BODY MASS INDEX: 30.56 KG/M2 | RESPIRATION RATE: 18 BRPM | OXYGEN SATURATION: 99 % | DIASTOLIC BLOOD PRESSURE: 62 MMHG | WEIGHT: 179 LBS | HEART RATE: 68 BPM | SYSTOLIC BLOOD PRESSURE: 120 MMHG | TEMPERATURE: 97.6 F | HEIGHT: 64 IN

## 2023-11-02 DIAGNOSIS — J01.10 ACUTE NON-RECURRENT FRONTAL SINUSITIS: Primary | ICD-10-CM

## 2023-11-02 DIAGNOSIS — H61.23 BILATERAL IMPACTED CERUMEN: ICD-10-CM

## 2023-11-02 PROCEDURE — 99214 OFFICE O/P EST MOD 30 MIN: CPT | Mod: 25

## 2023-11-02 PROCEDURE — 69209 REMOVE IMPACTED EAR WAX UNI: CPT | Mod: 50

## 2023-11-02 RX ORDER — AMOXICILLIN 875 MG
875 TABLET ORAL 2 TIMES DAILY
Qty: 14 TABLET | Refills: 0 | Status: SHIPPED | OUTPATIENT
Start: 2023-11-02 | End: 2023-11-02 | Stop reason: SINTOL

## 2023-11-02 RX ORDER — AZITHROMYCIN 250 MG/1
TABLET, FILM COATED ORAL
Qty: 6 TABLET | Refills: 0 | Status: SHIPPED | OUTPATIENT
Start: 2023-11-02 | End: 2023-11-07

## 2023-11-02 RX ORDER — RESPIRATORY SYNCYTIAL VIRUS VACCINE 120MCG/0.5
0.5 KIT INTRAMUSCULAR ONCE
Qty: 1 EACH | Refills: 0 | Status: CANCELLED | OUTPATIENT
Start: 2023-11-02 | End: 2023-11-02

## 2023-11-02 NOTE — TELEPHONE ENCOUNTER
Reason for Call:  Appointment Request    Patient requesting this type of appt:  Right ear pain    Requested provider: Dinorah Wong    Reason patient unable to be scheduled: Not within requested timeframe    When does patient want to be seen/preferred time:  11/03 in AM    Comments: Has to be in AM, pt has another appt in afternoon.    Pt is open to seeing any provider available if PCP is unavailable.    Could we send this information to you in [facility] or would you prefer to receive a phone call?:   Patient would prefer a phone call   Okay to leave a detailed message?: Yes at Home number on file [phone] (home)    Call taken on 11/2/2023 at 10:07 AM by YAAKOV HECTOR

## 2023-11-02 NOTE — NURSING NOTE
"Chief Complaint   Patient presents with    Ear Problem     Wax or sinus? Onset 1-3 days.      initial /62   Pulse 68   Temp 97.6  F (36.4  C) (Oral)   Resp 18   Ht 1.626 m (5' 4\")   Wt 81.2 kg (179 lb)   LMP  (LMP Unknown)   SpO2 99%   BMI 30.73 kg/m   Estimated body mass index is 30.73 kg/m  as calculated from the following:    Height as of this encounter: 1.626 m (5' 4\").    Weight as of this encounter: 81.2 kg (179 lb)..  bp completed using cuff size large  MARIAH LIAO LPN  "

## 2023-11-02 NOTE — PROGRESS NOTES
"  Assessment & Plan     (J01.10) Acute non-recurrent frontal sinusitis  (primary encounter diagnosis)  Comment: Patient presents to the clinic for an acute nonrecurrent frontal sinus infection.  Patient states that she had teeth pain the last week with some nasal drip and postnasal drip.  Yesterday her ear started hurting specifically the right ear and she thinks she has a sinus infection.  Upon exam there is slight tenderness to her frontal and maxillary.  At this time I advised her to use Flonase in both nostrils twice a day for 2 weeks and then I would send in an antibiotic if things do not clear up in 1 week.  Otherwise I suggested that she hold off with antibiotic for the time being as it has only been 1 week and it likely is not a sinus infection at this time.  Plan: azithromycin (ZITHROMAX) 250 MG tablet,         DISCONTINUED: amoxicillin (AMOXIL) 875 MG         tablet        Medication sent in to take in 1 week if symptoms worsen or do not get better.  Patient agrees with the plan.    (H61.23) Bilateral impacted cerumen  Comment: Upon exam patient was found to have bilateral impacted ear cerumen also specifically on the right side.  Patient would like to get both ears flushed out while she is here in the clinic.  Plan: DE REMOVAL IMPACTED CERUMEN IRRIGATION/LVG         UNILAT        Both ears irrigated.  Patient did have some dizziness during the procedure however she tolerated okay and was discharged home.      30 minutes spent by me on the date of the encounter doing chart review, patient visit, and documentation        BMI:   Estimated body mass index is 30.73 kg/m  as calculated from the following:    Height as of this encounter: 1.626 m (5' 4\").    Weight as of this encounter: 81.2 kg (179 lb).           LILLIAM Gonzalez CNP  [facility] is a 83 year old, presenting for the following health issues: Patients right ear has been hurting and she is wondering " if its wax. She also had some blood snot this morning, she has teeth pain so she went to the dentist and everything was fine and now she has some nasal drainage down her throat. She doesn't feel sharp. Her ear started hurting yesterday. Her throat is a little bit sore from the drainage. She is hoping to get something that will take away her symptoms. Has not had a runny nose or URI recently.       Ear Problem      11/2/2023     1:39 PM   Additional Questions   Roomed by Luis thompson       Ear Problem    History of Present Illness       Reason for visit:  Wax in ear or sinus problem  Symptom onset:  1-3 days ago    She eats 2-3 servings of fruits and vegetables daily.She consumes 0 sweetened beverage(s) daily.She exercises with enough effort to increase her heart rate 10 to 19 minutes per day.  She exercises with enough effort to increase her heart rate 3 or less days per week.   She is taking medications regularly.                 Review of Systems   HENT:  Positive for ear pain.       Constitutional, HEENT, cardiovascular, pulmonary, gi and gu systems are negative, except as otherwise noted.      Objective    LMP  (LMP Unknown)   There is no height or weight on file to calculate BMI.  Physical Exam   GENERAL: healthy, alert and no distress  EYES: Eyes grossly normal to inspection, PERRL and conjunctivae and sclerae normal  HENT: ear canals compacted with cerumen bilaterally, nose and mouth without ulcers or lesions  NECK: no adenopathy, no asymmetry, masses, or scars and thyroid normal to palpation  RESP: lungs clear to auscultation - no rales, rhonchi or wheezes  CV: regular rate and rhythm, normal S1 S2, no S3 or S4, no murmur, click or rub, no peripheral edema and peripheral pulses strong  ABDOMEN: soft, nontender, no hepatosplenomegaly, no masses and bowel sounds normal  MS: no gross musculoskeletal defects noted, no edema

## 2023-11-06 ENCOUNTER — HOSPITAL ENCOUNTER (OUTPATIENT)
Dept: MAMMOGRAPHY | Facility: CLINIC | Age: 83
Discharge: HOME | End: 2023-11-06
Attending: NURSE PRACTITIONER | Admitting: NURSE PRACTITIONER
Payer: COMMERCIAL

## 2023-11-06 DIAGNOSIS — Z12.31 VISIT FOR SCREENING MAMMOGRAM: ICD-10-CM

## 2023-11-06 PROCEDURE — 77067 SCR MAMMO BI INCL CAD: CPT

## 2023-11-07 ENCOUNTER — TELEPHONE (OUTPATIENT)
Dept: INTERNAL MEDICINE | Facility: CLINIC | Age: 83
End: 2023-11-07
Payer: COMMERCIAL

## 2023-11-27 ENCOUNTER — OFFICE VISIT (OUTPATIENT)
Dept: PHARMACY | Facility: CLINIC | Age: 83
End: 2023-11-27
Payer: COMMERCIAL

## 2023-11-27 VITALS — SYSTOLIC BLOOD PRESSURE: 128 MMHG | DIASTOLIC BLOOD PRESSURE: 68 MMHG

## 2023-11-27 DIAGNOSIS — E11.9 TYPE 2 DIABETES MELLITUS WITHOUT COMPLICATION, WITHOUT LONG-TERM CURRENT USE OF INSULIN (H): Primary | ICD-10-CM

## 2023-11-27 DIAGNOSIS — I10 ESSENTIAL HYPERTENSION: ICD-10-CM

## 2023-11-27 DIAGNOSIS — Z78.9 TAKES DIETARY SUPPLEMENTS: ICD-10-CM

## 2023-11-27 DIAGNOSIS — E11.9 TYPE 2 DIABETES MELLITUS WITHOUT COMPLICATION, WITHOUT LONG-TERM CURRENT USE OF INSULIN (H): ICD-10-CM

## 2023-11-27 DIAGNOSIS — E78.5 HYPERLIPIDEMIA LDL GOAL <100: ICD-10-CM

## 2023-11-27 DIAGNOSIS — Z71.85 VACCINE COUNSELING: ICD-10-CM

## 2023-11-27 DIAGNOSIS — Z22.7 LATENT TUBERCULOSIS: ICD-10-CM

## 2023-11-27 DIAGNOSIS — I10 HYPERTENSION GOAL BP (BLOOD PRESSURE) < 130/80: ICD-10-CM

## 2023-11-27 PROCEDURE — 99606 MTMS BY PHARM EST 15 MIN: CPT | Performed by: PHARMACIST

## 2023-11-27 PROCEDURE — 99607 MTMS BY PHARM ADDL 15 MIN: CPT | Performed by: PHARMACIST

## 2023-11-27 RX ORDER — GLIPIZIDE 10 MG/1
10 TABLET ORAL
Qty: 180 TABLET | Refills: 0 | Status: SHIPPED | OUTPATIENT
Start: 2023-11-27 | End: 2024-03-01

## 2023-11-27 RX ORDER — HUMAN INSULIN 100 [IU]/ML
INJECTION, SUSPENSION SUBCUTANEOUS
Start: 2023-11-27 | End: 2023-12-28

## 2023-11-27 RX ORDER — FUROSEMIDE 20 MG
TABLET ORAL
Qty: 180 TABLET | Refills: 0 | Status: SHIPPED | OUTPATIENT
Start: 2023-11-27 | End: 2024-04-04

## 2023-11-27 RX ORDER — SYRINGE-NEEDLE,INSULIN,0.5 ML 27GX1/2"
SYRINGE, EMPTY DISPOSABLE MISCELLANEOUS
Qty: 200 EACH | Refills: 1 | Status: SHIPPED | OUTPATIENT
Start: 2023-11-27 | End: 2024-07-24

## 2023-11-27 NOTE — PATIENT INSTRUCTIONS
"Recommendations from today's MTM visit:                                                         Learn how to put another sensor on here: https://www.freestyle.abbott/us-en/freestyle-isrrael-2-resources.html  Increase Novolin insulin to 32 units in the morning and 24 units in the evening before dinner.       It was great speaking with you today.  I value your experience and would be very thankful for your time in providing feedback in our clinic survey. In the next few days, you may receive an email or text message from [facility] [employer] with a link to a survey related to your  clinical pharmacist.\"     To schedule another MTM appointment, please call the clinic directly or you may call the MTM scheduling line at [phone] or toll-free at [phone].     My Clinical Pharmacist's contact information:                                                      Please feel free to contact me with any questions or concerns you have.      Colette Nguyen, PharmD, BCACP  Medication Therapy Management Provider, [facility]    "

## 2023-11-27 NOTE — PROGRESS NOTES
Medication Therapy Management (MTM) Encounter    ASSESSMENT:                            Medication Adherence/Access: No issues identified    Diabetes: Patient is not meeting A1c goal of < 7.5%.  Self monitoring of blood glucose is not at goal of fasting  mg/dL.  Helped put on and start Freestyle Juan Francisco 2 device today.  Recommend increasing NPH to 32 units in the morning and 24 units at dinner for hyperglycemia.  Consider getting NPH in pens through PAP.  Could consider changing NPH to Lantus and/or adding Ozempic through PAP to help reduce insulin burden.    Hypertension: Stable. Patient is meeting blood pressure goal of < 130/80mmHg.    Hyperlipidemia: Stable.    Latent TB: Following ID, patient started taking.  Continue per ID recommendation.    Supplements: Recommend stop vitamin C due to starting continuous glucose monitor and interaction with device.    Vaccines: Due for RSV vaccine per ACIP/CDC Guidelines: did not get to discuss in visit today, but consider for next visit    PLAN:                            Increase Novolin insulin to 32 units in the morning and 24 units in the evening before dinner.   Stop vitamin C.    Follow-up: Return in 31 days (on 12/28/2023) for Follow up, with MTM Pharmacist, in person.    SUBJECTIVE/OBJECTIVE:                          Lindsey Gary is a 83 year old female coming in for a follow-up visit from 10/19/2023.       Reason for visit: Brings CGM for sensor education and application today.     Allergies/ADRs: Reviewed in chart  Past Medical History: Reviewed in chart  Tobacco: She reports that she has never smoked. She has never been exposed to tobacco smoke. She has never used smokeless tobacco.  Alcohol: not currently using      Medication Adherence/Access: Novolin insulin through PAP, working on getting assistance renewal for next year    Diabetes   Metformin 500 mg twice daily  Glipizide 10 mg twice daily before breakfast and lunch   NPH insulin 28 units at 8 am and 22  units at 5 pm  Aspirin 81mg daily    Patient is not experiencing side effects currently.     Blood sugar monitorin time(s) daily but going to be using CGM now; Ranges: (from glucometer) fasting , 168 this morning; afternoons post-prandial 250's     Eye exam is up to date  Foot exam is up to date  Urine Albumin:   Lab Results   Component Value Date    UMALCR 198.32 (H) 2023      Lab Results   Component Value Date    A1C 7.8 (H) 10/26/2023    A1C 7.0 (H) 2023    A1C 7.1 (H) 2022       Hypertension   Metoprolol ER 50 mg daily  Lisinopril 40 mg daily  Furosemide 40 mg daily    Patient does not self-monitor blood pressure.       BP Readings from Last 3 Encounters:   23 128/68   23 120/62   10/19/23 128/78     Pulse Readings from Last 3 Encounters:   23 68   23 55   23 55       Hyperlipidemia   rosuvastatin 20mg daily     Recent Labs   Lab Test 23  1041 22  1028   CHOL 109 128   HDL 27* 31*   LDL 48 64   TRIG 169* 164*       Latent TB:   Isoniazid 300 mg daily    Supplements:     Vitamin C 500 mg daily  No reported issues at this time.     Vaccines:  Most Recent Immunizations   Administered Date(s) Administered    COVID-19 12+ (-) (Pfizer) 10/19/2023    COVID-19 MONOVALENT 12+ (Pfizer) 10/11/2021    COVID-19 Monovalent 12+ (Pfizer ) 2022    Flu, Unspecified 10/29/1999    Influenza (H1N1) 2009    Influenza (High Dose) 3 valent vaccine 10/10/2019    Influenza (IIV3) PF 2009    Influenza Vaccine 65+ (Fluzone HD) 10/10/2023    Pneumo Conj 13-V (2010&after) 2016    Pneumococcal 23 valent 2013    TD,PF 7+ (Tenivac) 10/08/2010    TDAP (Adacel,Boostrix) 2014    TDAP Vaccine (Adacel) 2014    Tdap (Adult) Unspecified Formulation 2014    Zoster recombinant adjuvanted (SHINGRIX) 2018       Today's Vitals: /68   LMP  (LMP Unknown) Patient refused getting a weight taken in clinic  today.  ----------------    I spent 30 minutes with this patient today. All changes were made via collaborative practice agreement with LILLIAM Sam CNP. A copy of the visit note was provided to the patient's provider(s).    A summary of these recommendations was given to the patient.    Nan Posadas, RadhaD  Pharmacy Resident  Pager #[phone]    Radha JohnsonD, [facility]  Medication Therapy Management ProviderTwo [facility]  Pager: [phone]       Medication Therapy Recommendations  Takes dietary supplements    Current Medication: Ascorbic Acid (VITAMIN C) 500 MG CAPS (Discontinued)   Rationale: No medical indication at this time - Unnecessary medication therapy - Indication   Recommendation: Discontinue Medication   Status: Accepted - no CPA Needed         Type 2 diabetes mellitus with complication, without long-term current use of insulin (H)    Current Medication: insulin NPH (NOVOLIN N RELION) 100 UNIT/ML vial   Rationale: Dose too low - Dosage too low - Effectiveness   Recommendation: Increase Dose - NovoLIN N VIAL 100 UNIT/ML susp   Status: Accepted per CPA

## 2023-11-29 ENCOUNTER — TELEPHONE (OUTPATIENT)
Dept: PHARMACY | Facility: CLINIC | Age: 83
End: 2023-11-29
Payer: COMMERCIAL

## 2023-12-12 ENCOUNTER — TRANSFERRED RECORDS (OUTPATIENT)
Dept: HEALTH INFORMATION MANAGEMENT | Facility: CLINIC | Age: 83
End: 2023-12-12
Payer: COMMERCIAL

## 2023-12-12 DIAGNOSIS — G62.9 NEUROPATHY: ICD-10-CM

## 2023-12-12 LAB — RETINOPATHY: POSITIVE

## 2023-12-13 RX ORDER — GABAPENTIN 300 MG/1
CAPSULE ORAL
Qty: 270 CAPSULE | Refills: 3 | Status: SHIPPED | OUTPATIENT
Start: 2023-12-13

## 2023-12-19 NOTE — PROGRESS NOTES
Medication Therapy Management (MTM) Encounter    ASSESSMENT:                            Medication Adherence/Access: No issues identified    Diabetes:   Patient is not meeting A1c goal of < 7.5%.    Patient is not meeting goal of > 70% time in target with continuous glucose monitoring.  Recommend increase NPH insulin to 36 units in the morning due to daytime hyperglycemia and continue 24 units at 5pm since patient is normoglycemic overnight, occasionally close to hypoglycemia.    Future considerations of GLP-1 agonist or SGLT2 inhibitor for better glucose control and to help reduce insulin burden.  Patient not opposed to weight loss either.  If choosing an SGLT2 inhibitor, would recommend decrease furosemide to 20 mg daily to maintain current blood pressure.  Patient will need to follow up with assistance programs to see if she is eligible for otherwise it seems unlikely she will be able to afford brand name medication.   Due for RSV vaccine per ACIP/CDC Guidelines: did not get to discuss in clinic today, recommend at a future visit.    Hypertension:   Stable. Patient is meeting blood pressure goal of < 130/80mmHg.  Pulse running low, so would recommend monitoring pulse at home if starting to feel more dizzy or lightheaded in the future    Hyperlipidemia:   Stable.    Latent tuberculosis infection:   continue to follow with ID, started taking late November and plan to continue for 9 months.  Maximum dose of isoniazid is 300 mg daily, should discontinue or hold if AST or ALT are > 3 times upper limit of normal    PLAN:                            Increase NPH to 36 units in the morning. Stay on 24 units in the evening.  Try to increase protein to help balance the carbs/sugar from the food - cottage cheese, greek yogurt or nuts  Judi to follow up at PAP appointment about coverage for Ozempic, Trulicity, Farxiga, and Jardiance    Follow-up: Return in 1 month (on 1/29/2024) for Follow up, with MTM  Pharmacist.    SUBJECTIVE/OBJECTIVE:                          Lindsey Gary is a 83 year old female coming in for a follow-up visit from 2023 with Colette Nguyen, PharmD and Nan Posadas, RadhaD.       Reason for visit: CGM follow-up. Patient has not questions for us today.     Allergies/ADRs: Reviewed in chart  Past Medical History: Reviewed in chart  Tobacco: She reports that she has never smoked. She has never been exposed to tobacco smoke. She has never used smokeless tobacco.  Alcohol: not currently using    Medication Adherence/Access:   She has concerns about cost of brand name medications. Has an appointment in a few weeks to meet with [place] Prescription assistance program.     Diabetes   Metformin 500 mg twice daily  Glipizide 10 mg twice daily before breakfast and lunch   NPH insulin 32 units at 8 am and 24 units at 5 pm    Patient is not experiencing side effects currently. She expresses that she does want to change insulin dose any further. She also shares hesitancy to try medication such as Ozempic as her sister and other friends have been very sick from the medication. Lastly she is concerns about cost of a brand name medication.  Breakfast: small waffle, no syrup, peach slice and raspberry with light cool whip +/ 1/2 cup coffee  Blood sugar monitorin time(s) daily but going to be using CGM now.          Eye exam is up to date  Foot exam is up to date  Urine Albumin:   Lab Results   Component Value Date    UMALCR 198.32 (H) 2023      Lab Results   Component Value Date    A1C 7.8 (H) 10/26/2023     Wt Readings from Last 4 Encounters:   23 179 lb (81.2 kg)   23 179 lb (81.2 kg)   08/10/23 180 lb (81.6 kg)   23 173 lb (78.5 kg)     Hypertension   Metoprolol ER 50 mg daily  Lisinopril 40 mg daily  Furosemide 40 mg daily    Patient does not self-monitor blood pressure.  No dizziness or light headedness. She reports severe dizziness in the past when her HR was in the low  50's, but none so far.      BP Readings from Last 3 Encounters:   12/28/23 120/60   11/27/23 128/68   11/02/23 120/62     Pulse Readings from Last 3 Encounters:   12/28/23 57   11/02/23 68   09/18/23 55     Hyperlipidemia   rosuvastatin 20mg daily    No side effects reported.      Recent Labs   Lab Test 07/14/23  1041 12/30/22  1028   CHOL 109 128   HDL 27* 31*   LDL 48 64   TRIG 169* 164*     Latent tuberculosis infection:   Isoniazid 300 mg daily (started ~11/27/23)    She shares her exposure was from the age of 19 years old.  Mentions she has follow-up with ID today to get liver function lab rechecked again, was concerned about being told her liver enzymes are a little bit high.    Today's Vitals: /60   Pulse 57   LMP  (LMP Unknown)   SpO2 97%   ----------------    I spent 45 minutes with this patient today. All changes were made via collaborative practice agreement with LILLIAM Sam CNP. A copy of the visit note was provided to the patient's provider(s).    A summary of these recommendations was given to the patient.    Nan Posadas, PharmD  Pharmacy Resident    Martha Price, PharmD  Medication Therapy Management Pharmacist       Medication Therapy Recommendations  Type 2 diabetes mellitus with complication, without long-term current use of insulin (H)    Current Medication: insulin NPH (NOVOLIN N RELION) 100 UNIT/ML vial (Discontinued)   Rationale: Dose too low - Dosage too low - Effectiveness   Recommendation: Increase Dose - NovoLIN N VIAL 100 UNIT/ML susp   Status: Accepted per CPA

## 2023-12-28 ENCOUNTER — OFFICE VISIT (OUTPATIENT)
Dept: PHARMACY | Facility: CLINIC | Age: 83
End: 2023-12-28
Payer: COMMERCIAL

## 2023-12-28 VITALS — HEART RATE: 57 BPM | SYSTOLIC BLOOD PRESSURE: 120 MMHG | DIASTOLIC BLOOD PRESSURE: 60 MMHG | OXYGEN SATURATION: 97 %

## 2023-12-28 DIAGNOSIS — E11.9 TYPE 2 DIABETES MELLITUS WITHOUT COMPLICATION, WITHOUT LONG-TERM CURRENT USE OF INSULIN (H): Primary | ICD-10-CM

## 2023-12-28 DIAGNOSIS — Z22.7 LATENT TUBERCULOSIS: ICD-10-CM

## 2023-12-28 DIAGNOSIS — E78.5 HYPERLIPIDEMIA LDL GOAL <100: ICD-10-CM

## 2023-12-28 DIAGNOSIS — I10 HYPERTENSION GOAL BP (BLOOD PRESSURE) < 130/80: ICD-10-CM

## 2023-12-28 PROCEDURE — 99607 MTMS BY PHARM ADDL 15 MIN: CPT | Performed by: PHARMACIST

## 2023-12-28 PROCEDURE — 99606 MTMS BY PHARM EST 15 MIN: CPT | Performed by: PHARMACIST

## 2023-12-28 RX ORDER — FLUOROMETHOLONE 0.1 %
1 SUSPENSION, DROPS(FINAL DOSAGE FORM)(ML) OPHTHALMIC (EYE) DAILY
COMMUNITY
Start: 2023-12-26

## 2023-12-28 RX ORDER — HUMAN INSULIN 100 [IU]/ML
INJECTION, SUSPENSION SUBCUTANEOUS
Start: 2023-12-28 | End: 2024-01-09

## 2023-12-28 NOTE — PATIENT INSTRUCTIONS
"Recommendation(s) from today's visit:                                                      Continue with plan to follow-up with your ID doctor on the latent TB treatment. There are other medications combination option.     Please ask the appointment [place] Assistance Program:  Ozempic, Trulicity which   OR  2. Farxiga or Jardiance which is a pill    Increase NPH to 36 units in the morning. Stay on 24 units in the evening.    Try to increase protein to help balance the carbs/sugar from the food.   - cottage cheese, greek yogurt or nuts      For Freestyle sensor refills call the pharmacy, [phone]     Follow-up: No follow-ups on file.    My Clinical Pharmacist's contact information:                                                      Nan Posadas, PharmD  Martha Price, PharmD  Medication Therapy Management Clinical Pharmacist    It was great speaking with you today.  I value your experience and would be very thankful for your time in providing feedback in our clinic survey. In the next few days, you may receive an email or text message from [employer] with a link to a survey related to your  clinical pharmacist.\"     To schedule another MTM appointment, please call the clinic directly [phone] or you may call the MTM scheduling line at [phone].    "

## 2024-01-09 ENCOUNTER — TELEPHONE (OUTPATIENT)
Dept: INTERNAL MEDICINE | Facility: CLINIC | Age: 84
End: 2024-01-09

## 2024-01-09 ENCOUNTER — VIRTUAL VISIT (OUTPATIENT)
Dept: INTERNAL MEDICINE | Facility: CLINIC | Age: 84
End: 2024-01-09
Payer: COMMERCIAL

## 2024-01-09 DIAGNOSIS — B37.31 YEAST INFECTION OF THE VAGINA: ICD-10-CM

## 2024-01-09 DIAGNOSIS — R05.9 COUGH, UNSPECIFIED TYPE: ICD-10-CM

## 2024-01-09 DIAGNOSIS — E11.9 TYPE 2 DIABETES MELLITUS WITHOUT COMPLICATION, WITHOUT LONG-TERM CURRENT USE OF INSULIN (H): ICD-10-CM

## 2024-01-09 DIAGNOSIS — R30.0 DYSURIA: Primary | ICD-10-CM

## 2024-01-09 DIAGNOSIS — J32.9 SINUSITIS, UNSPECIFIED CHRONICITY, UNSPECIFIED LOCATION: ICD-10-CM

## 2024-01-09 PROCEDURE — 99442 PR PHYSICIAN TELEPHONE EVALUATION 11-20 MIN: CPT | Mod: 93 | Performed by: NURSE PRACTITIONER

## 2024-01-09 RX ORDER — CODEINE PHOSPHATE AND GUAIFENESIN 10; 100 MG/5ML; MG/5ML
1-2 SOLUTION ORAL EVERY 4 HOURS PRN
Qty: 237 ML | Refills: 1 | Status: SHIPPED | OUTPATIENT
Start: 2024-01-09 | End: 2024-02-27

## 2024-01-09 RX ORDER — HUMAN INSULIN 100 [IU]/ML
INJECTION, SUSPENSION SUBCUTANEOUS
Qty: 10 ML | Refills: 1 | Status: SHIPPED | OUTPATIENT
Start: 2024-01-09 | End: 2024-01-29

## 2024-01-09 RX ORDER — FLUCONAZOLE 150 MG/1
150 TABLET ORAL ONCE
Qty: 30 TABLET | Refills: 3 | Status: SHIPPED | OUTPATIENT
Start: 2024-01-09 | End: 2024-01-10

## 2024-01-09 RX ORDER — CEFDINIR 300 MG/1
300 CAPSULE ORAL 2 TIMES DAILY
Qty: 20 CAPSULE | Refills: 0 | Status: SHIPPED | OUTPATIENT
Start: 2024-01-09 | End: 2024-01-29

## 2024-01-09 RX ORDER — LANOLIN ALCOHOL/MO/W.PET/CERES
50 CREAM (GRAM) TOPICAL DAILY
COMMUNITY
End: 2024-08-15

## 2024-01-09 NOTE — PROGRESS NOTES
"Lindsey is a 83 year old who is being evaluated via a billable telephone visit.      What phone number would you like to be contacted at? [phone]  How would you like to obtain your AVS? Mail a copy    Distant Location (provider location):  On-site    Assessment & Plan     Dysuria  Want to check urine culture  Will do omnicef for sinus and last positive urine culture was sensitive to this as well so if UTI should be treated  - Urine Culture Aerobic Bacterial - lab collect; Future    Cough, unspecified type      - guaiFENesin-codeine (ROBITUSSIN AC) 100-10 MG/5ML solution; Take 5-10 mLs by mouth every 4 hours as needed for cough    Sinusitis, unspecified chronicity, unspecified location      - cefdinir (OMNICEF) 300 MG capsule; Take 1 capsule (300 mg) by mouth 2 times daily    Type 2 diabetes mellitus without complication, without long-term current use of insulin (H)  Wan tto have a back up vial   - insulin NPH (NOVOLIN N RELION) 100 UNIT/ML vial; INJECT 36 UNITS SUBCUTANEOUSLY BEFORE BREAKFAST AND 24 UNITS BEFORE DINNER    Yeast infection of the vagina  Gets with antibiotics   - fluconazole (DIFLUCAN) 150 MG tablet; Take 1 tablet (150 mg) by mouth once for 1 dose      14   minutes spent by me on the date of the encounter doing chart review, history and exam, documentation and further activities per the note       BMI:   Estimated body mass index is 30.73 kg/m  as calculated from the following:    Height as of 11/2/23: 1.626 m (5' 4\").    Weight as of 11/2/23: 81.2 kg (179 lb).       Patient Instructions   Omnicef twice daily for 10 days     Diflucan refill sent      Prescription for insulin sent     Cough syrup sent     LILLIAM Sam CNP  [facility]    Subjective   Lindsey is a 83 year old, presenting for the following health issues:  Chief Complaint   Patient presents with    Sinus Problem     Pt states started with a Sore throat over a week ago,now has sinus headache, head " congestion , runny nose,pain in ears shanta, constant drainage down her throat. Afebrile.pt would also like a future lab for UC put in as she can not come in now.Possible UTI. Wants to know is she should get RSV immunization.           1/9/2024    10:30 AM   Additional Questions   Roomed by Jeannette DAMON       HPI     Sick for over 2 weeks - sore throat - ears an eyes and chest and now sinus infection     Headache and sinus drainage     Review of Systems   Constitutional, HEENT, cardiovascular, pulmonary, GI, , musculoskeletal, neuro, skin, endocrine and psych systems are negative, except as otherwise noted.      Objective    Vitals - Patient Reported  Weight (Patient Reported): 81.2 kg (179 lb)      Vitals:  No vitals were obtained today due to virtual visit.    Physical Exam   alert and feels sick   PSYCH: Alert and oriented times 3; coherent speech, normal   rate and volume, able to articulate logical thoughts, able   to abstract reason, no tangential thoughts, no hallucinations   or delusions  Her affect is normal  RESP: No cough, no audible wheezing, able to talk in full sentences  Remainder of exam unable to be completed due to telephone visits                Phone call duration: 14 minutes

## 2024-01-09 NOTE — NURSING NOTE
"Chief Complaint   Patient presents with    Sinus Problem     Pt states started with a Sore throat over a week ago,now has sinus headache, head congestion , runny nose,pain in ears shanta, constant drainage down her throat. Afebrile.pt would also like a future lab for UC put in as she can not come in now.Possible UTI. Wants to know is she should get RSV immunization.     initial LMP  (LMP Unknown)  Estimated body mass index is 30.73 kg/m  as calculated from the following:    Height as of 11/2/23: 1.626 m (5' 4\").    Weight as of 11/2/23: 81.2 kg (179 lb)..  bp completed using cuff size NA (Not Taken)  AMRIAH LIAO LPN  "

## 2024-01-09 NOTE — TELEPHONE ENCOUNTER
Jan 9, 2024 I spoke with Lindsey, she is in need of financial assistance for medication.    We reviewed the Prescription Assistance Program for manfacturer assistance programs, gross income, insurance and Rx list.     assistance applications will be completed for: Novolin N vials.    Lindsey has stated she would like to wait on the Ozempic.    When approved, Lindsey will receive this medication at no cost through December 2024.    Lindsey is over income for the Pharmacy Assistance Fund karina $500.    Rose Bray  Prescription Assistance Supervisor  Pharmacy Assistance

## 2024-01-09 NOTE — PATIENT INSTRUCTIONS
Omnicef twice daily for 10 days     Diflucan refill sent      Prescription for insulin sent     Cough syrup sent

## 2024-01-09 NOTE — TELEPHONE ENCOUNTER
Received call from [email or web address]@ regarding Diflucan 150 mg rx.  The rx was for one time dose but quantity was for #30 with 3 refills.  This RN advised the pharmacist to dispense just the one tablet without refills and message would be sent to primary care provider to send new rx to be profiled if refills were to be added.  No need to do anything if order for 1 tablet with no refills is acceptable. Lo Jaeger R.N.

## 2024-01-10 ENCOUNTER — TELEPHONE (OUTPATIENT)
Dept: INTERNAL MEDICINE | Facility: CLINIC | Age: 84
End: 2024-01-10
Payer: COMMERCIAL

## 2024-01-10 RX ORDER — FLUCONAZOLE 150 MG/1
150 TABLET ORAL
Qty: 30 TABLET | Refills: 3 | Status: SHIPPED | OUTPATIENT
Start: 2024-01-10

## 2024-01-10 NOTE — TELEPHONE ENCOUNTER
Home phone doesn't have voicemail set up. Mobile phone rang for a while and went to busy signal before disconnecting.     Thank you,  Everton Wynn, Triage RN Jossie Hernandez  12:20 PM 1/10/2024

## 2024-01-10 NOTE — TELEPHONE ENCOUNTER
Rx pended with original prescription. Needs to resend since refills and 30 day quantity amount was cancelled.    Thank you,  Everton Wynn, Triage RN [facility]  8:41 AM 1/10/2024

## 2024-01-10 NOTE — TELEPHONE ENCOUNTER
PRIOR AUTHORIZATION DENIED    Medication: INSULIN ISOPHANE HUMAN VIAL 100 UNIT/ML [facility]  Insurance Company: [facility] - Phone [phone] Fax [phone]  Denial Date: 1/10/2024  Denial Reason(s):     Appeal Information:     Patient Notified: No

## 2024-01-10 NOTE — TELEPHONE ENCOUNTER
Patient returned call - advised of messaged below, will contact pharmacy to pay out of pocket for medication

## 2024-01-10 NOTE — TELEPHONE ENCOUNTER
Pharmacy called to verify correct amount of Fluconazole sent. Advised of provider note and confirmed amount.     Pito Delong RN [facility]

## 2024-01-11 NOTE — TELEPHONE ENCOUNTER
Routing refill request to provider for review/approval because:  Failed protocol       Oriented - self; Oriented - place; Oriented - time

## 2024-01-17 ENCOUNTER — TELEPHONE (OUTPATIENT)
Dept: INTERNAL MEDICINE | Facility: CLINIC | Age: 84
End: 2024-01-17
Payer: COMMERCIAL

## 2024-01-17 DIAGNOSIS — J32.9 SINUSITIS, UNSPECIFIED CHRONICITY, UNSPECIFIED LOCATION: Primary | ICD-10-CM

## 2024-01-17 NOTE — TELEPHONE ENCOUNTER
Reason for Call:  Other call back    Detailed comments: Had appointment with provider and Medication Cefdinir 300mg, makes her sick : stomach issues & nausea. She stopped the medication but still has sinus issues, what should she do?    Phone Number Patient can be reached at: Home number on file [phone] (home)    Best Time: any    Can we leave a detailed message on this number? YES    Call taken on 1/17/2024 at 1:15 PM by Britany Chiang

## 2024-01-18 RX ORDER — DOXYCYCLINE 100 MG/1
100 CAPSULE ORAL 2 TIMES DAILY
Qty: 20 CAPSULE | Refills: 0 | Status: SHIPPED | OUTPATIENT
Start: 2024-01-18 | End: 2024-01-29

## 2024-01-25 ENCOUNTER — LAB (OUTPATIENT)
Dept: LAB | Facility: CLINIC | Age: 84
End: 2024-01-25
Payer: COMMERCIAL

## 2024-01-25 DIAGNOSIS — R30.0 DYSURIA: ICD-10-CM

## 2024-01-25 PROCEDURE — 87086 URINE CULTURE/COLONY COUNT: CPT

## 2024-01-26 LAB — BACTERIA UR CULT: NO GROWTH

## 2024-01-29 ENCOUNTER — OFFICE VISIT (OUTPATIENT)
Dept: PHARMACY | Facility: CLINIC | Age: 84
End: 2024-01-29
Payer: COMMERCIAL

## 2024-01-29 ENCOUNTER — OFFICE VISIT (OUTPATIENT)
Dept: FAMILY MEDICINE | Facility: CLINIC | Age: 84
End: 2024-01-29
Payer: COMMERCIAL

## 2024-01-29 VITALS — HEART RATE: 55 BPM | DIASTOLIC BLOOD PRESSURE: 54 MMHG | SYSTOLIC BLOOD PRESSURE: 110 MMHG | OXYGEN SATURATION: 99 %

## 2024-01-29 VITALS
TEMPERATURE: 97.7 F | WEIGHT: 179 LBS | OXYGEN SATURATION: 99 % | RESPIRATION RATE: 14 BRPM | SYSTOLIC BLOOD PRESSURE: 110 MMHG | HEART RATE: 55 BPM | DIASTOLIC BLOOD PRESSURE: 54 MMHG | BODY MASS INDEX: 30.56 KG/M2 | HEIGHT: 64 IN

## 2024-01-29 DIAGNOSIS — R09.82 POST-NASAL DRAINAGE: Primary | ICD-10-CM

## 2024-01-29 DIAGNOSIS — E11.8 TYPE 2 DIABETES MELLITUS WITH COMPLICATION, WITHOUT LONG-TERM CURRENT USE OF INSULIN (H): ICD-10-CM

## 2024-01-29 DIAGNOSIS — Z22.7 LATENT TUBERCULOSIS: ICD-10-CM

## 2024-01-29 DIAGNOSIS — E11.9 TYPE 2 DIABETES MELLITUS WITHOUT COMPLICATION, WITHOUT LONG-TERM CURRENT USE OF INSULIN (H): Primary | ICD-10-CM

## 2024-01-29 DIAGNOSIS — I10 HYPERTENSION GOAL BP (BLOOD PRESSURE) < 130/80: ICD-10-CM

## 2024-01-29 DIAGNOSIS — K21.00 GASTROESOPHAGEAL REFLUX DISEASE WITH ESOPHAGITIS, UNSPECIFIED WHETHER HEMORRHAGE: ICD-10-CM

## 2024-01-29 DIAGNOSIS — N18.30 STAGE 3 CHRONIC KIDNEY DISEASE, UNSPECIFIED WHETHER STAGE 3A OR 3B CKD (H): ICD-10-CM

## 2024-01-29 DIAGNOSIS — Z71.85 VACCINE COUNSELING: ICD-10-CM

## 2024-01-29 DIAGNOSIS — E78.5 HYPERLIPIDEMIA LDL GOAL <100: ICD-10-CM

## 2024-01-29 PROCEDURE — 99605 MTMS BY PHARM NP 15 MIN: CPT | Performed by: PHARMACIST

## 2024-01-29 PROCEDURE — 99213 OFFICE O/P EST LOW 20 MIN: CPT | Performed by: PHYSICIAN ASSISTANT

## 2024-01-29 PROCEDURE — 99607 MTMS BY PHARM ADDL 15 MIN: CPT | Performed by: PHARMACIST

## 2024-01-29 RX ORDER — FLUTICASONE PROPIONATE 50 MCG
1 SPRAY, SUSPENSION (ML) NASAL DAILY
Qty: 16 G | Refills: 0 | Status: SHIPPED | OUTPATIENT
Start: 2024-01-29 | End: 2024-02-23

## 2024-01-29 RX ORDER — RESPIRATORY SYNCYTIAL VIRUS VACCINE 120MCG/0.5
0.5 KIT INTRAMUSCULAR ONCE
Qty: 1 EACH | Refills: 0 | Status: CANCELLED | OUTPATIENT
Start: 2024-01-29 | End: 2024-01-29

## 2024-01-29 RX ORDER — HUMAN INSULIN 100 [IU]/ML
INJECTION, SUSPENSION SUBCUTANEOUS
Start: 2024-01-29 | End: 2024-02-28 | Stop reason: DRUGHIGH

## 2024-01-29 RX ORDER — CETIRIZINE HYDROCHLORIDE 10 MG/1
10 TABLET ORAL DAILY
Qty: 30 TABLET | Refills: 0 | Status: SHIPPED | OUTPATIENT
Start: 2024-01-29 | End: 2024-02-23

## 2024-01-29 NOTE — LETTER
"Recommended To-Do List      Prepared on: 01/29/2024       You can get the best results from your medications by completing the items on this \"To-Do List.\"      Bring your To-Do List when you go to your doctor. And, share it with your family or caregivers.    My To-Do List:  What we talked about: What I should do:   Your medication dosage is adjusting    Decrease your dosage of NovoLIN N RELION to 18 units in the evening, and increase to 38 units in the morning          What we talked about: What I should do:                     "

## 2024-01-29 NOTE — LETTER
January 29, 2024  Lindsey PAULINO Abdirahman  [id number] [place] [address]    Dear Ms. Gary, [facility]     Thank you for talking with me on Jan 29, 2024 about your health and medications. As a follow-up to our conversation, I have included two documents:      Your Recommended To-Do List has steps you should take to get the best results from your medications.  Your Medication List will help you keep track of your medications and how to take them.    If you want to talk about these documents, please call Nan Posadas RPH at phone: [phone], Monday-Friday 8-4:30pm.    I look forward to working with you and your doctors to make sure your medications work well for you.    Sincerely,  Colette Nguyen RPH  [facility] Pharmacist, [facility]

## 2024-01-29 NOTE — PATIENT INSTRUCTIONS
"Recommendations from today's MTM visit:                                                      Increase NPH to 38 units in the morning. Reduce evening NPH to 18 units in the evening.  Great job with increasing protein to help balance the carbs/sugar from meals  Okay to use orange juice or life savers for low blood sugars <80 mg/dL, then eat a more long-term snack as well (like a cracker)  Okay to double check with a finger prick and glucometer if your Juan Francisco sensor is reading low    Follow-up: Return in 29 days (on 2/27/2024) for Follow up, with MTM Pharmacist.    It was great speaking with you today.  I value your experience and would be very thankful for your time in providing feedback in our clinic survey. In the next few days, you may receive an email or text message from [employer] with a link to a survey related to your  clinical pharmacist.\"     To schedule another MTM appointment, please call the clinic directly or you may call the MTM scheduling line at [phone] or toll-free at [phone].     My Clinical Pharmacist's contact information:                                                      Please feel free to contact me with any questions or concerns you have.      Nan Posadas, PharmD  Pharmacy Resident  Pager #[phone]    Radha JohnsonD, BCACP  Medication Therapy Management Provider, [facility]  Phone: [phone]   "

## 2024-01-29 NOTE — LETTER
_  Medication List        Prepared on: 01/29/2024     Bring your Medication List when you go to the doctor, hospital, or   emergency room. And, share it with your family or caregivers.     Note any changes to how you take your medications.  Cross out medications when you no longer use them.    Medication How I take it Why I use it Prescriber   acetaminophen (TYLENOL) 500 MG tablet Take 2 tablets by mouth every 8 hours as needed  Pain Patient Reported   allopurinol (ZYLOPRIM) 300 MG tablet Take 1/2 (one-half) tablet by mouth twice daily Gout, unspecified cause, unspecified chronicity, unspecified site LILLIAM Sam CNP   cetirizine (ZYRTEC) 10 MG tablet Take 1 tablet (10 mg) by mouth daily Post-nasal drainage David Hitchcock PA-C   Cholecalciferol (VITAMIN D) 2000 UNITS tablet Take 1 tablet by mouth daily  General health Patient Reported   cycloSPORINE (RESTASIS) 0.05 % ophthalmic emulsion Place 1 drop in each eye daily   Dry eyes Patient Reported   diclofenac (VOLTAREN) 1 % topical gel Apply topically 4 times daily as needed for moderate pain  Arthritis pain Patient Reported   estradiol (ESTRACE) 0.1 MG/GM vaginal cream Please use blueberry size amount in the vagina nightly for two weeks and then three times a week at night thereafter Recurrent UTI; Vaginal Atrophy LILLIAM Hardy CNP   famotidine (PEPCID) 20 MG tablet Take 20 mg by mouth 2 times daily  GERD Patient Reported   fluconazole (DIFLUCAN) 150 MG tablet Take 1 tablet (150 mg) by mouth once as needed (yeast infection) Yeast Infection of the Vagina LILLIAM Sam CNP   fluorometholone (FML LIQUIFILM) 0.1 % ophthalmic suspension Place 1 drop Into the left eye daily  Dry eyes Patient Reported   fluticasone (FLONASE) 50 MCG/ACT nasal spray Spray 1 spray into both nostrils daily Post-nasal drainage David Hitchcock PA-C   furosemide (LASIX) 20 MG tablet Take 2 tablets by mouth once daily Essential Hypertension Esther Townsend MD    gabapentin (NEURONTIN) 300 MG capsule TAKE 3 CAPSULES BY MOUTH AT BEDTIME Neuropathy LILLIAM Sam CNP   glipiZIDE (GLUCOTROL) 10 MG tablet TAKE 1 TABLET BY MOUTH TWICE DAILY BEFORE MEAL(S) Type 2 diabetes mellitus without complication, without long-term current use of insulin (H) Esther Townsend MD   guaiFENesin-codeine (ROBITUSSIN AC) 100-10 MG/5ML solution Take 5-10 mLs by mouth every 4 hours as needed for cough Cough, unspecified type LILLIAM Sam CNP   insulin NPH (NOVOLIN N RELION) 100 UNIT/ML vial INJECT 38 UNITS SUBCUTANEOUSLY BEFORE BREAKFAST AND 18 UNITS BEFORE DINNER Type 2 diabetes mellitus without complication, without long-term current use of insulin (H) LILLIAM Sam CNP   isoniazid (NYDRAZID) 300 MG tablet Take 1 tablet (300 mg) by mouth daily  Latent tuberculosis LILLIAM Sam CNP   Lactobacillus (PROBIOTIC ACIDOPHILUS) TABS Take 1 tablet by mouth daily  General health Patient Reported   lisinopril (ZESTRIL) 40 MG tablet Take 1 tablet by mouth once daily Essential Hypertension LILLIAM Sam CNP   metFORMIN (GLUCOPHAGE) 500 MG tablet Take 1 tablet (500 mg) by mouth 2 times daily (with meals)  Type 2 diabetes LILLIAM Sam CNP   metoprolol succinate ER (TOPROL XL) 50 MG 24 hr tablet Take 1 tablet by mouth once daily Hyperlipidemia LDL Goal <100; Hypertension Goal BP (Blood Pressure) < 130/80 LILLIAM Sam CNP   metroNIDAZOLE (METROGEL) 0.75 % external gel Apply topically 2 times daily Vulvar irritation LILLIAM Sam CNP   omeprazole (PRILOSEC) 20 MG DR capsule Take 1 capsule (20 mg) by mouth daily Gastroesophageal reflux disease with esophagitis without hemorrhage LILLIAM Sam CNP   rosuvastatin (CRESTOR) 20 MG tablet Take 1 tablet (20 mg) by mouth daily Hyperlipidemia LDL Goal <100 LILLIAM Sam CNP   TUMS 500 MG CHEW 1 TABLET BY MOUTH 3 TIMES AS NEEDED  General health Dano Alvarez  MD Monty   vitamin B6 (PYRIDOXINE) 50 MG TABS Take 100 mg by mouth daily  General health Patient Reported         Add new medications, over-the-counter drugs, herbals, vitamins, or  minerals in the blank rows below.    Medication How I take it Why I use it Prescriber                                      Allergies:      amoxicillin-pot clavulanate; sulfa antibiotics        Side effects I have had:               Other Information:              My notes and questions:

## 2024-01-29 NOTE — PROGRESS NOTES
Medication Therapy Management (MTM) Encounter    ASSESSMENT:                            Medication Adherence/Access: No issues identified    Diabetes:   Patient is not meeting A1c goal of < 7.5%.    Patient is very close to meeting goal of > 70% time in target with continuous glucose monitoring.  Recommend increase NPH insulin to 38 units in the morning due to daytime hyperglycemia and decrease to  18 units at 5pm since patient is sometimes hypoglycemic overnight.    Future considerations of GLP-1 agonist or SGLT2 inhibitor for better glucose control and to help reduce insulin burden.  If choosing an SGLT2 inhibitor, would recommend decrease furosemide to 20 mg daily to maintain current blood pressure.  Patient has declined these therapies in the past, but could reconsider at future visit.  Additionally, could consider changing NPH to Lantus to reduce insulin burden.    Hypertension:   Stable. Patient is meeting blood pressure goal of < 130/80mmHg.  Pulse running low, has been recommended before to monitor pulse at home.    Hyperlipidemia:   Stable.    GERD:   Stable. Patient would benefit from the following changes: taper off proton pump inhibitor due to bone health and risk of infection.  Patient content with current regimen, not wanting to make too many changes today.  Consider this at a future visit.    Latent tuberculosis infection:   continue to follow with ID, started taking late November and plan to continue for 9 months.  Maximum dose of isoniazid is 300 mg daily, should discontinue or hold if AST or ALT are > 3 times upper limit of normal    Vaccines: Due for RSV vaccine per ACIP/CDC Guidelines: Did not discuss today, recommend at future visit.    PLAN:                            Increase NPH to 38 units in the morning. Reduce evening NPH to 18 units in the evening.      Follow-up: Return in 29 days (on 2/27/2024) for Follow up, with MTM Pharmacist.    SUBJECTIVE/OBJECTIVE:                          Lindsey  Abdirahman is a 83 year old female coming in for a follow-up visit from 12/28/2023 with Radha ChanD.       Reason for visit: CGM follow-up. Concerned about low blood sugars.     Allergies/ADRs: Reviewed in chart  Past Medical History: Reviewed in chart  Tobacco: She reports that she has never smoked. She has never been exposed to tobacco smoke. She has never used smokeless tobacco.  Alcohol: not currently using    Medication Adherence/Access:   Other medications/conditions were not reviewed in detail today due to time constraints, but were reviewed for medication reconciliation purposes.    Diabetes   Metformin 500 mg twice daily  Glipizide 10 mg twice daily before breakfast and lunch   NPH insulin 36 units at 8 am and 24 units at 5 pm    Patient is not experiencing side effects currently.  She has expressed concerns in the past about Ozempic and hesitancy to start new medications in general.  Also has concerns about medication costs for brand name drugs.    Breakfast: small waffle, no syrup, peach slice and raspberry with light cool whip +/ 1/2 cup coffee, is eating 1/2 cup of yogurt, cashews, cottage cheese  Blood sugar monitoring: CGM Freestyle Juan Francisco 2          Eye exam is up to date  Foot exam is up to date  Urine Albumin:   Lab Results   Component Value Date    UMALCR 198.32 (H) 07/17/2023      Lab Results   Component Value Date    A1C 7.8 (H) 10/26/2023     Wt Readings from Last 4 Encounters:   01/29/24 179 lb (81.2 kg)   11/02/23 179 lb (81.2 kg)   08/29/23 179 lb (81.2 kg)   08/10/23 180 lb (81.6 kg)     Hypertension   Metoprolol ER 50 mg daily  Lisinopril 40 mg daily  Furosemide 40 mg daily    Patient does not self-monitor blood pressure.  No dizziness or light headedness. She reports severe dizziness in the past when her HR was in the low 50's, but none so far.      BP Readings from Last 3 Encounters:   01/29/24 110/54   01/29/24 110/54   12/28/23 120/60     Pulse Readings from Last 3 Encounters:    01/29/24 55   01/29/24 55   12/28/23 57     Hyperlipidemia   rosuvastatin 20mg daily    No side effects reported.      Recent Labs   Lab Test 07/14/23  1041 12/30/22  1028   CHOL 109 128   HDL 27* 31*   LDL 48 64   TRIG 169* 164*       GERD:   Omeprazole 20 mg once daily  Famotidine 20 mg twice daily  Tums 500 mg once daily as needed - doesn't take very often  Patient feels that current regimen is effective.      Latent tuberculosis infection:   Isoniazid 300 mg daily (started ~11/27/23)  She shared her last liver labs were good and that she has to get them checked monthly now.  No issues today.  Last labs at outside clinic 1/21/24: AST: 43   ALT: 42  Lab Results   Component Value Date    AST 30 12/30/2022    AST 15 07/06/2021     Lab Results   Component Value Date    ALT 21 12/30/2022    ALT 24 07/06/2021       Today's Vitals: /54   Pulse 55   LMP  (LMP Unknown)   SpO2 99%   ----------------    I spent 40 minutes with this patient today. All changes were made via collaborative practice agreement with LILLIAM Sam CNP. A copy of the visit note was provided to the patient's provider(s).    A summary of these recommendations was given to the patient.    Nan Posadas, RadhaD  Pharmacy Resident  Pager #[phone]    Colette Nguyen PharmD, [facility]  Medication Therapy Management Provider, [facility]  Phone: [phone]     Medication Therapy Recommendations  Gastroesophageal reflux disease with esophagitis, unspecified whether hemorrhage    Current Medication: omeprazole (PRILOSEC) 20 MG DR capsule   Rationale: More effective medication available - Ineffective medication - Effectiveness   Recommendation: Discontinue Medication - famotidine 20 MG tablet   Status: Declined per Patient         Type 2 diabetes mellitus with complication, without long-term current use of insulin (H)    Current Medication: insulin NPH (NOVOLIN N RELION) 100 UNIT/ML vial (Discontinued)    Rationale: Dose too high - Dosage too high - Safety   Recommendation: Decrease Dose - NovoLIN N VIAL 100 UNIT/ML susp   Status: Accepted per CPA   Note: decrease evening dose, increase morning dose

## 2024-01-29 NOTE — PROGRESS NOTES
"  Assessment & Plan     Post-nasal drainage    Recommend zyrtec and Flonase for a couple weeks. OK to restart if helpful.    - cetirizine (ZYRTEC) 10 MG tablet; Take 1 tablet (10 mg) by mouth daily  - fluticasone (FLONASE) 50 MCG/ACT nasal spray; Spray 1 spray into both nostrils daily      Stage 3 chronic kidney disease, unspecified whether stage 3a or 3b CKD (H)    Ordered labs to be done at next MTM visit.    - COMPREHENSIVE METABOLIC PANEL; Future      Type 2 diabetes mellitus with complication, without long-term current use of insulin (H)    Ordered labs to be done at next MTM visit. Diabetes managed by [facility] currently.    - TSH WITH FREE T4 REFLEX; Future                Subjective   Sonmichelle is a 83 year old, presenting for the following health issues:  Follow Up        1/29/2024     2:03 PM   Additional Questions   Roomed by Adri Reza     [facility]     HPI  General Follow Up    Concern: Sinus problem  Problem started: follow up from 1/9/24  Description: patient would like to know what OTC medications are safe for her to take. She is worried about interactions. She states she has drainage from throat. She has bilateral ear pain and constant runny nose. She has a dull headache.   No cough or fever        Review of Systems  Constitutional, HEENT, cardiovascular, pulmonary, gi and gu systems are negative, except as otherwise noted.        Objective    /54 (BP Location: Left arm, Patient Position: Sitting, Cuff Size: Adult Large)   Pulse 55   Temp 97.7  F (36.5  C) (Oral)   Resp 14   Ht 1.626 m (5' 4\")   Wt 81.2 kg (179 lb)   LMP  (LMP Unknown)   SpO2 99%   BMI 30.73 kg/m    Body mass index is 30.73 kg/m .      Physical Exam   GENERAL: alert and no distress  EYES: Eyes grossly normal to inspection, PERRL and conjunctivae and sclerae normal  HENT: ear canals and TM's normal, nose and mouth without ulcers or lesions  RESP: lungs clear to auscultation - no rales, rhonchi or wheezes  CV: regular rate and " rhythm, normal S1 S2, no S3 or S4, no murmur, click or rub, no peripheral edema  MS: no gross musculoskeletal defects noted, no edema  SKIN: no suspicious lesions or rashes  NEURO: Normal strength and tone, mentation intact and speech normal  PSYCH: mentation appears normal, affect normal/bright  LYMPH: no cervical, supraclavicular, axillary, or inguinal adenopathy            Signed Electronically by: David Hitchcock PA-C

## 2024-02-07 ENCOUNTER — TELEPHONE (OUTPATIENT)
Dept: INTERNAL MEDICINE | Facility: CLINIC | Age: 84
End: 2024-02-07
Payer: COMMERCIAL

## 2024-02-07 DIAGNOSIS — I10 ESSENTIAL HYPERTENSION: ICD-10-CM

## 2024-02-07 RX ORDER — LISINOPRIL 40 MG/1
TABLET ORAL
Qty: 90 TABLET | Refills: 0 | Status: SHIPPED | OUTPATIENT
Start: 2024-02-07 | End: 2024-05-02

## 2024-02-07 NOTE — TELEPHONE ENCOUNTER
S-(situation): Patient concerned that diastolic BP is too low, has been in the 50s at her last few appointments    B-(background): Patient has been taking antibiotic for latent TB and concerned that these may be affecting her BP.     A-(assessment): Patient does endorse intermittent dizziness/lightheadedness that goes away. Systolic number has been >100 but diastolic has been <60 of late. Patient does not know BP today and does not check regularly, just during appointments. Last BP was on 1/29/24 at 110/54    R-(recommendations): Patient wanted PCP to comment on whether or not low Diastolic BP is concerning    Summer RN

## 2024-02-14 ENCOUNTER — TELEPHONE (OUTPATIENT)
Dept: SCHEDULING | Facility: CLINIC | Age: 84
End: 2024-02-14

## 2024-02-14 ENCOUNTER — TELEPHONE (OUTPATIENT)
Dept: FAMILY MEDICINE | Facility: CLINIC | Age: 84
End: 2024-02-14

## 2024-02-14 ENCOUNTER — ALLIED HEALTH/NURSE VISIT (OUTPATIENT)
Dept: FAMILY MEDICINE | Facility: CLINIC | Age: 84
End: 2024-02-14
Payer: COMMERCIAL

## 2024-02-14 VITALS — DIASTOLIC BLOOD PRESSURE: 52 MMHG | SYSTOLIC BLOOD PRESSURE: 124 MMHG

## 2024-02-14 DIAGNOSIS — I10 ESSENTIAL HYPERTENSION: Primary | ICD-10-CM

## 2024-02-14 PROCEDURE — 99207 PR NO CHARGE NURSE ONLY: CPT

## 2024-02-14 NOTE — PROGRESS NOTES
Lindsey Gary is a 83 year old patient who comes in today for a Blood Pressure check.  Initial BP:  /52 (BP Location: Left arm, Patient Position: Chair, Cuff Size: Adult Large)   LMP  (LMP Unknown)      Data Unavailable  Disposition: follow-up as previously indicated by provider    Adela Krueger, LYNETTE

## 2024-02-14 NOTE — TELEPHONE ENCOUNTER
Called and spoke with patient. An appointment for next day was declined at this time. Patient asked to see her PCP. An appointment was made for 2- however patient said if she gets to the point of needing to be seen sooner, she would call in for an appt next day

## 2024-02-14 NOTE — TELEPHONE ENCOUNTER
Dinorah Wong CNP   Please review     Patient in clinic today for BP only check    BP is normal today     Patient has been a bit worried as she felt BP was to low   Has not been drinking much fluids, RN advised to increase fluids   Has had dizziness on and off - not present today     Currently taking lisinopril 40 mg daily, also takes furosemide     BP Readings from Last 6 Encounters:   02/14/24 124/52   01/29/24 110/54   01/29/24 110/54   12/28/23 120/60   11/27/23 128/68   11/02/23 120/62     Patient has an upcoming visit with her pcp, will send message to advise on BP readings and patient concerns to discuss at upcoming visit     Patient is to call and speak to a triage nurse if any questions/concerns/symptoms arise     Glenda Horne, Registered Nurse  [facility]

## 2024-02-14 NOTE — PROGRESS NOTES
Patient in clinic today for BP only check    BP is normal today     Patient has been a bit worried as she felt BP was to low   Has not been drinking much fluids, RN advised to increase fluids   Has had dizziness on and off - not present today     Currently taking lisinopril 40 mg daily, also takes furosemide     BP Readings from Last 6 Encounters:   02/14/24 124/52   01/29/24 110/54   01/29/24 110/54   12/28/23 120/60   11/27/23 128/68   11/02/23 120/62     Patient has an upcoming visit with her pcp, will send message to advise on BP readings and patient concerns to discuss at upcoming visit     Patient is to call and speak to a triage nurse if any questions/concerns/symptoms arise     Glenda Horne, Registered Nurse  [facility]

## 2024-02-14 NOTE — TELEPHONE ENCOUNTER
Reason for Call:  Appointment Request    Patient requesting this type of appt: Chronic Diease Management/Medication/Follow-Up    Requested provider: Dinorah Wong    Reason patient unable to be scheduled: Not within requested timeframe    When does patient want to be seen/preferred time: 3-7 days    Comments: Pt calling to request IN PERSON appointment with PCP or partner in regards to a sinus issue that has been ongoing since before Sebastian time. Currently no availability until April. Pt states she is not available 2/20.    Could we send this information to you in [employer] or would you prefer to receive a phone call?:   Patient would prefer a phone call   Okay to leave a detailed message?: Yes at Home number on file [phone] (home)    Call taken on 2/14/2024 at 11:44 AM by Mary Blair

## 2024-02-20 DIAGNOSIS — E11.51 TYPE 2 DIABETES MELLITUS WITH DIABETIC PERIPHERAL ANGIOPATHY WITHOUT GANGRENE, WITH LONG-TERM CURRENT USE OF INSULIN (H): ICD-10-CM

## 2024-02-20 DIAGNOSIS — Z79.4 TYPE 2 DIABETES MELLITUS WITH DIABETIC PERIPHERAL ANGIOPATHY WITHOUT GANGRENE, WITH LONG-TERM CURRENT USE OF INSULIN (H): ICD-10-CM

## 2024-02-23 ENCOUNTER — OFFICE VISIT (OUTPATIENT)
Dept: INTERNAL MEDICINE | Facility: CLINIC | Age: 84
End: 2024-02-23
Payer: COMMERCIAL

## 2024-02-23 VITALS
HEIGHT: 64 IN | WEIGHT: 179 LBS | BODY MASS INDEX: 30.56 KG/M2 | RESPIRATION RATE: 18 BRPM | HEART RATE: 60 BPM | SYSTOLIC BLOOD PRESSURE: 106 MMHG | DIASTOLIC BLOOD PRESSURE: 62 MMHG | OXYGEN SATURATION: 96 % | TEMPERATURE: 97.3 F

## 2024-02-23 DIAGNOSIS — Z79.4 TYPE 2 DIABETES MELLITUS WITH DIABETIC PERIPHERAL ANGIOPATHY WITHOUT GANGRENE, WITH LONG-TERM CURRENT USE OF INSULIN (H): ICD-10-CM

## 2024-02-23 DIAGNOSIS — J32.9 SINUSITIS, UNSPECIFIED CHRONICITY, UNSPECIFIED LOCATION: Primary | ICD-10-CM

## 2024-02-23 DIAGNOSIS — N18.32 STAGE 3B CHRONIC KIDNEY DISEASE (H): ICD-10-CM

## 2024-02-23 DIAGNOSIS — E11.51 TYPE 2 DIABETES MELLITUS WITH DIABETIC PERIPHERAL ANGIOPATHY WITHOUT GANGRENE, WITH LONG-TERM CURRENT USE OF INSULIN (H): ICD-10-CM

## 2024-02-23 PROCEDURE — 99214 OFFICE O/P EST MOD 30 MIN: CPT | Performed by: NURSE PRACTITIONER

## 2024-02-23 RX ORDER — PREDNISONE 20 MG/1
20 TABLET ORAL DAILY
Qty: 5 TABLET | Refills: 0 | Status: SHIPPED | OUTPATIENT
Start: 2024-02-23 | End: 2024-04-02

## 2024-02-23 RX ORDER — CEFDINIR 300 MG/1
300 CAPSULE ORAL 2 TIMES DAILY
Qty: 20 CAPSULE | Refills: 0 | Status: SHIPPED | OUTPATIENT
Start: 2024-02-23 | End: 2024-04-02

## 2024-02-23 RX ORDER — RESPIRATORY SYNCYTIAL VIRUS VACCINE 120MCG/0.5
0.5 KIT INTRAMUSCULAR ONCE
Qty: 1 EACH | Refills: 0 | Status: CANCELLED | OUTPATIENT
Start: 2024-02-23 | End: 2024-02-23

## 2024-02-23 NOTE — PROGRESS NOTES
"  Assessment & Plan     Sinusitis, unspecified chronicity, unspecified location    - CT Sinus w/o Contrast; Future  - Adult ENT  Referral; Future  - cefdinir (OMNICEF) 300 MG capsule; Take 1 capsule (300 mg) by mouth 2 times daily  - predniSONE (DELTASONE) 20 MG tablet; Take 1 tablet (20 mg) by mouth daily    Type 2 diabetes mellitus with diabetic peripheral angiopathy without gangrene, with long-term current use of insulin (H)  Stable   Lab Results   Component Value Date    A1C 7.8 10/26/2023    A1C 7.0 07/14/2023    A1C 7.1 12/30/2022    A1C 7.5 10/10/2022    A1C 7.2 07/07/2022    A1C 7.6 07/06/2021    A1C 7.3 12/01/2020    A1C 6.8 11/26/2019    A1C 7.1 03/08/2019    A1C 7.0 12/31/2018         Stage 3b chronic kidney disease (H)  Stable   Creatinine   Date Value Ref Range Status   10/26/2023 1.44 (H) 0.51 - 0.95 mg/dL Final   07/06/2021 1.17 (H) 0.52 - 1.04 mg/dL Final      She is due for recheck - last creatinine was higher   Lab are ordered for next MTM visit       30 minutes spent by me on the date of the encounter doing chart review, history and exam, documentation and further activities per the note      BMI  Estimated body mass index is 30.73 kg/m  as calculated from the following:    Height as of this encounter: 1.626 m (5' 4\").    Weight as of this encounter: 81.2 kg (179 lb).         Patient Instructions   Prednisone 20 mg daily for 5 days   Take in morning with food     Omnicef twice daily for 10 days     ENT referral     CT scan sinus    [facility]   [facility], [address]  [address] [address]  Get Directions    Scheduling  (T) [phone]    (F) [phone]    Recheck lab - kidney function decreased last check and due to recheck     Subjective   Lindsey is a 83 year old, presenting for the following health issues:    Sinus Problem        Sinus problem since December 2/23/2024    12:05 PM   Additional Questions   Roomed by Jeannette DAMON " "    HPI     She has had sinus issues since Sebastian time.  She was treated in November with amoxicillin 875 mg for sinus issues.  She is not sure that she had much improvement with that.  However, she was then seen again January 18 and treated with doxycycline, and she felt that that did nothing to her symptoms    On review of her chart she has often had azithromycin or doxycycline in the past for her sinuses, sometimes with prednisone added    We will try doing Omnicef with the 5-day course of prednisone to see if we can get her feeling better.  I do not want to do a Levaquin type a drug because of side effects, but this may be something she may need in the future.    I ordered a CT scan of her sinuses so we can see if there is any blockages or problems.  I have a referral for ENT for her to see them.  She wants to do her scan at [facility] so order was faxed to them or sent through the computer.  She has the number to call for scheduling      Review of Systems  Constitutional, neuro, ENT, endocrine, pulmonary, cardiac, gastrointestinal, genitourinary, musculoskeletal, integument and psychiatric systems are negative, except as otherwise noted.      Objective    /62   Pulse 60   Temp 97.3  F (36.3  C) (Oral)   Resp 18   Ht 1.626 m (5' 4\")   Wt 81.2 kg (179 lb)   LMP  (LMP Unknown)   SpO2 96%   BMI 30.73 kg/m    Body mass index is 30.73 kg/m .  Physical Exam   GENERAL: alert and no distress  RESP: lungs clear to auscultation - no rales, rhonchi or wheezes  CV: regular rate and rhythm, normal S1 S2, no S3 or S4, no murmur, click or rub, no peripheral edema  PSYCH: mentation appears normal, affect normal/bright    Ct sinus         Signed Electronically by: LILLIAM Sam CNP    "

## 2024-02-23 NOTE — NURSING NOTE
"Chief Complaint   Patient presents with    Sinus Problem     Sinus problem since December     initial /62   Pulse 60   Temp 97.3  F (36.3  C) (Oral)   Resp 18   Ht 1.626 m (5' 4\")   Wt 81.2 kg (179 lb)   LMP  (LMP Unknown)   SpO2 96%   BMI 30.73 kg/m   Estimated body mass index is 30.73 kg/m  as calculated from the following:    Height as of this encounter: 1.626 m (5' 4\").    Weight as of this encounter: 81.2 kg (179 lb)..  bp completed using cuff size large  MARIAH LIAO LPN  "

## 2024-02-23 NOTE — PATIENT INSTRUCTIONS
Prednisone 20 mg daily for 5 days   Take in morning with food     Omnicef twice daily for 10 days     ENT referral     CT scan sinus    [facility]   [facility], [address]  [address] [place] [address]  Get Directions    Scheduling  (T) [phone]    (F) [phone]    Recheck lab - kidney function decreased last check and due to recheck

## 2024-02-26 NOTE — PROGRESS NOTES
Medication Therapy Management (MTM) Encounter    ASSESSMENT:                            Medication Adherence/Access: No issues identified    Diabetes: Patient is meeting A1c goal of < 8%, but last A1c was higher than typical for patient.  Additionally, predicted A1c on most recent continuous glucose monitor was 8.1%.  Patient is not meeting goal of > 50% time in target with continuous glucose monitoring.  Recommend increase NPH insulin to 40 units in the morning to 20 units at 5pm for hyperglycemia.   Future considerations of GLP-1 agonist or SGLT2 inhibitor for better glucose control and to help reduce insulin burden.  If choosing an SGLT2 inhibitor, would recommend decrease furosemide to 20 mg daily to maintain current blood pressure.  Patient has declined these therapies in the past, but could reconsider at future visit.  Additionally, could consider changing NPH to Lantus to reduce insulin burden.  Check A1c at next visit.    Hypertension/CKD: Stable. Patient is meeting blood pressure goal of < 130/80mmHg.  Could consider reducing or stopping furosemide if patient is not experiencing edema.  Could also consider as needed use only.  If need for additional blood pressure support, consider addition of SGLT2 inhibitor, hydrochlorothiazide, or spironolactone.  Amlodipine may be an option, but hesitate due to history of edema.  In the past, patient has preferred furosemide over hydrochlorothiazide.    Hyperlipidemia: Stable.    Sinusitis: Improving.  Continue current regimen.    GERD: Stable.  Could consider PPI taper in the future due to risk of bone density loss and infection.    Latent tuberculosis infection: continue to follow with ID, started taking late November and plan to continue for 9 months.  Maximum dose of isoniazid is 300 mg daily, should discontinue or hold if AST or ALT are > 3 times upper limit of normal.  Liver labs elevated, but okay to continue per Jennie Frazier, APRN, CNP.  Continue to  monitor.    PLAN:                            Increase NPH to 40 units in the morning. Reduce evening NPH to 20 units in the evening.  Try to walk 10-15 minutes per day.      Follow-up: Return in 5 weeks (on 4/2/2024) for Follow up, with MTM Pharmacist.    SUBJECTIVE/OBJECTIVE:                          Lindsey Gary is a 83 year old female coming in for a follow-up visit from 1/29/24.       Reason for visit: CGM follow-up.    Allergies/ADRs: Reviewed in chart  Past Medical History: Reviewed in chart  Tobacco: She reports that she has never smoked. She has never been exposed to tobacco smoke. She has never used smokeless tobacco.  Alcohol: not currently using    Medication Adherence/Access: no issues reported.    Diabetes   Metformin 500 mg twice daily  Glipizide 10 mg twice daily before breakfast and lunch   NPH insulin 38 units at 8 am and 18 units at 5 pm    Patient is not experiencing side effects currently.  She has expressed concerns in the past about Ozempic and hesitancy to start new medications in general.  Also has concerns about medication costs for brand name drugs.    Breakfast: small waffle, no syrup, peach slice and raspberry with light cool whip +/ 1/2 cup coffee, is eating 1/2 cup of yogurt, cashews, cottage cheese.  Thinks her  puts regular sugar in her coffee when he makes it.    Exercise: not exercising a lot, patient mentions she wants to start walking again, but needed to get better shoes.    Blood sugar monitoring: CGM Freestyle Juan Francisco 2         Eye exam is up to date  Foot exam is up to date  Urine Albumin:   Lab Results   Component Value Date    UMALCR 198.32 (H) 07/17/2023      Lab Results   Component Value Date    A1C 7.8 (H) 10/26/2023    A1C 7.0 (H) 07/14/2023     Wt Readings from Last 4 Encounters:   02/23/24 179 lb (81.2 kg)   01/29/24 179 lb (81.2 kg)   11/02/23 179 lb (81.2 kg)   08/29/23 179 lb (81.2 kg)       Hypertension /CKD  Metoprolol ER 50 mg daily  Lisinopril 40 mg  daily  Furosemide 40 mg daily for edema    Patient does not self-monitor blood pressure.  Some lightheadedness lately, more of a pressure, thinks this is related to her sinuses.      BP Readings from Last 3 Encounters:   02/27/24 110/62   02/23/24 106/62   02/14/24 124/52     Pulse Readings from Last 3 Encounters:   02/27/24 53   02/23/24 60   01/29/24 55     Hyperlipidemia   rosuvastatin 20mg daily    No side effects reported.      Recent Labs   Lab Test 07/14/23  1041 12/30/22  1028   CHOL 109 128   HDL 27* 31*   LDL 48 64   TRIG 169* 164*       Sinusitis:   Cefdinir 300 mg twice daily  Prednisone 20 mg daily  Has been sick since Christmas.  Just started above medications on Saturday.  Not feeling better yet, but is hoping the medications will help.  Feels it's too soon to feel better, maybe just starting to feel better.      GERD:   Omeprazole 20 mg once daily  Famotidine 20 mg twice daily  Tums 500 mg once daily as needed - doesn't take very often  Patient feels that current regimen is effective.      Latent tuberculosis infection:   Isoniazid 300 mg daily (started ~11/27/23)  She shared her last liver labs were good and that she has to get them checked monthly now.  No issues today.  Last labs at outside clinic 2/23/24: AST: 63   ALT: 55  Patient reports she was told to keep continuing medication.  Liver labs were elevated, but not enough to stop medication.  Gets labs monthly.      Today's Vitals: /62   Pulse 53   LMP  (LMP Unknown)   SpO2 98%  Declines weight today.  ----------------    I spent 30 minutes with this patient today. All changes were made via collaborative practice agreement with LILLIAM Sam CNP. A copy of the visit note was provided to the patient's provider(s).    A summary of these recommendations was given to the patient.    Nan Posadas, PharmD  Pharmacy Resident  Pager #[phone]    Lindsey Gary was seen independently by Dr. Nan Posadas.  I have reviewed and agree  with the resident note and plan of care.      Colette Nguyen, PharmD, BCACP  Medication Therapy Management Provider, [facility]  [phone]     Medication Therapy Recommendations  Type 2 diabetes mellitus with diabetic peripheral angiopathy without gangrene, with long-term current use of insulin (H)    Current Medication: insulin NPH (NOVOLIN N RELION) 100 UNIT/ML vial (Discontinued)   Rationale: Dose too low - Dosage too low - Effectiveness   Recommendation: Increase Dose - NovoLIN N VIAL 100 UNIT/ML susp   Status: Accepted per Provider

## 2024-02-27 ENCOUNTER — LAB (OUTPATIENT)
Dept: LAB | Facility: CLINIC | Age: 84
End: 2024-02-27
Payer: COMMERCIAL

## 2024-02-27 ENCOUNTER — TELEPHONE (OUTPATIENT)
Dept: INTERNAL MEDICINE | Facility: CLINIC | Age: 84
End: 2024-02-27

## 2024-02-27 ENCOUNTER — OFFICE VISIT (OUTPATIENT)
Dept: PHARMACY | Facility: CLINIC | Age: 84
End: 2024-02-27
Payer: COMMERCIAL

## 2024-02-27 VITALS — OXYGEN SATURATION: 98 % | HEART RATE: 53 BPM | SYSTOLIC BLOOD PRESSURE: 110 MMHG | DIASTOLIC BLOOD PRESSURE: 62 MMHG

## 2024-02-27 DIAGNOSIS — E11.51 TYPE 2 DIABETES MELLITUS WITH DIABETIC PERIPHERAL ANGIOPATHY WITHOUT GANGRENE, WITH LONG-TERM CURRENT USE OF INSULIN (H): Primary | ICD-10-CM

## 2024-02-27 DIAGNOSIS — Z22.7 LATENT TUBERCULOSIS: ICD-10-CM

## 2024-02-27 DIAGNOSIS — N18.32 STAGE 3B CHRONIC KIDNEY DISEASE (H): Primary | ICD-10-CM

## 2024-02-27 DIAGNOSIS — Z79.4 TYPE 2 DIABETES MELLITUS WITH DIABETIC PERIPHERAL ANGIOPATHY WITHOUT GANGRENE, WITH LONG-TERM CURRENT USE OF INSULIN (H): Primary | ICD-10-CM

## 2024-02-27 DIAGNOSIS — E11.8 TYPE 2 DIABETES MELLITUS WITH COMPLICATION, WITHOUT LONG-TERM CURRENT USE OF INSULIN (H): ICD-10-CM

## 2024-02-27 DIAGNOSIS — N18.30 STAGE 3 CHRONIC KIDNEY DISEASE, UNSPECIFIED WHETHER STAGE 3A OR 3B CKD (H): Primary | ICD-10-CM

## 2024-02-27 DIAGNOSIS — J32.9 CHRONIC SINUSITIS, UNSPECIFIED LOCATION: ICD-10-CM

## 2024-02-27 DIAGNOSIS — E78.5 HYPERLIPIDEMIA LDL GOAL <100: ICD-10-CM

## 2024-02-27 DIAGNOSIS — I10 HYPERTENSION GOAL BP (BLOOD PRESSURE) < 130/80: ICD-10-CM

## 2024-02-27 DIAGNOSIS — K21.00 GASTROESOPHAGEAL REFLUX DISEASE WITH ESOPHAGITIS, UNSPECIFIED WHETHER HEMORRHAGE: ICD-10-CM

## 2024-02-27 LAB
ALBUMIN SERPL BCG-MCNC: 4.4 G/DL (ref 3.5–5.2)
ALP SERPL-CCNC: 75 U/L (ref 40–150)
ALT SERPL W P-5'-P-CCNC: 58 U/L (ref 0–50)
ANION GAP SERPL CALCULATED.3IONS-SCNC: 15 MMOL/L (ref 7–15)
AST SERPL W P-5'-P-CCNC: 49 U/L (ref 0–45)
BILIRUB SERPL-MCNC: 0.5 MG/DL
BUN SERPL-MCNC: 60.9 MG/DL (ref 8–23)
CALCIUM SERPL-MCNC: 9.9 MG/DL (ref 8.8–10.2)
CHLORIDE SERPL-SCNC: 103 MMOL/L (ref 98–107)
CREAT SERPL-MCNC: 1.86 MG/DL (ref 0.51–0.95)
DEPRECATED HCO3 PLAS-SCNC: 21 MMOL/L (ref 22–29)
EGFRCR SERPLBLD CKD-EPI 2021: 27 ML/MIN/1.73M2
GLUCOSE SERPL-MCNC: 205 MG/DL (ref 70–99)
PHOSPHATE SERPL-MCNC: 3.7 MG/DL (ref 2.5–4.5)
POTASSIUM SERPL-SCNC: 4.7 MMOL/L (ref 3.4–5.3)
PROT SERPL-MCNC: 7 G/DL (ref 6.4–8.3)
PTH-INTACT SERPL-MCNC: 46 PG/ML (ref 15–65)
SODIUM SERPL-SCNC: 139 MMOL/L (ref 135–145)
TSH SERPL DL<=0.005 MIU/L-ACNC: 0.54 UIU/ML (ref 0.3–4.2)

## 2024-02-27 PROCEDURE — 83970 ASSAY OF PARATHORMONE: CPT | Performed by: PHARMACIST

## 2024-02-27 PROCEDURE — 36415 COLL VENOUS BLD VENIPUNCTURE: CPT | Performed by: PHARMACIST

## 2024-02-27 PROCEDURE — 99607 MTMS BY PHARM ADDL 15 MIN: CPT | Performed by: PHARMACIST

## 2024-02-27 PROCEDURE — 84100 ASSAY OF PHOSPHORUS: CPT | Performed by: PHARMACIST

## 2024-02-27 PROCEDURE — 80053 COMPREHEN METABOLIC PANEL: CPT | Performed by: PHARMACIST

## 2024-02-27 PROCEDURE — 84443 ASSAY THYROID STIM HORMONE: CPT | Performed by: PHARMACIST

## 2024-02-27 PROCEDURE — 99606 MTMS BY PHARM EST 15 MIN: CPT | Performed by: PHARMACIST

## 2024-02-27 NOTE — PATIENT INSTRUCTIONS
"Recommendations from today's MTM visit:                                                      Increase NPH to 40 units in the morning. Reduce evening NPH to 20 units in the evening.  Try to walk 10-15 minutes per day.    Follow-up: Return in 5 weeks (on 4/2/2024) for Follow up, with MTM Pharmacist.    It was great speaking with you today.  I value your experience and would be very thankful for your time in providing feedback in our clinic survey. In the next few days, you may receive an email or text message from [employer] with a link to a survey related to your  clinical pharmacist.\"     To schedule another MTM appointment, please call the clinic directly or you may call the MTM scheduling line at [phone] or toll-free at [phone].     My Clinical Pharmacist's contact information:                                                      Please feel free to contact me with any questions or concerns you have.      Nan Posadas, PharmD  Pharmacy Resident  Pager #[phone]   "

## 2024-02-27 NOTE — TELEPHONE ENCOUNTER
Linus Copeland,    I met with Bobmichelle today for an MTM visit and her blood sugars are elevated, even before starting the cefdinir and prednisone for her sinusitis.  Plan to increase to 40 units of NPH in the morning and 20 units at 5pm.  Additionally adding A1c order possibly for next visit.    Thanks,    Nan Posadas, PharmD  Pharmacy Resident  Pager #[phone]

## 2024-03-01 DIAGNOSIS — E11.9 TYPE 2 DIABETES MELLITUS WITHOUT COMPLICATION, WITHOUT LONG-TERM CURRENT USE OF INSULIN (H): ICD-10-CM

## 2024-03-01 RX ORDER — GLIPIZIDE 10 MG/1
10 TABLET ORAL
Qty: 180 TABLET | Refills: 0 | Status: SHIPPED | OUTPATIENT
Start: 2024-03-01 | End: 2024-05-20

## 2024-03-15 PROCEDURE — 82570 ASSAY OF URINE CREATININE: CPT

## 2024-03-15 PROCEDURE — 82043 UR ALBUMIN QUANTITATIVE: CPT

## 2024-03-16 LAB
CREAT UR-MCNC: 31.1 MG/DL
MICROALBUMIN UR-MCNC: 18.7 MG/L
MICROALBUMIN/CREAT UR: 60.13 MG/G CR (ref 0–25)

## 2024-03-20 ENCOUNTER — TELEPHONE (OUTPATIENT)
Dept: FAMILY MEDICINE | Facility: CLINIC | Age: 84
End: 2024-03-20
Payer: COMMERCIAL

## 2024-03-20 NOTE — TELEPHONE ENCOUNTER
Pt calls, PCP at [facility], discussed 3/15/24 lab message from PCP office, see creatinine labs, pt informed will send PCP message thru [employer] to discuss follow up plan, informs saw her 2/22/24, offered to send pt to their office, pt informs will send message via [employer] for guidance  Jaci Rodriguez RN, BSN  [facility]

## 2024-03-27 NOTE — MR AVS SNAPSHOT
After Visit Summary   3/8/2018    Lindsey Gary    MRN: [id number]           Patient Information     Date Of Birth          1940        Visit Information        Provider Department      3/8/2018 11:00 AM Dinorah Wong APRN CNP [facility]        Today's Diagnoses     Overactive bladder    -  1    Atrophic vaginitis        Vaginal infection          Care Instructions    Cipro twice daily for 7 days as previously ordered     Consider ALIGN probiotic    BALNEOL lotion to skin as needed for burning     Dr Ravi          Follow-ups after your visit        Additional Services     UROLOGY ADULT REFERRAL       Your provider has referred you to: Dr Ravi [facility]     Please be aware that coverage of these services is subject to the terms and limitations of your health insurance plan.  Call member services at your health plan with any benefit or coverage questions.      Please bring the following with you to your appointment:    (1) Any X-Rays, CTs or MRIs which have been performed.  Contact the facility where they were done to arrange for  prior to your scheduled appointment.    (2) List of current medications  (3) This referral request   (4) Any documents/labs given to you for this referral                  Your next 10 appointments already scheduled     Mar 22, 2018 10:15 AM CDT   SHORT with Lisa Church,    [facility] ([facility]    [facility] [address]  [facility] [address] [phone]              Who to contact     If you have questions or need follow up information about today's clinic visit or your schedule please contact [facility] directly at [phone].  Normal or non-critical lab and imaging results will be communicated to you by MyChart, letter or phone within 4 business days after the clinic has received the results. If you do not hear from us within 7 days, please contact the clinic through  Rx sent   "[employer] or phone. If you have a critical or abnormal lab result, we will notify you by phone as soon as possible.  Submit refill requests through [employer] or call your pharmacy and they will forward the refill request to us. Please allow 3 business days for your refill to be completed.          Additional Information About Your Visit        [employer] Information     [employer] lets you send messages to your doctor, view your test results, renew your prescriptions, schedule appointments and more. To sign up, go to [email or web address] . Click on \"Log in\" on the left side of the screen, which will take you to the Welcome page. Then click on \"Sign up Now\" on the right side of the page.     You will be asked to enter the access code listed below, as well as some personal information. Please follow the directions to create your username and password.     Your access code is: [email or web address]  Expires: 2018  3:49 PM     Your access code will  in 90 days. If you need help or a new code, please call your [facility] clinic or [phone].        Care EveryWhere ID     This is your Care EveryWhere ID. This could be used by other organizations to access your [facility] medical records  [id number]        Your Vitals Were     Pulse Temperature Height Pulse Oximetry BMI (Body Mass Index)       88 98.2  F (36.8  C) (Oral) 5' 2.75\" (1.594 m) 98% 32.85 kg/m2        Blood Pressure from Last 3 Encounters:   18 122/76   18 126/80   18 126/64    Weight from Last 3 Encounters:   18 184 lb (83.5 kg)   18 183 lb (83 kg)   18 180 lb 6.4 oz (81.8 kg)              We Performed the Following     UROLOGY ADULT REFERRAL        Primary Care Provider Office Phone # Fax #    LILLIAM Sam [phone]       [address] [facility] [address]        Equal Access to Services     [facility] TERRENCE AH: Stacia Yi, waaxda luqadajamey benítez waxay idiin " nam hardwick ah. So [facility] [phone].    ATENCIÓN: Si gilbertola renetta, tiene a toribio disposición servicios gratuitos de asistencia lingüística. Bety dwyer [phone].    We comply with applicable federal civil rights laws and [place] laws. We do not discriminate on the basis of race, color, national origin, age, disability, sex, sexual orientation, or gender identity.            Thank you!     Thank you for choosing [facility]  for your care. Our goal is always to provide you with excellent care. Hearing back from our patients is one way we can continue to improve our services. Please take a few minutes to complete the written survey that you may receive in the mail after your visit with us. Thank you!             Your Updated Medication List - Protect others around you: Learn how to safely use, store and throw away your medicines at www.disposemymeds.org.          This list is accurate as of 3/8/18 12:02 PM.  Always use your most recent med list.                   Brand Name Dispense Instructions for use Diagnosis    allopurinol 300 MG tablet    ZYLOPRIM    90 tablet    TAKE ONE TABLET BY MOUTH EVERY DAY    Gout       aspirin 81 MG tablet     100    1 tablet daily        betamethasone (augmented) 0.05 % lotion    DIPROLENE     GINA 10 TO 20 DROPS TOPICALLY AA OF SCALP Q NIGHT UTD        blood glucose monitoring lancets     2 Box    Test 2-3 times daily as directed    Type 2 diabetes, HbA1c goal < 7% (H)       blood glucose monitoring meter device kit     1 kit    Use to test blood sugar 1 times daily or as directed.    Type 2 diabetes mellitus without complication, without long-term current use of insulin (H)       blood glucose monitoring test strip    ACCU-CHEK SMARTVIEW    300 each    Check 3 times daily as directed    Type 2 diabetes, HbA1c goal < 7% (H)       boric acid 600 mg vaginal suppository - PHARMACY TO MIX COMPOUND     21 suppository    Place 1 suppository (600 mg) vaginally  At Bedtime    Vaginal irritation       calcipotriene 0.005 % Oint      Apply 1 Application topically as needed        chlorhexidine 0.12 % solution    PERIDEX     RINSE ONE HALF  OZ 2-3 X D AFTER BREAKFAST BEFORE BEDTIME FOLLOWING BRUSHING AND FLOSSIN        cinnamon 500 MG Caps      2 tablets daily        COMPOUNDED NON-CONTROLLED SUBSTANCE - PHARMACY TO MIX COMPOUNDED MEDICATION    CMPD RX    45 g    Patient to use 1 g intravaginally at bedtime Monday and Thursday nights as directed for menopausal symptoms    Symptomatic menopausal or female climacteric states       * estradiol 10 MCG Tabs vaginal tablet    VAGIFEM    8 tablet    Place 1 tablet (10 mcg) vaginally twice a week    Vaginal pain, Vaginal atrophy       * estradiol 0.1 MG/GM cream    ESTRACE VAGINAL    42.5 g    Place 2 g vaginally twice a week    Vaginal pain, Vaginal atrophy       EXCEDRIN ASPIRIN FREE PO           fluconazole 150 MG tablet    DIFLUCAN    30 tablet    Take 1 tablet (150 mg) by mouth every 3 days    Yeast infection of the vagina       furosemide 20 MG tablet    LASIX    90 tablet    Take 1 tablet (20 mg) by mouth daily as needed    Edema, unspecified type       gabapentin 300 MG capsule    NEURONTIN    180 capsule    Take 2 capsules (600 mg) by mouth At Bedtime    Neuropathy       glimepiride 4 MG tablet    AMARYL    180 tablet    Take 1 tablet (4 mg) by mouth 2 times daily    Type 2 diabetes mellitus without complication, without long-term current use of insulin (H)       guaiFENesin-codeine 100-10 MG/5ML Soln solution    ROBITUSSIN AC    240 mL    Take 10 mLs by mouth every 4 hours as needed    Cough       lisinopril 10 MG tablet    PRINIVIL/ZESTRIL    90 tablet    TAKE ONE TABLET BY MOUTH EVERY DAY    Essential hypertension, benign       MAGNESIUM OXIDE -MG SUPPLEMENT PO      Take 1 tablet by mouth daily        NYQUIL PO           PROBIOTIC ACIDOPHILUS Tabs      Take 1 tablet by mouth daily        simvastatin 40 MG tablet    ZOCOR     90 tablet    TAKE ONE TABLET BY MOUTH EVERY NIGHT AT BEDTIME    Mixed hyperlipidemia       SitaGLIPtin-MetFORMIN HCl  MG Tb24    JANUMET XR    180 tablet    Take 2 tablets by mouth daily    Type 2 diabetes mellitus without complication, without long-term current use of insulin (H)       terconazole 0.4 % cream    TERAZOL 7    70 g    Place 1 applicator vaginally At Bedtime    Yeast infection of the vagina       tobramycin 0.3 % ophthalmic solution    TOBREX     1 drop 4 times daily        TUMS 500 MG chewable tablet   Generic drug:  calcium carbonate     90    1 TABLET 3 TIMES PRN        TYLENOL 500 MG tablet   Generic drug:  acetaminophen      Take 1-2 tablets by mouth every morning.    Mixed hyperlipidemia, Gout, unspecified, Type 2 diabetes, HbA1c goal < 7% (H), Hyperlipidemia LDL goal <100       vitamin D 2000 UNITS tablet      Take 1 tablet by mouth daily        vitamin E 400 UNITS Tabs      Take 400 Units by mouth daily        * Notice:  This list has 2 medication(s) that are the same as other medications prescribed for you. Read the directions carefully, and ask your doctor or other care provider to review them with you.

## 2024-04-01 NOTE — PROGRESS NOTES
"Medication Therapy Management (MTM) Encounter    ASSESSMENT:                            Medication Adherence/Access: No issues identified    Diabetes: Patient is meeting A1c goal of < 8%, but last A1c was higher than typical for patient.  Additionally, predicted A1c on most recent continuous glucose monitor was 8.1%.  Patient is very close to meeting goal of > 50% time in target with continuous glucose monitoring.  Recommend increase NPH insulin to 41 units in the morning to 21 units at 5pm for hyperglycemia - did a small adjustment due to great efficacy of last dose adjustment.  Additionally educated to take lunchtime glipizide with lunch meal vs at the \"lunch hour\" even if not eating lunch at that time - immediate release glipizide should be taken with a meal.  Future considerations of GLP-1 agonist or SGLT2 inhibitor for better glucose control and to help reduce insulin burden.  If choosing an SGLT2 inhibitor, would recommend decrease furosemide to 20 mg daily to maintain current blood pressure.  Patient has declined these therapies in the past, but could reconsider at future visit, discussed these a bit more today.  Additionally, could consider changing NPH to Lantus to reduce insulin burden.  Check A1c at next visit.    Hypertension/CKD: Stable. Patient is meeting blood pressure goal of < 130/80mmHg.  Could consider reducing or stopping furosemide if patient is not experiencing edema.  Could also consider as needed use only.  If need for additional blood pressure support, consider addition of SGLT2 inhibitor, hydrochlorothiazide, or spironolactone.  Amlodipine may be an option, but hesitate due to history of edema.  In the past, patient has preferred furosemide over hydrochlorothiazide.    Hyperlipidemia: Stable.    GERD: Stable.  Could consider PPI taper in the future due to risk of bone density loss and infection.  Did not address due to time constraints.    Latent tuberculosis infection: continue to follow " with ID, started taking late November and plan to continue for 9 months.  Maximum dose of isoniazid is 300 mg daily, should discontinue or hold if AST or ALT are > 3 times upper limit of normal.  Liver labs elevated, but okay to continue per LILLIAM Ballard, CNP.  Continue to monitor.    PLAN:                            Increase NPH to 41 units in the morning.  Increase evening NPH to 21 units in the evening.  Try to walk 10-15 minutes per day.  Take lunchtime glipizide with lunch, even if lunch isn't at 12pm      Follow-up: Return in 7 weeks (on 5/21/2024) for Follow up, with MTM Pharmacist.    SUBJECTIVE/OBJECTIVE:                          Lindsey Gary is a 83 year old female coming in for a follow-up visit from 2/27/24.       Reason for visit: CGM/diabetes follow-up.    Allergies/ADRs: Reviewed in chart  Past Medical History: Reviewed in chart  Tobacco: She reports that she has never smoked. She has never been exposed to tobacco smoke. She has never used smokeless tobacco.  Alcohol: not currently using    Medication Adherence/Access: no issues reported.    Diabetes   Metformin 500 mg twice daily  Glipizide 10 mg twice daily before breakfast and lunch   NPH insulin 40 units at 8 am and 20 units at 5 pm    Patient is not experiencing side effects currently.  She has expressed concerns in the past about Ozempic and hesitancy to start new medications in general.  Also has concerns about medication costs for brand name drugs.    Breakfast: small waffle, no syrup, peach slice and raspberry with light cool whip +/ 1/2 cup coffee, is eating 1/2 cup of yogurt, cashews, cottage cheese.  Had Easter dinner that caused increased blood sugars.  Sometimes has a swallow of orange juice when evening blood glucose is ~115 mg/dL    Exercise: not exercising a lot, treadmill is broken but working to fix it, will start walking soon    Blood sugar monitoring: CGM [employer] Juan Francisco 2         Eye exam is up to date  Foot exam is up to  date  Urine Albumin:   Lab Results   Component Value Date    UMALCR 60.13 (H) 03/15/2024      Lab Results   Component Value Date    A1C 7.8 (H) 10/26/2023    A1C 7.0 (H) 07/14/2023     Wt Readings from Last 4 Encounters:   02/23/24 179 lb (81.2 kg)   01/29/24 179 lb (81.2 kg)   11/02/23 179 lb (81.2 kg)   08/29/23 179 lb (81.2 kg)       Hypertension /CKD  Metoprolol ER 50 mg daily  Lisinopril 40 mg daily  Furosemide 40 mg daily for edema    Patient does not self-monitor blood pressure.  No reported side effects.      BP Readings from Last 3 Encounters:   04/02/24 122/60   02/27/24 110/62   02/23/24 106/62     Pulse Readings from Last 3 Encounters:   04/02/24 55   02/27/24 53   02/23/24 60     Hyperlipidemia   rosuvastatin 20mg daily  No side effects reported.      Recent Labs   Lab Test 07/14/23  1041 12/30/22  1028   CHOL 109 128   HDL 27* 31*   LDL 48 64   TRIG 169* 164*       GERD:   Omeprazole 20 mg once daily  Famotidine 20 mg twice daily  Tums 500 mg once daily as needed - doesn't take very often  Patient feels that current regimen is effective, no side effects.      Latent tuberculosis infection:   Isoniazid 300 mg daily (started ~11/27/23)  Vitamin B6 100 mg daily  No issues today.  Gets monthly liver labs.  Due for labs again.  Patient reports she was told to keep continuing medication.  Liver labs were elevated, but not enough to stop medication.   AST   Date Value Ref Range Status   02/27/2024 49 (H) 0 - 45 U/L Final     Comment:     Reference intervals for this test were updated on 6/12/2023 to more accurately reflect our healthy population. There may be differences in the flagging of prior results with similar values performed with this method. Interpretation of those prior results can be made in the context of the updated reference intervals.   07/06/2021 15 0 - 45 U/L Final     Lab Results   Component Value Date    ALT 58 02/27/2024    ALT 24 07/06/2021       Today's Vitals: /60   Pulse 55    LMP  (LMP Unknown)   SpO2 100%  Declined weight today.  ----------------    I spent 40 minutes with this patient today. All changes were made via verbal approval with LILLIAM Sam CNP. A copy of the visit note was provided to the patient's provider(s).    A summary of these recommendations was given to the patient.    Nan Posadas PharmD  Pharmacy Resident  Pager #[phone]    Lindsey Gary was seen independently by Dr. Nan Posadas.  I have reviewed and agree with the resident note and plan of care.      Colette Nguyen, PharmD, BCACP  Medication Therapy Management Provider, [facility]  [phone]       Medication Therapy Recommendations  Type 2 diabetes mellitus with complication, without long-term current use of insulin (H)    Current Medication: glipiZIDE (GLUCOTROL) 10 MG tablet   Rationale: Does not understand instructions - Adherence - Adherence   Recommendation: Provide Education - glipiZIDE 10 MG tablet   Status: Patient Agreed - Adherence/Education   Note: instructed to take with lunchtime meal, even if not right at lunch time of day          Current Medication: insulin  UNIT/ML vial (Discontinued)   Rationale: Dose too low - Dosage too low - Effectiveness   Recommendation: Increase Dose - NovoLIN N VIAL 100 UNIT/ML susp   Status: Accepted per Provider

## 2024-04-01 NOTE — PATIENT INSTRUCTIONS
Sinusitis     The sinuses are air-filled spaces within the bones of the face. They connect to the inside of the nose. Sinusitis is an inflammation of the tissue lining the sinus cavity. Sinus inflammation can occur during a cold. It can also be due to allergies to pollens and other particles in the air. It can cause symptoms such as sinus congestion, headache, sore throat, facial swelling and fullness. It may also cause a low-grade fever. No infection is present, and no antibiotic treatment is needed.  Home care    Drink plenty of water, hot tea, and other liquids. This may help thin mucus. It also may promote sinus drainage.    Heat may help soothe painful areas of the face. Use a towel soaked in hot water. Or,  the shower and direct the hot spray onto your face. Using a vaporizer along with a menthol rub at night may also help.     An expectorant containing guaifenesin may help thin the mucus and promote drainage from the sinuses.    Over-the-counter decongestants may be used unless a similar medicine was prescribed. Nasal sprays work the fastest. Use one that contains phenylephrine or oxymetazoline. First blow the nose gently. Then use the spray. Do not use these medicines more often than directed on the label or symptoms may get worse. You may also use tablets containing pseudoephedrine. Avoid products that combine ingredients, because side effects may be increased. Read labels. You can also ask the pharmacist for help. (NOTE: Persons with high blood pressure should not use decongestants. They can raise blood pressure.)    Over-the-counter antihistamines may help if allergies contributed to your sinusitis.      Use acetaminophen or ibuprofen to control pain, unless another pain medicine was prescribed. (If you have chronic liver or kidney disease or ever had a stomach ulcer, talk with your doctor before using these medicines. Aspirin should never be used in anyone under 18 years of age who is ill with  Report to Katlyn BRUCE at this time    a fever. It may cause severe liver damage.)    Use nasal rinses or irrigation as instructed by your health care provider.    Don't smoke. This can worsen symptoms.  Follow-up care  Follow up with your healthcare provider or our staff if you are not improving within the next week.  When to seek medical advice  Call your healthcare provider if any of these occur:    Unusual drowsiness or confusion    Swelling of the forehead or eyelids    Fever of 100.4 F (38 C) or higher, or as directed by your healthcare provider    Seizure    Breathing problems    Symptoms not resolving within 10 days  Date Last Reviewed: 4/13/2015 2000-2017 [employer]. [address] [place] [id number]. All rights reserved. This information is not intended as a substitute for professional medical care. Always follow your healthcare professional's instructions.

## 2024-04-02 ENCOUNTER — OFFICE VISIT (OUTPATIENT)
Dept: PHARMACY | Facility: CLINIC | Age: 84
End: 2024-04-02
Payer: COMMERCIAL

## 2024-04-02 ENCOUNTER — TELEPHONE (OUTPATIENT)
Dept: INTERNAL MEDICINE | Facility: CLINIC | Age: 84
End: 2024-04-02
Payer: COMMERCIAL

## 2024-04-02 VITALS — SYSTOLIC BLOOD PRESSURE: 122 MMHG | OXYGEN SATURATION: 100 % | HEART RATE: 55 BPM | DIASTOLIC BLOOD PRESSURE: 60 MMHG

## 2024-04-02 DIAGNOSIS — E11.8 TYPE 2 DIABETES MELLITUS WITH COMPLICATION, WITHOUT LONG-TERM CURRENT USE OF INSULIN (H): Primary | ICD-10-CM

## 2024-04-02 DIAGNOSIS — K21.00 GASTROESOPHAGEAL REFLUX DISEASE WITH ESOPHAGITIS, UNSPECIFIED WHETHER HEMORRHAGE: ICD-10-CM

## 2024-04-02 DIAGNOSIS — Z79.4 TYPE 2 DIABETES MELLITUS WITH DIABETIC PERIPHERAL ANGIOPATHY WITHOUT GANGRENE, WITH LONG-TERM CURRENT USE OF INSULIN (H): ICD-10-CM

## 2024-04-02 DIAGNOSIS — E11.51 TYPE 2 DIABETES MELLITUS WITH DIABETIC PERIPHERAL ANGIOPATHY WITHOUT GANGRENE, WITH LONG-TERM CURRENT USE OF INSULIN (H): ICD-10-CM

## 2024-04-02 DIAGNOSIS — Z22.7 LATENT TUBERCULOSIS: ICD-10-CM

## 2024-04-02 DIAGNOSIS — I10 HYPERTENSION GOAL BP (BLOOD PRESSURE) < 130/80: ICD-10-CM

## 2024-04-02 DIAGNOSIS — N18.30 STAGE 3 CHRONIC KIDNEY DISEASE, UNSPECIFIED WHETHER STAGE 3A OR 3B CKD (H): ICD-10-CM

## 2024-04-02 DIAGNOSIS — E78.5 HYPERLIPIDEMIA LDL GOAL <100: ICD-10-CM

## 2024-04-02 PROCEDURE — 99606 MTMS BY PHARM EST 15 MIN: CPT

## 2024-04-02 PROCEDURE — 99607 MTMS BY PHARM ADDL 15 MIN: CPT

## 2024-04-02 NOTE — TELEPHONE ENCOUNTER
Linus Copeland,    I met with Lindsey today, she is right around goal for blood sugars as reported on her freestyle isrrael, just needs a slight adjustment. Recommend increase NPH insulin to 41 units in the AM and 21 units in the PM.  Additionally recommend A1c next visit.    Thanks,    Nan Posadas, PharmD  Pharmacy Resident  Pager #[phone]    
Noted   
abdominal wound
abdominal wound

## 2024-04-02 NOTE — PATIENT INSTRUCTIONS
"Recommendations from today's MTM visit:                                                      Increase NPH to 41 units in the morning.  Increase evening NPH to 21 units in the evening.  Try to walk 10-15 minutes per day.  Take lunchtime glipizide with lunch, even if lunch isn't at 12pm    Follow-up: Return in 7 weeks (on 5/21/2024) for Follow up, with MTM Pharmacist.    It was great speaking with you today.  I value your experience and would be very thankful for your time in providing feedback in our clinic survey. In the next few days, you may receive an email or text message from [employer] with a link to a survey related to your  clinical pharmacist.\"     To schedule another MTM appointment, please call the clinic directly or you may call the MTM scheduling line at [phone] or toll-free at [phone].     My Clinical Pharmacist's contact information:                                                      Please feel free to contact me with any questions or concerns you have.      Nan Posadas, PharmD  Pharmacy Resident  Pager #[phone]   "

## 2024-04-04 DIAGNOSIS — I10 ESSENTIAL HYPERTENSION: ICD-10-CM

## 2024-04-04 RX ORDER — FUROSEMIDE 20 MG
TABLET ORAL
Qty: 180 TABLET | Refills: 0 | Status: ON HOLD | OUTPATIENT
Start: 2024-04-04 | End: 2024-07-25

## 2024-04-30 ENCOUNTER — TELEPHONE (OUTPATIENT)
Dept: INTERNAL MEDICINE | Facility: CLINIC | Age: 84
End: 2024-04-30
Payer: COMMERCIAL

## 2024-04-30 NOTE — TELEPHONE ENCOUNTER
Chetan ELY vials it is!  Thank You!    Rose Bray  Prescription Assistance Supervisor  Pharmacy Assistance

## 2024-04-30 NOTE — TELEPHONE ENCOUNTER
I am putting Lindsey's application together for her insulin NPH.    I want to verify the Brand Name for the assistance program. Novlin N vials?    Thank you!!    Rose Bray  Prescription Assistance Supervisor  Pharmacy Assistance

## 2024-05-02 ENCOUNTER — TRANSFERRED RECORDS (OUTPATIENT)
Dept: HEALTH INFORMATION MANAGEMENT | Facility: CLINIC | Age: 84
End: 2024-05-02
Payer: COMMERCIAL

## 2024-05-02 DIAGNOSIS — I10 ESSENTIAL HYPERTENSION: ICD-10-CM

## 2024-05-02 LAB — RETINOPATHY: POSITIVE

## 2024-05-02 RX ORDER — LISINOPRIL 40 MG/1
TABLET ORAL
Qty: 90 TABLET | Refills: 0 | Status: ON HOLD | OUTPATIENT
Start: 2024-05-02 | End: 2024-07-25

## 2024-05-15 ENCOUNTER — TELEPHONE (OUTPATIENT)
Dept: INTERNAL MEDICINE | Facility: CLINIC | Age: 84
End: 2024-05-15
Payer: COMMERCIAL

## 2024-05-15 NOTE — TELEPHONE ENCOUNTER
Lindsey's Novolin N application has been submitted to the [employer] prescription assistance program.     We will note EPIC when [employer] has made their decision.     Thank you!    Kelsea Thakkar   Prescription Assistance Assistant

## 2024-05-20 DIAGNOSIS — E11.9 TYPE 2 DIABETES MELLITUS WITHOUT COMPLICATION, WITHOUT LONG-TERM CURRENT USE OF INSULIN (H): ICD-10-CM

## 2024-05-20 RX ORDER — GLIPIZIDE 10 MG/1
10 TABLET ORAL
Qty: 180 TABLET | Refills: 0 | Status: SHIPPED | OUTPATIENT
Start: 2024-05-20 | End: 2024-08-21

## 2024-05-20 NOTE — PROGRESS NOTES
Medication Therapy Management (MTM) Encounter    ASSESSMENT:                            Medication Adherence/Access: No issues identified.  Patient is comfortable with current medications.    Diabetes: Patient is meeting A1c goal of < 8% and is slightly improved today.  Patient is meeting goal of >50% of time in target range with continuous glucose monitoring, but is having overnight hypoglycemia.  Recommend reduce evening NPH dose to 19 units and keep morning dose the same at 41 units.  Patient is comfortable with this regimen, but future could consider GLP-1 agonist or SGLT2 inhibitor for better glucose control and to help reduce insulin burden.  Added BMP today to check kidney function, would recommend stopping metformin if GFR remains < 30.  Would need to consider increasing insulin again if discontinuing metformin    Hypertension/CKD: Stable. Patient is meeting blood pressure goal of < 130/80mmHg.  Did not discuss edema today, but could consider stopping or using furosemide as needed in the future.    Hyperlipidemia: Stable.    GERD: Recommend patient reduce famotidine to every other day maximum given current kidney function.  Suggested she could supplement more often with Tums as well if having breakthrough heartburn.    Latent tuberculosis infection: due for labs today, patient will provide clinic fax number to [facility] to fax lab orders over and include with lab draw today. Continue to monitor.    PLAN:                            Keep NPH to 41 units in the morning.  Decrease evening NPH to 19 units in the evening.  Reduce famotidine 20 mg to every other day, try to take only when you need it.  Okay to use Tums more often.      Follow-up: Return in 6 weeks (on 7/2/2024) for Follow up, with MTM Pharmacist.    SUBJECTIVE/OBJECTIVE:                          Lindsey Gary is a 83 year old female coming in for a follow-up visit from 2/27/24.       Reason for visit: CGM/diabetes follow-up.    Allergies/ADRs:  Reviewed in chart  Past Medical History: Reviewed in chart  Tobacco: She reports that she has never smoked. She has never been exposed to tobacco smoke. She has never used smokeless tobacco.  Alcohol: not currently using    Medication Adherence/Access: no issues reported.    Diabetes   Metformin 500 mg twice daily  Glipizide 10 mg twice daily before breakfast and lunch   NPH insulin 41 units at 8 am and 21 units at 5 pm    Patient is not experiencing side effects currently.  She has expressed concerns in the past about Ozempic and hesitancy to start new medications in general.  Also has concerns about medication costs for brand name drugs.    Breakfast: small waffle, no syrup, peach slice and raspberry with light cool whip +/ 1/2 cup coffee, is eating 1/2 cup of yogurt, cashews, cottage cheese.  Had Easter dinner that caused increased blood sugars.  Sometimes has a swallow of orange juice when evening blood glucose is ~115 mg/dL - however, she reports these eating habits have been not consistent lately    Exercise: not exercising a lot, treadmill is broken but working to fix it, will start walking soon    Blood sugar monitoring: CGM [employer] Juan Francisco 2           Eye exam is up to date  Foot exam is up to date  Urine Albumin:   Lab Results   Component Value Date    UMALCR 60.13 (H) 03/15/2024      Lab Results   Component Value Date    A1C 7.7 (H) 05/21/2024    A1C 7.8 (H) 10/26/2023     Wt Readings from Last 4 Encounters:   02/23/24 179 lb (81.2 kg)   01/29/24 179 lb (81.2 kg)   11/02/23 179 lb (81.2 kg)   08/29/23 179 lb (81.2 kg)       Hypertension /CKD  Metoprolol ER 50 mg daily  Lisinopril 40 mg daily  Furosemide 40 mg daily for edema    Patient does not self-monitor blood pressure.  No reported side effects.      BP Readings from Last 3 Encounters:   05/21/24 114/68   04/02/24 122/60   02/27/24 110/62     Pulse Readings from Last 3 Encounters:   05/21/24 58   04/02/24 55   02/27/24 53     Hyperlipidemia    rosuvastatin 20mg daily  No side effects reported.      Recent Labs   Lab Test 07/14/23  1041 12/30/22  1028   CHOL 109 128   HDL 27* 31*   LDL 48 64   TRIG 169* 164*       GERD:   Omeprazole 20 mg once daily  Famotidine 20 mg twice daily  Tums 500 mg once daily as needed - doesn't take very often  Patient feels that current regimen is effective, no side effects.      Latent tuberculosis infection:   Isoniazid 300 mg daily (started ~11/27/23)  Vitamin B6 100 mg daily  No issues today.  Gets monthly liver labs.  Due for labs again - mentions lab gene extra blood today in anticipation of getting [facility] lab orders faxed over.      Today's Vitals: /68   Pulse 58   LMP  (LMP Unknown)   SpO2 99%   ----------------    I spent 40 minutes with this patient today. All changes were made via verbal approval with LILLIAM Sam CNP. A copy of the visit note was provided to the patient's provider(s).    A summary of these recommendations was given to the patient.    Nan Posadas, RadhaD  Pharmacy Resident  Pager #[phone]    Lindsey Gary was seen independently by Dr. Nan Posadas.  I have reviewed and agree with the resident note and plan of care.      Colette Nguyen, PharmD, [facility]CP  Medication Therapy Management Provider, [facility]  [phone]       Medication Therapy Recommendations  Gastroesophageal reflux disease with esophagitis, unspecified whether hemorrhage    Current Medication: famotidine (PEPCID) 20 MG tablet   Rationale: Dose too high - Dosage too high - Safety   Recommendation: Decrease Frequency - famotidine 20 MG tablet   Status: Accepted - no CPA Needed         Type 2 diabetes mellitus with complication, without long-term current use of insulin (H)    Current Medication: insulin  UNIT/ML vial (Discontinued)   Rationale: Dose too high - Dosage too high - Safety   Recommendation: Decrease Dose   Status: Accepted per Provider

## 2024-05-21 ENCOUNTER — LAB (OUTPATIENT)
Dept: LAB | Facility: CLINIC | Age: 84
End: 2024-05-21
Payer: COMMERCIAL

## 2024-05-21 ENCOUNTER — OFFICE VISIT (OUTPATIENT)
Dept: PHARMACY | Facility: CLINIC | Age: 84
End: 2024-05-21
Payer: COMMERCIAL

## 2024-05-21 ENCOUNTER — TELEPHONE (OUTPATIENT)
Dept: INTERNAL MEDICINE | Facility: CLINIC | Age: 84
End: 2024-05-21

## 2024-05-21 VITALS — DIASTOLIC BLOOD PRESSURE: 68 MMHG | SYSTOLIC BLOOD PRESSURE: 114 MMHG | OXYGEN SATURATION: 99 % | HEART RATE: 58 BPM

## 2024-05-21 DIAGNOSIS — Z79.4 TYPE 2 DIABETES MELLITUS WITH DIABETIC PERIPHERAL ANGIOPATHY WITHOUT GANGRENE, WITH LONG-TERM CURRENT USE OF INSULIN (H): ICD-10-CM

## 2024-05-21 DIAGNOSIS — I10 HYPERTENSION GOAL BP (BLOOD PRESSURE) < 130/80: ICD-10-CM

## 2024-05-21 DIAGNOSIS — E11.51 TYPE 2 DIABETES MELLITUS WITH DIABETIC PERIPHERAL ANGIOPATHY WITHOUT GANGRENE, WITH LONG-TERM CURRENT USE OF INSULIN (H): ICD-10-CM

## 2024-05-21 DIAGNOSIS — E11.51 TYPE 2 DIABETES MELLITUS WITH DIABETIC PERIPHERAL ANGIOPATHY WITHOUT GANGRENE, WITH LONG-TERM CURRENT USE OF INSULIN (H): Primary | ICD-10-CM

## 2024-05-21 DIAGNOSIS — N18.32 STAGE 3B CHRONIC KIDNEY DISEASE (H): ICD-10-CM

## 2024-05-21 DIAGNOSIS — Z79.4 TYPE 2 DIABETES MELLITUS WITH DIABETIC PERIPHERAL ANGIOPATHY WITHOUT GANGRENE, WITH LONG-TERM CURRENT USE OF INSULIN (H): Primary | ICD-10-CM

## 2024-05-21 DIAGNOSIS — N18.30 STAGE 3 CHRONIC KIDNEY DISEASE, UNSPECIFIED WHETHER STAGE 3A OR 3B CKD (H): ICD-10-CM

## 2024-05-21 DIAGNOSIS — E78.5 HYPERLIPIDEMIA LDL GOAL <100: ICD-10-CM

## 2024-05-21 DIAGNOSIS — E11.8 TYPE 2 DIABETES MELLITUS WITH COMPLICATION, WITHOUT LONG-TERM CURRENT USE OF INSULIN (H): Primary | ICD-10-CM

## 2024-05-21 DIAGNOSIS — K21.00 GASTROESOPHAGEAL REFLUX DISEASE WITH ESOPHAGITIS, UNSPECIFIED WHETHER HEMORRHAGE: ICD-10-CM

## 2024-05-21 DIAGNOSIS — Z22.7 LATENT TUBERCULOSIS: ICD-10-CM

## 2024-05-21 LAB
HBA1C MFR BLD: 7.7 % (ref 0–5.6)
HGB BLD-MCNC: 10.1 G/DL (ref 11.7–15.7)
HOLD SPECIMEN: NORMAL

## 2024-05-21 PROCEDURE — 80048 BASIC METABOLIC PNL TOTAL CA: CPT

## 2024-05-21 PROCEDURE — 99607 MTMS BY PHARM ADDL 15 MIN: CPT

## 2024-05-21 PROCEDURE — 85018 HEMOGLOBIN: CPT

## 2024-05-21 PROCEDURE — 36415 COLL VENOUS BLD VENIPUNCTURE: CPT

## 2024-05-21 PROCEDURE — 83036 HEMOGLOBIN GLYCOSYLATED A1C: CPT

## 2024-05-21 PROCEDURE — 99606 MTMS BY PHARM EST 15 MIN: CPT

## 2024-05-21 NOTE — TELEPHONE ENCOUNTER
Linus Copeland,    I met with Lindsey today for an MTM visit.  Still adjusting her NPH based on Juan Francisco readings - would recommend reduce PM dose to 19 units daily today to prevent overnight hypoglycemia.  Additionally would recommend add on BMP to check kidney function, and consider stopping metformin if GFR<30.  Please review and sign as appropriate.    Thanks,  Nan Posadas, PharmD  Pharmacy Resident

## 2024-05-21 NOTE — PATIENT INSTRUCTIONS
"Recommendations from today's MTM visit:                                                      Keep NPH to 41 units in the morning.  Decrease evening NPH to 19 units in the evening.  Reduce famotidine to 20 mg every other day, try to take only when you need it.  Okay to use Tums more often.    Follow-up: Return in 6 weeks (on 7/2/2024) for Follow up, with MTM Pharmacist.    It was great speaking with you today.  I value your experience and would be very thankful for your time in providing feedback in our clinic survey. In the next few days, you may receive an email or text message from [employer] with a link to a survey related to your  clinical pharmacist.\"     To schedule another MTM appointment, please call the clinic directly or you may call the MTM scheduling line at [phone] or toll-free at [phone].     My Clinical Pharmacist's contact information:                                                      Please feel free to contact me with any questions or concerns you have.      Nan Posadas, PharmD  Pharmacy Resident  Pager #[phone]   "

## 2024-05-22 LAB
ANION GAP SERPL CALCULATED.3IONS-SCNC: 13 MMOL/L (ref 7–15)
BUN SERPL-MCNC: 52.2 MG/DL (ref 8–23)
CALCIUM SERPL-MCNC: 9.8 MG/DL (ref 8.8–10.2)
CHLORIDE SERPL-SCNC: 105 MMOL/L (ref 98–107)
CREAT SERPL-MCNC: 2.22 MG/DL (ref 0.51–0.95)
DEPRECATED HCO3 PLAS-SCNC: 23 MMOL/L (ref 22–29)
EGFRCR SERPLBLD CKD-EPI 2021: 22 ML/MIN/1.73M2
GLUCOSE SERPL-MCNC: 117 MG/DL (ref 70–99)
POTASSIUM SERPL-SCNC: 5.4 MMOL/L (ref 3.4–5.3)
SODIUM SERPL-SCNC: 141 MMOL/L (ref 135–145)

## 2024-05-22 NOTE — TELEPHONE ENCOUNTER
Patient calling from the pharmacy and is trying to  Metformin.  This medication was denied today stating has refills.      Medication was ordered on 9/18/23 for a 90 day supply with one refill.  Patient due to have medication refilled.     Patient waiting at pharmacy.  Please see contraindication.

## 2024-05-23 ENCOUNTER — TELEPHONE (OUTPATIENT)
Dept: INTERNAL MEDICINE | Facility: CLINIC | Age: 84
End: 2024-05-23
Payer: COMMERCIAL

## 2024-05-23 DIAGNOSIS — N18.4 CKD (CHRONIC KIDNEY DISEASE) STAGE 4, GFR 15-29 ML/MIN (H): Primary | ICD-10-CM

## 2024-05-23 NOTE — TELEPHONE ENCOUNTER
Patient called this morning. She stated she received a call far too early at 7:39 am today as she was still sleeping. She didn't really understand what was being told to her and very confused over who ordered the labs. Patient also shared that this news was not delivered in a gentle manner. Patient was crying.     Pls call this patient and go over the labs and what nephrology can do for her.     Patient can be reached at [phone]

## 2024-05-23 NOTE — TELEPHONE ENCOUNTER
[facility] is calling to ask us to cancel our order for the NPH insulin because it is cheaper for the patient to buy this over the counter.

## 2024-05-23 NOTE — TELEPHONE ENCOUNTER
Health Call Center    Phone Message    May a detailed message be left on voicemail: yes     Reason for Call: Nephrology Referral  Referring Provider Name: Dinorah Wong APRN CNP in [facility]  Diagnosis and/or Symptoms: CKD (chronic kidney disease) stage 4, GFR 15-29 ml/min (H) [N18.4]  Referred to Location: [facility]    Contacted patient to inform she has been referred to our preferred partner clinic, [facility] # [phone].     Patient is confused/anxious about this, doesn't understand the referral, would like to discuss diagnosis more in depth and what the urgency of appointment is. A detailed message maybe left if no answer. Thank you!      Action Taken: Message routed to:  Other: [facility]  Clinic Pool    Travel Screening: Not Applicable               No need to reply to sender

## 2024-05-23 NOTE — TELEPHONE ENCOUNTER
Linus Copeland,    Given Lindsey's lab results and kidney function decline, I think it is best to stop metformin for now and consider increasing her insulin again.  Consider a slight increase in morning and evening doses for now given patient's current blood glucose control.  Please review and sign orders as appropriate.    Thanks,  Nan Posadas, PharmD  Pharmacy Resident

## 2024-06-12 ENCOUNTER — TELEPHONE (OUTPATIENT)
Dept: INTERNAL MEDICINE | Facility: CLINIC | Age: 84
End: 2024-06-12
Payer: COMMERCIAL

## 2024-06-12 NOTE — TELEPHONE ENCOUNTER
We had applied to the [employer] assistance program for Lindsey's Novolin N.    [employer] has denied her application because she is well over income for their program.    I have closed her case and archived her file.    Thanks so much for your help!    Rose Bray  Prescription Assistance Supervisor  Pharmacy Assistance

## 2024-07-02 ENCOUNTER — OFFICE VISIT (OUTPATIENT)
Dept: PHARMACY | Facility: CLINIC | Age: 84
End: 2024-07-02
Payer: COMMERCIAL

## 2024-07-02 ENCOUNTER — LAB (OUTPATIENT)
Dept: LAB | Facility: CLINIC | Age: 84
End: 2024-07-02
Payer: COMMERCIAL

## 2024-07-02 VITALS — DIASTOLIC BLOOD PRESSURE: 62 MMHG | SYSTOLIC BLOOD PRESSURE: 126 MMHG

## 2024-07-02 DIAGNOSIS — Z79.4 TYPE 2 DIABETES MELLITUS WITH DIABETIC PERIPHERAL ANGIOPATHY WITHOUT GANGRENE, WITH LONG-TERM CURRENT USE OF INSULIN (H): Primary | ICD-10-CM

## 2024-07-02 DIAGNOSIS — E11.51 TYPE 2 DIABETES MELLITUS WITH DIABETIC PERIPHERAL ANGIOPATHY WITHOUT GANGRENE, WITH LONG-TERM CURRENT USE OF INSULIN (H): Primary | ICD-10-CM

## 2024-07-02 DIAGNOSIS — M19.90 ARTHRITIS: ICD-10-CM

## 2024-07-02 DIAGNOSIS — Z22.7 LATENT TUBERCULOSIS: ICD-10-CM

## 2024-07-02 DIAGNOSIS — K21.00 GASTROESOPHAGEAL REFLUX DISEASE WITH ESOPHAGITIS, UNSPECIFIED WHETHER HEMORRHAGE: ICD-10-CM

## 2024-07-02 DIAGNOSIS — N18.30 STAGE 3 CHRONIC KIDNEY DISEASE, UNSPECIFIED WHETHER STAGE 3A OR 3B CKD (H): ICD-10-CM

## 2024-07-02 DIAGNOSIS — E78.5 HYPERLIPIDEMIA LDL GOAL <100: ICD-10-CM

## 2024-07-02 DIAGNOSIS — I10 HYPERTENSION GOAL BP (BLOOD PRESSURE) < 130/80: ICD-10-CM

## 2024-07-02 LAB — HOLD SPECIMEN: NORMAL

## 2024-07-02 PROCEDURE — 36415 COLL VENOUS BLD VENIPUNCTURE: CPT

## 2024-07-02 PROCEDURE — 80048 BASIC METABOLIC PNL TOTAL CA: CPT

## 2024-07-02 PROCEDURE — 99606 MTMS BY PHARM EST 15 MIN: CPT | Performed by: PHARMACIST

## 2024-07-02 PROCEDURE — 99607 MTMS BY PHARM ADDL 15 MIN: CPT | Performed by: PHARMACIST

## 2024-07-02 RX ORDER — METHOTREXATE 2.5 MG/1
15 TABLET ORAL
COMMUNITY
Start: 2024-06-26 | End: 2024-09-13

## 2024-07-02 RX ORDER — FOLIC ACID 1 MG/1
1 TABLET ORAL DAILY
COMMUNITY
Start: 2024-06-26 | End: 2025-06-26

## 2024-07-02 NOTE — PATIENT INSTRUCTIONS
"Recommendations from today's MTM visit:                                                         Increase Novolin N insulin to 44 units in the morning and 22 units in the evening before dinner.     It was great speaking with you today.  I value your experience and would be very thankful for your time in providing feedback in our clinic survey. In the next few days, you may receive an email or text message from [facility] [employer] with a link to a survey related to your  clinical pharmacist.\"     To schedule another MTM appointment, please call the clinic directly or you may call the MTM scheduling line at [phone] or toll-free at [phone].     My Clinical Pharmacist's contact information:                                                      Please feel free to contact me with any questions or concerns you have.      Colette Nguyen, PharmD, BCACP  Medication Therapy Management Provider, [facility]    "

## 2024-07-02 NOTE — PROGRESS NOTES
Medication Therapy Management (MTM) Encounter    ASSESSMENT:                            Medication Adherence/Access: No issues identified    Diabetes: Patient is meeting A1c goal of < 8% but is not meeting goal of >50% of time in target range with continuous glucose monitoring. Will increase insulin dose for now to use up her home supply but next visit will plan to switch her insulin vials to Soliqua which has a GLP-1 agonist and she is agreeable to this today. Prefer Soliqua instead of addition of pioglitazone due to weight gain and edema side effect and for convenience.     Hypertension/CKD: Stable. Patient is meeting blood pressure goal of < 130/80mmHg.       Hyperlipidemia: Stable.    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   PLAN:                            Increase Novolin N insulin to 44 units in the morning and 22 units in the evening before dinner.     Follow-up: Return in about 1 month (around 8/2/2024) for Medication Therapy Management Pharmacist.    SUBJECTIVE/OBJECTIVE:                          Lindsey Gary is a 83 year old female seen for a follow-up visit.       Reason for visit: elevated blood glucose since stopping metformin.    Tobacco: She reports that she has never smoked. She has never been exposed to tobacco smoke. She has never used smokeless tobacco.  Alcohol: not currently using    Medication Adherence/Access: no issues reported, does not qualify for assistance program    Diabetes   Glipizide 10 mg twice daily before breakfast and lunch   NPH insulin 41 units at 8 am and 21 units at 5 pm    Patient is not experiencing side effects currently. Off metformin due to worsened creatinine.     Breakfast: small waffle, no syrup, peach slice and raspberry with light cool whip +/ 1/2 cup coffee, is eating 1/2 cup of yogurt, cashews, cottage cheese.     Exercise: not exercising a lot, treadmill is broken but working to fix it, will start walking  soon    Blood sugar monitoring: CGM Freestyle Juan Francisco 2      No hypoglycemia now.        Eye exam is up to date  Foot exam: due    Hypertension /CKD  Metoprolol ER 50 mg daily  Lisinopril 40 mg daily  Furosemide 40 mg daily for edema    Patient does not self-monitor blood pressure.  No reported side effects.          Hyperlipidemia   rosuvastatin 20mg daily  No side effects reported.          GERD:   Omeprazole 20 mg once daily  Famotidine 20 mg twice daily as needed - needs frequently but only at night now  Tums 500 mg once daily as needed - doesn't take very often  Patient feels that current regimen is effective, no side effects.      Latent tuberculosis infection:   Isoniazid 300 mg daily (started ~11/27/23)  Vitamin B6 100 mg daily  No issues today.  Gets monthly liver labs.       Inflammation arthritis  Methotrexate and folic acid - has not started yet but plans to soon per rheumatology  Tylenol 1000 mg twice daily - did not assess at this visit  Voltaren as needed    No side effects. She is nervous  about starting methotrexate and getting nausea from it. Following with Rheumatology.         Today's Vitals: /62   LMP  (LMP Unknown)   ----------------      I spent 40 minutes with this patient today. All changes were made via collaborative practice agreement with LILLIAM Sam CNP. A copy of the visit note was provided to the patient's provider(s).    A summary of these recommendations was given to the patient.    Colette Nguyen, PharmD, BCACP  Medication Therapy Management Provider, [facility]  [phone]         Medication Therapy Recommendations  Type 2 diabetes mellitus with complication, without long-term current use of insulin (H)    Current Medication: insulin  UNIT/ML vial   Rationale: Dose too low - Dosage too low - Effectiveness   Recommendation: Increase Dose - NovoLIN N VIAL 100 UNIT/ML susp   Status: Accepted per CPA

## 2024-07-03 DIAGNOSIS — E87.5 HIGH POTASSIUM: Primary | ICD-10-CM

## 2024-07-03 LAB
ANION GAP SERPL CALCULATED.3IONS-SCNC: 22 MMOL/L (ref 7–15)
BUN SERPL-MCNC: 66.4 MG/DL (ref 8–23)
CALCIUM SERPL-MCNC: 9.6 MG/DL (ref 8.8–10.2)
CHLORIDE SERPL-SCNC: 106 MMOL/L (ref 98–107)
CREAT SERPL-MCNC: 2 MG/DL (ref 0.51–0.95)
DEPRECATED HCO3 PLAS-SCNC: 12 MMOL/L (ref 22–29)
EGFRCR SERPLBLD CKD-EPI 2021: 25 ML/MIN/1.73M2
GLUCOSE SERPL-MCNC: 214 MG/DL (ref 70–99)
POTASSIUM SERPL-SCNC: 5.9 MMOL/L (ref 3.4–5.3)
SODIUM SERPL-SCNC: 140 MMOL/L (ref 135–145)

## 2024-07-22 ENCOUNTER — TELEPHONE (OUTPATIENT)
Dept: INTERNAL MEDICINE | Facility: CLINIC | Age: 84
End: 2024-07-22
Payer: COMMERCIAL

## 2024-07-22 NOTE — TELEPHONE ENCOUNTER
Patient calls back.     Message was left on Friday 7/19, patient unsure what it was about. Appears to be related to her labs.     She had labs done on 7/3. Potassium was elevated and it was recommended to be rechecked in 1 week. Patient was notified of this on 7/3 by phone with Colette Nguyen RPH.      Patient has not had labs rechecked since then and writer assumes that is what message left on Friday was about. As its attached to her labs from 7/3     Patient states she is having labs done at [facility] on 7/28 and would like to have BMP drawn at same time.     Writer printed order and faxed to [facility] [place] at [phone] with note on cover sheet to be done on 7/28 and results faxed to [facility] at [phone].     Routing to clinic to ensure we receive results.     Barbi Contreras RN  [facility]

## 2024-07-24 ENCOUNTER — NURSE TRIAGE (OUTPATIENT)
Dept: FAMILY MEDICINE | Facility: CLINIC | Age: 84
End: 2024-07-24
Payer: COMMERCIAL

## 2024-07-24 ENCOUNTER — OFFICE VISIT (OUTPATIENT)
Dept: URGENT CARE | Facility: URGENT CARE | Age: 84
End: 2024-07-24
Payer: COMMERCIAL

## 2024-07-24 ENCOUNTER — HOSPITAL ENCOUNTER (OUTPATIENT)
Facility: CLINIC | Age: 84
Setting detail: OBSERVATION
Discharge: HOME | End: 2024-07-25
Attending: EMERGENCY MEDICINE | Admitting: INTERNAL MEDICINE
Payer: COMMERCIAL

## 2024-07-24 VITALS
SYSTOLIC BLOOD PRESSURE: 114 MMHG | DIASTOLIC BLOOD PRESSURE: 54 MMHG | TEMPERATURE: 98 F | OXYGEN SATURATION: 99 % | HEART RATE: 68 BPM

## 2024-07-24 DIAGNOSIS — N28.9 ACUTE ON CHRONIC RENAL INSUFFICIENCY: ICD-10-CM

## 2024-07-24 DIAGNOSIS — E87.5 HYPERKALEMIA: ICD-10-CM

## 2024-07-24 DIAGNOSIS — I10 ESSENTIAL HYPERTENSION: ICD-10-CM

## 2024-07-24 DIAGNOSIS — E87.20 METABOLIC ACIDOSIS: ICD-10-CM

## 2024-07-24 DIAGNOSIS — N18.9 ACUTE ON CHRONIC RENAL INSUFFICIENCY: ICD-10-CM

## 2024-07-24 DIAGNOSIS — R25.1 TREMOR OF LEFT HAND: Primary | ICD-10-CM

## 2024-07-24 DIAGNOSIS — R29.898 LEFT ARM WEAKNESS: ICD-10-CM

## 2024-07-24 DIAGNOSIS — N18.32 STAGE 3B CHRONIC KIDNEY DISEASE (H): Primary | ICD-10-CM

## 2024-07-24 LAB
ALBUMIN UR-MCNC: 10 MG/DL
ANION GAP SERPL CALCULATED.3IONS-SCNC: 14 MMOL/L (ref 7–15)
APPEARANCE UR: CLEAR
BACTERIA #/AREA URNS HPF: ABNORMAL /HPF
BASOPHILS # BLD AUTO: 0 10E3/UL (ref 0–0.2)
BASOPHILS NFR BLD AUTO: 0 %
BILIRUB UR QL STRIP: NEGATIVE
BUN SERPL-MCNC: 109.6 MG/DL (ref 8–23)
CALCIUM SERPL-MCNC: 9.7 MG/DL (ref 8.8–10.4)
CHLORIDE SERPL-SCNC: 106 MMOL/L (ref 98–107)
COLOR UR AUTO: ABNORMAL
CREAT SERPL-MCNC: 3.29 MG/DL (ref 0.51–0.95)
EGFRCR SERPLBLD CKD-EPI 2021: 13 ML/MIN/1.73M2
EOSINOPHIL # BLD AUTO: 0.2 10E3/UL (ref 0–0.7)
EOSINOPHIL NFR BLD AUTO: 3 %
ERYTHROCYTE [DISTWIDTH] IN BLOOD BY AUTOMATED COUNT: 15.9 % (ref 10–15)
FASTING STATUS PATIENT QL REPORTED: NO
GLUCOSE BLDC GLUCOMTR-MCNC: 179 MG/DL (ref 70–99)
GLUCOSE BLDC GLUCOMTR-MCNC: 221 MG/DL (ref 70–99)
GLUCOSE BLDC GLUCOMTR-MCNC: 221 MG/DL (ref 70–99)
GLUCOSE BLDC GLUCOMTR-MCNC: 233 MG/DL (ref 70–99)
GLUCOSE BLDC GLUCOMTR-MCNC: 253 MG/DL (ref 70–99)
GLUCOSE BLDC GLUCOMTR-MCNC: 289 MG/DL (ref 70–99)
GLUCOSE BLDC GLUCOMTR-MCNC: 294 MG/DL (ref 70–99)
GLUCOSE BLDC GLUCOMTR-MCNC: 299 MG/DL (ref 70–99)
GLUCOSE BLDC GLUCOMTR-MCNC: 305 MG/DL (ref 70–99)
GLUCOSE SERPL-MCNC: 250 MG/DL (ref 70–99)
GLUCOSE SERPL-MCNC: 325 MG/DL (ref 70–99)
GLUCOSE UR STRIP-MCNC: NEGATIVE MG/DL
HCO3 SERPL-SCNC: 16 MMOL/L (ref 22–29)
HCT VFR BLD AUTO: 32.3 % (ref 35–47)
HGB BLD-MCNC: 9.9 G/DL (ref 11.7–15.7)
HGB UR QL STRIP: ABNORMAL
HOLD SPECIMEN: NORMAL
HOLD SPECIMEN: NORMAL
HYALINE CASTS: 1 /LPF
IMM GRANULOCYTES # BLD: 0 10E3/UL
IMM GRANULOCYTES NFR BLD: 0 %
KETONES UR STRIP-MCNC: NEGATIVE MG/DL
LEUKOCYTE ESTERASE UR QL STRIP: NEGATIVE
LYMPHOCYTES # BLD AUTO: 1.7 10E3/UL (ref 0.8–5.3)
LYMPHOCYTES NFR BLD AUTO: 24 %
MCH RBC QN AUTO: 26.3 PG (ref 26.5–33)
MCHC RBC AUTO-ENTMCNC: 30.7 G/DL (ref 31.5–36.5)
MCV RBC AUTO: 86 FL (ref 78–100)
MONOCYTES # BLD AUTO: 0.4 10E3/UL (ref 0–1.3)
MONOCYTES NFR BLD AUTO: 5 %
MUCOUS THREADS #/AREA URNS LPF: PRESENT /LPF
NEUTROPHILS # BLD AUTO: 5 10E3/UL (ref 1.6–8.3)
NEUTROPHILS NFR BLD AUTO: 68 %
NITRATE UR QL: NEGATIVE
NRBC # BLD AUTO: 0 10E3/UL
NRBC BLD AUTO-RTO: 0 /100
PH UR STRIP: 5 [PH] (ref 5–7)
PLATELET # BLD AUTO: 180 10E3/UL (ref 150–450)
POTASSIUM SERPL-SCNC: 4.5 MMOL/L (ref 3.4–5.3)
POTASSIUM SERPL-SCNC: 5.7 MMOL/L (ref 3.4–5.3)
RBC # BLD AUTO: 3.76 10E6/UL (ref 3.8–5.2)
RBC URINE: 1 /HPF
SODIUM SERPL-SCNC: 136 MMOL/L (ref 135–145)
SP GR UR STRIP: 1.01 (ref 1–1.03)
SQUAMOUS EPITHELIAL: <1 /HPF
UROBILINOGEN UR STRIP-MCNC: NORMAL MG/DL
WBC # BLD AUTO: 7.4 10E3/UL (ref 4–11)
WBC URINE: 1 /HPF

## 2024-07-24 PROCEDURE — 85048 AUTOMATED LEUKOCYTE COUNT: CPT | Performed by: EMERGENCY MEDICINE

## 2024-07-24 PROCEDURE — 81001 URINALYSIS AUTO W/SCOPE: CPT | Performed by: EMERGENCY MEDICINE

## 2024-07-24 PROCEDURE — 93005 ELECTROCARDIOGRAM TRACING: CPT

## 2024-07-24 PROCEDURE — 36415 COLL VENOUS BLD VENIPUNCTURE: CPT | Performed by: EMERGENCY MEDICINE

## 2024-07-24 PROCEDURE — 84132 ASSAY OF SERUM POTASSIUM: CPT | Mod: 91 | Performed by: EMERGENCY MEDICINE

## 2024-07-24 PROCEDURE — 250N000009 HC RX 250: Performed by: EMERGENCY MEDICINE

## 2024-07-24 PROCEDURE — G0378 HOSPITAL OBSERVATION PER HR: HCPCS

## 2024-07-24 PROCEDURE — 258N000003 HC RX IP 258 OP 636: Performed by: EMERGENCY MEDICINE

## 2024-07-24 PROCEDURE — 258N000003 HC RX IP 258 OP 636: Performed by: INTERNAL MEDICINE

## 2024-07-24 PROCEDURE — 250N000012 HC RX MED GY IP 250 OP 636 PS 637: Performed by: INTERNAL MEDICINE

## 2024-07-24 PROCEDURE — 250N000011 HC RX IP 250 OP 636: Performed by: EMERGENCY MEDICINE

## 2024-07-24 PROCEDURE — 250N000012 HC RX MED GY IP 250 OP 636 PS 637: Performed by: EMERGENCY MEDICINE

## 2024-07-24 PROCEDURE — 96374 THER/PROPH/DIAG INJ IV PUSH: CPT

## 2024-07-24 PROCEDURE — 82962 GLUCOSE BLOOD TEST: CPT

## 2024-07-24 PROCEDURE — 36415 COLL VENOUS BLD VENIPUNCTURE: CPT | Performed by: INTERNAL MEDICINE

## 2024-07-24 PROCEDURE — 96375 TX/PRO/DX INJ NEW DRUG ADDON: CPT

## 2024-07-24 PROCEDURE — 99222 1ST HOSP IP/OBS MODERATE 55: CPT | Performed by: INTERNAL MEDICINE

## 2024-07-24 PROCEDURE — 99215 OFFICE O/P EST HI 40 MIN: CPT | Performed by: FAMILY MEDICINE

## 2024-07-24 PROCEDURE — 80048 BASIC METABOLIC PNL TOTAL CA: CPT | Performed by: EMERGENCY MEDICINE

## 2024-07-24 PROCEDURE — 82947 ASSAY GLUCOSE BLOOD QUANT: CPT | Mod: 91 | Performed by: INTERNAL MEDICINE

## 2024-07-24 PROCEDURE — 99285 EMERGENCY DEPT VISIT HI MDM: CPT | Mod: 25

## 2024-07-24 PROCEDURE — 258N000001 HC RX 258: Performed by: EMERGENCY MEDICINE

## 2024-07-24 PROCEDURE — 96361 HYDRATE IV INFUSION ADD-ON: CPT

## 2024-07-24 PROCEDURE — 250N000013 HC RX MED GY IP 250 OP 250 PS 637: Performed by: INTERNAL MEDICINE

## 2024-07-24 PROCEDURE — 85025 COMPLETE CBC W/AUTO DIFF WBC: CPT | Performed by: EMERGENCY MEDICINE

## 2024-07-24 RX ORDER — SODIUM CHLORIDE 9 MG/ML
INJECTION, SOLUTION INTRAVENOUS CONTINUOUS
Status: DISCONTINUED | OUTPATIENT
Start: 2024-07-24 | End: 2024-07-25

## 2024-07-24 RX ORDER — HYDRALAZINE HYDROCHLORIDE 10 MG/1
10 TABLET, FILM COATED ORAL EVERY 4 HOURS PRN
Status: DISCONTINUED | OUTPATIENT
Start: 2024-07-24 | End: 2024-07-25 | Stop reason: HOSPADM

## 2024-07-24 RX ORDER — AMOXICILLIN 250 MG
1 CAPSULE ORAL 2 TIMES DAILY PRN
Status: DISCONTINUED | OUTPATIENT
Start: 2024-07-24 | End: 2024-07-25 | Stop reason: HOSPADM

## 2024-07-24 RX ORDER — ESTRADIOL 0.1 MG/G
CREAM VAGINAL PRN
COMMUNITY
End: 2024-09-13

## 2024-07-24 RX ORDER — ONDANSETRON 4 MG/1
4 TABLET, ORALLY DISINTEGRATING ORAL EVERY 6 HOURS PRN
Status: DISCONTINUED | OUTPATIENT
Start: 2024-07-24 | End: 2024-07-25 | Stop reason: HOSPADM

## 2024-07-24 RX ORDER — POLYETHYLENE GLYCOL 3350 17 G/17G
17 POWDER, FOR SOLUTION ORAL 2 TIMES DAILY PRN
Status: DISCONTINUED | OUTPATIENT
Start: 2024-07-24 | End: 2024-07-25 | Stop reason: HOSPADM

## 2024-07-24 RX ORDER — PANTOPRAZOLE SODIUM 40 MG/1
40 TABLET, DELAYED RELEASE ORAL
Status: DISCONTINUED | OUTPATIENT
Start: 2024-07-25 | End: 2024-07-25 | Stop reason: HOSPADM

## 2024-07-24 RX ORDER — NICOTINE POLACRILEX 4 MG
15-30 LOZENGE BUCCAL
Status: DISCONTINUED | OUTPATIENT
Start: 2024-07-24 | End: 2024-07-24

## 2024-07-24 RX ORDER — DEXTROSE MONOHYDRATE 25 G/50ML
25 INJECTION, SOLUTION INTRAVENOUS ONCE
Status: COMPLETED | OUTPATIENT
Start: 2024-07-24 | End: 2024-07-24

## 2024-07-24 RX ORDER — FUROSEMIDE 10 MG/ML
40 INJECTION INTRAMUSCULAR; INTRAVENOUS ONCE
Status: COMPLETED | OUTPATIENT
Start: 2024-07-24 | End: 2024-07-24

## 2024-07-24 RX ORDER — AMOXICILLIN 250 MG
2 CAPSULE ORAL 2 TIMES DAILY PRN
Status: DISCONTINUED | OUTPATIENT
Start: 2024-07-24 | End: 2024-07-25 | Stop reason: HOSPADM

## 2024-07-24 RX ORDER — DEXTROSE MONOHYDRATE 25 G/50ML
25-50 INJECTION, SOLUTION INTRAVENOUS
Status: DISCONTINUED | OUTPATIENT
Start: 2024-07-24 | End: 2024-07-25 | Stop reason: HOSPADM

## 2024-07-24 RX ORDER — NICOTINE POLACRILEX 4 MG
15-30 LOZENGE BUCCAL
Status: DISCONTINUED | OUTPATIENT
Start: 2024-07-24 | End: 2024-07-25 | Stop reason: HOSPADM

## 2024-07-24 RX ORDER — DEXTROSE MONOHYDRATE 25 G/50ML
25-50 INJECTION, SOLUTION INTRAVENOUS
Status: DISCONTINUED | OUTPATIENT
Start: 2024-07-24 | End: 2024-07-24

## 2024-07-24 RX ORDER — ACETAMINOPHEN 325 MG/1
650 TABLET ORAL EVERY 4 HOURS PRN
Status: DISCONTINUED | OUTPATIENT
Start: 2024-07-24 | End: 2024-07-25 | Stop reason: HOSPADM

## 2024-07-24 RX ORDER — ALBUTEROL SULFATE 5 MG/ML
10 SOLUTION RESPIRATORY (INHALATION) ONCE
Status: COMPLETED | OUTPATIENT
Start: 2024-07-24 | End: 2024-07-24

## 2024-07-24 RX ORDER — METRONIDAZOLE 7.5 MG/G
GEL TOPICAL 2 TIMES DAILY PRN
COMMUNITY
End: 2024-08-15

## 2024-07-24 RX ORDER — ACETAMINOPHEN 650 MG/1
650 SUPPOSITORY RECTAL EVERY 4 HOURS PRN
Status: DISCONTINUED | OUTPATIENT
Start: 2024-07-24 | End: 2024-07-25 | Stop reason: HOSPADM

## 2024-07-24 RX ORDER — BISACODYL 10 MG
10 SUPPOSITORY, RECTAL RECTAL DAILY PRN
Status: DISCONTINUED | OUTPATIENT
Start: 2024-07-24 | End: 2024-07-25 | Stop reason: HOSPADM

## 2024-07-24 RX ORDER — HYDRALAZINE HYDROCHLORIDE 20 MG/ML
10 INJECTION INTRAMUSCULAR; INTRAVENOUS EVERY 4 HOURS PRN
Status: DISCONTINUED | OUTPATIENT
Start: 2024-07-24 | End: 2024-07-25 | Stop reason: HOSPADM

## 2024-07-24 RX ORDER — ONDANSETRON 2 MG/ML
4 INJECTION INTRAMUSCULAR; INTRAVENOUS EVERY 6 HOURS PRN
Status: DISCONTINUED | OUTPATIENT
Start: 2024-07-24 | End: 2024-07-25 | Stop reason: HOSPADM

## 2024-07-24 RX ADMIN — ACETAMINOPHEN 650 MG: 325 TABLET, FILM COATED ORAL at 23:32

## 2024-07-24 RX ADMIN — SODIUM CHLORIDE: 9 INJECTION, SOLUTION INTRAVENOUS at 21:40

## 2024-07-24 RX ADMIN — SODIUM BICARBONATE 50 MEQ: 84 INJECTION INTRAVENOUS at 18:21

## 2024-07-24 RX ADMIN — DEXTROSE MONOHYDRATE 25 G: 25 INJECTION, SOLUTION INTRAVENOUS at 17:35

## 2024-07-24 RX ADMIN — INSULIN ASPART 2 UNITS: 100 INJECTION, SOLUTION INTRAVENOUS; SUBCUTANEOUS at 21:48

## 2024-07-24 RX ADMIN — DEXTROSE MONOHYDRATE 300 ML: 100 INJECTION, SOLUTION INTRAVENOUS at 17:39

## 2024-07-24 RX ADMIN — FUROSEMIDE 40 MG: 10 INJECTION, SOLUTION INTRAMUSCULAR; INTRAVENOUS at 17:57

## 2024-07-24 RX ADMIN — SODIUM CHLORIDE 8.5 UNITS: 9 INJECTION, SOLUTION INTRAVENOUS at 17:31

## 2024-07-24 RX ADMIN — ALBUTEROL SULFATE 10 MG: 2.5 SOLUTION RESPIRATORY (INHALATION) at 17:43

## 2024-07-24 RX ADMIN — SODIUM CHLORIDE 1000 ML: 9 INJECTION, SOLUTION INTRAVENOUS at 17:29

## 2024-07-24 ASSESSMENT — ACTIVITIES OF DAILY LIVING (ADL)
ADLS_ACUITY_SCORE: 38
ADLS_ACUITY_SCORE: 35
ADLS_ACUITY_SCORE: 38
ADLS_ACUITY_SCORE: 35
ADLS_ACUITY_SCORE: 27
ADLS_ACUITY_SCORE: 38
ADLS_ACUITY_SCORE: 35
ADLS_ACUITY_SCORE: 33

## 2024-07-24 ASSESSMENT — COLUMBIA-SUICIDE SEVERITY RATING SCALE - C-SSRS
1. IN THE PAST MONTH, HAVE YOU WISHED YOU WERE DEAD OR WISHED YOU COULD GO TO SLEEP AND NOT WAKE UP?: NO
6. HAVE YOU EVER DONE ANYTHING, STARTED TO DO ANYTHING, OR PREPARED TO DO ANYTHING TO END YOUR LIFE?: NO
2. HAVE YOU ACTUALLY HAD ANY THOUGHTS OF KILLING YOURSELF IN THE PAST MONTH?: NO

## 2024-07-24 NOTE — ED PROVIDER NOTES
Emergency Department Note      History of Present Illness     Chief Complaint   Eye Problem (/) and Altered Mental Status (/)      HPI   Lindsey Gary is a 84 year old female with history of rheumatoid arthritis and chronic kidney disease who presents for evaluation of an eye problem and tremors. The patient noticed her left hand had began to shake three days ago, but she believes she's been experiencing it for a longer time. She initially believed the shaking was due to her arthritis in her hands but doesn't believe so anymore. She states her tremors are intermittent, random, involuntary, and last for seconds. She reports experiencing 0-10 episodes of tremor per day. Denies trauma or injury to her hand. She also complains that she has been experiencing flashes of light and floaters in her vision for a few weeks now. She was seen yesterday by her ophthalmologist and given a workup which came back negative. Denies fever, vomiting, diarrhea, headache, chest pain, shortness of breath, and alcohol use.  The patient notes that she takes 44 units of insulin in the morning and 22 unites in the after noon. She adds that she endorses chronic leg swelling for which she takes lasix. She has a renal appointment scheduled in February 2025.    Independent Historian   None    Review of External Notes   I reviewed the rheumatology visit from 6/26/24, the patient was started on methotrexate.    Past Medical History     Medical History and Problem List   Past Medical History:   Diagnosis Date    Arthritis     Essential hypertension, benign     Fuchs' corneal dystrophy     Gout     Mixed hyperlipidemia     Mumps     Pain in joint, ankle and foot     Type II or unspecified type diabetes mellitus without mention of complication, not stated as uncontrolled        Medications   acetaminophen (TYLENOL) 500 MG tablet  alcohol swab prep pads  allopurinol (ZYLOPRIM) 300 MG tablet  blood glucose (CONTOUR NEXT TEST) test strip  blood glucose  "calibration (NO BRAND SPECIFIED) solution  blood glucose monitoring (CONTOUR NEXT MONITOR W/DEVICE KIT) meter device kit  Cholecalciferol (VITAMIN D) 2000 UNITS tablet  Continuous Blood Gluc Sensor (FREESTYLE LOVE 2 SENSOR) MISC  cycloSPORINE (RESTASIS) 0.05 % ophthalmic emulsion  diclofenac (VOLTAREN) 1 % topical gel  estradiol (ESTRACE) 0.1 MG/GM vaginal cream  famotidine (PEPCID) 20 MG tablet  fluconazole (DIFLUCAN) 150 MG tablet  fluorometholone (FML LIQUIFILM) 0.1 % ophthalmic suspension  folic acid (FOLVITE) 1 MG tablet  furosemide (LASIX) 20 MG tablet  gabapentin (NEURONTIN) 300 MG capsule  glipiZIDE (GLUCOTROL) 10 MG tablet  insulin  UNIT/ML vial  insulin syringe-needle U-100 (31G X 5/16\" 0.5 ML) 31G X 5/16\" 0.5 ML miscellaneous  insulin syringe-needle U-100 (DROPLET INSULIN SYRINGE) 31G X 5/16\" 0.3 ML miscellaneous  isoniazid (NYDRAZID) 300 MG tablet  Lactobacillus (PROBIOTIC ACIDOPHILUS) TABS  lisinopril (ZESTRIL) 40 MG tablet  methotrexate 2.5 MG tablet  metoprolol succinate ER (TOPROL XL) 50 MG 24 hr tablet  metroNIDAZOLE (METROGEL) 0.75 % external gel  omeprazole (PRILOSEC) 20 MG DR capsule  rosuvastatin (CRESTOR) 20 MG tablet  thin (NO BRAND SPECIFIED) lancets  TUMS 500 MG OR CHEW  vitamin B6 (PYRIDOXINE) 50 MG TABS        Surgical History   Past Surgical History:   Procedure Laterality Date    BLADDER SURGERY      COLONOSCOPY      COLONOSCOPY N/A 12/17/2014    Procedure: COMBINED COLONOSCOPY, SINGLE OR MULTIPLE BIOPSY/POLYPECTOMY BY BIOPSY;  Surgeon: Chuy Staton MD;  Location:  GI    COLONOSCOPY N/A 08/07/2020    Procedure: COLONOSCOPY;  Surgeon: Chuy Staton MD;  Location:  GI    CYSTOSCOPY      EYE SURGERY Left 01/2023    cornea transplant - FUCHS distrophy    HYSTERECTOMY, PAP NO LONGER INDICATED      HYSTERECTOMY, JAIME  1991    fibroid    ORTHOPEDIC SURGERY Left 07/2016    partial knee replacement    SURGICAL HISTORY OF -   10/28/2015    right partial knee     ZZC " "NONSPECIFIC PROCEDURE      Breast biopsies       [facility] NONSPECIFIC PROCEDURE      Symptomatic left thigh lipomas       [facility] NONSPECIFIC PROCEDURE  06/01/2009    pubovaginal sling       Physical Exam     Patient Vitals for the past 24 hrs:   BP Temp Temp src Pulse Resp SpO2 Height Weight   07/24/24 1351 119/55 97.5  F (36.4  C) Temporal 78 17 99 % 1.6 m (5' 3\") 84.7 kg (186 lb 11.7 oz)     Physical Exam  GENERAL: Awake, alert  CARDIOVASCULAR: Regular rate and rhythm  LUNGS: Clear bilaterally, no wheezes rales or rhonchi  ABDOMEN: Soft, nontender, no rebound or guarding  EXTREMITIES: Nonpitting peripheral edema  NEURO: Awake and alert, normal strength to all 5 extremities, no dysarthria, no hand tremor  MSK: No tend to hand or neck    Diagnostics     Lab Results   Labs Ordered and Resulted from Time of ED Arrival to Time of ED Departure   BASIC METABOLIC PANEL - Abnormal       Result Value    Sodium 136      Potassium 5.7 (*)     Chloride 106      Carbon Dioxide (CO2) 16 (*)     Anion Gap 14      Urea Nitrogen 109.6 (*)     Creatinine 3.29 (*)     GFR Estimate 13 (*)     Calcium 9.7      Glucose 250 (*)    CBC WITH PLATELETS AND DIFFERENTIAL - Abnormal    WBC Count 7.4      RBC Count 3.76 (*)     Hemoglobin 9.9 (*)     Hematocrit 32.3 (*)     MCV 86      MCH 26.3 (*)     MCHC 30.7 (*)     RDW 15.9 (*)     Platelet Count 180      % Neutrophils 68      % Lymphocytes 24      % Monocytes 5      % Eosinophils 3      % Basophils 0      % Immature Granulocytes 0      NRBCs per 100 WBC 0      Absolute Neutrophils 5.0      Absolute Lymphocytes 1.7      Absolute Monocytes 0.4      Absolute Eosinophils 0.2      Absolute Basophils 0.0      Absolute Immature Granulocytes 0.0      Absolute NRBCs 0.0     ROUTINE UA WITH MICROSCOPIC REFLEX TO CULTURE       Imaging   No orders to display         ED Course      Medications Administered   Medications - No data to display    Procedures   Procedures     Discussion of Management   None    ED " Course   ED Course as of 07/24/24 2226 Wed Jul 24, 2024   2831 I obtained history and performed physical exam as noted above.    1626 I spoke with Dr. Do, of the hospitalist team, regarding the patient. They accepted the patient for admission.         Optional/Additional Documentation  None    Medical Decision Making / Diagnosis         MIPS       None    [facility]   Lindsey Gary is a 84 year old female who presents emergency department for evaluation of intermittent hand tremor.  She has no tremor currently, normal neurological exam, no tenderness to the hand.  History is very atypical, and these episodes last for several seconds, unlikely to be seizure.  Basic labs were performed which showed worsening renal insufficiency with creatinine of 3.2 compared to baseline of around 2.  Potassium was also elevated to 5.7 with bicarb of 16.  She does not appear to be overtly volume overloaded and is not hypoxic..  She does not have a nephrology appointment until February 2025.  She was given IV fluids and medications as above for hypokalemia and believe that she would benefit from admission for recheck of her labs and possible nephrology consult    Disposition   The patient was admitted to the hospital.     Diagnosis   No diagnosis found.     Discharge Medications   New Prescriptions    No medications on file         MD Jarod Sheth Lauren T, MD  07/24/24 9890

## 2024-07-24 NOTE — PROGRESS NOTES
SUBJECTIVE:  Chief Complaint   Patient presents with    Urgent Care     Starting Monday she feels like her left hand  and thumb is jerking inside involuntarily- she isnt sure it is visible- (looks like a tremor ) pt also feels like she is having mild memory issues the last few days,      Lindsey Gary is a 84 year old female who presents with a chief complaint of left hand jerking involuntarily and having memory issues.    Unsure of duration of symptoms but has noticed this more since Monday.  Also had vision changes and feels like memory is not as good.  Went to eye doctor and was told that vision concerns was not necessarily due to eyes.    Will get hand jerking/trembling, this does not last more than a second, also notice that has more weakness, will be dropping items.      Had cervical neck DJD, states that is clicking at times.  Denies significant pain.  Able to move left arm    Past Medical History:   Diagnosis Date    Arthritis     Essential hypertension, benign     Fuchs' corneal dystrophy     bilateral - left cornea replacement  done and right planned    Gout     Mixed hyperlipidemia     Mumps     Pain in joint, ankle and foot     gout    Type II or unspecified type diabetes mellitus without mention of complication, not stated as uncontrolled     Diabetes mellitus     Current Outpatient Medications   Medication Sig Dispense Refill    acetaminophen (TYLENOL) 500 MG tablet Take 2 tablets by mouth every 8 hours as needed       alcohol swab prep pads Use to swab area of injection/beau as directed 100 each 3    allopurinol (ZYLOPRIM) 300 MG tablet Take 1/2 (one-half) tablet by mouth twice daily 90 tablet 3    blood glucose (CONTOUR NEXT TEST) test strip Use to test blood sugar 5 times daily or as directed. (Patient not taking: Reported on 2/23/2024) 150 strip 11    blood glucose calibration (NO BRAND SPECIFIED) solution Use to calibrate blood glucose monitor as needed as directed. To accompany: Blood Glucose  "Monitor Brands: per insurance. (Patient not taking: Reported on 2/23/2024) 1 each 11    blood glucose monitoring (CONTOUR NEXT MONITOR W/DEVICE KIT) meter device kit Use to test blood sugar 3 times daily or as directed. (Patient not taking: Reported on 2/23/2024) 1 kit 0    Cholecalciferol (VITAMIN D) 2000 UNITS tablet Take 1 tablet by mouth daily      Continuous Blood Gluc Sensor (FREESTYLE LOVE 2 SENSOR) MISC Change every 14 days. 2 each 5    cycloSPORINE (RESTASIS) 0.05 % ophthalmic emulsion Place 1 drop into both eyes daily      diclofenac (VOLTAREN) 1 % topical gel Apply topically 4 times daily as needed for moderate pain      estradiol (ESTRACE) 0.1 MG/GM vaginal cream Please use blueberry size amount in the vagina nightly for two weeks and then three times a week at night thereafter 42.5 g 3    famotidine (PEPCID) 20 MG tablet Take 20 mg by mouth every other day      fluconazole (DIFLUCAN) 150 MG tablet Take 1 tablet (150 mg) by mouth once as needed (yeast infection) 30 tablet 3    fluorometholone (FML LIQUIFILM) 0.1 % ophthalmic suspension Place 1 drop Into the left eye daily      folic acid (FOLVITE) 1 MG tablet Take 1 tablet by mouth daily      furosemide (LASIX) 20 MG tablet Take 2 tablets by mouth once daily 180 tablet 0    gabapentin (NEURONTIN) 300 MG capsule TAKE 3 CAPSULES BY MOUTH AT BEDTIME 270 capsule 3    glipiZIDE (GLUCOTROL) 10 MG tablet TAKE 1 TABLET BY MOUTH TWICE DAILY BEFORE MEAL(S) 180 tablet 0    insulin  UNIT/ML vial Inject subcutaneously 42 units in the morning and 20 units at 5pm every day. (Patient taking differently: Inject subcutaneously 44 units in the morning and 22 units at 5pm every day.) 20 mL 1    insulin syringe-needle U-100 (31G X 5/16\" 0.5 ML) 31G X 5/16\" 0.5 ML miscellaneous Use 2 syringes daily or as directed. 200 each 1    insulin syringe-needle U-100 (DROPLET INSULIN SYRINGE) 31G X 5/16\" 0.3 ML miscellaneous Use two syringes daily or as directed. 200 each 1    " isoniazid (NYDRAZID) 300 MG tablet Take 1 tablet (300 mg) by mouth daily      Lactobacillus (PROBIOTIC ACIDOPHILUS) TABS Take 1 tablet by mouth daily      lisinopril (ZESTRIL) 40 MG tablet Take 1 tablet by mouth once daily 90 tablet 0    methotrexate 2.5 MG tablet Take 15 mg by mouth every 7 days      metoprolol succinate ER (TOPROL XL) 50 MG 24 hr tablet Take 1 tablet by mouth once daily 90 tablet 3    metroNIDAZOLE (METROGEL) 0.75 % external gel Apply topically 2 times daily 45 g 3    omeprazole (PRILOSEC) 20 MG DR capsule Take 1 capsule (20 mg) by mouth daily 90 capsule 3    rosuvastatin (CRESTOR) 20 MG tablet Take 1 tablet (20 mg) by mouth daily 90 tablet 3    thin (NO BRAND SPECIFIED) lancets Use to test blood sugar 5 times daily or as directed. To accompany: Blood Glucose Monitor Brands: per insurance. 200 each 11    TUMS 500 MG OR CHEW 1 TABLET 3 TIMES PRN 90 0    vitamin B6 (PYRIDOXINE) 50 MG TABS Take 100 mg by mouth daily       Social History     Tobacco Use    Smoking status: Never     Passive exposure: Never    Smokeless tobacco: Never   Substance Use Topics    Alcohol use: No     Alcohol/week: 0.0 standard drinks of alcohol       ROS:  Review of systems negative except as stated above.    EXAM:   /54   Pulse 68   Temp 98  F (36.7  C) (Tympanic)   LMP  (LMP Unknown)   SpO2 99%   GENERAL APPEARANCE: healthy, alert and no distress  EXTREMITIES: peripheral pulses normal, able to move left arm, no overt tremor noted  SKIN: no suspicious lesions or rashes  PSYCH:alert, affect bright      ASSESSMENT/PLAN:  (R25.1) Tremor of left hand  (primary encounter diagnosis)  Plan: go to ER    (R29.898) Left arm weakness  Plan: go to ER      Given constellation of symptoms with left hand tremor, weakness, vision changes and ?memory concerns that will need to have head imaging down to evaluate for potential CVA.  Symptoms present for 2-3 days and vitals stable, okay to go by private vehicle as patient declined  paramedic transport.    Patient will go by private car to [facility], ER notified.    Arash Lawson MD  July 24, 2024 1:13 PM

## 2024-07-24 NOTE — ED NOTES
"[facility]  ED Nurse Handoff Report    ED Chief complaint: Eye Problem (/) and Altered Mental Status (/)  . ED Diagnosis:   Final diagnoses:   Hyperkalemia   Acute on chronic renal insufficiency   Metabolic acidosis       Allergies:   Allergies   Allergen Reactions    Amoxicillin-Pot Clavulanate      Tongue swelling and rash     Can take PCN K without reaction     Sulfa Antibiotics      Tongue swelling and rash       Code Status: Full Code    Activity level - Baseline/Home:  independent.  Activity Level - Current:   standby.   Lift room needed: No.   Bariatric: No   Needed: No   Isolation: No.   Infection: Not Applicable.     Respiratory status: Room air    Vital Signs (within 30 minutes):   Vitals:    07/24/24 1351   BP: 119/55   Pulse: 78   Resp: 17   Temp: 97.5  F (36.4  C)   TempSrc: Temporal   SpO2: 99%   Weight: 84.7 kg (186 lb 11.7 oz)   Height: 1.6 m (5' 3\")       Cardiac Rhythm:  ,      Pain level:    Patient confused: No.   Patient Falls Risk: nonskid shoes/slippers when out of bed, arm band in place, patient and family education, and assistive device/personal items within reach.   Elimination Status: Has voided     Patient Report - Initial Complaint:      Expand All Collapse All    Pt complains of L hand Jerking involuntarily and memory issues since Monday. Pt states she has become more forgetful w/ where she has placed items in the house. Pt stats she has severe arthritis in hands. Pt complains of blurry vision in bilateral eyes for past wks. Pt was seen by eye doctor and was told nothing was wrong w/ patho of eyes. 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      .   Focused Assessment: 84 year old female with history of rheumatoid arthritis and chronic kidney disease who presents for evaluation of an eye problem and tremors. The patient noticed her left hand had began to shake three days ago, but she believes she's been experiencing it for a longer time. She initially " [facility].  [address] [facility] [address], [address]  [address]  [phone]  1957    It was my pleasure seeing you for your procedure. You will also receive a summary report with the findings from this procedure and any further recommendations. If you had polyps removed or biopsies taken during your procedure, you will receive a separate letter from me within the next 2 weeks. If you don't receive this letter or if you have any questions, please call my office [phone]. Please take note of the post procedure instructions listed below. Garry Fox,    Dr. Vicky Regan    These instructions give you information on caring for yourself after your procedure. Call your doctor if you have any problems or questions after your procedure. HOME CARE  · Walk if you have belly cramping or gas. Walking will help get rid of the air and reduce the bloated feeling in your belly (abdomen). · Your IV site (where you received drugs) may be tender to touch. Place warm towels on the site; keep your arm up on two pillows if you have any swelling or soreness in the area. · You may shower. ACTIVITY:  · Take frequent rest periods and move at a slower pace for the next 24 hours. .  · You may resume your regular activity tomorrow if you are feeling back to normal.  · Do not drive or ride a bicycle for at least 24 hours (because of the medicine (anesthesia) used during the test). · Do not sign any important legal documents or use or operate any machinery for 24 hours  · Do not take sleeping medicines/nerve drugs for 24 hours unless the doctor tells you. · You can return to work/school tomorrow unless otherwise instructed. NUTRITION:  · Drink plenty of fluids to keep your pee (urine) clear or pale yellow  · Begin with a light meal and progress to your normal diet.  Heavy or fried foods are harder to digest and believed the shaking was due to her arthritis in her hands but doesn't believe so anymore. She states her tremors are intermittent, random, involuntary, and last for seconds. She reports experiencing 0-10 episodes of tremor per day. Denies trauma or injury to her hand. She also complains that she has been experiencing flashes of light and floaters in her vision for a few weeks now. She was seen yesterday by her ophthalmologist and given a workup which came back negative. Denies fever, vomiting, diarrhea, headache, chest pain, shortness of breath, and alcohol use.  The patient notes that she takes 44 units of insulin in the morning and 22 unites in the after noon. She adds that she endorses chronic leg swelling for which she takes lasix. She has a renal appointment scheduled in February 2025.      Abnormal Results:   Labs Ordered and Resulted from Time of ED Arrival to Time of ED Departure   BASIC METABOLIC PANEL - Abnormal       Result Value    Sodium 136      Potassium 5.7 (*)     Chloride 106      Carbon Dioxide (CO2) 16 (*)     Anion Gap 14      Urea Nitrogen 109.6 (*)     Creatinine 3.29 (*)     GFR Estimate 13 (*)     Calcium 9.7      Glucose 250 (*)    CBC WITH PLATELETS AND DIFFERENTIAL - Abnormal    WBC Count 7.4      RBC Count 3.76 (*)     Hemoglobin 9.9 (*)     Hematocrit 32.3 (*)     MCV 86      MCH 26.3 (*)     MCHC 30.7 (*)     RDW 15.9 (*)     Platelet Count 180      % Neutrophils 68      % Lymphocytes 24      % Monocytes 5      % Eosinophils 3      % Basophils 0      % Immature Granulocytes 0      NRBCs per 100 WBC 0      Absolute Neutrophils 5.0      Absolute Lymphocytes 1.7      Absolute Monocytes 0.4      Absolute Eosinophils 0.2      Absolute Basophils 0.0      Absolute Immature Granulocytes 0.0      Absolute NRBCs 0.0     GLUCOSE BY METER - Abnormal    GLUCOSE BY METER POCT 179 (*)    GLUCOSE BY METER - Abnormal    GLUCOSE BY METER POCT 305 (*)    GLUCOSE BY METER - Abnormal    GLUCOSE BY METER  may make you feel sick to your stomach (nauseated). · Once you are feeling back to normal, you may resume your normal diet as instructed by your doctor. · Avoid alcoholic beverages for 24 hours or as instructed. IF YOU HAD BIOPSIES TAKEN OR POLYPS REMOVED DURING THE PROCEDURE:  · For the next 7 days, avoid all non-steroidal antiinflammatory medications such as Ibuprofen, Motrin, Advil, Alleve, Deepika-seltzer, Goody's powder, BC powder. · If you do not have an heart condition that requires you to take a daily aspirin, you should avoid taking aspirin for 7 days. · Eat a soft diet for 24 hours. · Monitor your stools for any blood or dark black, tar-like, stools as this may be a sign of bleeding and if you see any blood, notify your doctor immediately. GET HELP RIGHT AWAY AND SEEK IMMEDIATE MEDICAL CARE IF:  · You have more than a spotting of blood in your stool. · You pass clumps of tissue (blood clots) or fill the toilet with blood. · Your belly is painfully swollen or puffy (abdominal distention). · You throw up (vomit). · You have a fever. · You have redness, pain or swelling at the IV site that last greater than two days. · You have abdominal pain or discomfort that is severe or gets worse throughout the day. Post-procedure diagnosis:  colon polyp    Findings:   Rectum: small internal hemorrhoids  Sigmoid: normal  Descending Colon: normal  Transverse Colon: normal  Ascending Colon: 30-35 mm flat polyp on back of fold. Injected with 18 cc of orise for lift (injection performed in retroflexion). Removed polyp piecemeal in complete fashion. Closed with 5 clips to prevent future bleeding  Cecum: normal  Terminal Ileum:     Recommendations:   - Await pathology. You should receive a letter within 2 weeks. - Resume normal medications.  - Recommend repeat colonoscopy in 6 months noting piecemeal resection.     Patient Education   Patient Education        Hemorrhoids: Care Instructions  Your Care Instructions    Hemorrhoids are enlarged veins that develop in the anal canal. Bleeding during bowel movements, itching, swelling, and rectal pain are the most common symptoms. They can be uncomfortable at times, but hemorrhoids rarely are a serious problem. You can treat most hemorrhoids with simple changes to your diet and bowel habits. These changes include eating more fiber and not straining to pass stools. Most hemorrhoids do not need surgery or other treatment unless they are very large and painful or bleed a lot. Follow-up care is a key part of your treatment and safety. Be sure to make and go to all appointments, and call your doctor if you are having problems. It's also a good idea to know your test results and keep a list of the medicines you take. How can you care for yourself at home? · Sit in a few inches of warm water (sitz bath) 3 times a day and after bowel movements. The warm water helps with pain and itching. · Put ice on your anal area several times a day for 10 minutes at a time. Put a thin cloth between the ice and your skin. Follow this by placing a warm, wet towel on the area for another 10 to 20 minutes. · Take pain medicines exactly as directed. ? If the doctor gave you a prescription medicine for pain, take it as prescribed. ? If you are not taking a prescription pain medicine, ask your doctor if you can take an over-the-counter medicine. · Keep the anal area clean, but be gentle. Use water and a fragrance-free soap, such as [place], or use baby wipes or medicated pads, such as Tucks. · Wear cotton underwear and loose clothing to decrease moisture in the anal area. · Eat more fiber. Include foods such as whole-grain breads and cereals, raw vegetables, raw and dried fruits, and beans. · Drink plenty of fluids, enough so that your urine is light yellow or clear like water.  If you have kidney, heart, or liver disease and have to limit fluids, talk with your doctor before you increase POCT 221 (*)    ROUTINE UA WITH MICROSCOPIC REFLEX TO CULTURE   GLUCOSE MONITOR NURSING POCT   GLUCOSE MONITOR NURSING POCT   GLUCOSE MONITOR NURSING POCT   POTASSIUM        No orders to display       Treatments provided: see MAR, Hyperkalemia correction  Family Comments:  at bedside  OBS brochure/video discussed/provided to patient:  N/A  ED Medications:   Medications   glucose gel 15-30 g (has no administration in time range)     Or   dextrose 50 % injection 25-50 mL (has no administration in time range)     Or   glucagon injection 1 mg (has no administration in time range)   sodium bicarbonate 8.4 % injection 50 mEq (50 mEq Intravenous Incomplete 7/24/24 1821)   dextrose 10% BOLUS 300 mL (300 mLs Intravenous $New Bag 7/24/24 1739)   furosemide (LASIX) injection 40 mg (40 mg Intravenous $Given 7/24/24 1757)   dextrose 50 % injection 25 g (25 g Intravenous $Given 7/24/24 1735)   insulin regular 1 unit/mL injection 8.5 Units (8.5 Units Intravenous $Given 7/24/24 1731)   albuterol (PROVENTIL) neb solution 10 mg (10 mg Nebulization $Given 7/24/24 1743)   sodium chloride 0.9% BOLUS 1,000 mL (1,000 mLs Intravenous $New Bag 7/24/24 1729)       Drips infusing:  Yes  For the majority of the shift this patient was Green.   Interventions performed were NA.    Sepsis treatment initiated: No    Cares/treatment/interventions/medications to be completed following ED care: follow POC. See BG check orders after insulin & dextrose administration, potassium recheck.     ED Nurse Name: Halle Guerrero RN  6:40 PM     RECEIVING UNIT ED HANDOFF REVIEW    Above ED Nurse Handoff Report was reviewed: Yes  Reviewed by: Malaika Hadley RN on July 24, 2024 at 7:42 PM   I Hortencia called the ED to inform them the note was read: Yes      the amount of fluids you drink. · Use a stool softener that contains bran or psyllium. You can save money by buying bran or psyllium (available in bulk at most health food stores) and sprinkling it on foods or stirring it into fruit juice. Or you can use a product such as Metamucil or Hydrocil. · Practice healthy bowel habits. ? Go to the bathroom as soon as you have the urge. ? Avoid straining to pass stools. Relax and give yourself time to let things happen naturally. ? Do not hold your breath while passing stools. ? Do not read while sitting on the toilet. Get off the toilet as soon as you have finished. · Take your medicines exactly as prescribed. Call your doctor if you think you are having a problem with your medicine. When should you call for help? Call 911 anytime you think you may need emergency care. For example, call if:    · You pass maroon or very bloody stools.    Call your doctor now or seek immediate medical care if:    · You have increased pain.     · You have increased bleeding.    Watch closely for changes in your health, and be sure to contact your doctor if:    · Your symptoms have not improved after 3 or 4 days. Where can you learn more? Go to http://gabriel-foster.info/. Enter F228 in the search box to learn more about \"Hemorrhoids: Care Instructions. \"  Current as of: March 27, 2018  Content Version: 11.9  © 3695-9280 [employer]. Care instructions adapted under license by [employer] (which disclaims liability or warranty for this information). If you have questions about a medical condition or this instruction, always ask your healthcare professional. [place] [address] any warranty or liability for your use of this information. Colon Polyps: Care Instructions  Your Care Instructions    Colon polyps are growths in the colon or the rectum.  The cause of most colon polyps is not known, and most people who get them do not have any problems. But a certain kind can turn into cancer. For this reason, regular testing for colon polyps is important for people age 48 and older and anyone who has an increased risk for colon cancer. Polyps are usually found through routine colon cancer screening tests. Although most colon polyps are not cancerous, they are usually removed and then tested for cancer. Screening for colon cancer saves lives because the cancer can usually be cured if it is caught early. If you have a polyp that is the type that can turn into cancer, you may need more tests to examine your entire colon. The doctor will remove any other polyps that he or she finds, and you will be tested more often. Follow-up care is a key part of your treatment and safety. Be sure to make and go to all appointments, and call your doctor if you are having problems. It's also a good idea to know your test results and keep a list of the medicines you take. How can you care for yourself at home? Regular exams to look for colon polyps are the best way to prevent polyps from turning into colon cancer. These can include stool tests, sigmoidoscopy, colonoscopy, and CT colonography. Talk with your doctor about a testing schedule that is right for you. To prevent polyps  There is no home treatment that can prevent colon polyps. But these steps may help lower your risk for cancer. · Stay active. Being active can help you get to and stay at a healthy weight. Try to exercise on most days of the week. Walking is a good choice. · Eat well. Choose a variety of vegetables, fruits, legumes (such as peas and beans), fish, poultry, and whole grains. · Do not smoke. If you need help quitting, talk to your doctor about stop-smoking programs and medicines. These can increase your chances of quitting for good. · If you drink alcohol, limit how much you drink. Limit alcohol to 2 drinks a day for men and 1 drink a day for women.   When should you call for help?  Call your doctor now or seek immediate medical care if:    · You have severe belly pain.     · Your stools are maroon or very bloody.    Watch closely for changes in your health, and be sure to contact your doctor if:    · You have a fever.     · You have nausea or vomiting.     · You have a change in bowel habits (new constipation or diarrhea).     · Your symptoms get worse or are not improving as expected. Where can you learn more? Go to http://gabriel-foster.info/. Enter 95 [id number] in the search box to learn more about \"Colon Polyps: Care Instructions. \"  Current as of: 2018  Content Version: 11.9  © 8422-9788 [employer]. Care instructions adapted under license by [employer] (which disclaims liability or warranty for this information). If you have questions about a medical condition or this instruction, always ask your healthcare professional. [address] any warranty or liability for your use of this information. PolyPid Activation    Thank you for requesting access to PolyPid. Please follow the instructions below to securely access and download your online medical record. PolyPid allows you to send messages to your doctor, view your test results, renew your prescriptions, schedule appointments, and more. How Do I Sign Up? 1. In your internet browser, go to [email or web address]  2. Click on the First Time User? Click Here link in the Sign In box. You will be redirect to the New Member Sign Up page. 3. Enter your PolyPid Access Code exactly as it appears below. You will not need to use this code after youve completed the sign-up process. If you do not sign up before the expiration date, you must request a new code. PolyPid Access Code: Activation code not generated  Current PolyPid Status: Active (This is the date your PolyPid access code will )    4.  Enter the last four digits of your Social Security Number (xxxx) and Date of Birth (mm/dd/yyyy) as indicated and click Submit. You will be taken to the next sign-up page. 5. Create a Netskope ID. This will be your Netskope login ID and cannot be changed, so think of one that is secure and easy to remember. 6. Create a Netskope password. You can change your password at any time. 7. Enter your Password Reset Question and Answer. This can be used at a later time if you forget your password. 8. Enter your e-mail address. You will receive e-mail notification when new information is available in [address] 9. Click Sign Up. You can now view and download portions of your medical record. 10. Click the Download Summary menu link to download a portable copy of your medical information. Additional Information    If you have questions, please visit the Frequently Asked Questions section of the Netskope website at [email or web address] Remember, Netskope is NOT to be used for urgent needs. For medical emergencies, dial 911.

## 2024-07-24 NOTE — TELEPHONE ENCOUNTER
"Nurse Triage SBAR    Is this a 2nd Level Triage? NO    Situation:   Pt having unexplained muscle jerking in hands and is unable to hold onto a piece of paper.    Background:   07/22/2024: onset of muscle jerking and vision changes  07/23/2024: appt with ophthalmologist. Pt reports they do not have acute eye pain. Their \"jelly around the cornea or retina turns to water\"    Assessment:   Shaking in their hands: pt cannot physically see the shaking, spouse stated in background they could  see movement in pt's hands. Pt feels the jerking/ shaking with larger occasional jerks. Pt is able tp  a piece of paper and then drops it onto the ground. Pt has arthritis in their hands at baseline.     Protocol Recommended Disposition:   Go To Office Now    Recommendation:   No appts available at home clinic. Writer recommending pt seen at [facility]. Pt agreeable to plan.    Radha Casillas RN     Reason for Disposition   New-onset muscle jerks and unexplained and 3 or more times/day    Additional Information   Negative: Difficult to awaken or acting confused (e.g., disoriented, slurred speech)   Negative: Sounds like a life-threatening emergency to the triager   Negative: Sounds like a seizure (generalized or focal)   Negative: Suspected alcohol withdrawal   Negative: Suspected substance use (drug use), addiction, or withdrawal   Negative: Shivering or chills and fever   Negative: Face, arm, or leg weakness   Negative: Muscle rigidity or tightness and present now   Negative: New-onset muscle jerks (twitches, spasms) and present now   Negative: New-onset muscle jerks and episode lasts > 1 minute and resolved   Negative: Patient sounds very sick or weak to the triager   Negative: Muscle rigidity or tightness and brief (now gone)    Answer Assessment - Initial Assessment Questions  1. APPEARANCE of MOVEMENT: \"What did the jerking or twitching look like?\" (e.g., body area)      Bilateral hands  2. ONSET: \"When did " "this start happening?\" (e.g., hours, days, weeks, months ago)      07/22/2024  3. DURATION: \"How long does the jerk, twitch, or spasm last?\"      seconds  4. FREQUENCY:  \"How often does this happen?\"       Increasing in frequency,   5. WHEN: \"When does this happen?\" (e.g., while awake, while falling asleep, while sleeping)      Minutes to hours  6. CAUSE: \"What do you think caused the jerking?\"      unknown  7. OTHER SYMPTOMS: \"Are there any other symptoms?\" (e.g., fever, headache)      denies  8. PREGNANCY: \"Is there any chance you are pregnant?\" \"When was your last menstrual period?\"      NA postmenopausal    Protocols used: Muscle Jerks - Tics - [id number]    "

## 2024-07-24 NOTE — ED TRIAGE NOTES
Pt complains of L hand Jerking involuntarily and memory issues since Monday. Pt states she has become more forgetful w/ where she has placed items in the house. Pt stats she has severe arthritis in hands. Pt complains of blurry vision in bilateral eyes for past wks. Pt was seen by eye doctor and was told nothing was wrong w/ patho of eyes. 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 Pt A&O x4 in triage room.

## 2024-07-25 ENCOUNTER — APPOINTMENT (OUTPATIENT)
Dept: PHYSICAL THERAPY | Facility: CLINIC | Age: 84
End: 2024-07-25
Attending: INTERNAL MEDICINE
Payer: COMMERCIAL

## 2024-07-25 ENCOUNTER — APPOINTMENT (OUTPATIENT)
Dept: MRI IMAGING | Facility: CLINIC | Age: 84
End: 2024-07-25
Attending: INTERNAL MEDICINE
Payer: COMMERCIAL

## 2024-07-25 VITALS
TEMPERATURE: 98.7 F | BODY MASS INDEX: 31.92 KG/M2 | RESPIRATION RATE: 16 BRPM | DIASTOLIC BLOOD PRESSURE: 56 MMHG | HEART RATE: 67 BPM | OXYGEN SATURATION: 98 % | SYSTOLIC BLOOD PRESSURE: 136 MMHG | HEIGHT: 64 IN | WEIGHT: 186.95 LBS

## 2024-07-25 LAB
ALBUMIN SERPL BCG-MCNC: 3.6 G/DL (ref 3.5–5.2)
ALP SERPL-CCNC: 93 U/L (ref 40–150)
ALT SERPL W P-5'-P-CCNC: 42 U/L (ref 0–50)
ANION GAP SERPL CALCULATED.3IONS-SCNC: 14 MMOL/L (ref 7–15)
AST SERPL W P-5'-P-CCNC: 43 U/L (ref 0–45)
ATRIAL RATE - MUSE: 59 BPM
BILIRUB SERPL-MCNC: 0.3 MG/DL
BUN SERPL-MCNC: 93.9 MG/DL (ref 8–23)
CALCIUM SERPL-MCNC: 9.3 MG/DL (ref 8.8–10.4)
CHLORIDE SERPL-SCNC: 110 MMOL/L (ref 98–107)
CREAT SERPL-MCNC: 2.19 MG/DL (ref 0.51–0.95)
DIASTOLIC BLOOD PRESSURE - MUSE: NORMAL MMHG
EGFRCR SERPLBLD CKD-EPI 2021: 22 ML/MIN/1.73M2
ERYTHROCYTE [DISTWIDTH] IN BLOOD BY AUTOMATED COUNT: 15.4 % (ref 10–15)
FASTING STATUS PATIENT QL REPORTED: YES
FASTING STATUS PATIENT QL REPORTED: YES
GLUCOSE BLDC GLUCOMTR-MCNC: 129 MG/DL (ref 70–99)
GLUCOSE BLDC GLUCOMTR-MCNC: 219 MG/DL (ref 70–99)
GLUCOSE SERPL-MCNC: 172 MG/DL (ref 70–99)
GLUCOSE SERPL-MCNC: 172 MG/DL (ref 70–99)
HCO3 SERPL-SCNC: 20 MMOL/L (ref 22–29)
HCT VFR BLD AUTO: 26.9 % (ref 35–47)
HGB BLD-MCNC: 8.6 G/DL (ref 11.7–15.7)
INTERPRETATION ECG - MUSE: NORMAL
MCH RBC QN AUTO: 26.6 PG (ref 26.5–33)
MCHC RBC AUTO-ENTMCNC: 32 G/DL (ref 31.5–36.5)
MCV RBC AUTO: 83 FL (ref 78–100)
P AXIS - MUSE: -15 DEGREES
PLATELET # BLD AUTO: 143 10E3/UL (ref 150–450)
POTASSIUM SERPL-SCNC: 4.8 MMOL/L (ref 3.4–5.3)
PR INTERVAL - MUSE: 172 MS
PROT SERPL-MCNC: 6.3 G/DL (ref 6.4–8.3)
QRS DURATION - MUSE: 118 MS
QT - MUSE: 438 MS
QTC - MUSE: 433 MS
R AXIS - MUSE: 15 DEGREES
RBC # BLD AUTO: 3.23 10E6/UL (ref 3.8–5.2)
SODIUM SERPL-SCNC: 144 MMOL/L (ref 135–145)
SYSTOLIC BLOOD PRESSURE - MUSE: NORMAL MMHG
T AXIS - MUSE: 65 DEGREES
VENTRICULAR RATE- MUSE: 59 BPM
WBC # BLD AUTO: 4.5 10E3/UL (ref 4–11)

## 2024-07-25 PROCEDURE — 255N000002 HC RX 255 OP 636: Performed by: INTERNAL MEDICINE

## 2024-07-25 PROCEDURE — 70553 MRI BRAIN STEM W/O & W/DYE: CPT

## 2024-07-25 PROCEDURE — 97161 PT EVAL LOW COMPLEX 20 MIN: CPT | Mod: GP | Performed by: PHYSICAL THERAPIST

## 2024-07-25 PROCEDURE — 258N000003 HC RX IP 258 OP 636: Performed by: INTERNAL MEDICINE

## 2024-07-25 PROCEDURE — 97116 GAIT TRAINING THERAPY: CPT | Mod: GP | Performed by: PHYSICAL THERAPIST

## 2024-07-25 PROCEDURE — G0378 HOSPITAL OBSERVATION PER HR: HCPCS

## 2024-07-25 PROCEDURE — 85027 COMPLETE CBC AUTOMATED: CPT | Performed by: INTERNAL MEDICINE

## 2024-07-25 PROCEDURE — 82947 ASSAY GLUCOSE BLOOD QUANT: CPT | Performed by: INTERNAL MEDICINE

## 2024-07-25 PROCEDURE — 99207 PR APP CREDIT; MD BILLING SHARED VISIT: CPT | Performed by: INTERNAL MEDICINE

## 2024-07-25 PROCEDURE — 36415 COLL VENOUS BLD VENIPUNCTURE: CPT | Performed by: INTERNAL MEDICINE

## 2024-07-25 PROCEDURE — 99239 HOSP IP/OBS DSCHRG MGMT >30: CPT | Performed by: INTERNAL MEDICINE

## 2024-07-25 PROCEDURE — 250N000013 HC RX MED GY IP 250 OP 250 PS 637: Performed by: INTERNAL MEDICINE

## 2024-07-25 PROCEDURE — 99204 OFFICE O/P NEW MOD 45 MIN: CPT | Performed by: INTERNAL MEDICINE

## 2024-07-25 PROCEDURE — 80053 COMPREHEN METABOLIC PANEL: CPT | Performed by: INTERNAL MEDICINE

## 2024-07-25 PROCEDURE — A9585 GADOBUTROL INJECTION: HCPCS | Performed by: INTERNAL MEDICINE

## 2024-07-25 PROCEDURE — 82962 GLUCOSE BLOOD TEST: CPT

## 2024-07-25 RX ORDER — LORAZEPAM 0.5 MG/1
0.5 TABLET ORAL ONCE
Status: COMPLETED | OUTPATIENT
Start: 2024-07-25 | End: 2024-07-25

## 2024-07-25 RX ORDER — GADOBUTROL 604.72 MG/ML
8 INJECTION INTRAVENOUS ONCE
Status: COMPLETED | OUTPATIENT
Start: 2024-07-25 | End: 2024-07-25

## 2024-07-25 RX ORDER — SODIUM CHLORIDE, SODIUM LACTATE, POTASSIUM CHLORIDE, CALCIUM CHLORIDE 600; 310; 30; 20 MG/100ML; MG/100ML; MG/100ML; MG/100ML
INJECTION, SOLUTION INTRAVENOUS CONTINUOUS
Status: DISCONTINUED | OUTPATIENT
Start: 2024-07-25 | End: 2024-07-25 | Stop reason: HOSPADM

## 2024-07-25 RX ORDER — FUROSEMIDE 20 MG
20 TABLET ORAL DAILY
Qty: 180 TABLET | Refills: 0 | Status: SHIPPED | OUTPATIENT
Start: 2024-07-25 | End: 2024-08-08

## 2024-07-25 RX ORDER — NIFEDIPINE 30 MG
30 TABLET, EXTENDED RELEASE ORAL DAILY
Qty: 30 TABLET | Refills: 6 | Status: SHIPPED | OUTPATIENT
Start: 2024-07-25 | End: 2024-08-21

## 2024-07-25 RX ADMIN — LORAZEPAM 0.5 MG: 0.5 TABLET ORAL at 13:54

## 2024-07-25 RX ADMIN — SODIUM CHLORIDE: 9 INJECTION, SOLUTION INTRAVENOUS at 06:41

## 2024-07-25 RX ADMIN — PANTOPRAZOLE SODIUM 40 MG: 40 TABLET, DELAYED RELEASE ORAL at 06:42

## 2024-07-25 RX ADMIN — GADOBUTROL 8 ML: 604.72 INJECTION INTRAVENOUS at 14:56

## 2024-07-25 ASSESSMENT — ACTIVITIES OF DAILY LIVING (ADL)
ADLS_ACUITY_SCORE: 27

## 2024-07-25 NOTE — PLAN OF CARE
Goal Outcome Evaluation:           Overall Patient Progress: no changeOverall Patient Progress: no change    Outcome Evaluation: Anticipate discharge home with OP PT

## 2024-07-25 NOTE — H&P
[facility]    History and Physical - Hospitalist Service       Date of Admission:  7/24/2024    Assessment & Plan      Lindsey Gary is a 84 year old female admitted on 7/24/2024. She has a past medical history significant for diabetes mellitus type 2, chronic kidney disease, hypertension, hyperlipidemia, gout, osteoarthritis, and latent tuberculosis.  She was sent to the emergency room from primary care provider's office due to acute kidney injury in setting of chronic kidney disease with mild chronic hyperkalemia.    Acute kidney injury on chronic kidney disease stage IV.  -Baseline creatinine appears to be around 2.  -Creatinine today 3.3.  -Hold lisinopril and furosemide.  -Start continuous IV fluids.  -Nephrology consult.  -Hold gabapentin.    Hyperkalemia.  -Mild.  Potassium 5.7.  -Appears chronic over the past at least 3 months.  -Had several potassium shifting agents in the ER.  -Recheck potassium now.  -Hold lisinopril.  -If she does need treatment for hyperkalemia on recheck, give Lokelma.  -Nephrology consult.    Hypertension.  -Hold lisinopril and furosemide.  -Likely restart other home antihypertensives once verified by pharmacy.  -Hydralazine if needed.    Diabetes mellitus type 2.  -Aspart insulin sliding scale as needed.  -Restart home insulins once verified by pharmacy.    Latent tuberculosis.  -Restart isoniazid once verified by pharmacy.    Hyperlipidemia.  -Restart statin once verified by pharmacy.    GERD.  -Pantoprazole 40 mg a day.     Observation Goals:   Diet: Regular Diet Adult    DVT Prophylaxis: Pneumatic Compression Devices  Arauz Catheter: Not present  Lines: None     Cardiac Monitoring: ACTIVE order. Indication: Tachyarrhythmias, acute (48 hours)  Code Status: Full Code      Clinically Significant Risk Factors Present on Admission        # Hyperkalemia: Highest K = 5.7 mmol/L in last 2 days, will monitor as appropriate          # Acute Kidney Injury, unspecified:  "based on a >150% or 0.3 mg/dL increase in last creatinine compared to past 90 day average, will monitor renal function  # Hypertension: Noted on problem list    # Anemia: based on hgb <11      # DMII: A1C = 7.7 % (Ref range: 0.0 - 5.6 %) within past 6 months    # Obesity: Estimated body mass index is 32.09 kg/m  as calculated from the following:    Height as of this encounter: 1.626 m (5' 4\").    Weight as of this encounter: 84.8 kg (186 lb 15.2 oz).              Disposition Plan     Medically Ready for Discharge: Anticipated Tomorrow           Hank Do DO  Hospitalist Service  [facility]  Securely message with [employer] (more info)  Text page via [employer] Paging/Directory     ______________________________________________________________________    Chief Complaint   Abnormal labs.    History is obtained from the patient    History of Present Illness   Lindsey Gary is a 84 year old female who has a past medical history significant for diabetes mellitus type 2, chronic kidney disease, hypertension, hyperlipidemia, gout, osteoarthritis, and latent tuberculosis.  She had lab testing done at her primary care provider's office recently.  Was called today by primary care provider and directed to emergency room due to abnormal labs.  She has not been having any other complaints recently.      Past Medical History    Past Medical History:   Diagnosis Date    Arthritis     Essential hypertension, benign     Fuchs' corneal dystrophy     bilateral - left cornea replacement  done and right planned    Gout     Mixed hyperlipidemia     Mumps     Pain in joint, ankle and foot     gout    Type II or unspecified type diabetes mellitus without mention of complication, not stated as uncontrolled     Diabetes mellitus       Past Surgical History   Past Surgical History:   Procedure Laterality Date    BLADDER SURGERY      COLONOSCOPY      COLONOSCOPY N/A 12/17/2014    Procedure: COMBINED COLONOSCOPY, SINGLE " OR MULTIPLE BIOPSY/POLYPECTOMY BY BIOPSY;  Surgeon: Chuy Staton MD;  Location:  GI    COLONOSCOPY N/A 08/07/2020    Procedure: COLONOSCOPY;  Surgeon: Chuy Staton MD;  Location:  GI    CYSTOSCOPY      EYE SURGERY Left 01/2023    cornea transplant - FUCHS distrophy    HYSTERECTOMY, PAP NO LONGER INDICATED      HYSTERECTOMY, JAIME  1991    fibroid    ORTHOPEDIC SURGERY Left 07/2016    partial knee replacement    SURGICAL HISTORY OF -   10/28/2015    right partial knee     Z NONSPECIFIC PROCEDURE      Breast biopsies       Z NONSPECIFIC PROCEDURE      Symptomatic left thigh lipomas       ZZ NONSPECIFIC PROCEDURE  06/01/2009    pubovaginal sling       Prior to Admission Medications   Prior to Admission Medications   Prescriptions Last Dose Informant Patient Reported? Taking?   Lactobacillus (PROBIOTIC ACIDOPHILUS) TABS Unknown at PRN  Yes Yes   Sig: Take 1 capsule by mouth as needed   TUMS 500 MG OR CHEW Unknown at PRN  Yes Yes   Sig: Take 1 chew tab by mouth 3 times daily as needed   acetaminophen (TYLENOL) 500 MG tablet 7/24/2024 at am  Yes Yes   Sig: Take 2 tablets by mouth every 8 hours as needed    allopurinol (ZYLOPRIM) 300 MG tablet 7/23/2024 at pm  No Yes   Sig: Take 1/2 (one-half) tablet by mouth twice daily   cycloSPORINE (RESTASIS) 0.05 % ophthalmic emulsion Unknown at PRN  Yes Yes   Sig: Place 1 drop into both eyes as needed   diclofenac (VOLTAREN) 1 % topical gel Unknown at PRN  Yes Yes   Sig: Apply topically 4 times daily as needed for moderate pain   estradiol (ESTRACE) 0.1 MG/GM vaginal cream Unknown at PRN  Yes Yes   Sig: Place vaginally as needed   famotidine (PEPCID) 20 MG tablet Unknown at PRN  Yes Yes   Sig: Take 20 mg by mouth as needed   fluconazole (DIFLUCAN) 150 MG tablet Unknown at PRN  No Yes   Sig: Take 1 tablet (150 mg) by mouth once as needed (yeast infection)   fluorometholone (FML LIQUIFILM) 0.1 % ophthalmic suspension 7/23/2024 at AM  Yes Yes   Sig: Place 1 drop Into  the left eye daily   folic acid (FOLVITE) 1 MG tablet HASN'T STARTED  Yes Yes   Sig: Take 1 tablet by mouth daily   furosemide (LASIX) 20 MG tablet 7/23/2024 at AM  No Yes   Sig: Take 2 tablets by mouth once daily   gabapentin (NEURONTIN) 300 MG capsule 7/23/2024 at PM  No Yes   Sig: TAKE 3 CAPSULES BY MOUTH AT BEDTIME   glipiZIDE (GLUCOTROL) 10 MG tablet 7/23/2024 at PM  No Yes   Sig: TAKE 1 TABLET BY MOUTH TWICE DAILY BEFORE MEAL(S)   insulin  UNIT/ML vial 7/23/2024 at AM  Yes Yes   Sig: Inject 44 Units subcutaneously every morning   insulin  UNIT/ML vial 7/23/2024 at PM  Yes Yes   Sig: Inject 22 Units subcutaneously every evening AT 5 PM   isoniazid (NYDRAZID) 300 MG tablet 7/23/2024 at AM  Yes Yes   Sig: Take 1 tablet (300 mg) by mouth daily   lisinopril (ZESTRIL) 40 MG tablet 7/23/2024 at AM  No Yes   Sig: Take 1 tablet by mouth once daily   methotrexate 2.5 MG tablet HASN'T STARTED  Yes Yes   Sig: Take 15 mg by mouth every 7 days   metoprolol succinate ER (TOPROL XL) 50 MG 24 hr tablet 7/23/2024 at PM  No Yes   Sig: Take 1 tablet by mouth once daily   metroNIDAZOLE (METROGEL) 0.75 % external gel Unknown at PRN  Yes Yes   Sig: Apply topically 2 times daily as needed   omeprazole (PRILOSEC) 20 MG DR capsule 7/23/2024 at AM  No Yes   Sig: Take 1 capsule (20 mg) by mouth daily   rosuvastatin (CRESTOR) 20 MG tablet 7/23/2024 at AM  No Yes   Sig: Take 1 tablet (20 mg) by mouth daily   vitamin B6 (PYRIDOXINE) 50 MG TABS 7/23/2024 at AM  Yes Yes   Sig: Take 50 mg by mouth daily      Facility-Administered Medications: None        Allergies   Allergies   Allergen Reactions    Amoxicillin-Pot Clavulanate      Tongue swelling and rash     Can take PCN K without reaction     Sulfa Antibiotics      Tongue swelling and rash        Physical Exam   Vital Signs: Temp: 97.5  F (36.4  C) Temp src: Oral BP: 131/41 Pulse: 68   Resp: 17 SpO2: 100 % O2 Device: None (Room air)    Weight: 186 lbs 15.2 oz    Gen:  NAD,  A&Ox3.  Eyes:  PERRL, sclera anicteric.  OP:  MMM, no lesions.  Neck:  Supple.  CV:  Regular, no murmurs.  Lung:  CTA b/l, normal effort.  Ab:  +BS, soft.  Skin:  Warm, dry to touch.  No rash.  Ext:  No pitting edema LE b/l.      Medical Decision Making       65 MINUTES SPENT BY ME on the date of service doing chart review, history, exam, documentation & further activities per the note.      Data     I have personally reviewed the following data over the past 24 hrs:    7.4  \   9.9 (L)   / 180     136 106 109.6 (H) /  221 (H)   5.7 (H) 16 (L) 3.29 (H) \       Imaging results reviewed over the past 24 hrs:   No results found for this or any previous visit (from the past 24 hour(s)).

## 2024-07-25 NOTE — PROVIDER NOTIFICATION
Paged crosscover regarding pts. Blood sugars.     4294: Hello, I just took this pts blood sugar 2 hrs post dextrose 10% infusion, and it was 289. According to the order, I am to notify if blood sugar is over 250. Do you want any additional insulin to be given? Thanks.     Dr. Weber acknowledged and placed orders.

## 2024-07-25 NOTE — PLAN OF CARE
"A&O x4. Assist of 1, GB. NS infusing at 100 mL/hr through L. PIV. On tele, in SR. Nephro and PT are consulted. Plan to potentially discharge today, depending on potassium results.     Goal Outcome Evaluation:      Plan of Care Reviewed With: patient    Overall Patient Progress: improvingOverall Patient Progress: improving    Outcome Evaluation: Potassium: 4.5. Bg elevated but trending down. Fall band in place. BP stable.      Problem: Adult Inpatient Plan of Care  Goal: Plan of Care Review  Description: The Plan of Care Review/Shift note should be completed every shift.  The Outcome Evaluation is a brief statement about your assessment that the patient is improving, declining, or no change.  This information will be displayed automatically on your shift  note.  Outcome: Progressing  Flowsheets (Taken 7/25/2024 0315)  Outcome Evaluation: Potassium: 4.5. Bg elevated but trending down. Fall band in place. BP stable.  Plan of Care Reviewed With: patient  Overall Patient Progress: improving  Goal: Patient-Specific Goal (Individualized)  Description: You can add care plan individualizations to a care plan. Examples of Individualization might be:  \"Parent requests to be called daily at 9am for status\", \"I have a hard time hearing out of my right ear\", or \"Do not touch me to wake me up as it startles  me\".  Outcome: Progressing  Goal: Absence of Hospital-Acquired Illness or Injury  Outcome: Progressing  Intervention: Identify and Manage Fall Risk  Recent Flowsheet Documentation  Taken 7/24/2024 2200 by Malaika Hadley RN  Safety Promotion/Fall Prevention: safety round/check completed  Intervention: Prevent Skin Injury  Recent Flowsheet Documentation  Taken 7/25/2024 0052 by Malaika Hadley RN  Body Position: position changed independently  Taken 7/24/2024 2332 by Malaika Hadley, RN  Body Position: weight shifting  Taken 7/24/2024 2200 by Malaika Hadley RN  Body Position: position changed independently  Goal: Optimal Comfort and " Wellbeing  Outcome: Progressing  Intervention: Monitor Pain and Promote Comfort  Recent Flowsheet Documentation  Taken 7/25/2024 0034 by Malaika Hadley, RN  Pain Management Interventions: rest  Taken 7/24/2024 2332 by Malaika Hadley, RN  Pain Management Interventions: (PRN tylenol)   medication (see MAR)   repositioned  Goal: Readiness for Transition of Care  Outcome: Progressing  Intervention: Mutually Develop Transition Plan  Recent Flowsheet Documentation  Taken 7/24/2024 2232 by Malaika Hadley, RN  Equipment Currently Used at Home:   raised toilet seat   grab bar, tub/shower     Problem: Fall Injury Risk  Goal: Absence of Fall and Fall-Related Injury  Outcome: Progressing  Intervention: Promote Injury-Free Environment  Recent Flowsheet Documentation  Taken 7/24/2024 2200 by Malaika Hadley, RN  Safety Promotion/Fall Prevention: safety round/check completed     Problem: Comorbidity Management  Goal: Blood Glucose Levels Within Targeted Range  Outcome: Progressing     Problem: Electrolyte Imbalance  Goal: Electrolyte Balance  Outcome: Progressing

## 2024-07-25 NOTE — PHARMACY-ADMISSION MEDICATION HISTORY
Pharmacy Intern Admission Medication History    Admission medication history is complete. The information provided in this note is only as accurate as the sources available at the time of the update.    Information Source(s): Patient and CareEverywhere/SureScripts via in-person    Pertinent Information: None    Changes made to PTA medication list:  Added: None  Deleted: Vit D  Changed:   Schedule to PRN  Estradiol  PEPCID  METROGEL  B6 2 TABS->1 TAB  For patients on insulin therapy:  Do you use sliding scale insulin based on blood sugars? N/A  Do you typically eat three meals a day? No (2 MEALS)  How many times do you check your blood glucose per day? SENSOR (continuous)  How many episodes of hypoglycemia do you typically have per month? NONE     Allergies reviewed with patient and updates made in EHR: yes    Medication History Completed By: Billy Berumen 7/24/2024 7:21 PM    PTA Med List   Medication Sig Last Dose    acetaminophen (TYLENOL) 500 MG tablet Take 2 tablets by mouth every 8 hours as needed  7/24/2024 at am    allopurinol (ZYLOPRIM) 300 MG tablet Take 1/2 (one-half) tablet by mouth twice daily 7/23/2024 at pm    cycloSPORINE (RESTASIS) 0.05 % ophthalmic emulsion Place 1 drop into both eyes as needed Unknown at PRN    diclofenac (VOLTAREN) 1 % topical gel Apply topically 4 times daily as needed for moderate pain Unknown at PRN    estradiol (ESTRACE) 0.1 MG/GM vaginal cream Place vaginally as needed Unknown at PRN    famotidine (PEPCID) 20 MG tablet Take 20 mg by mouth as needed Unknown at PRN    fluconazole (DIFLUCAN) 150 MG tablet Take 1 tablet (150 mg) by mouth once as needed (yeast infection) Unknown at PRN    fluorometholone (FML LIQUIFILM) 0.1 % ophthalmic suspension Place 1 drop Into the left eye daily 7/23/2024 at AM    folic acid (FOLVITE) 1 MG tablet Take 1 tablet by mouth daily HASN'T STARTED    furosemide (LASIX) 20 MG tablet Take 2 tablets by mouth once daily 7/23/2024 at AM    gabapentin  (NEURONTIN) 300 MG capsule TAKE 3 CAPSULES BY MOUTH AT BEDTIME 7/23/2024 at PM    glipiZIDE (GLUCOTROL) 10 MG tablet TAKE 1 TABLET BY MOUTH TWICE DAILY BEFORE MEAL(S) 7/23/2024 at PM    insulin  UNIT/ML vial Inject 44 Units subcutaneously every morning 7/23/2024 at AM    insulin  UNIT/ML vial Inject 22 Units subcutaneously every evening AT 5 PM 7/23/2024 at PM    isoniazid (NYDRAZID) 300 MG tablet Take 1 tablet (300 mg) by mouth daily 7/23/2024 at AM    Lactobacillus (PROBIOTIC ACIDOPHILUS) TABS Take 1 capsule by mouth as needed Unknown at PRN    lisinopril (ZESTRIL) 40 MG tablet Take 1 tablet by mouth once daily 7/23/2024 at AM    methotrexate 2.5 MG tablet Take 15 mg by mouth every 7 days HASN'T STARTED    metoprolol succinate ER (TOPROL XL) 50 MG 24 hr tablet Take 1 tablet by mouth once daily 7/23/2024 at PM    metroNIDAZOLE (METROGEL) 0.75 % external gel Apply topically 2 times daily as needed Unknown at PRN    omeprazole (PRILOSEC) 20 MG DR capsule Take 1 capsule (20 mg) by mouth daily 7/23/2024 at AM    rosuvastatin (CRESTOR) 20 MG tablet Take 1 tablet (20 mg) by mouth daily 7/23/2024 at AM    TUMS 500 MG OR CHEW Take 1 chew tab by mouth 3 times daily as needed Unknown at PRN    vitamin B6 (PYRIDOXINE) 50 MG TABS Take 50 mg by mouth daily 7/23/2024 at AM

## 2024-07-25 NOTE — PROGRESS NOTES
PRIMARY DIAGNOSIS: GENERALIZED WEAKNESS    OUTPATIENT/OBSERVATION GOALS TO BE MET BEFORE DISCHARGE  1. Orthostatic performed: No    2. Tolerating PO medications: Yes    3. Return to near baseline physical activity: No    4. Cleared for discharge by consultants (if involved): No    Discharge Planner Nurse   Safe discharge environment identified: Yes  Barriers to discharge: Yes       Entered by: Malaika Hadley RN 07/25/2024 5:25 AM     Please review provider order for any additional goals.   Nurse to notify provider when observation goals have been met and patient is ready for discharge.

## 2024-07-25 NOTE — PROGRESS NOTES
"    [facility]     Hospitalist Progress Note     Assessment & Plan     ASSESSMENT    84F with hx of HFpEF on lasix, HTN on Lisinopril, IDDM Type II, CKD Stage III, and latent tb on isoniazid presents with abnormal labs and found to have HIGINIO on CKD Stage III that resolved with IVF. Also with subacute LUE dysmetria and visual changes that are most likely 2/2 isoniazid use but will be obtaining MRI brain today to rule out stroke.    PLAN    HIGINIO on CKD Stage III  -BL Cr 2.1, 3.29 on adm  -Largely asymptomatic (no recent illness, n/v, diarrhea)  -Resolved with IVF suggesting this was pre-renal  PLAN  -Nephrology consulted, ordered additional IVF  -Hold Lisinopril and Lasix, may need to dose-adjust for discharge    LUE Dysmetria & Concern for Stroke  -Has tremor and dysmetria of LUE that suspect 2/2 isoniazid use  -Has been going on for days-to-weeks so long outside tpa window  -Will obtain MRI brain to rule out stroke, further workup if MRI is positive    Other Issues  -Essential HTN: Hold Lisinopril in setting of HIGINIO  -Latent TB: Home Isoniazid and B6  -Chronic HFpEF: Hold lasix in setting of HIGINIO  -IDDM Type II: MDSS + home NPH    DVT Prophy  -SCDs    Disposition  Medically Ready for Discharge: Anticipated Tomorrow       Ton Terry MD    Subjective     Seen at bedside. Denies n/v or diarrhea. Does note new dysmetria of LUE over last few days.        Objective   Blood pressure 111/45, pulse 67, temperature 98.4  F (36.9  C), temperature source Axillary, resp. rate 16, height 1.626 m (5' 4\"), weight 84.8 kg (186 lb 15.2 oz), SpO2 99%, not currently breastfeeding.    PHYSICAL EXAM  General: In no acute distress  CV: RRR.  Lungs: CTAB. Nl WOB.  Abd: Non-tender.  Ext: No edema.  Neuro: Mild dysmetria of LUE. Strength and sensation intact. CN II-XII intact.    LABS AND IMAGING  Reviewed and pertinent results discussed in assessment and plan.   "

## 2024-07-25 NOTE — PROGRESS NOTES
PRIMARY DIAGNOSIS: GENERALIZED WEAKNESS    OUTPATIENT/OBSERVATION GOALS TO BE MET BEFORE DISCHARGE  1. Orthostatic performed: No    2. Tolerating PO medications: Yes    3. Return to near baseline physical activity: No    4. Cleared for discharge by consultants (if involved): No    Discharge Planner Nurse   Safe discharge environment identified: Yes  Barriers to discharge: Yes       Entered by: Malaika Hadley RN 07/25/2024 3:14 AM     Please review provider order for any additional goals.   Nurse to notify provider when observation goals have been met and patient is ready for discharge.

## 2024-07-25 NOTE — PROGRESS NOTES
"PRIMARY DIAGNOSIS: \"GENERIC\" NURSING  OUTPATIENT/OBSERVATION GOALS TO BE MET BEFORE DISCHARGE:  ADLs back to baseline: Yes    Activity and level of assistance: Ambulating independently.    Pain status: Pain free.    Return to near baseline physical activity: Yes     Discharge Planner Nurse   Safe discharge environment identified: Yes  Barriers to discharge: No       Entered by: Abeba Stovall RN 07/25/2024 3:46 PM     Please review provider order for any additional goals.   Nurse to notify provider when observation goals have been met and patient is ready for discharge.  "

## 2024-07-25 NOTE — DISCHARGE SUMMARY
"[facility]  Discharge Summary    Admit date:  7/24/2024    Discharge date and time: 7/25/2024    Discharge Physician: Ton Terry MD    Primary care provider: Dinorah Wong    Primary Discharge Diagnosis      HIGINIO on CKD Stage III     Secondary Diagnoses     LUE Dysmetria  Essential HTN  Latent TB  Chronic HFpEF  IDDM Type II    Summary of Hospital Stay     84F with hx of HFpEF on lasix, HTN on Lisinopril, IDDM Type II, CKD Stage III, and latent tb on isoniazid presented with abnormal labs and found to have HIGINIO on CKD Stage III that resolved with IVF. Also with subacute LUE dysmetria and visual changes that are most likely 2/2 isoniazid use. MRI of brain negative for stroke.     MEDICATION CHANGES  -Lasix 40mg every day --> 20mg every day  -Discontinue Lisinopril 40mg every day   -Start Nifedipine 30mg ER every day    FOLLOW-UP  -Follow-up with PCP (need repeat BMP prior to visit, this has been ordered)    Patient Discharge Condition & Exam     Discharge condition: Improved    /56 (BP Location: Right arm)   Pulse 67   Temp 98.7  F (37.1  C) (Oral)   Resp 16   Ht 1.626 m (5' 4\")   Wt 84.8 kg (186 lb 15.2 oz)   LMP  (LMP Unknown)   SpO2 98%   BMI 32.09 kg/m       General: In NAD.  Cardiac: RRR.  Lungs: CTAB.  Abd: Non-tender.  Ext: No edema.    Discharge Instructions     Patient/family instructions: Written discharge instruction given to patient/family    Discharge Medications:     Review of your medicines        START taking        Dose / Directions   NIFEdipine ER 30 MG 24 hr tablet  Commonly known as: ADALAT CC  Used for: Essential hypertension      Dose: 30 mg  Take 1 tablet (30 mg) by mouth daily  Quantity: 30 tablet  Refills: 6            CONTINUE these medicines which may have CHANGED, or have new prescriptions. If we are uncertain of the size of tablets/capsules you have at home, strength may be listed as something that might have changed.        Dose / Directions "   furosemide 20 MG tablet  Commonly known as: LASIX  This may have changed: how much to take  Used for: Essential hypertension      Dose: 20 mg  Take 1 tablet (20 mg) by mouth daily  Quantity: 180 tablet  Refills: 0            CONTINUE these medicines which have NOT CHANGED        Dose / Directions   acetaminophen 500 MG tablet  Commonly known as: TYLENOL  Used for: Mixed hyperlipidemia, GOUT NOS, Type 2 diabetes, HbA1c goal < 7% (H), Hyperlipidemia LDL goal <100      Dose: 2 tablet  Take 2 tablets by mouth every 8 hours as needed  Refills: 0     allopurinol 300 MG tablet  Commonly known as: ZYLOPRIM  Used for: Gout, unspecified cause, unspecified chronicity, unspecified site      Take 1/2 (one-half) tablet by mouth twice daily  Quantity: 90 tablet  Refills: 3     diclofenac 1 % topical gel  Commonly known as: VOLTAREN      Apply topically 4 times daily as needed for moderate pain  Refills: 0     estradiol 0.1 MG/GM vaginal cream  Commonly known as: ESTRACE      Place vaginally as needed  Refills: 0     famotidine 20 MG tablet  Commonly known as: PEPCID      Dose: 20 mg  Take 20 mg by mouth as needed  Refills: 0     fluconazole 150 MG tablet  Commonly known as: DIFLUCAN  Used for: Yeast infection of the vagina      Dose: 150 mg  Take 1 tablet (150 mg) by mouth once as needed (yeast infection)  Quantity: 30 tablet  Refills: 3     fluorometholone 0.1 % ophthalmic suspension  Commonly known as: FML LIQUIFILM      Dose: 1 drop  Place 1 drop Into the left eye daily  Refills: 0     folic acid 1 MG tablet  Commonly known as: FOLVITE      Dose: 1 tablet  Take 1 tablet by mouth daily  Refills: 0     gabapentin 300 MG capsule  Commonly known as: NEURONTIN  Used for: Neuropathy      TAKE 3 CAPSULES BY MOUTH AT BEDTIME  Quantity: 270 capsule  Refills: 3     glipiZIDE 10 MG tablet  Commonly known as: GLUCOTROL  Used for: Type 2 diabetes mellitus without complication, without long-term current use of insulin (H)      Dose: 10  mg  TAKE 1 TABLET BY MOUTH TWICE DAILY BEFORE MEAL(S)  Quantity: 180 tablet  Refills: 0     * insulin  UNIT/ML vial      Dose: 44 Units  Inject 44 Units subcutaneously every morning  Refills: 0     * insulin  UNIT/ML vial      Dose: 22 Units  Inject 22 Units subcutaneously every evening AT 5 PM  Refills: 0     isoniazid 300 MG tablet  Commonly known as: NYDRAZID      Dose: 300 mg  Take 1 tablet (300 mg) by mouth daily  Refills: 0     methotrexate 2.5 MG tablet      Dose: 15 mg  Take 15 mg by mouth every 7 days  Refills: 0     metoprolol succinate ER 50 MG 24 hr tablet  Commonly known as: TOPROL XL  Used for: Hyperlipidemia LDL goal <100, Hypertension goal BP (blood pressure) < 130/80      Take 1 tablet by mouth once daily  Quantity: 90 tablet  Refills: 3     metroNIDAZOLE 0.75 % external gel  Commonly known as: METROGEL      Apply topically 2 times daily as needed  Refills: 0     omeprazole 20 MG DR capsule  Commonly known as: PriLOSEC  Used for: Gastroesophageal reflux disease with esophagitis without hemorrhage      Dose: 20 mg  Take 1 capsule (20 mg) by mouth daily  Quantity: 90 capsule  Refills: 3     Probiotic Acidophilus Tabs      Dose: 1 capsule  Take 1 capsule by mouth as needed  Refills: 0     Restasis 0.05 % ophthalmic emulsion  Generic drug: cycloSPORINE      Dose: 1 drop  Place 1 drop into both eyes as needed  Refills: 0     rosuvastatin 20 MG tablet  Commonly known as: CRESTOR  Used for: Hyperlipidemia LDL goal <100      Dose: 20 mg  Take 1 tablet (20 mg) by mouth daily  Quantity: 90 tablet  Refills: 3     Tums 500 MG chewable tablet  Generic drug: calcium carbonate      Dose: 1 chew tab  Take 1 chew tab by mouth 3 times daily as needed  Quantity: 90  Refills: 0     vitamin B6 50 MG Tabs  Commonly known as: pyridOXINE      Dose: 50 mg  Take 50 mg by mouth daily  Refills: 0           * This list has 2 medication(s) that are the same as other medications prescribed for you. Read the  directions carefully, and ask your doctor or other care provider to review them with you.                STOP taking      lisinopril 40 MG tablet  Commonly known as: ZESTRIL                  Where to get your medicines        These medications were sent to [facility] [address] [place] [address] - [id number] [place] [address]  [address] [facility] [address]      Phone: [phone]   furosemide 20 MG tablet  NIFEdipine ER 30 MG 24 hr tablet        Discharge diet: regular diet    Discharge activity: Activity as tolerated    Discharge follow-up:    Follow up with primary care provider within one week or earlier if symptoms return or gets worse.    Follow up with consultant as instructed.    Pending Results     Unresulted Labs Ordered in the Past 30 Days of this Admission       No orders found for last 31 day(s).             Patient Allergies     Allergies   Allergen Reactions    Amoxicillin-Pot Clavulanate      Tongue swelling and rash     Can take PCN K without reaction     Sulfa Antibiotics      Tongue swelling and rash     Disposition   Disposition: home     I saw and evaluated the patient on day of discharge and  discharge instructions reviewed  and  all the patient's questions and concerns addressed. Over 30 minutes spent on discharge and coordination of discharge process for this patient.

## 2024-07-25 NOTE — PROGRESS NOTES
[employer] notified of a BG of 289.  This is after the 4-hour infusion of D10 following treatment for hyperkalemia.  She is on moderate doses of NPH at baseline for diabetes.  -Give 8 units aspart now

## 2024-07-25 NOTE — PROGRESS NOTES
[facility]  OUTPATIENT PHYSICAL THERAPY EVALUATION  PLAN OF TREATMENT FOR OUTPATIENT REHABILITATION  (COMPLETE FOR INITIAL CLAIMS ONLY)  Patient's Last Name, First Name, M.I.  YOB: 1940  Lindsey Gary                        Provider's Name  [facility] Medical Record No.  [id number]                             Onset Date:  07/24/24   Start of Care Date:   07/25/24     Type:     _X_PT   ___OT   ___SLP Medical Diagnosis:  HIGINIO            PT Diagnosis:  decreased functional mobility Visits from SOC:  1     See note for plan of treatment, functional goals and certification details    I CERTIFY THE NEED FOR THESE SERVICES FURNISHED UNDER        THIS PLAN OF TREATMENT AND WHILE UNDER MY CARE     (Physician co-signature of this document indicates review and certification of the therapy plan).               07/25/24 1025   Appointment Info   Signing Clinician's Name / Credentials (PT) CHRISTINE Jefferson   Student Supervision Direct supervision provided;Therapy services provided with the co-signing licensed therapist guiding and directing the services, and providing the skilled judgement and assessment throughout the session   Living Environment   People in Home spouse   Current Living Arrangements other (see comments)  ([facility])   Home Accessibility stairs to enter home;stairs within home   Number of Stairs, Main Entrance 2   Stair Railings, Main Entrance railing on right side (ascending)   Number of Stairs, Within Home, Primary greater than 10 stairs   Stair Railings, Within Home, Primary railing on left side (ascending)   Transportation Anticipated car, drives self   [place]  [place] adult daughter in [place].  Pt and spouse feel sufficient with support system   Self-Care   Usual Activity Tolerance good   Current Activity Tolerance moderate   Regular Exercise No   Equipment  "Currently Used at Home raised toilet seat;grab bar, tub/shower   Fall history within last six months no   Activity/Exercise/Self-Care Comment Pt does not have any AD at home and uses no AD at baseline   General Information   Onset of Illness/Injury or Date of Surgery 07/24/24   Referring Physician Hank Do, DO   Patient/Family Therapy Goals Statement (PT) return to home   Pertinent History of Current Problem (include personal factors and/or comorbidities that impact the POC) Per H&P, \"Lindsey Gary is a 84 year old female admitted on 7/24/2024. She has a past medical history significant for diabetes mellitus type 2, chronic kidney disease, hypertension, hyperlipidemia, gout, osteoarthritis, and latent tuberculosis.  She was sent to the emergency room from primary care provider's office due to acute kidney injury in setting of chronic kidney disease with mild chronic hyperkalemia\"   Existing Precautions/Restrictions fall   Cognition   Affect/Mental Status (Cognition) WFL   Orientation Status (Cognition) oriented x 4   Follows Commands (Cognition) WFL   Pain Assessment   Patient Currently in Pain No   Integumentary/Edema   Integumentary/Edema other (describe)   Integumentary/Edema Comments Normal aging and bruising around IV line   Posture    Posture Forward head position;Protracted shoulders;Kyphosis   Range of Motion (ROM)   Range of Motion Neck (Group);Trunk (Group)   ROM Comment Decreased cervical and trunk elevation and rotation   Strength (Manual Muscle Testing)   Strength (Manual Muscle Testing) strength is WFL   Bed Mobility   Bed Mobility supine-sit   Supine-Sit [place] (Bed Mobility) independent   Transfers   Transfers sit-stand transfer   Sit-Stand Transfer   Sit-Stand [place] (Transfers) independent   Gait/Stairs (Locomotion)   [place] Level (Gait) supervision   Distance in Feet (Gait) 10   Pattern (Gait) step-through   Deviations/Abnormal Patterns (Gait) gait speed " decreased;stride length decreased   Balance   Balance other (describe)   Balance Comments Pt demonstrated decreased overall balance while standing and ambulating   Sensory Examination   Sensory Perception patient reports no sensory changes   Coordination   Coordination no deficits were identified   Muscle Tone   Muscle Tone no deficits were identified   Clinical Impression   Criteria for Skilled Therapeutic Intervention Yes, treatment indicated   PT Diagnosis (PT) decreased functional mobility   Influenced by the following impairments impaired posture, ROM, and overall balance   Functional limitations due to impairments decreased gait and stairs ability   Clinical Presentation (PT Evaluation Complexity) stable   Clinical Presentation Rationale Pt is medically stable   Clinical Decision Making (Complexity) low complexity   Planned Therapy Interventions (PT) balance training;gait training;home exercise program;neuromuscular re-education;patient/family education;postural re-education;ROM (range of motion);stair training;progressive activity/exercise   Risk & Benefits of therapy have been explained evaluation/treatment results reviewed;care plan/treatment goals reviewed;current/potential barriers reviewed;risks/benefits reviewed;participants voiced agreement with care plan;participants included;patient;spouse/significant other   PT Total Evaluation Time   PT Eval, Low Complexity Minutes (46290) 10   Physical Therapy Goals   PT Frequency One time eval and treatment only   PT Predicted Duration/Target Date for Goal Attainment 07/26/24   PT Goals Gait;Stairs   PT: Gait Independent;Standard walker;100 feet   PT: Stairs Supervision/stand-by assist;2 stairs;Rail on right   Interventions   Interventions Quick Adds Therapeutic Activity;Gait Training   Therapeutic Activity   Therapeutic Activities: dynamic activities to improve functional performance Minutes (72140) 10   Symptoms Noted During/After Treatment None   Treatment  Detail/Skilled Intervention Pt greeted supine in bed, spouse in room, agreeable to PT.  Instructed pt to perform supine <> sit transfer with flat bed.  Pt performed IND.  Instructed pt to perform sit <> stand with no AD.  Pt performed IND.  VCing for hand placement, technique, and controlled descent.  Educated pt and spouse on return to OP PT rec.  End of session, pt supine in bed, spouse in room, alarm on, RN notifed.   Gait Training   Gait Training Minutes (59737) 10   Symptoms Noted During/After Treatment (Gait Training) fatigue   Treatment Detail/Skilled Intervention Instructed pt to amb ~150' with no AD and SBA, progressing to IND.  PT demonstrated flexed cervical and trunk posture, as well as decreased gait speed and step length.  Educated pt on strategies to correct posture.  Pt unable to correct posture with VCing.  Pt would reach for railings upon walking in hallway, but demonstrated SBA/IND gait when directed not to hold railings.  Instructed pt to negotiate 2x stairs with railing on R asceding and no AD, SBA.  VCing for step patterning and hand placement on rail.   Distance in Feet 150   [place] Level (Gait Training) independent   PT Discharge Planning   PT Plan d/c   PT Discharge Recommendation (DC Rec) home with outpatient physical therapy   PT Rationale for DC Rec Pt is near baseline of being IND for functional mobility.  Pt has spouse at home and adult daughter in [facility] in the event that pt requires assistance.  Pt demonstrated flexed posture and decreased overall balance when ambulation.  Pt would benefit from OP PT to progress posture and functional mobility.   PT Brief overview of current status IND, no AD   Total Session Time   Timed Code Treatment Minutes 20   Total Session Time (sum of timed and untimed services) 30

## 2024-07-25 NOTE — PROGRESS NOTES
Care Management Discharge Note    Discharge Date: 07/25/2024       Discharge Disposition:  home with OP    Discharge Services:  OP therapies    Discharge DME:      Discharge Transportation: car, drives self    Handoff Referral Completed: No    Additional Information:  PT  recs OP. No anticipated SW needs at this time. SW signing off. Please re-consult SW if needs arise.     MALI Rodriguez, LICSW  Emergency Room   Please contact the SW on the floor in which the patient is staying for any questions or concerns

## 2024-07-25 NOTE — PLAN OF CARE
Physical Therapy Discharge Summary    Reason for therapy discharge:    All goals and outcomes met, no further needs identified.    Progress towards therapy goal(s). See goals on Care Plan in [facility] electronic health record for goal details.  Goals met    Therapy recommendation(s):    Continued therapy is recommended.  Rationale/Recommendations:  Pt is near baseline of being IND for functional mobility. Pt has spouse at home and adult daughter in [facility] in the event that pt requires assistance. Pt demonstrated flexed posture and decreased overall balance when ambulation. Pt would benefit from OP PT to progress posture and functional mobility..

## 2024-07-25 NOTE — CONSULTS
"RENAL CONSULTATION NOTE    REFERRING MD:  Hank Do DO     REASON FOR CONSULTATION:  Acute on CKD, mild hyperkalemia    HPI:  84 y.o woman with CKD III/IV, IDDM and hypertension, who presented to the ER yesterday with \"eye problem\".     Per Dr. Olivera's note, patient came in for floaters and tremor of the left hand.   I reviewed Dr. Olivera's note.   ER vital: 36.4 119/55.   Labs showed worsening  kidney function and mild  hyperkalemia.     She saw her PCP, Dr. Lawson yesterday for the above problems.  She was directed to the ER for further evaluation.     I reviewed notes from Dr. Olivera, Dr. Lawson and Dr. Do     She has had rapidly progressive kidney disease since 2022.    Admitted this time with a creatinine of 3.29 mg/dl and already improved nicely with IVF.     Her main concerns are the intermittent hands tremor L>R, hallucination and floaters x 1 week. These Symptoms have resolved.    asked she she had a stroke.  I advised him that I can't says since no head imaging was done.   Will have her primary team address their concerns.    ROS:  A complete review of systems was performed and is negative except as noted above.    PMH:    Past Medical History:   Diagnosis Date    Arthritis     Essential hypertension, benign     Fuchs' corneal dystrophy     bilateral - left cornea replacement  done and right planned    Gout     Mixed hyperlipidemia     Mumps     Pain in joint, ankle and foot     gout    Type II or unspecified type diabetes mellitus without mention of complication, not stated as uncontrolled     Diabetes mellitus       PSH:    Past Surgical History:   Procedure Laterality Date    BLADDER SURGERY      COLONOSCOPY      COLONOSCOPY N/A 12/17/2014    Procedure: COMBINED COLONOSCOPY, SINGLE OR MULTIPLE BIOPSY/POLYPECTOMY BY BIOPSY;  Surgeon: Chuy Staton MD;  Location:  GI    COLONOSCOPY N/A 08/07/2020    Procedure: COLONOSCOPY;  Surgeon: Chuy Staton MD;  Location:  GI "    CYSTOSCOPY      EYE SURGERY Left 01/2023    cornea transplant - FUCHS distrophy    HYSTERECTOMY, PAP NO LONGER INDICATED      HYSTERECTOMY, JAIME  1991    fibroid    ORTHOPEDIC SURGERY Left 07/2016    partial knee replacement    SURGICAL HISTORY OF -   10/28/2015    right partial knee     [facility] NONSPECIFIC PROCEDURE      Breast biopsies       [facility] NONSPECIFIC PROCEDURE      Symptomatic left thigh lipomas       Z NONSPECIFIC PROCEDURE  06/01/2009    pubovaginal sling       MEDICATIONS:    Current Facility-Administered Medications   Medication Dose Route Frequency Provider Last Rate Last Admin    insulin aspart (NovoLOG) injection (RAPID ACTING)  1-7 Units Subcutaneous TID AC Hank Do, DO        insulin aspart (NovoLOG) injection (RAPID ACTING)  1-5 Units Subcutaneous At Bedtime Hank Do,    2 Units at 07/24/24 2148    pantoprazole (PROTONIX) EC tablet 40 mg  40 mg Oral QAM AC Hank Do, DO   40 mg at 07/25/24 0642       ALLERGIES:    Allergies as of 07/24/2024 - Reviewed 07/24/2024   Allergen Reaction Noted    Amoxicillin-pot clavulanate  01/25/2018    Sulfa antibiotics  06/27/2002       FH:    Family History   Problem Relation Age of Onset    Cancer Mother         colon ca survivor    Cancer - colorectal Mother     Cerebrovascular Disease Father     No Known Problems Maternal Grandmother     No Known Problems Maternal Grandfather     No Known Problems Paternal Grandmother     No Known Problems Brother     No Known Problems Sister     No Known Problems Son     No Known Problems Daughter     No Known Problems Other        SH:    Social History     Socioeconomic History    Marital status:      Spouse name: Not on file    Number of children: Not on file    Years of education: Not on file    Highest education level: Not on file   Occupational History    Not on file   Tobacco Use    Smoking status: Never     Passive exposure: Never    Smokeless tobacco: Never   Vaping Use    Vaping  status: Never Used   Substance and Sexual Activity    Alcohol use: No     Alcohol/week: 0.0 standard drinks of alcohol    Drug use: No    Sexual activity: Yes     Partners: Male     Birth control/protection: Post-menopausal, Female Surgical     Comment: hyst   Other Topics Concern    Parent/sibling w/ CABG, MI or angioplasty before 65F 55M? Not Asked   Social History Narrative    Not on file     Social Determinants of Health     Financial Resource Strain: Low Risk  (11/2/2023)    Financial Resource Strain     Within the past 12 months, have you or your family members you live with been unable to get utilities (heat, electricity) when it was really needed?: No   Food Insecurity: Low Risk  (11/2/2023)    Food Insecurity     Within the past 12 months, did you worry that your food would run out before you got money to buy more?: No     Within the past 12 months, did the food you bought just not last and you didn t have money to get more?: No   Transportation Needs: Low Risk  (11/2/2023)    Transportation Needs     Within the past 12 months, has lack of transportation kept you from medical appointments, getting your medicines, non-medical meetings or appointments, work, or from getting things that you need?: No   Physical Activity: Not on file   Stress: Not on file   Social Connections: Not on file   Interpersonal Safety: Low Risk  (11/2/2023)    Interpersonal Safety     Do you feel physically and emotionally safe where you currently live?: Yes     Within the past 12 months, have you been hit, slapped, kicked or otherwise physically hurt by someone?: No     Within the past 12 months, have you been humiliated or emotionally abused in other ways by your partner or ex-partner?: No   Housing Stability: Low Risk  (11/2/2023)    Housing Stability     Do you have housing? : Yes     Are you worried about losing your housing?: No       PHYSICAL EXAM:    /45 (BP Location: Right arm)   Pulse 67   Temp 98.4  F (36.9  C)  "(Axillary)   Resp 16   Ht 1.626 m (5' 4\")   Wt 84.8 kg (186 lb 15.2 oz)   LMP  (LMP Unknown)   SpO2 99%   BMI 32.09 kg/m    GENERAL:  NAD  HEENT:  Normocephalic. No gross abnormalities.  Pupils equal.  MMM.  Dentition is ok.  CV: RRR, no MGR  RESP: Clear bilaterally with good efforts. No wheezes or crackles.   GI: Abdomen obese, soft, NT  MUSCULOSKELETAL: extremities nl - no gross deformities noted. No edema  SKIN: no suspicious lesions or rashes, dry to touch  NEURO:  Awake, alert and conversing normally.  PSYCH: mood good, affect appropriate  LYMPH: No palpable ant/post cervical a    LABS:      CBC RESULTS:     Recent Labs   Lab 07/25/24  0625 07/24/24  1402   WBC 4.5 7.4   RBC 3.23* 3.76*   HGB 8.6* 9.9*   HCT 26.9* 32.3*   * 180       BMP RESULTS:  Recent Labs   Lab 07/25/24  0736 07/25/24  0625 07/24/24  2347 07/24/24  2248 07/24/24 2218 07/24/24 2148 07/24/24 2044 07/24/24 2005 07/24/24  1636 07/24/24  1402   NA  --  144  --   --   --   --   --   --   --  136   POTASSIUM  --  4.8  --   --   --   --   --  4.5  --  5.7*   CHLORIDE  --  110*  --   --   --   --   --   --   --  106   CO2  --  20*  --   --   --   --   --   --   --  16*   BUN  --  93.9*  --   --   --   --   --   --   --  109.6*   CR  --  2.19*  --   --   --   --   --   --   --  3.29*   * 172*  172* 289* 299* 325* 294*   < >  --    < > 250*   BISHOP  --  9.3  --   --   --   --   --   --   --  9.7    < > = values in this interval not displayed.       INRNo lab results found in last 7 days.     DIAGNOSTICS:  Reviewed    A/P:  84 y.o woman with CKD III/IV, IDDM, HTN and obesity, admitted for intermittent hand tremors, hallucination and floaters, found to to have acute kidney injury and hyperkalemia.     # CKD: Rapidly progressive since 2022.     # Acute kidney injury: Due to volume depletion. I suspect kidney function will continue to improve with hydration. New baseline TBD   -Lasix and lisinopril are on hold   -normal appearing " kidneys on CT in 2021    # Intermittent L>R hands tremor, hallucination and floaters: Defer to IM.     # Anemia: History of Fe def. Mild thrombocytopenia    # Hypertension: BP is at target.    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     Plan:  # Start LR at 150 ml/hr x 1 liter only.  # Check UPCR, SPEP/UPEP, iron study, ferritin level  # Agree with holding Lasix/lisinopril  # Will have Dr. Palacio address their stroke concern    I discussed the case with her RN (Annette) at the bedside.    Raman Gomez MD  [facility]phrology  Office Phone: [phone]  Pager: [phone]

## 2024-07-26 ENCOUNTER — PATIENT OUTREACH (OUTPATIENT)
Dept: INTERNAL MEDICINE | Facility: CLINIC | Age: 84
End: 2024-07-26
Payer: COMMERCIAL

## 2024-07-26 NOTE — TELEPHONE ENCOUNTER
ED / Discharge Outreach Protocol    Patient Contact    Attempt # 1    Was call answered?  No.  Left message on voicemail with information to call me back. Please transfer call to any RN when patient calls back to complete hospital follow-up outreach.     Florence Walker RN  [facility]

## 2024-07-29 NOTE — TELEPHONE ENCOUNTER
ED / Discharge Outreach Protocol    Patient Contact    Attempt # 2    Was call answered?  No.  Left message on voicemail with information to call me back.    On Call Back:     Please relay triage RN was attempting to contact patient to follow up with them regarding their most recent hospital visit. If patient calls back, please route call to triage RN for hospital follow up assessment.     Thank you,  Everton Wynn, Triage RN [place]  8:19 AM 7/29/2024

## 2024-07-30 ENCOUNTER — LAB (OUTPATIENT)
Dept: LAB | Facility: CLINIC | Age: 84
End: 2024-07-30
Payer: COMMERCIAL

## 2024-07-30 DIAGNOSIS — E87.5 HIGH POTASSIUM: ICD-10-CM

## 2024-07-30 DIAGNOSIS — N18.32 STAGE 3B CHRONIC KIDNEY DISEASE (H): ICD-10-CM

## 2024-07-30 LAB
ANION GAP SERPL CALCULATED.3IONS-SCNC: 17 MMOL/L (ref 7–15)
BUN SERPL-MCNC: 38.8 MG/DL (ref 8–23)
CALCIUM SERPL-MCNC: 9.4 MG/DL (ref 8.8–10.4)
CHLORIDE SERPL-SCNC: 98 MMOL/L (ref 98–107)
CREAT SERPL-MCNC: 1.67 MG/DL (ref 0.51–0.95)
EGFRCR SERPLBLD CKD-EPI 2021: 30 ML/MIN/1.73M2
GLUCOSE SERPL-MCNC: 210 MG/DL (ref 70–99)
HCO3 SERPL-SCNC: 20 MMOL/L (ref 22–29)
POTASSIUM SERPL-SCNC: 5.3 MMOL/L (ref 3.4–5.3)
SODIUM SERPL-SCNC: 135 MMOL/L (ref 135–145)

## 2024-07-30 PROCEDURE — 80048 BASIC METABOLIC PNL TOTAL CA: CPT

## 2024-07-30 PROCEDURE — 36415 COLL VENOUS BLD VENIPUNCTURE: CPT

## 2024-07-31 DIAGNOSIS — Z79.4 TYPE 2 DIABETES MELLITUS WITH DIABETIC PERIPHERAL ANGIOPATHY WITHOUT GANGRENE, WITH LONG-TERM CURRENT USE OF INSULIN (H): ICD-10-CM

## 2024-07-31 DIAGNOSIS — E11.51 TYPE 2 DIABETES MELLITUS WITH DIABETIC PERIPHERAL ANGIOPATHY WITHOUT GANGRENE, WITH LONG-TERM CURRENT USE OF INSULIN (H): ICD-10-CM

## 2024-08-02 ENCOUNTER — TELEPHONE (OUTPATIENT)
Dept: INTERNAL MEDICINE | Facility: CLINIC | Age: 84
End: 2024-08-02
Payer: COMMERCIAL

## 2024-08-02 NOTE — TELEPHONE ENCOUNTER
"Dr. Arambula reviewed patient's lab results as Dinorah Wong is out of the office:   \"Decreased kidney function is stable, improving.  Elevated glucose. Keep diet and exercise , along with medications.  Keep hydrated, avoid NSAID use.  Follow up with PCP in 1-2 months.\"    Attempted to contact patient. Left voice detailed voice message (ok per initial message) with results and recommendations from Dinorah's partner Dr. Arambula. Asked patient to call clinic back to speak to any RN if she has further questions.     Patient does have future appointment with Dinorah.     Aug 08, 2024 11:00 AM  Provider Visit with LILLIAM Sam CNP  [facility] ([facility] ) [phone]        Florence Walker RN  [facility]   "

## 2024-08-02 NOTE — TELEPHONE ENCOUNTER
General Call    Contacts       Contact Date/Time Type Contact Phone/Fax    08/02/2024 10:16 AM CDT Phone (Incoming) Lindsey Gary (Self) [phone] (M)          Reason for Call:  pt calling to check on results of recent lab work.     What are your questions or concerns:  patient is looking to know results and pcp's response on her labs     Date of last appointment with provider: 02/23/2024 w/pcp    Okay to leave a detailed message?: Yes at Cell number on file:    Telephone Information:   Mobile [phone]

## 2024-08-02 NOTE — TELEPHONE ENCOUNTER
Test Results        Who ordered the test:  brian    Type of test: Lab    Date of test:  7/30    Where was the test performed:  [facility]    What are your questions/concerns?:  pt wanting to get potassium results    Okay to leave a detailed message?: Yes at Home number on file [phone]

## 2024-08-02 NOTE — LETTER
August 6, 2024      Bobmichelle PAULINO Abdirahman  [id number] [id number]        Dear ,    We are writing to inform you of your test results.    Decreased kidney function is stable, improving.  Elevated glucose. Keep diet and exercise , along with medications.  Keep hydrated, avoid NSAID use.  Follow up with PCP in 1-2 months.        If you have any questions or concerns, please call the clinic at the number listed above.       Sincerely,      [address]

## 2024-08-04 ENCOUNTER — HEALTH MAINTENANCE LETTER (OUTPATIENT)
Age: 84
End: 2024-08-04

## 2024-08-06 NOTE — TELEPHONE ENCOUNTER
Looks like patient may have already gotten results to her from another encounter from 08/02/2024.     Called and spoke with patient to relay provider results and potassium level. Patient says she has more questions about her labs, but will talk about this with primary care provider at upcoming phone appointment.    Patient requested labs to be mailed to her. Sent results to be mailed to patient today.     Thank you,  Everton Wynn, Triage RN [facility]  1:55 PM 8/6/2024

## 2024-08-08 ENCOUNTER — VIRTUAL VISIT (OUTPATIENT)
Dept: INTERNAL MEDICINE | Facility: CLINIC | Age: 84
End: 2024-08-08
Payer: COMMERCIAL

## 2024-08-08 DIAGNOSIS — I10 ESSENTIAL HYPERTENSION: ICD-10-CM

## 2024-08-08 DIAGNOSIS — N18.4 CKD (CHRONIC KIDNEY DISEASE) STAGE 4, GFR 15-29 ML/MIN (H): ICD-10-CM

## 2024-08-08 DIAGNOSIS — E11.8 TYPE 2 DIABETES MELLITUS WITH COMPLICATION, WITHOUT LONG-TERM CURRENT USE OF INSULIN (H): Primary | ICD-10-CM

## 2024-08-08 PROCEDURE — 99443 PR PHYSICIAN TELEPHONE EVALUATION 21-30 MIN: CPT | Mod: 93 | Performed by: NURSE PRACTITIONER

## 2024-08-08 RX ORDER — FUROSEMIDE 20 MG
40 TABLET ORAL DAILY
Qty: 180 TABLET | Refills: 1 | Status: SHIPPED | OUTPATIENT
Start: 2024-08-08

## 2024-08-08 NOTE — NURSING NOTE
"Chief Complaint   Patient presents with    Recheck Medication    Hospital F/U     initial LMP  (LMP Unknown)  Estimated body mass index is 32.09 kg/m  as calculated from the following:    Height as of 7/24/24: 1.626 m (5' 4\").    Weight as of 7/24/24: 84.8 kg (186 lb 15.2 oz)..  bp completed using cuff size NA (Not Taken)  MARIAH LIAO LPN  "

## 2024-08-08 NOTE — PROGRESS NOTES
"Lindsey is a 84 year old who is being evaluated via a billable telephone visit.    What phone number would you like to be contacted at? [phone]  How would you like to obtain your AVS? Mail a copy  Originating Location (pt. Location): Home    Distant Location (provider location):  On-site    Assessment & Plan     Type 2 diabetes mellitus with complication, without long-term current use of insulin (H)  High glucose in hospital and now back to her normal    - Basic metabolic panel  (Ca, Cl, CO2, Creat, Gluc, K, Na, BUN); Future    CKD (chronic kidney disease) stage 4, GFR 15-29 ml/min (H)  Improved with recent lab  Still running lower than desired  Lisinopril was stopped in hospital and they did not drop her Lasix down to 20 mg a day but she found that her leg was swelling and she increased it back up to 40 mg a day and that helped her leg swelling  She was given a referral to nephrology a while back and I do not believe that she is seeing them.  She did see them in the hospital -would like her to see them outpatient  We will recheck her labs in 2 to 4 weeks while she is taking the 40 mg of Lasix and is off the lisinopril  She also has had some intermittent elevation in her potassium which could be related to the lisinopril and we will check that when we do her labs in 2 to 4 weeks  - Basic metabolic panel  (Ca, Cl, CO2, Creat, Gluc, K, Na, BUN); Future    Essential hypertension  Has been in a good range  They changed her lisinopril to nifedipine  - furosemide (LASIX) 20 MG tablet; Take 2 tablets (40 mg) by mouth daily        MED REC REQUIRED  Post Medication Reconciliation Status:  Discharge medications reconciled, continue medications without change  BMI  Estimated body mass index is 32.09 kg/m  as calculated from the following:    Height as of 7/24/24: 1.626 m (5' 4\").    Weight as of 7/24/24: 84.8 kg (186 lb 15.2 oz).         Patient Instructions   May increase lasix to 40 mg daily     Recheck lab for potassium " and kidney function in 2-4 weeks     Would make appointment with nephrology - they saw you in hospital   Previous order placed   To help watch her kidney function and provide any direction re medication etc   [facility]   [address]   [address]   [place] [address]   Phone: [phone]       Subjective   Lindsey is a 84 year old, presenting for the following health issues:    Hospital follow up and med check      8/8/2024    10:12 AM   Additional Questions   Roomed by Jeannette DAMON     [facility]       Hospital Follow-up Visit:    Hospital/Nursing Home/IP Rehab Facility: [facility]  Date of Admission: 7-24-24  Date of Discharge: 7-25-24  Reason(s) for Admission: chronic kidney disease  Was the patient in the ICU or did the patient experience delirium during hospitalization?  No  Do you have any other stressors you would like to discuss with your provider? No    Problems taking medications regularly:  None  Medication changes since discharge: None  Problems adhering to non-medication therapy:  None    Summary of hospitalization:  [facility] discharge summary reviewed  Diagnostic Tests/Treatments reviewed.  Follow up needed: FRANCOIS ok  Other Healthcare Providers Involved in Patient s Care:         None  Update since discharge: improved.         Plan of care communicated with patient           Discontinue lisinopril and added nifedipine   Lasix 40 mg daily - changed to 20 mg daily   Left leg was very high swelling - increased to 40 mg and swelling improved   Also using compression socks and almost back to normal       Potassium was high in past   Lisinopril stopped in hospital which may have caused this     Taking INH ongoing     Glucose high in hospital and now back down in range     Review of Systems  Constitutional, neuro, ENT, endocrine, pulmonary, cardiac, gastrointestinal, genitourinary, musculoskeletal, integument and psychiatric systems are negative, except as otherwise  noted.      Objective    Vitals - Patient Reported  Weight (Patient Reported): 77.1 kg (170 lb)      Vitals:  No vitals were obtained today due to virtual visit.    Physical Exam   General: Alert and no distress //Respiratory: No audible wheeze, cough, or shortness of breath // Psychiatric:  Appropriate affect, tone, and pace of words      Reviewed lab       Phone call duration: 31 minutes  Signed Electronically by: LILLIAM Sam CNP

## 2024-08-08 NOTE — PATIENT INSTRUCTIONS
May increase lasix to 40 mg daily     Recheck lab for potassium and kidney function in 2-4 weeks     Would make appointment with nephrology - they saw you in hospital   Previous order placed   To help watch her kidney function and provide any direction re medication etc   [facility]   [address]   [place] [address]   Phone: [phone]

## 2024-08-12 ENCOUNTER — TELEPHONE (OUTPATIENT)
Dept: INTERNAL MEDICINE | Facility: CLINIC | Age: 84
End: 2024-08-12
Payer: COMMERCIAL

## 2024-08-12 NOTE — TELEPHONE ENCOUNTER
Reason for Call:  Appointment Request    Patient requesting this type of appt: Chronic Diease Management/Medication/Follow-Up    Requested provider:  Colette Nguyen Pharmacist    Reason patient unable to be scheduled: Not within requested timeframe    When does patient want to be seen/preferred time:  this week    Comments: wants to be seen this week if possible    Could we send this information to you in [employer] [facility] [employer] or would you prefer to receive a phone call?:   Patient would prefer a phone call   Okay to leave a detailed message?: Yes at Home number on file [phone] (home)    Call taken on 8/12/2024 at 10:22 AM by Trisha Dickinson

## 2024-08-13 NOTE — TELEPHONE ENCOUNTER
I r/s visit for in person on 8/15/2024 at 8 AM.  Kike Arcos is completely full on Monday.  Heike Koenig, TC

## 2024-08-13 NOTE — TELEPHONE ENCOUNTER
Its a phone visit tomorrow. Is she okay with this? Otherwise Kike Arcos could see her on Monday 8/19 or you can put her in for me in person on 8/15 at 8AM.    Colette Nguyen, PharmD, [facility]  Medication Therapy Management Provider, [facility]

## 2024-08-14 NOTE — PROGRESS NOTES
Medication Therapy Management (MTM) Encounter    ASSESSMENT:                            Medication Adherence/Access: No issues identified    Diabetes/CKD:   Patient is meeting A1c goal of < 8%.  Self monitoring of blood glucose is at goal of > 50% time in target with continuous glucose monitoring..  Microalbumin is not at goal <30mg/g.   Patient would benefit from lowering evening Novolin dose to prevent early morning hypoglycemia. If Novolin is adequately lowered and still having lows, consider reducing lunchtime dose of glipizide. Since recent discontinuation of lisinopril due to HIGINIO, patient is not being treated for microalbuminuria. May consider addition of SGLT2i in the future if creatinine is stabilized, which could also help lower A1c and potentially allow for decrease in insulin dosing.     Hypertension:   Patient is meeting blood pressure goal of < 130/80mmHg. Continue to monitor dizziness and potential hypotension. Patient would benefit from potassium recheck after HIGINIO and discontinuation of lisinopril. BMP to be done today as planned per Dr. Wong.     Hyperlipidemia:   Stable. Patient to get annual lipid panel today.     GERD    Stable, continue current regimen    Latent tuberculosis infection:     Patient due to be done with isoniazid/pyridoxine therapy on 8/25/24. Removed from list today.     Inflammation arthritis  Recommend starting methotrexate/folic acid therapy as planned. Continue to follow with rheumatology.     Immunizations  Per CDC vaccine schedules, patient due for TDaP now and COVID, flu, and RSV this fall. Unable to cover today due to time constraints.     PLAN:                            Decrease your Novolin dose in the evening to 20 units before dinner.   If you are still getting low blood sugars after 1-2 weeks, decrease to 18 units before dinner.   Keep morning dose of Novolin at 44 units.    Lab today: BMP and lipids     Future Considerations:  Lower lunchtime dose of glipizide if  continuing to have blood glucose lows  Consider Jardiance or other SGLT2i for microalbuminuria and diabetes  Recommend TDaP booster asap, and COVID, flu, and RSV vaccines this fall.     Follow-up: Return in 1 month (on 9/17/2024) for Follow up, Medication Therapy Management.    SUBJECTIVE/OBJECTIVE:                          Lindsey Gary is a 84 year old female seen for a transitions of care visit. She was discharged from [facility] on 7/25 for HIGINIO. Also saw Colette Nguyen on 7/2 for MTM.    Reason for visit: MIREYA + hypoglycemic events.    Allergies/ADRs: Reviewed in chart  Past Medical History: Reviewed in chart  Tobacco: She reports that she has never smoked. She has never been exposed to tobacco smoke. She has never used smokeless tobacco.  Alcohol: not currently using    Medication Adherence/Access: no issues reported, does not qualify for assistance program     Diabetes /CKD  Glipizide 10 mg twice daily before breakfast and lunch   NPH insulin 44 units at 8 am and 22 units at 5 pm (increased 7/2)    Low this morning - 68 mg/dL, drank orange juice, low alarm went off again a bit later showing 69 mg/dL  Feels shaky and not well if blood glucose too low. This usually happens very early in the morning while in bed, so she drinks some juice and goes back to sleep.     Not much of an appetite right now  Not exercising right now - likes to walk on the treadmill when she does. Knees are bothering her and making it hard to walk long distances; she has been told she will need a total knee replacement soon.     Recently in the hospital for hyperkalemia secondary to HIGINIO  Prior to hospitalization, was eating some foods high in potassium: occasional bananas, but not a lot. Eats salmon and sweet potatoes once a week at least. Makes ham and bean soup in the winter, but this is more for her .     Blood sugar monitoring: CGM Freestyle Juan Francisco 2             Eye exam is up to date  Foot exam: due    Hypertension   Metoprolol ER 50 mg  daily  Nifedipine XL 30 mg daily   Furosemide 40 mg daily for edema    Using compression stockings for edema.  Taken off of lisinopril 7/25 due to HIGINIO   Not feeling great since hospital - thinking it might be nifedipine; having general dizziness  Patient does not self-monitor blood pressure.     Hyperlipidemia   Rosuvastatin 20mg daily  No side effects reported. Annual labs due.        GERD    Omeprazole 20 mg once daily  Famotidine 20 mg twice daily as needed - only takes with acidic/spicy foods, not often  Tums 500 mg once daily as needed - doesn't take very often  Patient feels that current regimen is effective, no side effects.       Latent tuberculosis infection:   Isoniazid 300 mg daily (started ~11/27/23) - removed from list, final dose due 8/25/24  Vitamin B6 100 mg daily   Following with ID, almost at complete 9 months of treatment.   Gets monthly liver labs.        Inflammation arthritis  Methotrexate and folic acid - has not started yet but plans to after finishing isoniazid  Tylenol 1000 mg twice daily + 1000 mg as needed occasionally   Voltaren as needed    No side effects. She is nervous  about starting methotrexate and getting nausea from it. Granddaughter took it for an autoimmune disease and had bad side effects.   Feels that Tylenol and Voltaren is adequate for pain control right now.   Following with Rheumatology.     Vaccinations  Due for TDaP now; Covid, flu, RSV in fall. Unable to cover today due to time. .  Most Recent Immunizations   Administered Date(s) Administered    COVID-19 12+ (2023-24) (Pfizer) 10/19/2023    COVID-19 MONOVALENT 12+ (Pfizer) 10/11/2021    COVID-19 Monovalent 12+ (Pfizer 2022) 04/12/2022    Flu, Unspecified 10/29/1999    Influenza (H1N1) 12/01/2009    Influenza (High Dose) 3 valent vaccine 10/10/2019    Influenza (IIV3) PF 09/17/2009    Influenza Vaccine 65+ (Fluzone HD) 10/10/2023    Pneumo Conj 13-V (2010&after) 09/26/2016    Pneumococcal 23 valent 05/16/2013    TD,PF  7+ (Tenivac) 10/08/2010    TDAP (Adacel,Boostrix) 03/31/2014    TDAP Vaccine (Adacel) 03/31/2014    Tdap (Adult) Unspecified Formulation 03/31/2014    Zoster recombinant adjuvanted (SHINGRIX) 12/26/2018       Today's Vitals: /55   LMP  (LMP Unknown)   ----------------  Post Discharge Medication Reconciliation Status: discharge medications reconciled and changed, per note/orders.    I spent 40 minutes with this patient today. All changes were made via collaborative practice agreement with Dinorah Wong. A copy of the visit note was provided to the patient's provider(s).    A summary of these recommendations was given to the patient.    Nubia [place], RadhaD   Medication Therapy Management   Pharmacy Resident  Pager: [phone]     Radha JohnsonD, [facility]  Medication Therapy Management Provider, [facility]  [phone]        Medication Therapy Recommendations  Type 2 diabetes mellitus with diabetic peripheral angiopathy without gangrene, with long-term current use of insulin (H)    Current Medication: insulin  UNIT/ML vial   Rationale: Dose too high - Dosage too high - Safety   Recommendation: Decrease Dose   Status: Accepted per CPA

## 2024-08-15 ENCOUNTER — OFFICE VISIT (OUTPATIENT)
Dept: PHARMACY | Facility: CLINIC | Age: 84
End: 2024-08-15
Payer: COMMERCIAL

## 2024-08-15 ENCOUNTER — TELEPHONE (OUTPATIENT)
Dept: INTERNAL MEDICINE | Facility: CLINIC | Age: 84
End: 2024-08-15

## 2024-08-15 ENCOUNTER — LAB (OUTPATIENT)
Dept: LAB | Facility: CLINIC | Age: 84
End: 2024-08-15
Payer: COMMERCIAL

## 2024-08-15 VITALS — DIASTOLIC BLOOD PRESSURE: 55 MMHG | SYSTOLIC BLOOD PRESSURE: 122 MMHG

## 2024-08-15 DIAGNOSIS — Z79.4 TYPE 2 DIABETES MELLITUS WITH DIABETIC PERIPHERAL ANGIOPATHY WITHOUT GANGRENE, WITH LONG-TERM CURRENT USE OF INSULIN (H): Primary | ICD-10-CM

## 2024-08-15 DIAGNOSIS — N28.9 ACUTE ON CHRONIC RENAL INSUFFICIENCY: Primary | ICD-10-CM

## 2024-08-15 DIAGNOSIS — K21.00 GASTROESOPHAGEAL REFLUX DISEASE WITH ESOPHAGITIS, UNSPECIFIED WHETHER HEMORRHAGE: ICD-10-CM

## 2024-08-15 DIAGNOSIS — E78.5 HYPERLIPIDEMIA LDL GOAL <100: ICD-10-CM

## 2024-08-15 DIAGNOSIS — I10 HYPERTENSION GOAL BP (BLOOD PRESSURE) < 130/80: ICD-10-CM

## 2024-08-15 DIAGNOSIS — N18.30 STAGE 3 CHRONIC KIDNEY DISEASE, UNSPECIFIED WHETHER STAGE 3A OR 3B CKD (H): ICD-10-CM

## 2024-08-15 DIAGNOSIS — N18.9 ACUTE ON CHRONIC RENAL INSUFFICIENCY: Primary | ICD-10-CM

## 2024-08-15 DIAGNOSIS — Z71.85 VACCINE COUNSELING: ICD-10-CM

## 2024-08-15 DIAGNOSIS — M19.90 ARTHRITIS: ICD-10-CM

## 2024-08-15 DIAGNOSIS — N18.4 CKD (CHRONIC KIDNEY DISEASE) STAGE 4, GFR 15-29 ML/MIN (H): ICD-10-CM

## 2024-08-15 DIAGNOSIS — Z22.7 LATENT TUBERCULOSIS: ICD-10-CM

## 2024-08-15 DIAGNOSIS — E11.8 TYPE 2 DIABETES MELLITUS WITH COMPLICATION, WITHOUT LONG-TERM CURRENT USE OF INSULIN (H): ICD-10-CM

## 2024-08-15 DIAGNOSIS — E11.51 TYPE 2 DIABETES MELLITUS WITH DIABETIC PERIPHERAL ANGIOPATHY WITHOUT GANGRENE, WITH LONG-TERM CURRENT USE OF INSULIN (H): Primary | ICD-10-CM

## 2024-08-15 LAB
ANION GAP SERPL CALCULATED.3IONS-SCNC: 11 MMOL/L (ref 7–15)
BUN SERPL-MCNC: 24.2 MG/DL (ref 8–23)
CALCIUM SERPL-MCNC: 9.8 MG/DL (ref 8.8–10.4)
CHLORIDE SERPL-SCNC: 103 MMOL/L (ref 98–107)
CHOLEST SERPL-MCNC: 144 MG/DL
CREAT SERPL-MCNC: 1.28 MG/DL (ref 0.51–0.95)
EGFRCR SERPLBLD CKD-EPI 2021: 42 ML/MIN/1.73M2
FASTING STATUS PATIENT QL REPORTED: YES
FASTING STATUS PATIENT QL REPORTED: YES
GLUCOSE SERPL-MCNC: 80 MG/DL (ref 70–99)
HCO3 SERPL-SCNC: 26 MMOL/L (ref 22–29)
HDLC SERPL-MCNC: 33 MG/DL
LDLC SERPL CALC-MCNC: 85 MG/DL
NONHDLC SERPL-MCNC: 111 MG/DL
POTASSIUM SERPL-SCNC: 4.3 MMOL/L (ref 3.4–5.3)
SODIUM SERPL-SCNC: 140 MMOL/L (ref 135–145)
TRIGL SERPL-MCNC: 130 MG/DL

## 2024-08-15 PROCEDURE — 36415 COLL VENOUS BLD VENIPUNCTURE: CPT

## 2024-08-15 PROCEDURE — 99607 MTMS BY PHARM ADDL 15 MIN: CPT | Performed by: PHARMACIST

## 2024-08-15 PROCEDURE — 99606 MTMS BY PHARM EST 15 MIN: CPT | Performed by: PHARMACIST

## 2024-08-15 PROCEDURE — 80048 BASIC METABOLIC PNL TOTAL CA: CPT

## 2024-08-15 PROCEDURE — 80061 LIPID PANEL: CPT

## 2024-08-15 RX ORDER — CARBOXYMETHYLCELLULOSE SODIUM 10 MG/ML
1 GEL OPHTHALMIC 3 TIMES DAILY PRN
COMMUNITY

## 2024-08-15 NOTE — TELEPHONE ENCOUNTER
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  Patient has a Historical prescription of fluorometholone (FML LIQUIFILM) 0.1 % ophthalmic suspension.

## 2024-08-15 NOTE — PATIENT INSTRUCTIONS
"Recommendations from today's MTM visit:                                                      Decrease your Novolin dose in the evening to 20 units before dinner.   If you are still getting low blood sugars, decrease to 18 units before dinner.   Keep morning dose of Novolin at 44 units.      It was great speaking with you today.  I value your experience and would be very thankful for your time in providing feedback in our clinic survey. In the next few days, you may receive an email or text message from [facility] "Shahab P. Tabatabai, Broker" with a link to a survey related to your  clinical pharmacist.\"     To schedule another MTM appointment, please call the clinic directly or you may call the MTM scheduling line at [phone] or toll-free at [phone].     My Clinical Pharmacist's contact information:                                                      Please feel free to contact me with any questions or concerns you have.      Nubia [place]itt, PharmD   Medication Therapy Management   Pharmacy Resident  Pager: [phone]    Colette Nguyen PharmD, [facility]  Medication Therapy Management Provider, [facility]  [phone]      "

## 2024-08-19 ENCOUNTER — APPOINTMENT (OUTPATIENT)
Dept: LAB | Facility: CLINIC | Age: 84
End: 2024-08-19
Payer: COMMERCIAL

## 2024-08-19 LAB
CREAT UR-MCNC: 52.4 MG/DL
MICROALBUMIN UR-MCNC: 17 MG/L
MICROALBUMIN/CREAT UR: 32.44 MG/G CR (ref 0–25)

## 2024-08-19 PROCEDURE — 82570 ASSAY OF URINE CREATININE: CPT

## 2024-08-19 PROCEDURE — 82043 UR ALBUMIN QUANTITATIVE: CPT

## 2024-08-21 ENCOUNTER — TELEPHONE (OUTPATIENT)
Dept: INTERNAL MEDICINE | Facility: CLINIC | Age: 84
End: 2024-08-21
Payer: COMMERCIAL

## 2024-08-21 DIAGNOSIS — I10 ESSENTIAL HYPERTENSION: Primary | ICD-10-CM

## 2024-08-21 DIAGNOSIS — E11.9 TYPE 2 DIABETES MELLITUS WITHOUT COMPLICATION, WITHOUT LONG-TERM CURRENT USE OF INSULIN (H): ICD-10-CM

## 2024-08-21 RX ORDER — GLIPIZIDE 10 MG/1
10 TABLET ORAL
Qty: 180 TABLET | Refills: 0 | Status: SHIPPED | OUTPATIENT
Start: 2024-08-21

## 2024-08-21 RX ORDER — LOSARTAN POTASSIUM 50 MG/1
50 TABLET ORAL DAILY
Qty: 30 TABLET | Refills: 1 | Status: SHIPPED | OUTPATIENT
Start: 2024-08-21

## 2024-08-21 NOTE — TELEPHONE ENCOUNTER
S-(situation): Potential medication side effects    B-(background): Patient began nifedipine 7/25/24    A-(assessment): Patient called in with side effects of nifedipine including dizziness and constipation. She is wondering if there is another medication she can switch to because she is worried about these side effects in her long term health. She is still able to perform daily functions as intended, but she is needing to make changes to combat the side effects she's experiencing. For her dizziness, it is taking her longer to complete her tasks which is inconvenient for her, and for her constipation she is taking double OTC stool softeners to no effect.     R-(recommendations): Please review information and get in contact with the patient.     Jose A Olmstead RN  [facility]

## 2024-08-21 NOTE — TELEPHONE ENCOUNTER
Advised patient of provider recommendation via telephone. Discussed that nifedipine was not for swollen legs as patient was concerned that if she changes her medication her legs would swell again. Discussed that PCP just placed new order for lasix on 8/8/24 which is a diuretic. Patient verbalized understanding.     Patient will start losartan, helped patient schedule lab appointment.     Appointments in Next Year      Sep 20, 2024 11:30 AM  LAB with CR LAB  [facility] ([facility] ) [phone]     Summer RN 5:16 PM August 21, 2024   [facility]

## 2024-08-22 NOTE — TELEPHONE ENCOUNTER
No tapering on blood pressure medicine needed    The losartan potassium should not be an issue with her potassium, but we will be checking that when we check her labs in a month

## 2024-08-22 NOTE — TELEPHONE ENCOUNTER
Call received from patient asking if she needs to taper off the previous blood pressure medication before starting the new one. Advised Dinorah did not give any directions to taper so she can stop the previous medication and start the new one when she picks it up. Patient will start tomorrow.    Patient also asking if she could take 1/2 tablet of Nifedipine today because it makes her so dizzy. Advised she could try this if she feels the dizziness is too bothersome.     Patient asking about the fact that potassium is in the name of the new medication Losartan Potassium 50 MG. Patient states she was previously in the hospital for high potassium. Patient asking if this is a concern. Please advise.    Please call patient back at [phone]. OK to leave a voice mail at this number.

## 2024-08-26 DIAGNOSIS — K21.00 GASTROESOPHAGEAL REFLUX DISEASE WITH ESOPHAGITIS WITHOUT HEMORRHAGE: ICD-10-CM

## 2024-09-02 DIAGNOSIS — E78.5 HYPERLIPIDEMIA LDL GOAL <100: ICD-10-CM

## 2024-09-03 RX ORDER — ROSUVASTATIN CALCIUM 20 MG/1
20 TABLET, COATED ORAL DAILY
Qty: 90 TABLET | Refills: 0 | Status: SHIPPED | OUTPATIENT
Start: 2024-09-03

## 2024-09-13 ENCOUNTER — ANCILLARY PROCEDURE (OUTPATIENT)
Dept: GENERAL RADIOLOGY | Facility: CLINIC | Age: 84
End: 2024-09-13
Attending: NURSE PRACTITIONER
Payer: COMMERCIAL

## 2024-09-13 ENCOUNTER — OFFICE VISIT (OUTPATIENT)
Dept: INTERNAL MEDICINE | Facility: CLINIC | Age: 84
End: 2024-09-13
Payer: COMMERCIAL

## 2024-09-13 VITALS
WEIGHT: 180 LBS | SYSTOLIC BLOOD PRESSURE: 107 MMHG | HEIGHT: 64 IN | HEART RATE: 65 BPM | TEMPERATURE: 98.2 F | RESPIRATION RATE: 16 BRPM | BODY MASS INDEX: 30.73 KG/M2 | DIASTOLIC BLOOD PRESSURE: 65 MMHG | OXYGEN SATURATION: 98 %

## 2024-09-13 DIAGNOSIS — M20.009 FINGER DEFORMITY: ICD-10-CM

## 2024-09-13 DIAGNOSIS — M79.644 PAIN IN FINGER OF RIGHT HAND: ICD-10-CM

## 2024-09-13 DIAGNOSIS — I10 ESSENTIAL HYPERTENSION: ICD-10-CM

## 2024-09-13 DIAGNOSIS — M79.644 PAIN IN FINGER OF RIGHT HAND: Primary | ICD-10-CM

## 2024-09-13 DIAGNOSIS — M79.89 LEFT LEG SWELLING: ICD-10-CM

## 2024-09-13 PROCEDURE — 99213 OFFICE O/P EST LOW 20 MIN: CPT | Performed by: NURSE PRACTITIONER

## 2024-09-13 PROCEDURE — 73140 X-RAY EXAM OF FINGER(S): CPT | Mod: TC | Performed by: RADIOLOGY

## 2024-09-13 ASSESSMENT — ENCOUNTER SYMPTOMS
FEVER: 0
CHILLS: 0

## 2024-09-13 NOTE — PROGRESS NOTES
"  Assessment & Plan     Pain in finger of right hand  -will obtain xray, consider referral to hand/ortho pending results     - XR Finger Right G/E 2 Views; Future    Finger deformity    - XR Finger Right G/E 2 Views; Future    Left leg swelling  -Ultrasound ordered-can be done at next available appointment, no red flag symptoms, swelling has been present for quite a while, is currently minimal  -Discussed wearing compression stockings, she currently has compression socks but these are pretty loose fitting, discussed getting something with a compression of around 20-30  - US Lower Extremity Venous Duplex Left; Future    Essential hypertension  -controlled on Losartan  -she needs to have potassium level rechecked, advised to stop by lab today to get this done but she reports not having time, she will set up lab appointment       BMI  Estimated body mass index is 31.39 kg/m  as calculated from the following:    Height as of this encounter: 1.613 m (5' 3.5\").    Weight as of this encounter: 81.6 kg (180 lb).             Subjective   Lindsey is a 84 year old, presenting for the following health issues:  Musculoskeletal Problem (Right Pinky Finger and left leg swelling )        9/13/2024    11:13 AM   Additional Questions   Roomed by JENNY Smith   Accompanied by Self         9/13/2024    11:13 AM   Patient Reported Additional Medications   Patient reports taking the following new medications None     History of Present Illness       Reason for visit:  Pain in right little finger  Symptom onset:  1-2 weeks ago  Symptom intensity:  Moderate  Symptom progression:  Staying the same  Had these symptoms before:  No   She is taking medications regularly.     Patient presents to the clinic today with pain in her right fifth finger.  She has noticed a deformity of this which has been present for a few years.  She reports she fell around 3 years ago on it but otherwise has not had any trauma.  She reports she has had pain for quite a " "while, unsure how long.  She also has difficulty with range of motion of this.  She would like an x-ray today.      She also reports chronic problems with swelling in her legs.  She reports that been going on for few years.  Her left side seems to be worse than her right side.  She did have an ultrasound back in 2021 which showed a Baker's cyst.  She reports the swelling is worse at the end of the day and does get better overnight.  She does take Lasix 40 mg daily and reports good urine output with this.              Review of Systems   Constitutional:  Negative for chills and fever.   Cardiovascular:  Positive for leg swelling. Negative for chest pain.         Objective    /65 (BP Location: Right arm, Patient Position: Sitting, Cuff Size: Adult Regular)   Pulse 65   Temp 98.2  F (36.8  C) (Oral)   Resp 16   Ht 1.613 m (5' 3.5\")   Wt 81.6 kg (180 lb)   LMP  (LMP Unknown)   SpO2 98%   BMI 31.39 kg/m    Body mass index is 31.39 kg/m .  Physical Exam  Vitals and nursing note reviewed.   Constitutional:       General: She is not in acute distress.     Appearance: Normal appearance. She is not ill-appearing.   HENT:      Head: Normocephalic and atraumatic.   Cardiovascular:      Pulses:           Dorsalis pedis pulses are 2+ on the right side and 2+ on the left side.        Posterior tibial pulses are 2+ on the right side and 2+ on the left side.      Comments: Trace swelling BLE   Musculoskeletal:      Comments: Decreased ROM right 5th finger, deformity present, no erythema or warmth    Neurological:      General: No focal deficit present.      Mental Status: She is alert and oriented to person, place, and time. Mental status is at baseline.                    Signed Electronically by: Tonya Sanz CNP    "

## 2024-09-14 ENCOUNTER — LAB (OUTPATIENT)
Dept: LAB | Facility: CLINIC | Age: 84
End: 2024-09-14
Payer: COMMERCIAL

## 2024-09-14 DIAGNOSIS — I10 ESSENTIAL HYPERTENSION: ICD-10-CM

## 2024-09-14 LAB
ANION GAP SERPL CALCULATED.3IONS-SCNC: 15 MMOL/L (ref 7–15)
BUN SERPL-MCNC: 28.3 MG/DL (ref 8–23)
CALCIUM SERPL-MCNC: 9.4 MG/DL (ref 8.8–10.4)
CHLORIDE SERPL-SCNC: 102 MMOL/L (ref 98–107)
CREAT SERPL-MCNC: 1.35 MG/DL (ref 0.51–0.95)
EGFRCR SERPLBLD CKD-EPI 2021: 39 ML/MIN/1.73M2
GLUCOSE SERPL-MCNC: 135 MG/DL (ref 70–99)
HCO3 SERPL-SCNC: 23 MMOL/L (ref 22–29)
POTASSIUM SERPL-SCNC: 4.6 MMOL/L (ref 3.4–5.3)
SODIUM SERPL-SCNC: 140 MMOL/L (ref 135–145)

## 2024-09-14 PROCEDURE — 36415 COLL VENOUS BLD VENIPUNCTURE: CPT

## 2024-09-14 PROCEDURE — 80048 BASIC METABOLIC PNL TOTAL CA: CPT

## 2024-09-16 ENCOUNTER — TELEPHONE (OUTPATIENT)
Dept: INTERNAL MEDICINE | Facility: CLINIC | Age: 84
End: 2024-09-16
Payer: COMMERCIAL

## 2024-09-16 DIAGNOSIS — Z79.2 PROPHYLACTIC ANTIBIOTIC: ICD-10-CM

## 2024-09-16 RX ORDER — CLINDAMYCIN HCL 300 MG
600 CAPSULE ORAL
Qty: 2 CAPSULE | Refills: 3 | Status: SHIPPED | OUTPATIENT
Start: 2024-09-16

## 2024-09-16 NOTE — TELEPHONE ENCOUNTER
Clindamycin 300 mg refill request for previous knee surgery.  Patient has dental appointment 9-     Patient can be reached at [phone]

## 2024-09-16 NOTE — RESULT ENCOUNTER NOTE
Spoke to patient and she is concerned about the osteopenia mentioned, so she is now taking calcium, vitamin D and magnesium, she wants to double check you would recommend that?     Janneth Royal MA

## 2024-09-16 NOTE — TELEPHONE ENCOUNTER
Order/Referral Request    Who is requesting: PATIENT    Orders being requested: U/S OF THE LEFT LEG     Reason service is needed/diagnosis: POSSIBLE BLOOD CLOT    When are orders needed by: ASAP THIS WEEK     Has this been discussed with Provider: No    Does patient have a preference on a Group/Provider/Facility?   [facility]      Does patient have an appointment scheduled?: Yes[facility] BUT THAT'S A LONG WAIT I CAN GET IN SOONER THIS WEEK WITH [facility] IN [facility] . AND I WANT FASTER SERVICE/APPT    Where to send orders: N/A NOT SURE I ONLY HAVE THE PHONE TO [facility] WHICH [phone]    Could we send this information to you in [employer] or would you prefer to receive a phone call?:   Patient would prefer a phone call   Okay to leave a detailed message?: Yes at Cell number on file:    Telephone Information:   Mobile [phone]

## 2024-09-17 ENCOUNTER — IMMUNIZATION (OUTPATIENT)
Dept: FAMILY MEDICINE | Facility: CLINIC | Age: 84
End: 2024-09-17
Payer: COMMERCIAL

## 2024-09-17 ENCOUNTER — OFFICE VISIT (OUTPATIENT)
Dept: PHARMACY | Facility: CLINIC | Age: 84
End: 2024-09-17
Payer: COMMERCIAL

## 2024-09-17 VITALS — DIASTOLIC BLOOD PRESSURE: 64 MMHG | SYSTOLIC BLOOD PRESSURE: 122 MMHG

## 2024-09-17 DIAGNOSIS — N18.30 STAGE 3 CHRONIC KIDNEY DISEASE, UNSPECIFIED WHETHER STAGE 3A OR 3B CKD (H): ICD-10-CM

## 2024-09-17 DIAGNOSIS — M85.80 OSTEOPENIA, UNSPECIFIED LOCATION: ICD-10-CM

## 2024-09-17 DIAGNOSIS — E78.5 HYPERLIPIDEMIA LDL GOAL <100: ICD-10-CM

## 2024-09-17 DIAGNOSIS — I10 HYPERTENSION GOAL BP (BLOOD PRESSURE) < 130/80: ICD-10-CM

## 2024-09-17 DIAGNOSIS — Z79.4 TYPE 2 DIABETES MELLITUS WITH DIABETIC PERIPHERAL ANGIOPATHY WITHOUT GANGRENE, WITH LONG-TERM CURRENT USE OF INSULIN (H): Primary | ICD-10-CM

## 2024-09-17 DIAGNOSIS — Z23 NEED FOR PROPHYLACTIC VACCINATION AND INOCULATION AGAINST INFLUENZA: Primary | ICD-10-CM

## 2024-09-17 DIAGNOSIS — E11.51 TYPE 2 DIABETES MELLITUS WITH DIABETIC PERIPHERAL ANGIOPATHY WITHOUT GANGRENE, WITH LONG-TERM CURRENT USE OF INSULIN (H): Primary | ICD-10-CM

## 2024-09-17 DIAGNOSIS — M19.90 ARTHRITIS: ICD-10-CM

## 2024-09-17 PROCEDURE — G0008 ADMIN INFLUENZA VIRUS VAC: HCPCS

## 2024-09-17 PROCEDURE — 90662 IIV NO PRSV INCREASED AG IM: CPT

## 2024-09-17 PROCEDURE — 99207 PR NO CHARGE NURSE ONLY: CPT

## 2024-09-17 PROCEDURE — 99607 MTMS BY PHARM ADDL 15 MIN: CPT | Performed by: PHARMACIST

## 2024-09-17 PROCEDURE — 99606 MTMS BY PHARM EST 15 MIN: CPT | Performed by: PHARMACIST

## 2024-09-17 RX ORDER — METHOTREXATE SODIUM 2.5 MG/1
15 TABLET ORAL WEEKLY
COMMUNITY

## 2024-09-17 NOTE — PATIENT INSTRUCTIONS
"Recommendations from today's MTM visit:                                                         Keep Novolin dose in the evening at 20 units before dinner.   Increase morning dose of Novolin at 46 units.      It was great speaking with you today.  I value your experience and would be very thankful for your time in providing feedback in our clinic survey. In the next few days, you may receive an email or text message from [facility] with a link to a survey related to your  clinical pharmacist.\"     To schedule another MTM appointment, please call the clinic directly or you may call the MTM scheduling line at [phone] or toll-free at [phone].     My Clinical Pharmacist's contact information:                                                      Please feel free to contact me with any questions or concerns you have.      Colette Nguyen, PharmD, BCACP  Medication Therapy Management Provider, [facility]    "

## 2024-09-18 NOTE — TELEPHONE ENCOUNTER
Called patient and she stated that [facility] could see her sooner. Would  need order faxed to them at [phone]      [facility] [phone]

## 2024-09-20 ENCOUNTER — TRANSFERRED RECORDS (OUTPATIENT)
Dept: HEALTH INFORMATION MANAGEMENT | Facility: CLINIC | Age: 84
End: 2024-09-20

## 2024-09-24 ENCOUNTER — ALLIED HEALTH/NURSE VISIT (OUTPATIENT)
Dept: FAMILY MEDICINE | Facility: CLINIC | Age: 84
End: 2024-09-24
Payer: COMMERCIAL

## 2024-09-24 DIAGNOSIS — Z23 ENCOUNTER FOR IMMUNIZATION: Primary | ICD-10-CM

## 2024-09-24 PROCEDURE — 99207 PR NO CHARGE NURSE ONLY: CPT

## 2024-09-24 PROCEDURE — 91320 SARSCV2 VAC 30MCG TRS-SUC IM: CPT

## 2024-09-24 PROCEDURE — 90480 ADMN SARSCOV2 VAC 1/ONLY CMP: CPT

## 2024-09-24 NOTE — PROGRESS NOTES
Prior to immunization administration, verified patients identity using patient s name and date of birth. Please see Immunization Activity for additional information.     Is the patient's temperature normal (100.5 or less)? Yes     Patient MEETS CRITERIA. PROCEED with vaccine administration.          9/24/2024   COVID   Have you had myocarditis or pericarditis (inflammation of or around the heart muscle) after getting a COVID-19 vaccine? No   Have you had a serious reaction to a COVID vaccine or something in a COVID vaccine, like polyethylene glycol (PEG) or polysorbate? No   Have you had multisystem inflammatory syndrome from COVID-19 in the past 90 days? No            Patient MEETS CRITERIA. PROCEED with vaccine administration.    Patient instructed to remain in clinic for 15 minutes afterwards, and to report any adverse reactions.      Link to Ancillary Visit Immunization Standing Orders SmartSet     Screening performed by Fartun Carter CMA on 9/24/2024 at 11:42 AM.

## 2024-09-30 DIAGNOSIS — M10.9 GOUT, UNSPECIFIED CAUSE, UNSPECIFIED CHRONICITY, UNSPECIFIED SITE: ICD-10-CM

## 2024-09-30 DIAGNOSIS — I10 HYPERTENSION GOAL BP (BLOOD PRESSURE) < 130/80: ICD-10-CM

## 2024-09-30 DIAGNOSIS — E78.5 HYPERLIPIDEMIA LDL GOAL <100: ICD-10-CM

## 2024-09-30 RX ORDER — METOPROLOL SUCCINATE 50 MG/1
TABLET, EXTENDED RELEASE ORAL
Qty: 90 TABLET | Refills: 0 | Status: SHIPPED | OUTPATIENT
Start: 2024-09-30

## 2024-09-30 RX ORDER — ALLOPURINOL 300 MG/1
TABLET ORAL
Qty: 90 TABLET | Refills: 0 | Status: SHIPPED | OUTPATIENT
Start: 2024-09-30

## 2024-10-07 ENCOUNTER — PATIENT OUTREACH (OUTPATIENT)
Dept: CARE COORDINATION | Facility: CLINIC | Age: 84
End: 2024-10-07
Payer: COMMERCIAL

## 2024-10-29 ENCOUNTER — OFFICE VISIT (OUTPATIENT)
Dept: INTERNAL MEDICINE | Facility: CLINIC | Age: 84
End: 2024-10-29
Payer: COMMERCIAL

## 2024-10-29 VITALS
OXYGEN SATURATION: 100 % | DIASTOLIC BLOOD PRESSURE: 77 MMHG | TEMPERATURE: 98 F | SYSTOLIC BLOOD PRESSURE: 124 MMHG | BODY MASS INDEX: 31.38 KG/M2 | RESPIRATION RATE: 18 BRPM | HEART RATE: 70 BPM | HEIGHT: 64 IN

## 2024-10-29 DIAGNOSIS — N18.32 STAGE 3B CHRONIC KIDNEY DISEASE (H): ICD-10-CM

## 2024-10-29 DIAGNOSIS — I10 ESSENTIAL HYPERTENSION: Primary | ICD-10-CM

## 2024-10-29 DIAGNOSIS — E11.8 TYPE 2 DIABETES MELLITUS WITH COMPLICATION, WITHOUT LONG-TERM CURRENT USE OF INSULIN (H): ICD-10-CM

## 2024-10-29 PROCEDURE — 99213 OFFICE O/P EST LOW 20 MIN: CPT | Performed by: NURSE PRACTITIONER

## 2024-10-29 ASSESSMENT — PAIN SCALES - GENERAL: PAINLEVEL_OUTOF10: NO PAIN (0)

## 2024-10-29 NOTE — PROGRESS NOTES
"  Assessment & Plan     Essential hypertension  In good range   - Basic metabolic panel  (Ca, Cl, CO2, Creat, Gluc, K, Na, BUN); Future    Type 2 diabetes mellitus with complication, without long-term current use of insulin (H)  Lab Results   Component Value Date    A1C 7.7 05/21/2024    A1C 7.8 10/26/2023    A1C 7.0 07/14/2023    A1C 7.1 12/30/2022    A1C 7.5 10/10/2022    A1C 7.6 07/06/2021    A1C 7.3 12/01/2020    A1C 6.8 11/26/2019    A1C 7.1 03/08/2019    A1C 7.0 12/31/2018       - Basic metabolic panel  (Ca, Cl, CO2, Creat, Gluc, K, Na, BUN); Future  - Hemoglobin A1c; Future    Stage 3b chronic kidney disease (H)  Stable   - Basic metabolic panel  (Ca, Cl, CO2, Creat, Gluc, K, Na, BUN); Future          BMI  Estimated body mass index is 31.38 kg/m  as calculated from the following:    Height as of this encounter: 1.613 m (5' 3.5\").    Weight as of 9/13/24: 81.6 kg (180 lb).         Patient Instructions   Lab in [address]      Aidee Portillo is a 84 year old, presenting for the following health issues:  Consult      10/29/2024     1:14 PM   Additional Questions   Roomed by Mary Lopez MA   Accompanied by self         10/29/2024   Declines Weight   Did patient decline having their weight taken? Yes          10/29/2024     1:14 PM   Patient Reported Additional Medications   Patient reports taking the following new medications None     History of Present Illness       Reason for visit:  ConsultTion and order for potassium test  Symptom onset:  More than a month   She is taking medications regularly.     Want to have potassium recheck     Finger pain - x ray was showing degenerative changes     Sees pharm D for medication management     Starting methotrexate - has been scared to start this but feels she needs to     Also was told to consider GLP 1 for weight and diabetes   She does not think she can afford this     Review of Systems  Constitutional, neuro, ENT, endocrine, pulmonary, cardiac, gastrointestinal, " "genitourinary, musculoskeletal, integument and psychiatric systems are negative, except as otherwise noted.      Objective    /77 (BP Location: Left arm, Patient Position: Sitting, Cuff Size: Adult Regular)   Pulse 70   Temp 98  F (36.7  C) (Oral)   Resp 18   Ht 1.613 m (5' 3.5\")   LMP  (LMP Unknown)   SpO2 100%   Breastfeeding No   BMI 31.38 kg/m    Body mass index is 31.38 kg/m .  Physical Exam   GENERAL: alert and no distress  RESP: lungs clear to auscultation - no rales, rhonchi or wheezes  CV: regular rate and rhythm  ABDOMEN: soft, nontender,  and bowel sounds normal  MS: no gross musculoskeletal defects noted, no edema  PSYCH: mentation appears normal, affect normal/bright    Lab         Signed Electronically by: LILLIAM Sam CNP    "

## 2024-11-06 DIAGNOSIS — I10 ESSENTIAL HYPERTENSION: ICD-10-CM

## 2024-11-06 RX ORDER — LOSARTAN POTASSIUM 50 MG/1
50 TABLET ORAL DAILY
Qty: 90 TABLET | Refills: 0 | Status: SHIPPED | OUTPATIENT
Start: 2024-11-06

## 2024-11-06 NOTE — TELEPHONE ENCOUNTER
Patient calling stating she has not taken her blood pressure med now for a couple days due to not having any as her med was not on auto refill. She is hoping to get this refilled today so she can get back on medication. Sent patient to triage as she has questions re: her blood sugars.    Lesly Laurent,

## 2024-11-06 NOTE — TELEPHONE ENCOUNTER
See message below. She is not sure if BP is elevated. She will have pharmacist check it when she goes to  Losartan. She would like to  today.   Will route to covering provider since Dinorah called in ill today.       BS fasting 250. She says this is unusual, for the past few days has been higher. She was not sure if related to not taking the Losartan.     She think she ran out of Losartan at the end of October, since had 2 refills end of August.     BP Readings from Last 3 Encounters:   10/29/24 124/77   09/17/24 122/64   09/13/24 107/65

## 2024-11-07 ENCOUNTER — HOSPITAL ENCOUNTER (OUTPATIENT)
Dept: MAMMOGRAPHY | Facility: CLINIC | Age: 84
Discharge: HOME | End: 2024-11-07
Attending: NURSE PRACTITIONER | Admitting: NURSE PRACTITIONER
Payer: COMMERCIAL

## 2024-11-07 DIAGNOSIS — Z12.31 VISIT FOR SCREENING MAMMOGRAM: ICD-10-CM

## 2024-11-07 PROCEDURE — 77063 BREAST TOMOSYNTHESIS BI: CPT

## 2024-11-11 ENCOUNTER — LAB (OUTPATIENT)
Dept: LAB | Facility: CLINIC | Age: 84
End: 2024-11-11
Payer: COMMERCIAL

## 2024-11-11 DIAGNOSIS — E11.51 TYPE 2 DIABETES MELLITUS WITH DIABETIC PERIPHERAL ANGIOPATHY WITHOUT GANGRENE, WITH LONG-TERM CURRENT USE OF INSULIN (H): ICD-10-CM

## 2024-11-11 DIAGNOSIS — I10 ESSENTIAL HYPERTENSION: ICD-10-CM

## 2024-11-11 DIAGNOSIS — N18.32 STAGE 3B CHRONIC KIDNEY DISEASE (H): ICD-10-CM

## 2024-11-11 DIAGNOSIS — Z79.4 TYPE 2 DIABETES MELLITUS WITH DIABETIC PERIPHERAL ANGIOPATHY WITHOUT GANGRENE, WITH LONG-TERM CURRENT USE OF INSULIN (H): ICD-10-CM

## 2024-11-11 DIAGNOSIS — E11.8 TYPE 2 DIABETES MELLITUS WITH COMPLICATION, WITHOUT LONG-TERM CURRENT USE OF INSULIN (H): ICD-10-CM

## 2024-11-11 LAB
ANION GAP SERPL CALCULATED.3IONS-SCNC: 11 MMOL/L (ref 7–15)
BUN SERPL-MCNC: 36.4 MG/DL (ref 8–23)
CALCIUM SERPL-MCNC: 9.6 MG/DL (ref 8.8–10.4)
CHLORIDE SERPL-SCNC: 105 MMOL/L (ref 98–107)
CREAT SERPL-MCNC: 1.39 MG/DL (ref 0.51–0.95)
EGFRCR SERPLBLD CKD-EPI 2021: 38 ML/MIN/1.73M2
EST. AVERAGE GLUCOSE BLD GHB EST-MCNC: 206 MG/DL
GLUCOSE SERPL-MCNC: 190 MG/DL (ref 70–99)
HBA1C MFR BLD: 8.8 % (ref 0–5.6)
HCO3 SERPL-SCNC: 23 MMOL/L (ref 22–29)
POTASSIUM SERPL-SCNC: 4.5 MMOL/L (ref 3.4–5.3)
SODIUM SERPL-SCNC: 139 MMOL/L (ref 135–145)

## 2024-11-11 PROCEDURE — 36415 COLL VENOUS BLD VENIPUNCTURE: CPT

## 2024-11-11 PROCEDURE — 83036 HEMOGLOBIN GLYCOSYLATED A1C: CPT

## 2024-11-11 PROCEDURE — 80048 BASIC METABOLIC PNL TOTAL CA: CPT

## 2024-11-14 ENCOUNTER — TELEPHONE (OUTPATIENT)
Dept: INTERNAL MEDICINE | Facility: CLINIC | Age: 84
End: 2024-11-14
Payer: COMMERCIAL

## 2024-11-14 DIAGNOSIS — R30.0 DYSURIA: Primary | ICD-10-CM

## 2024-11-14 NOTE — TELEPHONE ENCOUNTER
Patient seen 10/29/24, forgot to mention to provider that she was having UTI symptoms.  States she was nervous and totally forgot.  Complains of 2 plus week history of dysuria, low back pain, lower abdominal pain.  She also reports vaginal itching without discharge.  Denies fever or hematuria.      Advised patient to request E-visit and offered clinic appointment but patient states she cannot come in today.  She just wants an order placed for UA/UC.   EDMAR Jaeger R.N.

## 2024-11-16 ENCOUNTER — LAB (OUTPATIENT)
Dept: LAB | Facility: CLINIC | Age: 84
End: 2024-11-16
Payer: COMMERCIAL

## 2024-11-16 DIAGNOSIS — R30.0 DYSURIA: ICD-10-CM

## 2024-11-16 PROCEDURE — 81003 URINALYSIS AUTO W/O SCOPE: CPT

## 2024-11-19 ENCOUNTER — TELEPHONE (OUTPATIENT)
Dept: INTERNAL MEDICINE | Facility: CLINIC | Age: 84
End: 2024-11-19

## 2024-11-19 ENCOUNTER — OFFICE VISIT (OUTPATIENT)
Dept: INTERNAL MEDICINE | Facility: CLINIC | Age: 84
End: 2024-11-19
Payer: COMMERCIAL

## 2024-11-19 VITALS
RESPIRATION RATE: 14 BRPM | HEART RATE: 65 BPM | WEIGHT: 180 LBS | DIASTOLIC BLOOD PRESSURE: 76 MMHG | BODY MASS INDEX: 30.73 KG/M2 | OXYGEN SATURATION: 100 % | SYSTOLIC BLOOD PRESSURE: 124 MMHG | HEIGHT: 64 IN

## 2024-11-19 DIAGNOSIS — N18.9 ACUTE ON CHRONIC RENAL INSUFFICIENCY: ICD-10-CM

## 2024-11-19 DIAGNOSIS — N39.0 URINARY TRACT INFECTION WITHOUT HEMATURIA, SITE UNSPECIFIED: ICD-10-CM

## 2024-11-19 DIAGNOSIS — N28.9 ACUTE ON CHRONIC RENAL INSUFFICIENCY: ICD-10-CM

## 2024-11-19 DIAGNOSIS — B96.89 BV (BACTERIAL VAGINOSIS): Primary | ICD-10-CM

## 2024-11-19 DIAGNOSIS — N76.0 BV (BACTERIAL VAGINOSIS): Primary | ICD-10-CM

## 2024-11-19 DIAGNOSIS — N18.32 STAGE 3B CHRONIC KIDNEY DISEASE (H): ICD-10-CM

## 2024-11-19 DIAGNOSIS — N95.2 ATROPHIC VAGINITIS: ICD-10-CM

## 2024-11-19 PROCEDURE — 87210 SMEAR WET MOUNT SALINE/INK: CPT

## 2024-11-19 PROCEDURE — 99213 OFFICE O/P EST LOW 20 MIN: CPT

## 2024-11-19 RX ORDER — CEFDINIR 300 MG/1
300 CAPSULE ORAL 2 TIMES DAILY
Qty: 14 CAPSULE | Refills: 0 | Status: SHIPPED | OUTPATIENT
Start: 2024-11-19

## 2024-11-19 NOTE — TELEPHONE ENCOUNTER
Pt calling wondering about recent test results for BV. Wants to get a call back tonight and would like to get started on medication.       Advised would route to provider for recommendations as provider has not commented yet on results or next steps.    Malaika KAMARA,RN PHN

## 2024-11-19 NOTE — PROGRESS NOTES
"  Assessment & Plan   Problem List Items Addressed This Visit          Urinary    Stage 3b chronic kidney disease (H)    Acute on chronic renal insufficiency    Atrophic vaginitis     Other Visit Diagnoses       BV (bacterial vaginosis)    -  Primary    Relevant Orders    Wet prep - Clinic Collect    Urinary tract infection without hematuria, site unspecified                      BMI  Estimated body mass index is 31.39 kg/m  as calculated from the following:    Height as of this encounter: 1.613 m (5' 3.5\").    Weight as of this encounter: 81.6 kg (180 lb).             Subjective   Lindsey is a 84 year old, presenting for the following health issues: Pt has been experiencing vaginal burning and itching. Pt provided a urine specimen 11/16/2024 that was negative for UTI. Pt has been taking diflucan because she thought it was a yeast infection and the vaginal burning and itching is still persisting.  Patient denies having any constipation issues and takes senna S and Metamucil when she does have problems with bowel movements.    Pt states that she feels out of sorts and patient endorses discomfort upon palpation to the suprapubic area.  Patient reports that she takes estradiol cream for atrophic vaginitis and genitourinary syndrome of menopause.  Vaginal Problem        11/19/2024     2:41 PM   Additional Questions   Roomed by azamit   Accompanied by self         11/19/2024     2:41 PM   Patient Reported Additional Medications   Patient reports taking the following new medications none     Vaginal Problem     History of Present Illness       Back Pain:  She presents for follow up of back pain. Patient's back pain is a recurring problem.           Reason for visit:  Vaginal problem    She eats 2-3 servings of fruits and vegetables daily.She consumes 0 sweetened beverage(s) daily.She exercises with enough effort to increase her heart rate 9 or less minutes per day.    She is taking medications regularly.   " "              Review of Systems  Constitutional, HEENT, cardiovascular, pulmonary, gi and gu systems are negative, except as otherwise noted.      Objective    /76 (BP Location: Right arm, Patient Position: Sitting, Cuff Size: Adult Regular)   Pulse 65   Resp 14   Ht 1.613 m (5' 3.5\")   Wt 81.6 kg (180 lb)   LMP  (LMP Unknown)   SpO2 100%   BMI 31.39 kg/m    Body mass index is 31.39 kg/m .  Physical Exam   GENERAL: alert and no distress  HENT: ear canals and TM's normal, nose and mouth without ulcers or lesions  NECK: no adenopathy, no asymmetry, masses, or scars  RESP: lungs clear to auscultation - no rales, rhonchi or wheezes  CV: regular rate and rhythm, normal S1 S2, no S3 or S4, no murmur, click or rub, no peripheral edema  ABDOMEN: tenderness suprapubic and bowel sounds normal  MS: no gross musculoskeletal defects noted, no edema  SKIN: no suspicious lesions or rashes  NEURO: Normal strength and tone, mentation intact and speech normal  PSYCH: mentation appears normal, affect normal/bright            Signed Electronically by: Corinna Ferris, LILLIAM CNP    "

## 2024-11-24 DIAGNOSIS — E11.9 TYPE 2 DIABETES MELLITUS WITHOUT COMPLICATION, WITHOUT LONG-TERM CURRENT USE OF INSULIN (H): ICD-10-CM

## 2024-11-25 RX ORDER — GLIPIZIDE 10 MG/1
10 TABLET ORAL
Qty: 180 TABLET | Refills: 0 | Status: SHIPPED | OUTPATIENT
Start: 2024-11-25

## 2024-12-03 ENCOUNTER — NURSE TRIAGE (OUTPATIENT)
Dept: NURSING | Facility: CLINIC | Age: 84
End: 2024-12-03
Payer: COMMERCIAL

## 2024-12-04 DIAGNOSIS — E78.5 HYPERLIPIDEMIA LDL GOAL <100: ICD-10-CM

## 2024-12-04 RX ORDER — ROSUVASTATIN CALCIUM 20 MG/1
20 TABLET, COATED ORAL DAILY
Qty: 90 TABLET | Refills: 0 | Status: SHIPPED | OUTPATIENT
Start: 2024-12-04

## 2024-12-04 NOTE — TELEPHONE ENCOUNTER
Patient calling. She is an insulin dependent diabetic 46 units in the morning, 21 units at 5 pm. At 7 pm, allopurinol, rosuvastatin, santanamine  for heartburn (OTC), and gabapentin.     Tonight, she took an extra dose of 21 units of Novolin NPH.     Current blood sugar is 187 per her Freestyle Juan Francisco.     Patient transferred to Poison Control per protocol.     Ronda Santa RN  [facility]  December 3, 2024, 7:48 PM    Reason for Disposition   Drug overdose and triager unable to answer question   MORE THAN A DOUBLE DOSE of a prescription or over-the-counter (OTC) drug    Additional Information   Negative: [1] Intentional drug overdose AND [2] suicidal thoughts or ideas   Negative: SEVERE difficulty breathing (e.g., struggling for each breath, speaks in single words)   Negative: Bluish (or gray) lips or face now   Negative: Slow, shallow and weak breathing   Negative: Difficult to awaken or acting confused (e.g., disoriented, slurred speech)   Negative: Seizure   Negative: Shock suspected (e.g., cold/pale/clammy skin, too weak to stand, low BP, rapid pulse)   Negative: Suicide attempt, known or suspected   Negative: [1] Intentional overdose AND [2] suicidal thoughts or ideas   Negative: Sounds like a life-threatening emergency to the triager   Negative: Inhalation of smoke or fumes   Negative: Carbon monoxide exposure, known or suspected   Negative: Chemical in the eye   Negative: Chemical on the skin   Negative: Swallowed a (non-poisonous) foreign body   Negative: Epinephrine (such as from Epi-Pen) accidental injection   Negative: [1] CAUSTIC ACID or ALKALI ingestion (e.g., toilet , drain , lye, Clinitest tablets, ammonia, bleaches) AND [2] any symptoms (e.g., difficulty breathing, drooling, mouth pain, sore throat, stridor)   Negative: [1] HYDROCARBON PRODUCT ingestion (e.g., kerosene, gasoline, benzene, furniture polish, lighter fluid) AND [2] any symptoms (e.g., coughing, difficulty  breathing, vomiting)   Negative: [1] Poison Center advised patient to go to ED AND [2] patient or caller seeking second opinion   Negative: Patient sounds very sick or weak to the triager   Negative: [1] CAUSTIC ACID or ALKALI ingestion (e.g., toilet , drain , lye, Clinitest tablets, ammonia, bleaches) AND [2] NO symptoms   Negative: [1] HYDROCARBON PRODUCT ingestion (e.g., kerosene, gasoline, benzene, furniture polish, lighter fluid) AND [2] NO symptoms    Protocols used: Medication Question Call-A-AH, Poisoning-A-AH

## 2024-12-13 ENCOUNTER — TRANSFERRED RECORDS (OUTPATIENT)
Dept: HEALTH INFORMATION MANAGEMENT | Facility: CLINIC | Age: 84
End: 2024-12-13
Payer: COMMERCIAL

## 2024-12-13 LAB — RETINOPATHY: POSITIVE

## 2024-12-17 ENCOUNTER — OFFICE VISIT (OUTPATIENT)
Dept: PHARMACY | Facility: CLINIC | Age: 84
End: 2024-12-17
Payer: COMMERCIAL

## 2024-12-17 VITALS — OXYGEN SATURATION: 97 % | DIASTOLIC BLOOD PRESSURE: 63 MMHG | HEART RATE: 55 BPM | SYSTOLIC BLOOD PRESSURE: 107 MMHG

## 2024-12-17 DIAGNOSIS — Z79.4 TYPE 2 DIABETES MELLITUS WITH DIABETIC PERIPHERAL ANGIOPATHY WITHOUT GANGRENE, WITH LONG-TERM CURRENT USE OF INSULIN (H): Primary | ICD-10-CM

## 2024-12-17 DIAGNOSIS — I10 HYPERTENSION GOAL BP (BLOOD PRESSURE) < 130/80: ICD-10-CM

## 2024-12-17 DIAGNOSIS — M19.90 ARTHRITIS: ICD-10-CM

## 2024-12-17 DIAGNOSIS — M10.9 GOUT, UNSPECIFIED CAUSE, UNSPECIFIED CHRONICITY, UNSPECIFIED SITE: ICD-10-CM

## 2024-12-17 DIAGNOSIS — Z78.9 TAKES DIETARY SUPPLEMENTS: ICD-10-CM

## 2024-12-17 DIAGNOSIS — M85.80 OSTEOPENIA, UNSPECIFIED LOCATION: ICD-10-CM

## 2024-12-17 DIAGNOSIS — E11.51 TYPE 2 DIABETES MELLITUS WITH DIABETIC PERIPHERAL ANGIOPATHY WITHOUT GANGRENE, WITH LONG-TERM CURRENT USE OF INSULIN (H): Primary | ICD-10-CM

## 2024-12-17 RX ORDER — ALLOPURINOL 300 MG/1
0.5 TABLET ORAL
Qty: 90 TABLET | Refills: 1 | Status: SHIPPED | OUTPATIENT
Start: 2024-12-17

## 2024-12-17 RX ORDER — CALCIUM CITRATE/VITAMIN D3 315MG-6.25
2 TABLET ORAL DAILY
COMMUNITY

## 2024-12-17 NOTE — PROGRESS NOTES
Medication Therapy Management (MTM) Encounter    ASSESSMENT:                            Medication Adherence/Access: No issues identified.    Diabetes   Patient is not meeting A1c goal of < 8%.  Patient is not meeting goal of > 50% time in target with continuous glucose monitoring.   Had a long discussion with patient about Ozempic vs. Rybelsus. Patient is interested in trying Ozempic to see how it affects A1c, and she likes the idea of additional weight loss benefit, but wants to think about it longer. Will discuss again at follow-up. For now, recommend increase evening NPH dose for better blood glucose control.     Hypertension   Patient is meeting blood pressure goal of < 130/80mmHg, though blood pressure on the lower end today. Continue to monitor.     Inflammation arthritis  Continue to follow with rheumatology.      Osteopenia/Supplements:   Probiotic not necessary, especially while getting greek yogurt almost daily. Recommend discontinue. Discussed different calcium supplement options; recommend calcium citrate for better absorption and smaller tablets.     Gout:   Stable, continue current regimen.     PLAN:                            Diabetes:   Increase evening dose of insulin NPH to 24 units. Continue morning dose at 46 units.   Sending in prescription for new juan francisco sensor. If it is not covered, let me know.   Think more about Ozempic. We will talk about it at your next visit.     Supplements:   Try calcium citrate for your calcium supplement 2 tablets once daily (total daily dose goal is 1200 mg including food and supplements).   Stop taking probiotic    Follow-up: Return in 5 weeks (on 1/21/2025) for Medication Therapy Management.    SUBJECTIVE/OBJECTIVE:                          Lindsey Gary is a 84 year old female seen for a follow-up visit from 9/17/24.       Reason for visit: Juan Francisco Scipio update.    Allergies/ADRs: Reviewed in chart  Past Medical History: Reviewed in chart  Tobacco: She reports that  she has never smoked. She has never been exposed to tobacco smoke. She has never used smokeless tobacco.  Alcohol: not currently using     Medication Adherence/Access: no issues reported, does not qualify for assistance program     Diabetes   /CKD  Glipizide 10 mg twice daily before breakfast and lunch   NPH insulin 46 units at 8 am and 21 units at 5 pm    No side effects reported.   Patient is concerned with recent high A1c, and is wondering about Ozempic vs. Rybelsus. She is concerned with starting a new long-term medication, but wants her A1c down.   Diet/Exercise: not discussed in depth today due to time. Patient is going through stressors at home that are making her blood glucose feel out of control.   Frequent yeast infections; SGLT2i not a great option.      Blood sugar monitoring: Continuous Glucose Monitor see AGP below. Patient is requesting new reader.   Eye exam is up to date  Foot exam: due      Hypertension   Metoprolol ER 50 mg daily  Losartan 50 mg daily  Furosemide 40 mg as needed daily for edema (taking once every 2-3 days). Takes when at home, not when going out.    Using compression stockings for edema. Edema well controlled recently per patient.   Has been having some dizziness upon standing but not frequent, she is not concerned about it.   Patient does not self-monitor blood pressure.     Inflammation arthritis  Methotrexate and folic acid - has not started yet but plans to in the new year   Tylenol 1000 mg twice daily + 1000 mg as needed occasionally   Voltaren as needed    No side effects. She is nervous about starting methotrexate and getting nausea from it. Granddaughter took it for an autoimmune disease and had bad side effects.   Feels that Tylenol and Voltaren is adequate for pain control right now.   Following with Rheumatology.      Osteopenia/Suppplements:   Calcium/vitamin D 1000 mg once daily (she thinks 1000 mg) - large pill to swallow  Probiotic - forgets to take     No side  effects or concerns.   Gets cottage cheese or greek yogurt every day and some leafy greens.   DEXA 09/2024 showing osteopenia    Gout:   Allopurinol 150 mg twice daily    Patient reports no concerns or side effects at this time. Refill needed.     Today's Vitals: /63   Pulse 55   LMP  (LMP Unknown)   SpO2 97%   ----------------  I spent 50 minutes with this patient today. All changes were made via verbal approval with LILLIAM Sam CNP.     A summary of these recommendations was given to the patient.    Nubia Hewitt, PharmD   Medication Therapy Management   Pharmacy Resident  Pager: [phone]    Lindsey Gary was seen independently by Dr. Nubia Hewitt.  I have reviewed and agree with the resident note and plan of care.      Colette Nguyen, PharmD, [facility]  Medication Therapy Management Provider, [facility]  [phone]     Medication Therapy Recommendations  Takes dietary supplements   1 Current Medication: Lactobacillus (PROBIOTIC ACIDOPHILUS) TABS (Discontinued)   Current Medication Sig: Take 1 capsule by mouth as needed   Rationale: No medical indication at this time - Unnecessary medication therapy - Indication   Recommendation: Discontinue Medication   Status: Accepted - no CPA Needed   Identified Date: 12/17/2024 Completed Date: 12/17/2024         2 Current Medication: calcium citrate-vitamin D (CITRACAL) 315-6.25 MG-MCG TABS per tablet   Current Medication Sig: Take 2 tablets by mouth daily.   Rationale: Cannot swallow/administer medication - Adherence - Adherence   Recommendation: Change Medication   Status: Accepted - no CPA Needed   Identified Date: 12/17/2024 Completed Date: 12/17/2024   Note: Change from calcium carbonate to calcium citrate 600 mg once daily         Type 2 diabetes mellitus with complication, without long-term current use of insulin (H)   1 Current Medication: insulin  UNIT/ML vial   Current Medication Sig: Inject 46 Units  subcutaneously every morning.   Rationale: Dose too low - Dosage too low - Effectiveness   Recommendation: Increase Dose   Status: Accepted per Provider   Identified Date: 12/17/2024 Completed Date: 12/17/2024

## 2024-12-17 NOTE — PATIENT INSTRUCTIONS
"Recommendations from today's MTM visit:                                                      Diabetes:   Increase evening dose of insulin NPH to 24 units. Continue morning dose at 46 units.   Sending in prescription for new isrrael sensor. If it is not covered, let me know.   Think more about Ozempic. We will talk about it at your next visit.     Supplements:   Try calcium citrate for your calcium supplement 2 tablets once daily (total daily dose goal is 1200 mg including food and supplements).   Stop taking probiotic      It was great speaking with you today.  I value your experience and would be very thankful for your time in providing feedback in our clinic survey. In the next few days, you may receive an email or text message from [facility] with a link to a survey related to your  clinical pharmacist.\"     To schedule another MTM appointment, please call the clinic directly or you may call the MTM scheduling line at [phone] or toll-free at [phone].     My Clinical Pharmacist's contact information:                                                      Please feel free to contact me with any questions or concerns you have.      Nubia Hewitt, PharmD   Medication Therapy Management   Pharmacy Resident  Pager: [phone]     "

## 2024-12-17 NOTE — Clinical Note
Linus Copeland,   I saw Lindsey today for a MTM visit. See attached note for details. I recommend increasing her insulin NPH evening dose to 24 units, as well as refilling her allopurinol and sending a new Rx for isrrael reader as requested. Let me know if these changes are ok with you, and I will sign the orders!   Let me know if you have any questions. Thanks,  Nubia Hewitt, PharmD  Medication Therapy Management  Pharmacy Resident Pager: [phone]

## 2024-12-23 ENCOUNTER — DOCUMENTATION ONLY (OUTPATIENT)
Dept: NEPHROLOGY | Facility: CLINIC | Age: 84
End: 2024-12-23
Payer: COMMERCIAL

## 2024-12-23 DIAGNOSIS — I10 ESSENTIAL HYPERTENSION: Primary | ICD-10-CM

## 2024-12-23 NOTE — PROGRESS NOTES
Lindsey PAULINO Abdirahman has an upcoming lab appointment:    Future Appointments   Date Time Provider Department Center   1/8/2025 11:15 AM CR LAB CRLABR CR   1/15/2025  2:00 PM Venus Acosta MD [facility]   1/21/2025 11:00 AM Sandee Hewitt [facility] CR     Patient is requesting labs for Venus Acosta.There is no order available for this provider. Please review and place either future orders or HMPO (Review of Health Maintenance Protocol Orders), as appropriate.      Thank You,    Ronna TERRY III  [facility]  P: [phone]

## 2024-12-26 DIAGNOSIS — I10 HYPERTENSION GOAL BP (BLOOD PRESSURE) < 130/80: ICD-10-CM

## 2024-12-26 DIAGNOSIS — E78.5 HYPERLIPIDEMIA LDL GOAL <100: ICD-10-CM

## 2024-12-26 RX ORDER — METOPROLOL SUCCINATE 50 MG/1
TABLET, EXTENDED RELEASE ORAL
Qty: 90 TABLET | Refills: 2 | Status: SHIPPED | OUTPATIENT
Start: 2024-12-26

## 2025-01-07 ENCOUNTER — TELEPHONE (OUTPATIENT)
Dept: INTERNAL MEDICINE | Facility: CLINIC | Age: 85
End: 2025-01-07
Payer: COMMERCIAL

## 2025-01-07 NOTE — TELEPHONE ENCOUNTER
S-(situation): Pt calling with question about insulin    B-(background): Takes 46 units NPH in the AM. Managed by MT.    A-(assessment): Pt has fasting labs at 10:00 AM. She is asking if she should take her insulin in the AM.     R-(recommendations): Writer advised she likely should wait to take her NPH until closer to when she will eat. Provided MT pharmacists number to call and ask for more specific recommendations. Routing to MT pharmacist for further recommendations.

## 2025-01-08 ENCOUNTER — LAB (OUTPATIENT)
Dept: LAB | Facility: CLINIC | Age: 85
End: 2025-01-08
Payer: COMMERCIAL

## 2025-01-08 DIAGNOSIS — M10.9 GOUT: ICD-10-CM

## 2025-01-08 DIAGNOSIS — I10 ESSENTIAL HYPERTENSION: ICD-10-CM

## 2025-01-08 LAB
ALBUMIN MFR UR ELPH: 21.5 MG/DL
ALBUMIN SERPL BCG-MCNC: 4.2 G/DL (ref 3.5–5.2)
ALBUMIN UR-MCNC: 30 MG/DL
ALP SERPL-CCNC: 120 U/L (ref 40–150)
ALT SERPL W P-5'-P-CCNC: 13 U/L (ref 0–50)
ANION GAP SERPL CALCULATED.3IONS-SCNC: 10 MMOL/L (ref 7–15)
APPEARANCE UR: CLEAR
AST SERPL W P-5'-P-CCNC: 25 U/L (ref 0–45)
BACTERIA #/AREA URNS HPF: ABNORMAL /HPF
BILIRUB SERPL-MCNC: 0.6 MG/DL
BILIRUB UR QL STRIP: NEGATIVE
BUN SERPL-MCNC: 27 MG/DL (ref 8–23)
CALCIUM SERPL-MCNC: 9.7 MG/DL (ref 8.8–10.4)
CHLORIDE SERPL-SCNC: 101 MMOL/L (ref 98–107)
COLOR UR AUTO: YELLOW
CREAT SERPL-MCNC: 1.16 MG/DL (ref 0.51–0.95)
CREAT UR-MCNC: 73.3 MG/DL
CREAT UR-MCNC: 73.3 MG/DL
EGFRCR SERPLBLD CKD-EPI 2021: 47 ML/MIN/1.73M2
ERYTHROCYTE [DISTWIDTH] IN BLOOD BY AUTOMATED COUNT: 16.6 % (ref 10–15)
GLUCOSE SERPL-MCNC: 195 MG/DL (ref 70–99)
GLUCOSE UR STRIP-MCNC: NEGATIVE MG/DL
HCO3 SERPL-SCNC: 25 MMOL/L (ref 22–29)
HCT VFR BLD AUTO: 31.4 % (ref 35–47)
HGB BLD-MCNC: 9.8 G/DL (ref 11.7–15.7)
HGB UR QL STRIP: NEGATIVE
KETONES UR STRIP-MCNC: NEGATIVE MG/DL
LEUKOCYTE ESTERASE UR QL STRIP: NEGATIVE
MCH RBC QN AUTO: 24.3 PG (ref 26.5–33)
MCHC RBC AUTO-ENTMCNC: 31.2 G/DL (ref 31.5–36.5)
MCV RBC AUTO: 78 FL (ref 78–100)
MICROALBUMIN UR-MCNC: 32.4 MG/L
MICROALBUMIN/CREAT UR: 44.2 MG/G CR (ref 0–25)
NITRATE UR QL: NEGATIVE
PH UR STRIP: 5.5 [PH] (ref 5–7)
PLATELET # BLD AUTO: 173 10E3/UL (ref 150–450)
POTASSIUM SERPL-SCNC: 4.2 MMOL/L (ref 3.4–5.3)
PROT SERPL-MCNC: 7 G/DL (ref 6.4–8.3)
PROT/CREAT 24H UR: 0.29 MG/MG CR (ref 0–0.2)
RBC # BLD AUTO: 4.04 10E6/UL (ref 3.8–5.2)
RBC #/AREA URNS AUTO: ABNORMAL /HPF
SODIUM SERPL-SCNC: 136 MMOL/L (ref 135–145)
SP GR UR STRIP: 1.01 (ref 1–1.03)
SQUAMOUS #/AREA URNS AUTO: ABNORMAL /LPF
URATE SERPL-MCNC: 3.8 MG/DL (ref 2.4–5.7)
UROBILINOGEN UR STRIP-ACNC: 0.2 E.U./DL
WBC # BLD AUTO: 6.2 10E3/UL (ref 4–11)
WBC #/AREA URNS AUTO: ABNORMAL /HPF

## 2025-01-08 PROCEDURE — 84550 ASSAY OF BLOOD/URIC ACID: CPT

## 2025-01-08 PROCEDURE — 80053 COMPREHEN METABOLIC PANEL: CPT

## 2025-01-08 PROCEDURE — 82043 UR ALBUMIN QUANTITATIVE: CPT

## 2025-01-08 PROCEDURE — 82570 ASSAY OF URINE CREATININE: CPT

## 2025-01-08 PROCEDURE — 85027 COMPLETE CBC AUTOMATED: CPT

## 2025-01-08 PROCEDURE — 36415 COLL VENOUS BLD VENIPUNCTURE: CPT

## 2025-01-08 PROCEDURE — 81001 URINALYSIS AUTO W/SCOPE: CPT

## 2025-01-08 PROCEDURE — 84156 ASSAY OF PROTEIN URINE: CPT

## 2025-01-08 NOTE — TELEPHONE ENCOUNTER
Called and spoke to patient.     Ok to take normal dose of insulin NPH this morning while fasting for labs, as insulin NPH is intermediate acting and does not reach peak serum concentration until 6-10 hours after dosing.     Instructed patient that it would be ok to delay dose until after labs when she can eat if her blood glucose is low or she would prefer. Patient repeated understanding.    Nubia Hewitt, RadhaD   Medication Therapy Management   Pharmacy Resident  Pager: [phone]

## 2025-01-14 DIAGNOSIS — Z79.4 TYPE 2 DIABETES MELLITUS WITH DIABETIC PERIPHERAL ANGIOPATHY WITHOUT GANGRENE, WITH LONG-TERM CURRENT USE OF INSULIN (H): ICD-10-CM

## 2025-01-14 DIAGNOSIS — E11.51 TYPE 2 DIABETES MELLITUS WITH DIABETIC PERIPHERAL ANGIOPATHY WITHOUT GANGRENE, WITH LONG-TERM CURRENT USE OF INSULIN (H): ICD-10-CM

## 2025-01-14 NOTE — TELEPHONE ENCOUNTER
Juan Francisco SENSORS refill request.   Prescription approved per [facility] Refill Protocol.

## 2025-01-16 DIAGNOSIS — I10 ESSENTIAL HYPERTENSION: ICD-10-CM

## 2025-01-16 RX ORDER — LOSARTAN POTASSIUM 50 MG/1
50 TABLET ORAL DAILY
Qty: 90 TABLET | Refills: 0 | Status: SHIPPED | OUTPATIENT
Start: 2025-01-16

## 2025-01-20 ENCOUNTER — TELEPHONE (OUTPATIENT)
Dept: NEPHROLOGY | Facility: CLINIC | Age: 85
End: 2025-01-20
Payer: COMMERCIAL

## 2025-01-20 NOTE — TELEPHONE ENCOUNTER
[facility] Call Center    Phone Message    May a detailed message be left on voicemail: yes     Reason for Call: Patient is requesting we send Renal US orders to [facility]. Please review and call patient with any questions.    Patient does not know what fax number it needs to be sent to.    Action Taken: Other: CS - Nephrology    Travel Screening: Not Applicable     Date of Service:

## 2025-01-21 ENCOUNTER — OFFICE VISIT (OUTPATIENT)
Dept: PHARMACY | Facility: CLINIC | Age: 85
End: 2025-01-21
Payer: COMMERCIAL

## 2025-01-21 VITALS — DIASTOLIC BLOOD PRESSURE: 72 MMHG | SYSTOLIC BLOOD PRESSURE: 142 MMHG

## 2025-01-21 DIAGNOSIS — N18.32 STAGE 3B CHRONIC KIDNEY DISEASE (H): ICD-10-CM

## 2025-01-21 DIAGNOSIS — M19.90 ARTHRITIS: ICD-10-CM

## 2025-01-21 DIAGNOSIS — M10.9 GOUT, UNSPECIFIED CAUSE, UNSPECIFIED CHRONICITY, UNSPECIFIED SITE: ICD-10-CM

## 2025-01-21 DIAGNOSIS — Z79.4 TYPE 2 DIABETES MELLITUS WITH DIABETIC PERIPHERAL ANGIOPATHY WITHOUT GANGRENE, WITH LONG-TERM CURRENT USE OF INSULIN (H): Primary | ICD-10-CM

## 2025-01-21 DIAGNOSIS — G62.9 NEUROPATHY: ICD-10-CM

## 2025-01-21 DIAGNOSIS — K21.00 GASTROESOPHAGEAL REFLUX DISEASE WITH ESOPHAGITIS, UNSPECIFIED WHETHER HEMORRHAGE: ICD-10-CM

## 2025-01-21 DIAGNOSIS — M85.80 OSTEOPENIA, UNSPECIFIED LOCATION: ICD-10-CM

## 2025-01-21 DIAGNOSIS — E78.5 HYPERLIPIDEMIA LDL GOAL <100: ICD-10-CM

## 2025-01-21 DIAGNOSIS — E11.51 TYPE 2 DIABETES MELLITUS WITH DIABETIC PERIPHERAL ANGIOPATHY WITHOUT GANGRENE, WITH LONG-TERM CURRENT USE OF INSULIN (H): Primary | ICD-10-CM

## 2025-01-21 DIAGNOSIS — I10 HYPERTENSION GOAL BP (BLOOD PRESSURE) < 130/80: ICD-10-CM

## 2025-01-21 NOTE — LETTER
January 21, 2025  Lindsey LORA Abdirahman  [address] [address] [id number]    Dear Ms. Gary, [facility]     Thank you for talking with me on Jan 21, 2025 about your health and medications. As a follow-up to our conversation, I have included two documents:      Your Recommended To-Do List has steps you should take to get the best results from your medications.  Your Medication List will help you keep track of your medications and how to take them.    If you want to talk about these documents, please call Sandee Hewitt RPH at phone: [phone], Monday-Friday 8-4:30pm.    I look forward to working with you and your doctors to make sure your medications work well for you.    Sincerely,  Nubia Hewitt RPH  [facility] Pharmacist, [facility]

## 2025-01-21 NOTE — PATIENT INSTRUCTIONS
"Recommendations from today's MTM visit:                                                        Diabetes:   Continue insulin NPH 46 units at 8 am and 24 units at 5 pm  Start metformin  mg once daily as prescribed by nephrology  I will send Rx for Ozempic.  and bring in to next appointment, and we will do the first dose together.     Bone calcium:   Take either calcium citrate with vitamin D 630 mg (2 tablets) once daily OR take calcium citrate with D petite tablets 400 mg (2 tablets) twice daily       It was great speaking with you today.  I value your experience and would be very thankful for your time in providing feedback in our clinic survey. In the next few days, you may receive an email or text message from [facility] with a link to a survey related to your  clinical pharmacist.\"     To schedule another MTM appointment, please call the clinic directly or you may call the MTM scheduling line at [phone] or toll-free at [phone].     My Clinical Pharmacist's contact information:                                                      Please feel free to contact me with any questions or concerns you have.      Nubia Hewitt, PharmD   Medication Therapy Management   Pharmacy Resident  Pager: [phone]     "

## 2025-01-21 NOTE — LETTER
"Recommended To-Do List      Prepared on: Jan 21, 2025       You can get the best results from your medications by completing the items on this \"To-Do List.\"      Bring your To-Do List when you go to your doctor. And, share it with your family or caregivers.    My To-Do List:  What we talked about: What I should do:   A new medication that may be of benefit to you    Start taking Ozempic 0.25 mg once weekly for 4 weeks, then 0.5 mg once weekly.           What we talked about: What I should do:                     "

## 2025-01-21 NOTE — LETTER
_  Medication List        Prepared on: Jan 21, 2025     Bring your Medication List when you go to the doctor, hospital, or   emergency room. And, share it with your family or caregivers.     Note any changes to how you take your medications.  Cross out medications when you no longer use them.    Medication How I take it Why I use it Prescriber   acetaminophen (TYLENOL) 500 MG tablet Take 2 tablets by mouth 3 times daily. Take an additional 1000 mg daily needed. Mixed Hyperlipidemia; Gout, Unspecified; Type 2 Diabetes, HbA1c Goal < 7% (H); Hyperlipidemia LDL Goal <100 Patient Reported   allopurinol (ZYLOPRIM) 300 MG tablet Take 1 tablet (300 mg) by mouth daily. Gout, unspecified cause, unspecified chronicity, unspecified site Venus Acosta MD   calcium citrate-vitamin D (CITRACAL) 315-6.25 MG-MCG TABS per tablet Take 2 tablets by mouth daily. Osteopenia Patient Reported   carboxymethylcellulose PF (REFRESH LIQUIGEL) 1 % ophthalmic gel Place 1 drop into both eyes 3 times daily as needed for dry eyes Dry eyes Patient Reported   cycloSPORINE (RESTASIS) 0.05 % ophthalmic emulsion Place 1 drop Into the left eye 2 times daily  Corneal implant Kike Palomino MD    diclofenac (VOLTAREN) 1 % topical gel Apply topically 4 times daily as needed for moderate pain Arthritis  Patient Reported   famotidine (PEPCID) 20 MG tablet Take 20 mg by mouth daily. GERD Patient Reported   fluconazole (DIFLUCAN) 150 MG tablet Take 1 tablet (150 mg) by mouth once as needed (yeast infection) Yeast Infection of the Vagina LILLIAM Sam CNP   fluorometholone (FML LIQUIFILM) 0.1 % ophthalmic suspension Place 1 drop Into the left eye daily Corneal implant Kike Palomino MD    furosemide (LASIX) 20 MG tablet Take 2 tablets (40 mg) by mouth daily Essential Hypertension LILLIAM Sam CNP   gabapentin (NEURONTIN) 300 MG capsule TAKE 3 CAPSULES BY MOUTH AT BEDTIME Neuropathy LILLIAM Sam CNP   glipiZIDE  (GLUCOTROL) 10 MG tablet TAKE 1 TABLET BY MOUTH TWICE DAILY BEFORE MEAL(S) Type 2 diabetes mellitus without complication, without long-term current use of insulin (H) LILLIAM Sam CNP   insulin  UNIT/ML vial Inject 46 Units subcutaneously every morning. Diabetes LILLIAM Sam CNP   insulin  UNIT/ML vial Inject 24 Units subcutaneously every evening. AT 5 PM  Diabetes LILLIAM Sam CNP   losartan (COZAAR) 50 MG tablet Take 1 tablet by mouth once daily Essential Hypertension LILLIAM Sam CNP   metFORMIN (GLUCOPHAGE-XR) 750 MG 24 hr tablet Take 1 tablet (750 mg) by mouth daily (with dinner). CKD (Chronic Kidney Disease) Stage 4, GFR 15-29 ml/min (H) Venus Acosta MD   metoprolol succinate ER (TOPROL XL) 50 MG 24 hr tablet Take 1 tablet by mouth once daily Hyperlipidemia LDL Goal <100; Hypertension Goal BP (Blood Pressure) < 130/80 LILLIAM Sam CNP   omeprazole (PRILOSEC) 20 MG DR capsule Take 1 capsule by mouth once daily Gastroesophageal reflux disease with esophagitis without hemorrhage LILLIAM Sam CNP   rosuvastatin (CRESTOR) 20 MG tablet Take 1 tablet by mouth once daily Hyperlipidemia LDL Goal <100 LILLIAM Sam CNP   TUMS 500 MG OR CHEW Take 1 chew tab by mouth 3 times daily as needed GERD Dano Millan MD         Add new medications, over-the-counter drugs, herbals, vitamins, or  minerals in the blank rows below.    Medication How I take it Why I use it Prescriber                                      Allergies:      - Amoxicillin-pot Clavulanate  - Sulfa Antibiotics        Side effects I have had:      Not on File        Other Information:              My notes and questions:

## 2025-01-21 NOTE — Clinical Note
Linus Copeland!  I saw Lindsey today for a MTM pharmacist appointment. She has decided she would like to try Ozempic. I am planning for her to pick it up and then come back into clinic to see me for her first dose. At that time, I will assess blood glucose on newly started metformin and decide if we should decrease glipizide dose for Ozempic initiation.   I will send a prescription for Ozempic 0.25 mg once weekly x 4 weeks, then increase to 0.5 mg weekly.   Please let me know if this is ok with you, and I will get it sent.  Thanks! Nubia Hewitt, PharmD  Medication Therapy Management  Pharmacy Resident Pager: [phone]

## 2025-01-21 NOTE — PROGRESS NOTES
Medication Therapy Management (MTM) Encounter    ASSESSMENT:                            Medication Adherence/Access: See below for considerations.    Diabetes/CKD  Patient is not meeting A1c goal of < 8%.  Patient is meeting goal of > 50% time in target with continuous glucose monitoring.   Recommend patient start metformin as prescribed by nephrology. Recommend GLP1 initiation as patient is willing. Reviewed appropriate use, benefits, risks and monitoring at length, as well as likely cost. Recommend patient bring Ozempic pen in to next visit where we can do first injection together. Will consider decrease/discontinue glipizide at that time. Potential to decrease insulin need when stable on Ozempic.     Hypertension   Patient is not meeting blood pressure goal of < 130/80mmHg, though she historically has. Increased stress today may be the reason. Continue to monitor.     Hyperlipidemia   LDL not at goal <70 mg/dL. May consider increase in rosuvastatin dose. Will hold off on for now with other medication changes more urgent.     GERD    Stable, continue current regimen. May consider trial off of famotidine in the future.      Inflammation arthritis/Neuropathy  Ok to hold off on methotrexate use if patient is ok with pain. Will continue to answer questions and reassure patient.     Osteopenia  Recommend patient to receive goal 1200 mg calcium daily. With ~500-600mg daily from diet, recommend 600-800 mg extra calcium from supplements as discussed last visit. To take calcium in combination tablet with vitamin D for goal 800 units daily.    Gout  Stable, continue current regimen.    PLAN:                            Diabetes:   Continue insulin NPH 46 units at 8 am and 24 units at 5 pm  Start metformin  mg once daily as prescribed by nephrology  I will send Rx for Ozempic.  and bring in to next appointment, and we will do the first dose together.   Start with injecting 0.25 mg once weekly x 4 weeks, then  increase to 0.5 mg once weekly.   1/22/25 addendum: Ozempic Rx ok'd by LILLIAM Betancourt. Rx sent.     Bone calcium:   Take either calcium citrate with vitamin D 630 mg (2 tablets) once daily OR take calcium citrate with D petite tablets 400 mg (2 tablets) twice daily     Follow-up: Return in 23 days (on 2/13/2025) for Medication Therapy Management.    SUBJECTIVE/OBJECTIVE:                          Lindsey Gary is a 84 year old female seen for a follow-up visit.       Reason for visit: diabetes follow-up.    Allergies/ADRs: Reviewed in chart  Past Medical History: Reviewed in chart  Tobacco: She reports that she has never smoked. She has never been exposed to tobacco smoke. She has never used smokeless tobacco.  Alcohol: not currently using     Medication Adherence/Access: no issues reported, does not qualify for assistance program.   was recently in the hospital, so she has a lot of stress at home to manage. 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     Diabetes   /CKD  Glipizide 10 mg twice daily before breakfast and lunch   NPH insulin 46 units at 8 am and 24 units at 5 pm  Metformin  mg once daily - not yet started    No side effects reported.   Patient recently met with nephrology Dr. Acosta 1/15/25. Recommended restart metformin due to better kidney function, and start Farxiga. Patient received metformin, but Farxiga was too expensive. Discussed Ozempic with nephrology and patient feels she may be ready to try.   Diet/Exercise: not discussed in depth today due to time. Patient is going through stressors at home that are making her blood glucose feel out of control.   Of note, patient has frequent yeast infections, so SGLT2i was deemed not a great option in the past.      Blood sugar monitoring: Continuous Glucose Monitor see AGP below  Eye exam is up to date  Foot exam: due      Hypertension   Metoprolol ER 50 mg daily  Losartan 50 mg daily  Furosemide 40 mg  daily unless out of the house. Takes when at home, not when going out.    Using compression stockings for edema. Edema well controlled recently per patient.   No side effects reported.   Patient does not self-monitor blood pressure.     Hyperlipidemia   Rosuvastatin 20mg daily  No side effects reported.      GERD    Omeprazole 20 mg once daily  Famotidine 20 mg daily   Tums 500 mg once daily as needed - doesn't take very often    Patient reports hx hiatial hernia   Patient feels that current regimen is effective, no side effects.     Inflammation arthritis/Neuropathy  Methotrexate 2.5 mg tablets - take 3 tablets once weekly (dose recently decrease by nephrology, though patient has not started)   Folic acid 1 mg once daily - not yet started   Tylenol 1000 mg three times daily   Gabapentin 900 mg every night at bedtime   Voltaren as needed - finds helpful    No side effects.   Received prescription for methotrexate in June 2024 and has not tried. She is nervous about starting and getting nausea from it. Granddaughter took it for an autoimmune disease and had bad side effects. She was happy to hear dose is reduced for kidney function, as she thinks this may decrease her chance of getting side effects.   Feels that Tylenol and Voltaren is adequate for pain control right now.   Following with Rheumatology.      Osteopenia  Calcium/vitamin D 1000 mg once daily (she thinks 1000 mg) - large pill to swallow     No side effects or concerns.   Calcium in diet: Gets greek yogurt every day, 1/2 cup milk daily, occasional cottage cheese, and some leafy greens.   DEXA 09/2024 showing osteopenia     Gout:   Allopurinol 300 mg daily     Patient reports no concerns or side effects at this time.   Nephrology had her change dose from 150 mg twice daily to 300 mg daily     Today's Vitals: BP (!) 142/72   LMP  (LMP Unknown)   ----------------  I spent 45 minutes with this patient today. All changes were made via verbal approval with  Dinorah Wong, LILLIAM CNP.     A summary of these recommendations was given to the patient.    Nubia Hewitt PharmD   Medication Therapy Management   Pharmacy Resident  Pager: [phone]    Lindsey Gary was seen independently by Dr. Nubia Hewitt.  I have reviewed and agree with the resident note and plan of care.      Colette Nguyen PharmD, [facility]  Medication Therapy Management Provider, [facility]  [phone]     Medication Therapy Recommendations  Type 2 diabetes mellitus with diabetic peripheral angiopathy without gangrene, with long-term current use of insulin (H)   1 Current Medication: insulin  UNIT/ML vial   Current Medication Sig: Inject 46 Units subcutaneously every morning.   Rationale: Synergistic therapy - Needs additional medication therapy - Indication   Recommendation: Start Medication - Ozempic (0.25 or 0.5 MG/DOSE) 2 MG/1.5ML Sopn   Status: Accepted per Provider   Identified Date: 1/21/2025 Completed Date: 1/22/2025

## 2025-01-22 ENCOUNTER — TELEPHONE (OUTPATIENT)
Dept: INTERNAL MEDICINE | Facility: CLINIC | Age: 85
End: 2025-01-22
Payer: COMMERCIAL

## 2025-01-22 NOTE — TELEPHONE ENCOUNTER
FREE DRUG APPLICATION INITIATED    Medication: OZEMPIC (1 MG/DOSE) 4 MG/3ML SC SOPN  Free Drug Program Name:  [facility]  Date Submitted: 1/22/2025  2:49 PM  Additional Information: Attempting to fill out the [employer] [facility] application for Ozempic and am in need of an address that the Ozempic will be shipped to. Order cannot be shipped to the patient's home per [employer]. Per Randell, routing to the [facility] for assistance to either provide an address or provide a clinic contact for us to reach out to.              Beti Carlos [facility]  Clinic Liaison  [facility]  [email or web address]

## 2025-01-27 ENCOUNTER — TELEPHONE (OUTPATIENT)
Dept: NEPHROLOGY | Facility: CLINIC | Age: 85
End: 2025-01-27
Payer: COMMERCIAL

## 2025-01-27 NOTE — TELEPHONE ENCOUNTER
[facility] Call Center    Phone Message    May a detailed message be left on voicemail: no     Reason for Call: Other: Patient would like to speak to her team about metFORMIN (GLUCOPHAGE-XR) 750 MG 24 hr tablet. It sounds like it is affecting her diabetes. Please call patient back to discuss.     Action Taken: Other: CS Nephrology    Travel Screening: Not Applicable     Date of Service:

## 2025-01-28 DIAGNOSIS — E11.8 TYPE 2 DIABETES MELLITUS WITH COMPLICATION, WITHOUT LONG-TERM CURRENT USE OF INSULIN (H): Primary | ICD-10-CM

## 2025-01-28 RX ORDER — METFORMIN HYDROCHLORIDE 500 MG/1
500 TABLET, EXTENDED RELEASE ORAL
Qty: 90 TABLET | Refills: 3 | Status: SHIPPED | OUTPATIENT
Start: 2025-01-28 | End: 2026-01-23

## 2025-01-28 NOTE — TELEPHONE ENCOUNTER
Called patient to discuss, explained that she experienced low blood sugar the day she took it at night. BS was around 50. States she talked to the pharmacist MTALAYNA that she works with and they said to try taking it at noon and the same thing happened. She is wondering what she should do.

## 2025-01-31 ENCOUNTER — LAB (OUTPATIENT)
Dept: LAB | Facility: CLINIC | Age: 85
End: 2025-01-31
Payer: COMMERCIAL

## 2025-01-31 DIAGNOSIS — N18.4 CKD (CHRONIC KIDNEY DISEASE) STAGE 4, GFR 15-29 ML/MIN (H): ICD-10-CM

## 2025-01-31 LAB
ALBUMIN MFR UR ELPH: 25.3 MG/DL
ALBUMIN SERPL BCG-MCNC: 4.2 G/DL (ref 3.5–5.2)
ALBUMIN UR-MCNC: ABNORMAL MG/DL
ALP SERPL-CCNC: 119 U/L (ref 40–150)
ALT SERPL W P-5'-P-CCNC: 13 U/L (ref 0–50)
ANION GAP SERPL CALCULATED.3IONS-SCNC: 14 MMOL/L (ref 7–15)
APPEARANCE UR: CLEAR
AST SERPL W P-5'-P-CCNC: 23 U/L (ref 0–45)
BACTERIA #/AREA URNS HPF: ABNORMAL /HPF
BILIRUB SERPL-MCNC: 0.4 MG/DL
BILIRUB UR QL STRIP: NEGATIVE
BUN SERPL-MCNC: 41.2 MG/DL (ref 8–23)
CALCIUM SERPL-MCNC: 9.5 MG/DL (ref 8.8–10.4)
CHLORIDE SERPL-SCNC: 101 MMOL/L (ref 98–107)
COLOR UR AUTO: YELLOW
CREAT SERPL-MCNC: 1.34 MG/DL (ref 0.51–0.95)
CREAT UR-MCNC: 131 MG/DL
CREAT UR-MCNC: 131 MG/DL
EGFRCR SERPLBLD CKD-EPI 2021: 39 ML/MIN/1.73M2
ERYTHROCYTE [DISTWIDTH] IN BLOOD BY AUTOMATED COUNT: 16.3 % (ref 10–15)
GLUCOSE SERPL-MCNC: 165 MG/DL (ref 70–99)
GLUCOSE UR STRIP-MCNC: NEGATIVE MG/DL
HCO3 SERPL-SCNC: 21 MMOL/L (ref 22–29)
HCT VFR BLD AUTO: 31.7 % (ref 35–47)
HGB BLD-MCNC: 9.8 G/DL (ref 11.7–15.7)
HGB UR QL STRIP: NEGATIVE
KETONES UR STRIP-MCNC: NEGATIVE MG/DL
LEUKOCYTE ESTERASE UR QL STRIP: NEGATIVE
MCH RBC QN AUTO: 24.2 PG (ref 26.5–33)
MCHC RBC AUTO-ENTMCNC: 30.9 G/DL (ref 31.5–36.5)
MCV RBC AUTO: 78 FL (ref 78–100)
MICROALBUMIN UR-MCNC: 32.6 MG/L
MICROALBUMIN/CREAT UR: 24.89 MG/G CR (ref 0–25)
NITRATE UR QL: NEGATIVE
PH UR STRIP: 5.5 [PH] (ref 5–7)
PLATELET # BLD AUTO: 164 10E3/UL (ref 150–450)
POTASSIUM SERPL-SCNC: 4.2 MMOL/L (ref 3.4–5.3)
PROT SERPL-MCNC: 6.9 G/DL (ref 6.4–8.3)
PROT/CREAT 24H UR: 0.19 MG/MG CR (ref 0–0.2)
RBC # BLD AUTO: 4.05 10E6/UL (ref 3.8–5.2)
RBC #/AREA URNS AUTO: ABNORMAL /HPF
SODIUM SERPL-SCNC: 136 MMOL/L (ref 135–145)
SP GR UR STRIP: 1.02 (ref 1–1.03)
SQUAMOUS #/AREA URNS AUTO: ABNORMAL /LPF
UROBILINOGEN UR STRIP-ACNC: 0.2 E.U./DL
WBC # BLD AUTO: 6.2 10E3/UL (ref 4–11)
WBC #/AREA URNS AUTO: ABNORMAL /HPF

## 2025-01-31 PROCEDURE — 82570 ASSAY OF URINE CREATININE: CPT

## 2025-01-31 PROCEDURE — 84156 ASSAY OF PROTEIN URINE: CPT

## 2025-01-31 PROCEDURE — 81001 URINALYSIS AUTO W/SCOPE: CPT

## 2025-01-31 PROCEDURE — 85027 COMPLETE CBC AUTOMATED: CPT

## 2025-01-31 PROCEDURE — 36415 COLL VENOUS BLD VENIPUNCTURE: CPT

## 2025-01-31 PROCEDURE — 82043 UR ALBUMIN QUANTITATIVE: CPT

## 2025-01-31 PROCEDURE — 80053 COMPREHEN METABOLIC PANEL: CPT

## 2025-02-10 ENCOUNTER — PATIENT OUTREACH (OUTPATIENT)
Dept: INTERNAL MEDICINE | Facility: CLINIC | Age: 85
End: 2025-02-10
Payer: COMMERCIAL

## 2025-02-10 ENCOUNTER — TELEPHONE (OUTPATIENT)
Dept: PHARMACY | Facility: CLINIC | Age: 85
End: 2025-02-10
Payer: COMMERCIAL

## 2025-02-10 ENCOUNTER — MYC MEDICAL ADVICE (OUTPATIENT)
Dept: INTERNAL MEDICINE | Facility: CLINIC | Age: 85
End: 2025-02-10
Payer: COMMERCIAL

## 2025-02-10 DIAGNOSIS — I10 ESSENTIAL HYPERTENSION: ICD-10-CM

## 2025-02-10 RX ORDER — FUROSEMIDE 20 MG/1
40 TABLET ORAL DAILY
Qty: 180 TABLET | Refills: 0 | Status: SHIPPED | OUTPATIENT
Start: 2025-02-10

## 2025-02-10 NOTE — TELEPHONE ENCOUNTER
Patient left message on MTM scheduling line, would like a return call to reschedule with Sandee AMADOR in [place]. I returned call but was unable to leave a message because VM has not been set up yet.     Use [facility] medicare part D for billing

## 2025-02-10 NOTE — TELEPHONE ENCOUNTER
Patient Quality Outreach    Patient is due for the following:   Diabetes -  A1C  Hypertension -  BP check  Physical Annual Wellness Visit      Topic Date Due    Diptheria Tetanus Pertussis (DTAP/TDAP/TD) Vaccine (4 - Td or Tdap) 03/31/2024       Action(s) Taken:   Schedule a office visit for DM, HTN & an Annual Wellness Visit    Type of outreach:    Phone, spoke to patient/parent. Patient/parent will call back to schedule. and Sent [employer] message.    Questions for provider review:    None           Leah Ponce LPN  Chart routed to none.

## 2025-02-11 ENCOUNTER — DOCUMENTATION ONLY (OUTPATIENT)
Dept: NEPHROLOGY | Facility: CLINIC | Age: 85
End: 2025-02-11
Payer: COMMERCIAL

## 2025-02-11 DIAGNOSIS — N18.4 CKD (CHRONIC KIDNEY DISEASE) STAGE 4, GFR 15-29 ML/MIN (H): Primary | ICD-10-CM

## 2025-02-11 NOTE — PROGRESS NOTES
Bobmichelle PAULINO Abdirahman has an upcoming lab appointment:    Future Appointments   Date Time Provider Department Center   2/21/2025  9:45 AM CR LAB CRLABR CR   2/26/2025  1:30 PM Colette Zayas PA-C [facility]   3/4/2025 10:30 AM Sandee Hewitt RP CRM CR     Patient is requesting labs for upcoming appt with Colette Zayas. Currently no labs ordered under this provider. Please review and place either future orders or HMPO (Review of Health Maintenance Protocol Orders), as appropriate.      Thank You,    Ronna TERRY III  [facility]  P: [phone]

## 2025-02-19 DIAGNOSIS — E11.9 TYPE 2 DIABETES MELLITUS WITHOUT COMPLICATION, WITHOUT LONG-TERM CURRENT USE OF INSULIN (H): ICD-10-CM

## 2025-02-19 RX ORDER — GLIPIZIDE 10 MG/1
10 TABLET ORAL
Qty: 180 TABLET | Refills: 1 | Status: SHIPPED | OUTPATIENT
Start: 2025-02-19

## 2025-02-20 NOTE — PROGRESS NOTES
Nephrology Progress Note    Lindsey Gary MRN:[id number] YOB: 1940  Date of Service: 02/26/2025  Primary care provider: Dinorah Wong  Requesting physician: Dinorah Wong      REASON FOR VISIT: CKD    HISTORY OF PRESENT ILLNESS:   Lindsey Gary is a 84 year old female who presents for evaluation of CKD.  The past medical history is significant for obesity, HFpEF, hypertension, type 2 diabetes , gout, polyarthritis/psoriasis, latent TB on isoniazide and CKD stage 3b.  Regarding her diabetes the patient has had variable control and most recently an Hba1c at 8.8 on 11/11/24 and was subsequently referred to [facility] and was seen on 12/17/2024 with her evening dose of insulin NPH being increased to 24 units while her morning dose was kept at 46 units. She is otherwise on glipizide 10 mg twice daily. She reports however episodes of hypoglycemia at nighttime. For gout, she is on allopurinol 150 mg twice daily. She hasn't had any recent gout attack. Her latest uric acid level is 3.8 on 1/8/2025. 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 She also has a history of  generalized osteoarthritis. She has had a partial knee replacement of both knees. She has constant aching in th knees and was told he needs to have TKAs done bilaterally. She was seen in Rheumatology clinic in October 2024 and it was suggested that she starts methotrexate 15 mg weekly however she is still hesitant about it. She completed treatment treatment for latent TB in August 2024. From a renal standpoint, the patient has had CKD since at least 2022 with a creatinine level fluctuating between 1.1 and 1.2 mg/dL.   She had however 2 episodes of severe HIGINIO in 2024 in May and July where her creatinine level peaked at 2 mg/dL. This occurred in the  setting of overdiuresis for HFpEF.   There is mild albuminuria with a uACR at 102 mg/g from 24.89 mg/g from 44.2 mg/g Cr and the uPCR is 0.3 mg/mg Cr from 0.19 mg/mg from 0.24 mg/mg.    Renal US was completed at [facility] - no results available.     She was recommended to decrease lasix to 20 mg daily and start SGLT2i last visit. She is taking 40 mg of lasix on M, W, F and 20 mg daily on the other days. She elected to try ozempic, it's ordered but she has not started it yet.     Her current medications are the following: furosemide 40 mg M, W, F and 20 mg T,Th,Sat, Sun; losartan 50 mg daily and metoprolol succinate 25 mg daily.  The blood pressure in clinic is 130/71, She is not checking at home. There is no recent echocardiogram available.    The following portions of the patient's history were reviewed and updated as appropriate: allergies, current medications, past family history, past medical history, past social history, past surgical history and problem list.    PAST MEDICAL HISTORY:  Past Medical History:   Diagnosis Date    Arthritis     Essential hypertension, benign     Fuchs' corneal dystrophy     bilateral - left cornea replacement  done and right planned    Gout     Mixed hyperlipidemia     Mumps     Pain in joint, ankle and foot     gout    Type II or unspecified type diabetes mellitus without mention of complication, not stated as uncontrolled     Diabetes mellitus     PAST SURGICAL HISTORY:  Past Surgical History:   Procedure Laterality Date    BLADDER SURGERY      COLONOSCOPY      COLONOSCOPY N/A 12/17/2014    Procedure: COMBINED COLONOSCOPY, SINGLE OR MULTIPLE BIOPSY/POLYPECTOMY BY BIOPSY;  Surgeon: Chuy Staton MD;  Location:  GI    COLONOSCOPY N/A 08/07/2020    Procedure: COLONOSCOPY;  Surgeon: Chuy Staton MD;  Location:  GI    CYSTOSCOPY      EYE SURGERY Left 01/2023    cornea transplant - FUCHS distrophy    HYSTERECTOMY, PAP NO LONGER INDICATED      HYSTERECTOMY, JAIME   1991    fibroid    ORTHOPEDIC SURGERY Left 07/2016    partial knee replacement    SURGICAL HISTORY OF -   10/28/2015    right partial knee     [facility] NONSPECIFIC PROCEDURE      Breast biopsies       [facility] NONSPECIFIC PROCEDURE      Symptomatic left thigh lipomas       [facility] NONSPECIFIC PROCEDURE  06/01/2009    pubovaginal sling     MEDICATIONS:  Prescription Medications as of 2/26/2025         Rx Number Disp Refills Start End Last Dispensed Date Next Fill Date Owning Pharmacy    acetaminophen (TYLENOL) 500 MG tablet  -- --  --       Sig: Take 2 tablets by mouth 3 times daily. Take an additional 1000 mg daily needed.    Class: Historical    Route: Oral    allopurinol (ZYLOPRIM) 300 MG tablet  90 tablet 1 1/15/2025 --   [facility] [address] [place] [address]    Sig: Take 1 tablet (300 mg) by mouth daily.    Class: E-Prescribe    Route: Oral    Carboxymethylcellulose Sodium (ARTIFICIAL TEARS OP)  -- --  --       Sig: Apply to eye. One drop to left eye, one time a day    Class: Historical    Route: Ophthalmic    cycloSPORINE (RESTASIS) 0.05 % ophthalmic emulsion  -- -- 11/4/2022 --       Sig: Place 1 drop Into the left eye 2 times daily    Class: Historical    Route: Left Eye    diclofenac (VOLTAREN) 1 % topical gel  -- --  --       Sig: Apply topically 4 times daily as needed for moderate pain    Class: Historical    Route: Topical    fluconazole (DIFLUCAN) 150 MG tablet  30 tablet 3 1/10/2024 --   [address]    Sig: Take 1 tablet (150 mg) by mouth once as needed (yeast infection)    Class: E-Prescribe    Route: Oral    fluorometholone (FML LIQUIFILM) 0.1 % ophthalmic suspension  -- -- 12/26/2023 --       Sig: Place 1 drop Into the left eye daily    Class: Historical    Route: Left Eye    furosemide (LASIX) 20 MG tablet  180 tablet 0 2/10/2025 --   [address]    Sig: Take 2 tablets by mouth once  daily    Class: E-Prescribe    Route: Oral    gabapentin (NEURONTIN) 300 MG capsule  270 capsule 0 12/20/2024 --   [facility] [address] [place] [address]    Sig: TAKE 3 CAPSULES BY MOUTH AT BEDTIME    Class: E-Prescribe    glipiZIDE (GLUCOTROL) 10 MG tablet  180 tablet 1 2/19/2025 --   [facility] [address] [place] [address]    Sig: TAKE 1 TABLET BY MOUTH TWICE DAILY BEFORE MEAL(S)    Class: E-Prescribe    Route: Oral    insulin  UNIT/ML vial  -- --  --       Sig: Inject 46 Units subcutaneously every morning.    Class: Historical    Route: Subcutaneous    insulin  UNIT/ML vial  -- --  --       Sig: Inject 20 Units subcutaneously every evening. AT 5 PM    Class: Historical    Route: Subcutaneous    losartan (COZAAR) 50 MG tablet  90 tablet 0 1/16/2025 --   [facility] [address] [place] [address]    Sig: Take 1 tablet by mouth once daily    Class: E-Prescribe    Route: Oral    Renewals       Renewal provider: Khalif Rutherford MD            metFORMIN (GLUCOPHAGE XR) 500 MG 24 hr tablet  90 tablet 3 1/28/2025 1/23/2026   [facility] [address] [place] [address]    Sig: Take 1 tablet (500 mg) by mouth daily (with dinner).    Class: E-Prescribe    Route: Oral    metoprolol succinate ER (TOPROL XL) 50 MG 24 hr tablet  90 tablet 2 12/26/2024 --   [facility] [address] [place] [address]    Sig: Take 1 tablet by mouth once daily    Class: E-Prescribe    omeprazole (PRILOSEC) 20 MG DR capsule  90 capsule 2 8/26/2024 --   [facility] [address] [place] [address]    Sig: Take 1 capsule by mouth once daily    Class: E-Prescribe    Route: Oral    rosuvastatin (CRESTOR) 20 MG tablet  90 tablet 0 12/4/2024 --   [facility] [address] [place] [address]    Sig: Take 1 tablet by mouth once daily    Class: E-Prescribe    Notes to Pharmacy: Please advise  pt they are due for an appointment with their provider for further refills.    Route: Oral    TUMS 500 MG OR CHEW  90 0 6/14/2007 --       Sig: Take 1 chew tab by mouth 3 times daily as needed    Class: Historical    Route: Oral    calcium citrate-vitamin D (CITRACAL) 315-6.25 MG-MCG TABS per tablet  -- --  --       Sig: Take 2 tablets by mouth daily.    Class: Historical    Route: Oral    carboxymethylcellulose PF (REFRESH LIQUIGEL) 1 % ophthalmic gel  -- --  --       Sig: Place 1 drop into both eyes 3 times daily as needed for dry eyes    Class: Historical    Route: Both Eyes    Continuous Glucose  (FREESTYLE LOVE 2 READER) ZE  1 each 0 12/17/2024 --   [facility] [address] [place] [address]    Sig: Use to read blood sugars as per 's instructions.    Class: E-Prescribe    Continuous Glucose Sensor (FREESTYLE LOVE 2 SENSOR) MISC  2 each 5 1/14/2025 --   [facility] - [place], [place] - [address] [place] [address] [facility]    Sig: Change every 14 days.    Class: E-Prescribe    famotidine (PEPCID) 20 MG tablet  -- --  --       Sig: Take 20 mg by mouth daily.    Class: Historical    Route: Oral    semaglutide (OZEMPIC) 2 MG/3ML pen  3 mL 1 1/22/2025 4/16/2025   [facility] [address] [place] [address]    Sig: Inject 0.25 mg subcutaneously every 7 days for 28 days, THEN 0.5 mg every 7 days.    Class: E-Prescribe    Route: Subcutaneous           ALLERGIES:    Allergies   Allergen Reactions    Amoxicillin-Pot Clavulanate      Tongue swelling and rash     Can take PCN K without reaction     Sulfa Antibiotics      Tongue swelling and rash     REVIEW OF SYSTEMS:  A comprehensive review of systems was performed and found to be negative except as described here or above.    SOCIAL HISTORY:   Social History     Socioeconomic History    Marital status:      Spouse name: Not on file    Number of children: Not on file    Years of education: Not on file     Highest education level: Not on file   Occupational History    Not on file   Tobacco Use    Smoking status: Never     Passive exposure: Never    Smokeless tobacco: Never   Vaping Use    Vaping status: Never Used   Substance and Sexual Activity    Alcohol use: No     Alcohol/week: 0.0 standard drinks of alcohol    Drug use: No    Sexual activity: Yes     Partners: Male     Birth control/protection: Post-menopausal, Female Surgical     Comment: hyst   Other Topics Concern    Parent/sibling w/ CABG, MI or angioplasty before 65F 55M? Not Asked   Social History Narrative    Not on file     Social Drivers of Health     Financial Resource Strain: Low Risk  (11/2/2023)    Financial Resource Strain     Within the past 12 months, have you or your family members you live with been unable to get utilities (heat, electricity) when it was really needed?: No   Food Insecurity: Low Risk  (11/2/2023)    Food Insecurity     Within the past 12 months, did you worry that your food would run out before you got money to buy more?: No     Within the past 12 months, did the food you bought just not last and you didn t have money to get more?: No   Transportation Needs: Low Risk  (11/2/2023)    Transportation Needs     Within the past 12 months, has lack of transportation kept you from medical appointments, getting your medicines, non-medical meetings or appointments, work, or from getting things that you need?: No   Physical Activity: Not on file   Stress: Not on file   Social Connections: Not on file   Interpersonal Safety: Low Risk  (9/13/2024)    Interpersonal Safety     Do you feel physically and emotionally safe where you currently live?: Yes     Within the past 12 months, have you been hit, slapped, kicked or otherwise physically hurt by someone?: No     Within the past 12 months, have you been humiliated or emotionally abused in other ways by your partner or ex-partner?: No   Housing Stability: Low Risk  (11/2/2023)    Housing  Stability     Do you have housing? : Yes     Are you worried about losing your housing?: No     FAMILY MEDICAL HISTORY:   Family History   Problem Relation Age of Onset    Cancer Mother         colon ca survivor    Cancer - colorectal Mother     Cerebrovascular Disease Father     No Known Problems Maternal Grandmother     No Known Problems Maternal Grandfather     No Known Problems Paternal Grandmother     No Known Problems Brother     No Known Problems Sister     No Known Problems Son     No Known Problems Daughter     No Known Problems Other      PHYSICAL EXAM:   /71 (BP Location: Left arm, Patient Position: Sitting, Cuff Size: Adult Large)   Pulse 67   Wt 81.6 kg (180 lb)   LMP  (LMP Unknown)   SpO2 98%   BMI 31.39 kg/m    GENERAL APPEARANCE: alert and no distress  LUNGS: clear to auscultation   CV: regular rhythm, normal rate  SKIN: no visible rash  NEURO: mentation intact and speech normal  PSYCH: affect normal/bright   Extremities: no LE edema    LABS:   Recent Results (from the past 4 weeks)   Comprehensive metabolic panel    Collection Time: 01/31/25  9:22 AM   Result Value Ref Range    Sodium 136 135 - 145 mmol/L    Potassium 4.2 3.4 - 5.3 mmol/L    Carbon Dioxide (CO2) 21 (L) 22 - 29 mmol/L    Anion Gap 14 7 - 15 mmol/L    Urea Nitrogen 41.2 (H) 8.0 - 23.0 mg/dL    Creatinine 1.34 (H) 0.51 - 0.95 mg/dL    GFR Estimate 39 (L) >60 mL/min/1.73m2    Calcium 9.5 8.8 - 10.4 mg/dL    Chloride 101 98 - 107 mmol/L    Glucose 165 (H) 70 - 99 mg/dL    Alkaline Phosphatase 119 40 - 150 U/L    AST 23 0 - 45 U/L    ALT 13 0 - 50 U/L    Protein Total 6.9 6.4 - 8.3 g/dL    Albumin 4.2 3.5 - 5.2 g/dL    Bilirubin Total 0.4 <=1.2 mg/dL   CBC with platelets    Collection Time: 01/31/25  9:22 AM   Result Value Ref Range    WBC Count 6.2 4.0 - 11.0 10e3/uL    RBC Count 4.05 3.80 - 5.20 10e6/uL    Hemoglobin 9.8 (L) 11.7 - 15.7 g/dL    Hematocrit 31.7 (L) 35.0 - 47.0 %    MCV 78 78 - 100 fL    MCH 24.2 (L) 26.5 -  33.0 pg    MCHC 30.9 (L) 31.5 - 36.5 g/dL    RDW 16.3 (H) 10.0 - 15.0 %    Platelet Count 164 150 - 450 10e3/uL   UA with Microscopic reflex to Culture    Collection Time: 01/31/25  9:37 AM    Specimen: Urine, Clean Catch   Result Value Ref Range    Color Urine Yellow Colorless, Straw, Light Yellow, Yellow    Appearance Urine Clear Clear    Glucose Urine Negative Negative mg/dL    Bilirubin Urine Negative Negative    Ketones Urine Negative Negative mg/dL    Specific Gravity Urine 1.020 1.003 - 1.035    Blood Urine Negative Negative    pH Urine 5.5 5.0 - 7.0    Protein Albumin Urine Trace (A) Negative mg/dL    Urobilinogen Urine 0.2 0.2, 1.0 E.U./dL    Nitrite Urine Negative Negative    Leukocyte Esterase Urine Negative Negative   Protein  random urine    Collection Time: 01/31/25  9:37 AM   Result Value Ref Range    Total Protein Urine mg/dL 25.3   mg/dL    Total Protein Urine mg/mg Creat 0.19 0.00 - 0.20 mg/mg Cr    Creatinine Urine mg/dL 131.0 mg/dL   Albumin Random Urine Quantitative with Creat Ratio    Collection Time: 01/31/25  9:37 AM   Result Value Ref Range    Creatinine Urine mg/dL 131.0 mg/dL    Albumin Urine mg/L 32.6 mg/L    Albumin Urine mg/g Cr 24.89 0.00 - 25.00 mg/g Cr   UA Microscopic with Reflex to Culture    Collection Time: 01/31/25  9:37 AM   Result Value Ref Range    Bacteria Urine Few (A) None Seen /HPF    RBC Urine 0-2 0-2 /HPF /HPF    WBC Urine 0-5 0-5 /HPF /HPF    Squamous Epithelials Urine Few (A) None Seen /LPF   TSH WITH FREE T4 REFLEX    Collection Time: 02/21/25  9:58 AM   Result Value Ref Range    TSH 1.70 0.30 - 4.20 uIU/mL   Renal panel    Collection Time: 02/21/25  9:58 AM   Result Value Ref Range    Sodium 136 135 - 145 mmol/L    Potassium 4.3 3.4 - 5.3 mmol/L    Chloride 102 98 - 107 mmol/L    Carbon Dioxide (CO2) 18 (L) 22 - 29 mmol/L    Anion Gap 16 (H) 7 - 15 mmol/L    Glucose 132 (H) 70 - 99 mg/dL    Urea Nitrogen 41.9 (H) 8.0 - 23.0 mg/dL    Creatinine 1.54 (H) 0.51 - 0.95  mg/dL    GFR Estimate 33 (L) >60 mL/min/1.73m2    Calcium 9.3 8.8 - 10.4 mg/dL    Albumin 4.0 3.5 - 5.2 g/dL    Phosphorus 4.0 2.5 - 4.5 mg/dL   CBC with platelets    Collection Time: 02/21/25  9:58 AM   Result Value Ref Range    WBC Count 6.4 4.0 - 11.0 10e3/uL    RBC Count 4.07 3.80 - 5.20 10e6/uL    Hemoglobin 9.7 (L) 11.7 - 15.7 g/dL    Hematocrit 31.8 (L) 35.0 - 47.0 %    MCV 78 78 - 100 fL    MCH 23.8 (L) 26.5 - 33.0 pg    MCHC 30.5 (L) 31.5 - 36.5 g/dL    RDW 17.0 (H) 10.0 - 15.0 %    Platelet Count 175 150 - 450 10e3/uL   Albumin Random Urine Quantitative with Creat Ratio    Collection Time: 02/21/25 10:03 AM   Result Value Ref Range    Creatinine Urine mg/dL 158.0 mg/dL    Albumin Urine mg/L 162.0 mg/L    Albumin Urine mg/g Cr 102.53 (H) 0.00 - 25.00 mg/g Cr   Protein  random urine    Collection Time: 02/21/25 10:03 AM   Result Value Ref Range    Total Protein Urine mg/dL 47.3   mg/dL    Total Protein Urine mg/mg Creat 0.30 (H) 0.00 - 0.20 mg/mg Cr    Creatinine Urine mg/dL 158.0 mg/dL   UA with Microscopic    Collection Time: 02/21/25 10:03 AM   Result Value Ref Range    Color Urine Yellow Colorless, Straw, Light Yellow, Yellow    Appearance Urine Clear Clear    Glucose Urine Negative Negative mg/dL    Bilirubin Urine Negative Negative    Ketones Urine Negative Negative mg/dL    Specific Gravity Urine 1.020 1.003 - 1.035    Blood Urine Negative Negative    pH Urine 5.0 5.0 - 7.0    Protein Albumin Urine 30 (A) Negative mg/dL    Urobilinogen Urine 0.2 0.2, 1.0 E.U./dL    Nitrite Urine Negative Negative    Leukocyte Esterase Urine Small (A) Negative   Urine Microscopic Exam    Collection Time: 02/21/25 10:03 AM   Result Value Ref Range    Bacteria Urine Many (A) None Seen /HPF    RBC Urine 0-2 0-2 /HPF /HPF    WBC Urine 10-25 (A) 0-5 /HPF /HPF    Squamous Epithelials Urine Few (A) None Seen /LPF    WBC Clumps Urine Present (A) None Seen /HPF    Hyaline Casts Urine 5-10 (A) None Seen /LPF     CMP  Recent Labs    Lab Test 02/21/25  0958 01/31/25  0922 01/08/25  1015 11/11/24  0915 07/25/24  0736 07/25/24  0625 05/21/24  1308 02/27/24  1110 07/13/21  1808 07/06/21  0911 05/20/21  0859 12/01/20  1011 11/26/19  0915    136 136 139   < > 144   < > 139   < > 138 139 135 138   POTASSIUM 4.3 4.2 4.2 4.5   < > 4.8   < > 4.7   < > 4.8 4.4 4.6 5.1   CHLORIDE 102 101 101 105   < > 110*   < > 103   < > 104 106 104 107   CO2 18* 21* 25 23   < > 20*   < > 21*   < > 23 25 23 21   ANIONGAP 16* 14 10 11   < > 14   < > 15   < > 11 8 8 10   * 165* 195* 190*   < > 172*  172*   < > 205*   < > 161* 235* 134* 147*   BUN 41.9* 41.2* 27.0* 36.4*   < > 93.9*   < > 60.9*   < > 34* 20 21 31*   CR 1.54* 1.34* 1.16* 1.39*   < > 2.19*   < > 1.86*   < > 1.17* 0.83 0.66 0.92   GFRESTIMATED 33* 39* 47* 38*   < > 22*   < > 27*   < > 44* 67 84 60*   GFRESTBLACK  --   --   --   --   --   --   --   --   --  51* 78 >90 69   BISHOP 9.3 9.5 9.7 9.6   < > 9.3   < > 9.9   < > 9.4 9.0 9.4 9.2   PHOS 4.0  --   --   --   --   --   --  3.7  --   --   --   --   --    PROTTOTAL  --  6.9 7.0  --   --  6.3*  --  7.0   < > 7.1  --  7.6 7.1   ALBUMIN 4.0 4.2 4.2  --   --  3.6  --  4.4   < > 4.1  --  4.2 4.3   BILITOTAL  --  0.4 0.6  --   --  0.3  --  0.5   < > 0.5  --  0.8 0.5   ALKPHOS  --  119 120  --   --  93  --  75   < > 78  --  84 75   AST  --  23 25  --   --  43  --  49*   < > 15  --  18 22   ALT  --  13 13  --   --  42  --  58*   < > 24  --  23 31    < > = values in this interval not displayed.     CBC  Recent Labs   Lab Test 02/21/25  0958 01/31/25  0922 01/08/25  1015 07/25/24  0625   HGB 9.7* 9.8* 9.8* 8.6*   WBC 6.4 6.2 6.2 4.5   RBC 4.07 4.05 4.04 3.23*   HCT 31.8* 31.7* 31.4* 26.9*   MCV 78 78 78 83   MCH 23.8* 24.2* 24.3* 26.6   MCHC 30.5* 30.9* 31.2* 32.0   RDW 17.0* 16.3* 16.6* 15.4*    164 173 143*     INRNo lab results found.  ABGNo lab results found.   URINE STUDIES  Recent Labs   Lab Test 02/21/25  1003 01/31/25  0937 01/08/25  1008  11/16/24  1026 07/24/24  1821 10/26/23  0920   COLOR Yellow Yellow Yellow Straw Straw Yellow   APPEARANCE Clear Clear Clear Clear Clear Clear   URINEGLC Negative Negative Negative Negative Negative Negative   URINEBILI Negative Negative Negative Negative Negative Negative   URINEKETONE Negative Negative Negative Negative Negative Negative   SG 1.020 1.020 1.010 1.007 1.009 1.020   UBLD Negative Negative Negative Negative Small* Negative   URINEPH 5.0 5.5 5.5 5.0 5.0 5.0   PROTEIN 30* Trace* 30* Negative 10* 30*   UROBILINOGEN 0.2 0.2 0.2  --   --  0.2   NITRITE Negative Negative Negative Negative Negative Negative   LEUKEST Small* Negative Negative Negative Negative Negative   RBCU 0-2 0-2 0-2  --  1 0-2   WBCU 10-25* 0-5 0-5  --  1 0-5     No lab results found.    ASSESSMENT AND PLAN:   #CKD stage 3 with mild albuminuria that is multifactorial: uncontrolled type 2 diabetes, episodes of HIGINIO secondary to overdiuresis, colchicine use in the past and possibly NSAIDs, gout and decline related to age. I will decrease her furosemide to 20 mg. Agree with trying ozempic. The patient was instructed to keep a glycemia and BP diary. Keep the sodium intake around 2300 mg /day, follow a plant-based diet and to avoid NSAIDs     #HTN  Primary and secondary to CKD. Management as per above. The patient was instructed to keep a BP diary and to communicate the results  Current medications losartan 50 mg. Metoprolol succinate 25 and furosemide 40/20   - okay to take metoprolol at night.   - decrease lasix to 20 mg daily. Will call next week to see if she develops any LE edema    #Type 2 DM   Hba1c on 11/11/24 is 8.8 management as per above    #CVD/dyslipidemia  On rosuvastatin 20 mg daily as indicated for any patient with CKD above the age of 50     #Blood count  Hemoglobin 9.7  - check iron studies    #Acid-base status  CO2 level 18, down from 21   -start sodium bicarbonate supplementation 650 mg BID if remains < 22 on  recheck    #Electrolytes  Na 136  K 4.3  Normal and no acute issues    #BMD  Calcium   9.3        Phosphorus  4  Albumin 4  - check vit D and PTH    #DM  Ozempic and metformin  (Not taking SGLT2i)  - management per PCP    #Dysuria  UA with 10-25 WBC's with clumps and many bacteria  - get with UA with culture    #CKD journey/transplant not a candidate at this point    #Disposition: labs and follow up in 3 months    Colette Zayas PA-C    Visit length 20 minutes. An additional 20 minutes were spent on date of service in chart review, documentation, and other activities as noted.

## 2025-02-21 ENCOUNTER — TRANSFERRED RECORDS (OUTPATIENT)
Dept: HEALTH INFORMATION MANAGEMENT | Facility: CLINIC | Age: 85
End: 2025-02-21

## 2025-02-25 ENCOUNTER — NURSE TRIAGE (OUTPATIENT)
Dept: PHARMACY | Facility: CLINIC | Age: 85
End: 2025-02-25
Payer: COMMERCIAL

## 2025-02-25 NOTE — TELEPHONE ENCOUNTER
Medication Question or Refill    Contacts       Contact Date/Time Type Contact Phone/Fax    02/25/2025 09:35 AM CST Phone (Incoming) Lindsey Gary (Self) [phone] (H)            What medication are you calling about (include dose and sig)?: Metformin, once daily QHS    Preferred Pharmacy:       Controlled Substance Agreement on file:   CSA -- Patient Level:    CSA: None found at the patient level.       Who prescribed the medication?: Kidney doc, Dr Shahid    Do you need a refill? No    When did you use the medication last? 2/24/25 evening    Patient offered an appointment? No    Do you have any questions or concerns?  Yes: Pt wants to know if she can take it after lunch instead of at dinner/evening time.       Could we send this information to you in [facility] or would you prefer to receive a phone call?:   Patient would prefer a phone call   Okay to leave a detailed message?: No at Cell number on file:    Telephone Information:   Mobile [phone]

## 2025-02-26 ENCOUNTER — OFFICE VISIT (OUTPATIENT)
Dept: NEPHROLOGY | Facility: CLINIC | Age: 85
End: 2025-02-26
Payer: COMMERCIAL

## 2025-02-26 VITALS
BODY MASS INDEX: 31.39 KG/M2 | WEIGHT: 180 LBS | SYSTOLIC BLOOD PRESSURE: 130 MMHG | DIASTOLIC BLOOD PRESSURE: 71 MMHG | HEART RATE: 67 BPM | OXYGEN SATURATION: 98 %

## 2025-02-26 DIAGNOSIS — I10 HYPERTENSION, ESSENTIAL: ICD-10-CM

## 2025-02-26 DIAGNOSIS — N18.32 STAGE 3B CHRONIC KIDNEY DISEASE (H): Primary | ICD-10-CM

## 2025-02-26 DIAGNOSIS — R30.0 DYSURIA: ICD-10-CM

## 2025-02-26 DIAGNOSIS — D63.1 ANEMIA IN STAGE 3B CHRONIC KIDNEY DISEASE (H): ICD-10-CM

## 2025-02-26 DIAGNOSIS — I10 ESSENTIAL HYPERTENSION: ICD-10-CM

## 2025-02-26 DIAGNOSIS — N18.32 ANEMIA IN STAGE 3B CHRONIC KIDNEY DISEASE (H): ICD-10-CM

## 2025-02-26 DIAGNOSIS — R60.9 EDEMA, UNSPECIFIED TYPE: ICD-10-CM

## 2025-02-26 PROCEDURE — 1126F AMNT PAIN NOTED NONE PRSNT: CPT | Performed by: PHYSICIAN ASSISTANT

## 2025-02-26 PROCEDURE — 99215 OFFICE O/P EST HI 40 MIN: CPT | Performed by: PHYSICIAN ASSISTANT

## 2025-02-26 PROCEDURE — 3075F SYST BP GE 130 - 139MM HG: CPT | Performed by: PHYSICIAN ASSISTANT

## 2025-02-26 PROCEDURE — 3078F DIAST BP <80 MM HG: CPT | Performed by: PHYSICIAN ASSISTANT

## 2025-02-26 RX ORDER — FUROSEMIDE 20 MG/1
20 TABLET ORAL DAILY
COMMUNITY
Start: 2025-02-26

## 2025-02-26 ASSESSMENT — PAIN SCALES - GENERAL: PAINLEVEL_OUTOF10: NO PAIN (0)

## 2025-02-26 NOTE — TELEPHONE ENCOUNTER
"Pt doesn't have anyway to monitor her BP when she feels like this. It doesn't last long.     She is seeing the Kidney doctor today and will have them check BP and HR and will call back with the readings.  She has not taken the Metoprolol today, though so not sure how accurate this will be versus when she is taking the medication.     She states when she takes the medication, it feels like she is \"on something\" most of the day, and then gets \"little bursts\" of this dizziness that is quick in duration.     She is asking if can cut her Metoprolol in half. To take 1/2 tablet in AM and 1/2 in PM. Versus one a day?     Her Kidney doctor appt is this afternoon, so will be out until approx 4.     Pt was declining to schedule OV with any provider, at this time. She states she is too busy with other providers and appointments.                      "

## 2025-02-26 NOTE — TELEPHONE ENCOUNTER
Recommend to see PCP for follow up visit.   Monitor BP and heart rate if able when she feels dizzy.

## 2025-02-26 NOTE — NURSING NOTE
Chief Complaint   Patient presents with    RECHECK     Type 2 diabetes mellitus with complication, without long-term current use of insulin  +2 more       Vitals:    02/26/25 1322 02/26/25 1328   BP: (!) 140/76 130/71   BP Location: Left arm Left arm   Patient Position: Sitting Sitting   Cuff Size: Adult Large Adult Large   Pulse: 67    SpO2: 98%    Weight: 81.6 kg (180 lb)        Body mass index is 31.39 kg/m .    Pema Avery, BEBAF

## 2025-02-26 NOTE — PATIENT INSTRUCTIONS
Decrease furosemide to 20 mg daily starting 2/27/25. Nurse will call on Monday to see how it's going.   Okay to take metoprolol at night.   Check urine sample urine culture  Start checking Blood pressure at home - nurse will see if insurance will cover this.   Avoid ibuprofen/aleve/advil.   Increase water intake as able, follow low sodium diet.   Labs and follow up in 3 months.   
0.07

## 2025-02-26 NOTE — TELEPHONE ENCOUNTER
Call to patient, spoke with daughter and spouse (consent to communicate on file for spouse) to relay provider message. Daughter and spouse will relay message to patient.    Thank you,  [place], Triage RN Jossie Hernandez    2:41 PM 2/26/2025

## 2025-02-26 NOTE — TELEPHONE ENCOUNTER
"Nurse Triage SBAR    Is this a 2nd Level Triage? NO    Situation: Patient reports ongoing intermittent mild dizziness     Background:   Patient has been taking metoprolol and feels symptoms may be related to this medication  metoprolol succinate ER (TOPROL XL) 50 MG 24 hr tablet Take 1 tablet by mouth once daily 90 tablet 2 ordered 12/26/2024   -Patient has been taking metoprolol since 2021  -Reports  was taking metoprolol and experienced dizzy spells and medication was discontinued    Assessment:   Patient reports she is having intermittent dizzy spells  -Dizziness makes her feel \"off\", added pressure/fullness in head. Denies room is spinning/tilting, does not feel this is vertigo    -Denies dizziness now, notes dizziness occurs later in the afternoon after taking medication.       Patient has been trying to stay hydrated and has been drinking a lot of water for her kidneys.     -Reports low blood sugar two days ago that was <60 in the middle of the night but denies additional episodes.     Patient does not monitor her blood pressure at home.     Patient reports she will hold metoprolol today as she has an nephrology appointment and wants to feel safe while driving to the appointment.    Patient has MTM follow-up on 3/4/25   Per MTM visit note 1/21/25  Hypertension   Patient is not meeting blood pressure goal of < 130/80mmHg, though she historically has. Increased stress today may be the reason. Continue to monitor3.     Protocol Recommended Disposition:   Discuss With PCP And Callback By Nurse Today    Recommendation:   Please review and advise on medication recommendations.    -Patient requests call back after 4:00 pm.     Routed to provider    Does the patient meet one of the following criteria for ADS visit consideration? 16+ years old, with an [facility] PCP     TIP  Providers, please consider if this condition is appropriate for management at one of our Acute and Diagnostic Services sites.     If patient is " a good candidate, please use dotphrase <dot>triageresponse and select Refer to ADS to document.        Reason for Disposition   Taking a medicine that could cause dizziness (e.g., blood pressure medications, diuretics)    Additional Information   Negative: SEVERE difficulty breathing (e.g., struggling for each breath, speaks in single words)   Negative: Shock suspected (e.g., cold/pale/clammy skin, too weak to stand, low BP, rapid pulse)   Negative: Difficult to awaken or acting confused (e.g., disoriented, slurred speech)   Negative: Fainted, and still feels dizzy afterwards   Negative: Overdose (accidental or intentional) of medications   Negative: New neurologic deficit that is present now: * Weakness of the face, arm, or leg on one side of the body * Numbness of the face, arm, or leg on one side of the body * Loss of speech or garbled speech   Negative: Sounds like a life-threatening emergency to the triager   Negative: Heart beating < 50 beats per minute OR > 140 beats per minute   Negative: Chest pain   Negative: Rectal bleeding, bloody stool, or tarry-black stool   Negative: Vomiting is main symptom   Negative: Diarrhea is main symptom   Negative: Headache is main symptom   Negative: Heat exhaustion suspected (i.e., dehydration from heat exposure)   Negative: Patient states that they are having an anxiety or panic attack   Negative: Dizziness from low blood sugar (i.e., < 60 mg/dl or 3.5 mmol/l)   Negative: SEVERE dizziness (e.g., unable to stand, requires support to walk, feels like passing out now)   Negative: SEVERE headache or neck pain   Negative: Neurologic deficit that was brief (now gone), ANY of the following:* Weakness of the face, arm, or leg on one side of the body* Numbness of the face, arm, or leg on one side of the body* Loss of speech or garbled speech   Negative: Loss of vision or double vision  (Exception: Similar to previous migraines.)   Negative: Extra heartbeats, irregular heart beating,  "or heart is beating very fast (i.e., 'palpitations')   Negative: Difficulty breathing   Negative: Drinking very little and dehydration suspected (e.g., no urine > 12 hours, very dry mouth, very lightheaded)   Negative: Follows bleeding (e.g., stomach, rectum, vagina)  (Exception: Became dizzy from sight of small amount blood.)   Negative: Spinning or tilting sensation (vertigo) present now and one or more stroke risk factors (i.e., hypertension, diabetes mellitus, prior stroke/TIA, heart attack, age over 60) (Exception: Prior physician evaluation for this AND no different/worse than usual.)   Negative: Patient sounds very sick or weak to the triager   Negative: Lightheadedness (dizziness) present now, after 2 hours of rest and fluids   Negative: Spinning or tilting sensation (vertigo) present now   Negative: Fever > 103 F (39.4 C)   Negative: Fever > 100.0 F (37.8 C) and has diabetes mellitus or a weak immune system (e.g., HIV positive, cancer chemotherapy, organ transplant, splenectomy, chronic steroids)   Negative: MODERATE dizziness (e.g., interferes with normal activities)  (Exception: Dizziness caused by heat exposure, sudden standing, or poor fluid intake.)   Negative: Vomiting occurs with dizziness   Negative: Patient wants to be seen    Answer Assessment - Initial Assessment Questions  1. DESCRIPTION: \"Describe your dizziness.\"      Does not feel this is related to veritgo. Feels pressure/fullness in head  2. LIGHTHEADED: \"Do you feel lightheaded?\" (e.g., somewhat faint, woozy, weak upon standing)      Denies  3. VERTIGO: \"Do you feel like either you or the room is spinning or tilting?\" (i.e. vertigo)      Denies  4. SEVERITY: \"How bad is it?\"  \"Do you feel like you are going to faint?\" \"Can you stand and walk?\"    - MILD: Feels slightly dizzy, but walking normally.    - MODERATE: Feels unsteady when walking, but not falling; interferes with normal activities (e.g., school, work).    - SEVERE: Unable to walk " "without falling, or requires assistance to walk without falling; feels like passing out now.       Mild dizziness  5. ONSET:  \"When did the dizziness begin?\"      Ongoing for a long time  6. AGGRAVATING FACTORS: \"Does anything make it worse?\" (e.g., standing, change in head position)      Patient notices it is worse in the afternoon   7. HEART RATE: \"Can you tell me your heart rate?\" \"How many beats in 15 seconds?\"  (Note: not all patients can do this)        Patient unable to take   8. CAUSE: \"What do you think is causing the dizziness?\"      Metoprolol   9. RECURRENT SYMPTOM: \"Have you had dizziness before?\" If Yes, ask: \"When was the last time?\" \"What happened that time?\"      Ongoing   10. OTHER SYMPTOMS: \"Do you have any other symptoms?\" (e.g., fever, chest pain, vomiting, diarrhea, bleeding)        Denies  11. PREGNANCY: \"Is there any chance you are pregnant?\" \"When was your last menstrual period?\"        No    Protocols used: Dizziness-A-OH    "

## 2025-02-27 ENCOUNTER — LAB (OUTPATIENT)
Dept: LAB | Facility: CLINIC | Age: 85
End: 2025-02-27
Payer: COMMERCIAL

## 2025-02-27 DIAGNOSIS — D63.1 ANEMIA IN STAGE 3B CHRONIC KIDNEY DISEASE (H): ICD-10-CM

## 2025-02-27 DIAGNOSIS — R30.0 DYSURIA: ICD-10-CM

## 2025-02-27 DIAGNOSIS — N18.32 ANEMIA IN STAGE 3B CHRONIC KIDNEY DISEASE (H): ICD-10-CM

## 2025-02-27 DIAGNOSIS — N18.32 STAGE 3B CHRONIC KIDNEY DISEASE (H): ICD-10-CM

## 2025-02-27 LAB
ALBUMIN UR-MCNC: NEGATIVE MG/DL
APPEARANCE UR: CLEAR
BACTERIA #/AREA URNS HPF: ABNORMAL /HPF
BILIRUB UR QL STRIP: NEGATIVE
COLOR UR AUTO: YELLOW
FERRITIN SERPL-MCNC: 23 NG/ML (ref 11–328)
GLUCOSE UR STRIP-MCNC: NEGATIVE MG/DL
HGB UR QL STRIP: NEGATIVE
HYALINE CASTS #/AREA URNS LPF: ABNORMAL /LPF
IRON BINDING CAPACITY (ROCHE): 358 UG/DL (ref 240–430)
IRON SATN MFR SERPL: 11 % (ref 15–46)
IRON SERPL-MCNC: 38 UG/DL (ref 37–145)
KETONES UR STRIP-MCNC: NEGATIVE MG/DL
LEUKOCYTE ESTERASE UR QL STRIP: NEGATIVE
MUCOUS THREADS #/AREA URNS LPF: PRESENT /LPF
NITRATE UR QL: NEGATIVE
PH UR STRIP: 5 [PH] (ref 5–7)
RBC #/AREA URNS AUTO: ABNORMAL /HPF
SP GR UR STRIP: 1.01 (ref 1–1.03)
SQUAMOUS #/AREA URNS AUTO: ABNORMAL /LPF
UROBILINOGEN UR STRIP-ACNC: 0.2 E.U./DL
WBC #/AREA URNS AUTO: ABNORMAL /HPF

## 2025-02-28 ENCOUNTER — ALLIED HEALTH/NURSE VISIT (OUTPATIENT)
Dept: FAMILY MEDICINE | Facility: CLINIC | Age: 85
End: 2025-02-28
Payer: COMMERCIAL

## 2025-02-28 DIAGNOSIS — I10 ESSENTIAL HYPERTENSION: Primary | ICD-10-CM

## 2025-02-28 NOTE — PROGRESS NOTES
Lindsey Gary is a 84 year old patient who comes in today for a Blood Pressure check.  Initial BP:  LMP  (LMP Unknown)      Data Unavailable  Disposition: follow-up as previously indicated by provider   128/73

## 2025-03-03 ENCOUNTER — PATIENT OUTREACH (OUTPATIENT)
Dept: NEPHROLOGY | Facility: CLINIC | Age: 85
End: 2025-03-03
Payer: COMMERCIAL

## 2025-03-03 ENCOUNTER — MYC MEDICAL ADVICE (OUTPATIENT)
Dept: NEPHROLOGY | Facility: CLINIC | Age: 85
End: 2025-03-03
Payer: COMMERCIAL

## 2025-03-03 DIAGNOSIS — R30.0 DYSURIA: ICD-10-CM

## 2025-03-03 DIAGNOSIS — I10 ESSENTIAL HYPERTENSION: Primary | ICD-10-CM

## 2025-03-03 NOTE — PROGRESS NOTES
Colette Zayas, Radha Abraham, RN  Please let her know her iron sat is low. Recommend taking ferrous sulfate 325 mg every other day. If she has taken this in the past and doesn't tolerate it, can refer to anemia clinic for iron infusions and Hgb management.    Thanks  Colette

## 2025-03-03 NOTE — PROGRESS NOTES
Radha Shannon, Radha Meraz, RN  Colette Zayas, Radha Abraham, RN  Please call pt on Monday to see how swelling is after decreasing lasix to 20 mg daily. Can you also check to see if her insurance will cover a home BP monitor?    Thanks  Colette

## 2025-03-04 ENCOUNTER — LAB (OUTPATIENT)
Dept: LAB | Facility: CLINIC | Age: 85
End: 2025-03-04
Payer: COMMERCIAL

## 2025-03-04 ENCOUNTER — OFFICE VISIT (OUTPATIENT)
Dept: PHARMACY | Facility: CLINIC | Age: 85
End: 2025-03-04
Payer: COMMERCIAL

## 2025-03-04 VITALS — HEART RATE: 52 BPM | SYSTOLIC BLOOD PRESSURE: 130 MMHG | DIASTOLIC BLOOD PRESSURE: 56 MMHG

## 2025-03-04 DIAGNOSIS — E11.51 TYPE 2 DIABETES MELLITUS WITH DIABETIC PERIPHERAL ANGIOPATHY WITHOUT GANGRENE, WITH LONG-TERM CURRENT USE OF INSULIN (H): Primary | ICD-10-CM

## 2025-03-04 DIAGNOSIS — I10 HYPERTENSION GOAL BP (BLOOD PRESSURE) < 130/80: ICD-10-CM

## 2025-03-04 DIAGNOSIS — R30.0 DYSURIA: ICD-10-CM

## 2025-03-04 DIAGNOSIS — Z79.4 TYPE 2 DIABETES MELLITUS WITH DIABETIC PERIPHERAL ANGIOPATHY WITHOUT GANGRENE, WITH LONG-TERM CURRENT USE OF INSULIN (H): Primary | ICD-10-CM

## 2025-03-04 DIAGNOSIS — N18.32 STAGE 3B CHRONIC KIDNEY DISEASE (H): ICD-10-CM

## 2025-03-04 LAB
ALBUMIN UR-MCNC: NEGATIVE MG/DL
APPEARANCE UR: CLEAR
BACTERIA #/AREA URNS HPF: ABNORMAL /HPF
BILIRUB UR QL STRIP: NEGATIVE
COLOR UR AUTO: YELLOW
GLUCOSE UR STRIP-MCNC: NEGATIVE MG/DL
HGB UR QL STRIP: NEGATIVE
KETONES UR STRIP-MCNC: NEGATIVE MG/DL
LEUKOCYTE ESTERASE UR QL STRIP: NEGATIVE
NITRATE UR QL: NEGATIVE
PH UR STRIP: 5 [PH] (ref 5–7)
RBC #/AREA URNS AUTO: ABNORMAL /HPF
SP GR UR STRIP: 1.01 (ref 1–1.03)
SQUAMOUS #/AREA URNS AUTO: ABNORMAL /LPF
UROBILINOGEN UR STRIP-ACNC: 0.2 E.U./DL
WBC #/AREA URNS AUTO: ABNORMAL /HPF

## 2025-03-04 PROCEDURE — 87086 URINE CULTURE/COLONY COUNT: CPT

## 2025-03-04 PROCEDURE — 81001 URINALYSIS AUTO W/SCOPE: CPT

## 2025-03-04 NOTE — PATIENT INSTRUCTIONS
"Recommendations from today's MTM visit:                                                      Diabetes  Try not to worry or drink juice until blood glucose is <90 mg/dL  Restart metformin  mg once daily   Continue to think about Ozempic. Bring in to next appointment and we can do the first dose together    Blood pressure/swelling  Get arm blood pressure cuff OTC at a drug store - Omron is a good brand.   Write down blood pressure and pulse   I will ask nephrology about going back to previous furosemide schedule (20 mg four days per week and 40 mg three days per week)     Follow-up: No follow-ups on file.    It was great speaking with you today.  I value your experience and would be very thankful for your time in providing feedback in our clinic survey. In the next few days, you may receive an email or text message from [employer] with a link to a survey related to your  clinical pharmacist.\"     To schedule another MTM appointment, please call the clinic directly or you may call the MTM scheduling line at [phone] or toll-free at [phone].     My Clinical Pharmacist's contact information:                                                      Please feel free to contact me with any questions or concerns you have.      Nubia Hewitt, PharmD   Medication Therapy Management   Pharmacy Resident  Pager: [phone]     " to be done/done

## 2025-03-04 NOTE — PROGRESS NOTES
Medication Therapy Management (MTM) Encounter    ASSESSMENT:                            Medication Adherence/Access: No issues identified.    Diabetes  Patient is not meeting A1c goal of < 8%, though due for recheck. Ok to wait until provider visit next month for recheck.   Patient is meeting goal of > 50% time in target with continuous glucose monitoring.   Recommend patient restart metformin as prescribed, as stopping it did not seem to affect dizziness. Reassured patient that goal FBG is  mg/dL, and blood glucose in the 110s is good. Reset alarm on isrrael for 90 mg/dL, so that patient does not have to watch it before then, as any number above 90 mg/dL is appropriate as long as asymptomatic. Extra sugar intake when blood glucose is in the 100s could be contributing to higher overall blood glucose. Continue to recommend Ozempic to lower insulin need and help with weight loss.      Hypertension/CKD  Patient is close to meeting blood pressure goal of < 130/80mmHg. Ok to take metoprolol XL 50 mg full tablet in the evening if patient would like to try for preventing dizziness. Pulse on the lower end today, which may be contributing to dizziness. Recommend patient get home blood pressure cuff to monitor both blood pressure and pulse. If pulse continues low, recommend decrease metoprolol dose and then increase losartan dose if needed for further blood pressure lowering.      PLAN:                              Diabetes  Try not to worry about blood glucose or drink juice until blood glucose is <90 mg/dL  Restart metformin  mg once daily   Continue to think about Ozempic. Bring in to next appointment and we can do the first dose together    Blood pressure/swelling  Ok to take metoprolol ER as one full 50 mg tablet once daily in the evening.   Get arm blood pressure cuff OTC at a drug store - Omron is a good brand.   Write down blood pressure and pulse and bring to next appointment  I will ask nephrology about  going back to previous furosemide schedule (20 mg four days per week and 40 mg three days per week)     Follow-up: Return in about 7 weeks (around 4/22/2025) for Medication Therapy Management.    SUBJECTIVE/OBJECTIVE:                          Lindsey Gary is a 84 year old female seen for a follow-up visit.       Reason for visit: Diabetes management - worry about low blood glucose. Recent increased dizziness.     Allergies/ADRs: Reviewed in chart  Past Medical History: Reviewed in chart  Tobacco: She reports that she has never smoked. She has never been exposed to tobacco smoke. She has never used smokeless tobacco.  Alcohol: not currently using     Medication Adherence/Access: no issues reported, does not qualify for assistance program.   has continued to be in and out of the hospital, so she has a lot of stress at home to manage. 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    Diabetes   Glipizide 10 mg twice daily before breakfast and lunch   NPH insulin 46 units at 8 am and 18 units at 5 pm  Metformin  mg once daily after lunch - stopped taking a few days ago to see if it contributed dizziness, it did not change dizziness.     No side effects reported.   Patient has been worrying quite a bit about low blood glucose recently. She is having trouble sleeping because she will just watch her [employer] reader. When it gets in the 110s, she starts to get very worried about going low and will have orange juice and lifesaver candies that she keeps by her bedside. She notices when she does this her blood glucose will be higher in the morning and sometimes throughout the next day. Low blood glucose alarm is currently set at 70 mg/dL. She reports she starts to feel low/shaky in the 80s.   Patient picked up Ozempic and has it at home, but has not felt ready to try yet. She is close to getting there. She knows her nephrologist thinks it would be a good idea too.   Diet/Exercise:  "not discussed in depth today due to time. Patient is going through stressors at home that are making her blood glucose feel out of control.   Of note, patient has frequent yeast infections, so SGLT2i was deemed not a great option in the past.      Blood sugar monitoring: See attached  Eye exam is up to date  Foot exam: due        Hypertension /CKD  Metoprolol ER 25 mg twice daily (recently switched to half tablet twice daily)   Losartan 50 mg daily  Furosemide 20 mg daily unless out of the house. Takes when at home, not when going out.    Has been feeling increased dizziness in the morning over the past several weeks, which seems to resolve in the afternoon. Was previously taking metoprolol ER 50 mg once daily in the morning, but moved it to 25 mg twice daily to see if that would resolve dizziness, which it did not. She is wondering if she could take the full 50 mg tablet in the evening. Denies postural dizzness; its seems more to come and go and feel almost like \"pressure\" in her head more than spinning.   Furosemide was recently decreased by nephrology. Previously taking 20 mg four days per week and 40 mg three days per week. Patient reports markedly increased edema in left leg by the end of the day, and is wondering if she can go back to original regimen.   Using compression stockings for edema.   Patient does not self-monitor blood pressure, but has been thinking about getting a cuff  Follows nephrology. They recommended low sodium diet 2300 mg/day and Ozempic       Today's Vitals: /56   Pulse 52   LMP  (LMP Unknown)  Unable to take weight due to time.   ----------------  I spent 40 minutes with this patient today. All changes were made for consideration by the patient her provider(s).     A summary of these recommendations was given to the patient.    Nubia Hewitt, PharmD   Medication Therapy Management   Pharmacy Resident  Pager: [phone]    Lindsey Gary was seen independently by Dr. Nubia Hewitt.  " I have reviewed and agree with the resident note and plan of care.      Colette Nguyen, PharmD, BCACP  Medication Therapy Management Provider, [facility]  [phone]     Medication Therapy Recommendations  Type 2 diabetes mellitus with complication, without long-term current use of insulin (H)   1 Current Medication: metFORMIN (GLUCOPHAGE XR) 500 MG 24 hr tablet   Current Medication Sig: Take 1 tablet (500 mg) by mouth daily (with dinner).   Rationale: Patient prefers not to take - Adherence - Adherence   Recommendation: Provide Education   Status: Accepted - no CPA Needed   Identified Date: 3/4/2025 Completed Date: 3/4/2025
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
## 2025-03-05 NOTE — PROGRESS NOTES
Called patient to let her know that Colette Zayas is okay with going back to 40mg of lasix daily. Patient verbalized understanding.

## 2025-03-05 NOTE — PROGRESS NOTES
Called [facility] to get US report. Medical records faxed it over. Uploaded to patients chart.

## 2025-03-06 LAB — BACTERIA UR CULT: NORMAL

## 2025-03-07 NOTE — PROGRESS NOTES
Patient called wondering about her UA results. Culture did not grow anything. Patient reports burning pain in vaginal area all the time and complaints of itching. No change in discharge. No other symptoms. Discussed that her symptoms sound similar to a yeast infection which patient also agrees and plans to go to urgent care if things don't improve.      Sent to provider to review.

## 2025-03-08 DIAGNOSIS — E78.5 HYPERLIPIDEMIA LDL GOAL <100: ICD-10-CM

## 2025-03-10 RX ORDER — ROSUVASTATIN CALCIUM 20 MG/1
20 TABLET, COATED ORAL DAILY
Qty: 90 TABLET | Refills: 3 | Status: SHIPPED | OUTPATIENT
Start: 2025-03-10

## 2025-03-13 NOTE — TELEPHONE ENCOUNTER
"In lab result notes provider noted to \"Contact patient would like to start on multiple medications please determine pharmacy and confirm no penicillin allergy\"  A call was made yesterday to have patient call back confirming no penicillin allergy, however, no medications have been pended or indicated which one would likely be started.     Please review pt's labs and indicate which medications are likely to be started (pt requested to know which meds in a previous telephone encounter).    I attempted to call patient but no answer, I left a detailed vm relaying this information and instructed her to call back and ask for urgent care or a triage nurse.      Everton Wynn MA Seeley Aviva  11:42 AM 12/16/2019     "
Called and spoke to patient, verbalized understanding. Please sign for rx to get sent to pharmacy.    KARINA Zavaleta  4:13 PM 12/16/2019     
Meds pending. She has taken Cipro in the past, should take 1/2 amount of glipizide while on this med    Mary Corona PA-C    
Received a call from Triage RN Génesis, and consulted with KARINA Toscano had Génesis confirm with patient what pharmacy she wants medication sent to and patient has no allergy to penicillin. Génesis in agreement with plan. Awaiting to hear what medications are being prescribed for patient. Florence Motley MA    
Returned call from pt, states they are not allergic to penicillin and have taken it many times. She would like Rx's sent to [facility] [address] [place], [place] - [address] [facility]  [phone]    Génesis Cai on 12/16/2019 at 12:30 PM    
no

## 2025-03-18 DIAGNOSIS — G62.9 NEUROPATHY: ICD-10-CM

## 2025-03-18 RX ORDER — GABAPENTIN 300 MG/1
CAPSULE ORAL
Qty: 270 CAPSULE | Refills: 1 | Status: SHIPPED | OUTPATIENT
Start: 2025-03-18

## 2025-03-22 ENCOUNTER — HEALTH MAINTENANCE LETTER (OUTPATIENT)
Age: 85
End: 2025-03-22

## 2025-04-02 ENCOUNTER — OFFICE VISIT (OUTPATIENT)
Dept: INTERNAL MEDICINE | Facility: CLINIC | Age: 85
End: 2025-04-02
Payer: COMMERCIAL

## 2025-04-02 VITALS
RESPIRATION RATE: 16 BRPM | BODY MASS INDEX: 30.9 KG/M2 | HEIGHT: 64 IN | HEART RATE: 64 BPM | WEIGHT: 181 LBS | TEMPERATURE: 96.1 F | OXYGEN SATURATION: 99 % | SYSTOLIC BLOOD PRESSURE: 110 MMHG | DIASTOLIC BLOOD PRESSURE: 59 MMHG

## 2025-04-02 DIAGNOSIS — M54.2 NECK PAIN: ICD-10-CM

## 2025-04-02 DIAGNOSIS — K21.00 GASTROESOPHAGEAL REFLUX DISEASE WITH ESOPHAGITIS WITHOUT HEMORRHAGE: ICD-10-CM

## 2025-04-02 DIAGNOSIS — M79.671 FOOT PAIN, BILATERAL: ICD-10-CM

## 2025-04-02 DIAGNOSIS — I10 ESSENTIAL HYPERTENSION: ICD-10-CM

## 2025-04-02 DIAGNOSIS — R22.1 LOCALIZED SWELLING, MASS AND LUMP, NECK: ICD-10-CM

## 2025-04-02 DIAGNOSIS — Q74.2 TOE ANOMALY: ICD-10-CM

## 2025-04-02 DIAGNOSIS — M79.672 FOOT PAIN, BILATERAL: ICD-10-CM

## 2025-04-02 DIAGNOSIS — E11.51 TYPE 2 DIABETES MELLITUS WITH DIABETIC PERIPHERAL ANGIOPATHY WITHOUT GANGRENE, WITH LONG-TERM CURRENT USE OF INSULIN (H): Primary | ICD-10-CM

## 2025-04-02 DIAGNOSIS — B37.31 YEAST INFECTION OF THE VAGINA: ICD-10-CM

## 2025-04-02 DIAGNOSIS — Z79.4 TYPE 2 DIABETES MELLITUS WITH DIABETIC PERIPHERAL ANGIOPATHY WITHOUT GANGRENE, WITH LONG-TERM CURRENT USE OF INSULIN (H): Primary | ICD-10-CM

## 2025-04-02 PROCEDURE — 3074F SYST BP LT 130 MM HG: CPT | Performed by: NURSE PRACTITIONER

## 2025-04-02 PROCEDURE — 99214 OFFICE O/P EST MOD 30 MIN: CPT | Performed by: NURSE PRACTITIONER

## 2025-04-02 PROCEDURE — 3078F DIAST BP <80 MM HG: CPT | Performed by: NURSE PRACTITIONER

## 2025-04-02 RX ORDER — LOSARTAN POTASSIUM 50 MG/1
50 TABLET ORAL DAILY
Qty: 90 TABLET | Refills: 3 | Status: SHIPPED | OUTPATIENT
Start: 2025-04-02

## 2025-04-02 RX ORDER — FLUCONAZOLE 150 MG/1
150 TABLET ORAL
Qty: 30 TABLET | Refills: 3 | Status: SHIPPED | OUTPATIENT
Start: 2025-04-02

## 2025-04-02 RX ORDER — OMEPRAZOLE 20 MG/1
20 CAPSULE, DELAYED RELEASE ORAL DAILY
Qty: 90 CAPSULE | Refills: 3 | Status: SHIPPED | OUTPATIENT
Start: 2025-04-02

## 2025-04-02 ASSESSMENT — ANXIETY QUESTIONNAIRES
7. FEELING AFRAID AS IF SOMETHING AWFUL MIGHT HAPPEN: SEVERAL DAYS
4. TROUBLE RELAXING: NOT AT ALL
8. IF YOU CHECKED OFF ANY PROBLEMS, HOW DIFFICULT HAVE THESE MADE IT FOR YOU TO DO YOUR WORK, TAKE CARE OF THINGS AT HOME, OR GET ALONG WITH OTHER PEOPLE?: SOMEWHAT DIFFICULT
3. WORRYING TOO MUCH ABOUT DIFFERENT THINGS: SEVERAL DAYS
7. FEELING AFRAID AS IF SOMETHING AWFUL MIGHT HAPPEN: SEVERAL DAYS
IF YOU CHECKED OFF ANY PROBLEMS ON THIS QUESTIONNAIRE, HOW DIFFICULT HAVE THESE PROBLEMS MADE IT FOR YOU TO DO YOUR WORK, TAKE CARE OF THINGS AT HOME, OR GET ALONG WITH OTHER PEOPLE: SOMEWHAT DIFFICULT
6. BECOMING EASILY ANNOYED OR IRRITABLE: SEVERAL DAYS
5. BEING SO RESTLESS THAT IT IS HARD TO SIT STILL: NOT AT ALL
1. FEELING NERVOUS, ANXIOUS, OR ON EDGE: SEVERAL DAYS
GAD7 TOTAL SCORE: 5
GAD7 TOTAL SCORE: 5
2. NOT BEING ABLE TO STOP OR CONTROL WORRYING: SEVERAL DAYS
GAD7 TOTAL SCORE: 5

## 2025-04-02 ASSESSMENT — PATIENT HEALTH QUESTIONNAIRE - PHQ9
SUM OF ALL RESPONSES TO PHQ QUESTIONS 1-9: 8
SUM OF ALL RESPONSES TO PHQ QUESTIONS 1-9: 8
10. IF YOU CHECKED OFF ANY PROBLEMS, HOW DIFFICULT HAVE THESE PROBLEMS MADE IT FOR YOU TO DO YOUR WORK, TAKE CARE OF THINGS AT HOME, OR GET ALONG WITH OTHER PEOPLE: NOT DIFFICULT AT ALL

## 2025-04-02 NOTE — PROGRESS NOTES
Assessment & Plan     Type 2 diabetes mellitus with diabetic peripheral angiopathy without gangrene, with long-term current use of insulin (H)  Her  has dementia and he has been sick.  Apparently his thyroid was very off so now that that is back in shape he is feeling better.  But she has had a bit of stress taking care of him.  She is trying to keep her home as long as she can  She thinks her glucose might be a little bit higher and her A1c might be a little bit higher because of the  She gets her insulin through [employer]  - Hemoglobin A1c; Future  - Basic metabolic panel  (Ca, Cl, CO2, Creat, Gluc, K, Na, BUN); Future    Essential hypertension  In good range on current medicines  - Basic metabolic panel  (Ca, Cl, CO2, Creat, Gluc, K, Na, BUN); Future  - losartan (COZAAR) 50 MG tablet; Take 1 tablet (50 mg) by mouth daily.    Yeast infection of the vagina  Uses as needed when she has symptoms  - fluconazole (DIFLUCAN) 150 MG tablet; Take 1 tablet (150 mg) by mouth once as needed (yeast infection).    Gastroesophageal reflux disease with esophagitis without hemorrhage  Working well for her for GERD  - omeprazole (PRILOSEC) 20 MG DR capsule; Take 1 capsule (20 mg) by mouth daily.    Localized swelling, mass and lump, neck  Has some swelling in the back of her neck that feels soft and like a lipoma.  It causes her some pain.  We will do an ultrasound which she wants to do at [facility] because it is cheaper there and then once it is define we can send her on to surgery if she wants to get it removed  - US Head Neck Soft Tissue; Future  - XR Cervical Spine 2/3 Views; Future    Foot pain, bilateral  She has some changes in her toes where they are curling to the side and causing her pain with walking.  Referral to Ortho to see what options she may have.  One of her toes seems to be a trigger toe where it will not straighten out  - Orthopedic  Referral; Future    Toe anomaly    - Orthopedic  " Referral; Future    Neck pain  She feels some cracking her neck at times.  She would like to do an x-ray  - XR Cervical Spine 2/3 Views; Future          BMI  Estimated body mass index is 31.56 kg/m  as calculated from the following:    Height as of this encounter: 1.613 m (5' 3.5\").    Weight as of this encounter: 82.1 kg (181 lb).         Patient Instructions   Lab in [address]    Podiatry referral [facility] - #[phone]    Ultrasound neck     Medication refill     Subjective   Lindsey is a 84 year old, presenting for the following health issues:  RECHECK, Diabetes, Hypertension, Derm Problem, and Foot Problems      4/2/2025     1:07 PM   Additional Questions   Roomed by FROYLAN Ponce LPN   Accompanied by self         4/2/2025     1:07 PM   Patient Reported Additional Medications   Patient reports taking the following new medications none     History of Present Illness       Diabetes:   She presents for follow up of diabetes.   She is checking home blood glucose with a continuous glucose monitor.   She checks blood glucose before meals.  Blood glucose is sometimes over 200 and sometimes under 70. She is aware of hypoglycemia symptoms including shakiness and dizziness.   She is concerned about blood sugar frequently over 200.   She is having numbness in feet and burning in feet.            Hypertension: She presents for follow up of hypertension.  She does not check blood pressure  regularly outside of the clinic. Outpatient blood pressures have not been over 140/90. She follows a low salt diet.     Reason for visit:  Followup    She eats 2-3 servings of fruits and vegetables daily.She consumes 0 sweetened beverage(s) daily.She exercises with enough effort to increase her heart rate 9 or less minutes per day.  She exercises with enough effort to increase her heart rate 3 or less days per week.   She is taking medications regularly.      Bump back of neck - starting to hurt and goes down through " "shoudler with pain - burning pain      Neuropathy in feet and toes?  Thinks it is arthritis - feet are changing shape  - feet are turning I  Feet painful at days end   Tylenol does not help     Sees MTM - meter set at 89 for alarm   Does not go low anymore   Not as low during day as before    46 units an am and 20 at pm- novolin N - NPH     Sees nephrology     Memory issues with      Started on iron for lower hgb          Review of Systems  Constitutional, neuro, ENT, endocrine, pulmonary, cardiac, gastrointestinal, genitourinary, musculoskeletal, integument and psychiatric systems are negative, except as otherwise noted.      Objective    /63   Pulse 64   Temp (!) 96.1  F (35.6  C) (Oral)   Resp 16   Ht 1.613 m (5' 3.5\")   Wt 82.1 kg (181 lb)   LMP  (LMP Unknown)   SpO2 99%   Breastfeeding No   BMI 31.56 kg/m    Body mass index is 31.56 kg/m .  Physical Exam   GENERAL: alert and no distress  NECK: no adenopathy, no asymmetry, masses, or scars  RESP: lungs clear to auscultation - no rales, rhonchi or wheezes  CV: regular rate and rhythm  ABDOMEN: soft, nontender,  and bowel sounds normal  MS: Right toes middle ring and little toe are bent and toward the great toe and cause her pain with walking-this is on her right foot.  She says it is on both feet but she did not take off the other shoe  SKIN: She has a slightly raised soft lesion that is about 2 to 3 inches in diameter.  Feels like a lipoma at the base of her neck.  It causes her pain at times especially if pushing on it  NEURO: Normal strength and tone, mentation intact and speech normal  PSYCH: mentation appears normal, affect normal/bright    Labs.  She is going to do them a different day because she needs to get  her  home who has dementia        Signed Electronically by: LILLIAM Sam CNP    "

## 2025-04-02 NOTE — PATIENT INSTRUCTIONS
Lab in [address]    Podiatry referral [facility] - #[phone]    Ultrasound neck     Medication refill

## 2025-04-03 ENCOUNTER — PATIENT OUTREACH (OUTPATIENT)
Dept: CARE COORDINATION | Facility: CLINIC | Age: 85
End: 2025-04-03
Payer: COMMERCIAL

## 2025-04-07 ENCOUNTER — PATIENT OUTREACH (OUTPATIENT)
Dept: CARE COORDINATION | Facility: CLINIC | Age: 85
End: 2025-04-07
Payer: COMMERCIAL

## 2025-04-08 ENCOUNTER — ANCILLARY PROCEDURE (OUTPATIENT)
Dept: GENERAL RADIOLOGY | Facility: CLINIC | Age: 85
End: 2025-04-08
Attending: NURSE PRACTITIONER
Payer: COMMERCIAL

## 2025-04-08 DIAGNOSIS — M54.2 NECK PAIN: ICD-10-CM

## 2025-04-08 DIAGNOSIS — R22.1 LOCALIZED SWELLING, MASS AND LUMP, NECK: ICD-10-CM

## 2025-04-08 PROCEDURE — 72040 X-RAY EXAM NECK SPINE 2-3 VW: CPT | Mod: TC | Performed by: RADIOLOGY

## 2025-04-11 ENCOUNTER — TRANSFERRED RECORDS (OUTPATIENT)
Dept: HEALTH INFORMATION MANAGEMENT | Facility: CLINIC | Age: 85
End: 2025-04-11
Payer: COMMERCIAL

## 2025-04-22 ENCOUNTER — OFFICE VISIT (OUTPATIENT)
Dept: PHARMACY | Facility: CLINIC | Age: 85
End: 2025-04-22
Payer: COMMERCIAL

## 2025-04-22 ENCOUNTER — LAB (OUTPATIENT)
Dept: LAB | Facility: CLINIC | Age: 85
End: 2025-04-22
Payer: COMMERCIAL

## 2025-04-22 VITALS — SYSTOLIC BLOOD PRESSURE: 122 MMHG | DIASTOLIC BLOOD PRESSURE: 55 MMHG

## 2025-04-22 DIAGNOSIS — E11.8 TYPE 2 DIABETES MELLITUS WITH COMPLICATION, WITHOUT LONG-TERM CURRENT USE OF INSULIN (H): Primary | ICD-10-CM

## 2025-04-22 DIAGNOSIS — N18.32 STAGE 3B CHRONIC KIDNEY DISEASE (H): ICD-10-CM

## 2025-04-22 DIAGNOSIS — E11.51 TYPE 2 DIABETES MELLITUS WITH DIABETIC PERIPHERAL ANGIOPATHY WITHOUT GANGRENE, WITH LONG-TERM CURRENT USE OF INSULIN (H): ICD-10-CM

## 2025-04-22 DIAGNOSIS — I10 HYPERTENSION GOAL BP (BLOOD PRESSURE) < 130/80: ICD-10-CM

## 2025-04-22 DIAGNOSIS — Z79.4 TYPE 2 DIABETES MELLITUS WITH DIABETIC PERIPHERAL ANGIOPATHY WITHOUT GANGRENE, WITH LONG-TERM CURRENT USE OF INSULIN (H): ICD-10-CM

## 2025-04-22 DIAGNOSIS — I10 ESSENTIAL HYPERTENSION: ICD-10-CM

## 2025-04-22 LAB
EST. AVERAGE GLUCOSE BLD GHB EST-MCNC: 183 MG/DL
HBA1C MFR BLD: 8 % (ref 0–5.6)

## 2025-04-22 PROCEDURE — 99607 MTMS BY PHARM ADDL 15 MIN: CPT

## 2025-04-22 PROCEDURE — 80048 BASIC METABOLIC PNL TOTAL CA: CPT

## 2025-04-22 PROCEDURE — 3074F SYST BP LT 130 MM HG: CPT

## 2025-04-22 PROCEDURE — 36415 COLL VENOUS BLD VENIPUNCTURE: CPT

## 2025-04-22 PROCEDURE — 99606 MTMS BY PHARM EST 15 MIN: CPT

## 2025-04-22 PROCEDURE — 3078F DIAST BP <80 MM HG: CPT

## 2025-04-22 PROCEDURE — 83036 HEMOGLOBIN GLYCOSYLATED A1C: CPT

## 2025-04-22 RX ORDER — FERROUS SULFATE 325(65) MG
325 TABLET, DELAYED RELEASE (ENTERIC COATED) ORAL EVERY OTHER DAY
COMMUNITY

## 2025-04-22 RX ORDER — SENNOSIDES 8.6 MG
1 TABLET ORAL DAILY PRN
COMMUNITY

## 2025-04-22 NOTE — PATIENT INSTRUCTIONS
"Recommendations from today's MTM visit:                                                      Diabetes   Increase Insulin NPH to 50 units in the morning and 22 units in the evening.   Ask your eye doctor what they think about starting Ozempic.   Getting A1c in clinic today     Follow-up: 1 month with MTM    It was great speaking with you today.  I value your experience and would be very thankful for your time in providing feedback in our clinic survey. In the next few days, you may receive an email or text message from [employer] with a link to a survey related to your  clinical pharmacist.\"     To schedule another MTM appointment, please call the clinic directly or you may call the MTM scheduling line at [phone] or toll-free at [phone].     My Clinical Pharmacist's contact information:                                                      Please feel free to contact me with any questions or concerns you have.      Nubia Hewitt, PharmD   Medication Therapy Management   Pharmacy Resident  Pager: [phone]     "

## 2025-04-22 NOTE — PROGRESS NOTES
Medication Therapy Management (MTM) Encounter    ASSESSMENT:                            Medication Adherence/Access: No issues identified.    Diabetes   Patient is not meeting A1c goal of < 8%.  Patient is not meeting goal of > 50% time in target with continuous glucose monitoring.   Recommend patient increase insulin dose by ~10%. Recommend continue to consider Ozempic, as patient already has supply at home. Educated on administration today. Explained that Ozempic does not interfere with eye drops or other meds, and then retinopathy seen in correlation to Ozempic use is mostly from blood glucose lowering. Recommend patient check with eye doctor as well.     Hypertension/CKD  Patient is meeting blood pressure goal of < 130/80mmHg.    PLAN:                              Diabetes   Increase Insulin NPH to 50 units in the morning and 22 units in the evening.   Ask your eye doctor what they think about starting Ozempic.   Getting A1c in clinic today     Follow-up: Return in about 5 weeks (around 5/29/2025) for Medication Therapy Management.    SUBJECTIVE/OBJECTIVE:                          Lnidsey Gary is a 84 year old female seen for a follow-up visit.       Reason for visit: Diabetes management.    Allergies/ADRs: Reviewed in chart  Past Medical History: Reviewed in chart  Tobacco: She reports that she has never smoked. She has never been exposed to tobacco smoke. She has never used smokeless tobacco.  Alcohol: not currently using     Medication Adherence/Access: no issues reported, does not qualify for assistance program.   has continued to be in and out of the hospital, so she has a lot of stress at home to manage. 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    Diabetes   Glipizide 10 mg twice daily before breakfast and lunch   NPH insulin 46 units at 8 am and 21 units at 5 pm  Metformin  mg once daily after lunch     No side effects reported. Tolerating things quite  well. No recent low blood glucose.   Is almost ready to start Ozempic, but has questions about effects with retinopathy. Patient sees eye doctor regularly and gets injections for retinopathy and takes multiple eye drops for history corneal transplant. Is concerned about this.   Diet/Exercise: not discussed in depth today due to time. Patient is going through stressors at home that are making her blood glucose feel out of control.   Of note, patient has frequent yeast infections, so SGLT2i was deemed not a great option in the past.      Blood sugar monitoring: See attached  Eye exam is up to date  Foot exam: due      Hypertension   /CKD   Metoprolol ER 50 mg daily in the evening   Losartan 50 mg daily  Furosemide 20 mg daily unless out of the house. Takes when at home, not when going out. About every other day.     No side effects. Patient feels current regimen is effective, and she is no longer having dizziness.   Using compression stockings for edema.   Patient self monitoring blood pressure. Getting on average: 122/75-77 mmHg  Follows nephrology. They recommended low sodium diet 2300 mg/day and Ozempic       Today's Vitals: /55   LMP  (LMP Unknown)  No weight taken today per patient request.   ----------------  I spent 40 minutes with this patient today. All changes were made via collaborative practice agreement with LILLIAM Sam CNP.     A summary of these recommendations was given to the patient.    Nubia Hewitt PharmD   Medication Therapy Management   Pharmacy Resident  Pager: [phone]    Lindsey Gary was seen independently by Dr. Nubia Hewitt.  I have reviewed and agree with the resident note and plan of care.      Colette Nguyen PharmD, [facility]  Medication Therapy Management Provider, [facility]  [phone]     Medication Therapy Recommendations  Type 2 diabetes mellitus with complication, without long-term current use of insulin (H)   1 Current Medication:  insulin  UNIT/ML vial   Current Medication Sig: Inject 22 Units subcutaneously every evening. AT 5 PM   Rationale: Dose too low - Dosage too low - Effectiveness   Recommendation: Increase Dose   Status: Accepted per CPA   Identified Date: 4/22/2025 Completed Date: 4/22/2025

## 2025-04-23 LAB
ANION GAP SERPL CALCULATED.3IONS-SCNC: 11 MMOL/L (ref 7–15)
BUN SERPL-MCNC: 26 MG/DL (ref 8–23)
CALCIUM SERPL-MCNC: 9.5 MG/DL (ref 8.8–10.4)
CHLORIDE SERPL-SCNC: 107 MMOL/L (ref 98–107)
CREAT SERPL-MCNC: 1.29 MG/DL (ref 0.51–0.95)
EGFRCR SERPLBLD CKD-EPI 2021: 41 ML/MIN/1.73M2
GLUCOSE SERPL-MCNC: 122 MG/DL (ref 70–99)
HCO3 SERPL-SCNC: 22 MMOL/L (ref 22–29)
POTASSIUM SERPL-SCNC: 4.8 MMOL/L (ref 3.4–5.3)
SODIUM SERPL-SCNC: 140 MMOL/L (ref 135–145)

## 2025-05-07 ENCOUNTER — DOCUMENTATION ONLY (OUTPATIENT)
Dept: NEPHROLOGY | Facility: CLINIC | Age: 85
End: 2025-05-07
Payer: COMMERCIAL

## 2025-05-07 NOTE — PROGRESS NOTES
"Lindsey Gary has an upcoming lab appointment:    Future Appointments   Date Time Provider Department Center   5/21/2025  9:30 AM CR LAB CRLABR CR   5/28/2025  1:30 PM Colette Zayas PA-C [facility]   5/29/2025 11:00 AM Sandee Hewitt [facility] CRMTM CR   7/30/2025  9:30 AM CR LAB CRLABR CR   8/6/2025  9:30 AM Venus Acosta MD [facility]     Patient is scheduled for the following lab(s): not indicated appt notes state \"per Colette Zayas\"    There is no order available. Please review and place either future orders or HMPO (Review of Health Maintenance Protocol Orders), as appropriate.    There are no preventive care reminders to display for this patient.  Anu Cunha    "

## 2025-05-21 ENCOUNTER — LAB (OUTPATIENT)
Dept: LAB | Facility: CLINIC | Age: 85
End: 2025-05-21
Payer: COMMERCIAL

## 2025-05-21 DIAGNOSIS — N18.32 STAGE 3B CHRONIC KIDNEY DISEASE (H): ICD-10-CM

## 2025-05-21 LAB
ALBUMIN MFR UR ELPH: 15.6 MG/DL
ALBUMIN SERPL BCG-MCNC: 4.2 G/DL (ref 3.5–5.2)
ALBUMIN UR-MCNC: ABNORMAL MG/DL
ANION GAP SERPL CALCULATED.3IONS-SCNC: 10 MMOL/L (ref 7–15)
APPEARANCE UR: CLEAR
BACTERIA #/AREA URNS HPF: ABNORMAL /HPF
BILIRUB UR QL STRIP: NEGATIVE
BUN SERPL-MCNC: 27.4 MG/DL (ref 8–23)
CALCIUM SERPL-MCNC: 9.5 MG/DL (ref 8.8–10.4)
CHLORIDE SERPL-SCNC: 104 MMOL/L (ref 98–107)
COLOR UR AUTO: YELLOW
CREAT SERPL-MCNC: 1.24 MG/DL (ref 0.51–0.95)
CREAT UR-MCNC: 92.7 MG/DL
CREAT UR-MCNC: 92.7 MG/DL
EGFRCR SERPLBLD CKD-EPI 2021: 43 ML/MIN/1.73M2
GLUCOSE SERPL-MCNC: 165 MG/DL (ref 70–99)
GLUCOSE UR STRIP-MCNC: NEGATIVE MG/DL
HCO3 SERPL-SCNC: 26 MMOL/L (ref 22–29)
HGB BLD-MCNC: 10.9 G/DL (ref 11.7–15.7)
HGB UR QL STRIP: NEGATIVE
HYALINE CASTS #/AREA URNS LPF: ABNORMAL /LPF
KETONES UR STRIP-MCNC: NEGATIVE MG/DL
LEUKOCYTE ESTERASE UR QL STRIP: NEGATIVE
MCV RBC AUTO: 85 FL (ref 78–100)
MICROALBUMIN UR-MCNC: 45.3 MG/L
MICROALBUMIN/CREAT UR: 48.87 MG/G CR (ref 0–25)
MUCOUS THREADS #/AREA URNS LPF: PRESENT /LPF
NITRATE UR QL: NEGATIVE
PH UR STRIP: 5 [PH] (ref 5–7)
PHOSPHATE SERPL-MCNC: 3.8 MG/DL (ref 2.5–4.5)
POTASSIUM SERPL-SCNC: 4.8 MMOL/L (ref 3.4–5.3)
PROT/CREAT 24H UR: 0.17 MG/MG CR (ref 0–0.2)
RBC #/AREA URNS AUTO: ABNORMAL /HPF
SODIUM SERPL-SCNC: 140 MMOL/L (ref 135–145)
SP GR UR STRIP: 1.01 (ref 1–1.03)
SQUAMOUS #/AREA URNS AUTO: ABNORMAL /LPF
UROBILINOGEN UR STRIP-ACNC: 0.2 E.U./DL
WBC #/AREA URNS AUTO: ABNORMAL /HPF

## 2025-05-21 PROCEDURE — 82570 ASSAY OF URINE CREATININE: CPT

## 2025-05-21 PROCEDURE — 81001 URINALYSIS AUTO W/SCOPE: CPT

## 2025-05-21 PROCEDURE — 80069 RENAL FUNCTION PANEL: CPT

## 2025-05-21 PROCEDURE — 85018 HEMOGLOBIN: CPT

## 2025-05-21 PROCEDURE — 82043 UR ALBUMIN QUANTITATIVE: CPT

## 2025-05-21 PROCEDURE — 36415 COLL VENOUS BLD VENIPUNCTURE: CPT

## 2025-05-21 PROCEDURE — 84156 ASSAY OF PROTEIN URINE: CPT

## 2025-05-28 ENCOUNTER — RESULTS FOLLOW-UP (OUTPATIENT)
Dept: NEPHROLOGY | Facility: CLINIC | Age: 85
End: 2025-05-28

## 2025-05-28 ENCOUNTER — OFFICE VISIT (OUTPATIENT)
Dept: NEPHROLOGY | Facility: CLINIC | Age: 85
End: 2025-05-28
Payer: COMMERCIAL

## 2025-05-28 VITALS
OXYGEN SATURATION: 97 % | WEIGHT: 180.2 LBS | DIASTOLIC BLOOD PRESSURE: 57 MMHG | BODY MASS INDEX: 31.42 KG/M2 | SYSTOLIC BLOOD PRESSURE: 116 MMHG | HEART RATE: 68 BPM

## 2025-05-28 DIAGNOSIS — N18.32 ANEMIA IN STAGE 3B CHRONIC KIDNEY DISEASE (H): ICD-10-CM

## 2025-05-28 DIAGNOSIS — D63.1 ANEMIA IN STAGE 3B CHRONIC KIDNEY DISEASE (H): ICD-10-CM

## 2025-05-28 DIAGNOSIS — N18.32 STAGE 3B CHRONIC KIDNEY DISEASE (H): Primary | ICD-10-CM

## 2025-05-28 DIAGNOSIS — I10 HYPERTENSION, ESSENTIAL: ICD-10-CM

## 2025-05-28 DIAGNOSIS — R60.9 EDEMA, UNSPECIFIED TYPE: ICD-10-CM

## 2025-05-28 RX ORDER — METHOTREXATE SODIUM 2.5 MG/1
7.5 TABLET ORAL WEEKLY
COMMUNITY

## 2025-05-28 ASSESSMENT — PAIN SCALES - GENERAL: PAINLEVEL_OUTOF10: NO PAIN (0)

## 2025-05-28 NOTE — PATIENT INSTRUCTIONS
No changes today.  Avoid ibuprofen/aleve.   Check blood pressure at home, keep log.   Continue good hydration, low sodium.   Labs and follow up in August with Dr. Acosta as planned.

## 2025-05-28 NOTE — PROGRESS NOTES
Nephrology Progress Note  5/28/2025  Lindsey Gary MRN:[id number] YOB: 1940  Date of Service: 05/28/2025  Primary care provider: Dinorah Wong  Requesting physician: Dinorah Wong      REASON FOR VISIT: CKD    HISTORY OF PRESENT ILLNESS:   Lindsey Gary is a 84 year old female who presents for evaluation of CKD.  The past medical history is significant for obesity, HFpEF, hypertension, type 2 diabetes , gout, polyarthritis/psoriasis, latent TB on isoniazide and CKD stage 3b.  Regarding her diabetes the patient has had variable control and most recently an Hba1c at 8.8 on 11/11/24 and was subsequently referred to [facility] and was seen on 12/17/2024 with her evening dose of insulin NPH being increased to 24 units while her morning dose was kept at 46 units. She is otherwise on glipizide 10 mg twice daily. She reports however episodes of hypoglycemia at nighttime. For gout, she is on allopurinol 150 mg twice daily. She hasn't had any recent gout attack. Her latest uric acid level is 3.8 on 1/8/2025. 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 She also has a history of  generalized osteoarthritis. She has had a partial knee replacement of both knees. She has constant aching in th knees and was told he needs to have TKAs done bilaterally. She was seen in Rheumatology clinic in October 2024 and it was suggested that she starts methotrexate 15 mg weekly however she is still hesitant about it. She completed treatment treatment for latent TB in August 2024. From a renal standpoint, the patient has had CKD since at least 2022 with a creatinine level fluctuating between 1.1 and 1.2 mg/dL.     She had however 2 episodes of severe HIGINIO in 2024 in May and July where her creatinine level peaked at 2 mg/dL. This occurred  in the setting of overdiuresis for HFpEF.   There is mild albuminuria with a uACR at 48 mg/g from 102 mg/g from 24.89 mg/g from 44.2 mg/g Cr and the uPCR is now normal from 0.3 mg/mg Cr from 0.19 mg/mg from 0.24 mg/mg.    Renal US was completed at [facility] - no results available.     She was recommended to decrease lasix to 20 mg daily and start SGLT2i last visit. She will be starting ozempic and has appt with MTM tomorrow. She also notes increased left foot pain and swelling, started methotrexate 7.5 mg weekly and has follow up with rheumatology on 6/9/25.     Her current BP medications are the following:   losartan 50 mg daily, lasix 20 mg daily, and metoprolol succinate 25 mg daily.    The blood pressure in clinic is 116/57, similar at home. There is no recent echocardiogram available.    She notes her  has been having medical issues and she is now caring for him on top of managing her own medical care and has not been sleeping well. Her blood sugars have been higher. She has been working on hydrating well.     The following portions of the patient's history were reviewed and updated as appropriate: allergies, current medications, past family history, past medical history, past social history, past surgical history and problem list.    PAST MEDICAL HISTORY:  Past Medical History:   Diagnosis Date    Arthritis     Essential hypertension, benign     Fuchs' corneal dystrophy     bilateral - left cornea replacement  done and right planned    Gout     Mixed hyperlipidemia     Mumps     Pain in joint, ankle and foot     gout    Type II or unspecified type diabetes mellitus without mention of complication, not stated as uncontrolled     Diabetes mellitus     PAST SURGICAL HISTORY:  Past Surgical History:   Procedure Laterality Date    BLADDER SURGERY      COLONOSCOPY      COLONOSCOPY N/A 12/17/2014    Procedure: COMBINED COLONOSCOPY, SINGLE OR MULTIPLE BIOPSY/POLYPECTOMY BY BIOPSY;  Surgeon: Brionna  Chuy FRYE MD;  Location:  GI    COLONOSCOPY N/A 08/07/2020    Procedure: COLONOSCOPY;  Surgeon: Chuy Staton MD;  Location:  GI    CYSTOSCOPY      EYE SURGERY Left 01/2023    cornea transplant - FUCHS distrophy    HYSTERECTOMY, PAP NO LONGER INDICATED      HYSTERECTOMY, JAIME  1991    fibroid    ORTHOPEDIC SURGERY Left 07/2016    partial knee replacement    SURGICAL HISTORY OF -   10/28/2015    right partial knee     [facility] NONSPECIFIC PROCEDURE      Breast biopsies       [facility] NONSPECIFIC PROCEDURE      Symptomatic left thigh lipomas       [facility] NONSPECIFIC PROCEDURE  06/01/2009    pubovaginal sling     MEDICATIONS:  Prescription Medications as of 5/28/2025         Rx Number Disp Refills Start End Last Dispensed Date Next Fill Date Owning Pharmacy    acetaminophen (TYLENOL) 500 MG tablet  -- --  --       Sig: Take 2 tablets by mouth 3 times daily. Take an additional 1000 mg daily needed.    Class: Historical    Route: Oral    allopurinol (ZYLOPRIM) 300 MG tablet  90 tablet 1 1/15/2025 --   [facility] [address] [place] [address]    Sig: Take 1 tablet (300 mg) by mouth daily.    Class: E-Prescribe    Route: Oral    calcium citrate-vitamin D (CITRACAL) 315-6.25 MG-MCG TABS per tablet  -- --  --       Sig: Take 2 tablets by mouth daily.    Class: Historical    Route: Oral    Carboxymethylcellulose Sodium (ARTIFICIAL TEARS OP)  -- --  --       Sig: Apply to eye. One drop to left eye, one time a day    Class: Historical    Route: Ophthalmic    cycloSPORINE (RESTASIS) 0.05 % ophthalmic emulsion  -- -- 11/4/2022 --       Sig: Place 1 drop Into the left eye 2 times daily    Class: Historical    Route: Left Eye    diclofenac (VOLTAREN) 1 % topical gel  -- --  --       Sig: Apply topically 4 times daily as needed for moderate pain    Class: Historical    Route: Topical    famotidine (PEPCID) 20 MG tablet  -- --  --       Sig: Take 20 mg by mouth daily.    Class: Historical    Route: Oral     fluconazole (DIFLUCAN) 150 MG tablet  30 tablet 3 4/2/2025 --   [facility] [address] [place] [address]    Sig: Take 1 tablet (150 mg) by mouth once as needed (yeast infection).    Class: E-Prescribe    Route: Oral    fluorometholone (FML LIQUIFILM) 0.1 % ophthalmic suspension  -- -- 12/26/2023 --       Sig: Place 1 drop Into the left eye daily    Class: Historical    Route: Left Eye    furosemide (LASIX) 20 MG tablet  -- -- 2/26/2025 --       Sig: Take 1 tablet (20 mg) by mouth daily.    Class: Historical    Route: Oral    gabapentin (NEURONTIN) 300 MG capsule  270 capsule 1 3/18/2025 --   [facility] [address] [place] [address]    Sig: TAKE 3 CAPSULES BY MOUTH AT BEDTIME    Class: E-Prescribe    glipiZIDE (GLUCOTROL) 10 MG tablet  180 tablet 1 2/19/2025 --   [facility] [address] [place] [address]    Sig: TAKE 1 TABLET BY MOUTH TWICE DAILY BEFORE MEAL(S)    Class: E-Prescribe    Route: Oral    insulin  UNIT/ML vial  -- -- 4/2/2025 --       Sig: Inject 22 Units subcutaneously every evening. AT 5 PM    Class: Historical    Route: Subcutaneous    losartan (COZAAR) 50 MG tablet  90 tablet 3 4/2/2025 --   [address]    Sig: Take 1 tablet (50 mg) by mouth daily.    Class: E-Prescribe    Route: Oral    metFORMIN (GLUCOPHAGE XR) 500 MG 24 hr tablet  90 tablet 3 1/28/2025 1/23/2026   [facility] [address] [place] [address]    Sig: Take 1 tablet (500 mg) by mouth daily (with dinner).    Class: E-Prescribe    Route: Oral    methotrexate 2.5 MG tablet  -- --  --       Sig: Take 2.5 mg by mouth once a week.    Class: Historical    Route: Oral    metoprolol succinate ER (TOPROL XL) 50 MG 24 hr tablet  90 tablet 2 12/26/2024 --   [facility] [address] [place] [address]    Sig: Take 1 tablet by mouth once daily    Class: E-Prescribe    omeprazole  (PRILOSEC) 20 MG DR capsule  90 capsule 3 4/2/2025 --   [facility] [address] [place] [address]    Sig: Take 1 capsule (20 mg) by mouth daily.    Class: E-Prescribe    Notes to Pharmacy: ### DO NOT FILL NOW.  Please update patient's profile to reflect additional refills.  ####    Route: Oral    psyllium (METAMUCIL/KONSYL) 58.6 % powder  -- --  --       Sig: Take 1 teaspoonful by mouth daily.    Class: Historical    Route: Oral    rosuvastatin (CRESTOR) 20 MG tablet  90 tablet 3 3/10/2025 --   [facility] [address] [place] [address]    Sig: Take 1 tablet by mouth once daily    Class: E-Prescribe    Route: Oral    sennosides (SENOKOT) 8.6 MG tablet  -- --  --       Sig: Take 1 tablet by mouth daily as needed for constipation.    Class: Historical    Route: Oral    TUMS 500 MG OR CHEW  90 0 6/14/2007 --       Sig: Take 1 chew tab by mouth 3 times daily as needed    Class: Historical    Route: Oral    carboxymethylcellulose PF (REFRESH LIQUIGEL) 1 % ophthalmic gel  -- --  --       Sig: Place 1 drop into both eyes 3 times daily as needed for dry eyes.    Class: Historical    Route: Both Eyes    Continuous Glucose  (FREESTYLE LOVE 2 READER) ZE  1 each 0 12/17/2024 --   [facility] [address] [place] [address]    Sig: Use to read blood sugars as per 's instructions.    Class: E-Prescribe    Continuous Glucose Sensor (FREESTYLE LOVE 2 SENSOR) MISC  2 each 5 1/14/2025 --   [facility] [place], [place] - [facility] [address]    Sig: Change every 14 days.    Class: E-Prescribe    ferrous sulfate (FE TABS) 325 (65 Fe) MG EC tablet  -- --  --       Sig: Take 325 mg by mouth every other day.    Class: Historical    Route: Oral    insulin  UNIT/ML vial  -- --  --       Sig: Inject 50 Units subcutaneously every morning.    Class: Historical    Route: Subcutaneous           ALLERGIES:    Allergies   Allergen  Reactions    Amoxicillin-Pot Clavulanate      Tongue swelling and rash     Can take PCN K without reaction     Sulfa Antibiotics      Tongue swelling and rash     REVIEW OF SYSTEMS:  A comprehensive review of systems was performed and found to be negative except as described here or above.    SOCIAL HISTORY:   Social History     Socioeconomic History    Marital status:      Spouse name: Not on file    Number of children: Not on file    Years of education: Not on file    Highest education level: Not on file   Occupational History    Not on file   Tobacco Use    Smoking status: Never     Passive exposure: Never    Smokeless tobacco: Never   Vaping Use    Vaping status: Never Used   Substance and Sexual Activity    Alcohol use: No     Alcohol/week: 0.0 standard drinks of alcohol    Drug use: No    Sexual activity: Yes     Partners: Male     Birth control/protection: Post-menopausal, Female Surgical     Comment: hyst   Other Topics Concern    Parent/sibling w/ CABG, MI or angioplasty before 65F 55M? Not Asked   Social History Narrative    Not on file     Social Drivers of Health     Financial Resource Strain: Low Risk  (11/2/2023)    Financial Resource Strain     Within the past 12 months, have you or your family members you live with been unable to get utilities (heat, electricity) when it was really needed?: No   Food Insecurity: Low Risk  (11/2/2023)    Food Insecurity     Within the past 12 months, did you worry that your food would run out before you got money to buy more?: No     Within the past 12 months, did the food you bought just not last and you didn t have money to get more?: No   Transportation Needs: Low Risk  (11/2/2023)    Transportation Needs     Within the past 12 months, has lack of transportation kept you from medical appointments, getting your medicines, non-medical meetings or appointments, work, or from getting things that you need?: No   Physical Activity: Not on file   Stress: Not on  file   Social Connections: Not on file   Interpersonal Safety: Low Risk  (4/2/2025)    Interpersonal Safety     Do you feel physically and emotionally safe where you currently live?: Yes     Within the past 12 months, have you been hit, slapped, kicked or otherwise physically hurt by someone?: No     Within the past 12 months, have you been humiliated or emotionally abused in other ways by your partner or ex-partner?: No   Housing Stability: Low Risk  (11/2/2023)    Housing Stability     Do you have housing? : Yes     Are you worried about losing your housing?: No     FAMILY MEDICAL HISTORY:   Family History   Problem Relation Age of Onset    Cancer Mother         colon ca survivor    Cancer - colorectal Mother     Cerebrovascular Disease Father     No Known Problems Maternal Grandmother     No Known Problems Maternal Grandfather     No Known Problems Paternal Grandmother     No Known Problems Brother     No Known Problems Sister     No Known Problems Son     No Known Problems Daughter     No Known Problems Other      PHYSICAL EXAM:   /57   Pulse 68   Wt 81.7 kg (180 lb 3.2 oz)   LMP  (LMP Unknown)   SpO2 97%   BMI 31.42 kg/m    GENERAL APPEARANCE: alert and no distress  LUNGS: clear to auscultation   CV: regular rhythm, normal rate  SKIN: no visible rash  NEURO: mentation intact and speech normal  PSYCH: affect normal/bright   Extremities: mild edema in both hands and LE edema though left foot> right    LABS:   Recent Results (from the past 4 weeks)   Renal panel    Collection Time: 05/21/25  9:33 AM   Result Value Ref Range    Sodium 140 135 - 145 mmol/L    Potassium 4.8 3.4 - 5.3 mmol/L    Chloride 104 98 - 107 mmol/L    Carbon Dioxide (CO2) 26 22 - 29 mmol/L    Anion Gap 10 7 - 15 mmol/L    Glucose 165 (H) 70 - 99 mg/dL    Urea Nitrogen 27.4 (H) 8.0 - 23.0 mg/dL    Creatinine 1.24 (H) 0.51 - 0.95 mg/dL    GFR Estimate 43 (L) >60 mL/min/1.73m2    Calcium 9.5 8.8 - 10.4 mg/dL    Albumin 4.2 3.5 - 5.2  g/dL    Phosphorus 3.8 2.5 - 4.5 mg/dL   Hemoglobin    Collection Time: 05/21/25  9:33 AM   Result Value Ref Range    Hemoglobin 10.9 (L) 11.7 - 15.7 g/dL    MCV 85 78 - 100 fL   UA with Microscopic    Collection Time: 05/21/25  9:38 AM   Result Value Ref Range    Color Urine Yellow Colorless, Straw, Light Yellow, Yellow    Appearance Urine Clear Clear    Glucose Urine Negative Negative mg/dL    Bilirubin Urine Negative Negative    Ketones Urine Negative Negative mg/dL    Specific Gravity Urine 1.010 1.003 - 1.035    Blood Urine Negative Negative    pH Urine 5.0 5.0 - 7.0    Protein Albumin Urine Trace (A) Negative mg/dL    Urobilinogen Urine 0.2 0.2, 1.0 E.U./dL    Nitrite Urine Negative Negative    Leukocyte Esterase Urine Negative Negative   Protein  random urine    Collection Time: 05/21/25  9:38 AM   Result Value Ref Range    Total Protein Urine mg/dL 15.6   mg/dL    Total Protein Urine mg/mg Creat 0.17 0.00 - 0.20 mg/mg Cr    Creatinine Urine mg/dL 92.7 mg/dL   Albumin Random Urine Quantitative with Creat Ratio    Collection Time: 05/21/25  9:38 AM   Result Value Ref Range    Creatinine Urine mg/dL 92.7 mg/dL    Albumin Urine mg/L 45.3 mg/L    Albumin Urine mg/g Cr 48.87 (H) 0.00 - 25.00 mg/g Cr   Urine Microscopic Exam    Collection Time: 05/21/25  9:38 AM   Result Value Ref Range    Bacteria Urine Few (A) None Seen /HPF    RBC Urine 0-2 0-2 /HPF /HPF    WBC Urine 0-5 0-5 /HPF /HPF    Squamous Epithelials Urine Few (A) None Seen /LPF    Mucus Urine Present (A) None Seen /LPF    Hyaline Casts Urine 0-2 (A) None Seen /LPF     CMP  Recent Labs   Lab Test 05/21/25  0933 04/22/25  1059 02/21/25  0958 01/31/25  0922 01/08/25  1015 07/25/24  0736 07/25/24  0625 05/21/24  1308 02/27/24  1110 07/13/21  1808 07/06/21  0911 05/20/21  0859 12/01/20  1011 11/26/19  0915    140 136 136 136   < > 144   < > 139   < > 138 139 135 138   POTASSIUM 4.8 4.8 4.3 4.2 4.2   < > 4.8   < > 4.7   < > 4.8 4.4 4.6 5.1   CHLORIDE  104 107 102 101 101   < > 110*   < > 103   < > 104 106 104 107   CO2 26 22 18* 21* 25   < > 20*   < > 21*   < > 23 25 23 21   ANIONGAP 10 11 16* 14 10   < > 14   < > 15   < > 11 8 8 10   * 122* 132* 165* 195*   < > 172*  172*   < > 205*   < > 161* 235* 134* 147*   BUN 27.4* 26.0* 41.9* 41.2* 27.0*   < > 93.9*   < > 60.9*   < > 34* 20 21 31*   CR 1.24* 1.29* 1.54* 1.34* 1.16*   < > 2.19*   < > 1.86*   < > 1.17* 0.83 0.66 0.92   GFRESTIMATED 43* 41* 33* 39* 47*   < > 22*   < > 27*   < > 44* 67 84 60*   GFRESTBLACK  --   --   --   --   --   --   --   --   --   --  51* 78 >90 69   BISHOP 9.5 9.5 9.3 9.5 9.7   < > 9.3   < > 9.9   < > 9.4 9.0 9.4 9.2   PHOS 3.8  --  4.0  --   --   --   --   --  3.7  --   --   --   --   --    PROTTOTAL  --   --   --  6.9 7.0  --  6.3*  --  7.0   < > 7.1  --  7.6 7.1   ALBUMIN 4.2  --  4.0 4.2 4.2  --  3.6  --  4.4   < > 4.1  --  4.2 4.3   BILITOTAL  --   --   --  0.4 0.6  --  0.3  --  0.5   < > 0.5  --  0.8 0.5   ALKPHOS  --   --   --  119 120  --  93  --  75   < > 78  --  84 75   AST  --   --   --  23 25  --  43  --  49*   < > 15  --  18 22   ALT  --   --   --  13 13  --  42  --  58*   < > 24  --  23 31    < > = values in this interval not displayed.     CBC  Recent Labs   Lab Test 05/21/25  0933 02/21/25  0958 01/31/25  0922 01/08/25  1015 07/25/24  0625   HGB 10.9* 9.7* 9.8* 9.8* 8.6*   WBC  --  6.4 6.2 6.2 4.5   RBC  --  4.07 4.05 4.04 3.23*   HCT  --  31.8* 31.7* 31.4* 26.9*   MCV 85 78 78 78 83   MCH  --  23.8* 24.2* 24.3* 26.6   MCHC  --  30.5* 30.9* 31.2* 32.0   RDW  --  17.0* 16.3* 16.6* 15.4*   PLT  --  175 164 173 143*     INRNo lab results found.  ABGNo lab results found.   URINE STUDIES  Recent Labs   Lab Test 05/21/25  0938 03/04/25  1545 02/27/25  1152 02/21/25  1003   COLOR Yellow Yellow Yellow Yellow   APPEARANCE Clear Clear Clear Clear   URINEGLC Negative Negative Negative Negative   URINEBILI Negative Negative Negative Negative   URINEKETONE Negative Negative  Negative Negative   SG 1.010 1.010 1.010 1.020   UBLD Negative Negative Negative Negative   URINEPH 5.0 5.0 5.0 5.0   PROTEIN Trace* Negative Negative 30*   UROBILINOGEN 0.2 0.2 0.2 0.2   NITRITE Negative Negative Negative Negative   LEUKEST Negative Negative Negative Small*   RBCU 0-2 None Seen 0-2 0-2   WBCU 0-5 0-5 0-5 10-25*     No lab results found.    ASSESSMENT AND PLAN:   #CKD stage 3 with mild albuminuria that is multifactorial: uncontrolled type 2 diabetes, episodes of HIGINIO secondary to overdiuresis, colchicine use in the past and possibly NSAIDs, gout and decline related to age. I will decrease her furosemide to 20 mg. Agree with trying ozempic. The patient was instructed to keep a glycemia and BP diary. Keep the sodium intake around 2300 mg /day, follow a plant-based diet and to avoid NSAIDs     #HTN  Primary and secondary to CKD.   The patient was instructed to keep a BP diary and to communicate the results  Current medications losartan 50 mg. Metoprolol succinate 25 and furosemide 20 mg daily   - no changes today.     #Type 2 DM  Hba1c on 4/22/25 is 8, improved 8.8  - starting ozempic, takes insulin  Not on SGLT2i  - follows with MTM    #CVD/dyslipidemia  On rosuvastatin 20 mg daily as indicated for any patient with CKD above the age of 50     #Blood count  Hemoglobin 10.9  - check iron studies - ordered    #Acid-base status  CO2 level 18, down from 21   -start sodium bicarbonate supplementation 650 mg BID if remains < 22 on recheck    #Electrolytes  Na 140  K 4.8  Normal and no acute issues    #BMD  Calcium  9.5   Phosphorus  3.8  Albumin 4.2  - check vit D and PTH      #CKD journey/transplant not a candidate at this point    #Disposition: labs and follow up in August with Dr. Acosta as planned.     Colette Zayas PA-C    Visit length 15 minutes. An additional 20 minutes were spent on date of service in chart review, documentation, and other activities as noted.

## 2025-05-29 ENCOUNTER — OFFICE VISIT (OUTPATIENT)
Dept: PHARMACY | Facility: CLINIC | Age: 85
End: 2025-05-29
Payer: COMMERCIAL

## 2025-05-29 VITALS — SYSTOLIC BLOOD PRESSURE: 134 MMHG | DIASTOLIC BLOOD PRESSURE: 58 MMHG

## 2025-05-29 DIAGNOSIS — I10 HYPERTENSION GOAL BP (BLOOD PRESSURE) < 130/80: ICD-10-CM

## 2025-05-29 DIAGNOSIS — M19.90 INFLAMMATORY ARTHRITIS: ICD-10-CM

## 2025-05-29 DIAGNOSIS — G62.9 NEUROPATHY: ICD-10-CM

## 2025-05-29 DIAGNOSIS — N18.32 STAGE 3B CHRONIC KIDNEY DISEASE (H): ICD-10-CM

## 2025-05-29 DIAGNOSIS — E11.8 TYPE 2 DIABETES MELLITUS WITH COMPLICATION, WITHOUT LONG-TERM CURRENT USE OF INSULIN (H): Primary | ICD-10-CM

## 2025-05-29 RX ORDER — FOLIC ACID 1 MG/1
1 TABLET ORAL DAILY
COMMUNITY

## 2025-05-29 RX ORDER — PROCHLORPERAZINE MALEATE 10 MG
5-10 TABLET ORAL EVERY 6 HOURS PRN
COMMUNITY

## 2025-05-29 RX ORDER — HYDROCORTISONE 25 MG/G
OINTMENT TOPICAL DAILY PRN
COMMUNITY
Start: 2025-04-23

## 2025-05-29 NOTE — PATIENT INSTRUCTIONS
"Recommendations from today's MTM visit:                                                      Great job with your blood sugars!    If you have a low blood sugar, drink 1/2 cup of orange juice. Wait 15 minutes, and check blood sugar again. If blood sugar is still low after 15 minutes, have another dose of carbs like juice or crackers. Can also have a more substantial snack to maintain blood glucose.     Once you decide to start Ozempic, call the clinic and we will give you instructions on decreasing insulin before starting Ozempic.       It was great speaking with you today.  I value your experience and would be very thankful for your time in providing feedback in our clinic survey. In the next few days, you may receive an email or text message from [employer] with a link to a survey related to your  clinical pharmacist.\"     To schedule another MTM appointment, please call the clinic directly or you may call the MTM scheduling line at [phone] or toll-free at [phone].     My Clinical Pharmacist's contact information:                                                      Please feel free to contact me with any questions or concerns you have.      Nubia Hewitt, PharmD   Medication Therapy Management   Pharmacy Resident  Pager: [phone]     "

## 2025-05-29 NOTE — PROGRESS NOTES
Medication Therapy Management (MTM) Encounter    ASSESSMENT:                            Medication Adherence/Access: No issues identified.    Diabetes   Patient is close to meeting A1c goal of < 8%.  Patient is meeting goal of > 50% time in target with continuous glucose monitoring.   Re-educated patient on what is a normal blood glucose vs low and how to manage lows. Re-educated on Ozempic injection. Discussed need to reduce insulin dose when she decides to start Ozempic; she will notify us before doing this. Would recommend 10-20% decrease of total insulin dose when initiating Ozempic.      Hypertension/CKD  Patient is just above blood pressure goal of < 130/80mmHg. Because previous clinic readings have been at goal, continue to monitor for now.     Inflammation arthritis/Neuropathy  Discussed that she may not need prochlorperazine for every methotrexate dose, but could keep on hand if needed for nausea symptoms afterwards.     PLAN:                            Continue current regimen. Patient to notify MTM team if decides to start Ozempic prior to next appointment so that we can advise lowering of insulin dose.     Follow-up: Return in about 6 weeks (around 7/9/2025) for Medication Therapy Management.    SUBJECTIVE/OBJECTIVE:                          Lindsey Gary is a 84 year old female seen for a follow-up visit.       Reason for visit: Diabetes follow-up.      Allergies/ADRs: Reviewed in chart  Past Medical History: Reviewed in chart  Tobacco: She reports that she has never smoked. She has never been exposed to tobacco smoke. She has never used smokeless tobacco.  Alcohol: not currently using     Medication Adherence/Access: no issues reported, does not qualify for assistance program.   has continued to be in and out of the hospital, so she has a lot of stress at home to manage. 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    Diabetes   Glipizide 10 mg twice daily  before breakfast and lunch   NPH insulin 50 units at 8 am and 22 units at 5 pm  Metformin  mg once daily after lunch     No side effects reported. Tolerating things quite well. Is still concerned for blood glucose around 80 mg/dL, and compensating by drinking orange juice and then getting high blood glucose. Her blood glucose does not go up immediately after drinking orange juice and this worries her.   She is almost ready to try Ozempic. She confirmed with her eye doctor that it would be ok to start taking. Not ready to start today, but is thinking maybe by next visit she would start.   Diet/Exercise: patient reports exercise is minimal right now.   Of note, patient has frequent yeast infections, so SGLT2i was deemed not a great option in the past.      Blood sugar monitoring: See attached  Eye exam is up to date  Foot exam: due        Hypertension   /CKD   Metoprolol ER 50 mg daily in the evening   Losartan 50 mg daily  Furosemide 20 mg daily     No side effects. Patient feels current regimen is effective, and she is no longer having dizziness.   Using compression stockings for edema.   Patient self monitoring blood pressure. Getting on average: 122/75-77 mmHg  Follows nephrology. They recommended low sodium diet 2300 mg/day and Ozempic     Inflammation arthritis/Neuropathy  Methotrexate 2.5 mg tablets - take 3 tablets once weekly on Wednesdays - patient has taken 3 weeks thus far  Folic acid 1 mg once daily   Tylenol 1000 mg three times daily as needed   Gabapentin 900 mg every night at bedtime   Voltaren as needed - finds helpful  Prochlorperazine 10 mg every 6 hours as needed - only taking prior to weekly methotrexate dose     No side effects. Patient is happy that methotrexate has been tolerated well. She has been taking a dose of prochlorperazine prior to methotrexate dose due to fear of nausea/side effects, but has not had any.   Received prescription for methotrexate in June 2024 and was nervous about  starting and getting nausea from it. Granddaughter took it for an autoimmune disease and had bad side effects. Has just started taking this month, May of 2025.   Reports she has not seen much improvement with her arthritis pain yet. Her ankles still give her a lot of trouble and swell up with pain easily.   Following with Rheumatology.     Today's Vitals: /58   LMP  (LMP Unknown)  Patient declined taking weight today.   ----------------    I spent 30 minutes with this patient today. All changes were made via collaborative practice agreement with LILLIAM Sam CNP.     A summary of these recommendations was given to the patient.    Nubia Hewitt PharmD   Medication Therapy Management   Pharmacy Resident  Pager: [phone]    Lindsey Gary was seen independently by Dr. Nubia Hewitt.  I have reviewed and agree with the resident note and plan of care.      Radha JohnsonD, BCACP  Medication Therapy Management Provider, [facility]  [phone]        Medication Therapy Recommendations  No medication therapy recommendations to display

## 2025-06-03 ENCOUNTER — TELEPHONE (OUTPATIENT)
Dept: INTERNAL MEDICINE | Facility: CLINIC | Age: 85
End: 2025-06-03
Payer: COMMERCIAL

## 2025-06-03 DIAGNOSIS — E11.51 TYPE 2 DIABETES MELLITUS WITH DIABETIC PERIPHERAL ANGIOPATHY WITHOUT GANGRENE, WITH LONG-TERM CURRENT USE OF INSULIN (H): Primary | ICD-10-CM

## 2025-06-03 DIAGNOSIS — Z79.4 TYPE 2 DIABETES MELLITUS WITH DIABETIC PERIPHERAL ANGIOPATHY WITHOUT GANGRENE, WITH LONG-TERM CURRENT USE OF INSULIN (H): ICD-10-CM

## 2025-06-03 DIAGNOSIS — E11.51 TYPE 2 DIABETES MELLITUS WITH DIABETIC PERIPHERAL ANGIOPATHY WITHOUT GANGRENE, WITH LONG-TERM CURRENT USE OF INSULIN (H): ICD-10-CM

## 2025-06-03 DIAGNOSIS — Z79.4 TYPE 2 DIABETES MELLITUS WITH DIABETIC PERIPHERAL ANGIOPATHY WITHOUT GANGRENE, WITH LONG-TERM CURRENT USE OF INSULIN (H): Primary | ICD-10-CM

## 2025-06-03 DIAGNOSIS — I10 ESSENTIAL HYPERTENSION: ICD-10-CM

## 2025-06-03 RX ORDER — FUROSEMIDE 20 MG/1
40 TABLET ORAL DAILY
Qty: 180 TABLET | Refills: 1 | Status: SHIPPED | OUTPATIENT
Start: 2025-06-03

## 2025-06-03 NOTE — TELEPHONE ENCOUNTER
Patient calling stating her pharmacy will not refill Lasix and need provider approval.   Noted medication is historical.  Patient not sure who ordered the medication for her.  Noted in chart that may have been ordered by nephology.     Patient confirmed she is taking 2 (10) mg tablets per day.    Medication and pharmacy pended

## 2025-06-03 NOTE — TELEPHONE ENCOUNTER
Medication Question or Refill        insulin  UNIT/ML vial       insulin  UNIT/ML vial       What medication are you calling about (include dose and sig)?:     Pharmacy called and last RX that was sent in Insulin PH there were 2 RX and Insurance will not pay for both (same thing at same time), need to be combined.    Preferred Pharmacy:     [facility] [address] [place] [id number] [facility]  [address] [facility] [address]  Phone: [phone] Fax: [phone]      Controlled Substance Agreement on file:   CSA -- Patient Level:    CSA: None found at the patient level.       Who prescribed the medication?: MARY JO Wong    Do you need a refill? Yes    When did you use the medication last? na    Patient offered an appointment? No    Do you have any questions or concerns?  No      Could we send this information to you in [facility] or would you prefer to receive a phone call?:   Patient would prefer a phone call   Okay to leave a detailed message?: No at Cell number on file:    Telephone Information:   Mobile [phone]     Mirella OHARA

## 2025-06-03 NOTE — TELEPHONE ENCOUNTER
Per office visit note with MTM on 4/22/25:    PLAN:                                Diabetes   Increase Insulin NPH to 50 units in the morning and 22 units in the evening.       ---------------------------------------------------------------------------------------    Called patient to confirm dose.   Patient confirmed above dose is correct.

## 2025-06-03 NOTE — TELEPHONE ENCOUNTER
Patient reports she takes 2 tablets of the 20 mg to equal 40 mg daily. Writer had patient confirm with current prescription bottle.     Summer RN 11:38 AM Rose 3, 2025   [facility]

## 2025-06-24 ENCOUNTER — TELEPHONE (OUTPATIENT)
Dept: PHARMACY | Facility: CLINIC | Age: 85
End: 2025-06-24
Payer: COMMERCIAL

## 2025-06-24 DIAGNOSIS — E11.51 TYPE 2 DIABETES MELLITUS WITH DIABETIC PERIPHERAL ANGIOPATHY WITHOUT GANGRENE, WITH LONG-TERM CURRENT USE OF INSULIN (H): ICD-10-CM

## 2025-06-24 DIAGNOSIS — Z79.4 TYPE 2 DIABETES MELLITUS WITH DIABETIC PERIPHERAL ANGIOPATHY WITHOUT GANGRENE, WITH LONG-TERM CURRENT USE OF INSULIN (H): ICD-10-CM

## 2025-06-24 NOTE — TELEPHONE ENCOUNTER
Patient calling to report she is ready to start Ozempic and wondering how to adjust her other medications when starting. Recommend decrease insulin dose by 10-20%; will lean closer to 20% due to patient age anxiety with hypoglycemia.    New dosing instructions:   Inject insulin NPH 42 units in the morning before breakfast and 18 units at 5pm before dinner.     Patient planning to start Ozempic and change insulin dose this weekend. Patient verbalized understanding of plan. Changes made per CPA with Dinorah Wong.     Nubia Hewitt, PharmD   Medication Therapy Management   Pharmacy Resident

## 2025-06-30 DIAGNOSIS — E11.8 TYPE 2 DIABETES MELLITUS WITH COMPLICATION, WITHOUT LONG-TERM CURRENT USE OF INSULIN (H): Primary | ICD-10-CM

## 2025-06-30 NOTE — TELEPHONE ENCOUNTER
Pt is requesting new rx for    Freestyle isrrael 2 **PLUS** sensor    Did not see on active med list please verify and send new rx. Thank you!     [facility]/mail pharmacy  [phone]

## 2025-07-01 RX ORDER — BLOOD-GLUCOSE SENSOR
EACH MISCELLANEOUS
Qty: 2 EACH | Refills: 5 | Status: SHIPPED | OUTPATIENT
Start: 2025-07-01

## 2025-07-07 ENCOUNTER — PATIENT OUTREACH (OUTPATIENT)
Dept: CARE COORDINATION | Facility: CLINIC | Age: 85
End: 2025-07-07
Payer: COMMERCIAL

## 2025-07-07 DIAGNOSIS — E11.51 TYPE 2 DIABETES MELLITUS WITH DIABETIC PERIPHERAL ANGIOPATHY WITHOUT GANGRENE, WITH LONG-TERM CURRENT USE OF INSULIN (H): ICD-10-CM

## 2025-07-07 DIAGNOSIS — Z79.4 TYPE 2 DIABETES MELLITUS WITH DIABETIC PERIPHERAL ANGIOPATHY WITHOUT GANGRENE, WITH LONG-TERM CURRENT USE OF INSULIN (H): ICD-10-CM

## 2025-07-07 RX ORDER — INSULIN HUMAN 100 [IU]/ML
INJECTION, SUSPENSION SUBCUTANEOUS
Qty: 20 ML | Refills: 0 | Status: SHIPPED | OUTPATIENT
Start: 2025-07-07

## 2025-07-08 ENCOUNTER — TELEPHONE (OUTPATIENT)
Dept: PHARMACY | Facility: CLINIC | Age: 85
End: 2025-07-08
Payer: COMMERCIAL

## 2025-07-08 NOTE — TELEPHONE ENCOUNTER
Patient calling to report that she was in the emergency room at [facility] yesterday and was found to have a blood clot in her leg. They started her on Eliquis and she wants to make sure this is compatible with the rest of her medications.     Confirmed with patient that Eliquis is ok to take with the rest of her medicaitons. She is seeing her PCP tomorrow to talk further.     Nubia Hewitt PharmD   Medication Therapy Management (MTM)  Pharmacist Practitioner

## 2025-07-09 ENCOUNTER — OFFICE VISIT (OUTPATIENT)
Dept: INTERNAL MEDICINE | Facility: CLINIC | Age: 85
End: 2025-07-09
Payer: COMMERCIAL

## 2025-07-09 VITALS
OXYGEN SATURATION: 97 % | RESPIRATION RATE: 16 BRPM | WEIGHT: 180 LBS | DIASTOLIC BLOOD PRESSURE: 63 MMHG | HEART RATE: 60 BPM | HEIGHT: 64 IN | TEMPERATURE: 97.1 F | BODY MASS INDEX: 30.73 KG/M2 | SYSTOLIC BLOOD PRESSURE: 121 MMHG

## 2025-07-09 DIAGNOSIS — I10 ESSENTIAL HYPERTENSION: ICD-10-CM

## 2025-07-09 DIAGNOSIS — E11.8 TYPE 2 DIABETES MELLITUS WITH COMPLICATION, WITHOUT LONG-TERM CURRENT USE OF INSULIN (H): ICD-10-CM

## 2025-07-09 DIAGNOSIS — I82.452 ACUTE DEEP VEIN THROMBOSIS (DVT) OF LEFT PERONEAL VEIN (H): Primary | ICD-10-CM

## 2025-07-09 NOTE — PATIENT INSTRUCTIONS
Hematology referral - blood and clotting      [facility]   Reason for Referral - Consult/Transfer Care (Routine) - New Request  Scheduling Instructions   Your clinician has recommended an appointment with [facility]. You can quickly make your appointment online at [employer]/schedule. You can also call [phone] for help scheduling your appointment. We suggest you call your health insurance company about your coverage and benefits for this appointment.      Diabetic lab in a month or so

## 2025-07-09 NOTE — PROGRESS NOTES
"  Assessment & Plan     Acute deep vein thrombosis (DVT) of left peroneal vein (H)  On eliquis and tolerating medication   Her leg feels a little better after a few doses   She has a month supply and will call closer to the time of refill as she wants to think about taking it   Was suggested she could take xarelto or warfarin in place of this medication   Warfarin would be cheaper but requires blood testing   At this time want to stay with medication even though costs #200 for a month   Referral to see blood clotting / hematology    - Adult Oncology/Hematology  Referral; Future    Essential hypertension  In good range   - Adult Oncology/Hematology  Referral; Future    Type 2 diabetes mellitus with complication, without long-term current use of insulin (H)  Lab Results   Component Value Date    A1C 8.0 04/22/2025    A1C 8.8 11/11/2024    A1C 7.7 05/21/2024    A1C 7.8 10/26/2023    A1C 7.0 07/14/2023    A1C 7.6 07/06/2021    A1C 7.3 12/01/2020    A1C 6.8 11/26/2019    A1C 7.1 03/08/2019    A1C 7.0 12/31/2018   Working with [facility] for lab in a month     - Adult Oncology/Hematology  Referral; Future        48 minutes spent by me on the date of the encounter doing chart review, history and exam, documentation and further activities per the note    BMI  Estimated body mass index is 31.39 kg/m  as calculated from the following:    Height as of this encounter: 1.613 m (5' 3.5\").    Weight as of this encounter: 81.6 kg (180 lb).       Patient Instructions   Hematology referral - blood and clotting      [facility]   Reason for Referral - Consult/Transfer Care (Routine) - New Request  Scheduling Instructions   Your clinician has recommended an appointment with [facility]. You can quickly make your appointment online at [facility]/schedule. You can also call [phone] for help scheduling your appointment. We suggest you call your health insurance company about your " "coverage and benefits for this appointment.      Diabetic lab in a month or so       Subjective   Lindsey is a 84 year old, presenting for the following health issues:  Urgent Care and RECHECK      7/9/2025    12:20 PM   Additional Questions   Roomed by FROYLAN Ponce LPN   Accompanied by self         7/9/2025    12:20 PM   Patient Reported Additional Medications   Patient reports taking the following new medications none     HPI          ED/UC Followup:    Facility:  [facility]  Date of visit: 7-7-2025  Reason for visit: bruising  Current Status: 1. Positive for occlusive left peroneal vein DVT.   2. No DVT above the knee.     Eliquis $200    She was told she could change to xarelto or warfarin   Discussed both   At this time she wants to stay with eliquis     Would like to see [place] blood clotting specialist vs [facility]       Objective    Pulse 60   Temp 97.1  F (36.2  C) (Oral)   Resp 16   Ht 1.613 m (5' 3.5\")   Wt 81.6 kg (180 lb)   LMP  (LMP Unknown)   SpO2 97%   BMI 31.39 kg/m    Body mass index is 31.39 kg/m .  Physical Exam   GENERAL: alert and no distress  MS: some bruising lower left leg   PSYCH: mentation appears normal, affect normal/bright    Reviewed outside records         Signed Electronically by: LILLIAM Sam CNP    "

## 2025-07-16 NOTE — PROGRESS NOTES
Medication Therapy Management (MTM) Encounter    ASSESSMENT:                            Medication Adherence/Access: No issues identified.    Diabetes   Patient is close to meeting A1c goal of < 8%, though due for recheck (scheduled 7/30).  Patient is meeting goal of > 50% time in target with continuous glucose monitoring.   Re-educated patient on what is a normal blood glucose vs low and how to manage lows. Assured her that blood glucose in the low 100s is her goal. Ok to continue on current Ozempic dose per patient preference, but would recommend continue to assess readiness for increased dose in order to see more weight loss and reduce insulin/glipizide.      Hypertension/CKD/DVT  Patient is meeting blood pressure goal of < 130/80mmHg.  Would be reasonable to try for Xarelto PAP for better price (patient aware she may not qualify). Would recommend standard DVT dosing per kidney function > 30 mL/min.    Constipation  Stable, continue current regimen.       PLAN:                            Will ask Dinorah Wong about trying to get Xarelto covered by prescription assistance instead of Eliquis.     Addendum 7/23: approved by Dinorah Wong; prescription sent.    Follow-up: Return in about 8 weeks (around 9/17/2025) for Medication Therapy Management.    SUBJECTIVE/OBJECTIVE:                          Lindsey Gary is a 84 year old female seen for a follow-up visit.       Reason for visit: diabetes follow-up and cost of Xarelto.     Allergies/ADRs: Reviewed in chart  Past Medical History: Reviewed in chart  Tobacco: She reports that she has never smoked. She has never been exposed to tobacco smoke. She has never used smokeless tobacco.  Alcohol: not currently using     Medication Adherence/Access: no issues reported, does not qualify for assistance program for Ozempic.   has continued to be in and out of the hospital, so she has a lot of stress at home to manage. She is working on focusing on her own health  while being her 's caretaker.    Diabetes   Glipizide 10 mg twice daily before breakfast and lunch   NPH insulin 42 units at 8 am and 18 units at 5 pm  Metformin  mg once daily after lunch   Ozempic 0.25 mg weekly on Thursdays (3rd dose due this week)    No side effects reported. Tolerating new Ozempic quite well and is happy about this; not ready to increase dose. She has a busy August with family events and does not want any more change.  Still gets very concerned when blood glucose is in low 100s and will drink orange juice when this happens. Sometimes sets an alarm to get up in the middle of the night to check her blood glucose due to anxiety. 1 instance of low blood glucose on isrrael report in the last 2 weeks.   Diet/Exercise: Appetite has not changed much yet on Ozempic. Yogurt for breakfast, chicken salad sandwich for lunch, fried egg sandwich for dinner. Fish once weekly. Patient reports exercise is minimal right now.   Of note, patient has frequent yeast infections, so SGLT2i was deemed not a great option in the past.   Starting weight: 189 lbs      Blood sugar monitoring: See attached  Eye exam is up to date  Foot exam: due          Hypertension   /CKD/DVT   Metoprolol ER 50 mg daily in the evening   Losartan 50 mg daily  Furosemide 40 mg daily   Eliquis 5 mg twice daily     No side effects. Recently started Eliquis for DVT. This has been very expensive for her, >$200 per month. Does not want to switch to warfarin due to frequent lab testing.   Using compression stockings for edema occasionally; edema has been stable recently (minus swelling with DVT).   Patient self monitoring blood pressure. Getting on average: 122/75-77 mmHg  Follows nephrology. They recommended low sodium diet 2300 mg/day.  eGFR per CKD-Epi equation adjusted for patient's own height and body weight (per Scr 1.34 on 7/7/25): 43 mL/min     Constipation   Metamucil 1 tsp daily   Senna 8.6 mg - 1-2 tablets daily as needed    Docusate 150 mg daily     Has 1 BM daily that is comfortable. Is very conscious of this due to hemorrhoids. Reports mounjaro has not changed her Bms as of now.   Patient feels that current therapy is effective.   Patient reports no current medication side effects.         Today's Vitals: /72   Pulse 65   Wt 189 lb (85.7 kg)   LMP  (LMP Unknown)   SpO2 95%   BMI 32.95 kg/m    ----------------    I spent 45 minutes with this patient today. All changes were made via collaborative practice agreement with LILLIAM Sam CNP.     A summary of these recommendations was given to the patient.    Nubia Hewitt, RadhaD   Medication Therapy Management (MTM)  Pharmacist Practitioner       Medication Therapy Recommendations  Acute deep vein thrombosis (DVT) of proximal vein of left lower extremity (H)   1 Current Medication: apixaban ANTICOAGULANT (ELIQUIS) 5 MG tablet   Current Medication Sig: Take 5 mg by mouth 2 times daily. First week is 2 tabs bid.   Rationale: Cannot afford medication product - Cost - Adherence   Recommendation: Change Medication - XARELTO ANTICOAGULANT 20 MG Tabs   Status: Accepted per Provider   Identified Date: 7/22/2025 Completed Date: 7/23/2025

## 2025-07-22 ENCOUNTER — OFFICE VISIT (OUTPATIENT)
Dept: PHARMACY | Facility: CLINIC | Age: 85
End: 2025-07-22
Payer: COMMERCIAL

## 2025-07-22 VITALS
DIASTOLIC BLOOD PRESSURE: 72 MMHG | WEIGHT: 189 LBS | HEART RATE: 65 BPM | SYSTOLIC BLOOD PRESSURE: 125 MMHG | BODY MASS INDEX: 32.95 KG/M2 | OXYGEN SATURATION: 95 %

## 2025-07-22 DIAGNOSIS — N18.32 STAGE 3B CHRONIC KIDNEY DISEASE (H): ICD-10-CM

## 2025-07-22 DIAGNOSIS — K59.00 CONSTIPATION, UNSPECIFIED CONSTIPATION TYPE: ICD-10-CM

## 2025-07-22 DIAGNOSIS — I10 HYPERTENSION GOAL BP (BLOOD PRESSURE) < 130/80: ICD-10-CM

## 2025-07-22 DIAGNOSIS — E11.51 TYPE 2 DIABETES MELLITUS WITH DIABETIC PERIPHERAL ANGIOPATHY WITHOUT GANGRENE, WITH LONG-TERM CURRENT USE OF INSULIN (H): Primary | ICD-10-CM

## 2025-07-22 DIAGNOSIS — I82.4Y2 ACUTE DEEP VEIN THROMBOSIS (DVT) OF PROXIMAL VEIN OF LEFT LOWER EXTREMITY (H): ICD-10-CM

## 2025-07-22 DIAGNOSIS — Z79.4 TYPE 2 DIABETES MELLITUS WITH DIABETIC PERIPHERAL ANGIOPATHY WITHOUT GANGRENE, WITH LONG-TERM CURRENT USE OF INSULIN (H): Primary | ICD-10-CM

## 2025-07-22 PROCEDURE — 99607 MTMS BY PHARM ADDL 15 MIN: CPT

## 2025-07-22 PROCEDURE — 3078F DIAST BP <80 MM HG: CPT

## 2025-07-22 PROCEDURE — 3074F SYST BP LT 130 MM HG: CPT

## 2025-07-22 PROCEDURE — 99606 MTMS BY PHARM EST 15 MIN: CPT

## 2025-07-22 NOTE — PATIENT INSTRUCTIONS
"Recommendations from today's MTM visit:                                                      Will ask Dinorah Marvin about trying to get Xarelto covered by prescription assistance instead of Eliquis.     Follow-up: 2 months     It was great speaking with you today.  I value your experience and would be very thankful for your time in providing feedback in our clinic survey. In the next few days, you may receive an email or text message from [employer] with a link to a survey related to your  clinical pharmacist.\"     To schedule another MTM appointment, please call the clinic directly or you may call the MTM scheduling line at [phone] or toll-free at [phone].     My Clinical Pharmacist's contact information:                                                      Please feel free to contact me with any questions or concerns you have.      Nubia Hewitt, PharmD   Medication Therapy Management (MTM)  Pharmacist Practitioner       "

## 2025-07-22 NOTE — Clinical Note
Linus Copeland,   Saw Lindsey today and her Eliquis was quite expensive. I discussed with her trying an application to the Xarelto prescription assistance program to see if she would qualify for free Xarelto. If she does, she could take the standard Xarelto dose for DVT treatment per her kidney function. Would you be ok if we try the application? If approved, we probably wouldn't be able to switch for a couple months. She is also aware she may not qualify.   Please let me know and I can get the paperwork going. Thanks! Nubia Hewitt, RadhaD  Medication Therapy Management (MTM) Pharmacist Practitioner

## 2025-07-23 ENCOUNTER — TELEPHONE (OUTPATIENT)
Dept: INTERNAL MEDICINE | Facility: CLINIC | Age: 85
End: 2025-07-23
Payer: COMMERCIAL

## 2025-07-23 NOTE — TELEPHONE ENCOUNTER
Is this a new referral or dose change: new patient    Patient preferred phone number (please verify with pt): [phone]    Medications requested: Xarelto 20 mg daily   (Note: these are the only medications that PAP applications will be sent. Meds should be active on the med list)    Additional helpful info for PAP team: N/A    Notes for MTM team:   Route TE to p RXASSIST

## 2025-07-23 NOTE — TELEPHONE ENCOUNTER
Patient calls stating she was diagnosed with blood clot in leg a few weeks ago. She is taking Eliquis but has some questions about the pain and activity level.     States she is going on 30-60 car ride today, to lunch, then car ride back and if this is OK .?    She asks how long she will have pain and what she can do?    Care advise given, sitting/car ride 2 hours or more, she would need to move or walk around is recommended. Her luncheon today should be fine, since will be in and out of car.   To use compression stockings, she states she has these on already.     Advised pain may be for several weeks, could be a few months but should lessen.     Can try Tylenol, heating pad, roll on lidocaine type product. She verbalized understanding.   She states she will continue her stockings and is also elevating as much as possible.

## 2025-07-24 DIAGNOSIS — N18.32 STAGE 3B CHRONIC KIDNEY DISEASE (H): Primary | ICD-10-CM

## 2025-07-29 ENCOUNTER — NURSE TRIAGE (OUTPATIENT)
Dept: NURSING | Facility: CLINIC | Age: 85
End: 2025-07-29
Payer: COMMERCIAL

## 2025-07-29 DIAGNOSIS — E11.8 TYPE 2 DIABETES MELLITUS WITH COMPLICATION, WITHOUT LONG-TERM CURRENT USE OF INSULIN (H): Primary | ICD-10-CM

## 2025-07-29 NOTE — TELEPHONE ENCOUNTER
"Pt called with concerns regarding low blood glucose readings. Over the past hour, Juan Francisco sensor readings were 108 - 111 - 125 -137 mg/dL. Pt consumed sugar and ate yogurt at 1:20 AM; current BG is 140 mg/dL.    Pt is asymptomatic; denies dizziness, fatigue, vomiting, or diaphoresis.    Pt does not have manual glucometer.    Pt expressed concern about a symbol on the newly placed Juan Francisco sensor (applied at 9:00 PM), described as a balloon with a drop of red blood. She inquired about its meaning.    Per Juan Francisco sensor website:       \"When you see this symbol during the first 12 hours of wearing a Sensor, confirm Sensor glucose readings with a blood glucose test before treatment.\"    FNA explained per [employer] that when applying a new FreeStyle Juan Francisco sensor, it's recommended to wait at least 12 hours after applying the sensor before initiating it, allowing for proper calibration and body acclimatization. Some users even prefer waiting 12-24 hours before starting the sensor to potentially improve first-day accuracy.This period allows the sensor to settle into the tissue and for the interstitial fluid to stabilize, which can reduce the chance of inaccurate readings or error message. Pt does not have a manual glucometer.    FNA advised home care and informed to call back with any symptoms of hypoglycemia, and if BG reads < 100. Pt verbalized understanding.    Michelle Mg RN/[place] Nurse Advisor        Reason for Disposition   Low blood sugar definition and treatment, questions about    Additional Information   Negative: Unconscious or difficult to awaken   Negative: Seizure occurs   Negative: Acting confused (e.g., disoriented, slurred speech)   Negative: Very weak (e.g., can't stand)   Negative: Sounds like a life-threatening emergency to the triager   Negative: [1] Vomiting AND [2] signs of dehydration (e.g., very dry mouth, lightheaded, dark urine)   Negative: [1] Low blood sugar symptoms persist > 30 minutes " AND [2] using low blood sugar Care Advice   Negative: [1] Low blood glucose (70 mg/dl [3.9 mmol/l] or below) persists > 30 minutes AND [2] using low blood sugar Care Advice   Negative: Patient sounds very sick or weak to the triager   Negative: [1] Low blood sugar symptoms with no other adult present AND [2] hasn't tried Care Advice   Negative: [1] Low blood glucose (70 mg/dl [3.9 mmol/l] or below)) with no other adult present AND [2] hasn't tried Care Advice   Negative: Diabetes drug error or overdose (e.g., insulin error or extra dose)   Negative: [1] Caller has URGENT medication or insulin pump question AND [2] triager unable to answer question   Negative: [1] Blood glucose 70  mg/dL (3.9 mmol/L) or below OR symptomatic, now improved with Care Advice AND [2] cause unknown   Negative: [1] Caller has NON-URGENT medication or insulin pump question AND [2] triager unable to answer question   Negative: [1] Morning (before breakfast) blood glucose < 80 mg/dL (4.4 mmol/L) AND [2] more than once in past week   Negative: [1] Evening (after bedtime snack) blood glucose < 100 mg/dL (5.6 mmol/L) AND [2] more than once in past week   Negative: [1] Blood glucose 70 mg/dl (3.9 mmol/l) or below, OR symptomatic AND [2] cause known    Protocols used: Diabetes - Low Blood Sugar-A-

## 2025-07-29 NOTE — TELEPHONE ENCOUNTER
"Called patient to follow-up and answer questions:     She asked about the same icon on her isrrael sensor as described below. I relayed the same information, that this icon means the sensor is \"warming up\" and she should use a finger poke to determine blood glucose if the numbers seem off or if she is feeling like she might be having a low. Patient states she is out of finger poke supplies; sent prescription for supplies per CPA with Dinorah Wong.   Patient was concerned for blood glucose in low 100s last night and she could not \"keep her sugars up\" after drinking orange juice, having yogurt, and having a spoonful of sugar. Assured patient that any blood glucose over 80 mg/dL is perfectly safe. Reviewed isrrael report and blood glucose appears to be in range with very infrequent lows. Re-educated on safe levels and how to read arrows on isrrael reader to know if blood glucose is going up or down.   Answered all questions; patient verbalized understanding of plan. No changes to medication doses necessary at this time.     Nubia Hewitt, PharmD   Medication Therapy Management (MTM)  Pharmacist Practitioner            "

## 2025-07-30 ENCOUNTER — LAB (OUTPATIENT)
Dept: LAB | Facility: CLINIC | Age: 85
End: 2025-07-30
Payer: COMMERCIAL

## 2025-07-30 DIAGNOSIS — Z79.4 TYPE 2 DIABETES MELLITUS WITH DIABETIC PERIPHERAL ANGIOPATHY WITHOUT GANGRENE, WITH LONG-TERM CURRENT USE OF INSULIN (H): ICD-10-CM

## 2025-07-30 DIAGNOSIS — E11.8 TYPE 2 DIABETES MELLITUS WITH COMPLICATION, WITHOUT LONG-TERM CURRENT USE OF INSULIN (H): ICD-10-CM

## 2025-07-30 DIAGNOSIS — I10 ESSENTIAL HYPERTENSION: ICD-10-CM

## 2025-07-30 DIAGNOSIS — Z13.6 SCREENING FOR CARDIOVASCULAR CONDITION: Primary | ICD-10-CM

## 2025-07-30 DIAGNOSIS — N18.32 STAGE 3B CHRONIC KIDNEY DISEASE (H): ICD-10-CM

## 2025-07-30 DIAGNOSIS — N18.32 ANEMIA IN STAGE 3B CHRONIC KIDNEY DISEASE (H): ICD-10-CM

## 2025-07-30 DIAGNOSIS — D63.1 ANEMIA IN STAGE 3B CHRONIC KIDNEY DISEASE (H): ICD-10-CM

## 2025-07-30 DIAGNOSIS — E11.51 TYPE 2 DIABETES MELLITUS WITH DIABETIC PERIPHERAL ANGIOPATHY WITHOUT GANGRENE, WITH LONG-TERM CURRENT USE OF INSULIN (H): ICD-10-CM

## 2025-07-30 LAB
ALBUMIN SERPL BCG-MCNC: 4.3 G/DL (ref 3.5–5.2)
ANION GAP SERPL CALCULATED.3IONS-SCNC: 11 MMOL/L (ref 7–15)
BUN SERPL-MCNC: 17.1 MG/DL (ref 8–23)
CALCIUM SERPL-MCNC: 9.6 MG/DL (ref 8.8–10.4)
CHLORIDE SERPL-SCNC: 97 MMOL/L (ref 98–107)
CHOLEST SERPL-MCNC: 121 MG/DL
CREAT SERPL-MCNC: 1.1 MG/DL (ref 0.51–0.95)
EGFRCR SERPLBLD CKD-EPI 2021: 50 ML/MIN/1.73M2
ERYTHROCYTE [DISTWIDTH] IN BLOOD BY AUTOMATED COUNT: 15.9 % (ref 10–15)
EST. AVERAGE GLUCOSE BLD GHB EST-MCNC: 183 MG/DL
FASTING STATUS PATIENT QL REPORTED: ABNORMAL
FERRITIN SERPL-MCNC: 35 NG/ML (ref 11–328)
GLUCOSE SERPL-MCNC: 127 MG/DL (ref 70–99)
HBA1C MFR BLD: 8 % (ref 0–5.6)
HCO3 SERPL-SCNC: 30 MMOL/L (ref 22–29)
HCT VFR BLD AUTO: 34.4 % (ref 35–47)
HDLC SERPL-MCNC: 28 MG/DL
HGB BLD-MCNC: 11.2 G/DL (ref 11.7–15.7)
IRON BINDING CAPACITY (ROCHE): 308 UG/DL (ref 240–430)
IRON SATN MFR SERPL: 15 % (ref 15–46)
IRON SERPL-MCNC: 47 UG/DL (ref 37–145)
LDLC SERPL CALC-MCNC: 56 MG/DL
MCH RBC QN AUTO: 29.8 PG (ref 26.5–33)
MCHC RBC AUTO-ENTMCNC: 32.6 G/DL (ref 31.5–36.5)
MCV RBC AUTO: 92 FL (ref 78–100)
NONHDLC SERPL-MCNC: 93 MG/DL
PHOSPHATE SERPL-MCNC: 4.1 MG/DL (ref 2.5–4.5)
PLATELET # BLD AUTO: 158 10E3/UL (ref 150–450)
POTASSIUM SERPL-SCNC: 4.5 MMOL/L (ref 3.4–5.3)
PTH-INTACT SERPL-MCNC: 98 PG/ML (ref 15–65)
RBC # BLD AUTO: 3.76 10E6/UL (ref 3.8–5.2)
SODIUM SERPL-SCNC: 138 MMOL/L (ref 135–145)
TRIGL SERPL-MCNC: 185 MG/DL
VIT D+METAB SERPL-MCNC: 19 NG/ML (ref 20–50)
WBC # BLD AUTO: 4.9 10E3/UL (ref 4–11)

## 2025-07-30 PROCEDURE — 83550 IRON BINDING TEST: CPT

## 2025-07-30 PROCEDURE — 80069 RENAL FUNCTION PANEL: CPT

## 2025-07-30 PROCEDURE — 83540 ASSAY OF IRON: CPT

## 2025-07-30 PROCEDURE — 83036 HEMOGLOBIN GLYCOSYLATED A1C: CPT

## 2025-07-30 PROCEDURE — 85027 COMPLETE CBC AUTOMATED: CPT

## 2025-07-30 PROCEDURE — 36415 COLL VENOUS BLD VENIPUNCTURE: CPT

## 2025-07-30 PROCEDURE — 83970 ASSAY OF PARATHORMONE: CPT

## 2025-07-30 PROCEDURE — 3048F LDL-C <100 MG/DL: CPT

## 2025-07-30 PROCEDURE — 80061 LIPID PANEL: CPT

## 2025-07-30 PROCEDURE — 82306 VITAMIN D 25 HYDROXY: CPT

## 2025-07-30 PROCEDURE — 3052F HG A1C>EQUAL 8.0%<EQUAL 9.0%: CPT

## 2025-07-30 PROCEDURE — 82728 ASSAY OF FERRITIN: CPT

## 2025-07-31 ENCOUNTER — TELEPHONE (OUTPATIENT)
Dept: PHARMACY | Facility: CLINIC | Age: 85
End: 2025-07-31
Payer: COMMERCIAL

## 2025-07-31 DIAGNOSIS — Z79.4 TYPE 2 DIABETES MELLITUS WITH DIABETIC PERIPHERAL ANGIOPATHY WITHOUT GANGRENE, WITH LONG-TERM CURRENT USE OF INSULIN (H): ICD-10-CM

## 2025-07-31 DIAGNOSIS — E11.51 TYPE 2 DIABETES MELLITUS WITH DIABETIC PERIPHERAL ANGIOPATHY WITHOUT GANGRENE, WITH LONG-TERM CURRENT USE OF INSULIN (H): ICD-10-CM

## 2025-07-31 RX ORDER — INSULIN HUMAN 100 [IU]/ML
INJECTION, SUSPENSION SUBCUTANEOUS
COMMUNITY
Start: 2025-07-31

## 2025-07-31 NOTE — TELEPHONE ENCOUNTER
Patient calling with concerns about low blood glucose. Reports she was in the 80s last night which very much worries her. Also reports she was in the 80s yesterday afternoon. Reports she has been in the 80s twice in the past week. When she is in the 80s she feels that she is having an out of body experience. It makes her anxious and she will be up all night drinking juice trying to correct. Reports blood glucose during the day are generally just fine.     Recommend reduce NPH insulin to 16 units in the evening. Continue with 42 units in the morning. Changes made using CPA with Dinorah Wong.     Nubia Hewitt, PharmD   Medication Therapy Management (MTM)  Pharmacist Practitioner

## 2025-08-01 LAB
ALBUMIN MFR UR ELPH: 9.6 MG/DL
ALBUMIN UR-MCNC: NEGATIVE MG/DL
APPEARANCE UR: CLEAR
BACTERIA #/AREA URNS HPF: ABNORMAL /HPF
BILIRUB UR QL STRIP: NEGATIVE
COLOR UR AUTO: YELLOW
CREAT UR-MCNC: 42 MG/DL
CREAT UR-MCNC: 42 MG/DL
GLUCOSE UR STRIP-MCNC: NEGATIVE MG/DL
GRAN CASTS #/AREA URNS LPF: ABNORMAL /LPF
HGB UR QL STRIP: NEGATIVE
KETONES UR STRIP-MCNC: NEGATIVE MG/DL
LEUKOCYTE ESTERASE UR QL STRIP: ABNORMAL
MICROALBUMIN UR-MCNC: 26.6 MG/L
MICROALBUMIN/CREAT UR: 63.33 MG/G CR (ref 0–25)
MUCOUS THREADS #/AREA URNS LPF: PRESENT /LPF
NITRATE UR QL: NEGATIVE
PH UR STRIP: 5.5 [PH] (ref 5–7)
PROT/CREAT 24H UR: 0.23 MG/MG CR (ref 0–0.2)
RBC #/AREA URNS AUTO: ABNORMAL /HPF
SP GR UR STRIP: 1.01 (ref 1–1.03)
SQUAMOUS #/AREA URNS AUTO: ABNORMAL /LPF
UROBILINOGEN UR STRIP-ACNC: 0.2 E.U./DL
WBC #/AREA URNS AUTO: ABNORMAL /HPF

## 2025-08-01 PROCEDURE — 82043 UR ALBUMIN QUANTITATIVE: CPT

## 2025-08-01 PROCEDURE — 82570 ASSAY OF URINE CREATININE: CPT

## 2025-08-01 PROCEDURE — 81001 URINALYSIS AUTO W/SCOPE: CPT

## 2025-08-01 PROCEDURE — 84156 ASSAY OF PROTEIN URINE: CPT

## 2025-08-05 ENCOUNTER — NURSE TRIAGE (OUTPATIENT)
Dept: NURSING | Facility: CLINIC | Age: 85
End: 2025-08-05
Payer: COMMERCIAL

## 2025-08-06 ENCOUNTER — TELEPHONE (OUTPATIENT)
Dept: NEPHROLOGY | Facility: CLINIC | Age: 85
End: 2025-08-06

## 2025-08-06 ENCOUNTER — TELEPHONE (OUTPATIENT)
Dept: PHARMACY | Facility: CLINIC | Age: 85
End: 2025-08-06

## 2025-08-07 ENCOUNTER — DOCUMENTATION ONLY (OUTPATIENT)
Dept: ANTICOAGULATION | Facility: CLINIC | Age: 85
End: 2025-08-07
Payer: COMMERCIAL

## 2025-08-11 ENCOUNTER — OFFICE VISIT (OUTPATIENT)
Dept: ENDOCRINOLOGY | Facility: CLINIC | Age: 85
End: 2025-08-11
Payer: COMMERCIAL

## 2025-08-11 VITALS
DIASTOLIC BLOOD PRESSURE: 60 MMHG | RESPIRATION RATE: 18 BRPM | TEMPERATURE: 97.3 F | BODY MASS INDEX: 30.9 KG/M2 | HEART RATE: 66 BPM | WEIGHT: 181 LBS | SYSTOLIC BLOOD PRESSURE: 140 MMHG | HEIGHT: 64 IN | OXYGEN SATURATION: 98 %

## 2025-08-11 DIAGNOSIS — Z79.4 TYPE 2 DIABETES MELLITUS WITH RETINOPATHY OF BOTH EYES, WITH LONG-TERM CURRENT USE OF INSULIN, MACULAR EDEMA PRESENCE UNSPECIFIED, UNSPECIFIED RETINOPATHY SEVERITY (H): Primary | ICD-10-CM

## 2025-08-11 DIAGNOSIS — E11.51 TYPE 2 DIABETES MELLITUS WITH DIABETIC PERIPHERAL ANGIOPATHY WITHOUT GANGRENE, WITH LONG-TERM CURRENT USE OF INSULIN (H): ICD-10-CM

## 2025-08-11 DIAGNOSIS — E11.319 TYPE 2 DIABETES MELLITUS WITH RETINOPATHY OF BOTH EYES, WITH LONG-TERM CURRENT USE OF INSULIN, MACULAR EDEMA PRESENCE UNSPECIFIED, UNSPECIFIED RETINOPATHY SEVERITY (H): Primary | ICD-10-CM

## 2025-08-11 DIAGNOSIS — Z79.4 TYPE 2 DIABETES MELLITUS WITH DIABETIC PERIPHERAL ANGIOPATHY WITHOUT GANGRENE, WITH LONG-TERM CURRENT USE OF INSULIN (H): ICD-10-CM

## 2025-08-11 PROCEDURE — 99204 OFFICE O/P NEW MOD 45 MIN: CPT | Performed by: PHYSICIAN ASSISTANT

## 2025-08-11 PROCEDURE — 3078F DIAST BP <80 MM HG: CPT | Performed by: PHYSICIAN ASSISTANT

## 2025-08-11 PROCEDURE — 99207 PR FOOT EXAM NO CHARGE: CPT | Performed by: PHYSICIAN ASSISTANT

## 2025-08-11 PROCEDURE — 3077F SYST BP >= 140 MM HG: CPT | Performed by: PHYSICIAN ASSISTANT

## 2025-08-11 RX ORDER — CHOLECALCIFEROL (VITAMIN D3) 50 MCG
1 TABLET ORAL DAILY
COMMUNITY

## 2025-08-11 RX ORDER — LANCETS
EACH MISCELLANEOUS
COMMUNITY
Start: 2025-07-29

## 2025-08-12 DIAGNOSIS — E11.9 TYPE 2 DIABETES MELLITUS WITHOUT COMPLICATION, WITHOUT LONG-TERM CURRENT USE OF INSULIN (H): ICD-10-CM

## 2025-08-12 RX ORDER — INSULIN HUMAN 100 [IU]/ML
INJECTION, SUSPENSION SUBCUTANEOUS
Qty: 20 ML | Refills: 5 | Status: SHIPPED | OUTPATIENT
Start: 2025-08-12

## 2025-08-12 RX ORDER — GLIPIZIDE 10 MG/1
10 TABLET ORAL DAILY
Qty: 90 TABLET | Refills: 1 | Status: SHIPPED | OUTPATIENT
Start: 2025-08-12

## 2025-08-16 ENCOUNTER — HEALTH MAINTENANCE LETTER (OUTPATIENT)
Age: 85
End: 2025-08-16

## 2025-08-18 ENCOUNTER — TRANSFERRED RECORDS (OUTPATIENT)
Dept: HEALTH INFORMATION MANAGEMENT | Facility: CLINIC | Age: 85
End: 2025-08-18
Payer: COMMERCIAL

## 2025-08-18 LAB — RETINOPATHY: POSITIVE

## 2025-08-20 ENCOUNTER — VIRTUAL VISIT (OUTPATIENT)
Dept: INTERNAL MEDICINE | Facility: CLINIC | Age: 85
End: 2025-08-20
Payer: COMMERCIAL

## 2025-08-20 DIAGNOSIS — E11.51 TYPE 2 DIABETES MELLITUS WITH DIABETIC PERIPHERAL ANGIOPATHY WITHOUT GANGRENE, WITH LONG-TERM CURRENT USE OF INSULIN (H): Primary | ICD-10-CM

## 2025-08-20 DIAGNOSIS — Z79.4 TYPE 2 DIABETES MELLITUS WITH DIABETIC PERIPHERAL ANGIOPATHY WITHOUT GANGRENE, WITH LONG-TERM CURRENT USE OF INSULIN (H): Primary | ICD-10-CM

## 2025-08-20 DIAGNOSIS — Z79.4 TYPE 2 DIABETES MELLITUS WITH OTHER SPECIFIED COMPLICATION, WITH LONG-TERM CURRENT USE OF INSULIN (H): ICD-10-CM

## 2025-08-20 DIAGNOSIS — E11.69 TYPE 2 DIABETES MELLITUS WITH OTHER SPECIFIED COMPLICATION, WITH LONG-TERM CURRENT USE OF INSULIN (H): ICD-10-CM

## 2025-08-20 PROCEDURE — 98013 SYNCH AUDIO-ONLY EST LOW 20: CPT | Performed by: NURSE PRACTITIONER

## 2025-08-20 ASSESSMENT — ANXIETY QUESTIONNAIRES: GAD7 TOTAL SCORE: INCOMPLETE

## 2025-08-27 ENCOUNTER — OFFICE VISIT (OUTPATIENT)
Dept: PHARMACY | Facility: CLINIC | Age: 85
End: 2025-08-27
Payer: COMMERCIAL

## 2025-08-27 VITALS
DIASTOLIC BLOOD PRESSURE: 65 MMHG | BODY MASS INDEX: 32.78 KG/M2 | HEART RATE: 63 BPM | OXYGEN SATURATION: 99 % | SYSTOLIC BLOOD PRESSURE: 141 MMHG | WEIGHT: 188 LBS

## 2025-08-27 DIAGNOSIS — M85.80 OSTEOPENIA, UNSPECIFIED LOCATION: ICD-10-CM

## 2025-08-27 DIAGNOSIS — K59.00 CONSTIPATION, UNSPECIFIED CONSTIPATION TYPE: ICD-10-CM

## 2025-08-27 DIAGNOSIS — M19.90 INFLAMMATORY ARTHRITIS: ICD-10-CM

## 2025-08-27 DIAGNOSIS — Z79.4 TYPE 2 DIABETES MELLITUS WITH DIABETIC PERIPHERAL ANGIOPATHY WITHOUT GANGRENE, WITH LONG-TERM CURRENT USE OF INSULIN (H): Primary | ICD-10-CM

## 2025-08-27 DIAGNOSIS — I82.4Y2 ACUTE DEEP VEIN THROMBOSIS (DVT) OF PROXIMAL VEIN OF LEFT LOWER EXTREMITY (H): ICD-10-CM

## 2025-08-27 DIAGNOSIS — N18.32 STAGE 3B CHRONIC KIDNEY DISEASE (H): ICD-10-CM

## 2025-08-27 DIAGNOSIS — E11.51 TYPE 2 DIABETES MELLITUS WITH DIABETIC PERIPHERAL ANGIOPATHY WITHOUT GANGRENE, WITH LONG-TERM CURRENT USE OF INSULIN (H): Primary | ICD-10-CM

## 2025-08-27 DIAGNOSIS — G62.9 NEUROPATHY: ICD-10-CM

## 2025-08-27 DIAGNOSIS — E66.811 CLASS 1 OBESITY WITH SERIOUS COMORBIDITY AND BODY MASS INDEX (BMI) OF 32.0 TO 32.9 IN ADULT, UNSPECIFIED OBESITY TYPE: ICD-10-CM

## 2025-08-27 DIAGNOSIS — I10 HYPERTENSION GOAL BP (BLOOD PRESSURE) < 130/80: ICD-10-CM

## 2025-08-27 PROCEDURE — 3078F DIAST BP <80 MM HG: CPT

## 2025-08-27 PROCEDURE — 99607 MTMS BY PHARM ADDL 15 MIN: CPT

## 2025-08-27 PROCEDURE — 99606 MTMS BY PHARM EST 15 MIN: CPT

## 2025-08-27 PROCEDURE — 3077F SYST BP >= 140 MM HG: CPT

## 2025-08-27 RX ORDER — INSULIN HUMAN 100 [IU]/ML
INJECTION, SUSPENSION SUBCUTANEOUS
Qty: 20 ML | Refills: 5 | Status: SHIPPED | OUTPATIENT
Start: 2025-08-27

## 2025-09-04 DIAGNOSIS — M10.9 GOUT, UNSPECIFIED CAUSE, UNSPECIFIED CHRONICITY, UNSPECIFIED SITE: ICD-10-CM

## 2025-09-04 DIAGNOSIS — G62.9 NEUROPATHY: ICD-10-CM

## 2025-09-04 RX ORDER — ALLOPURINOL 300 MG/1
300 TABLET ORAL DAILY
Qty: 90 TABLET | Refills: 1 | Status: SHIPPED | OUTPATIENT
Start: 2025-09-04

## 2025-09-04 RX ORDER — GABAPENTIN 300 MG/1
900 CAPSULE ORAL AT BEDTIME
Qty: 270 CAPSULE | Refills: 1 | Status: SHIPPED | OUTPATIENT
Start: 2025-09-04

## (undated) DEVICE — KIT ENDO TURNOVER/PROCEDURE W/CLEAN A SCOPE LINERS 103888

## (undated) RX ORDER — FENTANYL CITRATE 50 UG/ML
INJECTION, SOLUTION INTRAMUSCULAR; INTRAVENOUS
Status: DISPENSED
Start: 2020-08-07